# Patient Record
Sex: MALE | Race: WHITE | NOT HISPANIC OR LATINO | Employment: UNEMPLOYED | URBAN - METROPOLITAN AREA
[De-identification: names, ages, dates, MRNs, and addresses within clinical notes are randomized per-mention and may not be internally consistent; named-entity substitution may affect disease eponyms.]

---

## 2017-01-03 ENCOUNTER — OFFICE VISIT (OUTPATIENT)
Dept: ORTHOPEDICS | Facility: CLINIC | Age: 52
End: 2017-01-03

## 2017-01-03 VITALS — WEIGHT: 133 LBS | BODY MASS INDEX: 18.62 KG/M2 | HEIGHT: 71 IN

## 2017-01-03 DIAGNOSIS — S82.101D CLOSED FRACTURE OF PROXIMAL END OF RIGHT TIBIA WITH ROUTINE HEALING, UNSPECIFIED FRACTURE MORPHOLOGY, SUBSEQUENT ENCOUNTER: Primary | ICD-10-CM

## 2017-01-03 NOTE — PROGRESS NOTES
CHIEF COMPLAINT:  Status post right proximal tibia open reduction internal fixation performed on 09/15/2016.      HISTORY OF PRESENT ILLNESS:  Mr. Echevarria presents today for further followup.  Overall, he reports to be doing well.  Denies to have any pain.  Reports to have been nonweightbearing.      PHYSICAL EXAMINATION:  On today's visit, he presented with a knee flexion of 120 degrees with almost full extension, being probably short by 10 degrees.  Alignment presents with a slight valgus but overall is in good alignment.      A CT scan of the proximal tibia was obtained today which were significant for showing complete consolidation of the medial condyle of the proximal tibia with a still partial consolidation across the lateral condyle of the proximal tibia.  Hardware is intact and in place and there is no protrusion into the joint, although the joint is  traumatic and depressed, secondary to the early walking that we witnessed.      ASSESSMENT:  Status post right proximal tibia open reduction internal fixation.      PLAN:  I discussed with the patient that he can proceed with weightbearing as tolerated; however, he will not push through the pain.  A prescription for physical therapy was given to him for this purpose.      The patient will follow up in 2 months from today with plain x-rays of the proximal tibia versus on a p.r.n. basis if his discomfort is acceptable to him.      All questions were answered.  The patient was pleased with the discussion.  The patient was instructed on how to improve his knee extension by hanging a weight from his knee while watching TV or relaxing.  He is to continue with full extension, which is critical in order to avoid a gait without a limp.      TT 15 minutes, CT 10 minutes.

## 2017-01-03 NOTE — Clinical Note
1/3/2017       RE: Delvin Echevarria  [address] [place] [address]     Dear Colleague,    Thank you for referring your patient, Delvin Echevarria, to the [facility] at [facility]. Please see a copy of my visit note below.    CHIEF COMPLAINT:  Status post right proximal tibia open reduction internal fixation performed on 09/15/2016.      HISTORY OF PRESENT ILLNESS:  Mr. Echevarria presents today for further followup.  Overall, he reports to be doing well.  Denies to have any pain.  Reports to have been nonweightbearing.      PHYSICAL EXAMINATION:  On today's visit, he presented with a knee flexion of 120 degrees with almost full extension, being probably short by 10 degrees.  Alignment presents with a slight valgus but overall is in good alignment.      A CT scan of the proximal tibia was obtained today which were significant for showing complete consolidation of the medial condyle of the proximal tibia with a still partial consolidation across the lateral condyle of the proximal tibia.  Hardware is intact and in place and there is no protrusion into the joint, although the joint is  traumatic and depressed, secondary to the early walking that we witnessed.      ASSESSMENT:  Status post right proximal tibia open reduction internal fixation.      PLAN:  I discussed with the patient that he can proceed with weightbearing as tolerated; however, he will not push through the pain.  A prescription for physical therapy was given to him for this purpose.      The patient will follow up in 2 months from today with plain x-rays of the proximal tibia versus on a p.r.n. basis if his discomfort is acceptable to him.      All questions were answered.  The patient was pleased with the discussion.  The patient was instructed on how to improve his knee extension by hanging a weight from his knee while watching TV or relaxing.  He is to continue with full extension, which is  critical in order to avoid a gait without a limp.      TT 15 minutes, CT 10 minutes.         Again, thank you for allowing me to participate in the care of your patient.      Sincerely,    Miguel A Green MD

## 2017-01-03 NOTE — NURSING NOTE
"Reason For Visit:   Chief Complaint   Patient presents with     Surgical Followup     S/P RIGHT proximal tibia fracture with bicondylar fracture, DOS 9/15/16     RECHECK     Pt. states that he is here today for CT Result.       Pain Assessment  Patient Currently in Pain: No               HEIGHT: 5' 11\", WEIGHT: 133 lbs 0 oz, BMI: Body mass index is 18.56 kg/(m^2).      Current Outpatient Prescriptions   Medication Sig Dispense Refill     hydrocortisone (CORTAID) 1 % cream Apply topically 2 times daily       ASPIRIN PO Take 325 mg by mouth daily       Skin Protectants, Misc. (HYDROCERIN) LOTN Apply topically daily       multivitamin, therapeutic with minerals (THERA-VIT-M) TABS Take 1 tablet by mouth daily       NUTRITIONAL SUPPLEMENT LIQD Take 4 oz by mouth daily (with breakfast)        ferrous sulfate (IRON) 325 (65 FE) MG tablet Take 325 mg by mouth 2 times daily       HYDROmorphone (DILAUDID) 4 MG tablet Take 1-1.5 tablets (4-6 mg) by mouth every 3 hours as needed for moderate to severe pain 60 tablet 0     triamcinolone (KENALOG) 0.1 % cream Apply topically 2 times daily 15 g      mineral oil-hydrophilic petrolatum (AQUAPHOR) ointment Apply topically At Bedtime 99 g      magnesium hydroxide (MILK OF MAGNESIA) 400 MG/5ML suspension Take 30 mLs by mouth daily as needed for constipation 105 mL      polyethylene glycol (MIRALAX/GLYCOLAX) packet Take 17 g by mouth 2 times daily 14 packet      senna-docusate (SENOKOT-S;PERICOLACE) 8.6-50 MG per tablet Take 1 tablet by mouth 2 times daily 100 tablet      acetaminophen (TYLENOL) 325 MG tablet Take 3 tablets (975 mg) by mouth every 8 hours 100 tablet      gabapentin (NEURONTIN) 300 MG capsule Take 1 capsule (300 mg) by mouth 3 times daily 90 capsule      cyclobenzaprine (FLEXERIL) 10 MG tablet Take 1 tablet (10 mg) by mouth 3 times daily 90 tablet           Allergies   Allergen Reactions     Seasonal Allergies Other (See Comments)     Congestion sinuses     Codeine " Nausea     No Known Drug Allergies

## 2017-02-09 ENCOUNTER — NURSING HOME VISIT (OUTPATIENT)
Dept: GERIATRICS | Facility: CLINIC | Age: 52
End: 2017-02-09
Payer: COMMERCIAL

## 2017-02-09 DIAGNOSIS — S82.141D TIBIAL PLATEAU FRACTURE, RIGHT, CLOSED, WITH ROUTINE HEALING, SUBSEQUENT ENCOUNTER: Primary | ICD-10-CM

## 2017-02-09 DIAGNOSIS — I10 ESSENTIAL HYPERTENSION, BENIGN: ICD-10-CM

## 2017-02-09 DIAGNOSIS — R05.9 COUGH: ICD-10-CM

## 2017-02-09 PROCEDURE — 99308 SBSQ NF CARE LOW MDM 20: CPT | Performed by: NURSE PRACTITIONER

## 2017-02-10 ENCOUNTER — DOCUMENTATION ONLY (OUTPATIENT)
Dept: OTHER | Facility: CLINIC | Age: 52
End: 2017-02-10

## 2017-02-10 DIAGNOSIS — Z71.89 ACP (ADVANCE CARE PLANNING): Primary | Chronic | ICD-10-CM

## 2017-02-11 VITALS
BODY MASS INDEX: 18.45 KG/M2 | DIASTOLIC BLOOD PRESSURE: 73 MMHG | WEIGHT: 132.2 LBS | TEMPERATURE: 96.8 F | HEART RATE: 85 BPM | SYSTOLIC BLOOD PRESSURE: 111 MMHG | RESPIRATION RATE: 18 BRPM

## 2017-02-11 NOTE — PROGRESS NOTES
"  [facility]    Chief Complaint   Patient presents with     group home Regulatory       HPI:    Delvin Echevarria is a 51 year old  (1965), who is being seen today for a federally mandated E/M visit at [facility] . Today's concerns are:  1. Tibial plateau fracture, right, closed, with routine healing, subsequent encounter    2. Essential hypertension, benign    3. Cough      He tells me today that he is able to place greater on his fractured leg ( with normal healing).  He is also stating he is leaving the facility and will not be back until \"late\".    He feels he is doing very well overall.  He is using only a cane for assistive device.  His blood pressure is very stable, denies any issues with dizziness or lightheadedness.   He also shares he is taking Robitussin for his cough.    ALLERGIES: Seasonal allergies; Codeine; and No known drug allergies  PAST MEDICAL HISTORY:  has a past medical history of Hypertension (3/15/2013). He also has no past medical history of Unspecified cerebral artery occlusion with cerebral infarction, Thyroid disease, COPD (chronic obstructive pulmonary disease) (H), Arthritis, Congestive heart failure, unspecified, Asthma, Coronary artery disease, Diabetes mellitus (H), Malignant neoplasm (H), or Blood transfusion.  PAST SURGICAL HISTORY:  has past surgical history that includes Laryngectomy (Several Years ago); Herniorrhaphy inguinal (7/2/2012); Apply external fixator lower extremity (Right, 9/12/2016); and Open reduction internal fixation tibia (Right, 9/15/2016).  FAMILY HISTORY: family history includes Hypertension in his brother and mother.  SOCIAL HISTORY:  reports that he has been smoking.  He has never used smokeless tobacco. He reports that he does not drink alcohol or use illicit drugs.    MEDICATIONS:  Current Outpatient Prescriptions   Medication Sig Dispense Refill     hydrocortisone (CORTAID) 1 % cream Apply topically 2 times daily   "     ASPIRIN PO Take 325 mg by mouth daily       Skin Protectants, Misc. (HYDROCERIN) LOTN Apply topically daily       multivitamin, therapeutic with minerals (THERA-VIT-M) TABS Take 1 tablet by mouth daily       NUTRITIONAL SUPPLEMENT LIQD Take 4 oz by mouth daily (with breakfast)        ferrous sulfate (IRON) 325 (65 FE) MG tablet Take 325 mg by mouth 2 times daily       HYDROmorphone (DILAUDID) 4 MG tablet Take 1-1.5 tablets (4-6 mg) by mouth every 3 hours as needed for moderate to severe pain 60 tablet 0     triamcinolone (KENALOG) 0.1 % cream Apply topically 2 times daily 15 g      mineral oil-hydrophilic petrolatum (AQUAPHOR) ointment Apply topically At Bedtime 99 g      magnesium hydroxide (MILK OF MAGNESIA) 400 MG/5ML suspension Take 30 mLs by mouth daily as needed for constipation 105 mL      polyethylene glycol (MIRALAX/GLYCOLAX) packet Take 17 g by mouth 2 times daily 14 packet      senna-docusate (SENOKOT-S;PERICOLACE) 8.6-50 MG per tablet Take 1 tablet by mouth 2 times daily 100 tablet      acetaminophen (TYLENOL) 325 MG tablet Take 3 tablets (975 mg) by mouth every 8 hours 100 tablet      gabapentin (NEURONTIN) 300 MG capsule Take 1 capsule (300 mg) by mouth 3 times daily 90 capsule      cyclobenzaprine (FLEXERIL) 10 MG tablet Take 1 tablet (10 mg) by mouth 3 times daily 90 tablet      Medications reviewed:  Medications reconciled to facility chart and changes were made to reflect current medications as identified as above med list. Below are the changes that were made:   Medications stopped since last EPIC medication reconciliation:   There are no discontinued medications.    Medications started since last [facility] medication reconciliation:  No orders of the defined types were placed in this encounter.     Case Management:  I have reviewed the care plan and MDS and do agree with the plan. Patient's desire to return to the community is present, but is not able due to care needs .  Information reviewed:   Medications, vital signs, orders, and nursing notes.    ROS:  4 point ROS including Respiratory, CV, GI and , other than that noted in the HPI,  is negative    Exam:  /73 mmHg  Pulse 85  Temp(Src) 96.8  F (36  C)  Resp 18  Wt 132 lb 3.2 oz (59.966 kg)  GENERAL APPEARANCE:  Alert, in no distress, appears healthy  EYES:  EOM normal, Conjunctiva and lids normal  NECK:  no adenopathy, no thyromegaly  RESP:  lungs clear to auscultation , no respiratory distress  CV:  no edema, rate-normal  ABDOMEN:  bowel sounds normal, soft, non-tender  M/S:   Gait and station normal  Digits and nails normal  SKIN:  Inspection of skin and subcutaneous tissue baseline, Palpation of skin and subcutaneous tissue baseline  NEURO:   Cranial nerves 2-12 are normal tested and grossly at patient's baseline, Examination of sensation by touch normal  PSYCH:  oriented X 3    Lab/Diagnostic data:  Was reviewed from the nursing home chart.    ASSESSMENT/PLAN  1. Tibial plateau fracture, right, closed, with routine healing, subsequent encounter    2. Essential hypertension, benign    3. Cough      Orders:  1.  No changes needed at this time.    Electronically signed by:  XIN De Los Santos CNP

## 2017-02-16 ENCOUNTER — HOSPITAL LABORATORY (OUTPATIENT)
Facility: OTHER | Age: 52
End: 2017-02-16

## 2017-02-16 LAB
ANION GAP SERPL CALCULATED.3IONS-SCNC: 5 MMOL/L (ref 3–14)
BUN SERPL-MCNC: 13 MG/DL (ref 7–30)
CALCIUM SERPL-MCNC: 8.8 MG/DL (ref 8.5–10.1)
CHLORIDE SERPL-SCNC: 108 MMOL/L (ref 94–109)
CO2 SERPL-SCNC: 28 MMOL/L (ref 20–32)
CREAT SERPL-MCNC: 0.68 MG/DL (ref 0.66–1.25)
ERYTHROCYTE [DISTWIDTH] IN BLOOD BY AUTOMATED COUNT: 16.3 % (ref 10–15)
GFR SERPL CREATININE-BSD FRML MDRD: NORMAL ML/MIN/1.7M2
GLUCOSE SERPL-MCNC: 72 MG/DL (ref 70–99)
HCT VFR BLD AUTO: 41 % (ref 40–53)
HGB BLD-MCNC: 13.4 G/DL (ref 13.3–17.7)
IRON SERPL-MCNC: 46 UG/DL (ref 35–180)
MCH RBC QN AUTO: 28.9 PG (ref 26.5–33)
MCHC RBC AUTO-ENTMCNC: 32.7 G/DL (ref 31.5–36.5)
MCV RBC AUTO: 88 FL (ref 78–100)
PLATELET # BLD AUTO: 330 10E9/L (ref 150–450)
POTASSIUM SERPL-SCNC: 4.4 MMOL/L (ref 3.4–5.3)
RBC # BLD AUTO: 4.64 10E12/L (ref 4.4–5.9)
SODIUM SERPL-SCNC: 141 MMOL/L (ref 133–144)
WBC # BLD AUTO: 8.7 10E9/L (ref 4–11)

## 2017-03-01 DIAGNOSIS — S82.201D CLOSED FRACTURE OF RIGHT TIBIA AND FIBULA WITH ROUTINE HEALING: Primary | ICD-10-CM

## 2017-03-01 DIAGNOSIS — S82.401D CLOSED FRACTURE OF RIGHT TIBIA AND FIBULA WITH ROUTINE HEALING: Primary | ICD-10-CM

## 2017-03-07 ENCOUNTER — OFFICE VISIT (OUTPATIENT)
Dept: ORTHOPEDICS | Facility: CLINIC | Age: 52
End: 2017-03-07

## 2017-03-07 DIAGNOSIS — S82.201D CLOSED FRACTURE OF RIGHT TIBIA AND FIBULA WITH ROUTINE HEALING: Primary | ICD-10-CM

## 2017-03-07 DIAGNOSIS — S82.401D CLOSED FRACTURE OF RIGHT TIBIA AND FIBULA WITH ROUTINE HEALING: Primary | ICD-10-CM

## 2017-03-07 NOTE — MR AVS SNAPSHOT
After Visit Summary   3/7/2017    Delvin Echevarria    MRN: [id number]           Patient Information     Date Of Birth          1965        Visit Information        Provider Department      3/7/2017 2:10 PM Miguel A Green MD [facility]        Today's Diagnoses     Closed fracture of right tibia and fibula with routine healing    -  1       Follow-ups after your visit        Additional Services     PHYSICAL THERAPY REFERRAL (External-Prints)       Physical Therapy Referral                  Who to contact     Please call your clinic at [phone] to:    Ask questions about your health    Make or cancel appointments    Discuss your medicines    Learn about your test results    Speak to your doctor   If you have compliments or concerns about an experience at your clinic, or if you wish to file a complaint, please contact [facility] Patient Relations at [phone] or email us at [email or web address]         Additional Information About Your Visit        MyChart Information     [employer] is an electronic gateway that provides easy, online access to your medical records. With [employer] (formerly [employer]), you can request a clinic appointment, read your test results, renew a prescription or communicate with your care team.     To sign up for [employer] visit the website at [email or web address]   You will be asked to enter the access code listed below, as well as some personal information. Please follow the directions to create your username and password.     Your access code is: [email or web address]  Expires: 2017  7:31 AM     Your access code will  in 90 days. If you need help or a new code, please contact your [facility] or call [phone] for assistance.        Care EveryWhere ID     This is your Care EveryWhere ID. This could be used by other organizations to access your [place] medical records  [id number]          Blood Pressure from Last 3 Encounters:   02/09/17 111/73   12/20/16 156/82   12/14/16 (!) 147/109    Weight from Last 3 Encounters:   02/09/17 60 kg (132 lb 3.2 oz)   01/03/17 60.3 kg (133 lb)   12/20/16 59.2 kg (130 lb 9.6 oz)              We Performed the Following     PHYSICAL THERAPY REFERRAL (External-Prints)          Today's Medication Changes          These changes are accurate as of: 3/7/17 11:59 PM.  If you have any questions, ask your nurse or doctor.               Stop taking these medicines if you haven't already. Please contact your care team if you have questions.     HYDROmorphone 4 MG tablet   Commonly known as:  DILAUDID   Stopped by:  Miguel A Green MD           senna-docusate 8.6-50 MG per tablet   Commonly known as:  SENOKOT-S;PERICOLACE   Stopped by:  Miguel A Green MD                    Primary Care Provider Office Phone #    Jessica McXIN [phone]        [facility] [address] [place] [id number]        Thank you!     Thank you for choosing [facility]  for your care. Our goal is always to provide you with excellent care. Hearing back from our patients is one way we can continue to improve our services. Please take a few minutes to complete the written survey that you may receive in the mail after your visit with us. Thank you!             Your Updated Medication List - Protect others around you: Learn how to safely use, store and throw away your medicines at www.disposemymeds.org.          This list is accurate as of: 3/7/17 11:59 PM.  Always use your most recent med list.                   Brand Name Dispense Instructions for use    acetaminophen 325 MG tablet    TYLENOL    100 tablet    Take 3 tablets (975 mg) by mouth every 8 hours       ASPIRIN PO      Take 325 mg by mouth daily       cyclobenzaprine 10 MG tablet    FLEXERIL    90 tablet    Take 1 tablet (10 mg) by mouth 3 times daily       ferrous sulfate 325 (65 FE) MG  tablet    IRON     Take 325 mg by mouth 2 times daily       gabapentin 300 MG capsule    NEURONTIN    90 capsule    Take 1 capsule (300 mg) by mouth 3 times daily       HYDROCERIN Lotn lotion      Apply topically daily       hydrocortisone 1 % cream    CORTAID     Apply topically 2 times daily       magnesium hydroxide 400 MG/5ML suspension    MILK OF MAGNESIA    105 mL    Take 30 mLs by mouth daily as needed for constipation       mineral oil-hydrophilic petrolatum     99 g    Apply topically At Bedtime       multivitamin, therapeutic with minerals Tabs tablet      Take 1 tablet by mouth daily       NUTRITIONAL SUPPLEMENT Liqd      Take 4 oz by mouth daily (with breakfast)       polyethylene glycol Packet    MIRALAX/GLYCOLAX    14 packet    Take 17 g by mouth 2 times daily       triamcinolone 0.1 % cream    KENALOG    15 g    Apply topically 2 times daily

## 2017-03-07 NOTE — LETTER
3/7/2017       RE: Delvin Echevarria  [address] [place] [address]     Dear Colleague,    Thank you for referring your patient, Delvin Echevarria, to the [facility] at [facility]. Please see a copy of my visit note below.    CHIEF COMPLAINT:  Status post right proximal tibia open reduction internal fixation performed 09/15/2016.      HISTORY OF PRESENT ILLNESS:  Mr. Echevarria presents today for further followup.  Denies to have any problems or complaints.  Reports to be walking with a cane which according to the therapist, per patient's report, it is more in his head than a true necessity.        PHYSICAL EXAMINATION:  On today's visit, he presents with a very minimum antalgic gait.  He presents with almost full extension of the knee, maybe short by 5 degrees, and a knee flexion of approximately 100 degrees.  The patient is able to stand from a seated chair without the use of the upper extremity, although this requires a little bit of effort from his part.      RADIOGRAPHIC EVALUATION:  Plain x-rays were reviewed today which were significant for showing what seems to be complete consolidation of the fracture sites.  Hardware is intact and in place.  On the lateral projection, he presents with excellent alignment of the tibial slope.  However, on the AP projection, we know for a fact he presents with some collapse of the lateral plateau given the premature walking that he performed early on in the nursing home.      ASSESSMENT:  Status post right proximal tibia open reduction internal fixation.      PLAN:  I discussed with the patient at this point I emphasized the importance of strengthening along the full extensor mechanism of the knee but also for his core strengthening given the fact that I believe that his limp, which he clearly states is without any pain, is more related to weakness of his core than anything else.      The patient will follow up on a  p.r.n. basis.  He has no activity restrictions.  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      All questions were answered.      TT 15 minutes, CT 10 minutes.         Again, thank you for allowing me to participate in the care of your patient.      Sincerely,    Miguel A Green MD

## 2017-03-07 NOTE — NURSING NOTE
Reason For Visit:   Chief Complaint   Patient presents with     RECHECK     R proximal tibia fx ORIF. DOS 9/15/16.           Pain Assessment  Patient Currently in Pain: Denies

## 2017-03-07 NOTE — PROGRESS NOTES
CHIEF COMPLAINT:  Status post right proximal tibia open reduction internal fixation performed 09/15/2016.      HISTORY OF PRESENT ILLNESS:  Mr. Echevarria presents today for further followup.  Denies to have any problems or complaints.  Reports to be walking with a cane which according to the therapist, per patient's report, it is more in his head than a true necessity.        PHYSICAL EXAMINATION:  On today's visit, he presents with a very minimum antalgic gait.  He presents with almost full extension of the knee, maybe short by 5 degrees, and a knee flexion of approximately 100 degrees.  The patient is able to stand from a seated chair without the use of the upper extremity, although this requires a little bit of effort from his part.      RADIOGRAPHIC EVALUATION:  Plain x-rays were reviewed today which were significant for showing what seems to be complete consolidation of the fracture sites.  Hardware is intact and in place.  On the lateral projection, he presents with excellent alignment of the tibial slope.  However, on the AP projection, we know for a fact he presents with some collapse of the lateral plateau given the premature walking that he performed early on in the nursing home.      ASSESSMENT:  Status post right proximal tibia open reduction internal fixation.      PLAN:  I discussed with the patient at this point I emphasized the importance of strengthening along the full extensor mechanism of the knee but also for his core strengthening given the fact that I believe that his limp, which he clearly states is without any pain, is more related to weakness of his core than anything else.      The patient will follow up on a p.r.n. basis.  He has no activity restrictions.  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      All questions were answered.      TT 15 minutes, CT 10 minutes.

## 2017-03-21 ENCOUNTER — DISCHARGE SUMMARY NURSING HOME (OUTPATIENT)
Dept: GERIATRICS | Facility: CLINIC | Age: 52
End: 2017-03-21

## 2017-03-21 VITALS
WEIGHT: 152.8 LBS | SYSTOLIC BLOOD PRESSURE: 124 MMHG | DIASTOLIC BLOOD PRESSURE: 78 MMHG | RESPIRATION RATE: 20 BRPM | BODY MASS INDEX: 21.31 KG/M2 | TEMPERATURE: 98.4 F | OXYGEN SATURATION: 98 % | HEART RATE: 83 BPM

## 2017-03-21 DIAGNOSIS — S82.141D TIBIAL PLATEAU FRACTURE, RIGHT, CLOSED, WITH ROUTINE HEALING, SUBSEQUENT ENCOUNTER: Primary | ICD-10-CM

## 2017-03-21 DIAGNOSIS — I10 ESSENTIAL HYPERTENSION, BENIGN: ICD-10-CM

## 2017-03-21 NOTE — PROGRESS NOTES
[facility] DISCHARGE SUMMARY    PATIENT'S NAME: Delvin Echevarria  YOB: 1965    PRIMARY CARE PROVIDER AND CLINIC RESPONSIBLE AFTER TRANSFER: VADIM Kapoor [facility]    CODE STATUS/ADVANCE DIRECTIVES DISCUSSION: CPR/Full code     ALLERGIES: Seasonal allergies; Codeine; and No known drug allergies    TRANSFERRING PROVIDERS:  Jessica Mc CNP, Armani Palacios MD   DATE OF SNF ADMISSION:  September / 22 / 2016  DATE OF SNF (anticipated) DISCHARGE: March / 23 / 2017  DISCHARGE DISPOSITION: [facility] Provider  Nursing Facility: [facility] stay 9/10/16  to 9/22/16.     DISCHARGE DIAGNOSIS:   1. Tibial plateau fracture, right, closed, with routine healing, subsequent encounter    2. Essential hypertension, benign        Condition on Discharge:  Improving.    Function:  Ambulatory and completely able to care for self, does use a cane at times.   Cognitive Scores: BIMS 15/15    Equipment: cane      HPI Nursing Facility Course:    Tibial plateau fracture, right, closed, with routine healing, subsequent encounter  Gradual improvement in pain, function. He is now ambulatory with cane, though really only uses this for reassurance. He is ready to return to previous independent living.     Essential hypertension, benign  All medications discontinued,a nd BP stable. Monitor . Previously on lisinopril, metoprolol.   Blood Pressure Summary  03/19/2017 18:57 124/78 mmHg (Sitting l/arm)  03/12/2017 20:10 154/96 mmHg (Sitting r/arm)  02/26/2017 19:49 117/79 mmHg (Sitting l/arm)  02/19/2017 16:41 107/62 mmHg (Lying l/arm)  02/12/2017 20:29 118/90 mmHg (Sitting l/arm)        PAST MEDICAL HISTORY:  has a past medical history of Hypertension (3/15/2013). He also has no past medical history of Arthritis; Asthma; Blood transfusion; Congestive heart failure, unspecified; COPD (chronic obstructive pulmonary disease) (H); Coronary artery  disease; Diabetes mellitus (H); Malignant neoplasm (H); Thyroid disease; or Unspecified cerebral artery occlusion with cerebral infarction.    DISCHARGE MEDICATIONS:  Current Outpatient Prescriptions   Medication Sig Dispense Refill     GUAIFENESIN PO Take 2 teaspoonful by mouth every 6 hours as needed       hydrocortisone (CORTAID) 1 % cream Apply topically 2 times daily       ASPIRIN PO Take 325 mg by mouth daily       Skin Protectants, Misc. (HYDROCERIN) LOTN Apply topically daily       multivitamin, therapeutic with minerals (THERA-VIT-M) TABS Take 1 tablet by mouth daily       ferrous sulfate (IRON) 325 (65 FE) MG tablet Take 325 mg by mouth 2 times daily       triamcinolone (KENALOG) 0.1 % cream Apply topically 2 times daily 15 g      mineral oil-hydrophilic petrolatum (AQUAPHOR) ointment Apply topically At Bedtime 99 g      acetaminophen (TYLENOL) 325 MG tablet Take 3 tablets (975 mg) by mouth every 8 hours 100 tablet      gabapentin (NEURONTIN) 300 MG capsule Take 1 capsule (300 mg) by mouth 3 times daily 90 capsule      cyclobenzaprine (FLEXERIL) 10 MG tablet Take 1 tablet (10 mg) by mouth 3 times daily 90 tablet        DISCHARGE PLAN:  Home with no homecare services    Current [place] scheduled appointments:  No future appointments. - needs follow up with PCP.     Pending labs: none    [facility] labs   Hospital Laboratory on 02/16/2017   Component Date Value Ref Range Status     Sodium 02/16/2017 141  133 - 144 mmol/L Final     Potassium 02/16/2017 4.4  3.4 - 5.3 mmol/L Final     Chloride 02/16/2017 108  94 - 109 mmol/L Final     Carbon Dioxide 02/16/2017 28  20 - 32 mmol/L Final     Anion Gap 02/16/2017 5  3 - 14 mmol/L Final     Glucose 02/16/2017 72  70 - 99 mg/dL Final     Urea Nitrogen 02/16/2017 13  7 - 30 mg/dL Final     Creatinine 02/16/2017 0.68  0.66 - 1.25 mg/dL Final     GFR Estimate 02/16/2017   >60 mL/min/1.7m2 Final                    Value:>90  Non  GFR Calc       GFR Estimate If  Black 02/16/2017   >60 mL/min/1.7m2 Final                    Value:>90   GFR Calc       Calcium 02/16/2017 8.8  8.5 - 10.1 mg/dL Final     WBC 02/16/2017 8.7  4.0 - 11.0 10e9/L Final     RBC Count 02/16/2017 4.64  4.4 - 5.9 10e12/L Final     Hemoglobin 02/16/2017 13.4  13.3 - 17.7 g/dL Final     Hematocrit 02/16/2017 41.0  40.0 - 53.0 % Final     MCV 02/16/2017 88  78 - 100 fl Final     MCH 02/16/2017 28.9  26.5 - 33.0 pg Final     MCHC 02/16/2017 32.7  31.5 - 36.5 g/dL Final     RDW 02/16/2017 16.3* 10.0 - 15.0 % Final     Platelet Count 02/16/2017 330  150 - 450 10e9/L Final     Iron 02/16/2017 46  35 - 180 ug/dL Final         Discharge Treatments:none    No charge visit, as patient not available to be seen. Summary sent to Krystian Streeter .     TOTAL DISCHARGE TIME:   Less than or equal to 30 minutes  Electronically signed by:  Jessica Mc CNP   [facility]  [phone] cell

## 2017-07-10 ENCOUNTER — OFFICE VISIT (OUTPATIENT)
Dept: URGENT CARE | Facility: URGENT CARE | Age: 52
End: 2017-07-10
Payer: COMMERCIAL

## 2017-07-10 VITALS
HEART RATE: 73 BPM | DIASTOLIC BLOOD PRESSURE: 91 MMHG | BODY MASS INDEX: 20.31 KG/M2 | TEMPERATURE: 98.5 F | WEIGHT: 145.6 LBS | SYSTOLIC BLOOD PRESSURE: 148 MMHG | OXYGEN SATURATION: 97 %

## 2017-07-10 DIAGNOSIS — L30.9 ECZEMA, UNSPECIFIED TYPE: Primary | ICD-10-CM

## 2017-07-10 PROCEDURE — 99213 OFFICE O/P EST LOW 20 MIN: CPT | Performed by: NURSE PRACTITIONER

## 2017-07-10 RX ORDER — HYDROXYZINE PAMOATE 25 MG/1
25 CAPSULE ORAL 3 TIMES DAILY PRN
Qty: 30 CAPSULE | Refills: 0 | Status: ON HOLD | OUTPATIENT
Start: 2017-07-10 | End: 2017-08-15

## 2017-07-10 RX ORDER — TRIAMCINOLONE ACETONIDE 1 MG/G
CREAM TOPICAL
Qty: 30 G | Refills: 0 | Status: SHIPPED | OUTPATIENT
Start: 2017-07-10 | End: 2017-07-12

## 2017-07-10 NOTE — MR AVS SNAPSHOT
After Visit Summary   7/10/2017    Delvin Echevarria    MRN: [id number]           Patient Information     Date Of Birth          1965        Visit Information        Provider Department      7/10/2017 5:10 PM Sona Rice APRN CNP [facility]        Today's Diagnoses     Eczema, unspecified type    -  1      Care Instructions    Aveeno baths  Follow up in 1 week if there is no improvement.  Observe for signs of superimposed infection and systemic symptoms  Watch for signs of fever or worsening of the rash.    Use Medication as directed    Follow up with PCP in 2 weeks.    Go to Emergency Room if sx worsen or change.   May use Aveeno eczema cream  prn.    Patient voiced understanding of instructions given.          Atopic Dermatitis (Adult)  Atopic dermatitis is a dry, itchy, red rash. It s also called eczema. The rash is chronic, or ongoing. It can come and go over time. The disease is often passed down in families. It causes a problem with the skin barrier that makes the skin more sensitive to the environment and other factors. The increased skin sensitivity causes an itch, which causes scratching. Scratching can worsen the itching or also break the skin. This can put the skin at risk of infection.  The condition is most common in people with asthma, hay fever, hives, or dry or sensitive skin. The rash may be caused by extreme heat or heavy sweating. Skin irritants can cause the rash to flare up. These can include wool or silk clothing, grease, oils, some medicines, and harsh soaps and detergents. Emotional stress can also be a trigger.  Treatment is done to relieve the itching and inflammation of the skin.  Home care  Follow these tips to care for your condition:    Keep the areas of rash clean by bathing at least every other day. Use lukewarm water to bathe. Don t use hot water, which can dry out the skin.    Don t use soaps with strong detergents. Use mild  soaps made for sensitive skin.    Apply a cream or ointment to damp skin right after bathing.    Avoid things that irritate your skin. Wear absorbent, soft fabrics next to the skin rather than rough or scratchy materials.    Use mild laundry soap free of scents and perfumes. Make sure to rinse all the soap out of your clothes.    Treat any skin infection as directed.    Use oral diphenhydramine to help reduce itching. This is an antihistamine you can buy at drug and grocery stores. It can make you sleepy, so use lower doses during the daytime. Or you can use loratadine. This is an antihistamine that will not make you sleepy. Do not use diphenhydramine if you have glaucoma or have trouble urinating due to an enlarged prostate.  Follow-up care  See your healthcare provider, or as advised. If your symptoms don t get better or if they get worse in the next 7 days, make an appointment with your healthcare provider.  When to seek medical advice  Call your healthcare provider right away  if any of these occur:    Increasing area of redness or pain in the skin    Yellow crusts or wet drainage from the rash    Fever of 100.4 F (38 C) or higher, or as directed by your healthcare provider  Date Last Reviewed: 9/1/2016 2000-2017 [employer]. [address] [place] [address], [place] [address]. All rights reserved. This information is not intended as a substitute for professional medical care. Always follow your healthcare professional's instructions.                Follow-ups after your visit        Your next 10 appointments already scheduled     Jul 12, 2017  1:40 PM CDT   SHORT with Krystian Streeter MD   [facility]    [address] [place] [id number] [phone]              Who to contact     If you have questions or need follow up information about today's clinic visit or your schedule please contact [facility] directly at [phone].  Normal or non-critical lab and imaging results will be communicated to you by MyChart, letter or phone within 4 business days after the clinic has received the results. If you do not hear from us within 7 days, please contact the clinic through [employer] or phone. If you have a critical or abnormal lab result, we will notify you by phone as soon as possible.  Submit refill requests through [employer] or call your pharmacy and they will forward the refill request to us. Please allow 3 business days for your refill to be completed.          Additional Information About Your Visit        [employer] Information     [employer] lets you send messages to your doctor, view your test results, renew your prescriptions, schedule appointments and more. To sign up, go to [email or web address] . Click on \"Log in\" on the left side of the screen, which will take you to the Welcome page. Then click on \"Sign up Now\" on the right side of the page.     You will be asked to enter the access code listed below, as well as some personal information. Please follow the directions to create your username and password.     Your access code is: [email or web address]  Expires: 10/8/2017  5:49 PM     Your access code will  in 90 days. If you need help or a new code, please call your [facility] clinic or [phone].        Care EveryWhere ID     This is your Care EveryWhere ID. This could be used by other organizations to access your [facility] medical records  [id number]        Your Vitals Were     Pulse Temperature Pulse Oximetry BMI (Body Mass Index)          73 98.5  F (36.9  C) (Tympanic) 97% 20.31 kg/m2         Blood Pressure from Last 3 Encounters:   07/10/17 (!) 148/91   17 124/78   17 111/73    Weight from Last 3 Encounters:   07/10/17 145 lb 9.6 oz (66 kg)   17 152 lb 12.8 oz (69.3 kg)   17 132 lb 3.2 oz (60 kg)              Today, you had the following     No orders found for display       "   Today's Medication Changes          These changes are accurate as of: 7/10/17  5:49 PM.  If you have any questions, ask your nurse or doctor.               These medicines have changed or have updated prescriptions.        Dose/Directions    * triamcinolone 0.1 % cream   Commonly known as:  KENALOG   This may have changed:  Another medication with the same name was added. Make sure you understand how and when to take each.   Used for:  Atopic dermatitis, unspecified type        Apply topically 2 times daily   Quantity:  15 g   Refills:  0       * triamcinolone 0.1 % cream   Commonly known as:  KENALOG   This may have changed:  You were already taking a medication with the same name, and this prescription was added. Make sure you understand how and when to take each.   Used for:  Eczema, unspecified type   Changed by:  Sona Rice APRN CNP        Apply sparingly to affected area three times daily for 14 days.   Quantity:  30 g   Refills:  0       * Notice:  This list has 2 medication(s) that are the same as other medications prescribed for you. Read the directions carefully, and ask your doctor or other care provider to review them with you.         Where to get your medicines      These medications were sent to [facility] [place] [id number]     Phone:  [phone]     triamcinolone 0.1 % cream                Primary Care Provider Office Phone # Fax #    Krystian Streeter [phone]       [address] [address]        Equal Access to Services     GAYLA CRAWFORD AH: Hadii aad ku hadasho Solimaali, waaxda luqadaha, qaybta kaalmada adeyariyada, charleen coleman. So [facility] [phone].    ATENCIÓN: Si habla español, tiene a alcazar disposición servicios gratuitos de asistencia lingüística. Llame al [phone].    We comply with applicable  federal civil rights laws and [place] laws. We do not discriminate on the basis of race, color, national origin, age, disability sex, sexual orientation or gender identity.            Thank you!     Thank you for choosing [facility]  for your care. Our goal is always to provide you with excellent care. Hearing back from our patients is one way we can continue to improve our services. Please take a few minutes to complete the written survey that you may receive in the mail after your visit with us. Thank you!             Your Updated Medication List - Protect others around you: Learn how to safely use, store and throw away your medicines at www.disposemymeds.org.          This list is accurate as of: 7/10/17  5:49 PM.  Always use your most recent med list.                   Brand Name Dispense Instructions for use Diagnosis    acetaminophen 325 MG tablet    TYLENOL    100 tablet    Take 3 tablets (975 mg) by mouth every 8 hours    Tibial plateau fracture, right, closed, initial encounter       ASPIRIN PO      Take 325 mg by mouth daily        cyclobenzaprine 10 MG tablet    FLEXERIL    90 tablet    Take 1 tablet (10 mg) by mouth 3 times daily    Pain of right lower extremity       ferrous sulfate 325 (65 FE) MG tablet    IRON     Take 325 mg by mouth 2 times daily        gabapentin 300 MG capsule    NEURONTIN    90 capsule    Take 1 capsule (300 mg) by mouth 3 times daily    Pain of right lower extremity       GUAIFENESIN PO      Take 2 teaspoonful by mouth every 6 hours as needed        HYDROCERIN Lotn lotion      Apply topically daily        hydrocortisone 1 % cream    CORTAID     Apply topically 2 times daily        mineral oil-hydrophilic petrolatum     99 g    Apply topically At Bedtime    Atopic dermatitis, unspecified type       multivitamin, therapeutic with minerals Tabs tablet      Take 1 tablet by mouth daily        * triamcinolone 0.1 % cream    KENALOG    15 g    Apply  topically 2 times daily    Atopic dermatitis, unspecified type       * triamcinolone 0.1 % cream    KENALOG    30 g    Apply sparingly to affected area three times daily for 14 days.    Eczema, unspecified type       * Notice:  This list has 2 medication(s) that are the same as other medications prescribed for you. Read the directions carefully, and ask your doctor or other care provider to review them with you.

## 2017-07-10 NOTE — PROGRESS NOTES
SUBJECTIVE:  Delvin Echevarria is a 52 year old male who presents to the clinic today for a rash.  Onset of rash was 1 month(s) ago.   Rash is gradual onset.  Location of the rash: arm, lower and lower leg.  Quality/symptoms of rash: itching   Symptoms are moderate and rash seems to be worsening.  Previous history of a similar rash? No  Recent exposure history: none known    Associated symptoms include: nothing.    Past Medical History:   Diagnosis Date     Hypertension 3/15/2013     Current Outpatient Prescriptions   Medication Sig Dispense Refill     ASPIRIN PO Take 325 mg by mouth daily       acetaminophen (TYLENOL) 325 MG tablet Take 3 tablets (975 mg) by mouth every 8 hours 100 tablet      GUAIFENESIN PO Take 2 teaspoonful by mouth every 6 hours as needed       hydrocortisone (CORTAID) 1 % cream Apply topically 2 times daily       Skin Protectants, Misc. (HYDROCERIN) LOTN Apply topically daily       multivitamin, therapeutic with minerals (THERA-VIT-M) TABS Take 1 tablet by mouth daily       ferrous sulfate (IRON) 325 (65 FE) MG tablet Take 325 mg by mouth 2 times daily       triamcinolone (KENALOG) 0.1 % cream Apply topically 2 times daily (Patient not taking: Reported on 7/10/2017) 15 g      mineral oil-hydrophilic petrolatum (AQUAPHOR) ointment Apply topically At Bedtime (Patient not taking: Reported on 7/10/2017) 99 g      gabapentin (NEURONTIN) 300 MG capsule Take 1 capsule (300 mg) by mouth 3 times daily (Patient not taking: Reported on 7/10/2017) 90 capsule      cyclobenzaprine (FLEXERIL) 10 MG tablet Take 1 tablet (10 mg) by mouth 3 times daily (Patient not taking: Reported on 7/10/2017) 90 tablet      Social History   Substance Use Topics     Smoking status: Current Every Day Smoker     Packs/day: 1.00     Years: 10.00     Smokeless tobacco: Never Used      Comment: quit date of accident and has not restarted-9/10/16     Alcohol use No       ROS:  CONSTITUTIONAL:NEGATIVE for fever, chills, change in  weight  INTEGUMENTARY/SKIN: See above  EYES: NEGATIVE for vision changes or irritation  ENT/MOUTH: NEGATIVE for ear, mouth and throat problems  RESP:NEGATIVE for significant cough or SOB  CV: NEGATIVE for chest pain, palpitations or peripheral edema  MUSCULOSKELETAL: NEGATIVE for significant arthralgias or myalgia  NEURO: NEGATIVE for weakness, dizziness or paresthesias    EXAM:   BP (!) 148/91  Pulse 73  Temp 98.5  F (36.9  C) (Tympanic)  Wt 145 lb 9.6 oz (66 kg)  SpO2 97%  BMI 20.31 kg/m2  GENERAL: alert, no acute distress.  SKIN:    Distribution: localized  Location: arm, lower and lower leg  Examination of the rash reveals: Eczema: dry, slightly raised, red patches with mild flaking.  GENERAL APPEARANCE: healthy, alert and no distress  EYES: EOMI,  PERRL, conjunctiva clear  NECK: supple, non-tender to palpation, no adenopathy noted  RESP: lungs clear to auscultation - no rales, rhonchi or wheezes  CV: regular rates and rhythm, normal S1 S2, no murmur noted    ASSESSMENT:    ICD-10-CM    1. Eczema, unspecified type L30.9 triamcinolone (KENALOG) 0.1 % cream     hydrOXYzine (VISTARIL) 25 MG capsule         PLAN:  Patient Instructions   Aveeno baths  Follow up in 1 week if there is no improvement.  Observe for signs of superimposed infection and systemic symptoms  Watch for signs of fever or worsening of the rash.    Use Medication as directed    Follow up with PCP in 2 weeks.    Go to Emergency Room if sx worsen or change.   May use Aveeno eczema cream  prn.    Patient voiced understanding of instructions given.          Atopic Dermatitis (Adult)  Atopic dermatitis is a dry, itchy, red rash. It s also called eczema. The rash is chronic, or ongoing. It can come and go over time. The disease is often passed down in families. It causes a problem with the skin barrier that makes the skin more sensitive to the environment and other factors. The increased skin sensitivity causes an itch, which causes scratching.  Scratching can worsen the itching or also break the skin. This can put the skin at risk of infection.  The condition is most common in people with asthma, hay fever, hives, or dry or sensitive skin. The rash may be caused by extreme heat or heavy sweating. Skin irritants can cause the rash to flare up. These can include wool or silk clothing, grease, oils, some medicines, and harsh soaps and detergents. Emotional stress can also be a trigger.  Treatment is done to relieve the itching and inflammation of the skin.  Home care  Follow these tips to care for your condition:    Keep the areas of rash clean by bathing at least every other day. Use lukewarm water to bathe. Don t use hot water, which can dry out the skin.    Don t use soaps with strong detergents. Use mild soaps made for sensitive skin.    Apply a cream or ointment to damp skin right after bathing.    Avoid things that irritate your skin. Wear absorbent, soft fabrics next to the skin rather than rough or scratchy materials.    Use mild laundry soap free of scents and perfumes. Make sure to rinse all the soap out of your clothes.    Treat any skin infection as directed.    Use oral diphenhydramine to help reduce itching. This is an antihistamine you can buy at drug and grocery stores. It can make you sleepy, so use lower doses during the daytime. Or you can use loratadine. This is an antihistamine that will not make you sleepy. Do not use diphenhydramine if you have glaucoma or have trouble urinating due to an enlarged prostate.  Follow-up care  See your healthcare provider, or as advised. If your symptoms don t get better or if they get worse in the next 7 days, make an appointment with your healthcare provider.  When to seek medical advice  Call your healthcare provider right away  if any of these occur:    Increasing area of redness or pain in the skin    Yellow crusts or wet drainage from the rash    Fever of 100.4 F (38 C) or higher, or as directed by  your healthcare provider  Date Last Reviewed: 9/1/2016 2000-2017 [employer]. [address] [place] [address], [place] [address]. All rights reserved. This information is not intended as a substitute for professional medical care. Always follow your healthcare professional's instructions.              XIN Gomez CNP

## 2017-07-10 NOTE — PATIENT INSTRUCTIONS
Aveeno baths  Follow up in 1 week if there is no improvement.  Observe for signs of superimposed infection and systemic symptoms  Watch for signs of fever or worsening of the rash.    Use Medication as directed    Follow up with PCP in 2 weeks.    Go to Emergency Room if sx worsen or change.   May use Aveeno eczema cream  prn.    Patient voiced understanding of instructions given.          Atopic Dermatitis (Adult)  Atopic dermatitis is a dry, itchy, red rash. It s also called eczema. The rash is chronic, or ongoing. It can come and go over time. The disease is often passed down in families. It causes a problem with the skin barrier that makes the skin more sensitive to the environment and other factors. The increased skin sensitivity causes an itch, which causes scratching. Scratching can worsen the itching or also break the skin. This can put the skin at risk of infection.  The condition is most common in people with asthma, hay fever, hives, or dry or sensitive skin. The rash may be caused by extreme heat or heavy sweating. Skin irritants can cause the rash to flare up. These can include wool or silk clothing, grease, oils, some medicines, and harsh soaps and detergents. Emotional stress can also be a trigger.  Treatment is done to relieve the itching and inflammation of the skin.  Home care  Follow these tips to care for your condition:    Keep the areas of rash clean by bathing at least every other day. Use lukewarm water to bathe. Don t use hot water, which can dry out the skin.    Don t use soaps with strong detergents. Use mild soaps made for sensitive skin.    Apply a cream or ointment to damp skin right after bathing.    Avoid things that irritate your skin. Wear absorbent, soft fabrics next to the skin rather than rough or scratchy materials.    Use mild laundry soap free of scents and perfumes. Make sure to rinse all the soap out of your clothes.    Treat any skin infection as directed.    Use oral  diphenhydramine to help reduce itching. This is an antihistamine you can buy at drug and grocery stores. It can make you sleepy, so use lower doses during the daytime. Or you can use loratadine. This is an antihistamine that will not make you sleepy. Do not use diphenhydramine if you have glaucoma or have trouble urinating due to an enlarged prostate.  Follow-up care  See your healthcare provider, or as advised. If your symptoms don t get better or if they get worse in the next 7 days, make an appointment with your healthcare provider.  When to seek medical advice  Call your healthcare provider right away  if any of these occur:    Increasing area of redness or pain in the skin    Yellow crusts or wet drainage from the rash    Fever of 100.4 F (38 C) or higher, or as directed by your healthcare provider  Date Last Reviewed: 9/1/2016 2000-2017 [employer]. [address] [place] [address], [place] [address]. All rights reserved. This information is not intended as a substitute for professional medical care. Always follow your healthcare professional's instructions.

## 2017-07-12 ENCOUNTER — OFFICE VISIT (OUTPATIENT)
Dept: FAMILY MEDICINE | Facility: CLINIC | Age: 52
End: 2017-07-12
Payer: COMMERCIAL

## 2017-07-12 VITALS
WEIGHT: 146 LBS | SYSTOLIC BLOOD PRESSURE: 130 MMHG | BODY MASS INDEX: 20.44 KG/M2 | DIASTOLIC BLOOD PRESSURE: 78 MMHG | TEMPERATURE: 97.5 F | HEIGHT: 71 IN | HEART RATE: 76 BPM

## 2017-07-12 DIAGNOSIS — M79.604 PAIN OF RIGHT LOWER EXTREMITY: ICD-10-CM

## 2017-07-12 PROCEDURE — 99214 OFFICE O/P EST MOD 30 MIN: CPT | Performed by: FAMILY MEDICINE

## 2017-07-12 RX ORDER — GABAPENTIN 300 MG/1
300 CAPSULE ORAL 3 TIMES DAILY
Qty: 75 CAPSULE | Refills: 1 | Status: SHIPPED | OUTPATIENT
Start: 2017-07-12 | End: 2017-10-31

## 2017-07-12 RX ORDER — CYCLOBENZAPRINE HCL 10 MG
10 TABLET ORAL 3 TIMES DAILY
Qty: 75 TABLET | Refills: 1 | Status: SHIPPED | OUTPATIENT
Start: 2017-07-12 | End: 2017-09-23

## 2017-07-12 NOTE — MR AVS SNAPSHOT
"              After Visit Summary   7/12/2017    Delvin Echevarria    MRN: [id number]           Patient Information     Date Of Birth          1965        Visit Information        Provider Department      7/12/2017 1:40 PM Krystian Streeter MD [facility]        Today's Diagnoses     Pain of right lower extremity          Care Instructions    1. LETS START SOME MUSCLE BUILDING    2. RESUME THE MEDICATION FOR THREE WEEKS    3. COME BACK AND SEE ME IN ONE MONTH.          Follow-ups after your visit        Additional Services     PHYSICAL THERAPY REFERRAL       *This therapy referral will be filtered to a centralized scheduling office at [facility] and the patient will receive a call to schedule an appointment at a [facility] location most convenient for them. *     [facility] provides Physical Therapy evaluation and treatment and many specialty services across the [facility] system.  If requesting a specialty program, please choose from the list below.    If you have not heard from the scheduling office within 2 business days, please call [phone] for all locations, with the exception of [place], please call [phone].  Treatment: Evaluation & Treatment  Special Instructions/Modalities: HE NEEDS MUSCLE BUILDING   Special Programs: None    Please be aware that coverage of these services is subject to the terms and limitations of your health insurance plan.  Call member services at your health plan with any benefit or coverage questions.      **Note to Provider:  If you are referring outside of [facility] for the therapy appointment, please list the name of the location in the \"special instructions\" above, print the referral and give to the patient to schedule the appointment.                  Who to contact     If you have questions or need follow up information about today's clinic visit or your schedule please contact [facility] directly at [phone].  Normal or non-critical lab and imaging results will be communicated to you by MyChart, letter or phone within 4 business days after the clinic has received the results. If you do not hear from us within 7 days, please contact the clinic through [employer] or phone. If you have a critical or abnormal lab result, we will notify you by phone as soon as possible.  Submit refill requests through [employer] or call your pharmacy and they will forward the refill request to us. Please allow 3 business days for your refill to be completed.          Additional Information About Your Visit        [employer] Information     [employer] lets you send messages to your doctor, view your test results, renew your prescriptions, schedule appointments and more. To sign up, go to [email or web address] . Click on \"Log in\" on the left side of the screen, which will take you to the Welcome page. Then click on \"Sign up Now\" on the right side of the page.     You will be asked to enter the access code listed below, as well as some personal information. Please follow the directions to create your username and password.     Your access code is: [email or web address]  Expires: 10/8/2017  5:49 PM     Your access code will  in 90 days. If you need help or a new code, please call your [facility] clinic or [phone].        Care EveryWhere ID     This is your Care EveryWhere ID. This could be used by other organizations to access your [facility] medical records  [id number]        Your Vitals Were     Pulse Temperature Height BMI (Body Mass Index)          76 97.5  F (36.4  C) (Tympanic) 5' 11\" (1.803 m) 20.36 kg/m2         Blood Pressure from Last 3 Encounters:   17 130/78   07/10/17 (!) 148/91   17 124/78    Weight from Last 3 Encounters:   17 146 lb (66.2 kg)   07/10/17 145 lb 9.6 oz (66 kg)   17 152 lb 12.8 oz (69.3 kg)              We Performed the Following     PHYSICAL THERAPY REFERRAL        "   Where to get your medicines      These medications were sent to [facility] [place] - [address] [place] [address]  [address] [place] [address]     Phone:  [phone]     cyclobenzaprine 10 MG tablet    gabapentin 300 MG capsule          Primary Care Provider Office Phone # Fax #    Krystian Tadeo Streeter [phone]       [place] [address] [facility] [address]        Equal Access to Services     GAYLA CRAWFORD : Hadii aad ku hadasho Soomaali, waaxda luqadaha, qaybta kaalmada adeegyada, waxay idiin hayaan adeeg kharash laisabelle coleman. So [facility] [phone].    ATENCIÓN: Si habla español, tiene a alcazar disposición servicios gratuitos de asistencia lingüística. [facility] [phone].    We comply with applicable federal civil rights laws and [place] laws. We do not discriminate on the basis of race, color, national origin, age, disability sex, sexual orientation or gender identity.            Thank you!     Thank you for choosing [facility]  for your care. Our goal is always to provide you with excellent care. Hearing back from our patients is one way we can continue to improve our services. Please take a few minutes to complete the written survey that you may receive in the mail after your visit with us. Thank you!             Your Updated Medication List - Protect others around you: Learn how to safely use, store and throw away your medicines at www.disposemymeds.org.          This list is accurate as of: 7/12/17  2:08 PM.  Always use your most recent med list.                   Brand Name Dispense Instructions for use Diagnosis    acetaminophen 325 MG tablet    TYLENOL    100 tablet    Take 3 tablets (975 mg) by mouth every 8 hours    Tibial plateau fracture, right, closed, initial encounter       ASPIRIN PO      Take 325 mg by mouth daily        cyclobenzaprine 10 MG tablet    FLEXERIL    75 tablet    Take 1 tablet  (10 mg) by mouth 3 times daily    Pain of right lower extremity       gabapentin 300 MG capsule    NEURONTIN    75 capsule    Take 1 capsule (300 mg) by mouth 3 times daily    Pain of right lower extremity       HYDROCERIN Lotn lotion      Apply topically daily        hydrocortisone 1 % cream    CORTAID     Apply topically 2 times daily        hydrOXYzine 25 MG capsule    VISTARIL    30 capsule    Take 1 capsule (25 mg) by mouth 3 times daily as needed for itching    Eczema, unspecified type       mineral oil-hydrophilic petrolatum     99 g    Apply topically At Bedtime    Atopic dermatitis, unspecified type       triamcinolone 0.1 % cream    KENALOG    15 g    Apply topically 2 times daily    Atopic dermatitis, unspecified type
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
## 2017-07-12 NOTE — PATIENT INSTRUCTIONS
1. LETS START SOME MUSCLE BUILDING    2. RESUME THE MEDICATION FOR THREE WEEKS    3. COME BACK AND SEE ME IN ONE MONTH.

## 2017-07-12 NOTE — NURSING NOTE
"Chief Complaint   Patient presents with     Surgical Followup     Leg surgery       Initial /78 (BP Location: Right arm, Cuff Size: Adult Regular)  Pulse 76  Temp 97.5  F (36.4  C) (Tympanic)  Ht 5' 11\" (1.803 m)  Wt 146 lb (66.2 kg)  BMI 20.36 kg/m2 Estimated body mass index is 20.36 kg/(m^2) as calculated from the following:    Height as of this encounter: 5' 11\" (1.803 m).    Weight as of this encounter: 146 lb (66.2 kg).  Medication Reconciliation: complete    Health Maintenance that is potentially due pending provider review:  Colonoscopy/FIT and lipids, hep c screening, Tdap    Possibly completing today per provider review.    Lucia Lopez MA       "

## 2017-07-17 ENCOUNTER — HOSPITAL ENCOUNTER (OUTPATIENT)
Dept: PHYSICAL THERAPY | Facility: CLINIC | Age: 52
Setting detail: THERAPIES SERIES
End: 2017-07-17
Attending: FAMILY MEDICINE
Payer: COMMERCIAL

## 2017-07-17 PROCEDURE — 97110 THERAPEUTIC EXERCISES: CPT | Mod: GP

## 2017-07-17 PROCEDURE — 40000718 ZZHC STATISTIC PT DEPARTMENT ORTHO VISIT

## 2017-07-17 PROCEDURE — 97161 PT EVAL LOW COMPLEX 20 MIN: CPT | Mod: GP
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
## 2017-07-20 ENCOUNTER — HOSPITAL ENCOUNTER (OUTPATIENT)
Dept: PHYSICAL THERAPY | Facility: CLINIC | Age: 52
Setting detail: THERAPIES SERIES
End: 2017-07-20
Attending: FAMILY MEDICINE
Payer: COMMERCIAL

## 2017-07-20 PROCEDURE — 40000718 ZZHC STATISTIC PT DEPARTMENT ORTHO VISIT

## 2017-07-20 PROCEDURE — 97110 THERAPEUTIC EXERCISES: CPT | Mod: GP

## 2017-07-24 ENCOUNTER — HOSPITAL ENCOUNTER (OUTPATIENT)
Dept: PHYSICAL THERAPY | Facility: CLINIC | Age: 52
Setting detail: THERAPIES SERIES
End: 2017-07-24
Attending: FAMILY MEDICINE
Payer: COMMERCIAL

## 2017-07-24 PROCEDURE — 97110 THERAPEUTIC EXERCISES: CPT | Mod: GP

## 2017-07-24 PROCEDURE — 40000718 ZZHC STATISTIC PT DEPARTMENT ORTHO VISIT

## 2017-07-26 ENCOUNTER — HOSPITAL ENCOUNTER (OUTPATIENT)
Dept: PHYSICAL THERAPY | Facility: CLINIC | Age: 52
Setting detail: THERAPIES SERIES
End: 2017-07-26
Attending: FAMILY MEDICINE
Payer: COMMERCIAL

## 2017-07-26 PROCEDURE — 97140 MANUAL THERAPY 1/> REGIONS: CPT | Mod: GP

## 2017-07-26 PROCEDURE — 97110 THERAPEUTIC EXERCISES: CPT | Mod: GP

## 2017-07-26 PROCEDURE — 40000718 ZZHC STATISTIC PT DEPARTMENT ORTHO VISIT

## 2017-08-01 ENCOUNTER — HOSPITAL ENCOUNTER (OUTPATIENT)
Dept: PHYSICAL THERAPY | Facility: CLINIC | Age: 52
Setting detail: THERAPIES SERIES
End: 2017-08-01
Attending: FAMILY MEDICINE
Payer: COMMERCIAL

## 2017-08-01 PROCEDURE — 40000718 ZZHC STATISTIC PT DEPARTMENT ORTHO VISIT

## 2017-08-01 PROCEDURE — 97140 MANUAL THERAPY 1/> REGIONS: CPT | Mod: GP

## 2017-08-01 PROCEDURE — 97110 THERAPEUTIC EXERCISES: CPT | Mod: GP

## 2017-08-08 ENCOUNTER — HOSPITAL ENCOUNTER (OUTPATIENT)
Dept: PHYSICAL THERAPY | Facility: CLINIC | Age: 52
Setting detail: THERAPIES SERIES
End: 2017-08-08
Attending: FAMILY MEDICINE
Payer: COMMERCIAL

## 2017-08-10 ENCOUNTER — APPOINTMENT (OUTPATIENT)
Dept: GENERAL RADIOLOGY | Facility: CLINIC | Age: 52
DRG: 493 | End: 2017-08-10
Attending: ORTHOPAEDIC SURGERY
Payer: COMMERCIAL

## 2017-08-10 ENCOUNTER — OFFICE VISIT (OUTPATIENT)
Dept: URGENT CARE | Facility: URGENT CARE | Age: 52
End: 2017-08-10
Payer: COMMERCIAL

## 2017-08-10 ENCOUNTER — HOSPITAL ENCOUNTER (INPATIENT)
Facility: CLINIC | Age: 52
LOS: 5 days | Discharge: NURSING, REHAB OR LONG-TERM CARE | DRG: 493 | End: 2017-08-15
Attending: ORTHOPAEDIC SURGERY | Admitting: ORTHOPAEDIC SURGERY
Payer: COMMERCIAL

## 2017-08-10 VITALS
RESPIRATION RATE: 20 BRPM | TEMPERATURE: 97.5 F | WEIGHT: 147 LBS | DIASTOLIC BLOOD PRESSURE: 97 MMHG | HEART RATE: 96 BPM | BODY MASS INDEX: 20.5 KG/M2 | SYSTOLIC BLOOD PRESSURE: 152 MMHG

## 2017-08-10 DIAGNOSIS — L02.91 ABSCESS: Primary | ICD-10-CM

## 2017-08-10 DIAGNOSIS — I10 ESSENTIAL HYPERTENSION, BENIGN: ICD-10-CM

## 2017-08-10 DIAGNOSIS — S82.141A TIBIAL PLATEAU FRACTURE, RIGHT, CLOSED, INITIAL ENCOUNTER: ICD-10-CM

## 2017-08-10 DIAGNOSIS — L30.9 ECZEMA, UNSPECIFIED TYPE: ICD-10-CM

## 2017-08-10 DIAGNOSIS — L02.419 CELLULITIS AND ABSCESS OF LEG: Primary | ICD-10-CM

## 2017-08-10 DIAGNOSIS — M79.604 PAIN OF RIGHT LOWER EXTREMITY: ICD-10-CM

## 2017-08-10 DIAGNOSIS — L03.119 CELLULITIS AND ABSCESS OF LEG: Primary | ICD-10-CM

## 2017-08-10 LAB
BASOPHILS # BLD AUTO: 0.1 10E9/L (ref 0–0.2)
BASOPHILS NFR BLD AUTO: 0.8 %
DIFFERENTIAL METHOD BLD: ABNORMAL
EOSINOPHIL # BLD AUTO: 0.3 10E9/L (ref 0–0.7)
EOSINOPHIL NFR BLD AUTO: 5.5 %
ERYTHROCYTE [DISTWIDTH] IN BLOOD BY AUTOMATED COUNT: 13.1 % (ref 10–15)
ERYTHROCYTE [SEDIMENTATION RATE] IN BLOOD BY WESTERGREN METHOD: 56 MM/H (ref 0–20)
HCT VFR BLD AUTO: 36 % (ref 40–53)
HGB BLD-MCNC: 11.9 G/DL (ref 13.3–17.7)
LYMPHOCYTES # BLD AUTO: 2.1 10E9/L (ref 0.8–5.3)
LYMPHOCYTES NFR BLD AUTO: 33.8 %
MCH RBC QN AUTO: 30.6 PG (ref 26.5–33)
MCHC RBC AUTO-ENTMCNC: 33.1 G/DL (ref 31.5–36.5)
MCV RBC AUTO: 93 FL (ref 78–100)
MONOCYTES # BLD AUTO: 0.4 10E9/L (ref 0–1.3)
MONOCYTES NFR BLD AUTO: 6.1 %
NEUTROPHILS # BLD AUTO: 3.3 10E9/L (ref 1.6–8.3)
NEUTROPHILS NFR BLD AUTO: 53.8 %
PLATELET # BLD AUTO: 328 10E9/L (ref 150–450)
RBC # BLD AUTO: 3.89 10E12/L (ref 4.4–5.9)
WBC # BLD AUTO: 6.2 10E9/L (ref 4–11)

## 2017-08-10 PROCEDURE — 73590 X-RAY EXAM OF LOWER LEG: CPT | Mod: RT

## 2017-08-10 PROCEDURE — 99214 OFFICE O/P EST MOD 30 MIN: CPT | Performed by: NURSE PRACTITIONER

## 2017-08-10 PROCEDURE — 36415 COLL VENOUS BLD VENIPUNCTURE: CPT | Performed by: NURSE PRACTITIONER

## 2017-08-10 PROCEDURE — 87186 SC STD MICRODIL/AGAR DIL: CPT | Performed by: NURSE PRACTITIONER

## 2017-08-10 PROCEDURE — 93005 ELECTROCARDIOGRAM TRACING: CPT | Performed by: FAMILY MEDICINE

## 2017-08-10 PROCEDURE — 87070 CULTURE OTHR SPECIMN AEROBIC: CPT | Performed by: NURSE PRACTITIONER

## 2017-08-10 PROCEDURE — 12000001 ZZH R&B MED SURG/OB UMMC

## 2017-08-10 PROCEDURE — 87077 CULTURE AEROBIC IDENTIFY: CPT | Performed by: NURSE PRACTITIONER

## 2017-08-10 PROCEDURE — 85652 RBC SED RATE AUTOMATED: CPT | Performed by: NURSE PRACTITIONER

## 2017-08-10 PROCEDURE — 99207 ZZC MOONLIGHTING INDICATOR: CPT | Performed by: INTERNAL MEDICINE

## 2017-08-10 PROCEDURE — 85025 COMPLETE CBC W/AUTO DIFF WBC: CPT | Performed by: NURSE PRACTITIONER

## 2017-08-10 PROCEDURE — 93010 ELECTROCARDIOGRAM REPORT: CPT | Performed by: INTERNAL MEDICINE

## 2017-08-10 RX ORDER — CEFAZOLIN SODIUM 1 G/3ML
1 INJECTION, POWDER, FOR SOLUTION INTRAMUSCULAR; INTRAVENOUS SEE ADMIN INSTRUCTIONS
Status: DISCONTINUED | OUTPATIENT
Start: 2017-08-10 | End: 2017-08-11 | Stop reason: HOSPADM

## 2017-08-10 RX ORDER — ACETAMINOPHEN 500 MG
1000 TABLET ORAL ONCE
Status: DISCONTINUED | OUTPATIENT
Start: 2017-08-10 | End: 2017-08-11 | Stop reason: HOSPADM

## 2017-08-10 RX ORDER — CEFAZOLIN SODIUM 2 G/100ML
2 INJECTION, SOLUTION INTRAVENOUS
Status: DISCONTINUED | OUTPATIENT
Start: 2017-08-10 | End: 2017-08-11 | Stop reason: HOSPADM

## 2017-08-10 ASSESSMENT — ACTIVITIES OF DAILY LIVING (ADL)
FALL_HISTORY_WITHIN_LAST_SIX_MONTHS: NO
DRESS: 0-->INDEPENDENT
BATHING: 0-->INDEPENDENT
RETIRED_EATING: 0-->INDEPENDENT
TOILETING: 0-->INDEPENDENT
SWALLOWING: 0-->SWALLOWS FOODS/LIQUIDS WITHOUT DIFFICULTY
TRANSFERRING: 1-->ASSISTIVE EQUIPMENT
COGNITION: 0 - NO COGNITION ISSUES REPORTED
AMBULATION: 1-->ASSISTIVE EQUIPMENT
RETIRED_COMMUNICATION: 0-->UNDERSTANDS/COMMUNICATES WITHOUT DIFFICULTY

## 2017-08-10 NOTE — PATIENT INSTRUCTIONS
You will be directly admitted to the [facility] unit [facility] on the [place].    You are to go to the  and let them know you are a direct admit to 8A     Abscess (Incision & Drainage)  An abscess is sometimes called a boil. It happens when bacteria get trapped under the skin and start to grow. Pus forms inside the abscess as the body responds to the bacteria. An abscess can happen with an insect bite, ingrown hair, blocked oil gland, pimple, cyst, or puncture wound.  Your healthcare provider has drained the pus from your abscess. If the abscess pocket was large, your healthcare provider may have put in gauze packing. Your provider will need to remove it on your next visit. He or she may also replace it at that time. You may not need antibiotics to treat a simple abscess, unless the infection is spreading into the skin around the wound (cellulitis).  The wound will take about 1 to 2 weeks to heal, depending on the size of the abscess. Healthy tissue will grow from the bottom and sides of the opening until it seals over.  Home care  These tips can help your wound heal:    The wound may drain for the first 2 days. Cover the wound with a clean dry dressing. Change the dressing if it becomes soaked with blood or pus.    If a gauze packing was placed inside the abscess pocket, you may be told to remove it yourself. You may do this in the shower. Once the packing is removed, you should wash the area in the shower, or clean the area as directed by your provider. Continue to do this until the skin opening has closed. Make sure you wash your hands after changing the packing or cleaning the wound.    If you were prescribed antibiotics, take them as directed until they are all gone.    You may use acetaminophen or ibuprofen to control pain, unless another pain medicine was prescribed. If you have liver disease or ever had a stomach ulcer, talk with your doctor before using these  medicines.  Follow-up care  Follow up with your healthcare provider, or as advised. If a gauze packing was put in your wound, it should be removed in 1 to 2 days. Check your wound every day for any signs that the infection is getting worse. The signs are listed below.  When to seek medical advice  Call your healthcare provider right away if any of these occur:    Increasing redness or swelling    Red streaks in the skin leading away from the wound    Increasing local pain or swelling    Continued pus draining from the wound 2 days after treatment    Fever of 100.4 F (38 C) or higher, or as directed by your healthcare provider    Boil returns when you are at home  Date Last Reviewed: 9/1/2016 2000-2017 [employer]. [address] [place] [address], [place] [address]. All rights reserved. This information is not intended as a substitute for professional medical care. Always follow your healthcare professional's instructions.

## 2017-08-10 NOTE — IP AVS SNAPSHOT
"     Delvin Echevarria #[id number] (CSN: [id number])  (52 year old M)  (Adm: 08/10/17)     [id number]               UR 8A: [phone]            Patient Demographics     Patient Name Sex          Age SSN Address Phone    Delvin Echevarria Male 1965 (52 year old) [id number] [address] [place] [address] [phone] (Home) *Preferred*      Emergency Contact(s)     Name Relation Home Work Mobile    MAN SMITH [phone]        Admission Information     Attending Provider Admitting Provider Admission Type Admission Date/Time    Kenroy Dos Santos MD Horst, Patrick Kevin, MD Urgent 08/10/17  2131    Discharge Date Hospital Service Auth/Cert Status Service Area     Orthopedics [facility]    Unit Room/Bed Admission Status       UR 8A  Admission (Confirmed)       Admission     Complaint    Knee Abscess, Right tibial abcess, Abscess      Hospital Account     Name Acct ID Class Status Primary Coverage    Delvin Echevarria [id number] Inpatient Open BLUE PLUS - BLUE PLUS [place]            Guarantor Account (for Hospital Account #[id number])     Name Relation to Pt Service Area Active? Acct Type    Delvin Echevarria Self FCS Yes Personal/Family    Address Phone          [address] [place] [address] [phone](H)              Coverage Information (for Hospital Account #[id number])     F/O Payor/Plan Precert #    BLUE PLUS/BLUE PLUS [place]     Subscriber Subscriber #    Delvin Echevarria [id number]    Address Phone    [address] [address]  [place], [place] [id number] [phone]                                                INTERAGENCY TRANSFER FORM - PHYSICIAN ORDERS   8/10/2017                       UR 8A: [phone]            Attending Provider: Kenroy Dos Santos MD     Allergies:  Seasonal Allergies, Codeine, No Known Drug Allergies    Infection:  None   Service:  ORTHOPEDICS    Ht:  1.803 m (5' 11\")   Wt:  66.7 kg (147 lb)   Admission Wt:  --    " BMI:  20.5 kg/m 2   BSA:  1.83 m 2            ED Clinical Impression     Diagnosis Description Comment Added By Time Added    Cellulitis and abscess of leg [L03.119, L02.419] Cellulitis and abscess of leg [L03.119, L02.419]  Nicholas Agarwal MD 8/10/2017 10:33 PM    Tibial plateau fracture, right, closed, initial encounter [S82.141A] Tibial plateau fracture, right, closed, initial encounter [S82.141A]  Darleen Pabon APRN CNP 8/15/2017  8:40 AM    Pain of right lower extremity [M79.604] Pain of right lower extremity [M79.604]  Darleen Pabon APRN CNP 8/15/2017  8:40 AM    Eczema, unspecified type [L30.9] Eczema, unspecified type [L30.9]  Darleen Pabon APRN CNP 8/15/2017  8:45 AM    Essential hypertension, benign [I10] Essential hypertension, benign [I10]  Darleen Pabon APRN CNP 8/15/2017  8:46 AM      Hospital Problems as of 8/15/2017              Priority Class Noted POA    Abscess Medium  8/11/2017 Yes      Non-Hospital Problems as of 8/15/2017              Priority Class Noted    Inguinal hernia Medium  6/28/2012    Essential hypertension, benign Medium  3/15/2013    CARDIOVASCULAR SCREENING; LDL GOAL LESS THAN 130 Medium  8/12/2013    Tibial plateau fracture Medium  9/11/2016    Wound infection Medium  9/29/2016    Closed fracture of right tibia and fibula with routine healing Medium  9/29/2016    Pain Medium  9/30/2016    Dehydration Medium  9/30/2016    Anemia due to blood loss, acute Medium  9/30/2016    Rash Medium  10/14/2016    Aftercare for healing traumatic fracture of lower leg, unspecified laterality, closed Medium  10/16/2016    Weight loss Medium  10/21/2016    Hypotension due to drugs Medium  10/21/2016    Acute posthemorrhagic anemia Medium  12/14/2016    ACP (advance care planning) Medium  2/10/2017      Code Status History     Date Active Date Inactive Code Status Order ID Comments User Context    8/15/2017  7:37 AM  Full Code [id number]  Olivia Savage MD Outpatient     8/11/2017  7:58 AM 8/15/2017  7:37 AM Full Code [id number]  Nicholas Agarwal MD Inpatient    9/22/2016  8:18 AM 8/11/2017  7:58 AM Full Code [id number]  Jonah Salcedo NP Outpatient    9/11/2016  1:00 AM 9/22/2016  8:18 AM Full Code [id number]  Mike Farias MD Inpatient      Current Code Status     Date Active Code Status Order ID Comments User Context       Prior      Summary of Visit     Reason for your hospital stay       You were hospitalized following surgery for an infected tibia.                Medication Review      START taking        Dose / Directions Comments    amLODIPine 5 MG tablet   Commonly known as:  NORVASC   Used for:  Essential hypertension, benign        Dose:  5 mg   Take 1 tablet (5 mg) by mouth daily Hold for systolic pressure < 110. Facility MD to titrate as needed. This is a new med for the patient.   Quantity:  30 tablet   Refills:  0        ceFAZolin sodium-dextrose 2-4 GM/100ML-% Soln infusion   Commonly known as:  ANCEF   Indication:  Infection of Bone and Bone Marrow   Used for:  Cellulitis and abscess of leg        Dose:  2 g   Inject 100 mLs (2 g) into the vein every 8 hours Draw weekly cbc/diff, crp,esr, cmp, and send to Dr Ennis/Leuck [phone].   Refills:  0        melatonin 1 MG Tabs tablet   Used for:  Cellulitis and abscess of leg        Dose:  1 mg   Take 1 tablet (1 mg) by mouth nightly as needed for sleep   Quantity:  30 tablet   Refills:  0        oxyCODONE 5 MG IR tablet   Commonly known as:  ROXICODONE   Used for:  Cellulitis and abscess of leg        For pain complaints of 1-5 take 1 tablet, (5 mg) for pain complaints of 6-10 take 2 tablets ( 10 mg) every 4 hours as needed for pain.   Quantity:  65 tablet   Refills:  0        senna-docusate 8.6-50 MG per tablet   Commonly known as:  SENOKOT-S;PERICOLACE   Used for:  Cellulitis and abscess of leg        Dose:  1-2 tablet   Take 1-2 tablets by mouth 2 times daily   Quantity:  100 tablet   Refills:  0          CONTINUE  these medications which may have CHANGED, or have new prescriptions. If we are uncertain of the size of tablets/capsules you have at home, strength may be listed as something that might have changed.        Dose / Directions Comments    aspirin 81 MG chewable tablet   This may have changed:    - medication strength  - how much to take  - when to take this   Used for:  Cellulitis and abscess of leg        Dose:  81 mg   Take 1 tablet (81 mg) by mouth 2 times daily   Quantity:  60 tablet   Refills:  0          CONTINUE these medications which have NOT CHANGED        Dose / Directions Comments    acetaminophen 325 MG tablet   Commonly known as:  TYLENOL   Used for:  Tibial plateau fracture, right, closed, initial encounter        Dose:  975 mg   Take 3 tablets (975 mg) by mouth every 8 hours   Quantity:  100 tablet   Refills:  0        cyclobenzaprine 10 MG tablet   Commonly known as:  FLEXERIL   Used for:  Pain of right lower extremity        Dose:  10 mg   Take 1 tablet (10 mg) by mouth 3 times daily   Quantity:  75 tablet   Refills:  1        gabapentin 300 MG capsule   Commonly known as:  NEURONTIN   Used for:  Pain of right lower extremity        Dose:  300 mg   Take 1 capsule (300 mg) by mouth 3 times daily   Quantity:  75 capsule   Refills:  1        HYDROCERIN Lotn lotion        Apply topically daily   Refills:  0        hydrocortisone 1 % cream   Commonly known as:  CORTAID        Apply topically 2 times daily   Refills:  0        mineral oil-hydrophilic petrolatum   Used for:  Atopic dermatitis, unspecified type        Apply topically At Bedtime   Quantity:  99 g   Refills:  0        triamcinolone 0.1 % cream   Commonly known as:  KENALOG   Used for:  Atopic dermatitis, unspecified type        Apply topically 2 times daily   Quantity:  15 g   Refills:  0          STOP taking     hydrOXYzine 25 MG capsule   Commonly known as:  VISTARIL                   After Care     Advance Diet as Tolerated       Follow  "this diet upon discharge: Orders Placed This Encounter      Regular Diet Adult       General info for SNF       Length of Stay Estimate: Short Term Care: Estimated # of Days <30  Condition at Discharge: Improving  Level of care:skilled   Rehabilitation Potential: Excellent  Admission H&P remains valid and up-to-date: Yes  Recent Chemotherapy: N/A  Use Nursing Home Standing Orders: Yes       Mantoux instructions       Give two-step Mantoux (PPD) Per Facility Policy Yes       Weight bearing status       Toe touch weight bearing.       Wound care       Leave dressing in place for  10-14 days. May shower over dressing. Remove in 10-14 days. If wound is clean and dry, leave dressing off.  If any draining is noted, call [phone] and ask to speak to the triage nurse.             Referrals     Physical Therapy Adult Consult       Evaluate and treat as clinically indicated.    Reason:  S/p tibia I&D and hardware removal             Follow-Up Appointment Instructions     Follow Up and recommended labs and tests       Follow up with Dr. Dos Santos in 2 weeks             Your next 10 appointments already scheduled     Aug 15, 2017  4:00 PM CDT   [facility]   [facility]    [address] [place] [id number]   [phone]              Statement of Approval     Ordered          08/15/17 0738  I have reviewed and agree with all the recommendations and orders detailed in this document.  EFFECTIVE NOW     Approved and electronically signed by:  Kenroy Dos Santos MD                                                 INTERAGENCY TRANSFER FORM - NURSING   8/10/2017                       UR 8A: [phone]            Attending Provider: Kenroy Dos Santos MD     Allergies:  Seasonal Allergies, Codeine, No Known Drug Allergies    Infection:  None   Service:  ORTHOPEDICS    Ht:  1.803 m (5' 11\")   Wt:  66.7 kg (147 lb)   Admission Wt:  --    " BMI:  20.5 kg/m 2   BSA:  1.83 m 2            Advance Directives        Does patient have a scanned Advance Directive/ACP document in EPIC?           No        Immunizations     Name Date      Influenza (IIV3) defer-12/01/16     Deferral:  Patient Refused     Pneumococcal (PCV 13) defer-12/01/16     Deferral:  Patient Refused     Pneumococcal 23 valent defer-12/01/16     Deferral:  Patient Refused     Tdap (Adacel,Boostrix) 06/28/07       ASSESSMENT     Discharge Profile Flowsheet     EXPECTED DISCHARGE     Patient's communication style  spoken language (English or Bilingual) 12/14/16 1700    Expected Discharge Date  08/15/17 08/14/17 1438   SKIN      DISCHARGE NEEDS ASSESSMENT     Inspection of bony prominences  Full 08/15/17 1007    Patient/family verbalizes understanding of discharge plan recommendations?  Yes 08/14/17 1438   Full except areas not inspected   -- (under ACE wrap) 08/15/17 1007    Medical Team notified of plan?  yes 08/14/17 1438   Skin WDL  ex 08/15/17 1007    Equipment Currently Used at Home  cane, straight 08/12/17 1027   Skin Color/Characteristics  without discoloration 08/15/17 1007    Transportation Available  bicycle 08/12/17 0911   Skin Temperature  warm 08/15/17 1007    GASTROINTESTINAL (ADULT,PEDIATRIC,OB)     Skin Moisture  dry 08/15/17 1007    GI WDL  WDL 08/15/17 0922   Skin Integrity  incision(s) 08/15/17 1007    All Quadrants Bowel Sounds  audible and normoactive 08/15/17 0922   SAFETY      Last Bowel Movement  08/14/17 08/15/17 0922   Safety WDL  WDL;safety factors 08/15/17 0922    Passing flatus  yes 08/15/17 0922   Safety Factors  bed in low position;wheels locked;call light in reach;upper side rails raised x 2;ID band on 08/15/17 0922    COMMUNICATION ASSESSMENT                        Assessment WDL (Within Defined Limits) Definitions           Safety WDL     Effective: 09/28/15    Row Information: <b>WDL Definition:</b> Bed in low position, wheels locked; call light in reach;  "upper side rails up x 2; ID band on<br> <font color=\"gray\"><i>Item=AS safety wdl>>List=AS safety wdl>>Version=F14</i></font>      Skin WDL     Effective: 09/28/15    Row Information: <b>WDL Definition:</b> Warm; dry; intact; elastic; without discoloration; pressure points without redness<br> <font color=\"gray\"><i>Item=AS skin wdl>>List=AS skin wdl>>Version=F14</i></font>      Vitals     Vital Signs Flowsheet     VITAL SIGNS     Pain Management Interventions  analgesia administered 08/12/17 2350    Temp  96.8  F (36  C) 08/15/17 0817   Pain Intervention(s)  Declines 08/14/17 0606    Temp src  Oral 08/15/17 0112   CLINICALLY ALIGNED PAIN ASSESSMENT (CAPA) ([facility], [facility] AND [facility] ADULTS ONLY)      Resp  16 08/15/17 0817   Comfort  comfortably manageable 08/14/17 1100    Pulse  59 08/15/17 0817   Change in Pain  about the same 08/14/17 1100    Heart Rate  62 08/14/17 1548   Pain Control  partially effective 08/14/17 1100    Pulse/Heart Rate Source  Monitor 08/14/17 1548   Functioning  can do most things, but pain gets in the way of some 08/14/17 1100    BP  146/90 08/15/17 0817   Sleep  normal sleep 08/14/17 1100    BP Location  Left arm 08/14/17 1548   ANALGESIA SIDE EFFECTS MONITORING      Patient Position  Lying 08/11/17 1926   Side Effects Monitoring: Respiratory Quality  R 08/14/17 0606    OXYGEN THERAPY     Side Effects Monitoring: Respiratory Depth  N 08/14/17 0606    SpO2  95 % 08/15/17 0817   Side Effects Monitoring: Sedation Level  S 08/14/17 0606    O2 Device  None (Room air) 08/15/17 0817   POSITIONING      Oxygen Delivery  2 LPM 08/11/17 1945   Body Position  independently positioning 08/15/17 0922    RESPIRATORY MONITORING     Head of Bed (HOB)  HOB at 20-30 degrees 08/15/17 0922    Respiratory Monitoring (EtCO2)  41 mmHg 08/11/17 2128   Positioning/Transfer Devices  pillows;in use 08/15/17 0922    Integrated Pulmonary Index (IPI)  10 08/11/17 2128   Chair  Upright in chair 08/14/17 6834    PAIN/COMFORT "     DAILY CARE      Patient Currently in Pain  yes 08/14/17 0606   Activity Type  activity encouraged;ambulated in arechiga;ambulated in room;dorsiflexion, plantar flexion encouraged 08/15/17 0922    Preferred Pain Scale  CAPA (Clinically Aligned Pain Assessment) ([facility], [facility] Adults Only) 08/12/17 2350   Activity Level of Assistance  independent (SBA) 08/15/17 0922    Pain Location  Knee 08/12/17 2350   ECG      Pain Orientation  Right 08/12/17 2350   ECG Rhythm  Sinus bradycardia 08/11/17 2001    Pain Descriptors  Aching 08/12/17 2350   Ectopy  None 08/11/17 2001            Patient Lines/Drains/Airways Status    Active LINES/DRAINS/AIRWAYS     Name: Placement date: Placement time: Site: Days: Last dressing change:    Peripheral IV 12/14/16 Right Hand 12/14/16      Hand   244     Rash 09/11/16 0211 other (see comments) neck other (see comments);patch;papule 09/11/16   0211    338     Incision/Surgical Site 07/02/12 Right;Lower Abdomen 07/02/12   1444    1869     Incision/Surgical Site 09/12/16 Right Leg 09/12/16   1658    336     Incision/Surgical Site 08/11/17 Medial;Right Leg 08/11/17   1808    3     Incision/Surgical Site 08/11/17 Lateral;Right Leg 08/11/17   1812    3             Patient Lines/Drains/Airways Status    Active PICC/CVC     Name: Placement date: Placement time: Site: Days: Additional Info Last dressing change:    PICC Single Lumen 08/12/17 Right Basilic 08/12/17   1812   Basilic   2 External Cath Length (cm): 3 cm            Size (Fr): 4 Fr            Orientation: Right            Extremity Circumference (cm): 26.5 cm            Catheter Brand: Hotlist            Dressing Intervention: Chlorhexidine patch;Transparent;New dressing;Securing device            Description: Valved;Power PICC            Total Catheter Length (cm) Trimmed: 48 cm            Site Prep: Chlorhexidine            Local Anesthetic: Injectable            Inserted by: Olivia Auguste RN            Insertion attempts with  ultrasound: 1            Patient Tolerance: Tolerated well            Placement Verification: Other (Comment)            Difficulty with threading line: No            Tip location: SVC/RA Junction            Full barrier precautions done: Yes            Consent Signed: Yes            Time Out performed: Yes            Lot #: [id number]               Intake/Output Detail Report     Date Intake     Output     Net    Shift P.O. I.V. IV Piggyback Total Urine Drains Blood Total       Day 08/14/17 0000 - 08/14/17 0659 -- -- -- -- -- -- -- -- 0    Sasha 08/14/17 0700 - 08/14/17 1459 -- -- -- -- -- -- -- -- 0    Noc 08/14/17 1500 - 08/14/17 2359 -- -- -- -- -- -- -- -- 0    Day 08/15/17 0000 - 08/15/17 0659 -- -- -- -- -- -- -- -- 0    Sasha 08/15/17 0700 - 08/15/17 1459 240 -- -- 240 -- -- -- -- 240      Case Management/Discharge Planning     Case Management/Discharge Planning Flowsheet     LIVING ENVIRONMENT     Medical Team notified of plan?  yes 08/14/17 1438    Lives With  alone 08/12/17 1027   Transportation Available  bicycle 08/12/17 0911    Living Arrangements  other (see comments) 08/12/17 1027   FINAL RESOURCES      COPING/STRESS     Equipment Currently Used at Home  cane, straight 08/12/17 1027    Major Change/Loss/Stressor  hospitalization;illness;medical condition/diagnosis 08/14/17 1438   / CAREGIVER      Patient Personal Strengths  expressive of emotions;expressive of needs;positive attitude;resourceful;successful coping history 08/14/17 1438   Filed Complexity Screen Score  4 08/14/17 1438    Sources Of Support  friend(s);other family members 08/14/17 1438   ABUSE RISK SCREEN      Reaction To Health Status  accepting;adjusting;hopeful 08/14/17 1438   QUESTION TO PATIENT:  Has a member of your family or a partner(now or in the past) intimidated, hurt, manipulated, or controlled you in any way?  no 08/10/17 2210    Understanding Of Condition And Treatment  adequate understanding of medical condition;adequate  understanding of treatment 08/14/17 1438   QUESTION TO PATIENT: Do you feel safe going back to the place where you are living?  yes 08/10/17 2210    EXPECTED DISCHARGE     OBSERVATION: Is there reason to believe there has been maltreatment of a vulnerable adult (ie. Physical/Sexual/Emotional abuse, self neglect, lack of adequate food, shelter, medical care, or financial exploitation)?  no 08/10/17 2210    Expected Discharge Date  08/15/17 08/14/17 1438   (R) MENTAL HEALTH SUICIDE RISK      DISCHARGE PLANNING     Are you depressed or being treated for depression?  No 08/10/17 2211    Patient/family verbalizes understanding of discharge plan recommendations?  Yes 08/14/17 1438                       UR 8A: [phone]            Medication Administration Report for Delvin Echevarria as of 08/15/17 1228   Legend:    Given Hold Not Given Due Canceled Entry Other Actions    Time Time (Time) Time  Time-Action       Inactive    Active    Linked        Medications 08/09/17 08/10/17 08/11/17 08/12/17 08/13/17 08/14/17 08/15/17    acetaminophen (TYLENOL) tablet 650 mg  Dose: 650 mg Freq: EVERY 4 HOURS PRN Route: PO  PRN Reason: other  PRN Comment: surgical pain  Start: 08/14/17 0000   Admin Instructions: May give first dose 4 hours after last scheduled dose of acetaminophen  Maximum acetaminophen dose from all sources = 75 mg/kg/day not to exceed 4 grams/day.       1527-Auto Hold       2110-Unhold               amLODIPine (NORVASC) tablet 5 mg  Dose: 5 mg Freq: DAILY Route: PO  Start: 08/13/17 1000        1130 (5 mg)-Given        0824 (5 mg)-Given        0852 (5 mg)-Given           aspirin chewable tablet 81 mg  Dose: 81 mg Freq: 2 TIMES DAILY Route: PO  Start: 08/12/17 0800       0956 (81 mg)-Given       2020 (81 mg)-Given        0918 (81 mg)-Given       2113 (81 mg)-Given        0824 (81 mg)-Given       1949 (81 mg)-Given        0852 (81 mg)-Given       [ ] 2000           ceFAZolin sodium-dextrose (ANCEF) infusion 2  g  Dose: 2 g Freq: EVERY 8 HOURS Route: IV  Indications of Use: OSTEOMYELITIS  Start: 08/14/17 1600         1550 (2 g)-Given        0110 (2 g)-Given       0933 (2 g)-Given       [ ] 1600           diphenhydrAMINE (BENADRYL) capsule 25 mg  Dose: 25 mg Freq: EVERY 6 HOURS PRN Route: PO  PRN Reason: itching  Start: 08/11/17 0757   Admin Instructions: Caution to be used when administering multiple Central Nervous System (CNS) depressing meds within a short time frame.       1527-Auto Hold       2110-Unhold              Or  diphenhydrAMINE (BENADRYL) injection 25 mg  Dose: 25 mg Freq: EVERY 6 HOURS PRN Route: IV  PRN Reason: itching  PRN Comment: Only give if patient unable to take PO.  Start: 08/11/17 0757   Admin Instructions: Caution to be used when administering multiple Central Nervous System (CNS) depressing meds within a short time frame.       1527-Auto Hold       2110-Unhold               hydrocortisone (CORTAID) 1 % cream  Freq: 2 TIMES DAILY Route: Top  Start: 08/11/17 0800   Admin Instructions: Apply to hands, forearm rash       0927 ( )-Given       1527-Auto Hold       2000-Automatically Held       2110-Unhold        0958 ( )-Given       2145 ( )-Given        0923 ( )-Given       (2113)-Not Given        (0833)-Not Given       (1949)-Not Given        0933 ( )-Given       [ ] 2000           HYDROmorphone (PF) (DILAUDID) injection 0.3-0.5 mg  Dose: 0.3-0.5 mg Freq: EVERY 2 HOURS PRN Route: IV  PRN Reason: severe pain  PRN Comment: or patient unable to take PO  Start: 08/11/17 0757   Admin Instructions: Hold while on PCA..       1527-Auto Hold       2110-Unhold               lidocaine (LMX4) kit  Freq: ONCE PRN Route: Top  PRN Reason: moderate pain  PRN Comment: for local anesthetic during PICC insertion  Start: 08/12/17 1218   Admin Instructions: Apply to PICC Insertion Site. Apply 30 minutes prior to procedure. MAX Dose: 2.5 gm  (1/2 of 5 gm tube)                 lidocaine (LMX4) kit  Freq: EVERY 1 HOUR PRN  "Route: Top  PRN Reason: pain  PRN Comment: with VAD insertion or accessing implanted port.  Start: 08/11/17 0757   Admin Instructions: Do NOT give if patient has a history of allergy to any local anesthetic or any \"molly\" product.   Apply 30 minutes prior to VAD insertion or port access.  MAX Dose:  2.5 g (  of 5 g tube)       1527-Auto Hold       2110-Unhold               lidocaine 1 % 0.5-5 mL  Dose: 0.5-5 mL Freq: ONCE PRN Route: OTHER  PRN Comment: mild pain For local anesthetic during PICC insertion.  Start: 08/12/17 1218   Admin Instructions: Give Sub-Q/Intradermal.  Give in divided doses as needed.               lidocaine 1 % 1 mL  Dose: 1 mL Freq: EVERY 1 HOUR PRN Route: OTHER  PRN Comment: mild pain with VAD insertion or accessing implanted port  Start: 08/11/17 0757   Admin Instructions: Do NOT give if patient has a history of allergy to any local anesthetic or any \"molly\" product. MAX dose 1 mL subcutaneous OR intradermal in divided doses.       1527-Auto Hold       2110-Unhold               melatonin tablet 1 mg  Dose: 1 mg Freq: AT BEDTIME PRN Route: PO  PRN Reason: sleep  Start: 08/15/17 0139          0150 (1 mg)-Given           metoclopramide (REGLAN) tablet 10 mg  Dose: 10 mg Freq: EVERY 6 HOURS PRN Route: PO  PRN Reasons: nausea,vomiting  Start: 08/11/17 0757   Admin Instructions: This is Step 3 of nausea and vomiting management.  Give if nausea not resolved 15 minutes after giving prochlorperazine (COMPAZINE).  If nausea not resolved in 15-30 minutes, Notify provider.  Avoid use if patient has full bowel obstruction or perforation.       1527-Auto Hold       2110-Unhold              Or  metoclopramide (REGLAN) injection 10 mg  Dose: 10 mg Freq: EVERY 6 HOURS PRN Route: IV  PRN Reasons: nausea,vomiting  Start: 08/11/17 0757   Admin Instructions: This is Step 3 of nausea and vomiting management.  Give if nausea not resolved 15 minutes after giving prochlorperazine (COMPAZINE).  If nausea not resolved " in 15-30 minutes, Notify provider.  Avoid use if patient has full bowel obstruction or perforation. Irritant.       1527-Auto Hold       2110-Unhold                 Start: 08/14/17 1539   End: 08/15/17 0344   Admin Instructions: Noni Olmstead : steph evans                  0344-Med Discontinued       naloxone (NARCAN) injection 0.1-0.4 mg  Dose: 0.1-0.4 mg Freq: EVERY 2 MIN PRN Route: IV  PRN Reason: opioid reversal  Start: 08/11/17 0757   Admin Instructions: For respiratory rate LESS than or EQUAL to 8.  Partial reversal dose:  0.1 mg titrated q 2 minutes for Analgesia Side Effects Monitoring Sedation Level of 3 (frequently drowsy, arousable, drifts to sleep during conversation).Full reversal dose:  0.4 mg bolus for Analgesia Side Effects Monitoring Sedation Level of 4 (somnolent, minimal or no response to stimulation).       1527-Auto Hold       2110-Unhold               ondansetron (ZOFRAN-ODT) ODT tab 4 mg  Dose: 4 mg Freq: EVERY 6 HOURS PRN Route: PO  PRN Reasons: nausea,vomiting  Start: 08/11/17 0757   Admin Instructions: This is Step 1 of nausea and vomiting management.  If nausea not resolved in 15 minutes, go to Step 2 prochlorperazine (COMPAZINE). Do not push through foil backing. Peel back foil and gently remove. Place on tongue immediately. Administration with liquid unnecessary       1527-Auto Hold 2110-Unhold        0004 (4 mg)-Given             Or  ondansetron (ZOFRAN) injection 4 mg  Dose: 4 mg Freq: EVERY 6 HOURS PRN Route: IV  PRN Reasons: nausea,vomiting  Start: 08/11/17 0757   Admin Instructions: This is Step 1 of nausea and vomiting management.  If nausea not resolved in 15 minutes, go to Step 2 prochlorperazine (COMPAZINE).  Irritant.       1527-Auto Hold       2110-Unhold                      oxyCODONE (ROXICODONE) IR tablet 5-10 mg  Dose: 5-10 mg Freq: EVERY 3 HOURS PRN Route: PO  PRN Reason: moderate to severe pain  Start: 08/11/17 0757   Admin Instructions: IF CrCl is UNKNOWN  start at lowest end of dosing range. Hold while on PCA or with regular IV opioid dosing.       1527-Auto Hold       2110-Unhold        1443 (5 mg)-Given       1842 (10 mg)-Given       2324 (5 mg)-Given              prochlorperazine (COMPAZINE) injection 5-10 mg  Dose: 5-10 mg Freq: EVERY 6 HOURS PRN Route: IV  PRN Reasons: nausea,vomiting  Start: 08/11/17 0757   Admin Instructions: This is Step 2 of nausea and vomiting management.   If nausea not resolved in 15 minutes, give metoclopramide (REGLAN), if ordered (step 3 of nausea and vomiting management)       1527-Auto Hold       2110-Unhold        0513 (5 mg)-Given             Or  prochlorperazine (COMPAZINE) tablet 5-10 mg  Dose: 5-10 mg Freq: EVERY 6 HOURS PRN Route: PO  PRN Reasons: nausea,vomiting  Start: 08/11/17 0757   Admin Instructions: This is Step 2 of nausea and vomiting management.   If nausea not resolved in 15 minutes, give metoclopramide (REGLAN), if ordered (step 3 of nausea and vomiting management)       1527-Auto Hold       2110-Unhold                      senna-docusate (SENOKOT-S;PERICOLACE) 8.6-50 MG per tablet 1-2 tablet  Dose: 1-2 tablet Freq: 2 TIMES DAILY Route: PO  Start: 08/11/17 0757   Admin Instructions: Start with 1 tablet PO BID, If no bowel movement in 24 hours, increase to 2 tablets PO BID.  Hold for loose stools.       0927 (1 tablet)-Given       1527-Auto Hold       2000-Automatically Held       2110-Unhold        0956 (2 tablet)-Given       2020 (2 tablet)-Given        (0919)-Not Given       (2104)-Not Given        0824 (2 tablet)-Given       (1949)-Not Given        (0853)-Not Given       [ ] 2000           sodium chloride (PF) 0.9% PF flush 10 mL  Dose: 10 mL Freq: EVERY 7 DAYS Route: IK  Start: 08/12/17 1815   Admin Instructions: And Q1H PRN, to lock each CVC - Valved (Tunneled and Non-Tunneled) dormant lumen.        1844 (10 mL)-Given              sodium chloride (PF) 0.9% PF flush 10-20 mL  Dose: 10-20 mL Freq: EVERY 1 HOUR  PRN Route: IK  PRN Reasons: line flush,post meds or blood draw  Start: 08/12/17 1808   Admin Instructions: to flush CVC - Valved (Tunneled and Non-Tunneled).   10 mL post IV meds; 20 mL post blood draw.         1655 (10 mL)-Given        0709 (20 mL)-Given [C]            sodium chloride (PF) 0.9% PF flush 3 mL  Dose: 3 mL Freq: EVERY 8 HOURS Route: IK  Start: 08/11/17 0757   Admin Instructions: And Q1H PRN, to lock peripheral IV dormant line.       1432 (3 mL)-Given       1527-Auto Hold       1600-Automatically Held       2110-Unhold               (0800)-Not Given       1843 (3 mL)-Given        (0457)-Not Given       0920 (3 mL)-Given                      (0819)-Not Given       1552 (3 mL)-Given        (0000)-Not Given       0940 (3 mL)-Given       [ ] 1600           sodium chloride (PF) 0.9% PF flush 3 mL  Dose: 3 mL Freq: EVERY 1 HOUR PRN Route: IK  PRN Reason: line flush  PRN Comment: for peripheral IV flush post IV meds  Start: 08/11/17 0757      1527-Auto Hold       2110-Unhold               sodium chloride (PF) 0.9% PF flush 5-50 mL  Dose: 5-50 mL Freq: ONCE PRN Route: IK  PRN Reason: line flush  PRN Comment: to flush each lumen with line placement  Start: 08/12/17 1218   Admin Instructions: May repeat x 1              Completed Medications  Medications 08/09/17 08/10/17 08/11/17 08/12/17 08/13/17 08/14/17 08/15/17         Dose: 2-5 mL Freq: ONCE PRN Route: IK  PRN Reason: line flush  PRN Comment: for locking each dormant lumen with line placement  Start: 08/12/17 1218   End: 08/15/17 1122   Admin Instructions: May repeat x 1           1122 (5 mL)-Given          Discontinued Medications  Medications 08/09/17 08/10/17 08/11/17 08/12/17 08/13/17 08/14/17 08/15/17         Rate: 125 mL/hr Freq: CONTINUOUS Route: IV  Start: 08/11/17 2200   End: 08/15/17 1104      2228 ( )-New Bag          0824 ( )-New Bag        1104-Med Discontinued         Dose: 975 mg Freq: EVERY 8 HOURS Route: PO  Start: 08/11/17 0757   End:  08/14/17 0759   Admin Instructions: Do not use if patient has an active opioid/acetaminophen analgesic order for pain  Maximum acetaminophen dose from all sources = 75 mg/kg/day not to exceed 4 grams/day.       0927 (975 mg)-Given       1527-Auto Hold       1600-Automatically Held       2110-Unhold       2358 (975 mg)-Given        0956 (975 mg)-Given       1634 (975 mg)-Given       2324 (975 mg)-Given        0919 (975 mg)-Given       1701 (975 mg)-Given       2309 (975 mg)-Given        0759-Med Discontinued          Dose: 10 mg Freq: 4 TIMES DAILY PRN Route: PO  PRN Comment: sbp>170 or dbp>100  Start: 08/11/17 0033   End: 08/15/17 1104      1527-Auto Hold       2110-Unhold           1104-Med Discontinued         Dose: 3.375 g Freq: EVERY 6 HOURS Route: IV  Indications of Use: OSTEOMYELITIS  Last Dose: 3.375 g (08/12/17 1109)  Start: 08/11/17 2300   End: 08/14/17 1342      2228 (3.375 g)-New Bag        0515 (3.375 g)-New Bag       1109 (3.375 g)-New Bag       1728 (3.375 g)-New Bag [C]       2220 (3.375 g)-New Bag        0456 (3.375 g)-New Bag       1130 (3.375 g)-New Bag       1701 (3.375 g)-New Bag       2309 (3.375 g)-New Bag        0557 (3.375 g)-New Bag       1153 (3.375 g)-New Bag       1342-Med Discontinued          Dose: 1,250 mg Freq: EVERY 12 HOURS Route: IV  Indications of Use: BONE AND/OR JOINT INFECTION  Start: 08/13/17 0800   End: 08/14/17 1342   Admin Instructions: For an adult with peripheral catheter and dose of 2-2.5 g, infuse over 2 hours.  Vesicant. IF central catheter, doses below 2 g may be given over 1 hour.         0922 (1,250 mg)-New Bag [C]       2113 (1,250 mg)-New Bag        0824 (1,250 mg)-New Bag       1342-Med Discontinued          Dose: 1,000 mg Freq: EVERY 12 HOURS Route: IV  Indications of Use: BONE AND/OR JOINT INFECTION  Start: 08/12/17 0500   End: 08/13/17 1238   Admin Instructions: For an adult with peripheral catheter and dose of 2-2.5 g, infuse over 2 hours.  Vesicant. IF  central catheter, doses below 2 g may be given over 1 hour.        0635 (1,000 mg)-New Bag       1843 (1,000 mg)-New Bag        0752-Med Discontinued            Medications 08/09/17 08/10/17 08/11/17 08/12/17 08/13/17 08/14/17 08/15/17               INTERAGENCY TRANSFER FORM - NOTES (H&P, Discharge Summary, Consults, Procedures, Therapies)   8/10/2017                       UR 8A: [phone]               History & Physicals      H&P by Kenroy Dos Santos MD at 8/10/2017 10:55 PM     Author:  Kenroy Dos Santos MD Service:  Orthopedics Author Type:  Physician    Filed:  8/11/2017  6:41 PM Date of Service:  8/10/2017 10:55 PM Creation Time:  8/10/2017 10:55 PM    Status:  Signed :  Kenroy Dos Santos MD (Physician)         Orthopedic Surgery Consult / History and Physical    Delvin Echevarria MRN# [id number]   Age: 52 year old YOB: 1965     Date of Admission:  8/10/2017    Primary care provider: Krystian Streeter           Assessment and Plan:   Assessment:  R anterior tibial abscess around site of proximal tibial ORIF     Plan:  1. Admit to ortho  2. OR tomorrow for I&D R tibia, poss hardware removal  3. NPO after 2400             Chief Complaint:   R tibia abscess           History of Present Illness:   This patient is a 52 year old male who sustained R proximal tibia fx Sept 2016. Underwent ORIF by Dr Green. 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    On Tues reports he[RH1.1] had new onset swelling of the anterior tibia. He reports that this actually end up bursting open and draining some purulent fluid from the lateral aspect of his proximal tibia. Since that time has had mild purulent drainage. Went to an outside urgent care today and they're concerned for large abscess was transferred here.    Patient reports no systemic symptoms, no fevers or chills. No recent infections or illnesses.    Reports that he has continued to be  weightbearing as tolerated on the right side without pain. Range of motion of the right knee has not been affected and he still has motion from 0-110 limited by stiffness secondary to scarring. No pain with range of motion knee or weightbearing. 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.[RH1.2]              Past Medical History:     Past Medical History:   Diagnosis Date     Hypertension 3/15/2013            Past Surgical History:     Past Surgical History:   Procedure Laterality Date     APPLY EXTERNAL FIXATOR LOWER EXTREMITY Right 9/12/2016    Procedure: APPLY EXTERNAL FIXATOR LOWER EXTREMITY;  Surgeon: John Gonzales MD;  Location: [facility] OR     HERNIORRHAPHY INGUINAL  7/2/2012    Procedure: HERNIORRHAPHY INGUINAL;  Open Repair Right Inguinal Hernia with Mesh;  Surgeon: Avni Maxwell MD;  Location: [facility] OR     LARYNGECTOMY  Several Years ago    Partail removal due to fish bone     OPEN REDUCTION INTERNAL FIXATION TIBIA Right 9/15/2016    Procedure: OPEN REDUCTION INTERNAL FIXATION TIBIA;  Surgeon: Miguel A Green MD;  Location:  OR            Social History:   Smoking:[RH1.1] 1 PPD[RH1.2]  Alcohol:[RH1.1] rare[RH1.2]  Street drugs:[RH1.1] none[RH1.2]  Ambulatory status:[RH1.1]  indep[RH1.2]    Social History   Substance Use Topics     Smoking status: Current Every Day Smoker     Packs/day: 1.00     Years: 10.00     Smokeless tobacco: Never Used      Comment: quit date of accident and has not restarted-9/10/16     Alcohol use No            Family History:   Denies family history of bleeding or clotting disorders  Family History   Problem Relation Age of Onset     Hypertension Mother      Hypertension Brother             Allergies:     Allergies   Allergen Reactions     Seasonal Allergies Other (See Comments)     Congestion sinuses     Codeine Nausea     No Known Drug Allergies             Medications:   Anticoagulants:[RH1.1]  none[RH1.2]  Prescriptions Prior to Admission    Medication Sig Dispense Refill Last Dose     gabapentin (NEURONTIN) 300 MG capsule Take 1 capsule (300 mg) by mouth 3 times daily 75 capsule 1 Taking     cyclobenzaprine (FLEXERIL) 10 MG tablet Take 1 tablet (10 mg) by mouth 3 times daily 75 tablet 1 Taking     hydrOXYzine (VISTARIL) 25 MG capsule Take 1 capsule (25 mg) by mouth 3 times daily as needed for itching (Patient not taking: Reported on 8/10/2017) 30 capsule 0 Not Taking     hydrocortisone (CORTAID) 1 % cream Apply topically 2 times daily   Not Taking     ASPIRIN PO Take 325 mg by mouth daily   Taking     Skin Protectants, Misc. (HYDROCERIN) LOTN Apply topically daily   Not Taking     triamcinolone (KENALOG) 0.1 % cream Apply topically 2 times daily 15 g  Taking     mineral oil-hydrophilic petrolatum (AQUAPHOR) ointment Apply topically At Bedtime (Patient not taking: Reported on 8/10/2017) 99 g  Not Taking     acetaminophen (TYLENOL) 325 MG tablet Take 3 tablets (975 mg) by mouth every 8 hours (Patient not taking: Reported on 7/12/2017) 100 tablet  Not Taking             Review of Systems:   A 10 point review of systems was performed, and was negative except as noted in HPI.         Physical Exam:   Patient Vitals for the past 8 hrs:   BP Temp Temp src Pulse Resp SpO2   08/10/17 2137 (!) 141/94 97.4  F (36.3  C) Oral 79 16 98 %     General: awake, alert, cooperative, no apparent distress, appears stated age  HEENT: normal  Respiratory: breathing non-labored  Cardiovascular: peripheral pulses palpable and symmetric, capillary refill < 2sec  Skin: no rashes or lesions  Neurological: CN II-XII grossly intact  Musculoskeletal:[RH1.1]     RLE   Large anterior swelling of proximal tibia overlying tib crest approximately 10 cm ×7 cm   1 cm ×1 cm open wound on the lateral aspect the proximal tibia, P once draining out. Pressure on the inferior portion of the large anterior tibial crest abscess does increase pressure on the draining site. Unable to fully  decompress abscess, suggests loculation.   Motor: EHL, TA, FHL, GS 5/5   SILT on SP, DP, S, S, and T nerve territories   Circulation: palpable DP and TP, foot warm   Painless range of motion of knee, no knee effusion. The range of motion 3-110 without pain[RH1.2]            Nicholsa Agarwal  Orthopaedic Surgery PGY-4  Pager:  [phone][RH1.1]      ATTENDING ATTESTATION:[PH1.1]   I met with the patient and performed the key portions of the history and physical exam.  In brief, here is a pleasant 52-year-old man who underwent open reduction internal fixation of his right proximal tibia by Dr. Green in September 2016. The patient initially did well following the surgery and went on to heal his fracture. He had returned to normal activities and had no ongoing right proximal tibial or leg pain. He did have limited range of motion. However he denied any pain prior to this recent episode. About 3-4 days ago the patient noted increased swelling in the prior surgical area. The swelling eventually increased and burst open on the anterior lateral aspect of the tibia. The patient had continued drainage from that site and increased swelling and pain. He presented to urgent care and subsequently recommended she present to the ER. He was seen and evaluated by the orthopedic service and found to have a large tibial abscess. He has been stable since admission to the hospital. Antibiotics had been held. He denied systemic symptoms such as fever chills or night sweats. He also denied any recent infections or illness.    His past medical history is notable for hypertension no immunosuppressive conditions.    Past surgical history is as above.    He smokes 1 pack of cigarettes per day. He does not use any street drugs. He occasionally smokes marijuana.    On exam he is pleasant and healthy-appearing he is comfortable lying in bed he is cooperative and interactive. Examination of his right lower extremity reveals a large abscess overlying the  prior surgical site. His medial and laterally based incisions are well healed there is a forming sinus tract of the distal aspect of the medial incision, however there is no drainage from the site. There is a large open area of approximately 2-3 mm with a drainage. There is expressible drainage from an area of about 10 cm x 10 cm in between the 2 surgical incisions.      Imaging shows progressive bony union of his prior proximal tibia fracture. There is no notable lucency or fracture lines. The hardware appears to be intact.    Delvin is a very pleasant 52-year-old man with a large pretibial abscess status post open reduction internal fixation of his proximal tibia in September 2016.  Discussion with Delvin regarding operative versus nonoperative treatment options. At this point we discussed that there is a high concern for infection of the hardware. We discussed management options including irrigation and debridement and hardware removal. I further discuss this case with my partner Dr. Peter ortiz the original surgery and asked for my assistance in managing the ongoing infection. In reviewing imaging we both agree that the fracture appears to be well-healed. Furthermore if there was a nonunion E treatment would be revision open reduction internal fixation given his benign months. Therefore we will plan for hardware removal and irrigation debridement and wound closure. We discussed the possibility of X fix if the fracture is grossly unstable. Of ongoing infection, wound healing problems, persistent pain. We also discussed the postoperative plan including long-term IV antibiotics to help clear the infection. Patient is agreement with this treatment plan. The previously obtained informed consent was reviewed and signed by myself.[PH1.2]     Revision History        User Key Date/Time User Provider Type Action    > PH1.2 8/11/2017  6:41 PM Kenroy Dos Santos MD Physician Sign     PH1.1 8/11/2017  4:30 PM Kenroy Dos Santos  MD Jovan Physician Incomplete Revision     RH1.2 8/10/2017 11:02 PM Nicholas Agarwal MD Resident Sign     RH1.1 8/10/2017 10:55 PM Nicholas Agarwal MD Resident                      Discharge Summaries      Discharge Summaries by Darleen Pabon APRN CNP at 8/15/2017  7:40 AM     Author:  Darleen Pabon APRN CNP Service:  Orthopedics Author Type:  Nurse Practitioner    Filed:  8/15/2017 10:45 AM Date of Service:  8/15/2017  7:40 AM Creation Time:  8/15/2017  7:38 AM    Status:  Cosign Needed :  Darleen Pabon APRN CNP (Nurse Practitioner)    Cosign Required:  Yes             [facility], [place]  Orthopaedic Discharge Summary    Patient: Delvin Echevarria MRN# [id number]   Age/Sex: 52 year old male YOB: 1965      Date of Admission:  8/10/2017  Date of Discharge:  8/15/2017  Admitting Physician:  Kenroy Dos Santos MD  Discharge Physician:  Olivia Savage MD  Primary Care Provider:  Krystian Streeter  Discharge location         TCU    DISCHARGE DIAGNOSIS:  Right Tibial Abcess    PROCEDURE(S):   8/10/2017 - 8/11/2017 Procedure(s):  COMBINED INCISION AND DRAINAGE BONE LOWER EXTREMITY  REMOVE HARDWARE LOWER EXTREMITY     BRIEF HISTORY:  This patient is a 52 year old male who sustained R proximal tibia fx Sept 2016. Underwent ORIF by Dr Green. 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     Reports he had new onset swelling of the anterior tibia. He reports that this actually end up bursting open and draining some purulent fluid from the lateral aspect of his proximal tibia. Since that time has had mild purulent drainage. Went to an outside urgent care today and they're concerned for large abscess was transferred here.     Patient reports no systemic symptoms, no fevers or chills. No recent infections or illnesses.     Reports that he has continued to be weightbearing as tolerated on the right side without pain.  Range of motion of the right knee has not been affected and he still has motion from 0-110 limited by stiffness secondary to scarring. No pain with range of motion knee or weightbearing. 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     HOSPITAL COURSE:    Patient was admitted on 8/10/17 and subsequently underwent the above stated procedure(s). There were no complications and the patient was transferred to the PACU in stable condition.  Medicine was consulted post operatively to aid in management of medical comorbidities. Patient received routine nursing cares on the floor.  A clear liquid diet was started and subsequently advance to regular, which the patient is tolerating without issue such as GI distress, nausea or vomiting.  Stool softeners were administered while taking pain medications to prevent constipation.  The  patient is voiding independently.  PT/OT was initiated for safety training, ADLs, mobility and ROM and the patient is ambulating safetly.   After demonstrating the ability to tolerate a diet, pain control on PO pain meds and clearance by PT/OT, the patient was deemed medically safe for discharge to TCU on 8/15/17.    Antibiotics:  Vancomycin and Zosyn given and narrowed to[LF1.1] ancef post operatively to be continued for approximately 6 weeks.[MB1.1]   DVT Prophylaxis:  ASA 81 BID  for 4 weeks.  PT/OT Progress: Has met PT/OT goals for safe mobility.   Pain Medications: Weaned off all IV pain meds by time of discharge.  Inpatient Events: No significant events or complications.     DISCHARGE MEDICATIONS AND PROCEDURES:[LF1.1]      Current Discharge Medication List      START taking these medications    Details   senna-docusate (SENOKOT-S;PERICOLACE) 8.6-50 MG per tablet Take 1-2 tablets by mouth 2 times daily  Qty: 100 tablet, Refills: 0    Associated Diagnoses: Cellulitis and abscess of leg      melatonin 1 MG TABS tablet Take 1 tablet (1 mg) by mouth nightly as needed for sleep  Qty: 30 tablet,  Refills: 0    Associated Diagnoses: Cellulitis and abscess of leg      oxyCODONE (ROXICODONE) 5 MG IR tablet For pain complaints of 1-5 take 1 tablet, (5 mg) for pain complaints of 6-10 take 2 tablets ( 10 mg) every 4 hours as needed for pain.  Qty: 65 tablet, Refills: 0    Associated Diagnoses: Cellulitis and abscess of leg      amLODIPine (NORVASC) 5 MG tablet Take 1 tablet (5 mg) by mouth daily Hold for systolic pressure < 110. Facility MD to titrate as needed. This is a new med for the patient.  Qty: 30 tablet    Associated Diagnoses: Essential hypertension, benign      ceFAZolin sodium-dextrose (ANCEF) 2-4 GM/100ML-% SOLN infusion Inject 100 mLs (2 g) into the vein every 8 hours Draw weekly cbc/diff, crp,esr, cmp, and send to Dr Ennis/Sheeba [phone].    Associated Diagnoses: Cellulitis and abscess of leg         CONTINUE these medications which have CHANGED    Details   aspirin 81 MG chewable tablet Take 1 tablet (81 mg) by mouth 2 times daily  Qty: 60 tablet, Refills: 0    Associated Diagnoses: Cellulitis and abscess of leg         CONTINUE these medications which have NOT CHANGED    Details   gabapentin (NEURONTIN) 300 MG capsule Take 1 capsule (300 mg) by mouth 3 times daily  Qty: 75 capsule, Refills: 1    Associated Diagnoses: Pain of right lower extremity      cyclobenzaprine (FLEXERIL) 10 MG tablet Take 1 tablet (10 mg) by mouth 3 times daily  Qty: 75 tablet, Refills: 1    Associated Diagnoses: Pain of right lower extremity      hydrocortisone (CORTAID) 1 % cream Apply topically 2 times daily      Skin Protectants, Misc. (HYDROCERIN) LOTN Apply topically daily      triamcinolone (KENALOG) 0.1 % cream Apply topically 2 times daily  Qty: 15 g    Associated Diagnoses: Atopic dermatitis, unspecified type      mineral oil-hydrophilic petrolatum (AQUAPHOR) ointment Apply topically At Bedtime  Qty: 99 g    Associated Diagnoses: Atopic dermatitis, unspecified type      acetaminophen (TYLENOL) 325 MG tablet  Take 3 tablets (975 mg) by mouth every 8 hours  Qty: 100 tablet    Associated Diagnoses: Tibial plateau fracture, right, closed, initial encounter         STOP taking these medications       hydrOXYzine (VISTARIL) 25 MG capsule Comments:   Reason for Stopping:[MB1.2]                 Discharge Procedure Orders  General info for SNF   Order Comments: Length of Stay Estimate: Short Term Care: Estimated # of Days <30  Condition at Discharge: Improving  Level of care:skilled   Rehabilitation Potential: Excellent  Admission H&P remains valid and up-to-date: Yes  Recent Chemotherapy: N/A  Use Nursing Home Standing Orders: Yes     Mantoux instructions   Order Comments: Give two-step Mantoux (PPD) Per Facility Policy Yes     Reason for your hospital stay   Order Comments: You were hospitalized following surgery for an infected tibia.     Weight bearing status   Order Comments: Toe touch weight bearing.     Wound care   Order Comments: Remove dressing POD 7. Okay to shower POD 3. No soaking or bathing. After POD 7, change dressing PRN.     Follow Up and recommended labs and tests   Order Comments: Follow up with Dr. Dos Santos in 2 weeks     Full Code     Physical Therapy Adult Consult   Order Comments: Evaluate and treat as clinically indicated.    Reason:  S/p tibia I&D and hardware removal     ABO/Rh Type and Screen   Standing Status: Future  Standing Exp. Date: 08/10/18     Advance Diet as Tolerated   Order Comments: Follow this diet upon discharge: Orders Placed This Encounter     Regular Diet Adult   Order Specific Question Answer Comments   Is discharge order? Yes          DISCHARGE INSTRUCTIONS:    You are being discharged from hospital cares.  Please ice and elevate your affected area as much as possible to reduce swelling.  Swelling is one of the biggest producers of pain after surgery.  If the dressing is in place without a splint, please keep it in place for 7  days, then it is okay to remove.  Showering is okay after  surgery, but please do not soak or scrub the incision.  If a splint or cast is in place, please keep clean and dry, and keep covered at all times in shower/bath. If the cast or splint becomes soaked, you must return to clinic or emergency department to have it changed.     Please take pain medications as instructed, but it is okay to begin to wean off of them as your pain tolerates.    Please call or seek medical attention for pain that continues to worsen, new onset of numbness or tingling, or extreme swelling.    Please see a medical provider for new onset of chest pain or shortness of breath.  This may be a sign of a heart attack or blood clot in your lungs.     After discharge, most patients will return to clinic in 2[LF1.1] weeks[MB1.1] for a wound check.  Please call the orthopaedic clinic in the interim for any questions or concerns about your post-operative course.[LF1.1]  An appointment has been requested. Call [phone] if you do not hear regarding this appointment.[MB1.1]    FOLLOWUP:    Future Appointments  Date Time Provider Department Center   8/15/2017 9:00 AM Sejal Mon, OT [facility] [place]   8/15/2017 10:15 AM Lacy Bolden, PT [facility] [place]   8/15/2017 4:00 PM Krystian Streeter MD [facility]     Appointments at [facility]: [address] [place] [address]  Please call [phone]][phone][MB1.1] if you haven't heard regarding these appointments within 7 days of discharge.      Olivia Savage MD, MA  Orthopaedic Surgery Resident, PGY2  Pager: [phone][LF1.1]    Discharge summary updated by me to reflect changes in Plan/medicines.[MB1.1]      Revision History        User Key Date/Time User Provider Type Action    > MB1.2 8/15/2017 10:45 AM Darleen Pabon APRN CNP Nurse Practitioner Sign     MB1.1 8/15/2017 10:40 AM Darleen Pabon APRN CNP Nurse Practitioner      LF1.1 8/15/2017  7:40 AM Olivia Savage MD  Resident Share                     Consult Notes      Consults by Arlet Dorado LSW at 2017  2:39 PM     Author:  Arlet Dorado LSW Service:  Social Work Author Type:      Filed:  2017  3:04 PM Date of Service:  2017  2:39 PM Creation Time:  2017  2:38 PM    Status:  Signed :  Arlet Dorado LSW ()         Social Work: Assessment with Discharge Plan    Patient Name:  Delvin Echevarria  :  1965  Age:  52 year old  MRN:  [id number]  Risk/Complexity Score:  Filed Complexity Screen Score: 4  Completed assessment with:  Pt, 8A IDT    Presenting Information   Reason for Referral:  Discharge plan  Date of Intake:  2017  Referral Source:  Physician  Decision Maker:  pt  Alternate Decision Maker:  Friend Mike Byers  Health Care Directive:  Declined completing  Living Situation:  House  Previous Functional Status:  Independent  Patient and family understanding of hospitalization:  Seeking treatment for infection  Cultural/Language/Spiritual Considerations:    Adjustment to Illness:  accepting    Physical Health  Reason for Admission:[MK1.1]    1. Cellulitis and abscess of leg[MK1.2]      Services Needed/Recommended:  TCU    Mental Health/Chemical Dependency  Diagnosis:  NA  Support/Services in Place:  NA  Services Needed/Recommended:  NA    Support System  Significant relationship at present time:  None noted  Family of origin is available for support:  None noted  Other support available:  Not assessed  Gaps in support system:  Pt has support of friend  Patient is caregiver to:  None     Provider Information   Primary Care Physician:  Krystian Streeter   [phone]   Clinic:  [address] [place] [address]      :      Financial   In[place] Source:  [place]  Financial Concerns:  None noted  Insurance:    Payor/Plan Subscriber Name Rel Member # Group #   BLUE PLUS - BLUE PLUS*  VINH CATALAN  [id number]       [id number]       Discharge Plan   Patient and family discharge goal:  TCU  Provided education on discharge plan:  YES  Patient agreeable to discharge plan:  YES  A list of Medicare Certified Facilities was provided to the patient and/or family to encourage patient choice. Patient's choices for facility are:  Pt is requesting to return to [facility] as he has had previous stay there  Will NH provide Skilled rehabilitation or complex medical:  YES  General information regarding anticipated insurance coverage and possible out of pocket cost was discussed. Patient and patient's family are aware patient may incur the cost of transportation to the facility, pending insurance payment: YES  Barriers to discharge:  Medical stability    Discharge Recommendations   Anticipated Disposition:  Facility:  [facility]  Transportation Needs:  Other:  Health system [phone]  Name of Transportation Company and Phone:  [phone] (Health system)    Additional comments   Writer met w/pt and introduced role/reason for visit. Pt is requesting referral to [facility].  Writer spoke w/Admissions at [facility], they would be willing to accept him back, and have bed available for Tuesday, 8/15/17. Pt will need transportation. Writer faxed updated notes from weekend to ensure facility will be able to meet his needs. SW con't to follow[MK1.1]      Revision History        User Key Date/Time User Provider Type Action    > MK1.2 8/14/2017  3:04 PM Arlet Dorado LSW  Sign     MK1.1 8/14/2017  2:38 PM Arlet Dorado LSW              Consults signed by Arvin Ennis MD at 8/13/2017  3:42 PM      Author:  Arvin Enins MD Service:  Infectious Disease Author Type:  Physician    Filed:  8/13/2017  3:42 PM Date of Service:  8/12/2017 12:15 PM Creation Time:  8/12/2017 12:33 PM    Status:  Signed :  Strike,  Arvin DHALIWAL MD (Physician)         REQUESTING PHYSICIAN:  Dr. Dos Santos.      REASON FOR CONSULTATION:  Staphylococcus aureus infection, hardware, right proximal tibia, status post explantation of hardware.      HISTORY OF PRESENT ILLNESS:  Delvin Echevarria is a 52-year-old gentleman.  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      He had a right proximal tibia fracture in 09/2016.  He had open reduction and internal fixation.  He was in a nursing home afterwards.  He said there was some initial difficulty with his wound, but then it healed up nicely.  He has been doing well.  He has been working on the farm and did not have any problems.  He has had no fevers or chills.  No skin lesions.  No foot problems, ingrown toenails, cracks or calluses.      The patient said over the past 3 days he had swelling over the instrumented area.  This then opened and drained some purulent fluid laterally.  He went to an urgent care center on 08/10 and was transferred back to [facility] for care.  He has had no fevers or chills or sweats.      PAST MEDICAL HISTORY:  Remarkable for hypertension.      SURGICAL HISTORY:     1.  History of external fixator.     2.  History of inguinal hernia repair.    3.  Laryngeal surgery in the past.    4.  Open reduction internal fixation 09/15/2016.      SOCIAL HISTORY:  Single.  He is a smoker.  No use of alcohol.  Some use of marijuana.      FAMILY HISTORY:  Remarkable for hypertension.      DRUG ALLERGIES:  Nausea from codeine.      MEDICATIONS:  The patient has been on IV vancomycin.  Please see the medicine consultation on 08/10 by Dr. Joel listing other medications.      REVIEW OF SYSTEMS:  Complete review of systems negative other than above.      PHYSICAL EXAMINATION:   VITAL SIGNS:  Temperature 97.6, heart rate 63, blood pressure 133/75, respiratory rate 16, O2 saturation 95%.   HEENT:  Extraocular motions are full.  Pupils are  equal and round.  Oral mucosa is moist.  Dentition is in good shape.   CHEST:  Clear anteriorly.  There is a bit of pectus excavatum.   HEART:  Without murmurs or gallops.   ABDOMEN:  Soft and nontender.   GENITAL AND RECTAL:  Exam is deferred.   EXTREMITIES:  Without edema.   SKIN:  No generalized rashes.   NEUROLOGIC:  Cranial nerves intact.  No evident focal motor neurologic signs.      IMPRESSION:  This patient had a Staph aureus infection of his right tibial fixation plates.   The bone was healed and all hardware has been removed at this time.  Surgery was accomplished on .  At the present time, the patient is on IV vancomycin.  We are awaiting results of sensitivity tests on the Staph aureus and final evaluation of cultures.      I expect this patient will need a PICC line.  He will need a long course of IV antibiotic treatment adequate to treat for osteomyelitis when the IV course is completed.  He also will likely be on oral medication for a total treatment of up to 3 months. We can follow him in the ID clinic if needed. We should see him in one to two weeks post discharge.        Thank you for this consultation.  Infectious Disease Service will continue to look in on Mr. Catalan and add any additional recommendations in the progress notes.         GREGORY HERRERA MD             D: 2017 12:15   T: 2017 12:33   MT: BUTCH      Name:     VINH CATALAN   MRN:      [id number]        Account:       [id number]   :      1965           Consult Date:  2017      Document: [id number][DS1.1]         Revision History        User Key Date/Time User Provider Type Action    > DS1.1 2017  3:42 PM Gregory Herrera MD Physician Sign     [N/A] 2017  3:40 PM Gregory Herrera MD Physician Edit     [N/A] 2017 12:33 PM Gregory Herrera MD Physician Edit            Consults by Andrew Hicks MD at 2017  1:46 PM     Author:  Andrew Hicks MD Service:  Hospitalist Author Type:   Physician    Filed:  8/11/2017  1:46 PM Date of Service:  8/11/2017  1:46 PM Creation Time:  8/11/2017  1:46 PM    Status:  Signed :  Andrew Hicks MD (Physician)     Consult Orders:    1. Internal Medicine Adult IP Consult for [facility]: Patient to be seen: Routine within 24 hrs; Call back #: [phone]; Medical eval for surgery, assistance with comorbidities; Consultant may enter orders: Yes [[id number]] ordered by Nicholas Agarwal MD at 08/10/17 2233                Please see consult note on 08/10[PD1.1]     Revision History        User Key Date/Time User Provider Type Action    > PD1.1 8/11/2017  1:46 PM Andrew Hicks MD Physician Sign            Consults by Gui Joel MD at 8/10/2017 11:30 PM     Author:  Gui Joel MD Service:  Internal Medicine Author Type:  Physician    Filed:  8/11/2017  2:59 AM Date of Service:  8/10/2017 11:30 PM Creation Time:  8/10/2017 11:29 PM    Status:  Signed :  Gui Joel MD (Physician)     Consult Orders:    1. Internal Medicine Adult IP Consult for [facility]: Patient to be seen: Routine within 24 hrs; Call back #: [phone]; manage co-morbities and pre-op; Consultant may enter orders: Yes [[id number]] ordered by Kd Rocha MD at 08/10/17 2333                  Internal Medicine Consultation  [facility] Medicine Service    Delvin Echevarria MRN# [id number]   YOB: 1965 Age: 52 year old      Date of Admission: 8/10/2017    Primary care provider: Krystian Streeter    Requesting Provider : Dr Rocha    Reason for Consultation : Pre op eval and evaluation and recommendation for medical co morbidities.           Chief Complaint:      R knee, leg area pain, swelling.      History of Present Illness:     History is obtained from the patient and medical record. Orthopedic provider.     Delvin Echevarria is a 52 year old male with a history of HTN (not taking medications) who  sustained R proximal tibia fx Sept 2016. Underwent ORIF by Dr Green. 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     On Tues reports he had new onset swelling of the anterior tibia. He reports that this actually end up bursting open and draining some purulent fluid from the lateral aspect of his proximal tibia. Since that time has had mild purulent drainage. Went to an outside urgent care today and they're concerned for large abscess was transferred here.     Patient reports no systemic symptoms, no fevers or chills. No recent infections or illnesses.  He has been very active and continued to be weightbearing as tolerated on the right side without pain.  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     [SD1.1]  Denies any other significant medical concern.[SD1.2]                  Past Medical History:[SD1.1]     Past Medical History:   Diagnosis Date     Hypertension 3/15/2013[SD1.3]             Past Surgical History:[SD1.1]     Past Surgical History:   Procedure Laterality Date     APPLY EXTERNAL FIXATOR LOWER EXTREMITY Right 9/12/2016    Procedure: APPLY EXTERNAL FIXATOR LOWER EXTREMITY;  Surgeon: John Gonzales MD;  Location: [facility] OR     HERNIORRHAPHY INGUINAL  7/2/2012    Procedure: HERNIORRHAPHY INGUINAL;  Open Repair Right Inguinal Hernia with Mesh;  Surgeon: Avni Maxwell MD;  Location: [facility] OR     LARYNGECTOMY  Several Years ago    Partail removal due to fish bone     OPEN REDUCTION INTERNAL FIXATION TIBIA Right 9/15/2016    Procedure: OPEN REDUCTION INTERNAL FIXATION TIBIA;  Surgeon: Miguel A Green MD;  Location:  OR[SD1.3]             Social History:[SD1.1]     Social History     Social History     Marital status: Single     Spouse name: N/A     Number of children: N/A     Years of education: N/A     Occupational History     Not on file.     Social History Main Topics     Smoking status: Current Every Day Smoker     Packs/day: 1.00      Years: 10.00     Smokeless tobacco: Never Used      Comment: quit date of accident and has not restarted-9/10/16     Alcohol use No     Drug use: No      Comment: Smoked Marijuna last monday. Smoke Marijuna 1-2  times per week     Sexual activity: Not on file     Other Topics Concern     Not on file     Social History Narrative[SD1.3]             Family History:   Reviewed.[SD1.1]   Family History   Problem Relation Age of Onset     Hypertension Mother      Hypertension Brother[SD1.3]              Immunizations:[SD1.1]     Immunization History   Administered Date(s) Administered     Tdap (Adacel,Boostrix) 06/28/2007[SD1.3]            Allergies:[SD1.1]     Allergies   Allergen Reactions     Seasonal Allergies Other (See Comments)     Congestion sinuses     Codeine Nausea     No Known Drug Allergies[SD1.3]              Medications:     Prior to Admission medications    Medication Sig Start Date End Date Taking? Authorizing Provider   hydrOXYzine (VISTARIL) 25 MG capsule Take 1 capsule (25 mg) by mouth 3 times daily as needed for itching 7/10/17  Yes Sona Rice APRN CNP   gabapentin (NEURONTIN) 300 MG capsule Take 1 capsule (300 mg) by mouth 3 times daily 7/12/17   Krystian Streeter MD   cyclobenzaprine (FLEXERIL) 10 MG tablet Take 1 tablet (10 mg) by mouth 3 times daily 7/12/17   Krystian Streeter MD   hydrocortisone (CORTAID) 1 % cream Apply topically 2 times daily    Reported, Patient   ASPIRIN PO Take 325 mg by mouth daily    Reported, Patient   Skin Protectants, Misc. (HYDROCERIN) LOTN Apply topically daily    Reported, Patient   triamcinolone (KENALOG) 0.1 % cream Apply topically 2 times daily 9/21/16   Jonah Salcedo NP   mineral oil-hydrophilic petrolatum (AQUAPHOR) ointment Apply topically At Bedtime  Patient not taking: Reported on 8/10/2017 9/21/16   Jonah Salcedo NP   acetaminophen (TYLENOL) 325 MG tablet Take 3 tablets (975 mg) by mouth every 8 hours  Patient  not taking: Reported on 2017   Filomena Jung, NP[SD1.1]        Current Facility-Administered Medications   Medication     hydrOXYzine (ATARAX) tablet 25 mg     hydrocortisone (CORTAID) 1 % cream     hydrALAZINE (APRESOLINE) tablet 10 mg     ceFAZolin sodium-dextrose (ANCEF) infusion 2 g     ceFAZolin (ANCEF) 1 g vial to attach to  ml bag for ADULT or 50 ml bag for PEDS     acetaminophen (TYLENOL) tablet 1,000 mg[SD1.3]            Review of Systems:   The 10 point Review of Systems is negative other than noted in the HPI           Physical Exam:[SD1.1]       Blood pressure 138/90, pulse 70, temperature 97.3  F (36.3  C), temperature source Oral, resp. rate 16, SpO2 97 %.[SD1.3]    Temp (24hrs), Av.4  F (36.3  C), Min:97.3  F (36.3  C), Max:97.5  F (36.4  C)[SD1.1]      Wt Readings from Last 5 Encounters:   08/10/17 66.7 kg (147 lb)   17 66.2 kg (146 lb)   07/10/17 66 kg (145 lb 9.6 oz)   17 69.3 kg (152 lb 12.8 oz)   17 60 kg (132 lb 3.2 oz)[SD1.3]       No intake or output data in the 24 hours ending 17 0236    General: Alert, interactive, NAD  HEENT: AT/NC, anicteric, PERRL, Moist MM  Neck: Supple, no JVD or cervical LAD  Chest/Resp: Clear to auscultation bilaterally, no crackles or wheezes  Heart/CV: S1 S2 regular, no murmur. No pedal edema.   Abdomen/ GI: Soft, nontender, nondistended. +BS.  No rebound or guarding.  Extremities/MSK:[SD1.1] R knee, proximal tibia area anterior swelling, ttp. Rest per ortho.[SD1.2]  distal pulses 2+ and warm.   Skin:[SD1.1] both hands, forearm: dermatitis ? Contact. Chronic.[SD1.2]   Neuro: Alert & oriented x 3, Cns 2-12 grossly intact  Psych: Pleasant.           Data:     All laboratory data reviewed    LABS (Last four results)  CMP[SD1.1]No lab results found in last 7 days.[SD1.3]  CBC[SD1.1]    Recent Labs  Lab 08/10/17  1736   WBC 6.2   RBC 3.89*   HGB 11.9*   HCT 36.0*   MCV 93   MCH 30.6   MCHC 33.1   RDW 13.1   PLT  328[SD1.3]     INR[SD1.1]No lab results found in last 7 days.[SD1.3]  Arterial Blood Gas[SD1.1]No lab results found in last 7 days.[SD1.3]    No results found for this or any previous visit (from the past 48 hour(s)).         Assessment and Recommendations:[SD1.1]     53 yo male with htn (not on medications at current) with   R anterior tibial abscess around site of proximal tibial ORIF  Ortho planning for OR tomorrow for I&D R tibia, poss hardware removal.     # Pre Op evaluation:     HTN. Current smoker.   No other significant medical problem.     Patient claims his bp controlled despite not taking any medications.   Hands and both forearms dermatitis no topical steroids. No systemic steroid.   Denies any problem during prior surgeries.   Clinically no systemic s/s of sepsis.     RCRI:   No risk factors - 0.4 percentrisk of major cardiac complications yoko op.   METS >4.0    Routine pre op labs - pending.     Provided labs: wnl. Patient appears optimized for the procedure.   Monitor BP. Could consider hydralazine prn if needed for hbp.   Close yoko op pulmonary monitoring, toileting.   Patient was on aspirin 325, was taking till a day prior. Monitor for bleeding intra and post op.   Encourage smoking cessation.   Encourage IS    # Hand dermatitis: topical steroid. Hydroxyzine prn for itching.     Rest per Ortho.   Hospitalist team to follow.[SD1.2]     Thank you for this interesting consultation.    We are glad to follow along with you.[SD1.1]    D/w RN    Gui Joel MD  House Physician (Staff Hospitalist)  [facility]  Pager: [phone]    8/10/2017[SD1.2]                Revision History        User Key Date/Time User Provider Type Action    > SD1.3 8/11/2017  2:59 AM Gui Joel MD Physician Sign     SD1.2 8/11/2017  2:44 AM Gui Joel MD Physician      SD1.1 8/11/2017  2:35 AM Gui Joel MD Physician                      Progress Notes - Physician (Notes for yesterday and today)      Progress  Notes by Olivia Savage MD at 8/15/2017  6:43 AM     Author:  Olivia Savage MD Service:  Orthopedics Author Type:  Resident    Filed:  8/15/2017  6:48 AM Date of Service:  8/15/2017  6:43 AM Creation Time:  8/15/2017  6:43 AM    Status:  Signed :  Olivia Savage MD (Resident)         Ortho Progress Note     Subjective:   Pain controlled. No concerns at present. Tolerating PO, voiding spontaneously. Ambulating well with crutches.    Objective:  BP (!) 164/93  Pulse 91  Temp 96.4  F (35.8  C) (Oral)  Resp 14  SpO2 96%  Gen: A&Ox3, no acute distress  CV: 2+ dp/pt pulses, capillary refill < 2sec  Resp: breathing equal and non-labored, no wheezing  MSK:     RLE   Dressings c/d/i.    Motor: EHL, TA, FHL, GS 5/5   SILT on SP, DP, S, S, and T nerve territories   Circulation: palpable DP and TP, foot warm      Hemoglobin   Date Value Ref Range Status   08/13/2017 11.3 (L) 13.3 - 17.7 g/dL Final   08/12/2017 11.4 (L) 13.3 - 17.7 g/dL Final       Cx: Staph aureus      Assessment/Plan:  52M s/p 9/2016 ORIF R tibial plateau, now with pretibial abscess. S/p 8/11 I&D and removal of hardware by Dr Dos Santos.    -Weight Bearing Status: TTWB RLE  Diet: Regular  Abx:  Ceftriaxone per ID  DVT PPx: 81 ASA BID  Dispo:  TCU todau       Olivia Savage MD  PGY-2  Orthopaedic Surgery   [phone][LF1.1]       Revision History        User Key Date/Time User Provider Type Action    > LF1.1 8/15/2017  6:48 AM Olivia Savage MD Resident Sign            Progress Notes by Nikole Aly MD at 8/14/2017  1:38 PM     Author:  Nikole Aly MD Service:  Infectious Disease Author Type:  Physician    Filed:  8/14/2017  1:55 PM Date of Service:  8/14/2017  1:38 PM Creation Time:  8/14/2017  1:38 PM    Status:  Signed :  Nikole Aly MD (Physician)           [facility] ID SERVICE: PROGRESS NOTE      Patient:  Delvin Echevarria, Date of birth 1965, Medical record number [id number]  Date  of Visit:  August 14, 2017         Assessment and Recommendations:   Mr. Echevarria has a history of ORIF of his R tibia complicated by MSSA infection. He is now s/p hardware removal 8/11/17. At this point we can narrow his antibiotics to cefazolin with plans to continue for 6 weeks from the time of surgery. If ongoing concerns regarding elevated CRP or incomplete wound healing, may consider extension with oral antibiotics at that time.     Recommendations:  1. Stop vancomycin and pip/tazo (ordered)  2. Start cefazolin 2g IV Q8H through 9/22/17 (ordered)  3. Check CBC, CMP, and CRP weekly with labs faxed to the [facility] at [phone].   4. Follow-up with either Sheeba or Loli ~2 weeks after discharge.    The [facility] ID team will continue to peripherally follow this patient until discharge. Please feel free to call with any questions.     Nikole Aly MD  Infectious Diseases  [phone]      Interval History :   Overall feels he is doing well today except for ongoing pain with attempting to straighten leg. He reports that the current plan is for him to go to a TCU from the hospital and that he would be relieved if this were the plan as managing his care from his current living situation sounds daunting. No fevers. No significant diarrhea. No rashes. No difficulty breathing. 4 point ROS including Respiratory, GI, musculoskeletal, and skin otherwise negative         Allergies:[AL1.1]      Allergies   Allergen Reactions     Seasonal Allergies Other (See Comments)     Congestion sinuses     Codeine Nausea     No Known Drug Allergies[AL1.2]            Recent Antimicrobials::   Current: pip/tazo and vancomycin         Physical Exam:[AL1.1]   BP (!) 141/95 (BP Location: Left arm)  Pulse 57  Temp 97  F (36.1  C) (Oral)  Resp 16  SpO2 97%[AL1.2]   Exam:  GENERAL:  Well-developed, well-nourished, sitting in bed in no acute distress.   ENT:  Head is normocephalic, atraumatic.   EYES:  Eyes have anicteric  sclerae.    NECK:  Supple.  EXT: Extremities warm and without edema. Right leg wrapped. No erythema above wrapping.   SKIN:  No acute rashes. PICC is in place without any surrounding erythema.  NEUROLOGIC:  Grossly nonfocal.         Laboratory Data:[AL1.1]     Creatinine   Date Value Ref Range Status   08/14/2017 0.73 0.66 - 1.25 mg/dL Final   08/13/2017 0.87 0.66 - 1.25 mg/dL Final   08/11/2017 0.70 0.66 - 1.25 mg/dL Final   02/16/2017 0.68 0.66 - 1.25 mg/dL Final   12/14/2016 0.60 (L) 0.66 - 1.25 mg/dL Final     WBC   Date Value Ref Range Status   08/11/2017 6.1 4.0 - 11.0 10e9/L Final   08/10/2017 6.2 4.0 - 11.0 10e9/L Final   02/16/2017 8.7 4.0 - 11.0 10e9/L Final   12/14/2016 11.7 (H) 4.0 - 11.0 10e9/L Final   10/27/2016 6.4 4.0 - 11.0 10e9/L Final     Hemoglobin   Date Value Ref Range Status   08/13/2017 11.3 (L) 13.3 - 17.7 g/dL Final     Platelet Count   Date Value Ref Range Status   08/11/2017 302 150 - 450 10e9/L Final     Lab Results   Component Value Date     08/11/2017    BUN 16 08/11/2017    CO2 25 08/11/2017     CRP Inflammation   Date Value Ref Range Status   08/11/2017 29.5 (H) 0.0 - 8.0 mg/L Final[AL1.2]           Pertinent Recent Microbiology Data:[AL1.1]     Recent Labs  Lab 08/11/17  1719 08/11/17  1718 08/11/17  1707 08/11/17  1704 08/11/17  1701 08/10/17  1736   CULT On day 1, isolated in broth only: Staphylococcus aureus Susceptibility testing done on previous specimen*  Culture negative monitoring continues Light growth Staphylococcus aureus Susceptibility testing done on previous specimen*  Culture negative monitoring continues On day 2, isolated in broth only: Staphylococcus aureus Susceptibility testing done on previous specimen*  Culture negative monitoring continues Light growth Staphylococcus aureus*  Culture negative monitoring continues On day 2, isolated in broth only: Staphylococcus aureus Susceptibility testing done on previous specimen*  Culture negative monitoring  "continues Moderate growth Staphylococcus aureus*   SDES Tissue Right Leg  medial 2  Tissue Right Leg  medial 2 Tissue Right Leg  medial 1  Tissue Right Leg  medial 1 Tissue Right Leg  midline  Tissue Right Leg  midline Tissue Right Leg  lateral deep  Tissue Right Leg  lateral deep Tissue Right Leg  lateral superficial  Tissue Right Leg  lateral superficial Left Knee Wound[AL1.2]          Revision History        User Key Date/Time User Provider Type Action    > AL1.2 8/14/2017  1:55 PM Nikole Aly MD Physician Sign     AL1.1 8/14/2017  1:38 PM Nikole Aly MD Physician             Progress Notes by Andrew Hicks MD at 8/14/2017 12:09 PM     Author:  Andrew Hicks MD Service:  Hospitalist Author Type:  Physician    Filed:  8/14/2017 12:10 PM Date of Service:  8/14/2017 12:09 PM Creation Time:  8/14/2017 12:09 PM    Status:  Signed :  Andrew Hicks MD (Physician)         [facility], [place]   Internal Medicine Daily Note           Interval History/Events     Overnight events reviewed  Some overnight sweating.   No nausea, vomiting, chest pain, shortness of breath  Had BM  No lightheadedness or dizziness.        Review of Systems        4 point ROS including Respiratory, CV, GI and , other than that noted above is negative      Medications   I have reviewed current medications  in the \"current medication\" section of Epic.  Relevant changes include:     Physical Exam   General:       Vital signs:    Blood pressure (!) 141/95, pulse 57, temperature 97  F (36.1  C), temperature source Oral, resp. rate 16, SpO2 97 %.  Estimated body mass index is 20.5 kg/(m^2) as calculated from the following:    Height as of 7/12/17: 1.803 m (5' 11\").    Weight as of an earlier encounter on 8/10/17: 66.7 kg (147 lb).    No intake or output data in the 24 hours ending 08/11/17 1716   HEENT: No icterus, no pallor  Cardiovascular: S1, S2 nromal.   Respiratory:  B/LC TA  GI/Abdomen: " Soft, NT, BS+  Neurology: Alert, awake, and oriented. No tremors.   Extremities: R knee dressing in place.   Skin:      Laboratory and Imaging Studies     I have reviewed  laboratory and imaging studies in the Epic. Pertinent findings are as below:    BMP    Recent Labs  Lab 08/14/17  0719 08/13/17  0614 08/11/17  0649   NA  --   --  140   POTASSIUM  --   --  4.2   CHLORIDE  --   --  107   DINO  --   --  8.4*   CO2  --   --  25   BUN  --   --  16   CR 0.73 0.87 0.70   GLC  --   --  102*     CBC    Recent Labs  Lab 08/13/17  0614  08/11/17  0649 08/10/17  1736   WBC  --   --  6.1 6.2   RBC  --   --  3.87* 3.89*   HGB 11.3*  < > 11.7* 11.9*   HCT  --   --  35.4* 36.0*   MCV  --   --  92 93   MCH  --   --  30.2 30.6   MCHC  --   --  33.1 33.1   RDW  --   --  13.3 13.1   PLT  --   --  302 328   < > = values in this interval not displayed.  INR    Recent Labs  Lab 08/11/17  0649   INR 1.08     LFTsNo lab results found in last 7 days.   PANCNo lab results found in last 7 days.        Impression/Plan        51 yo male with hx of HTN, R anterior tibial abscess around site of proximal tibial ORIF is being admitted S/p I and D and removal of hardware by Dr. Dos Santos on 08/11    # S/p I and D and removal of hardware by Dr. Dos Santos on 08/11  Management primarily per Ortho  Culture from 08/11 growing Staph. ID consulted. Initiated on Vancomycin.   Antibiotics plan per ID.     # Anemia: Most likely due to acute blood loss, hemodilution. Hg 11.3 on 08/13  - Transfuse if Hg < 7 gm/dl.     # HTN: Not on any medications prior to admission.   Amlodipine 5 mg started on 08/13  - Continue to monitor and titrate    Pt's care was discussed with bedside RN, patient and  during Care Team Rounds.       Andrew Hicks MD  Hospitalist ( Internal medicine)  Pager: [phone][PD1.1]        Revision History        User Key Date/Time User Provider Type Action    > PD1.1 8/14/2017 12:10 PM Andrew Hicks MD Physician Sign            Progress Notes by  Kenroy Dos Santos MD at 8/14/2017  8:09 AM     Author:  Kenroy Dos Santos MD Service:  Orthopedics Author Type:  Physician    Filed:  8/14/2017  8:12 AM Date of Service:  8/14/2017  8:09 AM Creation Time:  8/14/2017  8:09 AM    Status:  Signed :  Kenroy Dos Santos MD (Physician)                  Interval History:     Pain well controlled. No acute events. He has been recovering well. He is tolerating oral diet and mobilizing with crutches. He has no pain with mobilization.         Physical Exam:     Vital Signs:  /76  Pulse 57  Temp 95.9  F (35.5  C) (Oral)  Resp 12  SpO2 94%    Intake/Output Summary (Last 24 hours) at 08/11/17 1906  Last data filed at 08/11/17 1834   Gross per 24 hour   Intake             1000 ml   Output               50 ml   Net              950 ml     Gen:    No acute distress. Alert.  Pulm:  Non-labored breathing  Incision:   Dressing c/d/i - incision clean dry and well approximated.  Drain: With 0 and 5 output over the past 12 and 12 hours. Drain removed this morning.    Operative extremity:   Motor: ehl/fhl/gs/at intact  Sensory: intact light touch dp/sp/t/s/s      Labs:  CBC    Recent Labs  Lab 08/13/17  0614 08/12/17  0640 08/11/17  0649 08/10/17  1736   WBC  --   --  6.1 6.2   HGB 11.3* 11.4* 11.7* 11.9*   CRP  --   --  29.5*  --    PLT  --   --  302 328     Micro:[PH1.1]    Recent Labs  Lab 08/11/17  1719 08/11/17  1718 08/11/17  1707 08/11/17  1704 08/11/17  1701 08/10/17  1736   CULT On day 1, isolated in broth only: Staphylococcus aureus Susceptibility testing done on previous specimen*  Culture negative monitoring continues Light growth Staphylococcus aureus Susceptibility testing done on previous specimen*  Culture negative monitoring continues On day 2, isolated in broth only: Gram positive cocci in clusters*  Culture negative monitoring continues Light growth Staphylococcus aureus*  Culture negative monitoring continues On day 2, isolated in broth  only: Gram positive cocci in clusters*  Culture negative monitoring continues Moderate growth Staphylococcus aureus*[PH1.2]            Assessment and Plan:    Delvin Echevarria is a 52 year old male who is s/p irrigation and debridement and removal of hardware from right proximal tibia.    Activity: Up with assist. Mobilizing well.  Weight bearing status: TTWB RLE  Antibiotics/Tetanus: IV vancomycin cultures as above. ID consult obtained and pending sensitivities.  Diet: Begin with clear fluids and progress diet as tolerated.  DVT prophylaxis: SCDs while in hospital, 81 mg aspirin BID x4 weeks total.  Bracing/Splinting: No brace or splint needed.  Ice: Ice operative site 20 minutes >4 times daily.  Wound Care: Will follow wounds closely.  Drains: Drain removed and we'll continue to follow incision closely.  Pain management: transition from IV to orals as tolerated.     Disposition pending IV antibiotic plan[PH1.1]           Revision History        User Key Date/Time User Provider Type Action    > PH1.2 8/14/2017  8:12 AM Kenroy Dos Santos MD Physician Sign     PH1.1 8/14/2017  8:09 AM Kenroy Dos Santos MD Physician             Progress Notes by Olivia Savage MD at 8/14/2017  7:03 AM     Author:  Olivia Savage MD Service:  Orthopedics Author Type:  Resident    Filed:  8/14/2017  7:04 AM Date of Service:  8/14/2017  7:03 AM Creation Time:  8/14/2017  7:03 AM    Status:  Signed :  Olivia Savage MD (Resident)         Ortho Progress Note     Subjective:  No fevers/chills. Pain controlled. No concerns at present. Tolerating PO, voiding spontaneously    Objective:  /76  Pulse 57  Temp 95.9  F (35.5  C) (Oral)  Resp 12  SpO2 94%  Gen: A&Ox3, no acute distress  CV: 2+ dp/pt pulses, capillary refill < 2sec  Resp: breathing equal and non-labored, no wheezing  MSK:     RLE   Dressings c/d/i. Dressings changed, incision intact.   Motor: EHL, TA, FHL, GS 5/5   SILT on SP, DP,  S, S, and T nerve territories   Circulation: palpable DP and TP, foot warm      Hemoglobin   Date Value Ref Range Status   08/13/2017 11.3 (L) 13.3 - 17.7 g/dL Final   08/12/2017 11.4 (L) 13.3 - 17.7 g/dL Final     HV: 5,0 last two shifts. Drain removed.  Cx: Staph aureus      Assessment/Plan:  52M s/p 9/2016 ORIF R tibial plateau, now with pretibial abscess. S/p 8/11 I&D and removal of hardware by Dr Dos Santos.    -Weight Bearing Status: TTWB RLE  Diet: Regular  Abx:  vanc, zosyn for now.  Appreciate ID consult  DVT PPx: 81 ASA BID  Dispo:  Pending cultures, establishment of antibiotic regimen, placement        Olivia Savage MD  PGY-2  Orthopaedic Surgery   [phone][LF1.1]       Revision History        User Key Date/Time User Provider Type Action    > LF1.1 8/14/2017  7:04 AM Olivia Savage MD Resident Sign                     Procedure Notes      Procedures by Olivia Auguste, RN at 8/12/2017  6:45 PM     Author:  Olivia Auguste RN Service:  Infusion Services Author Type:  Registered Nurse    Filed:  8/12/2017  6:48 PM Date of Service:  8/12/2017  6:45 PM Creation Time:  8/12/2017  6:44 PM    Status:  Signed :  Olivia Auguste, RN (Registered Nurse)     Procedure Orders:    1. Vascular Access Adult IP Consult [[id number]] ordered by Arvin Ennis MD at 08/12/17 1218                In pt room for PICC placement.  Procedure explained and consent signed.  Single lumen PICC placed in R basilic vein without difficulty.  Placement confirmed at SCV/RA via 3 CG.  Report given to Trudy SMITH that PICC is ready to use.            [LI1.1]     Revision History        User Key Date/Time User Provider Type Action    > LI1.1 8/12/2017  6:48 PM Olivia Auguste, RN Registered Nurse Sign                     Progress Notes - Therapies (Notes from 08/12/17 through 08/15/17)      Progress Notes by Kenzie Guzmán OT at 8/12/2017 10:50 AM     Author:  Pantages, Kenzie Leia, OT Service:  (none) Author Type:   Occupational Therapist    Filed:  8/12/2017 10:51 AM Date of Service:  8/12/2017 10:50 AM Creation Time:  8/12/2017 10:50 AM    Status:  Signed :  Kenzie Guzmán OT (Occupational Therapist)          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   Self-Care   Dominant Hand right   Usual Activity Tolerance good   Current Activity Tolerance fair   Regular Exercise no   Equipment Currently Used at Home cane, straight   Activity/Exercise/Self-Care Comment Had surgery last September and then was in a TCU for 6 months. 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 Reports R knee was stiff in the mornings   General Information   Onset of Illness/Injury or Date of Surgery - Date 08/11/17   Referring Physician Levon   Patient/Family Goals Statement Go to rehab, return to work and driving when able.    Additional Occupational Profile Info/Pertinent History of Current Problem s/p I and D and hardware removal due to ORIF R tibial plateau with abscess   Precautions/Limitations fall precautions   Weight-Bearing Status - RLE toe touch weight-bearing   General Observations Resting in bed, agreeable to OT.   Cognitive Status Examination   Orientation orientation to person, place and time   Level of Consciousness alert   Able to Follow Commands WNL/WFL   Personal Safety (Cognitive) WNL/WFL   Visual Perception   Visual Perception No deficits were identified   Sensory Examination   Sensory Quick Adds No deficits were identified   Pain Assessment   Patient Currently in Pain Yes, see Vital Sign flowsheet   Range of Motion (ROM)   ROM Comment BUE AROM intact. Limited R knee ROM. Reports R knee limited to approx 70 degrees flexion pre-op.   Strength   Strength Comments RLE limited post-op. Able to complete (I) R SLR.   Hand Strength   Hand Strength Comments strong  B hands   Muscle Tone Assessment   Muscle Tone Quick Adds No deficits were identified   Coordination   Upper Extremity Coordination No deficits were identified   Mobility   Bed Mobility Comments SBA-(I) with PT earlier today   Transfer Skills   Transfer Comments SBA-CGA with PT earlier today   Balance   Balance Comments CGA with cues for ambulating with crutches with PT. Declined to get up again with OT.   Upper Body Dressing   Level of [place]: Dress Upper Body independent   Physical Assist/Nonphysical Assist: Dress Upper Body set-up required   Lower Body Dressing   Level of [place]: Dress Lower Body minimum assist (75% patients effort)   Physical Assist/Nonphysical Assist: Dress Lower Body verbal cues   Toileting   Level of [place]: Toilet minimum assist (75% patients effort)   Grooming   Level of [place]: Grooming minimum assist (75% patients effort)   Eating/Self Feeding   Level of [place]: Eating independent   Instrumental  Activities of Daily Living (IADL)   Previous Responsibilities work;driving;meal prep   Activities of Daily Living Analysis   Impairments Contributing to Impaired Activities of Daily Living pain;post surgical precautions;ROM decreased   General Therapy Interventions   Planned Therapy Interventions ADL retraining;transfer training   Clinical Impression   Criteria for Skilled Therapeutic Interventions Met yes, treatment indicated   OT Diagnosis impaired ADL and mobility post RLE surgery   Influenced by the following impairments pain, fatigue, impaired ROM   Assessment of Occupational Performance 1-3 Performance Deficits   Identified Performance Deficits ADl, IADL, mobility   Clinical Decision Making (Complexity) Low complexity   Therapy Frequency daily   Predicted Duration of Therapy Intervention (days/wks) 3 days   Anticipated Equipment Needs at Discharge raised toilet seat;reacher;shower chair   Anticipated Discharge Disposition Transitional Care Facility   Risks and Benefits of Treatment have been explained. Yes   Patient, Family & other staff in agreement with plan of care Yes   Total Evaluation Time   Total Evaluation Time (Minutes) 10[KP1.1]        Revision History        User Key Date/Time User Provider Type Action    > KP1.1 8/12/2017 10:51 AM Kenzie Guzmán OT Occupational Therapist Sign            Progress Notes by Lacy Bolden PT at 8/12/2017  9:33 AM     Author:  Lacy Bolden PT Service:  (none) Author Type:  Physical Therapist    Filed:  8/12/2017  9:33 AM Date of Service:  8/12/2017  9:33 AM Creation Time:  8/12/2017  9:33 AM    Status:  Signed :  Lacy Bolden PT (Physical Therapist)          08/12/17 0837   Quick Adds   Type of Visit Initial PT Evaluation       Present no   Language english   Living Environment   Lives With alone   Living Arrangements other (see comments)   Home Accessibility stairs to enter home   Number of Stairs to Enter Home 4    Number of Stairs Within Home 0   Stair Railings at Home none   Transportation Available bicycle   Living Environment Comment 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 Pt reports farm house in .25 miles from school bus and usually drives to get there. 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 none   Prior Functional Level Comment Pt is IND with all ADLs and functional mobility   General Information   Onset of Illness/Injury or Date of Surgery - Date 08/11/17   Referring Physician dK Rocha MD   Pertinent History of Current Problem (include personal factors and/or comorbidities that impact the POC) Delvin Echevarria is a 52 year old male who is s/p irrigation and debridement and removal of hardware from right proximal tibia.   Precautions/Limitations fall precautions   Weight-Bearing Status - LUE full weight-bearing   Weight-Bearing Status - RUE full weight-bearing   Weight-Bearing Status - LLE full weight-bearing   Weight-Bearing Status - RLE weight-bearing as tolerated   General Observations hemovac drain on RLE   General Info Comments TTWB RLE   Cognitive Status Examination   Orientation  "orientation to person, place and time   Level of Consciousness alert   Follows Commands and Answers Questions 100% of the time   Personal Safety and Judgment intact   Memory intact   Pain Assessment   Patient Currently in Pain Yes, see Vital Sign flowsheet   Integumentary/Edema   Integumentary/Edema Comments normal surgical swelling on RLE   Posture    Posture Forward head position;Protracted shoulders   Range of Motion (ROM)   ROM Comment pt has lacks 11 degrees into extension on RLE   Strength   Strength Comments Not formally assessed but demonstrates WFL with mobility. Could not assess RLE d/t recent surgery   Bed Mobility   Bed Mobility Comments IND   Transfer Skills   Transfer Comments SBA for sit<>stand transfers with crutches   Gait   Gait Comments pt amb 80 ft with axillary crutches and CGA   Balance   Balance Comments Impaired static/dynamic standing balance d/t TTWB   General Therapy Interventions   Planned Therapy Interventions balance training;bed mobility training;gait training;strengthening;stretching;transfer training;progressive activity/exercise;home program guidelines;risk factor education   Clinical Impression   Criteria for Skilled Therapeutic Intervention yes, treatment indicated   PT Diagnosis Impaired functional mobility   Influenced by the following impairments pain, WB status, decreased RLE ROM and strength   Functional limitations due to impairments gait, stairs, transfers   Clinical Presentation Stable/Uncomplicated   Clinical Presentation Rationale Per pts PMHx and current medical and functional status is below baseline   Clinical Decision Making (Complexity) Low complexity   Therapy Frequency` 2 times/day   Predicted Duration of Therapy Intervention (days/wks) 4 days   Anticipated Discharge Disposition Transitional Care Facility   Risk & Benefits of therapy have been explained Yes   Patient, Family & other staff in agreement with plan of care Yes   [facility] AM-PAC TM \"6 Clicks\" " "  2016, Trustees of [facility], under license to [employer].  All rights reserved.   6 Clicks Short Forms Basic Mobility Inpatient Short Form   [facility] AM-PAC  \"6 Clicks\" V.2 Basic Mobility Inpatient Short Form   1. Turning from your back to your side while in a flat bed without using bedrails? 4 - None   2. Moving from lying on your back to sitting on the side of a flat bed without using bedrails? 4 - None   3. Moving to and from a bed to a chair (including a wheelchair)? 3 - A Little   4. Standing up from a chair using your arms (e.g., wheelchair, or bedside chair)? 3 - A Little   5. To walk in hospital room? 3 - A Little   6. Climbing 3-5 steps with a railing? 3 - A Little   Basic Mobility Raw Score (Score out of 24.Lower scores equate to lower levels of function) 20   Total Evaluation Time   Total Evaluation Time (Minutes) 10[SD1.1]        Revision History        User Key Date/Time User Provider Type Action    > SD1.1 8/12/2017  9:33 AM Lacy Bolden, PT Physical Therapist Sign                                                      INTERAGENCY TRANSFER FORM - LAB / IMAGING / EKG / EMG RESULTS   8/10/2017                       UR 8A: [phone]            Unresulted Labs     None         Lab Results - 3 Days      Tissue Culture Aerobic Bacterial [[id number]] (Abnormal)  Resulted: 08/14/17 0841, Result status: Final result    Ordering provider: Kenroy Dos Santos MD  08/11/17 1716 Resulting lab: [facility]    Specimen Information    Type Source Collected On   Tissue Leg Lower, Right 08/11/17 1707          Components       Value Reference Range Flag Lab   Specimen Description Tissue Right Leg  midline   75   Culture Micro --  A 225   Result:         On day 2, isolated in broth only: Staphylococcus aureus Susceptibility testing   done on previous specimen     Micro Report Status FINAL 08/14/2017   225   Result:              Tissue Culture Aerobic Bacterial " [[id number]] (Abnormal)  Resulted: 08/14/17 0840, Result status: Final result    Ordering provider: Kenroy Dos Santos MD  08/11/17 1716 Resulting lab: [facility]    Specimen Information    Type Source Collected On   Tissue Leg Lower, Right 08/11/17 1701          Components       Value Reference Range Flag Lab   Specimen Description Tissue Right Leg  lateral superficial   75   Culture Micro --  A 225   Result:         On day 2, isolated in broth only: Staphylococcus aureus Susceptibility testing   done on previous specimen     Micro Report Status FINAL 08/14/2017   225   Result:              Creatinine [[id number]]  Resulted: 08/14/17 0807, Result status: Final result    Ordering provider: Kenroy Dos Santos MD  08/14/17 0000 Resulting lab: [facility]    Specimen Information    Type Source Collected On   Blood  08/14/17 0719          Components       Value Reference Range Flag Lab   Creatinine 0.73 0.66 - 1.25 mg/dL  13   GFR Estimate -- >60 mL/min/1.7m2  13   Result:         >90  Non  GFR Calc     GFR Estimate If Black -- >60 mL/min/1.7m2  13   Result:         >90   GFR Calc              Tissue Culture Aerobic Bacterial [[id number]] (Abnormal)  Resulted: 08/14/17 0743, Result status: Final result    Ordering provider: Kenory Dos Santos MD  08/11/17 1719 Resulting lab: INFECTIOUS DISEASE DIAGNOSTIC LABORATORY    Specimen Information    Type Source Collected On   Tissue Leg Lower, Right 08/11/17 1719          Components       Value Reference Range Flag Lab   Specimen Description Tissue Right Leg  medial 2   75   Culture Micro --  A 225   Result:         On day 1, isolated in broth only: Staphylococcus aureus Susceptibility testing   done on previous specimen     Micro Report Status FINAL 08/14/2017   225   Result:              Tissue Culture Aerobic Bacterial [[id number]] (Abnormal)  Resulted: 08/13/17 2258, Result  status: Final result    Ordering provider: Kenroy Dos Santos MD  08/11/17 1716 Resulting lab: INFECTIOUS DISEASE DIAGNOSTIC LABORATORY    Specimen Information    Type Source Collected On   Tissue Leg Lower, Right 08/11/17 1704          Components       Value Reference Range Flag Lab   Specimen Description Tissue Right Leg  lateral deep   75   Culture Micro Light growth Staphylococcus aureus  A 225   Micro Report Status FINAL 08/13/2017   225   Organism: Light growth Staphylococcus aureus   225            Tissue Culture Aerobic Bacterial [[id number]] (Abnormal)  Resulted: 08/13/17 1240, Result status: Preliminary result    Ordering provider: Kenroy Dos Santos MD  08/11/17 1719 Resulting lab: INFECTIOUS DISEASE DIAGNOSTIC LABORATORY    Specimen Information    Type Source Collected On   Tissue Leg Lower, Right 08/11/17 1718          Components       Value Reference Range Flag Lab   Specimen Description Tissue Right Leg  medial 1   75   Culture Micro --  A 225   Result:         Light growth Staphylococcus aureus Susceptibility testing done on previous   specimen     Micro Report Status Pending   225   Result:              Vancomycin level [[id number]]  Resulted: 08/13/17 0650, Result status: Final result    Ordering provider: Kenroy Dos Santos MD  08/12/17 2330 Resulting lab: [facility]    Specimen Information    Type Source Collected On   Blood  08/13/17 0614          Components       Value Reference Range Flag Lab   Vancomycin Level 10.2 mg/L  13   Comment:         Traditional Dosing therapeutic Range:          Trough 8-20 mg/L          Peak 20-50 mg/L              Creatinine [[id number]]  Resulted: 08/13/17 0645, Result status: Final result    Ordering provider: Kenroy Dos Santos MD  08/12/17 2330 Resulting lab: [facility]    Specimen Information    Type Source Collected On   Blood  08/13/17 0614          Components       Value Reference Range Flag Lab    Creatinine 0.87 0.66 - 1.25 mg/dL  FrStHsLb   GFR Estimate -- >60 mL/min/1.7m2  FrStHsLb   Result:         >90  Non  GFR Calc     GFR Estimate If Black -- >60 mL/min/1.7m2  FrStHsLb   Result:         >90   GFR Calc              Hemoglobin [[id number]] (Abnormal)  Resulted: 08/13/17 0631, Result status: Final result    Ordering provider: Nicholas Agarwal MD  08/13/17 0000 Resulting lab: [facility]    Specimen Information    Type Source Collected On   Blood  08/13/17 0614          Components       Value Reference Range Flag Lab   Hemoglobin 11.3 13.3 - 17.7 g/dL L 13            Anaerobic bacterial culture [[id number]]  Resulted: 08/12/17 1222, Result status: Preliminary result    Ordering provider: Kenroy Dos Santos MD  08/11/17 1719 Resulting lab: INFECTIOUS DISEASE DIAGNOSTIC LABORATORY    Specimen Information    Type Source Collected On   Tissue Leg Lower, Right 08/11/17 1719          Components       Value Reference Range Flag Lab   Specimen Description Tissue Right Leg  medial 2   75   Special Requests Not received in an anaerobic transport container.   226   Culture Micro Culture negative monitoring continues   225   Micro Report Status Pending   225            Anaerobic bacterial culture [[id number]]  Resulted: 08/12/17 1221, Result status: Preliminary result    Ordering provider: Kenroy Dos Santos MD  08/11/17 1719 Resulting lab: INFECTIOUS DISEASE DIAGNOSTIC LABORATORY    Specimen Information    Type Source Collected On   Tissue Leg Lower, Right 08/11/17 1718          Components       Value Reference Range Flag Lab   Specimen Description Tissue Right Leg  medial 1   75   Special Requests Not received in an anaerobic transport container.   75   Culture Micro Culture negative monitoring continues   225   Micro Report Status Pending   225            Anaerobic bacterial culture [[id number]]  Resulted: 08/12/17 1221, Result status: Preliminary result     Ordering provider: Kenroy Dos Santos MD  08/11/17 1716 Resulting lab: INFECTIOUS DISEASE DIAGNOSTIC LABORATORY    Specimen Information    Type Source Collected On   Tissue Leg Lower, Right 08/11/17 1707          Components       Value Reference Range Flag Lab   Specimen Description Tissue Right Leg  midline   75   Special Requests Not received in an anaerobic transport container.   75   Culture Micro Culture negative monitoring continues   225   Micro Report Status Pending   225            Anaerobic bacterial culture [[id number]]  Resulted: 08/12/17 1221, Result status: Preliminary result    Ordering provider: Kenroy Dos Santos MD  08/11/17 1716 Resulting lab: INFECTIOUS DISEASE DIAGNOSTIC LABORATORY    Specimen Information    Type Source Collected On   Tissue Leg Lower, Right 08/11/17 1704          Components       Value Reference Range Flag Lab   Specimen Description Tissue Right Leg  lateral deep   75   Special Requests Not received in an anaerobic transport container.   75   Culture Micro Culture negative monitoring continues   225   Micro Report Status Pending   225            Anaerobic bacterial culture [[id number]]  Resulted: 08/12/17 1221, Result status: Preliminary result    Ordering provider: Kenroy Dos Santos MD  08/11/17 1716 Resulting lab: INFECTIOUS DISEASE DIAGNOSTIC LABORATORY    Specimen Information    Type Source Collected On   Tissue Leg Lower, Right 08/11/17 1701          Components       Value Reference Range Flag Lab   Specimen Description Tissue Right Leg  lateral superficial   75   Special Requests Not received in an anaerobic transport container.   226   Culture Micro Culture negative monitoring continues   225   Micro Report Status Pending   225            Hemoglobin [[id number]] (Abnormal)  Resulted: 08/12/17 0716, Result status: Final result    Ordering provider: Nicholas Agarwal MD  08/12/17 0000 Resulting lab: [facility]    Specimen Information     Type Source Collected On   Blood  08/12/17 0640          Components       Value Reference Range Flag Lab   Hemoglobin 11.4 13.3 - 17.7 g/dL L 13            Testing Performed By     Lab - Abbreviation Name Director Address Valid Date Range    13 - Unknown [facility] Unknown [address]  [place] [address] 01/15/15 0916 - Present    14 - [facility] Unknown [address] [place] [address] 05/08/15 1057 - Present    75 - Unknown [facility] Unknown [address] [facility] [address] 01/15/15 1019 - Present    225 - Unknown INFECT[facility]EASE DIAGNOSTIC LABORATORY Unknown [address] [facility] [address] 12/19/14 0954 - Present    226 - Unknown MICRO RAPID TESTING LAB Unknown [address] [facility] [address] 12/19/14 0955 - Present               Imaging Results - 3 Days      XR Tibia & Fibula Right 2 Views [[id number]]  Resulted: 08/12/17 0717, Result status: Final result    Ordering provider: Kenroy Dos Santos MD  08/11/17 1923 Resulted by: Chepe Varela MD    Performed: 08/11/17 1952 - 08/11/17 1952 Resulting lab: RADIOLOGY RESULTS    Narrative:       XR TIBIA & FIBULA RT 2 VW 8/11/2017 7:52 PM    HISTORY: Postop.    COMPARISON: August 10, 2017.      Impression:       IMPRESSION: Interval removal of previously seen cortical hardware of  the right proximal tibia. There is a surgical drain in place.    CHEPE VARELA MD      Testing Performed By     Lab - Abbreviation Name Director Address Valid Date Range    104 - Rad Rslts RADIOLOGY RESULTS Unknown Unknown 02/16/05 1553 - Present            Encounter-Level Documents:     There are no encounter-level documents.      Order-Level Documents:     There are no order-level documents.

## 2017-08-10 NOTE — NURSING NOTE
"Chief Complaint   Patient presents with     Knee Pain     patient had a water pocket that poped yesterday morning and now today it has puss coming out. Yellow, white color       Initial BP (!) 152/97 (BP Location: Right arm, Cuff Size: Adult Large)  Pulse 96  Temp 97.5  F (36.4  C) (Oral)  Resp 20  Wt 147 lb (66.7 kg)  BMI 20.5 kg/m2 Estimated body mass index is 20.5 kg/(m^2) as calculated from the following:    Height as of 7/12/17: 5' 11\" (1.803 m).    Weight as of this encounter: 147 lb (66.7 kg).  Medication Reconciliation: complete    Health Maintenance that is potentially due pending provider review:  NONE    n/a    Is there anyone who you would like to be able to receive your results? Not Applicable  If yes have patient fill out MEHUL Garza M.A.      "

## 2017-08-10 NOTE — PROGRESS NOTES
SUBJECTIVE:  Delvin Echevarria is a 52 year old male who presents to the clinic today for possible cellulitis on the abscess with drainage to right knee.  Patient began noticing edema and tenderness about 2-3 weeks ago. Denies fevers, chills, or night sweats.    Possible event related to current symptoms:  Had knee surgery 1 year ago for tibia plateau fracture.      Past Medical History:   Diagnosis Date     Hypertension 3/15/2013        Current Outpatient Prescriptions   Medication Sig Dispense Refill     gabapentin (NEURONTIN) 300 MG capsule Take 1 capsule (300 mg) by mouth 3 times daily 75 capsule 1     cyclobenzaprine (FLEXERIL) 10 MG tablet Take 1 tablet (10 mg) by mouth 3 times daily 75 tablet 1     ASPIRIN PO Take 325 mg by mouth daily       triamcinolone (KENALOG) 0.1 % cream Apply topically 2 times daily 15 g      hydrOXYzine (VISTARIL) 25 MG capsule Take 1 capsule (25 mg) by mouth 3 times daily as needed for itching (Patient not taking: Reported on 8/10/2017) 30 capsule 0     hydrocortisone (CORTAID) 1 % cream Apply topically 2 times daily       Skin Protectants, Misc. (HYDROCERIN) LOTN Apply topically daily       mineral oil-hydrophilic petrolatum (AQUAPHOR) ointment Apply topically At Bedtime (Patient not taking: Reported on 8/10/2017) 99 g      acetaminophen (TYLENOL) 325 MG tablet Take 3 tablets (975 mg) by mouth every 8 hours (Patient not taking: Reported on 7/12/2017) 100 tablet        REVIEW OF SYSTEMS  ROS:   CONSTITUTIONAL:NEGATIVE for fever, chills, change in weight  INTEGUMENTARY/SKIN: See above   EYES: NEGATIVE for vision changes or irritation  ENT/MOUTH: NEGATIVE for ear, mouth and throat problems  RESP:NEGATIVE for significant cough or SOB  CV: NEGATIVE for chest pain, palpitations or peripheral edema  MUSCULOSKELETAL: NEGATIVE for significant arthralgias or myalgia  NEURO: NEGATIVE for weakness, dizziness or paresthesias    OBJECTIVE:  BP (!) 152/97 (BP Location: Right arm, Cuff Size: Adult  Large)  Pulse 96  Temp 97.5  F (36.4  C) (Oral)  Resp 20  Wt 147 lb (66.7 kg)  BMI 20.5 kg/m2   General appearance: alert,no apparent distress  CARDIAC:NORMAL - regular rate and rhythm without murmur.  RESP: Normal - CTA without rales, rhonchi, or wheezing.  SKIN: right knee incsion noted to medial aspects of the knee. There is an open abscess draining the abscess extends 5 inch by 3 inches and is raised 2 inches with redness covering the knee.   Results for orders placed or performed in visit on 08/10/17   CBC with platelets differential   Result Value Ref Range    WBC 6.2 4.0 - 11.0 10e9/L    RBC Count 3.89 (L) 4.4 - 5.9 10e12/L    Hemoglobin 11.9 (L) 13.3 - 17.7 g/dL    Hematocrit 36.0 (L) 40.0 - 53.0 %    MCV 93 78 - 100 fl    MCH 30.6 26.5 - 33.0 pg    MCHC 33.1 31.5 - 36.5 g/dL    RDW 13.1 10.0 - 15.0 %    Platelet Count 328 150 - 450 10e9/L    Diff Method Automated Method     % Neutrophils 53.8 %    % Lymphocytes 33.8 %    % Monocytes 6.1 %    % Eosinophils 5.5 %    % Basophils 0.8 %    Absolute Neutrophil 3.3 1.6 - 8.3 10e9/L    Absolute Lymphocytes 2.1 0.8 - 5.3 10e9/L    Absolute Monocytes 0.4 0.0 - 1.3 10e9/L    Absolute Eosinophils 0.3 0.0 - 0.7 10e9/L    Absolute Basophils 0.1 0.0 - 0.2 10e9/L   ESR: Erythrocyte sedimentation rate   Result Value Ref Range    Sed Rate 56 (H) 0 - 20 mm/h       ASSESSMENT:    ICD-10-CM    1. Abscess L02.91 Wound Culture Aerobic Bacterial     CBC with platelets differential     ESR: Erythrocyte sedimentation rate       PLAN:  Reviewed with [facility].  Patient will be directly admitted to .     Patient Instructions   You will be directly admitted to the [facility] unit 8A on the [facility].    You are to go to the  and let them know you are a direct admit to 8A     Abscess (Incision & Drainage)  An abscess is sometimes called a boil. It happens when bacteria get trapped under the skin and start to grow. Pus  forms inside the abscess as the body responds to the bacteria. An abscess can happen with an insect bite, ingrown hair, blocked oil gland, pimple, cyst, or puncture wound.  Your healthcare provider has drained the pus from your abscess. If the abscess pocket was large, your healthcare provider may have put in gauze packing. Your provider will need to remove it on your next visit. He or she may also replace it at that time. You may not need antibiotics to treat a simple abscess, unless the infection is spreading into the skin around the wound (cellulitis).  The wound will take about 1 to 2 weeks to heal, depending on the size of the abscess. Healthy tissue will grow from the bottom and sides of the opening until it seals over.  Home care  These tips can help your wound heal:    The wound may drain for the first 2 days. Cover the wound with a clean dry dressing. Change the dressing if it becomes soaked with blood or pus.    If a gauze packing was placed inside the abscess pocket, you may be told to remove it yourself. You may do this in the shower. Once the packing is removed, you should wash the area in the shower, or clean the area as directed by your provider. Continue to do this until the skin opening has closed. Make sure you wash your hands after changing the packing or cleaning the wound.    If you were prescribed antibiotics, take them as directed until they are all gone.    You may use acetaminophen or ibuprofen to control pain, unless another pain medicine was prescribed. If you have liver disease or ever had a stomach ulcer, talk with your doctor before using these medicines.  Follow-up care  Follow up with your healthcare provider, or as advised. If a gauze packing was put in your wound, it should be removed in 1 to 2 days. Check your wound every day for any signs that the infection is getting worse. The signs are listed below.  When to seek medical advice  Call your healthcare provider right away if any  of these occur:    Increasing redness or swelling    Red streaks in the skin leading away from the wound    Increasing local pain or swelling    Continued pus draining from the wound 2 days after treatment    Fever of 100.4 F (38 C) or higher, or as directed by your healthcare provider    Boil returns when you are at home  Date Last Reviewed: 9/1/2016 2000-2017 [employer]. [address] [place] [address]. All rights reserved. This information is not intended as a substitute for professional medical care. Always follow your healthcare professional's instructions.               XIN Gomez CNP

## 2017-08-10 NOTE — MR AVS SNAPSHOT
After Visit Summary   8/10/2017    Delvin Echevarria    MRN: [id number]           Patient Information     Date Of Birth          1965        Visit Information        Provider Department      8/10/2017 5:15 PM Sona Rice APRN CNP [facility]        Today's Diagnoses     Abscess    -  1      Care Instructions    You will be directly admitted to the [facility] unit 8A on the [facility].    You are to go to the  and let them know you are a direct admit to 8A     Abscess (Incision & Drainage)  An abscess is sometimes called a boil. It happens when bacteria get trapped under the skin and start to grow. Pus forms inside the abscess as the body responds to the bacteria. An abscess can happen with an insect bite, ingrown hair, blocked oil gland, pimple, cyst, or puncture wound.  Your healthcare provider has drained the pus from your abscess. If the abscess pocket was large, your healthcare provider may have put in gauze packing. Your provider will need to remove it on your next visit. He or she may also replace it at that time. You may not need antibiotics to treat a simple abscess, unless the infection is spreading into the skin around the wound (cellulitis).  The wound will take about 1 to 2 weeks to heal, depending on the size of the abscess. Healthy tissue will grow from the bottom and sides of the opening until it seals over.  Home care  These tips can help your wound heal:    The wound may drain for the first 2 days. Cover the wound with a clean dry dressing. Change the dressing if it becomes soaked with blood or pus.    If a gauze packing was placed inside the abscess pocket, you may be told to remove it yourself. You may do this in the shower. Once the packing is removed, you should wash the area in the shower, or clean the area as directed by your provider. Continue to do this until the skin opening has closed. Make sure you  wash your hands after changing the packing or cleaning the wound.    If you were prescribed antibiotics, take them as directed until they are all gone.    You may use acetaminophen or ibuprofen to control pain, unless another pain medicine was prescribed. If you have liver disease or ever had a stomach ulcer, talk with your doctor before using these medicines.  Follow-up care  Follow up with your healthcare provider, or as advised. If a gauze packing was put in your wound, it should be removed in 1 to 2 days. Check your wound every day for any signs that the infection is getting worse. The signs are listed below.  When to seek medical advice  Call your healthcare provider right away if any of these occur:    Increasing redness or swelling    Red streaks in the skin leading away from the wound    Increasing local pain or swelling    Continued pus draining from the wound 2 days after treatment    Fever of 100.4 F (38 C) or higher, or as directed by your healthcare provider    Boil returns when you are at home  Date Last Reviewed: 9/1/2016 2000-2017 [employer]. [address] [place] [address]. All rights reserved. This information is not intended as a substitute for professional medical care. Always follow your healthcare professional's instructions.                Follow-ups after your visit        Your next 10 appointments already scheduled     Aug 15, 2017  4:00 PM CDT   SHORT with Krystian Streeter MD   [facility]    [address] [place] [id number] [phone]              Who to contact     If you have questions or need follow up information about today's clinic visit or your schedule please contact [facility] directly at [phone].  Normal or non-critical lab and imaging results will be communicated to you by MyChart, letter or phone within 4 business days after the clinic  "has received the results. If you do not hear from us within 7 days, please contact the clinic through [employer] or phone. If you have a critical or abnormal lab result, we will notify you by phone as soon as possible.  Submit refill requests through [employer] or call your pharmacy and they will forward the refill request to us. Please allow 3 business days for your refill to be completed.          Additional Information About Your Visit        [employer] Information     [employer] lets you send messages to your doctor, view your test results, renew your prescriptions, schedule appointments and more. To sign up, go to [email or web address] . Click on \"Log in\" on the left side of the screen, which will take you to the Welcome page. Then click on \"Sign up Now\" on the right side of the page.     You will be asked to enter the access code listed below, as well as some personal information. Please follow the directions to create your username and password.     Your access code is: [email or web address]  Expires: 10/8/2017  5:49 PM     Your access code will  in 90 days. If you need help or a new code, please call your [facility] clinic or [phone].        Care EveryWhere ID     This is your Care EveryWhere ID. This could be used by other organizations to access your [facility] medical records  [id number]        Your Vitals Were     Pulse Temperature Respirations BMI (Body Mass Index)          96 97.5  F (36.4  C) (Oral) 20 20.5 kg/m2         Blood Pressure from Last 3 Encounters:   08/10/17 (!) 152/97   17 130/78   07/10/17 (!) 148/91    Weight from Last 3 Encounters:   08/10/17 147 lb (66.7 kg)   17 146 lb (66.2 kg)   07/10/17 145 lb 9.6 oz (66 kg)              We Performed the Following     CBC with platelets differential     ESR: Erythrocyte sedimentation rate     Wound Culture Aerobic Bacterial        Primary Care Provider Office Phone # Fax #    Krystian Streeter [phone]       [address] [facility] [address]        Equal Access to Services     GAYLA CRAWFORD : Hadii aad ku hadzakidoron Bills, waaxda yadira, qaybta michamacharleen dixon. So [facility] [phone].    ATENCIÓN: Si habla español, tiene a alcazar disposición servicios gratuitos de asistencia lingüística. Llame al [phone].    We comply with applicable federal civil rights laws and [place] laws. We do not discriminate on the basis of race, color, national origin, age, disability sex, sexual orientation or gender identity.            Thank you!     Thank you for choosing [facility]  for your care. Our goal is always to provide you with excellent care. Hearing back from our patients is one way we can continue to improve our services. Please take a few minutes to complete the written survey that you may receive in the mail after your visit with us. Thank you!             Your Updated Medication List - Protect others around you: Learn how to safely use, store and throw away your medicines at www.disposemymeds.org.          This list is accurate as of: 8/10/17  6:12 PM.  Always use your most recent med list.                   Brand Name Dispense Instructions for use Diagnosis    acetaminophen 325 MG tablet    TYLENOL    100 tablet    Take 3 tablets (975 mg) by mouth every 8 hours    Tibial plateau fracture, right, closed, initial encounter       ASPIRIN PO      Take 325 mg by mouth daily        cyclobenzaprine 10 MG tablet    FLEXERIL    75 tablet    Take 1 tablet (10 mg) by mouth 3 times daily    Pain of right lower extremity       gabapentin 300 MG capsule    NEURONTIN    75 capsule    Take 1 capsule (300 mg) by mouth 3 times daily    Pain of right lower extremity       HYDROCERIN Lotn lotion      Apply topically daily        hydrocortisone 1 % cream    CORTAID     Apply topically 2 times daily        hydrOXYzine 25 MG capsule    VISTARIL    30 capsule     Take 1 capsule (25 mg) by mouth 3 times daily as needed for itching    Eczema, unspecified type       mineral oil-hydrophilic petrolatum     99 g    Apply topically At Bedtime    Atopic dermatitis, unspecified type       triamcinolone 0.1 % cream    KENALOG    15 g    Apply topically 2 times daily    Atopic dermatitis, unspecified type

## 2017-08-10 NOTE — IP AVS SNAPSHOT
` ` Patient Information     Patient Name Sex     Delvin Echevarria ([id number]) Male 1965       Room Bed    0835 0835-01      Patient Demographics     Address Phone    [address] [address] [phone] (Home) *Preferred*      Patient Ethnicity & Race     Ethnic Group Patient Race    Not  or  White      Emergency Contact(s)     Name Relation Home Work Mobile    MAN SMITH [phone]        Documents on File        Status Date Received Description       Documents for the Patient    Privacy Notice - [place] Received 12     Insurance Card Received 16 not available    External Medication Information Consent Accepted () 12     Patient ID Received 16 not available    Consent for Services - Hospital/Clinic Received () 12     Waiver - Payment Received 12     Waiver - Payment Received 12     Waiver - Payment Received 12     Waiver - Payment Received 12     Consent for EHR Access  13 Copied from existing Consent for services - C/HOD collected on 2012    [facility] Specified Other       Consent for Services - Hospital/Clinic Received () 13     External Medication Information Consent Accepted 13     External Medication Information Consent  () 13     External Medication Information Consent  () 13     Consent for Services - Hospital/Clinic  () 13     Consent for Services - Hospital/Clinic Received () 04/13/15     Consent for Services/Privacy Notice - Hospital/Clinic Received () 16     Consent for Services - Geriatrics Received 16     Consent for Services - [facility]       Consent for Services/Privacy Notice - Hospital/Clinic-Esign Received 07/10/17     Advance Directives and Living Will Received 16 POLST 2016    HIM MEHUL Authorization  17     Business/Insurance/Care Coordination/Health Form - Patient  17  BLUE CROSS/[facility] - PHYSICAL THERAPY APPROVAL - 7/26/17      Admission Information     Attending Provider Admitting Provider Admission Type Admission Date/Time    Kenroy Dos Santos MD Horst, Patrick Kevin, MD Urgent 08/10/17  2131    Discharge Date Hospital Service Auth/Cert Status Service Area     Orthopedics [facility]    Unit Room/Bed Admission Status       UR 8A 0835/0835-01 Admission (Confirmed)       Admission     Complaint    Knee Abscess, Right tibial abcess, Abscess      Hospital Account     Name Acct ID Class Status Primary Coverage    Delvin Echevarria [id number] Inpatient Open BLUE PLUS - BLUE PLUS [place]            Guarantor Account (for Hospital Account #[id number])     Name Relation to Pt Service Area Active? Acct Type    Delvin Echevarria Self FCS Yes Personal/Family    Address Phone          [address] [address] [phone](H)              Coverage Information (for Hospital Account #[id number])     F/O Payor/Plan Precert #    BLUE PLUS/BLUE PLUS [place]     Subscriber Subscriber #    Delvin Echevarria [id number]    Address Phone    [address] [address]  [place] [address] [phone]

## 2017-08-11 ENCOUNTER — ANESTHESIA (OUTPATIENT)
Dept: SURGERY | Facility: CLINIC | Age: 52
DRG: 493 | End: 2017-08-11
Payer: COMMERCIAL

## 2017-08-11 ENCOUNTER — ANESTHESIA EVENT (OUTPATIENT)
Dept: SURGERY | Facility: CLINIC | Age: 52
DRG: 493 | End: 2017-08-11
Payer: COMMERCIAL

## 2017-08-11 ENCOUNTER — APPOINTMENT (OUTPATIENT)
Dept: GENERAL RADIOLOGY | Facility: CLINIC | Age: 52
DRG: 493 | End: 2017-08-11
Attending: ORTHOPAEDIC SURGERY
Payer: COMMERCIAL

## 2017-08-11 PROBLEM — L02.91 ABSCESS: Status: ACTIVE | Noted: 2017-08-11

## 2017-08-11 LAB
ABO + RH BLD: NORMAL
ABO + RH BLD: NORMAL
ANION GAP SERPL CALCULATED.3IONS-SCNC: 8 MMOL/L (ref 3–14)
BLD GP AB SCN SERPL QL: NORMAL
BLOOD BANK CMNT PATIENT-IMP: NORMAL
BUN SERPL-MCNC: 16 MG/DL (ref 7–30)
CALCIUM SERPL-MCNC: 8.4 MG/DL (ref 8.5–10.1)
CHLORIDE SERPL-SCNC: 107 MMOL/L (ref 94–109)
CO2 SERPL-SCNC: 25 MMOL/L (ref 20–32)
CREAT SERPL-MCNC: 0.7 MG/DL (ref 0.66–1.25)
CRP SERPL-MCNC: 29.5 MG/L (ref 0–8)
ERYTHROCYTE [DISTWIDTH] IN BLOOD BY AUTOMATED COUNT: 13.3 % (ref 10–15)
ERYTHROCYTE [SEDIMENTATION RATE] IN BLOOD BY WESTERGREN METHOD: 53 MM/H (ref 0–20)
GFR SERPL CREATININE-BSD FRML MDRD: ABNORMAL ML/MIN/1.7M2
GLUCOSE SERPL-MCNC: 102 MG/DL (ref 70–99)
HCT VFR BLD AUTO: 35.4 % (ref 40–53)
HGB BLD-MCNC: 11.7 G/DL (ref 13.3–17.7)
INR PPP: 1.08 (ref 0.86–1.14)
INTERPRETATION ECG - MUSE: NORMAL
MCH RBC QN AUTO: 30.2 PG (ref 26.5–33)
MCHC RBC AUTO-ENTMCNC: 33.1 G/DL (ref 31.5–36.5)
MCV RBC AUTO: 92 FL (ref 78–100)
PLATELET # BLD AUTO: 302 10E9/L (ref 150–450)
POTASSIUM SERPL-SCNC: 4.2 MMOL/L (ref 3.4–5.3)
RBC # BLD AUTO: 3.87 10E12/L (ref 4.4–5.9)
SODIUM SERPL-SCNC: 140 MMOL/L (ref 133–144)
SPECIMEN EXP DATE BLD: NORMAL
WBC # BLD AUTO: 6.1 10E9/L (ref 4–11)

## 2017-08-11 PROCEDURE — 27210794 ZZH OR GENERAL SUPPLY STERILE: Performed by: ORTHOPAEDIC SURGERY

## 2017-08-11 PROCEDURE — 25000132 ZZH RX MED GY IP 250 OP 250 PS 637: Performed by: INTERNAL MEDICINE

## 2017-08-11 PROCEDURE — 85652 RBC SED RATE AUTOMATED: CPT | Performed by: ORTHOPAEDIC SURGERY

## 2017-08-11 PROCEDURE — 40000278 XR SURGERY CARM FLUORO LESS THAN 5 MIN: Mod: TC

## 2017-08-11 PROCEDURE — 86900 BLOOD TYPING SEROLOGIC ABO: CPT | Performed by: ORTHOPAEDIC SURGERY

## 2017-08-11 PROCEDURE — 25000128 H RX IP 250 OP 636: Performed by: ORTHOPAEDIC SURGERY

## 2017-08-11 PROCEDURE — 85027 COMPLETE CBC AUTOMATED: CPT | Performed by: ORTHOPAEDIC SURGERY

## 2017-08-11 PROCEDURE — 80048 BASIC METABOLIC PNL TOTAL CA: CPT | Performed by: ORTHOPAEDIC SURGERY

## 2017-08-11 PROCEDURE — 25000125 ZZHC RX 250: Performed by: ORTHOPAEDIC SURGERY

## 2017-08-11 PROCEDURE — 37000008 ZZH ANESTHESIA TECHNICAL FEE, 1ST 30 MIN: Performed by: ORTHOPAEDIC SURGERY

## 2017-08-11 PROCEDURE — E2402 NEG PRESS WOUND THERAPY PUMP: HCPCS | Performed by: OCCUPATIONAL THERAPIST

## 2017-08-11 PROCEDURE — 0QPG04Z REMOVAL OF INTERNAL FIXATION DEVICE FROM RIGHT TIBIA, OPEN APPROACH: ICD-10-PCS | Performed by: ORTHOPAEDIC SURGERY

## 2017-08-11 PROCEDURE — 37000009 ZZH ANESTHESIA TECHNICAL FEE, EACH ADDTL 15 MIN: Performed by: ORTHOPAEDIC SURGERY

## 2017-08-11 PROCEDURE — 25000132 ZZH RX MED GY IP 250 OP 250 PS 637: Performed by: ORTHOPAEDIC SURGERY

## 2017-08-11 PROCEDURE — 25000128 H RX IP 250 OP 636: Performed by: REGISTERED NURSE

## 2017-08-11 PROCEDURE — 25800025 ZZH RX 258: Performed by: ORTHOPAEDIC SURGERY

## 2017-08-11 PROCEDURE — 86901 BLOOD TYPING SEROLOGIC RH(D): CPT | Performed by: ORTHOPAEDIC SURGERY

## 2017-08-11 PROCEDURE — 87186 SC STD MICRODIL/AGAR DIL: CPT | Performed by: ORTHOPAEDIC SURGERY

## 2017-08-11 PROCEDURE — 0QBG0ZZ EXCISION OF RIGHT TIBIA, OPEN APPROACH: ICD-10-PCS | Performed by: ORTHOPAEDIC SURGERY

## 2017-08-11 PROCEDURE — 36000061 ZZH SURGERY LEVEL 3 W FLUORO 1ST 30 MIN - UMMC: Performed by: ORTHOPAEDIC SURGERY

## 2017-08-11 PROCEDURE — 40000170 ZZH STATISTIC PRE-PROCEDURE ASSESSMENT II: Performed by: ORTHOPAEDIC SURGERY

## 2017-08-11 PROCEDURE — 25000565 ZZH ISOFLURANE, EA 15 MIN: Performed by: ORTHOPAEDIC SURGERY

## 2017-08-11 PROCEDURE — 71000014 ZZH RECOVERY PHASE 1 LEVEL 2 FIRST HR: Performed by: ORTHOPAEDIC SURGERY

## 2017-08-11 PROCEDURE — 86850 RBC ANTIBODY SCREEN: CPT | Performed by: ORTHOPAEDIC SURGERY

## 2017-08-11 PROCEDURE — 85610 PROTHROMBIN TIME: CPT | Performed by: ORTHOPAEDIC SURGERY

## 2017-08-11 PROCEDURE — 86140 C-REACTIVE PROTEIN: CPT | Performed by: ORTHOPAEDIC SURGERY

## 2017-08-11 PROCEDURE — C9399 UNCLASSIFIED DRUGS OR BIOLOG: HCPCS | Performed by: REGISTERED NURSE

## 2017-08-11 PROCEDURE — 36415 COLL VENOUS BLD VENIPUNCTURE: CPT | Performed by: ORTHOPAEDIC SURGERY

## 2017-08-11 PROCEDURE — 27210995 ZZH RX 272: Performed by: ORTHOPAEDIC SURGERY

## 2017-08-11 PROCEDURE — 71000015 ZZH RECOVERY PHASE 1 LEVEL 2 EA ADDTL HR: Performed by: ORTHOPAEDIC SURGERY

## 2017-08-11 PROCEDURE — 25000125 ZZHC RX 250: Performed by: REGISTERED NURSE

## 2017-08-11 PROCEDURE — 87070 CULTURE OTHR SPECIMN AEROBIC: CPT | Performed by: ORTHOPAEDIC SURGERY

## 2017-08-11 PROCEDURE — 40000986 XR TIBIA & FIBULA RT 2 VW: Mod: RT

## 2017-08-11 PROCEDURE — 36000059 ZZH SURGERY LEVEL 3 EA 15 ADDTL MIN UMMC: Performed by: ORTHOPAEDIC SURGERY

## 2017-08-11 PROCEDURE — 87075 CULTR BACTERIA EXCEPT BLOOD: CPT | Performed by: ORTHOPAEDIC SURGERY

## 2017-08-11 PROCEDURE — 87077 CULTURE AEROBIC IDENTIFY: CPT | Performed by: ORTHOPAEDIC SURGERY

## 2017-08-11 PROCEDURE — 12000001 ZZH R&B MED SURG/OB UMMC

## 2017-08-11 RX ORDER — BENZOCAINE/MENTHOL 6 MG-10 MG
LOZENGE MUCOUS MEMBRANE 2 TIMES DAILY
Status: DISCONTINUED | OUTPATIENT
Start: 2017-08-11 | End: 2017-08-15 | Stop reason: HOSPADM

## 2017-08-11 RX ORDER — ONDANSETRON 4 MG/1
4 TABLET, ORALLY DISINTEGRATING ORAL EVERY 6 HOURS PRN
Status: DISCONTINUED | OUTPATIENT
Start: 2017-08-11 | End: 2017-08-15 | Stop reason: HOSPADM

## 2017-08-11 RX ORDER — ONDANSETRON 2 MG/ML
4 INJECTION INTRAMUSCULAR; INTRAVENOUS EVERY 6 HOURS PRN
Status: DISCONTINUED | OUTPATIENT
Start: 2017-08-11 | End: 2017-08-15 | Stop reason: HOSPADM

## 2017-08-11 RX ORDER — PROPOFOL 10 MG/ML
INJECTION, EMULSION INTRAVENOUS PRN
Status: DISCONTINUED | OUTPATIENT
Start: 2017-08-11 | End: 2017-08-11

## 2017-08-11 RX ORDER — SODIUM CHLORIDE, SODIUM LACTATE, POTASSIUM CHLORIDE, CALCIUM CHLORIDE 600; 310; 30; 20 MG/100ML; MG/100ML; MG/100ML; MG/100ML
INJECTION, SOLUTION INTRAVENOUS CONTINUOUS
Status: DISCONTINUED | OUTPATIENT
Start: 2017-08-11 | End: 2017-08-11

## 2017-08-11 RX ORDER — FENTANYL CITRATE 50 UG/ML
25-50 INJECTION, SOLUTION INTRAMUSCULAR; INTRAVENOUS
Status: DISCONTINUED | OUTPATIENT
Start: 2017-08-11 | End: 2017-08-11 | Stop reason: HOSPADM

## 2017-08-11 RX ORDER — LIDOCAINE 40 MG/G
CREAM TOPICAL
Status: DISCONTINUED | OUTPATIENT
Start: 2017-08-11 | End: 2017-08-15 | Stop reason: HOSPADM

## 2017-08-11 RX ORDER — LIDOCAINE HYDROCHLORIDE 20 MG/ML
INJECTION, SOLUTION INFILTRATION; PERINEURAL PRN
Status: DISCONTINUED | OUTPATIENT
Start: 2017-08-11 | End: 2017-08-11

## 2017-08-11 RX ORDER — VANCOMYCIN HYDROCHLORIDE 1 G/200ML
1000 INJECTION, SOLUTION INTRAVENOUS EVERY 12 HOURS
Status: DISCONTINUED | OUTPATIENT
Start: 2017-08-12 | End: 2017-08-13

## 2017-08-11 RX ORDER — HYDROMORPHONE HYDROCHLORIDE 1 MG/ML
.3-.5 INJECTION, SOLUTION INTRAMUSCULAR; INTRAVENOUS; SUBCUTANEOUS EVERY 5 MIN PRN
Status: DISCONTINUED | OUTPATIENT
Start: 2017-08-11 | End: 2017-08-11 | Stop reason: HOSPADM

## 2017-08-11 RX ORDER — DEXAMETHASONE SODIUM PHOSPHATE 4 MG/ML
INJECTION, SOLUTION INTRA-ARTICULAR; INTRALESIONAL; INTRAMUSCULAR; INTRAVENOUS; SOFT TISSUE PRN
Status: DISCONTINUED | OUTPATIENT
Start: 2017-08-11 | End: 2017-08-11

## 2017-08-11 RX ORDER — OXYCODONE HYDROCHLORIDE 5 MG/1
5-10 TABLET ORAL
Status: DISCONTINUED | OUTPATIENT
Start: 2017-08-11 | End: 2017-08-15 | Stop reason: HOSPADM

## 2017-08-11 RX ORDER — ACETAMINOPHEN 325 MG/1
975 TABLET ORAL EVERY 8 HOURS
Status: DISPENSED | OUTPATIENT
Start: 2017-08-11 | End: 2017-08-14

## 2017-08-11 RX ORDER — FENTANYL CITRATE 50 UG/ML
INJECTION, SOLUTION INTRAMUSCULAR; INTRAVENOUS PRN
Status: DISCONTINUED | OUTPATIENT
Start: 2017-08-11 | End: 2017-08-11

## 2017-08-11 RX ORDER — HYDROXYZINE HYDROCHLORIDE 25 MG/1
25 TABLET, FILM COATED ORAL 3 TIMES DAILY PRN
Status: DISCONTINUED | OUTPATIENT
Start: 2017-08-11 | End: 2017-08-11

## 2017-08-11 RX ORDER — HYDRALAZINE HYDROCHLORIDE 10 MG/1
10 TABLET, FILM COATED ORAL 4 TIMES DAILY PRN
Status: DISCONTINUED | OUTPATIENT
Start: 2017-08-11 | End: 2017-08-15

## 2017-08-11 RX ORDER — ASPIRIN 81 MG/1
81 TABLET, CHEWABLE ORAL 2 TIMES DAILY
Status: DISCONTINUED | OUTPATIENT
Start: 2017-08-12 | End: 2017-08-15 | Stop reason: HOSPADM

## 2017-08-11 RX ORDER — AMOXICILLIN 250 MG
1-2 CAPSULE ORAL 2 TIMES DAILY
Status: DISCONTINUED | OUTPATIENT
Start: 2017-08-11 | End: 2017-08-15 | Stop reason: HOSPADM

## 2017-08-11 RX ORDER — PROCHLORPERAZINE MALEATE 5 MG
5-10 TABLET ORAL EVERY 6 HOURS PRN
Status: DISCONTINUED | OUTPATIENT
Start: 2017-08-11 | End: 2017-08-15 | Stop reason: HOSPADM

## 2017-08-11 RX ORDER — ONDANSETRON 2 MG/ML
INJECTION INTRAMUSCULAR; INTRAVENOUS PRN
Status: DISCONTINUED | OUTPATIENT
Start: 2017-08-11 | End: 2017-08-11

## 2017-08-11 RX ORDER — HYDROMORPHONE HYDROCHLORIDE 1 MG/ML
.3-.5 INJECTION, SOLUTION INTRAMUSCULAR; INTRAVENOUS; SUBCUTANEOUS
Status: DISCONTINUED | OUTPATIENT
Start: 2017-08-11 | End: 2017-08-15 | Stop reason: HOSPADM

## 2017-08-11 RX ORDER — NALOXONE HYDROCHLORIDE 0.4 MG/ML
.1-.4 INJECTION, SOLUTION INTRAMUSCULAR; INTRAVENOUS; SUBCUTANEOUS
Status: DISCONTINUED | OUTPATIENT
Start: 2017-08-11 | End: 2017-08-15 | Stop reason: HOSPADM

## 2017-08-11 RX ORDER — ONDANSETRON 2 MG/ML
4 INJECTION INTRAMUSCULAR; INTRAVENOUS EVERY 30 MIN PRN
Status: DISCONTINUED | OUTPATIENT
Start: 2017-08-11 | End: 2017-08-11 | Stop reason: HOSPADM

## 2017-08-11 RX ORDER — DIPHENHYDRAMINE HYDROCHLORIDE 50 MG/ML
25 INJECTION INTRAMUSCULAR; INTRAVENOUS EVERY 6 HOURS PRN
Status: DISCONTINUED | OUTPATIENT
Start: 2017-08-11 | End: 2017-08-15 | Stop reason: HOSPADM

## 2017-08-11 RX ORDER — ONDANSETRON 4 MG/1
4 TABLET, ORALLY DISINTEGRATING ORAL EVERY 30 MIN PRN
Status: DISCONTINUED | OUTPATIENT
Start: 2017-08-11 | End: 2017-08-11 | Stop reason: HOSPADM

## 2017-08-11 RX ORDER — DIPHENHYDRAMINE HCL 25 MG
25 CAPSULE ORAL EVERY 6 HOURS PRN
Status: DISCONTINUED | OUTPATIENT
Start: 2017-08-11 | End: 2017-08-15 | Stop reason: HOSPADM

## 2017-08-11 RX ORDER — METOCLOPRAMIDE 10 MG/1
10 TABLET ORAL EVERY 6 HOURS PRN
Status: DISCONTINUED | OUTPATIENT
Start: 2017-08-11 | End: 2017-08-15 | Stop reason: HOSPADM

## 2017-08-11 RX ORDER — PIPERACILLIN SODIUM, TAZOBACTAM SODIUM 3; .375 G/15ML; G/15ML
INJECTION, POWDER, LYOPHILIZED, FOR SOLUTION INTRAVENOUS PRN
Status: DISCONTINUED | OUTPATIENT
Start: 2017-08-11 | End: 2017-08-11

## 2017-08-11 RX ORDER — HYDRALAZINE HYDROCHLORIDE 20 MG/ML
10 INJECTION INTRAMUSCULAR; INTRAVENOUS EVERY 6 HOURS PRN
Status: DISCONTINUED | OUTPATIENT
Start: 2017-08-11 | End: 2017-08-11

## 2017-08-11 RX ORDER — SODIUM CHLORIDE 9 MG/ML
INJECTION, SOLUTION INTRAVENOUS CONTINUOUS
Status: DISCONTINUED | OUTPATIENT
Start: 2017-08-11 | End: 2017-08-15

## 2017-08-11 RX ORDER — SODIUM CHLORIDE, SODIUM LACTATE, POTASSIUM CHLORIDE, CALCIUM CHLORIDE 600; 310; 30; 20 MG/100ML; MG/100ML; MG/100ML; MG/100ML
INJECTION, SOLUTION INTRAVENOUS CONTINUOUS
Status: DISCONTINUED | OUTPATIENT
Start: 2017-08-11 | End: 2017-08-11 | Stop reason: HOSPADM

## 2017-08-11 RX ORDER — METOCLOPRAMIDE HYDROCHLORIDE 5 MG/ML
10 INJECTION INTRAMUSCULAR; INTRAVENOUS EVERY 6 HOURS PRN
Status: DISCONTINUED | OUTPATIENT
Start: 2017-08-11 | End: 2017-08-15 | Stop reason: HOSPADM

## 2017-08-11 RX ORDER — PIPERACILLIN SODIUM, TAZOBACTAM SODIUM 3; .375 G/15ML; G/15ML
3.38 INJECTION, POWDER, LYOPHILIZED, FOR SOLUTION INTRAVENOUS EVERY 6 HOURS
Status: DISCONTINUED | OUTPATIENT
Start: 2017-08-11 | End: 2017-08-14

## 2017-08-11 RX ORDER — ACETAMINOPHEN 325 MG/1
650 TABLET ORAL EVERY 4 HOURS PRN
Status: DISCONTINUED | OUTPATIENT
Start: 2017-08-14 | End: 2017-08-15 | Stop reason: HOSPADM

## 2017-08-11 RX ADMIN — FENTANYL CITRATE 50 MCG: 50 INJECTION, SOLUTION INTRAMUSCULAR; INTRAVENOUS at 17:26

## 2017-08-11 RX ADMIN — FENTANYL CITRATE 100 MCG: 50 INJECTION, SOLUTION INTRAMUSCULAR; INTRAVENOUS at 16:30

## 2017-08-11 RX ADMIN — PROPOFOL 140 MG: 10 INJECTION, EMULSION INTRAVENOUS at 16:30

## 2017-08-11 RX ADMIN — SUGAMMADEX 200 MG: 100 INJECTION, SOLUTION INTRAVENOUS at 18:32

## 2017-08-11 RX ADMIN — LIDOCAINE HYDROCHLORIDE 80 MG: 20 INJECTION, SOLUTION INFILTRATION; PERINEURAL at 16:30

## 2017-08-11 RX ADMIN — HYDROMORPHONE HYDROCHLORIDE 0.5 MG: 1 INJECTION, SOLUTION INTRAMUSCULAR; INTRAVENOUS; SUBCUTANEOUS at 18:31

## 2017-08-11 RX ADMIN — ONDANSETRON 4 MG: 2 INJECTION INTRAMUSCULAR; INTRAVENOUS at 17:59

## 2017-08-11 RX ADMIN — PROPOFOL 60 MG: 10 INJECTION, EMULSION INTRAVENOUS at 16:33

## 2017-08-11 RX ADMIN — FENTANYL CITRATE 50 MCG: 50 INJECTION, SOLUTION INTRAMUSCULAR; INTRAVENOUS at 18:29

## 2017-08-11 RX ADMIN — DEXAMETHASONE SODIUM PHOSPHATE 6 MG: 4 INJECTION, SOLUTION INTRAMUSCULAR; INTRAVENOUS at 16:41

## 2017-08-11 RX ADMIN — ACETAMINOPHEN 975 MG: 325 TABLET, FILM COATED ORAL at 09:27

## 2017-08-11 RX ADMIN — Medication 50 MG: at 16:30

## 2017-08-11 RX ADMIN — SODIUM CHLORIDE: 9 INJECTION, SOLUTION INTRAVENOUS at 22:28

## 2017-08-11 RX ADMIN — HYDROMORPHONE HYDROCHLORIDE 0.5 MG: 1 INJECTION, SOLUTION INTRAMUSCULAR; INTRAVENOUS; SUBCUTANEOUS at 17:03

## 2017-08-11 RX ADMIN — SENNOSIDES AND DOCUSATE SODIUM 1 TABLET: 8.6; 5 TABLET ORAL at 09:27

## 2017-08-11 RX ADMIN — HYDROMORPHONE HYDROCHLORIDE 0.5 MG: 1 INJECTION, SOLUTION INTRAMUSCULAR; INTRAVENOUS; SUBCUTANEOUS at 17:34

## 2017-08-11 RX ADMIN — FENTANYL CITRATE 100 MCG: 50 INJECTION, SOLUTION INTRAMUSCULAR; INTRAVENOUS at 16:57

## 2017-08-11 RX ADMIN — SODIUM CHLORIDE, POTASSIUM CHLORIDE, SODIUM LACTATE AND CALCIUM CHLORIDE: 600; 310; 30; 20 INJECTION, SOLUTION INTRAVENOUS at 18:06

## 2017-08-11 RX ADMIN — SODIUM CHLORIDE, POTASSIUM CHLORIDE, SODIUM LACTATE AND CALCIUM CHLORIDE: 600; 310; 30; 20 INJECTION, SOLUTION INTRAVENOUS at 14:33

## 2017-08-11 RX ADMIN — MIDAZOLAM HYDROCHLORIDE 0.5 MG: 1 INJECTION, SOLUTION INTRAMUSCULAR; INTRAVENOUS at 16:53

## 2017-08-11 RX ADMIN — PIPERACILLIN SODIUM AND TAZOBACTAM SODIUM 3.38 G: .375; 3 INJECTION, POWDER, LYOPHILIZED, FOR SOLUTION INTRAVENOUS at 17:20

## 2017-08-11 RX ADMIN — ACETAMINOPHEN 975 MG: 325 TABLET, FILM COATED ORAL at 23:58

## 2017-08-11 RX ADMIN — HYDROCORTISONE: 10 CREAM TOPICAL at 09:27

## 2017-08-11 RX ADMIN — ONDANSETRON 4 MG: 2 INJECTION INTRAMUSCULAR; INTRAVENOUS at 18:28

## 2017-08-11 RX ADMIN — SODIUM CHLORIDE, POTASSIUM CHLORIDE, SODIUM LACTATE AND CALCIUM CHLORIDE: 600; 310; 30; 20 INJECTION, SOLUTION INTRAVENOUS at 16:21

## 2017-08-11 RX ADMIN — MIDAZOLAM HYDROCHLORIDE 2 MG: 1 INJECTION, SOLUTION INTRAMUSCULAR; INTRAVENOUS at 16:19

## 2017-08-11 RX ADMIN — PIPERACILLIN SODIUM,TAZOBACTAM SODIUM 3.38 G: 3; .375 INJECTION, POWDER, FOR SOLUTION INTRAVENOUS at 22:28

## 2017-08-11 RX ADMIN — MIDAZOLAM HYDROCHLORIDE 0.5 MG: 1 INJECTION, SOLUTION INTRAMUSCULAR; INTRAVENOUS at 17:34

## 2017-08-11 RX ADMIN — PHENYLEPHRINE HYDROCHLORIDE 100 MCG: 10 INJECTION, SOLUTION INTRAMUSCULAR; INTRAVENOUS; SUBCUTANEOUS at 16:53

## 2017-08-11 RX ADMIN — VANCOMYCIN HYDROCHLORIDE 1 G: 1 INJECTION, POWDER, LYOPHILIZED, FOR SOLUTION INTRAVENOUS at 17:25

## 2017-08-11 ASSESSMENT — LIFESTYLE VARIABLES: TOBACCO_USE: 1

## 2017-08-11 NOTE — BRIEF OP NOTE
[facility], [place]    Orthopaedic Surgery  Brief Operative Note    Pre-operative diagnosis: Right Tibial Abcess   Post-operative diagnosis Same   Procedure: Procedure(s):  Irrigation and Debridement Right Tibia,  Removal of Hardware - Wound Class: II-Clean Contaminated   - Wound Class: II-Clean Contaminated   Surgeon: Dr. Dos Santos   Assistants(s): Olivia Savage MD   Anesthesia: General    Estimated blood loss: 50 mL   Total IV fluids: (See anesthesia record)   Blood transfusion: (See anesthesia record)   Total urine output: (See anesthesia record)   Drains: Hemovac   Specimens:   ID Type Source Tests Collected by Time Destination   1 : Lateral Superficial Tissue Leg Lower, Right ANAEROBIC BACTERIAL CULTURE, TISSUE CULTURE AEROBIC BACTERIAL Kenroy Dos Santos MD 8/11/2017  5:01 PM    2 : Lateral Deep Tissue Leg Lower, Right ANAEROBIC BACTERIAL CULTURE, TISSUE CULTURE MONI BACTERIAL Kenroy Dos Santos MD 8/11/2017  5:04 PM    3 : Midline Tissue Leg Lower, Right ANAEROBIC BACTERIAL CULTURE, TISSUE CULTURE Kenroy Mason MD 8/11/2017  5:07 PM    4 : medial # 1 Tissue Leg Lower, Right ANAEROBIC BACTERIAL CULTURE, TISSUE CULTURE Kenroy Mason MD 8/11/2017  5:18 PM    5 : medial # 2 Tissue Leg Lower, Right ANAEROBIC BACTERIAL CULTURE, TISSUE CULTURE MONI BACTERIAL Kenroy Dos Santos MD 8/11/2017  5:19 PM       Findings: See dictated op note   Complications: None   Implants: None     Plan:  Diet: Regular  Weight Bearing: TTWB RLE  Abx:  vanc, zosyn postoperatively ID consult  DVT PPX: 81 ASA BID  Pain Management  PT/OT  Condition: To PACU in stable condition    Olivia Savage MD  PGY-2  Orthopaedic Surgery   [phone]

## 2017-08-11 NOTE — ANESTHESIA PREPROCEDURE EVALUATION
"  Anesthesia Evaluation     . Pt has had prior anesthetic. Type: General    History of anesthetic complications    Patient states that he \"woke up\" during his right tibial ORIF under general anesthesia in 2016.        ROS/MED HX    ENT/Pulmonary:     (+)tobacco use, Current use , . .    Neurologic:  - neg neurologic ROS     Cardiovascular:  - neg cardiovascular ROS   (+) ----. : . . . :. . Previous cardiac testing date:results:date: results:ECG reviewed date:08/10/2017 results:Sinus rhythm. Possible left atrial enlargement. date: results:          METS/Exercise Tolerance:     Hematologic:  - neg hematologic  ROS       Musculoskeletal:  - neg musculoskeletal ROS       GI/Hepatic:  - neg GI/hepatic ROS       Renal/Genitourinary:  - ROS Renal section negative       Endo:  - neg endo ROS       Psychiatric:  - neg psychiatric ROS       Infectious Disease:   (+) Other Infectious Disease Suspected infected right lower extremity hardware.        Malignancy:      - no malignancy   Other:    - neg other ROS                 Physical Exam  Normal systems: pulmonary and dental    Airway   Mallampati: II  TM distance: >3 FB  Neck ROM: full    Dental     Cardiovascular   Rhythm and rate: regular and normal      Pulmonary                        Lab / Radiology Results:   Reviewed current labs when avail, see EMR for details.      BMP:  Recent Labs   Lab Test  08/11/17   0649   NA  140   POTASSIUM  4.2   CHLORIDE  107   CO2  25   BUN  16   CR  0.70   GLC  102*   DINO  8.4*       LFTs:   Recent Labs   Lab Test  10/06/16   0642   PROTTOTAL  8.1   ALBUMIN  2.2*   BILITOTAL  0.3   ALKPHOS  184*   AST  23   ALT  19       CBC:   Recent Labs   Lab Test  08/11/17   0649   WBC  6.1   HGB  11.7*   PLT  302       Coags:  Recent Labs   Lab Test  08/11/17   0649  12/14/16   1604   INR  1.08  1.12   PTT   --   28       Blood Bank:  Lab Results   Component Value Date    ABO A 08/11/2017    RH  Pos 08/11/2017    AS Neg 08/11/2017       Studies:  See " EMR for current studies, reviewed when available.      Anesthesia Plan      History & Physical Review  History and physical reviewed and following examination; no interval change.    ASA Status:  2 .    NPO Status:  > 8 hours    Plan for General and ETT with Intravenous induction. Maintenance will be Balanced.    PONV prophylaxis:  Ondansetron (or other 5HT-3) and Dexamethasone or Solumedrol       Postoperative Care  Postoperative pain management:  Multi-modal analgesia.      Consents  Anesthetic plan, risks, benefits and alternatives discussed with:  Patient.  Use of blood products discussed: Yes.   Use of blood products discussed with Patient.  Consented to blood products.  .      Krystian Bourne MD  Anesthesiologist  4:18 PM  August 11, 2017                        .

## 2017-08-11 NOTE — CONSULTS
Internal Medicine Consultation  [facility] Medicine Service    Delvin Echevarria MRN# [id number]   YOB: 1965 Age: 52 year old      Date of Admission: 8/10/2017    Primary care provider: Krystian Streeter    Requesting Provider : Dr Rocha    Reason for Consultation : Pre op eval and evaluation and recommendation for medical co morbidities.           Chief Complaint:      R knee, leg area pain, swelling.      History of Present Illness:     History is obtained from the patient and medical record. Orthopedic provider.     Delvin Echevarria is a 52 year old male with a history of HTN (not taking medications) who sustained R proximal tibia fx Sept 2016. Underwent ORIF by Dr Green. 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     On Tues reports he had new onset swelling of the anterior tibia. He reports that this actually end up bursting open and draining some purulent fluid from the lateral aspect of his proximal tibia. Since that time has had mild purulent drainage. Went to an outside urgent care today and they're concerned for large abscess was transferred here.     Patient reports no systemic symptoms, no fevers or chills. No recent infections or illnesses.  He has been very active and continued to be weightbearing as tolerated on the right side without pain.  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       Denies any other significant medical concern.                  Past Medical History:     Past Medical History:   Diagnosis Date     Hypertension 3/15/2013             Past Surgical History:     Past Surgical History:   Procedure Laterality Date     APPLY EXTERNAL FIXATOR LOWER EXTREMITY Right 9/12/2016    Procedure: APPLY EXTERNAL FIXATOR LOWER EXTREMITY;  Surgeon: John Gonzales MD;  Location: [facility] OR     HERNIORRHAPHY INGUINAL  7/2/2012    Procedure: HERNIORRHAPHY INGUINAL;  Open Repair Right  Inguinal Hernia with Mesh;  Surgeon: Avni Maxwell MD;  Location: [facility] OR     LARYNGECTOMY  Several Years ago    Partail removal due to fish bone     OPEN REDUCTION INTERNAL FIXATION TIBIA Right 9/15/2016    Procedure: OPEN REDUCTION INTERNAL FIXATION TIBIA;  Surgeon: Miguel A Green MD;  Location: [facility] OR             Social History:     Social History     Social History     Marital status: Single     Spouse name: N/A     Number of children: N/A     Years of education: N/A     Occupational History     Not on file.     Social History Main Topics     Smoking status: Current Every Day Smoker     Packs/day: 1.00     Years: 10.00     Smokeless tobacco: Never Used      Comment: quit date of accident and has not restarted-9/10/16     Alcohol use No     Drug use: No      Comment: Smoked Marijuna last monday. Smoke Marijuna 1-2  times per week     Sexual activity: Not on file     Other Topics Concern     Not on file     Social History Narrative             Family History:   Reviewed.   Family History   Problem Relation Age of Onset     Hypertension Mother      Hypertension Brother              Immunizations:     Immunization History   Administered Date(s) Administered     Tdap (Adacel,Boostrix) 06/28/2007            Allergies:     Allergies   Allergen Reactions     Seasonal Allergies Other (See Comments)     Congestion sinuses     Codeine Nausea     No Known Drug Allergies              Medications:     Prior to Admission medications    Medication Sig Start Date End Date Taking? Authorizing Provider   hydrOXYzine (VISTARIL) 25 MG capsule Take 1 capsule (25 mg) by mouth 3 times daily as needed for itching 7/10/17  Yes Sona Rice APRN CNP   gabapentin (NEURONTIN) 300 MG capsule Take 1 capsule (300 mg) by mouth 3 times daily 7/12/17   Krystian Streeter MD   cyclobenzaprine (FLEXERIL) 10 MG tablet Take 1 tablet (10 mg) by mouth 3 times daily 7/12/17   Krystian Streeter MD    hydrocortisone (CORTAID) 1 % cream Apply topically 2 times daily    Reported, Patient   ASPIRIN PO Take 325 mg by mouth daily    Reported, Patient   Skin Protectants, Misc. (HYDROCERIN) LOTN Apply topically daily    Reported, Patient   triamcinolone (KENALOG) 0.1 % cream Apply topically 2 times daily 16   Jonah Salcedo, NP   mineral oil-hydrophilic petrolatum (AQUAPHOR) ointment Apply topically At Bedtime  Patient not taking: Reported on 8/10/2017 9/21/16   Jonah Salcedo NP   acetaminophen (TYLENOL) 325 MG tablet Take 3 tablets (975 mg) by mouth every 8 hours  Patient not taking: Reported on 2017   Filomena Jung NP        Current Facility-Administered Medications   Medication     hydrOXYzine (ATARAX) tablet 25 mg     hydrocortisone (CORTAID) 1 % cream     hydrALAZINE (APRESOLINE) tablet 10 mg     ceFAZolin sodium-dextrose (ANCEF) infusion 2 g     ceFAZolin (ANCEF) 1 g vial to attach to  ml bag for ADULT or 50 ml bag for PEDS     acetaminophen (TYLENOL) tablet 1,000 mg            Review of Systems:   The 10 point Review of Systems is negative other than noted in the HPI           Physical Exam:       Blood pressure 138/90, pulse 70, temperature 97.3  F (36.3  C), temperature source Oral, resp. rate 16, SpO2 97 %.    Temp (24hrs), Av.4  F (36.3  C), Min:97.3  F (36.3  C), Max:97.5  F (36.4  C)      Wt Readings from Last 5 Encounters:   08/10/17 66.7 kg (147 lb)   17 66.2 kg (146 lb)   07/10/17 66 kg (145 lb 9.6 oz)   17 69.3 kg (152 lb 12.8 oz)   17 60 kg (132 lb 3.2 oz)       No intake or output data in the 24 hours ending 17 0236    General: Alert, interactive, NAD  HEENT: AT/NC, anicteric, PERRL, Moist MM  Neck: Supple, no JVD or cervical LAD  Chest/Resp: Clear to auscultation bilaterally, no crackles or wheezes  Heart/CV: S1 S2 regular, no murmur. No pedal edema.   Abdomen/ GI: Soft, nontender, nondistended. +BS.  No rebound or  guarding.  Extremities/MSK: R knee, proximal tibia area anterior swelling, ttp. Rest per ortho.  distal pulses 2+ and warm.   Skin: both hands, forearm: dermatitis ? Contact. Chronic.   Neuro: Alert & oriented x 3, Cns 2-12 grossly intact  Psych: Pleasant.           Data:     All laboratory data reviewed    LABS (Last four results)  CMPNo lab results found in last 7 days.  CBC    Recent Labs  Lab 08/10/17  1736   WBC 6.2   RBC 3.89*   HGB 11.9*   HCT 36.0*   MCV 93   MCH 30.6   MCHC 33.1   RDW 13.1        INRNo lab results found in last 7 days.  Arterial Blood GasNo lab results found in last 7 days.    No results found for this or any previous visit (from the past 48 hour(s)).         Assessment and Recommendations:     53 yo male with htn (not on medications at current) with   R anterior tibial abscess around site of proximal tibial ORIF  Ortho planning for OR tomorrow for I&D R tibia, poss hardware removal.     # Pre Op evaluation:     HTN. Current smoker.   No other significant medical problem.     Patient claims his bp controlled despite not taking any medications.   Hands and both forearms dermatitis no topical steroids. No systemic steroid.   Denies any problem during prior surgeries.   Clinically no systemic s/s of sepsis.     RCRI:   No risk factors - 0.4 percentrisk of major cardiac complications yoko op.   METS >4.0    Routine pre op labs - pending.     Provided labs: wnl. Patient appears optimized for the procedure.   Monitor BP. Could consider hydralazine prn if needed for hbp.   Close yoko op pulmonary monitoring, toileting.   Patient was on aspirin 325, was taking till a day prior. Monitor for bleeding intra and post op.   Encourage smoking cessation.   Encourage IS    # Hand dermatitis: topical steroid. Hydroxyzine prn for itching.     Rest per Ortho.   Hospitalist team to follow.     Thank you for this interesting consultation.    We are glad to follow along with you.    D/w SARAH Messer  MD Marycruz  House Physician (Staff Hospitalist)  [facility]  Pager: [phone]    8/10/2017

## 2017-08-11 NOTE — OP NOTE
PREOPERATIVE DIAGNOSIS: Status post open reduction internal fixation proximal right tibia with postoperative deep space infection and draining abscess.  POSTOPERATIVE DIAGNOSIS: Same as pre-op.  PROCEDURE: Removal of lateral proximal tibial plate. Removal of medial proximal tibial plate. Irrigation and debridement of medial wound. Irrigation and debridement of lateral wound.  DATE OF SURGERY: 8/11/2017   ASSISTANTS: Olivia Savage    COMPONENTS USED:  Prior hardware was removed without complication  ANESTHESIA: General   COMPLICATIONS: None  EBL: 50cc    FINDINGS:  There was extensive purulence that indicated between the medial and the lateral plate. We incised through the prior lateral incision down to the plate. The plate was removed without complication. We then incised through the prior medial incision and the medial plate was removed without complication. Imaging confirmed complete removal of the hardware. 5 cultures were sent. The wound was closed without tension and deep drain was placed.  INDICATIONS: Delvin Echevarria is a 52 year old male who is status post open reduction internal fixation of his proximal tibia now with a surgical site infection deep abscess and draining sinus. A long discussion with him regarding the risks and benefits of irrigation and debridement and removal of hardware. Please see consult note for full details of that discussion. In brief following discussion of risks and benefits of surgery the patient elected to proceed with irrigation and debridement and removal of the hardware.    DESCRIPTION OF PROCEDURE: We met the patient in the preoperative area.  There we again reviewed the risks, benefits, and alternatives of irrigation and debridement and removal of hardware.  Informed written consent was obtained.  The operative right knee was marked with an indelible marker after patient confirmation of the operative site.  All the patient's questions were answered.  The patient was taken to  the operating room and underwent induction of  General  anesthesia.  The patient was placed on the operating table in the supine position.  Bony prominences were well padded and the patient was secured to the table in the standard fashion.   A tourniquet was placed on the operative thigh and the patient was prepped and draped in the normal sterile fashion.       A timeout was performed in which the patient's name, MRN, imaging, preoperative plan and laterality was reviewed. Antibiotics were held to obtain cultures.       We incised the prior laterally based incision. We carefully dissected down to the subcutaneous soft tissue. Extensive purulence was noted once we had incised through the deep subcutaneous tissue. The lateral plate was encountered. The incision and deep dissection was extended proximally and distally to expose the entire plate. 3 cultures were sent from the deep abscess. Once the plate was exposed, the screws were removed without complication. The plate was subsequently removed. We performed extensive sharp debridement of the skin, subcutaneous soft tissue, muscle, periosteum and bone using the Eyad, Morris elevator, scalpel.. The size of the lateral incision measured approximately 14 x 4 cm.  We then turned our attention to the medial side. We incised through the prior medial incision which was located directly over the plate. We exposed the entire plate. The screws removed without complication. The plate was subsequently removed. We then performed extensive sharp debridement of the medial wound, which communicated with the lateral wound. We used the Eyad, Morris elevator, scalpel, and curettes. The medial and lateral holes were curetted. The size of the medial incision was approximately 10 cm x 3 cm. Again there was a deep abscess likely indicated. We sent an additional 2 cultures from the medial side. Once cultures were sent patient was given IV vancomycin and Zosyn. Final sharp debridement was  performed using a nicola Morris Roger. All nonviable soft tissue was removed. We then irrigated the wound area with 4 L of antibiotic saline consisting of gentamicin polymyxin and bacitracin. We subsequently irrigated the wound with dilute Betadine followed by 3 L of sterile saline. A new sterile drape was placed the surgical team changed gloves and new inserts were used. A deep drain was placed. The wound was closed with 2-0 PDS followed by 3-0 nylon. Steri-Strips are placed and a sterile bandage was placed. The patient was subsequently x-rayed and transferred the PACU in stable condition    Postoperative plan the patient will be toe-touch weightbearing, range of motion as tolerated. He'll be started on IV vancomycin and Zosyn ending cultures and IV recommendations. Will follow the drain output. We will obtain postoperative imaging in the PACU.

## 2017-08-11 NOTE — PLAN OF CARE
Problem: Goal Outcome Summary  Goal: Goal Outcome Summary  Outcome: No Change  Pt arrived to unit 8A at 2220. Alert and oriented x4. Pt denied pain. Pt was seen by Ortho and the moonlighter. Ortho put dressing on LLE which is CDI. NPO at midnight. X rays and EKG done. Consent signed. Resting in bed with call light in reach.

## 2017-08-11 NOTE — PROGRESS NOTES
Ortho Progress Note     Subjective:  No acute overnight events. No fevers/chills. OK with OR today.    Objective:  /90 (BP Location: Left arm)  Pulse 70  Temp 97.3  F (36.3  C) (Oral)  Resp 16  SpO2 97%  Gen: A&Ox3, no acute distress  CV: 2+ dp/pt pulses, capillary refill < 2sec  Resp: breathing equal and non-labored, no wheezing  MSK:     RLE   Dressing with mild purulent drainage   Motor: EHL, TA, FHL, GS 5/5   SILT on SP, DP, S, S, and T nerve territories   Circulation: palpable DP and TP, foot warm      Hemoglobin   Date Value Ref Range Status   08/11/2017 11.7 (L) 13.3 - 17.7 g/dL Final   08/10/2017 11.9 (L) 13.3 - 17.7 g/dL Final       Assessment/Plan:  52M s/p 9/2016 ORIF R tibial plateau, now with pretibial abscess.    -Weight Bearing Status: WBAT RLE  NPO for OR  IV fluids  Hold Abx until OR      Nicholas Agarwal MD  Orthopaedic Surgery PGY-4  Pager:  [phone]

## 2017-08-11 NOTE — H&P
Orthopedic Surgery Consult / History and Physical    Delvin Echevarria MRN# [id number]   Age: 52 year old YOB: 1965     Date of Admission:  8/10/2017    Primary care provider: Krystian Streeter           Assessment and Plan:   Assessment:  R anterior tibial abscess around site of proximal tibial ORIF     Plan:  1. Admit to ortho  2. OR tomorrow for I&D R tibia, poss hardware removal  3. NPO after 2400             Chief Complaint:   R tibia abscess           History of Present Illness:   This patient is a 52 year old male who sustained R proximal tibia fx Sept 2016. Underwent ORIF by Dr Green. 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    On Tues reports he had new onset swelling of the anterior tibia. He reports that this actually end up bursting open and draining some purulent fluid from the lateral aspect of his proximal tibia. Since that time has had mild purulent drainage. Went to an outside urgent care today and they're concerned for large abscess was transferred here.    Patient reports no systemic symptoms, no fevers or chills. No recent infections or illnesses.    Reports that he has continued to be weightbearing as tolerated on the right side without pain. Range of motion of the right knee has not been affected and he still has motion from 0-110 limited by stiffness secondary to scarring. No pain with range of motion knee or weightbearing. 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              Past Medical History:     Past Medical History:   Diagnosis Date     Hypertension 3/15/2013            Past Surgical History:     Past Surgical History:   Procedure Laterality Date     APPLY EXTERNAL FIXATOR LOWER EXTREMITY Right 9/12/2016    Procedure: APPLY EXTERNAL FIXATOR LOWER EXTREMITY;  Surgeon: John Gonzales MD;  Location: [facility] OR     HERNIORRHAPHY INGUINAL  7/2/2012    Procedure: HERNIORRHAPHY INGUINAL;  Open Repair Right  Inguinal Hernia with Mesh;  Surgeon: Avni Maxwell MD;  Location: [facility] OR     LARYNGECTOMY  Several Years ago    Partail removal due to fish bone     OPEN REDUCTION INTERNAL FIXATION TIBIA Right 9/15/2016    Procedure: OPEN REDUCTION INTERNAL FIXATION TIBIA;  Surgeon: Miguel A Green MD;  Location: [facility] OR            Social History:   Smokin PPD  Alcohol: rare  Street drugs: none  Ambulatory status:  indep    Social History   Substance Use Topics     Smoking status: Current Every Day Smoker     Packs/day: 1.00     Years: 10.00     Smokeless tobacco: Never Used      Comment: quit date of accident and has not restarted-9/10/16     Alcohol use No            Family History:   Denies family history of bleeding or clotting disorders  Family History   Problem Relation Age of Onset     Hypertension Mother      Hypertension Brother             Allergies:     Allergies   Allergen Reactions     Seasonal Allergies Other (See Comments)     Congestion sinuses     Codeine Nausea     No Known Drug Allergies             Medications:   Anticoagulants:  none  Prescriptions Prior to Admission   Medication Sig Dispense Refill Last Dose     gabapentin (NEURONTIN) 300 MG capsule Take 1 capsule (300 mg) by mouth 3 times daily 75 capsule 1 Taking     cyclobenzaprine (FLEXERIL) 10 MG tablet Take 1 tablet (10 mg) by mouth 3 times daily 75 tablet 1 Taking     hydrOXYzine (VISTARIL) 25 MG capsule Take 1 capsule (25 mg) by mouth 3 times daily as needed for itching (Patient not taking: Reported on 8/10/2017) 30 capsule 0 Not Taking     hydrocortisone (CORTAID) 1 % cream Apply topically 2 times daily   Not Taking     ASPIRIN PO Take 325 mg by mouth daily   Taking     Skin Protectants, Misc. (HYDROCERIN) LOTN Apply topically daily   Not Taking     triamcinolone (KENALOG) 0.1 % cream Apply topically 2 times daily 15 g  Taking     mineral oil-hydrophilic petrolatum (AQUAPHOR) ointment Apply topically At Bedtime (Patient not taking:  Reported on 8/10/2017) 99 g  Not Taking     acetaminophen (TYLENOL) 325 MG tablet Take 3 tablets (975 mg) by mouth every 8 hours (Patient not taking: Reported on 7/12/2017) 100 tablet  Not Taking             Review of Systems:   A 10 point review of systems was performed, and was negative except as noted in HPI.         Physical Exam:   Patient Vitals for the past 8 hrs:   BP Temp Temp src Pulse Resp SpO2   08/10/17 2137 (!) 141/94 97.4  F (36.3  C) Oral 79 16 98 %     General: awake, alert, cooperative, no apparent distress, appears stated age  HEENT: normal  Respiratory: breathing non-labored  Cardiovascular: peripheral pulses palpable and symmetric, capillary refill < 2sec  Skin: no rashes or lesions  Neurological: CN II-XII grossly intact  Musculoskeletal:     RLE   Large anterior swelling of proximal tibia overlying tib crest approximately 10 cm ×7 cm   1 cm ×1 cm open wound on the lateral aspect the proximal tibia, P once draining out. Pressure on the inferior portion of the large anterior tibial crest abscess does increase pressure on the draining site. Unable to fully decompress abscess, suggests loculation.   Motor: EHL, TA, FHL, GS 5/5   SILT on SP, DP, S, S, and T nerve territories   Circulation: palpable DP and TP, foot warm   Painless range of motion of knee, no knee effusion. The range of motion 3-110 without pain            Nicholas Agarwal  Orthopaedic Surgery PGY-4  Pager:  [phone]      ATTENDING ATTESTATION:   I met with the patient and performed the key portions of the history and physical exam.  In brief, here is a pleasant 52-year-old man who underwent open reduction internal fixation of his right proximal tibia by Dr. Green in September 2016. The patient initially did well following the surgery and went on to heal his fracture. He had returned to normal activities and had no ongoing right proximal tibial or leg pain. He did have limited range of motion. However he denied any pain prior to this  recent episode. About 3-4 days ago the patient noted increased swelling in the prior surgical area. The swelling eventually increased and burst open on the anterior lateral aspect of the tibia. The patient had continued drainage from that site and increased swelling and pain. He presented to urgent care and subsequently recommended she present to the ER. He was seen and evaluated by the orthopedic service and found to have a large tibial abscess. He has been stable since admission to the hospital. Antibiotics had been held. He denied systemic symptoms such as fever chills or night sweats. He also denied any recent infections or illness.    His past medical history is notable for hypertension no immunosuppressive conditions.    Past surgical history is as above.    He smokes 1 pack of cigarettes per day. He does not use any street drugs. He occasionally smokes marijuana.    On exam he is pleasant and healthy-appearing he is comfortable lying in bed he is cooperative and interactive. Examination of his right lower extremity reveals a large abscess overlying the prior surgical site. His medial and laterally based incisions are well healed there is a forming sinus tract of the distal aspect of the medial incision, however there is no drainage from the site. There is a large open area of approximately 2-3 mm with a drainage. There is expressible drainage from an area of about 10 cm x 10 cm in between the 2 surgical incisions.      Imaging shows progressive bony union of his prior proximal tibia fracture. There is no notable lucency or fracture lines. The hardware appears to be intact.    Delvin is a very pleasant 52-year-old man with a large pretibial abscess status post open reduction internal fixation of his proximal tibia in September 2016.  Discussion with Delvin regarding operative versus nonoperative treatment options. At this point we discussed that there is a high concern for infection of the hardware. We discussed  management options including irrigation and debridement and hardware removal. I further discuss this case with my partner Dr. Peter ortiz the original surgery and asked for my assistance in managing the ongoing infection. In reviewing imaging we both agree that the fracture appears to be well-healed. Furthermore if there was a nonunion E treatment would be revision open reduction internal fixation given his benign months. Therefore we will plan for hardware removal and irrigation debridement and wound closure. We discussed the possibility of X fix if the fracture is grossly unstable. Of ongoing infection, wound healing problems, persistent pain. We also discussed the postoperative plan including long-term IV antibiotics to help clear the infection. Patient is agreement with this treatment plan. The previously obtained informed consent was reviewed and signed by myself.

## 2017-08-11 NOTE — PROGRESS NOTES
"[facility], [place]   Internal Medicine Daily Note           Interval History/Events     Overnight events reviewed  Reports of no new concerns  Awaiting to go to OR       Review of Systems        4 point ROS including Respiratory, CV, GI and , other than that noted above is negative      Medications   I have reviewed current medications  in the \"current medication\" section of Epic.  Relevant changes include:     Physical Exam   General:       Vital signs:    Blood pressure 130/78, pulse 56, temperature 97.4  F (36.3  C), temperature source Oral, resp. rate 16, SpO2 99 %.  Estimated body mass index is 20.5 kg/(m^2) as calculated from the following:    Height as of 7/12/17: 1.803 m (5' 11\").    Weight as of an earlier encounter on 8/10/17: 66.7 kg (147 lb).    No intake or output data in the 24 hours ending 08/11/17 1716   HEENT: No icterus, no pallor  Cardiovascular:   Respiratory:    GI/Abdomen:   Neurology: No tremors.   Extremities:   Skin:      Laboratory and Imaging Studies     I have reviewed  laboratory and imaging studies in the Epic. Pertinent findings are as below:    BMP  Recent Labs  Lab 08/11/17  0649      POTASSIUM 4.2   CHLORIDE 107   DINO 8.4*   CO2 25   BUN 16   CR 0.70   *     CBC  Recent Labs  Lab 08/11/17  0649 08/10/17  1736   WBC 6.1 6.2   RBC 3.87* 3.89*   HGB 11.7* 11.9*   HCT 35.4* 36.0*   MCV 92 93   MCH 30.2 30.6   MCHC 33.1 33.1   RDW 13.3 13.1    328     INR  Recent Labs  Lab 08/11/17  0649   INR 1.08     LFTsNo lab results found in last 7 days.   PANCNo lab results found in last 7 days.        Impression/Plan        53 yo male with htn (not on medications at current) with   R anterior tibial abscess around site of proximal tibial ORIF  Ortho planning for OR this AM for I&D R tibia, poss hardware removal  Pre op evaluation completed by Dr. Joel  Medicine will continue to follow post operative.     Pt's care was discussed with bedside RN, " patient and  during Care Team Rounds.               Andrew Hicks MD  Hospitalist ( Internal medicine)  Pager: [phone]

## 2017-08-11 NOTE — PLAN OF CARE
Problem: Individualization  Goal: Patient Preferences  8/11 07:30-15:30  VS:     Stable   Output:     Voiding spontaneously without difficulty  BM 8/10   Activity:     Up ad lesa   Skin: Redness/swelling below right knee.  2 cm cream colored raised area below right knee, draining serous fluid   LDA's  PIV placed this afternoon, LR running @ 125/hr   Pain:     denies   CMS:     intact   Diet:     NPO   Equipment:     PCD's on    Plan:     Surgery this afternoon  Report called to Mee @ 15:10.  Pt informed he will be going soon.   Additional Info:

## 2017-08-12 ENCOUNTER — APPOINTMENT (OUTPATIENT)
Dept: OCCUPATIONAL THERAPY | Facility: CLINIC | Age: 52
DRG: 493 | End: 2017-08-12
Attending: ORTHOPAEDIC SURGERY
Payer: COMMERCIAL

## 2017-08-12 ENCOUNTER — APPOINTMENT (OUTPATIENT)
Dept: PHYSICAL THERAPY | Facility: CLINIC | Age: 52
DRG: 493 | End: 2017-08-12
Attending: ORTHOPAEDIC SURGERY
Payer: COMMERCIAL

## 2017-08-12 LAB
BACTERIA SPEC CULT: ABNORMAL
HGB BLD-MCNC: 11.4 G/DL (ref 13.3–17.7)
Lab: ABNORMAL
MICRO REPORT STATUS: ABNORMAL
MICROORGANISM SPEC CULT: ABNORMAL
SPECIMEN SOURCE: ABNORMAL

## 2017-08-12 PROCEDURE — 12000001 ZZH R&B MED SURG/OB UMMC

## 2017-08-12 PROCEDURE — 25000128 H RX IP 250 OP 636: Performed by: ORTHOPAEDIC SURGERY

## 2017-08-12 PROCEDURE — 25000132 ZZH RX MED GY IP 250 OP 250 PS 637: Performed by: ORTHOPAEDIC SURGERY

## 2017-08-12 PROCEDURE — 97530 THERAPEUTIC ACTIVITIES: CPT | Mod: GP

## 2017-08-12 PROCEDURE — E2402 NEG PRESS WOUND THERAPY PUMP: HCPCS | Performed by: OCCUPATIONAL THERAPIST

## 2017-08-12 PROCEDURE — 85018 HEMOGLOBIN: CPT | Performed by: ORTHOPAEDIC SURGERY

## 2017-08-12 PROCEDURE — 97116 GAIT TRAINING THERAPY: CPT | Mod: GP

## 2017-08-12 PROCEDURE — 97535 SELF CARE MNGMENT TRAINING: CPT | Mod: GO | Performed by: OCCUPATIONAL THERAPIST

## 2017-08-12 PROCEDURE — 25000125 ZZHC RX 250: Performed by: ORTHOPAEDIC SURGERY

## 2017-08-12 PROCEDURE — 40000133 ZZH STATISTIC OT WARD VISIT: Performed by: OCCUPATIONAL THERAPIST

## 2017-08-12 PROCEDURE — 97161 PT EVAL LOW COMPLEX 20 MIN: CPT | Mod: GP

## 2017-08-12 PROCEDURE — 40000193 ZZH STATISTIC PT WARD VISIT

## 2017-08-12 PROCEDURE — 36569 INSJ PICC 5 YR+ W/O IMAGING: CPT

## 2017-08-12 PROCEDURE — 36415 COLL VENOUS BLD VENIPUNCTURE: CPT | Performed by: ORTHOPAEDIC SURGERY

## 2017-08-12 PROCEDURE — 99231 SBSQ HOSP IP/OBS SF/LOW 25: CPT | Performed by: INTERNAL MEDICINE

## 2017-08-12 PROCEDURE — 97165 OT EVAL LOW COMPLEX 30 MIN: CPT | Mod: GO | Performed by: OCCUPATIONAL THERAPIST

## 2017-08-12 RX ORDER — LIDOCAINE 40 MG/G
CREAM TOPICAL
Status: DISCONTINUED | OUTPATIENT
Start: 2017-08-12 | End: 2017-08-15 | Stop reason: HOSPADM

## 2017-08-12 RX ORDER — HEPARIN SODIUM,PORCINE 10 UNIT/ML
2-5 VIAL (ML) INTRAVENOUS
Status: COMPLETED | OUTPATIENT
Start: 2017-08-12 | End: 2017-08-15

## 2017-08-12 RX ADMIN — PIPERACILLIN SODIUM,TAZOBACTAM SODIUM 3.38 G: 3; .375 INJECTION, POWDER, FOR SOLUTION INTRAVENOUS at 22:20

## 2017-08-12 RX ADMIN — ACETAMINOPHEN 975 MG: 325 TABLET, FILM COATED ORAL at 23:24

## 2017-08-12 RX ADMIN — PROCHLORPERAZINE EDISYLATE 5 MG: 5 INJECTION INTRAMUSCULAR; INTRAVENOUS at 05:13

## 2017-08-12 RX ADMIN — ONDANSETRON 4 MG: 4 TABLET, ORALLY DISINTEGRATING ORAL at 00:04

## 2017-08-12 RX ADMIN — ACETAMINOPHEN 975 MG: 325 TABLET, FILM COATED ORAL at 16:34

## 2017-08-12 RX ADMIN — PIPERACILLIN SODIUM,TAZOBACTAM SODIUM 3.38 G: 3; .375 INJECTION, POWDER, FOR SOLUTION INTRAVENOUS at 11:09

## 2017-08-12 RX ADMIN — SENNOSIDES AND DOCUSATE SODIUM 2 TABLET: 8.6; 5 TABLET ORAL at 09:56

## 2017-08-12 RX ADMIN — VANCOMYCIN HYDROCHLORIDE 1000 MG: 1 INJECTION, SOLUTION INTRAVENOUS at 06:35

## 2017-08-12 RX ADMIN — OXYCODONE HYDROCHLORIDE 5 MG: 5 TABLET ORAL at 23:24

## 2017-08-12 RX ADMIN — ACETAMINOPHEN 975 MG: 325 TABLET, FILM COATED ORAL at 09:56

## 2017-08-12 RX ADMIN — SENNOSIDES AND DOCUSATE SODIUM 2 TABLET: 8.6; 5 TABLET ORAL at 20:20

## 2017-08-12 RX ADMIN — VANCOMYCIN HYDROCHLORIDE 1000 MG: 1 INJECTION, SOLUTION INTRAVENOUS at 18:43

## 2017-08-12 RX ADMIN — ASPIRIN 81 MG CHEWABLE TABLET 81 MG: 81 TABLET CHEWABLE at 09:56

## 2017-08-12 RX ADMIN — ASPIRIN 81 MG CHEWABLE TABLET 81 MG: 81 TABLET CHEWABLE at 20:20

## 2017-08-12 RX ADMIN — PIPERACILLIN SODIUM,TAZOBACTAM SODIUM 3.38 G: 3; .375 INJECTION, POWDER, FOR SOLUTION INTRAVENOUS at 05:15

## 2017-08-12 RX ADMIN — HYDROCORTISONE: 10 CREAM TOPICAL at 09:58

## 2017-08-12 RX ADMIN — HYDROCORTISONE: 10 CREAM TOPICAL at 21:45

## 2017-08-12 RX ADMIN — PIPERACILLIN SODIUM,TAZOBACTAM SODIUM 3.38 G: 3; .375 INJECTION, POWDER, FOR SOLUTION INTRAVENOUS at 17:28

## 2017-08-12 RX ADMIN — OXYCODONE HYDROCHLORIDE 5 MG: 5 TABLET ORAL at 14:43

## 2017-08-12 RX ADMIN — OXYCODONE HYDROCHLORIDE 10 MG: 5 TABLET ORAL at 18:42

## 2017-08-12 NOTE — PLAN OF CARE
Problem: Goal Outcome Summary  Goal: Goal Outcome Summary  Outcome: Therapy, progress toward functional goals as expected  OT: Eval completed and treatment initiated but patient declined to get out of bed again this morning after PT and eating a large breakfast. 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 Only has a cane, no other DME. He plans to return to TCU at [place].

## 2017-08-12 NOTE — PROGRESS NOTES
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 none   Prior Functional Level Comment Pt is IND with all ADLs and functional mobility   General Information   Onset of Illness/Injury or Date of Surgery - Date 08/11/17   Referring Physician Kd Rocha MD   Pertinent History of Current Problem (include personal factors and/or comorbidities that impact the POC) Delvin Echevarira is a 52 year old male who is s/p irrigation and debridement and removal of hardware from right proximal tibia.   Precautions/Limitations fall precautions   Weight-Bearing Status - LUE full weight-bearing    Weight-Bearing Status - RUE full weight-bearing   Weight-Bearing Status - LLE full weight-bearing   Weight-Bearing Status - RLE weight-bearing as tolerated   General Observations hemovac drain on RLE   General Info Comments TTWB RLE   Cognitive Status Examination   Orientation orientation to person, place and time   Level of Consciousness alert   Follows Commands and Answers Questions 100% of the time   Personal Safety and Judgment intact   Memory intact   Pain Assessment   Patient Currently in Pain Yes, see Vital Sign flowsheet   Integumentary/Edema   Integumentary/Edema Comments normal surgical swelling on RLE   Posture    Posture Forward head position;Protracted shoulders   Range of Motion (ROM)   ROM Comment pt has lacks 11 degrees into extension on RLE   Strength   Strength Comments Not formally assessed but demonstrates WFL with mobility. Could not assess RLE d/t recent surgery   Bed Mobility   Bed Mobility Comments IND   Transfer Skills   Transfer Comments SBA for sit<>stand transfers with crutches   Gait   Gait Comments pt amb 80 ft with axillary crutches and CGA   Balance   Balance Comments Impaired static/dynamic standing balance d/t TTWB   General Therapy Interventions   Planned Therapy Interventions balance training;bed mobility training;gait training;strengthening;stretching;transfer training;progressive activity/exercise;home program guidelines;risk factor education   Clinical Impression   Criteria for Skilled Therapeutic Intervention yes, treatment indicated   PT Diagnosis Impaired functional mobility   Influenced by the following impairments pain, WB status, decreased RLE ROM and strength   Functional limitations due to impairments gait, stairs, transfers   Clinical Presentation Stable/Uncomplicated   Clinical Presentation Rationale Per pts PMHx and current medical and functional status is below baseline   Clinical Decision Making (Complexity) Low complexity   Therapy Frequency` 2 times/day  "  Predicted Duration of Therapy Intervention (days/wks) 4 days   Anticipated Discharge Disposition Transitional Care Facility   Risk & Benefits of therapy have been explained Yes   Patient, Family & other staff in agreement with plan of care Yes   [facility] TM \"6 Clicks\"   2016, Trustees of [facility], under license to [employer].  All rights reserved.   6 Clicks Short Forms Basic Mobility Inpatient Short Form   [facility]  \"6 Clicks\" V.2 Basic Mobility Inpatient Short Form   1. Turning from your back to your side while in a flat bed without using bedrails? 4 - None   2. Moving from lying on your back to sitting on the side of a flat bed without using bedrails? 4 - None   3. Moving to and from a bed to a chair (including a wheelchair)? 3 - A Little   4. Standing up from a chair using your arms (e.g., wheelchair, or bedside chair)? 3 - A Little   5. To walk in hospital room? 3 - A Little   6. Climbing 3-5 steps with a railing? 3 - A Little   Basic Mobility Raw Score (Score out of 24.Lower scores equate to lower levels of function) 20   Total Evaluation Time   Total Evaluation Time (Minutes) 10     "

## 2017-08-12 NOTE — ANESTHESIA POSTPROCEDURE EVALUATION
Patient: Delvin Echevarria    Procedure(s):  Irrigation and Debridement Right Tibia,  Removal of Hardware - Wound Class: II-Clean Contaminated   - Wound Class: II-Clean Contaminated    Diagnosis:Right Tibial Abcess  Diagnosis Additional Information: No value filed.    Anesthesia Type:  General    Note:  Anesthesia Post Evaluation    Patient location during evaluation: PACU  Patient participation: Able to fully participate in evaluation  Level of consciousness: awake and alert  Pain management: adequate  Airway patency: patent  Cardiovascular status: acceptable and hemodynamically stable  Respiratory status: acceptable  Hydration status: acceptable  PONV: none     Anesthetic complications: None          Last vitals:  Vitals:    08/11/17 2015 08/11/17 2030 08/11/17 2045   BP: (!) 140/91 (!) 139/92    Pulse:      Resp: 9 12 24   Temp:   36.4  C (97.5  F)   SpO2: 97% 94% 92%         Electronically Signed By: Krystian Bourne MD  August 11, 2017  10:38 PM

## 2017-08-12 NOTE — CONSULTS
REQUESTING PHYSICIAN:  Dr. Dos Santos.      REASON FOR CONSULTATION:  Staphylococcus aureus infection, hardware, right proximal tibia, status post explantation of hardware.      HISTORY OF PRESENT ILLNESS:  Delvin Echevarria is a 52-year-old gentleman.  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      He had a right proximal tibia fracture in 09/2016.  He had open reduction and internal fixation.  He was in a nursing home afterwards.  He said there was some initial difficulty with his wound, but then it healed up nicely.  He has been doing well.  He has been working on the farm and did not have any problems.  He has had no fevers or chills.  No skin lesions.  No foot problems, ingrown toenails, cracks or calluses.      The patient said over the past 3 days he had swelling over the instrumented area.  This then opened and drained some purulent fluid laterally.  He went to an urgent care center on 08/10 and was transferred back to [facility] for care.  He has had no fevers or chills or sweats.      PAST MEDICAL HISTORY:  Remarkable for hypertension.      SURGICAL HISTORY:     1.  History of external fixator.     2.  History of inguinal hernia repair.    3.  Laryngeal surgery in the past.    4.  Open reduction internal fixation 09/15/2016.      SOCIAL HISTORY:  Single.  He is a smoker.  No use of alcohol.  Some use of marijuana.      FAMILY HISTORY:  Remarkable for hypertension.      DRUG ALLERGIES:  Nausea from codeine.      MEDICATIONS:  The patient has been on IV vancomycin.  Please see the medicine consultation on 08/10 by Dr. Joel listing other medications.      REVIEW OF SYSTEMS:  Complete review of systems negative other than above.      PHYSICAL EXAMINATION:   VITAL SIGNS:  Temperature 97.6, heart rate 63, blood pressure 133/75, respiratory rate 16, O2 saturation 95%.   HEENT:  Extraocular motions are full.  Pupils are equal and round.  Oral mucosa  is moist.  Dentition is in good shape.   CHEST:  Clear anteriorly.  There is a bit of pectus excavatum.   HEART:  Without murmurs or gallops.   ABDOMEN:  Soft and nontender.   GENITAL AND RECTAL:  Exam is deferred.   EXTREMITIES:  Without edema.   SKIN:  No generalized rashes.   NEUROLOGIC:  Cranial nerves intact.  No evident focal motor neurologic signs.      IMPRESSION:  This patient had a Staph aureus infection of his right tibial fixation plates.   The bone was healed and all hardware has been removed at this time.  Surgery was accomplished on .  At the present time, the patient is on IV vancomycin.  We are awaiting results of sensitivity tests on the Staph aureus and final evaluation of cultures.      I expect this patient will need a PICC line.  He will need a long course of IV antibiotic treatment adequate to treat for osteomyelitis when the IV course is completed.  He also will likely be on oral medication for a total treatment of up to 3 months. We can follow him in the ID clinic if needed. We should see him in one to two weeks post discharge.        Thank you for this consultation.  Infectious Disease Service will continue to look in on Mr. Catalan and add any additional recommendations in the progress notes.         GREGORY HERRERA MD             D: 2017 12:15   T: 2017 12:33   MT: BUTCH      Name:     VINH CATALAN   MRN:      [id number]        Account:       [id number]   :      1965           Consult Date:  2017      Document: [id number]

## 2017-08-12 NOTE — PROGRESS NOTES
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 However pt reports not to call it works since he does not collect a pay check.   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 none   Prior Functional Level Comment Pt is IND with all ADLs and functional mobility   General Information   Onset of Illness/Injury or Date of Surgery - Date 08/11/17   Referring Physician Kd Rocha MD   Pertinent History of Current Problem (include personal factors and/or comorbidities that impact the POC) Delvin Echevarria is a 52 year old male who is s/p irrigation and debridement and removal of hardware from right proximal tibia.   Precautions/Limitations fall precautions   Weight-Bearing Status - LUE full weight-bearing   Weight-Bearing Status - RUE full weight-bearing   Weight-Bearing Status - LLE full " weight-bearing   Weight-Bearing Status - RLE weight-bearing as tolerated   General Observations hemovac drain on RLE   General Info Comments TTWB RLE   Cognitive Status Examination   Orientation orientation to person, place and time   Level of Consciousness alert   Follows Commands and Answers Questions 100% of the time   Personal Safety and Judgment intact   Memory intact   Pain Assessment   Patient Currently in Pain Yes, see Vital Sign flowsheet   Integumentary/Edema   Integumentary/Edema Comments normal surgical swelling on RLE   Posture    Posture Forward head position;Protracted shoulders   Range of Motion (ROM)   ROM Comment pt has lacks 11 degrees into extension on RLE   Strength   Strength Comments Not formally assessed but demonstrates WFL with mobility. Could not assess RLE d/t recent surgery   Bed Mobility   Bed Mobility Comments IND   Transfer Skills   Transfer Comments SBA for sit<>stand transfers with crutches   Gait   Gait Comments pt amb 80 ft with axillary crutches and CGA   Balance   Balance Comments Impaired static/dynamic standing balance d/t TTWB   General Therapy Interventions   Planned Therapy Interventions balance training;bed mobility training;gait training;strengthening;stretching;transfer training;progressive activity/exercise;home program guidelines;risk factor education   Clinical Impression   Criteria for Skilled Therapeutic Intervention yes, treatment indicated   PT Diagnosis Impaired functional mobility   Influenced by the following impairments pain, WB status, decreased RLE ROM and strength   Functional limitations due to impairments gait, stairs, transfers   Clinical Presentation Stable/Uncomplicated   Clinical Presentation Rationale Per pts PMHx and current medical and functional status is below baseline   Clinical Decision Making (Complexity) Low complexity   Therapy Frequency` 2 times/day   Predicted Duration of Therapy Intervention (days/wks) 4 days   Anticipated Discharge  "Disposition Transitional Care Facility   Risk & Benefits of therapy have been explained Yes   Patient, Family & other staff in agreement with plan of care Yes   [facility] AM-PAC TM \"6 Clicks\"   2016, Trustees of [facility], under license to [employer].  All rights reserved.   6 Clicks Short Forms Basic Mobility Inpatient Short Form   [facility] AM-PAC  \"6 Clicks\" V.2 Basic Mobility Inpatient Short Form   1. Turning from your back to your side while in a flat bed without using bedrails? 4 - None   2. Moving from lying on your back to sitting on the side of a flat bed without using bedrails? 4 - None   3. Moving to and from a bed to a chair (including a wheelchair)? 3 - A Little   4. Standing up from a chair using your arms (e.g., wheelchair, or bedside chair)? 3 - A Little   5. To walk in hospital room? 3 - A Little   6. Climbing 3-5 steps with a railing? 3 - A Little   Basic Mobility Raw Score (Score out of 24.Lower scores equate to lower levels of function) 20   Total Evaluation Time   Total Evaluation Time (Minutes) 10     "

## 2017-08-12 NOTE — PROGRESS NOTES
Interval History:     Stable in pacu.  Pain controlled.           Physical Exam:     Vital Signs:  /78 (BP Location: Right arm)  Pulse 56  Temp 97.4  F (36.3  C) (Oral)  Resp 16  SpO2 99%    Intake/Output Summary (Last 24 hours) at 08/11/17 1906  Last data filed at 08/11/17 1834   Gross per 24 hour   Intake             1000 ml   Output               50 ml   Net              950 ml     Gen:    No acute distress. Alert.  Pulm:  Non-labored breathing  Incision:   Dressing c/d/i  Drain: w/ s/s output    Operative extremity:   Motor: ehl/fhl/gs/at intact  Sensory: intact light touch dp/sp/t/s/s  Vascular: 2+ DP pulse    Labs:  CBC  Recent Labs  Lab 08/11/17  0649 08/10/17  1736   WBC 6.1 6.2   HGB 11.7* 11.9*   CRP 29.5*  --     328     Micro:  Recent Labs  Lab 08/10/17  1736   CULT Moderate growth Staphylococcus aureusCulture in progress*            Assessment and Plan:    Delvin Echevarria is a 52 year old male who is s/p irrigation and debridement and removal of hardware from right proximal tibia.    Activity: Up with assist.  Bridge knee in extension while in bed.  Gait aids PRN.  OOB to chair for all meals.  Weight bearing status: TTWB RLE  Antibiotics/Tetanus: IV vancomycin and Zosyn pending ID recommendations..  Diet: Begin with clear fluids and progress diet as tolerated.  DVT prophylaxis: SCDs while in hospital, 81 mg aspirin BID x4 weeks total.  Bracing/Splinting: No brace or splint needed.  Ice: Ice operative site 20 minutes >4 times daily.  Wound Care: Will follow wounds closely.  Drains: Follow drain output.  Pain management: transition from IV to orals as tolerated.

## 2017-08-12 NOTE — OR NURSING
PACU to Inpatient Nursing Handoff    Patient Delvin Echevarria is a 52 year old male who speaks English.   Procedure Procedure(s):  Irrigation and Debridement Right Tibia,  Removal of Hardware - Wound Class: II-Clean Contaminated   - Wound Class: II-Clean Contaminated   Surgeon(s) Primary: Kenroy Dos Santos MD  Resident - Assisting: Olivia Savage MD     Allergies   Allergen Reactions     Seasonal Allergies Other (See Comments)     Congestion sinuses     Codeine Nausea     No Known Drug Allergies        Isolation  No active isolations    Past Medical History   has a past medical history of Hypertension (3/15/2013). He also has no past medical history of Arthritis; Asthma; Blood transfusion; Congestive heart failure, unspecified; COPD (chronic obstructive pulmonary disease) (H); Coronary artery disease; Diabetes mellitus (H); Malignant neoplasm (H); Thyroid disease; or Unspecified cerebral artery occlusion with cerebral infarction.    Anesthesia General   Dermatome Level     Preop Meds Versed 3mg 1734 - time given: 1734   Nerve block Not applicable   Intraop Meds fentanyl (Sublimaze):  300 mcg total   Local Meds No   Antibiotics piperacillin-tazobactam (Zosyn) - last given at 1720  vancomycin (Vancocin) - last given at 1725     Pain Patient Currently in Pain: sleeping: patient not able to self report   PACU meds  Not applicable   PCA / epidural No   Capnography     Telemetry ECG Rhythm: Sinus bradycardia      Labs Glucose Lab Results   Component Value Date     08/11/2017       Hgb Lab Results   Component Value Date    HGB 11.7 08/11/2017       INR Lab Results   Component Value Date    INR 1.08 08/11/2017      PACU Imaging Completed     Wound/Incision Rash 09/11/16 0211 other (see comments) neck other (see comments);patch;papule (Active)   Number of days:334       Incision/Surgical Site 07/02/12 Right;Lower Abdomen (Active)   Number of days:1866       Incision/Surgical Site 09/12/16 Right Leg  (Active)   Number of days:333       Incision/Surgical Site 08/11/17 Medial;Right Leg (Active)   Incision Assessment UTV 8/11/2017  7:54 PM   Closure TRAAS 8/11/2017  7:54 PM   Incision Drainage Amount Scant 8/11/2017  6:12 PM   Drainage Description Serosanguinous 8/11/2017  6:12 PM   Dressing Intervention Clean, dry, intact 8/11/2017  7:54 PM   Number of days:0       Incision/Surgical Site 08/11/17 Lateral;Right Leg (Active)   Number of days:0      CMS All Extremities Temperature: warm  RLE Temperature: warm  RLE Color: red  RLE Sensation: no numbness;no tingling   Equipment ice pack   Other LDA       IV Access Peripheral IV 12/14/16 Right Hand (Active)   Number of days:240       Peripheral IV 08/11/17 Left;Medial Lower forearm (Active)   Site Assessment WDL 8/11/2017  7:53 PM   Line Status Infusing 8/11/2017  7:53 PM   Phlebitis Scale 0-->no symptoms 8/11/2017  7:53 PM   Number of days:0      Blood Products Not applicable EBL surg log * No blood loss amount entered *   Intake/Output Date 08/11/17 0700 - 08/12/17 0659   Shift 3281-3547 0765-3798 7640-8146 24 Hour Total   I  N  T  A  K  E   I.V.  1000  1000    Shift Total  1000  1000   O  U  T  P  U  T   Blood  50  50    Shift Total  50  50   Weight (kg)            Drains / Rivera Closed/Suction Drain 1 Right Leg Accordion 10 [place] (Active)   Site Description UTV 8/11/2017  7:53 PM   Dressing Status Normal: Clean, Dry & Intact 8/11/2017  7:53 PM   Drainage Appearance Bloody/Bright Red 8/11/2017  7:53 PM   Status To bulb suction 8/11/2017  7:53 PM   Number of days:0      Time of void PreOp      PostOp     Bladder Scan     PO    tolerating sips     Vitals    B/P: (!) 128/94  T: 97  F (36.1  C)    Temp src: Temporal  P:  Pulse: 56 (08/11/17 0728)    Heart Rate: 59 (08/11/17 2000)     R: 10  O2:  SpO2: 95 %    O2 Device: None (Room air)    Oxygen Delivery: 2 LPM         Family/support present no   Patient belongings Patient Belongings: none   Patient transported on cart    DC meds/scripts (obs/outpt) Not applicable     Special needs/considerations None   Tasks needing completion None       Todd Younger RN  ASCOM [id number]

## 2017-08-12 NOTE — PHARMACY-VANCOMYCIN DOSING SERVICE
Pharmacy Vancomycin Initial Note  Date of Service 2017  Patient's  1965  52 year old, male    Indication: Bone and Joint Infection, s/p irrigation and debridement and removal of hardware from right proximal tibia on     Current estimated CrCl = Estimated Creatinine Clearance: 113.8 mL/min (based on Cr of 0.7).    Creatinine for last 3 days  2017:  6:49 AM Creatinine 0.70 mg/dL    Recent Vancomycin Level(s) for last 3 days  No results found for requested labs within last 72 hours.      Vancomycin IV Administrations (past 72 hours)                   vancomycin vial 1000 mg (g) 1 g Given 17 1725                Nephrotoxins and other renal medications (Future)    Start     Dose/Rate Route Frequency Ordered Stop    17 2300  piperacillin-tazobactam (ZOSYN) 3.375 g vial to attach to  mL bag      3.375 g  over 30 Minutes Intravenous EVERY 6 HOURS 17 213  vancomycin (VANCOCIN) 1000 mg in dextrose 5% 200 mL PREMIX      1,000 mg Intravenous EVERY 12 HOURS 17 212            Contrast Orders - past 72 hours     None                Plan:  1.  Start vancomycin  1000 mg IV q12h.   2.  Goal Trough Level: 15-20 mg/L   3.  Pharmacy will check trough levels as appropriate in 1-3 Days.    4. Serum creatinine levels will be ordered a minimum of twice weekly.    5. [place] method utilized to dose vancomycin therapy: Method 2    Agustin Cruz PharmD
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
## 2017-08-12 NOTE — ANESTHESIA CARE TRANSFER NOTE
Patient: Delvin Echevarria    Procedure(s):  Irrigation and Debridement Right Tibia,  Removal of Hardware - Wound Class: II-Clean Contaminated   - Wound Class: II-Clean Contaminated    Diagnosis: Right Tibial Abcess  Diagnosis Additional Information: No value filed.    Anesthesia Type:   General     Note:  Airway :Face Mask  Patient transferred to:PACU        Vitals: (Last set prior to Anesthesia Care Transfer)    CRNA VITALS  8/11/2017 1825 - 8/11/2017 1925      8/11/2017             Resp Rate (set): 10                Electronically Signed By: XIN Dickinson CRNA  August 11, 2017  8:39 PM

## 2017-08-12 NOTE — PROGRESS NOTES
Ortho Progress Note     Subjective:  No acute overnight events. No fevers/chills. Pain controlled.    Objective:  /66 (BP Location: Right arm)  Pulse (!) 47  Temp 95.7  F (35.4  C) (Oral)  Resp 14  SpO2 96%  Gen: A&Ox3, no acute distress  CV: 2+ dp/pt pulses, capillary refill < 2sec  Resp: breathing equal and non-labored, no wheezing  MSK:     RLE   Dressings c/d/i   Motor: EHL, TA, FHL, GS 5/5   SILT on SP, DP, S, S, and T nerve territories   Circulation: palpable DP and TP, foot warm      Hemoglobin   Date Value Ref Range Status   08/12/2017 11.4 (L) 13.3 - 17.7 g/dL Final   08/11/2017 11.7 (L) 13.3 - 17.7 g/dL Final     HV: 110  Cx: pending      Assessment/Plan:  52M s/p 9/2016 ORIF R tibial plateau, now with pretibial abscess. S/p 8/11 I&D and removal of hardware by Dr Dos Santos.    -Weight Bearing Status: TTWB RLE  Diet: Regular  Abx:  vanc, zosyn for now.  Appreciate ID consult  DVT PPx: 81 ASA BID  Dispo:  Pending cultures, clinical course.      Nicholas Agarwal MD  Orthopaedic Surgery PGY-4  Pager:  [phone]

## 2017-08-12 NOTE — PLAN OF CARE
"Problem: Goal Outcome Summary  Goal: Goal Outcome Summary  Outcome: No Change  Patient A/Ox4, arrived from PACU around 9pm. VSS, declined capnography, \"turn this alarm off, take this out of my nose\" - given education sheet about CAPNO. Denies CP, SOB, dizziness/LH. LSCTA, pt has some snoring noted. +BS. Voiding well in urinal at bedside. CMS intact. Dressing to right knee CDI ex. Some scant drainage. Tolerating clear liquid diet with some nausea, advanced to regular for AM, pt also took 1x zofran orally for comfort.  Activity level is not great for day 0, pt declined and requested to rest, pt rather lethargic and drowsy. IV infusing fluids throughout shift, given some doses of abx as well. Pain rated as comfortably managed throughout shift, pt has not requested anything PRN yet for pain, will offer oral pain control closer to morning. Patient has demonstrated ability to call appropriately. Patient is resting with call light within reach. Will continue to monitor.          "

## 2017-08-12 NOTE — PROGRESS NOTES
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   Self-Care   Dominant Hand right   Usual Activity Tolerance good   Current Activity Tolerance fair   Regular Exercise no   Equipment Currently Used at Home cane, straight   Activity/Exercise/Self-Care Comment Had surgery last September and then was in a TCU for 6 months. 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 Reports R knee was stiff in the mornings   General Information   Onset of Illness/Injury or Date of Surgery - Date 08/11/17   Referring Physician Levon   Patient/Family Goals Statement Go to rehab, return to work and driving when able.   Additional Occupational Profile Info/Pertinent History of Current Problem s/p I and D and hardware removal due to ORIF R tibial plateau with abscess   Precautions/Limitations fall precautions   Weight-Bearing Status - RLE  toe touch weight-bearing   General Observations Resting in bed, agreeable to OT.   Cognitive Status Examination   Orientation orientation to person, place and time   Level of Consciousness alert   Able to Follow Commands WNL/WFL   Personal Safety (Cognitive) WNL/WFL   Visual Perception   Visual Perception No deficits were identified   Sensory Examination   Sensory Quick Adds No deficits were identified   Pain Assessment   Patient Currently in Pain Yes, see Vital Sign flowsheet   Range of Motion (ROM)   ROM Comment BUE AROM intact. Limited R knee ROM. Reports R knee limited to approx 70 degrees flexion pre-op.   Strength   Strength Comments RLE limited post-op. Able to complete (I) R SLR.   Hand Strength   Hand Strength Comments strong  B hands   Muscle Tone Assessment   Muscle Tone Quick Adds No deficits were identified   Coordination   Upper Extremity Coordination No deficits were identified   Mobility   Bed Mobility Comments SBA-(I) with PT earlier today   Transfer Skills   Transfer Comments SBA-CGA with PT earlier today   Balance   Balance Comments CGA with cues for ambulating with crutches with PT. Declined to get up again with OT.   Upper Body Dressing   Level of [place]: Dress Upper Body independent   Physical Assist/Nonphysical Assist: Dress Upper Body set-up required   Lower Body Dressing   Level of [place]: Dress Lower Body minimum assist (75% patients effort)   Physical Assist/Nonphysical Assist: Dress Lower Body verbal cues   Toileting   Level of [place]: Toilet minimum assist (75% patients effort)   Grooming   Level of [place]: Grooming minimum assist (75% patients effort)   Eating/Self Feeding   Level of [place]: Eating independent   Instrumental Activities of Daily Living (IADL)   Previous Responsibilities work;driving;meal prep   Activities of Daily Living Analysis   Impairments Contributing to Impaired Activities of Daily Living pain;post surgical precautions;ROM  decreased   General Therapy Interventions   Planned Therapy Interventions ADL retraining;transfer training   Clinical Impression   Criteria for Skilled Therapeutic Interventions Met yes, treatment indicated   OT Diagnosis impaired ADL and mobility post RLE surgery   Influenced by the following impairments pain, fatigue, impaired ROM   Assessment of Occupational Performance 1-3 Performance Deficits   Identified Performance Deficits ADl, IADL, mobility   Clinical Decision Making (Complexity) Low complexity   Therapy Frequency daily   Predicted Duration of Therapy Intervention (days/wks) 3 days   Anticipated Equipment Needs at Discharge raised toilet seat;reacher;shower chair   Anticipated Discharge Disposition Transitional Care Facility   Risks and Benefits of Treatment have been explained. Yes   Patient, Family & other staff in agreement with plan of care Yes   Total Evaluation Time   Total Evaluation Time (Minutes) 10

## 2017-08-12 NOTE — PROGRESS NOTES
"[facility], [place]   Internal Medicine Daily Note           Interval History/Events     Overnight events reviewed  Reports doing well  No chest pain, shortness of breath, fever, chills, nausea, vomiting       Review of Systems        4 point ROS including Respiratory, CV, GI and , other than that noted above is negative      Medications   I have reviewed current medications  in the \"current medication\" section of Epic.  Relevant changes include:     Physical Exam   General:       Vital signs:    Blood pressure 133/75, pulse (!) 47, temperature 97.6  F (36.4  C), temperature source Oral, resp. rate 16, SpO2 95 %.  Estimated body mass index is 20.5 kg/(m^2) as calculated from the following:    Height as of 7/12/17: 1.803 m (5' 11\").    Weight as of an earlier encounter on 8/10/17: 66.7 kg (147 lb).    No intake or output data in the 24 hours ending 08/11/17 1716   HEENT: No icterus, no pallor  Cardiovascular: S1, S2 nromal.   Respiratory:  B/LC TA  GI/Abdomen: Soft, NT, BS+  Neurology: Alert, awake, and oriented. No tremors.   Extremities: R knee dressing in place.   Skin:      Laboratory and Imaging Studies     I have reviewed  laboratory and imaging studies in the Epic. Pertinent findings are as below:    BMP    Recent Labs  Lab 08/11/17  0649      POTASSIUM 4.2   CHLORIDE 107   DINO 8.4*   CO2 25   BUN 16   CR 0.70   *     CBC    Recent Labs  Lab 08/12/17  0640 08/11/17  0649 08/10/17  1736   WBC  --  6.1 6.2   RBC  --  3.87* 3.89*   HGB 11.4* 11.7* 11.9*   HCT  --  35.4* 36.0*   MCV  --  92 93   MCH  --  30.2 30.6   MCHC  --  33.1 33.1   RDW  --  13.3 13.1   PLT  --  302 328     INR    Recent Labs  Lab 08/11/17  0649   INR 1.08     LFTsNo lab results found in last 7 days.   PANCNo lab results found in last 7 days.        Impression/Plan        53 yo male with  Hx of HTN (not on medications at current) with   R anterior tibial abscess around site of proximal tibial " ORIF  S/p I and D and removal of hardware by Dr. Dos Santos on 08/11  Hemodynamics stable  Anemia- Most likely due to acute blood loss, hemodilution. Hg 11.4  IS, Bowel regimen    Pt's care was discussed with bedside RN, patient and  during Care Team Rounds.       Andrew Hicks MD  Hospitalist ( Internal medicine)  Pager: [phone]

## 2017-08-12 NOTE — PROCEDURES
In pt room for PICC placement.  Procedure explained and consent signed.  Single lumen PICC placed in R basilic vein without difficulty.  Placement confirmed at SCV/RA via 3 CG.  Report given to Trudy SMITH that PICC is ready to use.

## 2017-08-12 NOTE — PLAN OF CARE
Problem: Goal Outcome Summary  Goal: Goal Outcome Summary  Outcome: No Change  Patient A/Ox4. VSS. Denies CP, SOB, dizziness/LH. LSCTA. +fl/BS. Up voiding to the BR. Denies pain.  CMS intact. Dressing to affected LE CDI.Hemovac has scant output this shift. Zosyn 3.375 g infused without difficulty.Tolerating regular diet. IS encourage. Patient has demonstrated ability to call appropriately. Patient is resting with call light within reach. Will continue to monitor.      Roxicodone 5 mg po given for pain after PT.Pain is better .

## 2017-08-12 NOTE — PROGRESS NOTES
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 However pt reports not to call it works since he does not collect a pay check.   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 none   Prior Functional Level Comment Pt is IND with all ADLs and functional mobility   General Information   Onset of Illness/Injury or Date of Surgery - Date 08/11/17   Referring Physician Kd Rocha MD   Precautions/Limitations fall precautions   Weight-Bearing Status - LUE full weight-bearing   Weight-Bearing Status - RUE full weight-bearing   Weight-Bearing Status - LLE full weight-bearing   Weight-Bearing Status - RLE weight-bearing as tolerated   General Observations hemovac drain on RLE   Cognitive Status Examination   Orientation orientation to person, place and time   Level of Consciousness alert " "  Follows Commands and Answers Questions 100% of the time   Personal Safety and Judgment intact   Memory intact   Pain Assessment   Patient Currently in Pain Yes, see Vital Sign flowsheet   Integumentary/Edema   Integumentary/Edema Comments normal surgical swelling on RLE   Posture    Posture Forward head position;Protracted shoulders   Range of Motion (ROM)   ROM Comment pt has lacks 11 degrees into extension on RLE   Strength   Strength Comments Not formally assessed but demonstrates WFL with mobility. Could not assess RLE d/t recent surgery   Bed Mobility   Bed Mobility Comments IND   Transfer Skills   Transfer Comments SBA for sit<>stand transfers with crutches   Gait   Gait Comments pt amb 80 ft with axillary crutches and CGA   Balance   Balance Comments Impaired static/dynamic standing balance d/t TTWB   General Therapy Interventions   Planned Therapy Interventions balance training;bed mobility training;gait training;strengthening;stretching;transfer training;progressive activity/exercise;home program guidelines;risk factor education   Clinical Impression   Criteria for Skilled Therapeutic Intervention yes, treatment indicated   PT Diagnosis Impaired functional mobility   Influenced by the following impairments pain, WB status, decreased RLE ROM and strength   Functional limitations due to impairments gait, stairs, transfers   Clinical Presentation Stable/Uncomplicated   Clinical Presentation Rationale Per pts PMHx and current medical and functional status is below baseline   Clinical Decision Making (Complexity) Low complexity   Therapy Frequency` 2 times/day   Predicted Duration of Therapy Intervention (days/wks) 4 days   Anticipated Discharge Disposition Transitional Care Facility   Risk & Benefits of therapy have been explained Yes   Patient, Family & other staff in agreement with plan of care Yes   [facility] AM-PAC TM \"6 Clicks\"   2016, Trustees of [facility], under license to [employer]. " " All rights reserved.   6 Clicks Short Forms Basic Mobility Inpatient Short Form   [facility] AM-PAC  \"6 Clicks\" V.2 Basic Mobility Inpatient Short Form   1. Turning from your back to your side while in a flat bed without using bedrails? 4 - None   2. Moving from lying on your back to sitting on the side of a flat bed without using bedrails? 4 - None   3. Moving to and from a bed to a chair (including a wheelchair)? 3 - A Little   4. Standing up from a chair using your arms (e.g., wheelchair, or bedside chair)? 3 - A Little   5. To walk in hospital room? 3 - A Little   6. Climbing 3-5 steps with a railing? 3 - A Little   Basic Mobility Raw Score (Score out of 24.Lower scores equate to lower levels of function) 20   Total Evaluation Time   Total Evaluation Time (Minutes) 10     "

## 2017-08-13 ENCOUNTER — APPOINTMENT (OUTPATIENT)
Dept: PHYSICAL THERAPY | Facility: CLINIC | Age: 52
DRG: 493 | End: 2017-08-13
Attending: ORTHOPAEDIC SURGERY
Payer: COMMERCIAL

## 2017-08-13 ENCOUNTER — APPOINTMENT (OUTPATIENT)
Dept: OCCUPATIONAL THERAPY | Facility: CLINIC | Age: 52
DRG: 493 | End: 2017-08-13
Attending: ORTHOPAEDIC SURGERY
Payer: COMMERCIAL

## 2017-08-13 LAB
BACTERIA SPEC CULT: ABNORMAL
CREAT SERPL-MCNC: 0.87 MG/DL (ref 0.66–1.25)
GFR SERPL CREATININE-BSD FRML MDRD: NORMAL ML/MIN/1.7M2
HGB BLD-MCNC: 11.3 G/DL (ref 13.3–17.7)
MICRO REPORT STATUS: ABNORMAL
MICROORGANISM SPEC CULT: ABNORMAL
SPECIMEN SOURCE: ABNORMAL
VANCOMYCIN SERPL-MCNC: 10.2 MG/L

## 2017-08-13 PROCEDURE — 25000128 H RX IP 250 OP 636: Performed by: ORTHOPAEDIC SURGERY

## 2017-08-13 PROCEDURE — 80202 ASSAY OF VANCOMYCIN: CPT | Performed by: ORTHOPAEDIC SURGERY

## 2017-08-13 PROCEDURE — 97116 GAIT TRAINING THERAPY: CPT | Mod: GP

## 2017-08-13 PROCEDURE — 85018 HEMOGLOBIN: CPT | Performed by: ORTHOPAEDIC SURGERY

## 2017-08-13 PROCEDURE — 97535 SELF CARE MNGMENT TRAINING: CPT | Mod: GO | Performed by: OCCUPATIONAL THERAPIST

## 2017-08-13 PROCEDURE — 12000001 ZZH R&B MED SURG/OB UMMC

## 2017-08-13 PROCEDURE — 25000132 ZZH RX MED GY IP 250 OP 250 PS 637: Performed by: INTERNAL MEDICINE

## 2017-08-13 PROCEDURE — 97110 THERAPEUTIC EXERCISES: CPT | Mod: GP

## 2017-08-13 PROCEDURE — 40000193 ZZH STATISTIC PT WARD VISIT

## 2017-08-13 PROCEDURE — 99232 SBSQ HOSP IP/OBS MODERATE 35: CPT | Performed by: INTERNAL MEDICINE

## 2017-08-13 PROCEDURE — 40000133 ZZH STATISTIC OT WARD VISIT: Performed by: OCCUPATIONAL THERAPIST

## 2017-08-13 PROCEDURE — 36415 COLL VENOUS BLD VENIPUNCTURE: CPT | Performed by: ORTHOPAEDIC SURGERY

## 2017-08-13 PROCEDURE — 82565 ASSAY OF CREATININE: CPT | Performed by: ORTHOPAEDIC SURGERY

## 2017-08-13 PROCEDURE — E2402 NEG PRESS WOUND THERAPY PUMP: HCPCS | Performed by: OCCUPATIONAL THERAPIST

## 2017-08-13 PROCEDURE — 97530 THERAPEUTIC ACTIVITIES: CPT | Mod: GO | Performed by: OCCUPATIONAL THERAPIST

## 2017-08-13 PROCEDURE — 25000132 ZZH RX MED GY IP 250 OP 250 PS 637: Performed by: ORTHOPAEDIC SURGERY

## 2017-08-13 RX ORDER — AMLODIPINE BESYLATE 5 MG/1
5 TABLET ORAL DAILY
Status: DISCONTINUED | OUTPATIENT
Start: 2017-08-13 | End: 2017-08-15 | Stop reason: HOSPADM

## 2017-08-13 RX ADMIN — PIPERACILLIN SODIUM,TAZOBACTAM SODIUM 3.38 G: 3; .375 INJECTION, POWDER, FOR SOLUTION INTRAVENOUS at 11:30

## 2017-08-13 RX ADMIN — ASPIRIN 81 MG CHEWABLE TABLET 81 MG: 81 TABLET CHEWABLE at 21:13

## 2017-08-13 RX ADMIN — PIPERACILLIN SODIUM,TAZOBACTAM SODIUM 3.38 G: 3; .375 INJECTION, POWDER, FOR SOLUTION INTRAVENOUS at 17:01

## 2017-08-13 RX ADMIN — ACETAMINOPHEN 975 MG: 325 TABLET, FILM COATED ORAL at 09:19

## 2017-08-13 RX ADMIN — PIPERACILLIN SODIUM,TAZOBACTAM SODIUM 3.38 G: 3; .375 INJECTION, POWDER, FOR SOLUTION INTRAVENOUS at 04:56

## 2017-08-13 RX ADMIN — VANCOMYCIN HYDROCHLORIDE 1250 MG: 10 INJECTION, POWDER, LYOPHILIZED, FOR SOLUTION INTRAVENOUS at 09:22

## 2017-08-13 RX ADMIN — HYDROCORTISONE: 10 CREAM TOPICAL at 09:23

## 2017-08-13 RX ADMIN — VANCOMYCIN HYDROCHLORIDE 1250 MG: 10 INJECTION, POWDER, LYOPHILIZED, FOR SOLUTION INTRAVENOUS at 21:13

## 2017-08-13 RX ADMIN — AMLODIPINE BESYLATE 5 MG: 5 TABLET ORAL at 11:30

## 2017-08-13 RX ADMIN — PIPERACILLIN SODIUM,TAZOBACTAM SODIUM 3.38 G: 3; .375 INJECTION, POWDER, FOR SOLUTION INTRAVENOUS at 23:09

## 2017-08-13 RX ADMIN — ACETAMINOPHEN 975 MG: 325 TABLET, FILM COATED ORAL at 17:01

## 2017-08-13 RX ADMIN — ASPIRIN 81 MG CHEWABLE TABLET 81 MG: 81 TABLET CHEWABLE at 09:18

## 2017-08-13 RX ADMIN — ACETAMINOPHEN 975 MG: 325 TABLET, FILM COATED ORAL at 23:09

## 2017-08-13 NOTE — PLAN OF CARE
Problem: Goal Outcome Summary  Goal: Goal Outcome Summary  Outcome: No Change  VS:  VSS, A&O to 4 makes his needs known    Output:  Adequate output voiding spontaneously    Activity:  A with FWW; up in chair most of the shift.   Pain:  PRN oxycodone has been effective for pain control    CMS:  Intact   Dressing:  CDI   Diet:  Tolerating regular diet;    LDA:  PICC patent and infusing   Equipment:  None    Plan:  Continue with current plan

## 2017-08-13 NOTE — PHARMACY-VANCOMYCIN DOSING SERVICE
Pharmacy Vancomycin Note  Date of Service 2017  Patient's  1965   52 year old, male    Indication: Bone and Joint Infection  Goal Trough Level: 15-20 mg/L  Day of Therapy: 3  Current Vancomycin regimen:  1000 mg IV q12h    Current estimated CrCl = Estimated Creatinine Clearance: 91.6 mL/min (based on Cr of 0.87).    Creatinine for last 3 days  2017:  6:49 AM Creatinine 0.70 mg/dL  2017:  6:14 AM Creatinine 0.87 mg/dL    Recent Vancomycin Levels (past 3 days)  2017:  6:14 AM Vancomycin Level 10.2 mg/L    Vancomycin IV Administrations (past 72 hours)                   vancomycin (VANCOCIN) 1000 mg in dextrose 5% 200 mL PREMIX (mg) 1,000 mg New Bag 17 1843     1,000 mg New Bag  0635    vancomycin vial 1000 mg (g) 1 g Given 17 1725                Nephrotoxins and other renal medications (Future)    Start     Dose/Rate Route Frequency Ordered Stop    17 0500  vancomycin (VANCOCIN) 1000 mg in dextrose 5% 200 mL PREMIX      1,000 mg Intravenous EVERY 12 HOURS 17 2124      17 2300  piperacillin-tazobactam (ZOSYN) 3.375 g vial to attach to  mL bag      3.375 g  over 30 Minutes Intravenous EVERY 6 HOURS 17 2112               Contrast Orders - past 72 hours     None          Interpretation of levels and current regimen:  Trough level is  Subtherapeutic    Has serum creatinine changed > 50% in last 72 hours: No    Urine output:  good urine output     Renal Function: Possibly Worsening slightly based on increased Scr of 0.7 --> 0.87, but will continue to monitor.  Plan:  1.  Increase Dose to 1250 mg (19 mg/kg) po q12h.  2.  Pharmacy will check trough levels as appropriate in 1-3 Days.    3. Serum creatinine levels will be ordered daily for the first week of therapy and at least twice weekly for subsequent weeks.      Farhana Garcia, PharmD        .

## 2017-08-13 NOTE — PROVIDER NOTIFICATION
0920: /100 after ambulating in halls. Rechecked at 0926: 164/106. Rechecked at 0948:168/104. Dr. Hicks notified via text page.

## 2017-08-13 NOTE — PROGRESS NOTES
Ortho Progress Note     Subjective:  No fevers/chills. Pain controlled. No concerns at present.     Objective:  /84 (BP Location: Right arm)  Pulse 61  Temp 96.6  F (35.9  C) (Oral)  Resp 16  SpO2 98%  Gen: A&Ox3, no acute distress  CV: 2+ dp/pt pulses, capillary refill < 2sec  Resp: breathing equal and non-labored, no wheezing  MSK:     RLE   Dressings c/d/i   Motor: EHL, TA, FHL, GS 5/5   SILT on SP, DP, S, S, and T nerve territories   Circulation: palpable DP and TP, foot warm      Hemoglobin   Date Value Ref Range Status   08/13/2017 11.3 (L) 13.3 - 17.7 g/dL Final   08/12/2017 11.4 (L) 13.3 - 17.7 g/dL Final     HV: 30 cc past 24h  Cx: Staph aureus      Assessment/Plan:  52M s/p 9/2016 ORIF R tibial plateau, now with pretibial abscess. S/p 8/11 I&D and removal of hardware by Dr Dos Santos.    -Weight Bearing Status: TTWB RLE  Diet: Regular  Abx:  vanc, zosyn for now.  Appreciate ID consult  DVT PPx: 81 ASA BID  Dispo:  Pending cultures, establishment of antibiotic regimen       Nicholas Agarwal MD  Orthopaedic Surgery PGY-4  Pager:  [phone]

## 2017-08-13 NOTE — PROGRESS NOTES
"[facility], [place]   Internal Medicine Daily Note           Interval History/Events     Overnight events reviewed  Feels tired  No nausea, vomiting, chest pain, shortness of breath  Feels constipated, and abdominal fullness  No lightheadedness or dizziness.        Review of Systems        4 point ROS including Respiratory, CV, GI and , other than that noted above is negative      Medications   I have reviewed current medications  in the \"current medication\" section of Epic.  Relevant changes include:     Physical Exam   General:       Vital signs:    Blood pressure (!) 168/104, pulse 61, temperature 96.9  F (36.1  C), temperature source Oral, resp. rate 16, SpO2 97 %.  Estimated body mass index is 20.5 kg/(m^2) as calculated from the following:    Height as of 7/12/17: 1.803 m (5' 11\").    Weight as of an earlier encounter on 8/10/17: 66.7 kg (147 lb).    No intake or output data in the 24 hours ending 08/11/17 1716   HEENT: No icterus, no pallor  Cardiovascular: S1, S2 nromal.   Respiratory:  B/LC TA  GI/Abdomen: Soft, NT, BS+  Neurology: Alert, awake, and oriented. No tremors.   Extremities: R knee dressing in place.   Skin:      Laboratory and Imaging Studies     I have reviewed  laboratory and imaging studies in the Epic. Pertinent findings are as below:    BMP    Recent Labs  Lab 08/13/17  0614 08/11/17  0649   NA  --  140   POTASSIUM  --  4.2   CHLORIDE  --  107   DINO  --  8.4*   CO2  --  25   BUN  --  16   CR 0.87 0.70   GLC  --  102*     CBC    Recent Labs  Lab 08/13/17  0614  08/11/17  0649 08/10/17  1736   WBC  --   --  6.1 6.2   RBC  --   --  3.87* 3.89*   HGB 11.3*  < > 11.7* 11.9*   HCT  --   --  35.4* 36.0*   MCV  --   --  92 93   MCH  --   --  30.2 30.6   MCHC  --   --  33.1 33.1   RDW  --   --  13.3 13.1   PLT  --   --  302 328   < > = values in this interval not displayed.  INR    Recent Labs  Lab 08/11/17  0649   INR 1.08     LFTsNo lab results found in last 7 days. "   PANCNo lab results found in last 7 days.        Impression/Plan        51 yo male with hx of HTN, R anterior tibial abscess around site of proximal tibial ORIF is being admitted S/p I and D and removal of hardware by Dr. Dos Santos on 08/11    # S/p I and D and removal of hardware by Dr. Dos Santos on 08/11  Management primarily per Ortho  Culture from 08/11 growing Staph. ID consulted. Initiated on Vancomycin.   Antibiotics plan per ID.     # Anemia: Most likely due to acute blood loss, hemodilution. Hg 11.3  - Transfuse if Hg < 7 gm/dl.     # HTN: Not on any medications prior to admission.   - Start Amlodipine 5 mg  - Continue to monitor and titrate    Pt's care was discussed with bedside RN, patient and  during Care Team Rounds.       Andrew Hicks MD  Hospitalist ( Internal medicine)  Pager: [phone]

## 2017-08-13 NOTE — PLAN OF CARE
Problem: Goal Outcome Summary  Goal: Goal Outcome Summary  Outcome: Therapy, progress toward functional goals as expected  OT: Patient reports he was nauseated earlier but feeling better after eating and thinks nausea was from taking meds on an empty stomach. Completed bed mobility, toileting, hygienes at sink and ambulation x 225 feet with crutches, all with SBA-CGA. /89 after activity. 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

## 2017-08-13 NOTE — PLAN OF CARE
Problem: Goal Outcome Summary  Goal: Goal Outcome Summary  Outcome: No Change  Patient A/Ox4. VSS. Denies CP, SOB, dizziness/LH. LSCTA. +fl/BS. Voiding in large amounts in the urinal, clear yellow urine. CMS intact. Dressing to his right knee is  CDI, ice applied. Tolerating a regular diet without NV. IS encouraged. Activity up ambulating in the arechiga with crutches and the assist of one person. PICC in his right upper arm.  Patient has demonstrated ability to call appropriately. Patient is resting with call light within reach. Will continue to monitor.

## 2017-08-13 NOTE — PLAN OF CARE
Problem: Goal Outcome Summary  Goal: Goal Outcome Summary  Outcome: Improving  A/O x4. VSS, afebrile. LS clear. Denies SOB or CP. Right knee drsg CDI. Scant bloody drainage from hemovac. Pain managed with prn oxycodone. Up walking halls with one assist. PICC line placed in RUE by PICC nurse, pt tolerated well. NS running at 75 btw vanco and zosyn. Good appetite. Calls appropriately and makes need known.

## 2017-08-13 NOTE — PLAN OF CARE
Problem: Goal Outcome Summary  Goal: Goal Outcome Summary            VS:    BP elevated today, Dr. Kurt bray- new order for Norvasc. Continue to assess. Maintaining O2 SATS in upper 90s on room air, afebrile.    Output:    Voiding independently in bathroom- BM today.    Activity:    Up to ambulate with walker and SBA x 1    Skin: Intact- UTV under wrap on RLE.    Pain:    Pain well controlled today per patient report. Pt has PRN Oxycodone available q 3 hours, has not asked for it today.    CMS:    Intact    Dressing:    CDI    Diet:    Regular- fair appetite this afternoon.    LDA:    PICC patent and saline locked in right arm. PIV patent/saline locked in left hand. Hemovac drain with minimal output today.    Equipment:    Crutches, PCDS,

## 2017-08-13 NOTE — PLAN OF CARE
Problem: Goal Outcome Summary  Goal: Goal Outcome Summary  PT: Pt overall doing well. Independent with bed mobility and supervision level for transfers with use of crutches. Tolerates ambulating in arechiga with crutches and SBA, steady throughout with no LOB. Pt able to maintain TTWB throughout. Initiate exercises within precautions although needed some encouragement. Pt with limited flexion R LE overall. Continue to anticipate d/c to rehab as home currently not a safe environment following surgery and within precautions.

## 2017-08-14 ENCOUNTER — APPOINTMENT (OUTPATIENT)
Dept: OCCUPATIONAL THERAPY | Facility: CLINIC | Age: 52
DRG: 493 | End: 2017-08-14
Attending: ORTHOPAEDIC SURGERY
Payer: COMMERCIAL

## 2017-08-14 ENCOUNTER — APPOINTMENT (OUTPATIENT)
Dept: PHYSICAL THERAPY | Facility: CLINIC | Age: 52
DRG: 493 | End: 2017-08-14
Attending: ORTHOPAEDIC SURGERY
Payer: COMMERCIAL

## 2017-08-14 LAB
BACTERIA SPEC CULT: ABNORMAL
CREAT SERPL-MCNC: 0.73 MG/DL (ref 0.66–1.25)
GFR SERPL CREATININE-BSD FRML MDRD: NORMAL ML/MIN/1.7M2
MICRO REPORT STATUS: ABNORMAL
SPECIMEN SOURCE: ABNORMAL

## 2017-08-14 PROCEDURE — E2402 NEG PRESS WOUND THERAPY PUMP: HCPCS | Performed by: OCCUPATIONAL THERAPIST

## 2017-08-14 PROCEDURE — 25000128 H RX IP 250 OP 636: Performed by: ORTHOPAEDIC SURGERY

## 2017-08-14 PROCEDURE — 97535 SELF CARE MNGMENT TRAINING: CPT | Mod: GO | Performed by: OCCUPATIONAL THERAPIST

## 2017-08-14 PROCEDURE — 82565 ASSAY OF CREATININE: CPT | Performed by: ORTHOPAEDIC SURGERY

## 2017-08-14 PROCEDURE — 12000001 ZZH R&B MED SURG/OB UMMC

## 2017-08-14 PROCEDURE — 25000128 H RX IP 250 OP 636: Performed by: INTERNAL MEDICINE

## 2017-08-14 PROCEDURE — 40000193 ZZH STATISTIC PT WARD VISIT

## 2017-08-14 PROCEDURE — 97110 THERAPEUTIC EXERCISES: CPT | Mod: GP

## 2017-08-14 PROCEDURE — 97116 GAIT TRAINING THERAPY: CPT | Mod: GP

## 2017-08-14 PROCEDURE — 40000133 ZZH STATISTIC OT WARD VISIT: Performed by: OCCUPATIONAL THERAPIST

## 2017-08-14 PROCEDURE — 25000132 ZZH RX MED GY IP 250 OP 250 PS 637: Performed by: ORTHOPAEDIC SURGERY

## 2017-08-14 PROCEDURE — 25000132 ZZH RX MED GY IP 250 OP 250 PS 637: Performed by: INTERNAL MEDICINE

## 2017-08-14 PROCEDURE — 36592 COLLECT BLOOD FROM PICC: CPT | Performed by: ORTHOPAEDIC SURGERY

## 2017-08-14 PROCEDURE — 99231 SBSQ HOSP IP/OBS SF/LOW 25: CPT | Performed by: INTERNAL MEDICINE

## 2017-08-14 RX ORDER — CEFAZOLIN SODIUM 2 G/100ML
2 INJECTION, SOLUTION INTRAVENOUS EVERY 8 HOURS
Status: DISCONTINUED | OUTPATIENT
Start: 2017-08-14 | End: 2017-08-15 | Stop reason: HOSPADM

## 2017-08-14 RX ORDER — SODIUM CHLORIDE 9 MG/ML
INJECTION, SOLUTION INTRAVENOUS
Status: DISPENSED
Start: 2017-08-14 | End: 2017-08-15

## 2017-08-14 RX ADMIN — PIPERACILLIN SODIUM,TAZOBACTAM SODIUM 3.38 G: 3; .375 INJECTION, POWDER, FOR SOLUTION INTRAVENOUS at 05:57

## 2017-08-14 RX ADMIN — SENNOSIDES AND DOCUSATE SODIUM 2 TABLET: 8.6; 5 TABLET ORAL at 08:24

## 2017-08-14 RX ADMIN — VANCOMYCIN HYDROCHLORIDE 1250 MG: 10 INJECTION, POWDER, LYOPHILIZED, FOR SOLUTION INTRAVENOUS at 08:24

## 2017-08-14 RX ADMIN — CEFAZOLIN SODIUM 2 G: 2 INJECTION, SOLUTION INTRAVENOUS at 15:50

## 2017-08-14 RX ADMIN — ASPIRIN 81 MG CHEWABLE TABLET 81 MG: 81 TABLET CHEWABLE at 19:49

## 2017-08-14 RX ADMIN — PIPERACILLIN SODIUM,TAZOBACTAM SODIUM 3.38 G: 3; .375 INJECTION, POWDER, FOR SOLUTION INTRAVENOUS at 11:53

## 2017-08-14 RX ADMIN — ASPIRIN 81 MG CHEWABLE TABLET 81 MG: 81 TABLET CHEWABLE at 08:24

## 2017-08-14 RX ADMIN — SODIUM CHLORIDE: 9 INJECTION, SOLUTION INTRAVENOUS at 08:24

## 2017-08-14 RX ADMIN — AMLODIPINE BESYLATE 5 MG: 5 TABLET ORAL at 08:24

## 2017-08-14 NOTE — CONSULTS
Social Work: Assessment with Discharge Plan    Patient Name:  Delvin Catalan  :  1965  Age:  52 year old  MRN:  [id number]  Risk/Complexity Score:  Filed Complexity Screen Score: 4  Completed assessment with:  Pt, 8A IDT    Presenting Information   Reason for Referral:  Discharge plan  Date of Intake:  2017  Referral Source:  Physician  Decision Maker:  pt  Alternate Decision Maker:  Friend Mike Byers  Health Care Directive:  Declined completing  Living Situation:  House  Previous Functional Status:  Independent  Patient and family understanding of hospitalization:  Seeking treatment for infection  Cultural/Language/Spiritual Considerations:    Adjustment to Illness:  accepting    Physical Health  Reason for Admission:    1. Cellulitis and abscess of leg      Services Needed/Recommended:  TCU    Mental Health/Chemical Dependency  Diagnosis:  NA  Support/Services in Place:  NA  Services Needed/Recommended:  NA    Support System  Significant relationship at present time:  None noted  Family of origin is available for support:  None noted  Other support available:  Not assessed  Gaps in support system:  Pt has support of friend  Patient is caregiver to:  None     Provider Information   Primary Care Physician:  Krystian Streeter   [phone]   Clinic:  [address] [place] [id number]      :      Financial   Income Source:  GA  Financial Concerns:  None noted  Insurance:    Payor/Plan Subscriber Name Rel Member # Group #   BLUE PLUS - BLUE PLUS* DELVIN CATALAN  [id number]       [id number]       Discharge Plan   Patient and family discharge goal:  TCU  Provided education on discharge plan:  YES  Patient agreeable to discharge plan:  YES  A list of Medicare Certified Facilities was provided to the patient and/or family to encourage patient choice. Patient's choices for facility are:  Pt is requesting to return to [facility] as he  has had previous stay there  Will NH provide Skilled rehabilitation or complex medical:  YES  General information regarding anticipated insurance coverage and possible out of pocket cost was discussed. Patient and patient's family are aware patient may incur the cost of transportation to the facility, pending insurance payment: YES  Barriers to discharge:  Medical stability    Discharge Recommendations   Anticipated Disposition:  Facility:  [facility]  Transportation Needs:  Other:  [facility] [phone]  Name of Transportation Company and Phone:  [phone] ([facility])    Additional comments   Writer met w/pt and introduced role/reason for visit. Pt is requesting referral to [facility].  Writer spoke w/Admissions at [facility], they would be willing to accept him back, and have bed available for Tuesday, 8/15/17. Pt will need transportation. Writer faxed updated notes from weekend to ensure facility will be able to meet his needs. SW con't to follow

## 2017-08-14 NOTE — PROGRESS NOTES
Interval History:     Pain well controlled. No acute events. He has been recovering well. He is tolerating oral diet and mobilizing with crutches. He has no pain with mobilization.         Physical Exam:     Vital Signs:  /76  Pulse 57  Temp 95.9  F (35.5  C) (Oral)  Resp 12  SpO2 94%    Intake/Output Summary (Last 24 hours) at 08/11/17 1906  Last data filed at 08/11/17 1834   Gross per 24 hour   Intake             1000 ml   Output               50 ml   Net              950 ml     Gen:    No acute distress. Alert.  Pulm:  Non-labored breathing  Incision:   Dressing c/d/i - incision clean dry and well approximated.  Drain: With 0 and 5 output over the past 12 and 12 hours. Drain removed this morning.    Operative extremity:   Motor: ehl/fhl/gs/at intact  Sensory: intact light touch dp/sp/t/s/s      Labs:  CBC    Recent Labs  Lab 08/13/17  0614 08/12/17  0640 08/11/17  0649 08/10/17  1736   WBC  --   --  6.1 6.2   HGB 11.3* 11.4* 11.7* 11.9*   CRP  --   --  29.5*  --    PLT  --   --  302 328     Micro:    Recent Labs  Lab 08/11/17  1719 08/11/17  1718 08/11/17  1707 08/11/17  1704 08/11/17  1701 08/10/17  1736   CULT On day 1, isolated in broth only: Staphylococcus aureus Susceptibility testing done on previous specimen*  Culture negative monitoring continues Light growth Staphylococcus aureus Susceptibility testing done on previous specimen*  Culture negative monitoring continues On day 2, isolated in broth only: Gram positive cocci in clusters*  Culture negative monitoring continues Light growth Staphylococcus aureus*  Culture negative monitoring continues On day 2, isolated in broth only: Gram positive cocci in clusters*  Culture negative monitoring continues Moderate growth Staphylococcus aureus*            Assessment and Plan:    Delvin Echevarria is a 52 year old male who is s/p irrigation and debridement and removal of hardware from right proximal tibia.    Activity: Up with assist.  Mobilizing well.  Weight bearing status: TTWB RLE  Antibiotics/Tetanus: IV vancomycin cultures as above. ID consult obtained and pending sensitivities.  Diet: Begin with clear fluids and progress diet as tolerated.  DVT prophylaxis: SCDs while in hospital, 81 mg aspirin BID x4 weeks total.  Bracing/Splinting: No brace or splint needed.  Ice: Ice operative site 20 minutes >4 times daily.  Wound Care: Will follow wounds closely.  Drains: Drain removed and we'll continue to follow incision closely.  Pain management: transition from IV to orals as tolerated.     Disposition pending IV antibiotic plan

## 2017-08-14 NOTE — PLAN OF CARE
Problem: Goal Outcome Summary  Goal: Goal Outcome Summary  OT: OT educated pt on LB dressing and tub transfers w/in precautions.

## 2017-08-14 NOTE — PROGRESS NOTES
"[facility], [place]   Internal Medicine Daily Note           Interval History/Events     Overnight events reviewed  Some overnight sweating.   No nausea, vomiting, chest pain, shortness of breath  Had BM  No lightheadedness or dizziness.        Review of Systems        4 point ROS including Respiratory, CV, GI and , other than that noted above is negative      Medications   I have reviewed current medications  in the \"current medication\" section of Epic.  Relevant changes include:     Physical Exam   General:       Vital signs:    Blood pressure (!) 141/95, pulse 57, temperature 97  F (36.1  C), temperature source Oral, resp. rate 16, SpO2 97 %.  Estimated body mass index is 20.5 kg/(m^2) as calculated from the following:    Height as of 7/12/17: 1.803 m (5' 11\").    Weight as of an earlier encounter on 8/10/17: 66.7 kg (147 lb).    No intake or output data in the 24 hours ending 08/11/17 1716   HEENT: No icterus, no pallor  Cardiovascular: S1, S2 nromal.   Respiratory:  B/LC TA  GI/Abdomen: Soft, NT, BS+  Neurology: Alert, awake, and oriented. No tremors.   Extremities: R knee dressing in place.   Skin:      Laboratory and Imaging Studies     I have reviewed  laboratory and imaging studies in the Epic. Pertinent findings are as below:    BMP    Recent Labs  Lab 08/14/17  0719 08/13/17  0614 08/11/17  0649   NA  --   --  140   POTASSIUM  --   --  4.2   CHLORIDE  --   --  107   DINO  --   --  8.4*   CO2  --   --  25   BUN  --   --  16   CR 0.73 0.87 0.70   GLC  --   --  102*     CBC    Recent Labs  Lab 08/13/17  0614  08/11/17  0649 08/10/17  1736   WBC  --   --  6.1 6.2   RBC  --   --  3.87* 3.89*   HGB 11.3*  < > 11.7* 11.9*   HCT  --   --  35.4* 36.0*   MCV  --   --  92 93   MCH  --   --  30.2 30.6   MCHC  --   --  33.1 33.1   RDW  --   --  13.3 13.1   PLT  --   --  302 328   < > = values in this interval not displayed.  INR    Recent Labs  Lab 08/11/17  0649   INR 1.08     LFTsNo lab " results found in last 7 days.   PANCNo lab results found in last 7 days.        Impression/Plan        53 yo male with hx of HTN, R anterior tibial abscess around site of proximal tibial ORIF is being admitted S/p I and D and removal of hardware by Dr. Dos Santos on 08/11    # S/p I and D and removal of hardware by Dr. Dos Santos on 08/11  Management primarily per Ortho  Culture from 08/11 growing Staph. ID consulted. Initiated on Vancomycin.   Antibiotics plan per ID.     # Anemia: Most likely due to acute blood loss, hemodilution. Hg 11.3 on 08/13  - Transfuse if Hg < 7 gm/dl.     # HTN: Not on any medications prior to admission.   Amlodipine 5 mg started on 08/13  - Continue to monitor and titrate    Pt's care was discussed with bedside RN, patient and  during Care Team Rounds.       Andrew Hicks MD  Hospitalist ( Internal medicine)  Pager: [phone]

## 2017-08-14 NOTE — PROGRESS NOTES
Ortho Progress Note     Subjective:  No fevers/chills. Pain controlled. No concerns at present. Tolerating PO, voiding spontaneously    Objective:  /76  Pulse 57  Temp 95.9  F (35.5  C) (Oral)  Resp 12  SpO2 94%  Gen: A&Ox3, no acute distress  CV: 2+ dp/pt pulses, capillary refill < 2sec  Resp: breathing equal and non-labored, no wheezing  MSK:     RLE   Dressings c/d/i. Dressings changed, incision intact.   Motor: EHL, TA, FHL, GS 5/5   SILT on SP, DP, S, S, and T nerve territories   Circulation: palpable DP and TP, foot warm      Hemoglobin   Date Value Ref Range Status   08/13/2017 11.3 (L) 13.3 - 17.7 g/dL Final   08/12/2017 11.4 (L) 13.3 - 17.7 g/dL Final     HV: 5,0 last two shifts. Drain removed.  Cx: Staph aureus      Assessment/Plan:  52M s/p 9/2016 ORIF R tibial plateau, now with pretibial abscess. S/p 8/11 I&D and removal of hardware by Dr Dos Santos.    -Weight Bearing Status: TTWB RLE  Diet: Regular  Abx:  vanc, zosyn for now.  Appreciate ID consult  DVT PPx: 81 ASA BID  Dispo:  Pending cultures, establishment of antibiotic regimen, placement        Olivia Savage MD  PGY-2  Orthopaedic Surgery   [phone]

## 2017-08-14 NOTE — PROGRESS NOTES
[facility] [place] ID SERVICE: PROGRESS NOTE      Patient:  Delvin Echevarria, Date of birth 1965, Medical record number [id number]  Date of Visit:  August 14, 2017         Assessment and Recommendations:   Mr. Echevarria has a history of ORIF of his R tibia complicated by MSSA infection. He is now s/p hardware removal 8/11/17. At this point we can narrow his antibiotics to cefazolin with plans to continue for 6 weeks from the time of surgery. If ongoing concerns regarding elevated CRP or incomplete wound healing, may consider extension with oral antibiotics at that time.     Recommendations:  1. Stop vancomycin and pip/tazo (ordered)  2. Start cefazolin 2g IV Q8H through 9/22/17 (ordered)  3. Check CBC, CMP, and CRP weekly with labs faxed to the [facility] at [phone].   4. Follow-up with either Sheeba or Loli ~2 weeks after discharge.    The [facility] [place] ID team will continue to peripherally follow this patient until discharge. Please feel free to call with any questions.     Nikole Aly MD  Infectious Diseases  [phone]      Interval History :   Overall feels he is doing well today except for ongoing pain with attempting to straighten leg. He reports that the current plan is for him to go to a TCU from the hospital and that he would be relieved if this were the plan as managing his care from his current living situation sounds daunting. No fevers. No significant diarrhea. No rashes. No difficulty breathing. 4 point ROS including Respiratory, GI, musculoskeletal, and skin otherwise negative         Allergies:      Allergies   Allergen Reactions     Seasonal Allergies Other (See Comments)     Congestion sinuses     Codeine Nausea     No Known Drug Allergies            Recent Antimicrobials::   Current: pip/tazo and vancomycin         Physical Exam:   BP (!) 141/95 (BP Location: Left arm)  Pulse 57  Temp 97  F (36.1  C) (Oral)  Resp 16  SpO2 97%   Exam:  GENERAL:  Well-developed, well-nourished,  sitting in bed in no acute distress.   ENT:  Head is normocephalic, atraumatic.   EYES:  Eyes have anicteric sclerae.    NECK:  Supple.  EXT: Extremities warm and without edema. Right leg wrapped. No erythema above wrapping.   SKIN:  No acute rashes. PICC is in place without any surrounding erythema.  NEUROLOGIC:  Grossly nonfocal.         Laboratory Data:     Creatinine   Date Value Ref Range Status   08/14/2017 0.73 0.66 - 1.25 mg/dL Final   08/13/2017 0.87 0.66 - 1.25 mg/dL Final   08/11/2017 0.70 0.66 - 1.25 mg/dL Final   02/16/2017 0.68 0.66 - 1.25 mg/dL Final   12/14/2016 0.60 (L) 0.66 - 1.25 mg/dL Final     WBC   Date Value Ref Range Status   08/11/2017 6.1 4.0 - 11.0 10e9/L Final   08/10/2017 6.2 4.0 - 11.0 10e9/L Final   02/16/2017 8.7 4.0 - 11.0 10e9/L Final   12/14/2016 11.7 (H) 4.0 - 11.0 10e9/L Final   10/27/2016 6.4 4.0 - 11.0 10e9/L Final     Hemoglobin   Date Value Ref Range Status   08/13/2017 11.3 (L) 13.3 - 17.7 g/dL Final     Platelet Count   Date Value Ref Range Status   08/11/2017 302 150 - 450 10e9/L Final     Lab Results   Component Value Date     08/11/2017    BUN 16 08/11/2017    CO2 25 08/11/2017     CRP Inflammation   Date Value Ref Range Status   08/11/2017 29.5 (H) 0.0 - 8.0 mg/L Final           Pertinent Recent Microbiology Data:     Recent Labs  Lab 08/11/17  1719 08/11/17  1718 08/11/17  1707 08/11/17  1704 08/11/17  1701 08/10/17  1736   CULT On day 1, isolated in broth only: Staphylococcus aureus Susceptibility testing done on previous specimen*  Culture negative monitoring continues Light growth Staphylococcus aureus Susceptibility testing done on previous specimen*  Culture negative monitoring continues On day 2, isolated in broth only: Staphylococcus aureus Susceptibility testing done on previous specimen*  Culture negative monitoring continues Light growth Staphylococcus aureus*  Culture negative monitoring continues On day 2, isolated in broth only: Staphylococcus  aureus Susceptibility testing done on previous specimen*  Culture negative monitoring continues Moderate growth Staphylococcus aureus*   SDES Tissue Right Leg  medial 2  Tissue Right Leg  medial 2 Tissue Right Leg  medial 1  Tissue Right Leg  medial 1 Tissue Right Leg  midline  Tissue Right Leg  midline Tissue Right Leg  lateral deep  Tissue Right Leg  lateral deep Tissue Right Leg  lateral superficial  Tissue Right Leg  lateral superficial Left Knee Wound

## 2017-08-14 NOTE — PLAN OF CARE
Problem: Goal Outcome Summary  Goal: Goal Outcome Summary  Outcome: No Change  Patient A/Ox4. VSS on eves, pt declined night vitals and slept well for most of the night. Denies CP, SOB, dizziness/LH. LSCTA. +fl/BS. Voiding in urinal at bedside. CMS intact. Dressing to right leg CDI, HV in place. Tolerating regular diet without NV. IS encouraged. Activity level is great, pt ambulates with crutches a few times a day. IV PICC in place, infusing abx a few times this shift. Pain rated as comfortably managed throughout shift, pt takes scheduled tylenol for pain. Patient has demonstrated ability to call appropriately. Patient is resting with call light within reach. Will continue to monitor.

## 2017-08-14 NOTE — PLAN OF CARE
Problem: Goal Outcome Summary  Goal: Goal Outcome Summary  Outcome: Improving      VS:    High BP this AM; recheck was 149/87. Asymptomatic. On RA   Output:    Adequate output; LBM 8/14.   Activity:    TTWB RLE; independently ambulating in halls with crutches.   Skin: Intact except for incision   Pain:    Declined pain medications this shift.   CMS:    Intact   Dressing:    ACE CDI; pt states it was changed today   Diet:    Tolerating regular diet   LDA:    PICC SL btw abx; changed to ancef today. Drain removed today.   Equipment:    Crutches   Plan:    Possibly TCU tomorrow.

## 2017-08-14 NOTE — PLAN OF CARE
"Problem: Goal Outcome Summary  Goal: Goal Outcome Summary  PT: Continues to do well overall. Independent with bed mobility and Tobin for transfers with use of crutches. Ambulates in arechiga with crutches and supervision, stated he can do \"foot flat\" weight bearing but notes still indicated TTWB. Encouraged TTWB until orders/notes update. Completes 3 stairs with bilateral rails at SBA, did maintain TTWB with heavy push through arms. Has to do quite a large step to get into the bus he lives in with no rails. Completes therex with good tolerance. Actively flexing knee to about 90 degrees. Anticipate d/c to rehab once medically cleared.      "

## 2017-08-15 ENCOUNTER — APPOINTMENT (OUTPATIENT)
Dept: PHYSICAL THERAPY | Facility: CLINIC | Age: 52
DRG: 493 | End: 2017-08-15
Attending: ORTHOPAEDIC SURGERY
Payer: COMMERCIAL

## 2017-08-15 ENCOUNTER — APPOINTMENT (OUTPATIENT)
Dept: OCCUPATIONAL THERAPY | Facility: CLINIC | Age: 52
DRG: 493 | End: 2017-08-15
Attending: ORTHOPAEDIC SURGERY
Payer: COMMERCIAL

## 2017-08-15 VITALS
HEART RATE: 59 BPM | DIASTOLIC BLOOD PRESSURE: 90 MMHG | TEMPERATURE: 96.8 F | SYSTOLIC BLOOD PRESSURE: 146 MMHG | OXYGEN SATURATION: 95 % | RESPIRATION RATE: 16 BRPM

## 2017-08-15 PROCEDURE — 25000128 H RX IP 250 OP 636: Performed by: INTERNAL MEDICINE

## 2017-08-15 PROCEDURE — 99231 SBSQ HOSP IP/OBS SF/LOW 25: CPT | Performed by: INTERNAL MEDICINE

## 2017-08-15 PROCEDURE — 40000133 ZZH STATISTIC OT WARD VISIT: Performed by: OCCUPATIONAL THERAPIST

## 2017-08-15 PROCEDURE — 97110 THERAPEUTIC EXERCISES: CPT | Mod: GP

## 2017-08-15 PROCEDURE — 40000193 ZZH STATISTIC PT WARD VISIT

## 2017-08-15 PROCEDURE — 97116 GAIT TRAINING THERAPY: CPT | Mod: GP

## 2017-08-15 PROCEDURE — 25000132 ZZH RX MED GY IP 250 OP 250 PS 637: Performed by: ORTHOPAEDIC SURGERY

## 2017-08-15 PROCEDURE — 25000132 ZZH RX MED GY IP 250 OP 250 PS 637: Performed by: INTERNAL MEDICINE

## 2017-08-15 PROCEDURE — 97535 SELF CARE MNGMENT TRAINING: CPT | Mod: GO | Performed by: OCCUPATIONAL THERAPIST

## 2017-08-15 RX ORDER — OXYCODONE HYDROCHLORIDE 5 MG/1
TABLET ORAL
Qty: 65 TABLET | Refills: 0 | Status: SHIPPED | DISCHARGE
Start: 2017-08-15 | End: 2017-09-23

## 2017-08-15 RX ORDER — AMLODIPINE BESYLATE 5 MG/1
5 TABLET ORAL DAILY
Qty: 30 TABLET | DISCHARGE
Start: 2017-08-15 | End: 2017-11-09

## 2017-08-15 RX ORDER — CEFAZOLIN SODIUM 2 G/100ML
2 INJECTION, SOLUTION INTRAVENOUS EVERY 8 HOURS
DISCHARGE
Start: 2017-08-15 | End: 2017-09-23

## 2017-08-15 RX ORDER — ASPIRIN 81 MG/1
81 TABLET, CHEWABLE ORAL 2 TIMES DAILY
Qty: 60 TABLET | Refills: 0 | DISCHARGE
Start: 2017-08-15 | End: 2017-09-14

## 2017-08-15 RX ORDER — OXYCODONE HYDROCHLORIDE 5 MG/1
5-10 TABLET ORAL
Qty: 65 TABLET | Refills: 0 | Status: SHIPPED | OUTPATIENT
Start: 2017-08-15 | End: 2017-08-15

## 2017-08-15 RX ORDER — ASPIRIN 81 MG/1
81 TABLET, CHEWABLE ORAL 2 TIMES DAILY
Qty: 60 TABLET | Refills: 0 | Status: SHIPPED | OUTPATIENT
Start: 2017-08-15 | End: 2017-08-15

## 2017-08-15 RX ORDER — AMOXICILLIN 250 MG
1-2 CAPSULE ORAL 2 TIMES DAILY
Qty: 100 TABLET | Refills: 0 | Status: SHIPPED | OUTPATIENT
Start: 2017-08-15 | End: 2017-10-31

## 2017-08-15 RX ADMIN — HYDROCORTISONE: 10 CREAM TOPICAL at 09:33

## 2017-08-15 RX ADMIN — AMLODIPINE BESYLATE 5 MG: 5 TABLET ORAL at 08:52

## 2017-08-15 RX ADMIN — SODIUM CHLORIDE, PRESERVATIVE FREE 5 ML: 5 INJECTION INTRAVENOUS at 11:22

## 2017-08-15 RX ADMIN — Medication 1 MG: at 01:50

## 2017-08-15 RX ADMIN — ASPIRIN 81 MG CHEWABLE TABLET 81 MG: 81 TABLET CHEWABLE at 08:52

## 2017-08-15 RX ADMIN — CEFAZOLIN SODIUM 2 G: 2 INJECTION, SOLUTION INTRAVENOUS at 01:10

## 2017-08-15 RX ADMIN — CEFAZOLIN SODIUM 2 G: 2 INJECTION, SOLUTION INTRAVENOUS at 09:33

## 2017-08-15 NOTE — PLAN OF CARE
Problem: Perioperative Period (Adult)  Goal: Signs and Symptoms of Listed Potential Problems Will be Absent or Manageable (Perioperative Period)  Signs and symptoms of listed potential problems will be absent or manageable by discharge/transition of care (reference Perioperative Period (Adult) CPG).   Outcome: Adequate for Discharge Date Met:  08/15/17  VS:    VS WDL, BP slightly hypertensive but asymptomatic.    Output:    Pt voided spontaneously without difficulty, per pt report LBM 8/14.   Activity:    Pt ambulates and transfers with SBA using crutches.    Skin: Skin intact except for RLE, ACE wrapped to mid-thigh. No visible drainage on ACE wrap. Skin is warm without discoloration.   Pain:    Pt denies pain.   CMS:    CMS intact, DP +2 bilaterally. No swelling in BLE. Patient able to wiggle toes and fingers, no tingling or numbness present.    Dressing:    Dressing CDI to RLE. No visible drainage on dressing.    Diet:    Pt tolerating regular diet with good appetite. Ate 100% of all meals.   LDA:    Pt has PICC in RUE with good blood return, infused abx and heparin locked afterwards.    Equipment:    Pt ambulates with crutches.    Plan:    Discharge to TCU today.    Additional Info:    Pt is pleasant and cooperative.

## 2017-08-15 NOTE — DISCHARGE SUMMARY
[facility], [place]  Orthopaedic Discharge Summary    Patient: Delvin Echevarria MRN# [id number]   Age/Sex: 52 year old male YOB: 1965      Date of Admission:  8/10/2017  Date of Discharge:  8/15/2017  Admitting Physician:  Kenroy Dos Santos MD  Discharge Physician:  Olivia Savage MD  Primary Care Provider:  Krystian Streeter  Discharge location         TCU    DISCHARGE DIAGNOSIS:  Right Tibial Abcess    PROCEDURE(S):   8/10/2017 - 8/11/2017 Procedure(s):  COMBINED INCISION AND DRAINAGE BONE LOWER EXTREMITY  REMOVE HARDWARE LOWER EXTREMITY     BRIEF HISTORY:  This patient is a 52 year old male who sustained R proximal tibia fx Sept 2016. Underwent ORIF by Dr Green. 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     Reports he had new onset swelling of the anterior tibia. He reports that this actually end up bursting open and draining some purulent fluid from the lateral aspect of his proximal tibia. Since that time has had mild purulent drainage. Went to an outside urgent care today and they're concerned for large abscess was transferred here.     Patient reports no systemic symptoms, no fevers or chills. No recent infections or illnesses.     Reports that he has continued to be weightbearing as tolerated on the right side without pain. Range of motion of the right knee has not been affected and he still has motion from 0-110 limited by stiffness secondary to scarring. No pain with range of motion knee or weightbearing. 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     HOSPITAL COURSE:    Patient was admitted on 8/10/17 and subsequently underwent the above stated procedure(s). There were no complications and the patient was transferred to the PACU in stable condition.  Medicine was consulted post operatively to aid in management of medical comorbidities. Patient received routine nursing cares on the floor.  A  clear liquid diet was started and subsequently advance to regular, which the patient is tolerating without issue such as GI distress, nausea or vomiting.  Stool softeners were administered while taking pain medications to prevent constipation.  The  patient is voiding independently.  PT/OT was initiated for safety training, ADLs, mobility and ROM and the patient is ambulating safetly.   After demonstrating the ability to tolerate a diet, pain control on PO pain meds and clearance by PT/OT, the patient was deemed medically safe for discharge to TCU on 8/15/17.    Antibiotics:  Vancomycin and Zosyn given and narrowed to ancef post operatively to be continued for approximately 6 weeks.   DVT Prophylaxis:  ASA 81 BID  for 4 weeks.  PT/OT Progress: Has met PT/OT goals for safe mobility.   Pain Medications: Weaned off all IV pain meds by time of discharge.  Inpatient Events: No significant events or complications.     DISCHARGE MEDICATIONS AND PROCEDURES:      Current Discharge Medication List      START taking these medications    Details   senna-docusate (SENOKOT-S;PERICOLACE) 8.6-50 MG per tablet Take 1-2 tablets by mouth 2 times daily  Qty: 100 tablet, Refills: 0    Associated Diagnoses: Cellulitis and abscess of leg      melatonin 1 MG TABS tablet Take 1 tablet (1 mg) by mouth nightly as needed for sleep  Qty: 30 tablet, Refills: 0    Associated Diagnoses: Cellulitis and abscess of leg      oxyCODONE (ROXICODONE) 5 MG IR tablet For pain complaints of 1-5 take 1 tablet, (5 mg) for pain complaints of 6-10 take 2 tablets ( 10 mg) every 4 hours as needed for pain.  Qty: 65 tablet, Refills: 0    Associated Diagnoses: Cellulitis and abscess of leg      amLODIPine (NORVASC) 5 MG tablet Take 1 tablet (5 mg) by mouth daily Hold for systolic pressure < 110. Facility MD to titrate as needed. This is a new med for the patient.  Qty: 30 tablet    Associated Diagnoses: Essential hypertension, benign      ceFAZolin sodium-dextrose  (ANCEF) 2-4 GM/100ML-% SOLN infusion Inject 100 mLs (2 g) into the vein every 8 hours Draw weekly cbc/diff, crp,esr, cmp, and send to Dr Ennis/Jonok [phone].    Associated Diagnoses: Cellulitis and abscess of leg         CONTINUE these medications which have CHANGED    Details   aspirin 81 MG chewable tablet Take 1 tablet (81 mg) by mouth 2 times daily  Qty: 60 tablet, Refills: 0    Associated Diagnoses: Cellulitis and abscess of leg         CONTINUE these medications which have NOT CHANGED    Details   gabapentin (NEURONTIN) 300 MG capsule Take 1 capsule (300 mg) by mouth 3 times daily  Qty: 75 capsule, Refills: 1    Associated Diagnoses: Pain of right lower extremity      cyclobenzaprine (FLEXERIL) 10 MG tablet Take 1 tablet (10 mg) by mouth 3 times daily  Qty: 75 tablet, Refills: 1    Associated Diagnoses: Pain of right lower extremity      hydrocortisone (CORTAID) 1 % cream Apply topically 2 times daily      Skin Protectants, Misc. (HYDROCERIN) LOTN Apply topically daily      triamcinolone (KENALOG) 0.1 % cream Apply topically 2 times daily  Qty: 15 g    Associated Diagnoses: Atopic dermatitis, unspecified type      mineral oil-hydrophilic petrolatum (AQUAPHOR) ointment Apply topically At Bedtime  Qty: 99 g    Associated Diagnoses: Atopic dermatitis, unspecified type      acetaminophen (TYLENOL) 325 MG tablet Take 3 tablets (975 mg) by mouth every 8 hours  Qty: 100 tablet    Associated Diagnoses: Tibial plateau fracture, right, closed, initial encounter         STOP taking these medications       hydrOXYzine (VISTARIL) 25 MG capsule Comments:   Reason for Stopping:                 Discharge Procedure Orders  General info for SNF   Order Comments: Length of Stay Estimate: Short Term Care: Estimated # of Days <30  Condition at Discharge: Improving  Level of care:skilled   Rehabilitation Potential: Excellent  Admission H&P remains valid and up-to-date: Yes  Recent Chemotherapy: N/A  Use Nursing Home Standing  Orders: Yes     Mantoux instructions   Order Comments: Give two-step Mantoux (PPD) Per Facility Policy Yes     Reason for your hospital stay   Order Comments: You were hospitalized following surgery for an infected tibia.     Weight bearing status   Order Comments: Toe touch weight bearing.     Wound care   Order Comments: Remove dressing POD 7. Okay to shower POD 3. No soaking or bathing. After POD 7, change dressing PRN.     Follow Up and recommended labs and tests   Order Comments: Follow up with Dr. Dos Santos in 2 weeks     Full Code     Physical Therapy Adult Consult   Order Comments: Evaluate and treat as clinically indicated.    Reason:  S/p tibia I&D and hardware removal     ABO/Rh Type and Screen   Standing Status: Future  Standing Exp. Date: 08/10/18     Advance Diet as Tolerated   Order Comments: Follow this diet upon discharge: Orders Placed This Encounter     Regular Diet Adult   Order Specific Question Answer Comments   Is discharge order? Yes          DISCHARGE INSTRUCTIONS:    You are being discharged from hospital cares.  Please ice and elevate your affected area as much as possible to reduce swelling.  Swelling is one of the biggest producers of pain after surgery.  If the dressing is in place without a splint, please keep it in place for 7  days, then it is okay to remove.  Showering is okay after surgery, but please do not soak or scrub the incision.  If a splint or cast is in place, please keep clean and dry, and keep covered at all times in shower/bath. If the cast or splint becomes soaked, you must return to clinic or emergency department to have it changed.     Please take pain medications as instructed, but it is okay to begin to wean off of them as your pain tolerates.    Please call or seek medical attention for pain that continues to worsen, new onset of numbness or tingling, or extreme swelling.    Please see a medical provider for new onset of chest pain or shortness of breath.  This may be  a sign of a heart attack or blood clot in your lungs.     After discharge, most patients will return to clinic in 2 weeks for a wound check.  Please call the orthopaedic clinic in the interim for any questions or concerns about your post-operative course.  An appointment has been requested. Call [phone] if you do not hear regarding this appointment.    FOLLOWUP:    Future Appointments  Date Time Provider Department Center   8/15/2017 9:00 AM Sejal Mon OT UROT [place]   8/15/2017 10:15 AM Lacy Bolden PT URPT [place]   8/15/2017 4:00 PM [facility]     Appointments at [facility]: [address] [place] [id number]  Please call [phone] if you haven't heard regarding these appointments within 7 days of discharge.      Olivia Savage MD, MA  Orthopaedic Surgery Resident, PGY2  Pager: [phone]    Discharge summary updated by me to reflect changes in Plan/medicines.

## 2017-08-15 NOTE — PLAN OF CARE
Problem: Goal Outcome Summary  Goal: Goal Outcome Summary  PT: Pt progressing well per POC. Pt able to amb 500 ft with SBA and axiliary crutches, demonstrating good stability and able to maintain TTWB. Pt ascends/descends 3 stairs x2 with Bi handrail support and SBA. Pt tolerated sitting and supine exercises well at end of session to promote RLE ROM and strength for IND and safe functional mobility.     Physical Therapy Discharge Summary     Reason for therapy discharge:    Discharged to transitional care facility.     Progress towards therapy goal(s). See goals on Care Plan in [facility] electronic health record for goal details.  Goals met     Therapy recommendation(s):    Continued therapy is recommended.  Rationale/Recommendations:  to continue to improve pts IND and safety with functional mobility as well as promote further R knee ROM and strength for daily activities..

## 2017-08-15 NOTE — PLAN OF CARE
Problem: Individualization  Goal: Patient Preferences        8/14 15:30-23:30  VS:    BP's continue to run high, 162/94 this shift.  Denies headache, light headedness.    Other VS stable.  Lungs CTA, denies chest pain/SOB   Output:    Voiding spontaneously without difficulty.  BS + all quadrants, + flatus.  Pt reports having BM today, declined HS stool softeners.   Activity:    Up ad lesa, ambulating the arechiga using crutches   Skin: Intact except for surgical incisions RLE   Pain:    Denies   CMS:    intact   Dressing:    Shadowing on both aquacel dressings.  Dressings are intact. Ace wrap re-wrapped at HS.   Diet:    Regular.  Tolerating well.   LDA:    PICC, good blood return, saline locked after IV antibiotics.   Equipment:    Crutches   Plan:    Continue with plan of care.  Expect discharge to TCU.   Additional Info:

## 2017-08-15 NOTE — PROGRESS NOTES
"Social Work Services Discharge Note      Patient Name:  Delvin Echevarria     Anticipated Discharge Date:  8/15/27  Discharge Disposition:   TCU:  [facility] [phone] F: [phone]    Following MD:  [facility] : [phone]     Pre-Admission Screening (PAS) online form has been completed.  The Level of Care (LOC) is:  Determined  Confirmation Code is:  [id number]  Patient/caregiver informed of referral to [facility] Line for Pre-Admission Screening for skilled nursing facility (SNF) placement and to expect a phone call post discharge from SNF.     Additional Services/Equipment Arranged:  [employer] () [phone]     Patient / Family response to discharge plan:  agreeable. \"Right on, thank you\"     Persons notified of above discharge plan:  Pt, Darleen Pabon, NP, Tanna  Charge RN, [place],  at [facility]    Staff Discharge Instructions:  Please fax discharge orders and signed hard scripts for any controlled substances.  Please print a packet and send with patient.     CTS Handoff completed:  YES    Medicare Notice of Rights provided to the patient/family:  NO-pt does not have Medicare    "

## 2017-08-15 NOTE — PLAN OF CARE
Problem: Goal Outcome Summary  Goal: Goal Outcome Summary  OT: Recommend d/c to rehab for activity tolerance and ADL/IADL tasks. Pt complete tub shower transfers x 2 with CGA and crutches. Pt demonstrated ability to complete full body dressing with SBA, and crutches to complete the dressing task.      Occupational Therapy Discharge Summary     Reason for therapy discharge:    Discharged to transitional care facility.     Progress towards therapy goal(s). See goals on Care Plan in [facility] electronic health record for goal details.  Goals met     Therapy recommendation(s):    Continue with activity tolerance and BADLs/IADLs.

## 2017-08-15 NOTE — PROGRESS NOTES
Ortho Progress Note     Subjective:   Pain controlled. No concerns at present. Tolerating PO, voiding spontaneously. Ambulating well with crutches.    Objective:  BP (!) 164/93  Pulse 91  Temp 96.4  F (35.8  C) (Oral)  Resp 14  SpO2 96%  Gen: A&Ox3, no acute distress  CV: 2+ dp/pt pulses, capillary refill < 2sec  Resp: breathing equal and non-labored, no wheezing  MSK:     RLE   Dressings c/d/i.    Motor: EHL, TA, FHL, GS 5/5   SILT on SP, DP, S, S, and T nerve territories   Circulation: palpable DP and TP, foot warm      Hemoglobin   Date Value Ref Range Status   08/13/2017 11.3 (L) 13.3 - 17.7 g/dL Final   08/12/2017 11.4 (L) 13.3 - 17.7 g/dL Final       Cx: Staph aureus      Assessment/Plan:  52M s/p 9/2016 ORIF R tibial plateau, now with pretibial abscess. S/p 8/11 I&D and removal of hardware by Dr Dos Santos.    -Weight Bearing Status: TTWB RLE  Diet: Regular  Abx:  Ceftriaxone per ID  DVT PPx: 81 ASA BID  Dispo:  TCU bacilio Savage MD  PGY-2  Orthopaedic Surgery   [phone]

## 2017-08-15 NOTE — PROGRESS NOTES
"[facility], [place]   Internal Medicine Daily Note           Interval History/Events     Overnight events reviewed  Doing well.   Pain controlled.   No concern.            Review of Systems        4 point ROS including Respiratory, CV, GI and , other than that noted above is negative      Medications   I have reviewed current medications  in the \"current medication\" section of Epic.  Relevant changes include:     Physical Exam   General:       Vital signs:    Blood pressure 146/90, pulse 59, temperature 96.8  F (36  C), resp. rate 16, SpO2 95 %.  Estimated body mass index is 20.5 kg/(m^2) as calculated from the following:    Height as of 7/12/17: 1.803 m (5' 11\").    Weight as of an earlier encounter on 8/10/17: 66.7 kg (147 lb).    No intake or output data in the 24 hours ending 08/11/17 1716   HEENT: No icterus, no pallor  Cardiovascular: S1, S2 nromal.   Respiratory:  B/LC TA  GI/Abdomen: Soft, NT, BS+  Neurology: Alert, awake, and oriented. No tremors.   Extremities: R knee dressing in place.   Skin: no new rash     Laboratory and Imaging Studies     I have reviewed  laboratory and imaging studies in the Epic. Pertinent findings are as below:    BMP    Recent Labs  Lab 08/14/17  0719 08/13/17  0614 08/11/17  0649   NA  --   --  140   POTASSIUM  --   --  4.2   CHLORIDE  --   --  107   DINO  --   --  8.4*   CO2  --   --  25   BUN  --   --  16   CR 0.73 0.87 0.70   GLC  --   --  102*     CBC    Recent Labs  Lab 08/13/17  0614  08/11/17  0649 08/10/17  1736   WBC  --   --  6.1 6.2   RBC  --   --  3.87* 3.89*   HGB 11.3*  < > 11.7* 11.9*   HCT  --   --  35.4* 36.0*   MCV  --   --  92 93   MCH  --   --  30.2 30.6   MCHC  --   --  33.1 33.1   RDW  --   --  13.3 13.1   PLT  --   --  302 328   < > = values in this interval not displayed.  INR    Recent Labs  Lab 08/11/17  0649   INR 1.08     LFTsNo lab results found in last 7 days.   PANCNo lab results found in last 7 " days.        Impression/Plan        53 yo male with hx of HTN, R anterior tibial abscess around site of proximal tibial ORIF is being admitted S/p I and D and removal of hardware by Dr. Dos Santos on 08/11    # S/p I and D and removal of hardware by Dr. Dos Santos on 08/11  Management primarily per Ortho  Culture from 08/11 growing Staph. ID consulted. Initiated on Vancomycin.   Antibiotics plan per ID.     # Anemia: Most likely due to acute blood loss, hemodilution. Hg 11.3 on 08/13  - Transfuse if Hg < 7 gm/dl.     # HTN: Not on any medications prior to admission.   Amlodipine 5 mg started on 08/13  - Continue to monitor and titrate    Pt's care was discussed with bedside RN, patient and  during Care Team Rounds.     Dispo: per primary, medically stable.       Gui Joel MD   Hospitalist (Internal Medicine)  Pager: [phone].

## 2017-08-15 NOTE — PLAN OF CARE
Problem: Goal Outcome Summary  Goal: Goal Outcome Summary  Outcome: Improving            VS:    WDL except, hypertensive   Output:    Voiding adequately in urinal   Activity:    Up with crutches   Skin: WDL   Pain:    Denies   CMS:    intact   Dressing:    Aquacel, dried drainage   Diet:    Regular   LDA:    RUE PICC   Equipment:    Crutches   Plan:    D/C to TCU for antibiotics   Additional Info:

## 2017-08-16 LAB
BACTERIA SPEC CULT: ABNORMAL
SPECIMEN SOURCE: ABNORMAL

## 2017-08-17 ENCOUNTER — HOSPITAL LABORATORY (OUTPATIENT)
Facility: OTHER | Age: 52
End: 2017-08-17

## 2017-08-17 LAB
ALBUMIN SERPL-MCNC: 3.2 G/DL (ref 3.4–5)
ALP SERPL-CCNC: 86 U/L (ref 40–150)
ALT SERPL W P-5'-P-CCNC: 26 U/L (ref 0–70)
ANION GAP SERPL CALCULATED.3IONS-SCNC: 7 MMOL/L (ref 3–14)
AST SERPL W P-5'-P-CCNC: 27 U/L (ref 0–45)
BASOPHILS # BLD AUTO: 0.1 10E9/L (ref 0–0.2)
BASOPHILS NFR BLD AUTO: 1.1 %
BILIRUB SERPL-MCNC: 0.2 MG/DL (ref 0.2–1.3)
BUN SERPL-MCNC: 19 MG/DL (ref 7–30)
CALCIUM SERPL-MCNC: 9 MG/DL (ref 8.5–10.1)
CHLORIDE SERPL-SCNC: 105 MMOL/L (ref 94–109)
CO2 SERPL-SCNC: 25 MMOL/L (ref 20–32)
CREAT SERPL-MCNC: 0.7 MG/DL (ref 0.66–1.25)
CRP SERPL-MCNC: <2.9 MG/L (ref 0–8)
DIFFERENTIAL METHOD BLD: ABNORMAL
EOSINOPHIL # BLD AUTO: 0.8 10E9/L (ref 0–0.7)
EOSINOPHIL NFR BLD AUTO: 9.4 %
ERYTHROCYTE [DISTWIDTH] IN BLOOD BY AUTOMATED COUNT: 13.6 % (ref 10–15)
ERYTHROCYTE [SEDIMENTATION RATE] IN BLOOD BY WESTERGREN METHOD: 29 MM/H (ref 0–20)
GFR SERPL CREATININE-BSD FRML MDRD: >90 ML/MIN/1.7M2
GLUCOSE SERPL-MCNC: 78 MG/DL (ref 70–99)
HCT VFR BLD AUTO: 37.2 % (ref 40–53)
HGB BLD-MCNC: 12.3 G/DL (ref 13.3–17.7)
IMM GRANULOCYTES # BLD: 0.1 10E9/L (ref 0–0.4)
IMM GRANULOCYTES NFR BLD: 0.6 %
LYMPHOCYTES # BLD AUTO: 2.2 10E9/L (ref 0.8–5.3)
LYMPHOCYTES NFR BLD AUTO: 26.7 %
MCH RBC QN AUTO: 30.2 PG (ref 26.5–33)
MCHC RBC AUTO-ENTMCNC: 33.1 G/DL (ref 31.5–36.5)
MCV RBC AUTO: 91 FL (ref 78–100)
MONOCYTES # BLD AUTO: 0.7 10E9/L (ref 0–1.3)
MONOCYTES NFR BLD AUTO: 8.5 %
NEUTROPHILS # BLD AUTO: 4.4 10E9/L (ref 1.6–8.3)
NEUTROPHILS NFR BLD AUTO: 53.7 %
PLATELET # BLD AUTO: 358 10E9/L (ref 150–450)
POTASSIUM SERPL-SCNC: 4.1 MMOL/L (ref 3.4–5.3)
PROT SERPL-MCNC: 7.6 G/DL (ref 6.8–8.8)
RBC # BLD AUTO: 4.07 10E12/L (ref 4.4–5.9)
SODIUM SERPL-SCNC: 137 MMOL/L (ref 133–144)
WBC # BLD AUTO: 8.2 10E9/L (ref 4–11)

## 2017-08-18 ENCOUNTER — NURSING HOME VISIT (OUTPATIENT)
Dept: GERIATRICS | Facility: CLINIC | Age: 52
End: 2017-08-18
Payer: COMMERCIAL

## 2017-08-18 VITALS
TEMPERATURE: 97.4 F | WEIGHT: 139 LBS | OXYGEN SATURATION: 96 % | HEART RATE: 65 BPM | DIASTOLIC BLOOD PRESSURE: 77 MMHG | RESPIRATION RATE: 18 BRPM | SYSTOLIC BLOOD PRESSURE: 141 MMHG | BODY MASS INDEX: 19.39 KG/M2

## 2017-08-18 DIAGNOSIS — L30.9 ECZEMA, UNSPECIFIED TYPE: ICD-10-CM

## 2017-08-18 DIAGNOSIS — I10 ESSENTIAL HYPERTENSION, BENIGN: ICD-10-CM

## 2017-08-18 DIAGNOSIS — Z98.890 STATUS POST INCISION AND DRAINAGE: ICD-10-CM

## 2017-08-18 DIAGNOSIS — L02.91 ABSCESS: Primary | ICD-10-CM

## 2017-08-18 DIAGNOSIS — S82.141D TIBIAL PLATEAU FRACTURE, RIGHT, CLOSED, WITH ROUTINE HEALING, SUBSEQUENT ENCOUNTER: ICD-10-CM

## 2017-08-18 LAB
BACTERIA SPEC CULT: NORMAL
Lab: NORMAL
SPECIMEN SOURCE: NORMAL

## 2017-08-18 PROCEDURE — 99309 SBSQ NF CARE MODERATE MDM 30: CPT | Performed by: NURSE PRACTITIONER

## 2017-08-18 NOTE — PROGRESS NOTES
[facility]  PRIMARY CARE PROVIDER AND CLINIC:  Krystian Streeter [address] [place] [address]  Chief Complaint   Patient presents with     Hospital F/U       HPI:    Delvin Echevarria is a 52 year old  (1965),admitted to the [facility] from [facility].  Hospital stay 8/10/17 through 8/15/17.  Admitted to this facility for  rehab, medical management and nursing care.  HPI information obtained from: facility chart records, facility staff and patient report.  Current issues are:      Patient had right proximal tib/fib fracture in September 2016.  ORIF was done, all went well, no concerns.  Recently, experienced swelling and purulent drainage in anterior tibial area.  I & D of bone done, hardware removed.  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  Now on IV Ancef through 9/22/17.  Is here for management of IV antibiotics and rehab post op.  Patient offers no concerns other than stool softeners making BMs too soft.  He is not taking any narcotics.  History HTN.  BPs:  08/17/2017 20:26 141/77 mmHg (Sitting r/arm)  08/17/2017 16:08 139/84 mmHg (Sitting r/arm)  08/17/2017 10:02 130/91 mmHg (Sitting r/arm)  08/17/2017 00:35 108/68 mmHg (Sitting l/arm)  08/16/2017 22:24 125/78 mmHg (Lying l/arm)  08/16/2017 12:46 130/88 mmHg (Sitting l/arm)    CODE STATUS/ADVANCE DIRECTIVES DISCUSSION:   CPR/Full code   Patient's living condition: lives alone but with others on [place]    ALLERGIES:Seasonal allergies; Codeine; and No known drug allergies  PAST MEDICAL HISTORY:  has a past medical history of Hypertension (3/15/2013). He also has no past medical history of Arthritis; Asthma; Blood transfusion; Congestive heart failure, unspecified; COPD (chronic obstructive pulmonary disease) (H); Coronary artery disease; Diabetes mellitus (H); Malignant neoplasm (H); Thyroid disease; or Unspecified cerebral artery occlusion  with cerebral infarction.  PAST SURGICAL HISTORY:  has a past surgical history that includes Laryngectomy (Several Years ago); Herniorrhaphy inguinal (7/2/2012); Apply external fixator lower extremity (Right, 9/12/2016); Open reduction internal fixation tibia (Right, 9/15/2016); Incision and drainage bone lower extremity, combined (Right, 8/11/2017); and Remove hardware lower extremity (Right, 8/11/2017).  FAMILY HISTORY: family history includes Hypertension in his brother and mother.  SOCIAL HISTORY:  reports that he has been smoking.  He has a 10.00 pack-year smoking history. He has never used smokeless tobacco. He reports that he does not drink alcohol or use illicit drugs.    Post Discharge Medication Reconciliation Status: discharge medications reconciled and changed, per note/orders (see AVS).  Current Outpatient Prescriptions   Medication Sig Dispense Refill     senna-docusate (SENOKOT-S;PERICOLACE) 8.6-50 MG per tablet Take 1-2 tablets by mouth 2 times daily 100 tablet 0     melatonin 1 MG TABS tablet Take 1 tablet (1 mg) by mouth nightly as needed for sleep 30 tablet 0     oxyCODONE (ROXICODONE) 5 MG IR tablet For pain complaints of 1-5 take 1 tablet, (5 mg) for pain complaints of 6-10 take 2 tablets ( 10 mg) every 4 hours as needed for pain. 65 tablet 0     aspirin 81 MG chewable tablet Take 1 tablet (81 mg) by mouth 2 times daily 60 tablet 0     amLODIPine (NORVASC) 5 MG tablet Take 1 tablet (5 mg) by mouth daily Hold for systolic pressure < 110. Facility MD to titrate as needed. This is a new med for the patient. 30 tablet      ceFAZolin sodium-dextrose (ANCEF) 2-4 GM/100ML-% SOLN infusion Inject 100 mLs (2 g) into the vein every 8 hours Draw weekly cbc/diff, crp,esr, cmp, and send to Dr Ennis/Sheeba [phone].       gabapentin (NEURONTIN) 300 MG capsule Take 1 capsule (300 mg) by mouth 3 times daily 75 capsule 1     cyclobenzaprine (FLEXERIL) 10 MG tablet Take 1 tablet (10 mg) by mouth 3 times daily  75 tablet 1     hydrocortisone (CORTAID) 1 % cream Apply topically 2 times daily       Skin Protectants, Misc. (HYDROCERIN) LOTN Apply topically daily       triamcinolone (KENALOG) 0.1 % cream Apply topically 2 times daily 15 g      mineral oil-hydrophilic petrolatum (AQUAPHOR) ointment Apply topically At Bedtime 99 g      acetaminophen (TYLENOL) 325 MG tablet Take 3 tablets (975 mg) by mouth every 8 hours 100 tablet        ROS:  10 point ROS of systems including Constitutional, Eyes, Respiratory, Cardiovascular, Gastroenterology, Genitourinary, Integumentary, Muscularskeletal, Psychiatric were all negative except for pertinent positives noted in my HPI.    Exam:  /77  Pulse 65  Temp 97.4  F (36.3  C)  Resp 18  Wt 139 lb (63 kg)  SpO2 96%  BMI 19.39 kg/m2  GENERAL APPEARANCE:  Alert, in no distress, oriented  ENT:  Mouth and posterior oropharynx normal, moist mucous membranes  NECK:  No adenopathy,masses or thyromegaly  RESP:  lungs clear to auscultation , no respiratory distress  CV:  regular rate and rhythm, no murmur, rub, or gallop, no edema  ABDOMEN:  normal bowel sounds, soft, nontender, no hepatosplenomegaly or other masses  M/S:   Gait and station abnormal using cane for right leg limitations  SKIN:  wound healing well, no signs of infection covered with dressing, but no redness, swelling noted.  NO drainage outside of dressing.  Dressing to remain intact until 8/25-29  PSYCH:  normal insight, judgement and memory   ACE bandage removed, lotion applied to dry skin on foot/ankle.  ACE had not been removed since facility admission.    Lab/Diagnostic data:     CBC RESULTS:   Recent Labs   Lab Test  08/17/17   0644  08/13/17   0614   08/11/17   0649   WBC  8.2   --    --   6.1   RBC  4.07*   --    --   3.87*   HGB  12.3*  11.3*   < >  11.7*   HCT  37.2*   --    --   35.4*   MCV  91   --    --   92   MCH  30.2   --    --   30.2   MCHC  33.1   --    --   33.1   RDW  13.6   --    --   13.3   PLT  358    --    --   302    < > = values in this interval not displayed.       Last Basic Metabolic Panel:  Recent Labs   Lab Test  08/17/17   0644  08/14/17   0719   08/11/17   0649   NA  137   --    --   140   POTASSIUM  4.1   --    --   4.2   CHLORIDE  105   --    --   107   DINO  9.0   --    --   8.4*   CO2  25   --    --   25   BUN  19   --    --   16   CR  0.70  0.73   < >  0.70   GLC  78   --    --   102*    < > = values in this interval not displayed.       Liver Function Studies -   Recent Labs   Lab Test  08/17/17   0644  10/06/16   0642   PROTTOTAL  7.6  8.1   ALBUMIN  3.2*  2.2*   BILITOTAL  0.2  0.3   ALKPHOS  86  184*   AST  27  23   ALT  26  19       TSH   Date Value Ref Range Status   06/28/2012 2.12 0.4 - 5.0 mU/L Final   ]      ASSESSMENT/PLAN:  Abscess  Status post incision and drainage  Will continue IV antibiotics and therapy    Essential hypertension, benign  Blood pressure managed well with current regime     Eczema  Will add moisturizer    Orders:  1. Remove ACE wraps daily and apply hydrocerin or eucerin to foot and ankle, then reapply ACE.  2. DC senna ducosate scheduled  3. Senna S 1-2 tabs 1-2 x daily prn constipation  4. Weekly CBC w/ diff, CRP, ECR, CMP and send results to Dr Ennis each week.    Total time spent with patient visit at the skilled nursing facility was 25 min including patient visit and review of past records. Greater than 50% of total time spent with counseling and coordinating care due to new admit    Electronically signed by:  XIN Lennon CNP

## 2017-08-23 ENCOUNTER — OFFICE VISIT (OUTPATIENT)
Dept: ORTHOPEDICS | Facility: CLINIC | Age: 52
End: 2017-08-23

## 2017-08-23 VITALS — WEIGHT: 149 LBS | BODY MASS INDEX: 20.86 KG/M2 | HEIGHT: 71 IN

## 2017-08-23 DIAGNOSIS — S82.401D CLOSED FRACTURE OF RIGHT TIBIA AND FIBULA WITH ROUTINE HEALING: Primary | ICD-10-CM

## 2017-08-23 DIAGNOSIS — S82.201D CLOSED FRACTURE OF RIGHT TIBIA AND FIBULA WITH ROUTINE HEALING: Primary | ICD-10-CM

## 2017-08-23 NOTE — MR AVS SNAPSHOT
After Visit Summary   8/23/2017    Delvin Echevarria    MRN: [id number]           Patient Information     Date Of Birth          1965        Visit Information        Provider Department      8/23/2017 5:15 PM Kenroy Dos Santos MD [facility]        Today's Diagnoses     Closed fracture of right tibia and fibula with routine healing    -  1      Care Instructions         Interval History:   Delvin Echevarria is a 52 year old male who is here follow up for:   Right tibial hardware removal and I/D - 8/11/2017  Cultures positive for MSSA - on 2g cefazolin q8 hours through 9/22/17    He has been at a transitional care unit since discharge from the hospital and is on his IV antibiotics with the PICC line. He has not had any ongoing left tibial pain. He has been doing weightbearing as tolerated with no tibial pain. He has maintained the same dressings from the time of surgery and has not had any noted wound drainage. He continues to take aspirin for DVT prophylaxis.         Physical Exam:     NAD  AOx3  Interactive and cooperative with the exam.      The lateral incision is clean dry and intact and well-healed. There was some bloody saturation on the medial bandage. However after the bandage was removed there was no active drainage. There was a 1 cm area in the midportion of the incision that had read fibrinous healing. There is no surrounding redness or drainage.  Motor: GS/AT are intact  Sensation: intact diffusely throughout the foot.  No pain with passive range of motion of the operative extremity.         Imaging:      No new imaging.       Assessment and Plan:      Delvin is now approximately 2 weeks out following his hardware removal and irrigation and debridement of the right tibia. His lateral incision is healing well. There is a small area of fibrinous healing on the medial incision, which we'll keep a close eye on. Delvin will return to clinic next week. If the incision  continues to heal well we'll consider removal sutures. Otherwise we'll continue to follow him closely. He'll remain on the IV cefazolin until September 22 and possibly transition to oral antibiotics after that. In the meantime he can begin doing weightbearing as tolerated. We discussed that if he is having pain he should back off and rely more on his cane. As noted I will see him back in 1 week for a wound check.  He will continue taking 81 mg of aspirin twice a day for DVT prophylaxis at least until his next visit.    Kenroy Dos Santos M.D.     Arthritis and Joint Replacement  Department of Orthopaedic Surgery, [facility]  [email or web address]  [phone] (pager)                Follow-ups after your visit        Your next 10 appointments already scheduled     Aug 30, 2017  6:00 PM CDT   (Arrive by 5:45 PM)   Post-Op with Kenroy Dos Santos MD   [facility] ([facility])    [address]  [address] [facility] [address] [phone]            Aug 31, 2017 10:00 AM CDT   New Visit with Arvin Ennis MD   [facility] [place], Infectious Disease ([facility])    [address] [place] [address]  [address]  [facility] [address] [phone]              Who to contact     Please call your clinic at [phone] to:    Ask questions about your health    Make or cancel appointments    Discuss your medicines    Learn about your test results    Speak to your doctor   If you have compliments or concerns about an experience at your clinic, or if you wish to file a complaint, please contact [facility] Patient Relations at [phone] or email us at [email or web address]         Additional Information About Your Visit        [employer] Information     [employer] is an electronic gateway that provides easy, online access to your medical records. With [employer], you can request a clinic  "appointment, read your test results, renew a prescription or communicate with your care team.     To sign up for [employer] visit the website at [email or web address]   You will be asked to enter the access code listed below, as well as some personal information. Please follow the directions to create your username and password.     Your access code is: [email or web address]  Expires: 10/8/2017  5:49 PM     Your access code will  in 90 days. If you need help or a new code, please contact your [facility] or call [phone] for assistance.        Care EveryWhere ID     This is your Care EveryWhere ID. This could be used by other organizations to access your [place] medical records  [id number]        Your Vitals Were     Height BMI (Body Mass Index)                1.803 m (5' 11\") 20.78 kg/m2           Blood Pressure from Last 3 Encounters:   17 141/77   08/15/17 146/90   08/10/17 (!) 152/97    Weight from Last 3 Encounters:   17 67.6 kg (149 lb)   17 63 kg (139 lb)   08/10/17 66.7 kg (147 lb)              Today, you had the following     No orders found for display       Primary Care Provider Office Phone # Fax #    Krystian Streeter [phone]       [address] [address]        Equal Access to Services     [facility]IL[facility] AH: Hadii aad ku hadasho Soomaali, waaxda luqadaha, qaybta kaalmada adeegyada, charleen zambrano . So [facility] [phone].    ATENCIÓN: Si habla español, tiene a alcazar disposición servicios gratuitos de asistencia lingüística. Llame al [phone].    We comply with applicable federal civil rights laws and [place] laws. We do not discriminate on the basis of race, color, national origin, age, disability sex, sexual orientation or gender identity.            Thank you!     Thank you for choosing [facility]  for your care. Our goal is always to provide you with " excellent care. Hearing back from our patients is one way we can continue to improve our services. Please take a few minutes to complete the written survey that you may receive in the mail after your visit with us. Thank you!             Your Updated Medication List - Protect others around you: Learn how to safely use, store and throw away your medicines at www.disposemymeds.org.          This list is accurate as of: 8/23/17  5:41 PM.  Always use your most recent med list.                   Brand Name Dispense Instructions for use Diagnosis    acetaminophen 325 MG tablet    TYLENOL    100 tablet    Take 3 tablets (975 mg) by mouth every 8 hours    Tibial plateau fracture, right, closed, initial encounter       amLODIPine 5 MG tablet    NORVASC    30 tablet    Take 1 tablet (5 mg) by mouth daily Hold for systolic pressure < 110. Facility MD to titrate as needed. This is a new med for the patient.    Essential hypertension, benign       aspirin 81 MG chewable tablet     60 tablet    Take 1 tablet (81 mg) by mouth 2 times daily    Cellulitis and abscess of leg       ceFAZolin sodium-dextrose 2-4 GM/100ML-% Soln infusion    ANCEF     Inject 100 mLs (2 g) into the vein every 8 hours Draw weekly cbc/diff, crp,esr, cmp, and send to Dr Ennis/Le[place] [phone].    Cellulitis and abscess of leg       cyclobenzaprine 10 MG tablet    FLEXERIL    75 tablet    Take 1 tablet (10 mg) by mouth 3 times daily    Pain of right lower extremity       gabapentin 300 MG capsule    NEURONTIN    75 capsule    Take 1 capsule (300 mg) by mouth 3 times daily    Pain of right lower extremity       HYDROCERIN Lotn lotion      Apply topically daily        hydrocortisone 1 % cream    CORTAID     Apply topically 2 times daily        melatonin 1 MG Tabs tablet     30 tablet    Take 1 tablet (1 mg) by mouth nightly as needed for sleep    Cellulitis and abscess of leg       mineral oil-hydrophilic petrolatum     99 g    Apply topically At Bedtime     Atopic dermatitis, unspecified type       oxyCODONE 5 MG IR tablet    ROXICODONE    65 tablet    For pain complaints of 1-5 take 1 tablet, (5 mg) for pain complaints of 6-10 take 2 tablets ( 10 mg) every 4 hours as needed for pain.    Cellulitis and abscess of leg       senna-docusate 8.6-50 MG per tablet    SENOKOT-S;PERICOLACE    100 tablet    Take 1-2 tablets by mouth 2 times daily    Cellulitis and abscess of leg       triamcinolone 0.1 % cream    KENALOG    15 g    Apply topically 2 times daily    Atopic dermatitis, unspecified type

## 2017-08-23 NOTE — LETTER
8/23/2017       RE: Delvin Echevarria  [address] [place] [address]     Dear Colleague,    Thank you for referring your patient, Delvin Echevarria, to the [facility] at [facility]. Please see a copy of my visit note below.         Interval History:   Delvin Echevarria is a 52 year old male who is here follow up for:   Right tibial hardware removal and I/D - 8/11/2017  Cultures positive for MSSA - on 2g cefazolin q8 hours through 9/22/17    He has been at a transitional care unit since discharge from the hospital and is on his IV antibiotics with the PICC line. He has not had any ongoing left tibial pain. He has been doing weightbearing as tolerated with no tibial pain. He has maintained the same dressings from the time of surgery and has not had any noted wound drainage. He continues to take aspirin for DVT prophylaxis.         Physical Exam:   NAD  AOx3  Interactive and cooperative with the exam.    The lateral incision is clean dry and intact and well-healed. There was some bloody saturation on the medial bandage. However after the bandage was removed there was no active drainage. There was a 1 cm area in the midportion of the incision that had read fibrinous healing. There is no surrounding redness or drainage.  Motor: GS/AT are intact  Sensation: intact diffusely throughout the foot.  No pain with passive range of motion of the operative extremity.       Imaging:      No new imaging.       Assessment and Plan:    Delvin is now approximately 2 weeks out following his hardware removal and irrigation and debridement of the right tibia. His lateral incision is healing well. There is a small area of fibrinous healing on the medial incision, which we'll keep a close eye on. Delvin will return to clinic next week. If the incision continues to heal well we'll consider removal sutures. Otherwise we'll continue to follow him closely. He'll remain on the IV  cefazolin until September 22 and possibly transition to oral antibiotics after that. In the meantime he can begin doing weightbearing as tolerated. We discussed that if he is having pain he should back off and rely more on his cane. As noted I will see him back in 1 week for a wound check.  He will continue taking 81 mg of aspirin twice a day for DVT prophylaxis at least until his next visit.    Kenroy Dos Santos M.D.     Arthritis and Joint Replacement  Department of Orthopaedic Surgery, [facility]  [email or web address]  [phone] (pager)

## 2017-08-23 NOTE — PROGRESS NOTES
Interval History:   Delvin Echevarria is a 52 year old male who is here follow up for:   Right tibial hardware removal and I/D - 8/11/2017  Cultures positive for MSSA - on 2g cefazolin q8 hours through 9/22/17    He has been at a transitional care unit since discharge from the hospital and is on his IV antibiotics with the PICC line. He has not had any ongoing left tibial pain. He has been doing weightbearing as tolerated with no tibial pain. He has maintained the same dressings from the time of surgery and has not had any noted wound drainage. He continues to take aspirin for DVT prophylaxis.         Physical Exam:     NAD  AOx3  Interactive and cooperative with the exam.      The lateral incision is clean dry and intact and well-healed. There was some bloody saturation on the medial bandage. However after the bandage was removed there was no active drainage. There was a 1 cm area in the midportion of the incision that had read fibrinous healing. There is no surrounding redness or drainage.  Motor: GS/AT are intact  Sensation: intact diffusely throughout the foot.  No pain with passive range of motion of the operative extremity.         Imaging:      No new imaging.       Assessment and Plan:      Delvin is now approximately 2 weeks out following his hardware removal and irrigation and debridement of the right tibia. His lateral incision is healing well. There is a small area of fibrinous healing on the medial incision, which we'll keep a close eye on. Delvin will return to clinic next week. If the incision continues to heal well we'll consider removal sutures. Otherwise we'll continue to follow him closely. He'll remain on the IV cefazolin until September 22 and possibly transition to oral antibiotics after that. In the meantime he can begin doing weightbearing as tolerated. We discussed that if he is having pain he should back off and rely more on his cane. As noted I will see him back in 1 week for a wound  check.  He will continue taking 81 mg of aspirin twice a day for DVT prophylaxis at least until his next visit.    Kenroy Dos Santos M.D.     Arthritis and Joint Replacement  Department of Orthopaedic Surgery, [facility]  [email or web address]  [phone] (pager)

## 2017-08-23 NOTE — PATIENT INSTRUCTIONS
Interval History:   Delvin Echevarria is a 52 year old male who is here follow up for:   Right tibial hardware removal and I/D - 8/11/2017  Cultures positive for MSSA - on 2g cefazolin q8 hours through 9/22/17    He has been at a transitional care unit since discharge from the hospital and is on his IV antibiotics with the PICC line. He has not had any ongoing left tibial pain. He has been doing weightbearing as tolerated with no tibial pain. He has maintained the same dressings from the time of surgery and has not had any noted wound drainage. He continues to take aspirin for DVT prophylaxis.         Physical Exam:     NAD  AOx3  Interactive and cooperative with the exam.      The lateral incision is clean dry and intact and well-healed. There was some bloody saturation on the medial bandage. However after the bandage was removed there was no active drainage. There was a 1 cm area in the midportion of the incision that had read fibrinous healing. There is no surrounding redness or drainage.  Motor: GS/AT are intact  Sensation: intact diffusely throughout the foot.  No pain with passive range of motion of the operative extremity.         Imaging:      No new imaging.       Assessment and Plan:      Delvin is now approximately 2 weeks out following his hardware removal and irrigation and debridement of the right tibia. His lateral incision is healing well. There is a small area of fibrinous healing on the medial incision, which we'll keep a close eye on. Delvin will return to clinic next week. If the incision continues to heal well we'll consider removal sutures. Otherwise we'll continue to follow him closely. He'll remain on the IV cefazolin until September 22 and possibly transition to oral antibiotics after that. In the meantime he can begin doing weightbearing as tolerated. We discussed that if he is having pain he should back off and rely more on his cane. As noted I will see him back in 1 week for a wound  check.  He will continue taking 81 mg of aspirin twice a day for DVT prophylaxis at least until his next visit.    Kenroy Dos Santos M.D.     Arthritis and Joint Replacement  Department of Orthopaedic Surgery, [facility]  [email or web address]  [phone] (pager)

## 2017-08-24 ENCOUNTER — HOSPITAL LABORATORY (OUTPATIENT)
Facility: OTHER | Age: 52
End: 2017-08-24

## 2017-08-24 ENCOUNTER — NURSING HOME VISIT (OUTPATIENT)
Dept: GERIATRICS | Facility: CLINIC | Age: 52
End: 2017-08-24
Payer: COMMERCIAL

## 2017-08-24 ENCOUNTER — TRANSFERRED RECORDS (OUTPATIENT)
Dept: HEALTH INFORMATION MANAGEMENT | Facility: CLINIC | Age: 52
End: 2017-08-24

## 2017-08-24 VITALS
BODY MASS INDEX: 20.6 KG/M2 | SYSTOLIC BLOOD PRESSURE: 130 MMHG | WEIGHT: 147.7 LBS | RESPIRATION RATE: 18 BRPM | HEART RATE: 66 BPM | TEMPERATURE: 96.2 F | OXYGEN SATURATION: 96 % | DIASTOLIC BLOOD PRESSURE: 74 MMHG

## 2017-08-24 DIAGNOSIS — T84.7XXS INFECTED HARDWARE IN RIGHT LEG, SEQUELA: ICD-10-CM

## 2017-08-24 DIAGNOSIS — Z98.890 STATUS POST INCISION AND DRAINAGE: Primary | ICD-10-CM

## 2017-08-24 LAB
ALBUMIN SERPL-MCNC: 3.4 G/DL (ref 3.4–5)
ALP SERPL-CCNC: 87 U/L (ref 40–150)
ALT SERPL W P-5'-P-CCNC: 29 U/L (ref 0–70)
ANION GAP SERPL CALCULATED.3IONS-SCNC: 5 MMOL/L (ref 3–14)
AST SERPL W P-5'-P-CCNC: 40 U/L (ref 0–45)
BASOPHILS # BLD AUTO: 0.1 10E9/L (ref 0–0.2)
BASOPHILS NFR BLD AUTO: 1.1 %
BILIRUB SERPL-MCNC: 0.2 MG/DL (ref 0.2–1.3)
BUN SERPL-MCNC: 15 MG/DL (ref 7–30)
CALCIUM SERPL-MCNC: 8.9 MG/DL (ref 8.5–10.1)
CHLORIDE SERPL-SCNC: 107 MMOL/L (ref 94–109)
CO2 SERPL-SCNC: 27 MMOL/L (ref 20–32)
CREAT SERPL-MCNC: 0.7 MG/DL (ref 0.66–1.25)
CRP SERPL-MCNC: <2.9 MG/L (ref 0–8)
DIFFERENTIAL METHOD BLD: ABNORMAL
EOSINOPHIL # BLD AUTO: 0.7 10E9/L (ref 0–0.7)
EOSINOPHIL NFR BLD AUTO: 8.2 %
ERYTHROCYTE [DISTWIDTH] IN BLOOD BY AUTOMATED COUNT: 14.1 % (ref 10–15)
ERYTHROCYTE [SEDIMENTATION RATE] IN BLOOD BY WESTERGREN METHOD: 31 MM/H (ref 0–20)
GFR SERPL CREATININE-BSD FRML MDRD: >90 ML/MIN/1.7M2
GLUCOSE SERPL-MCNC: 75 MG/DL (ref 70–99)
HCT VFR BLD AUTO: 36.8 % (ref 40–53)
HGB BLD-MCNC: 12.2 G/DL (ref 13.3–17.7)
IMM GRANULOCYTES # BLD: 0 10E9/L (ref 0–0.4)
IMM GRANULOCYTES NFR BLD: 0.2 %
LYMPHOCYTES # BLD AUTO: 2.5 10E9/L (ref 0.8–5.3)
LYMPHOCYTES NFR BLD AUTO: 27.7 %
MCH RBC QN AUTO: 30.3 PG (ref 26.5–33)
MCHC RBC AUTO-ENTMCNC: 33.2 G/DL (ref 31.5–36.5)
MCV RBC AUTO: 92 FL (ref 78–100)
MONOCYTES # BLD AUTO: 0.6 10E9/L (ref 0–1.3)
MONOCYTES NFR BLD AUTO: 6.7 %
NEUTROPHILS # BLD AUTO: 5 10E9/L (ref 1.6–8.3)
NEUTROPHILS NFR BLD AUTO: 56.1 %
PLATELET # BLD AUTO: 395 10E9/L (ref 150–450)
POTASSIUM SERPL-SCNC: 4.4 MMOL/L (ref 3.4–5.3)
PROT SERPL-MCNC: 7.6 G/DL (ref 6.8–8.8)
RBC # BLD AUTO: 4.02 10E12/L (ref 4.4–5.9)
SODIUM SERPL-SCNC: 139 MMOL/L (ref 133–144)
WBC # BLD AUTO: 8.9 10E9/L (ref 4–11)

## 2017-08-24 PROCEDURE — 99308 SBSQ NF CARE LOW MDM 20: CPT | Performed by: NURSE PRACTITIONER

## 2017-08-24 NOTE — PROGRESS NOTES
[facility]    Chief Complaint   Patient presents with     Nursing Home Acute       HPI:    Delvin Echevarria is a 52 year old  (1965), who is being seen today for an episodic care visit at [facility].    HPI information obtained from: facility chart records, facility staff and patient report. Today's concern is:  Status post incision and drainage  Infected hardware in right leg, sequela  Continues on IV antibiotics through PICC line.  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  Concerned re:  Exposure, cleanliness, effects on healing.  Patient offers no c/o.  Is pleased with healing.  Continues with rehab      REVIEW OF SYSTEMS:  4 point ROS including Respiratory, CV, GI and , other than that noted in the HPI,  is negative    /74  Pulse 66  Temp 96.2  F (35.7  C)  Resp 18  Wt 147 lb 11.2 oz (67 kg)  SpO2 96%  BMI 20.6 kg/m2  GENERAL APPEARANCE:  Alert, in no distress  RESP:  respiratory effort and palpation of chest normal, auscultation of lungs clear , no respiratory distress  CV:  Palpation and auscultation of heart done , rate and rhythm regular, no murmur, minimal peripheral edema right foot/ankle  ABDOMEN:  normal bowel sounds, soft, nontender, no hepatosplenomegaly or other masses  M/S:   Gait and station abnormal--using cane  SKIN:  Lateral incision CDI, healing well.  Medial incision--healing well, 1cm area in middle of incision with red fibrinous healing.  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 The current medical regimen is effective;  continue present plan and medications. Pharmacy to manage dosing and labs  Ortho follow up appt next week        Tessie LY  XIN Thomson CNP

## 2017-08-30 ENCOUNTER — OFFICE VISIT (OUTPATIENT)
Dept: ORTHOPEDICS | Facility: CLINIC | Age: 52
End: 2017-08-30

## 2017-08-30 VITALS — WEIGHT: 149 LBS | BODY MASS INDEX: 20.86 KG/M2 | HEIGHT: 71 IN

## 2017-08-30 DIAGNOSIS — S82.201D CLOSED FRACTURE OF RIGHT TIBIA AND FIBULA WITH ROUTINE HEALING: Primary | ICD-10-CM

## 2017-08-30 DIAGNOSIS — S82.401D CLOSED FRACTURE OF RIGHT TIBIA AND FIBULA WITH ROUTINE HEALING: Primary | ICD-10-CM

## 2017-08-30 NOTE — MR AVS SNAPSHOT
After Visit Summary   8/30/2017    Delvin Echevarria    MRN: [id number]           Patient Information     Date Of Birth          1965        Visit Information        Provider Department      8/30/2017 6:00 PM Kenroy Dos Santos MD [facility]        Today's Diagnoses     Closed fracture of right tibia and fibula with routine healing    -  1      Care Instructions         Interval History:   Delvin Echevarria is a 52 year old male who is here follow up for:   Right tibial hardware removal and I/D - 8/11/2017  Cultures positive for MSSA - on 2g cefazolin q8 hours through 9/22/17    Delvin returns today for wound check. Since he was seen last week, he notes no ongoing issues. He states he has been doing aggressive physical therapy. He has been doing activities like going to the [employer]. He notes that the pain in his leg feels much better in the morning. He feels like once he gets up and walks around the pain gets E worse and is worse towards the end of the day. He has kept the bandage that we put on at the last visit in place. There has not been significant drainage or any drainage seeping through the bandage. Otherwise he feels well and continues to receive the cefazolin as noted above.         Physical Exam:     NAD  AOx3  Interactive and cooperative with the exam.      The lateral incision is clean dry and intact and well-healed.  The sutures were removed and Steri-Strips are placed.    The bandage on the medial aspect was removed. There is some dried blood that had formed since his last wound check 1 week ago. Otherwise the same bandages in place. There is no ongoing drainage. There is a 1 cm area in the midportion incision with progressively healing read fibrinous tissue. There is also a area of about 5 mm the distal portion that is fibrinous tissue. Otherwise the incision remains well approximated and appears to be healing. There is no fluctuance or any active drainage or  surrounding redness.           Imaging:      No new imaging.       Assessment and Plan:      In general, appears making good progress. I think that he has been overdoing it some with physical therapy. I would have him hold off on physical therapy for the next week. He can continue to progress with weightbearing as tolerated with a cane. However we discussed the importance of elevating his leg. He should minimize his activities of walking on the leg. He should not do activities like mowing the lawn or going to the state fair. For the most part he should continue with icing and leg elevation to minimize swelling and assist with healing. Otherwise he will remain on the aspirin for DVT prophylaxis. He will also continue the cefazolin as ordered. He'll return to clinic next week for repeat wound check. He contact us in the meantime if she has any issues or concerns.    Kenroy Dos Santos M.D.     Arthritis and Joint Replacement  Department of Orthopaedic Surgery, [facility]  [email or web address]  [phone] (pager)          Follow-ups after your visit        Your next 10 appointments already scheduled     Aug 31, 2017 10:00 AM CDT   New Visit with Arvin Ennis MD   [facility] [place], Infectious Disease ([facility])    [address] [place] [id number] [phone]            Sep 06, 2017 10:45 AM CDT   (Arrive by 10:30 AM)   Post-Op with Kenroy Dos Santos MD   [facility] ([facility])    [address]  4th [address]  [facility] [address] [phone]              Who to contact     Please call your clinic at [phone] to:    Ask questions about your health    Make or cancel appointments    Discuss your medicines    Learn about your test results    Speak to your doctor   If you have compliments or concerns about an experience at your clinic, or if you wish to file a  "complaint, please contact [facility] Patient Relations at [phone] or email us at [email or web address]         Additional Information About Your Visit        [employer] Information     [employer] is an electronic gateway that provides easy, online access to your medical records. With [employer], you can request a clinic appointment, read your test results, renew a prescription or communicate with your care team.     To sign up for [employer] visit the website at [email or web address]   You will be asked to enter the access code listed below, as well as some personal information. Please follow the directions to create your username and password.     Your access code is: [email or web address]  Expires: 10/8/2017  5:49 PM     Your access code will  in 90 days. If you need help or a new code, please contact your [facility] or call [phone] for assistance.        Care EveryWhere ID     This is your Care EveryWhere ID. This could be used by other organizations to access your [facility] medical records  [id number]        Your Vitals Were     Height BMI (Body Mass Index)                1.803 m (5' 11\") 20.78 kg/m2           Blood Pressure from Last 3 Encounters:   17 130/74   17 141/77   08/15/17 146/90    Weight from Last 3 Encounters:   17 67.6 kg (149 lb)   17 67 kg (147 lb 11.2 oz)   17 67.6 kg (149 lb)              Today, you had the following     No orders found for display       Primary Care Provider Office Phone # Fax #    Krystian Streeter [phone]       [address] [id number]        Equal Access to Services     GAYLA CRAWFORD : Zainab Bills, fuad may, varsha kaalmada abram, charleen coleman. So [facility] [phone].    ATENCIÓN: Si habla español, tiene a alcazar disposición servicios gratuitos de asistencia lingüística. Llame " al [phone].    We comply with applicable federal civil rights laws and [place] laws. We do not discriminate on the basis of race, color, national origin, age, disability sex, sexual orientation or gender identity.            Thank you!     Thank you for choosing [facility]  for your care. Our goal is always to provide you with excellent care. Hearing back from our patients is one way we can continue to improve our services. Please take a few minutes to complete the written survey that you may receive in the mail after your visit with us. Thank you!             Your Updated Medication List - Protect others around you: Learn how to safely use, store and throw away your medicines at www.disposemymeds.org.          This list is accurate as of: 8/30/17  6:08 PM.  Always use your most recent med list.                   Brand Name Dispense Instructions for use Diagnosis    acetaminophen 325 MG tablet    TYLENOL    100 tablet    Take 3 tablets (975 mg) by mouth every 8 hours    Tibial plateau fracture, right, closed, initial encounter       amLODIPine 5 MG tablet    NORVASC    30 tablet    Take 1 tablet (5 mg) by mouth daily Hold for systolic pressure < 110. Facility MD to titrate as needed. This is a new med for the patient.    Essential hypertension, benign       aspirin 81 MG chewable tablet     60 tablet    Take 1 tablet (81 mg) by mouth 2 times daily    Cellulitis and abscess of leg       ceFAZolin sodium-dextrose 2-4 GM/100ML-% Soln infusion    ANCEF     Inject 100 mLs (2 g) into the vein every 8 hours Draw weekly cbc/diff, crp,esr, cmp, and send to Dr Ennis/Leuck [phone].    Cellulitis and abscess of leg       cyclobenzaprine 10 MG tablet    FLEXERIL    75 tablet    Take 1 tablet (10 mg) by mouth 3 times daily    Pain of right lower extremity       gabapentin 300 MG capsule    NEURONTIN    75 capsule    Take 1 capsule (300 mg) by mouth 3 times daily    Pain of right lower extremity        HYDROCERIN Lotn lotion      Apply topically daily        hydrocortisone 1 % cream    CORTAID     Apply topically 2 times daily        melatonin 1 MG Tabs tablet     30 tablet    Take 1 tablet (1 mg) by mouth nightly as needed for sleep    Cellulitis and abscess of leg       mineral oil-hydrophilic petrolatum     99 g    Apply topically At Bedtime    Atopic dermatitis, unspecified type       oxyCODONE 5 MG IR tablet    ROXICODONE    65 tablet    For pain complaints of 1-5 take 1 tablet, (5 mg) for pain complaints of 6-10 take 2 tablets ( 10 mg) every 4 hours as needed for pain.    Cellulitis and abscess of leg       senna-docusate 8.6-50 MG per tablet    SENOKOT-S;PERICOLACE    100 tablet    Take 1-2 tablets by mouth 2 times daily    Cellulitis and abscess of leg       triamcinolone 0.1 % cream    KENALOG    15 g    Apply topically 2 times daily    Atopic dermatitis, unspecified type

## 2017-08-30 NOTE — NURSING NOTE
"Reason For Visit:   Chief Complaint   Patient presents with     Surgical Followup     DOS 8/11/17 S/P Irrigation and Debridement Right Tibia,  Removal of Hardware       Primary MD: Krystian Streeter      Ht 1.803 m (5' 11\")  Wt 67.6 kg (149 lb)  BMI 20.78 kg/m2    Pain Assessment  Patient Currently in Pain: Yes  0-10 Pain Scale: 2  Primary Pain Location: Leg  Pain Orientation: Right, Lower  Pain Descriptors: Discomfort, Sore      Current Outpatient Prescriptions   Medication     senna-docusate (SENOKOT-S;PERICOLACE) 8.6-50 MG per tablet     melatonin 1 MG TABS tablet     oxyCODONE (ROXICODONE) 5 MG IR tablet     aspirin 81 MG chewable tablet     amLODIPine (NORVASC) 5 MG tablet     ceFAZolin sodium-dextrose (ANCEF) 2-4 GM/100ML-% SOLN infusion     gabapentin (NEURONTIN) 300 MG capsule     cyclobenzaprine (FLEXERIL) 10 MG tablet     hydrocortisone (CORTAID) 1 % cream     Skin Protectants, Misc. (HYDROCERIN) LOTN     triamcinolone (KENALOG) 0.1 % cream     mineral oil-hydrophilic petrolatum (AQUAPHOR) ointment     acetaminophen (TYLENOL) 325 MG tablet     No current facility-administered medications for this visit.           Allergies   Allergen Reactions     Seasonal Allergies Other (See Comments)     Congestion sinuses     Codeine Nausea     No Known Drug Allergies      "

## 2017-08-30 NOTE — PATIENT INSTRUCTIONS
Interval History:   Delvin Echevarria is a 52 year old male who is here follow up for:   Right tibial hardware removal and I/D - 8/11/2017  Cultures positive for MSSA - on 2g cefazolin q8 hours through 9/22/17    Delvin returns today for wound check. Since he was seen last week, he notes no ongoing issues. He states he has been doing aggressive physical therapy. He has been doing activities like going to the state [employer]. He notes that the pain in his leg feels much better in the morning. He feels like once he gets up and walks around the pain gets E worse and is worse towards the end of the day. He has kept the bandage that we put on at the last visit in place. There has not been significant drainage or any drainage seeping through the bandage. Otherwise he feels well and continues to receive the cefazolin as noted above.         Physical Exam:     NAD  AOx3  Interactive and cooperative with the exam.      The lateral incision is clean dry and intact and well-healed.  The sutures were removed and Steri-Strips are placed.    The bandage on the medial aspect was removed. There is some dried blood that had formed since his last wound check 1 week ago. Otherwise the same bandages in place. There is no ongoing drainage. There is a 1 cm area in the midportion incision with progressively healing read fibrinous tissue. There is also a area of about 5 mm the distal portion that is fibrinous tissue. Otherwise the incision remains well approximated and appears to be healing. There is no fluctuance or any active drainage or surrounding redness.           Imaging:      No new imaging.       Assessment and Plan:      In general, appears making good progress. I think that he has been overdoing it some with physical therapy. I would have him hold off on physical therapy for the next week. He can continue to progress with weightbearing as tolerated with a cane. However we discussed the importance of elevating his leg. He should  minimize his activities of walking on the leg. He should not do activities like mowing the lawn or going to the state fair. For the most part he should continue with icing and leg elevation to minimize swelling and assist with healing. Otherwise he will remain on the aspirin for DVT prophylaxis. He will also continue the cefazolin as ordered. He'll return to clinic next week for repeat wound check. He contact us in the meantime if she has any issues or concerns.    Kenroy Dos Santos M.D.     Arthritis and Joint Replacement  Department of [facility], [facility]  [email or web address]  [phone] (pager)

## 2017-08-30 NOTE — PROGRESS NOTES
Interval History:   Delvin Echevarria is a 52 year old male who is here follow up for:   Right tibial hardware removal and I/D - 8/11/2017  Cultures positive for MSSA - on 2g cefazolin q8 hours through 9/22/17    Delvin returns today for wound check. Since he was seen last week, he notes no ongoing issues. He states he has been doing aggressive physical therapy. He has been doing activities like going to the state [employer]. He notes that the pain in his leg feels much better in the morning. He feels like once he gets up and walks around the pain gets E worse and is worse towards the end of the day. He has kept the bandage that we put on at the last visit in place. There has not been significant drainage or any drainage seeping through the bandage. Otherwise he feels well and continues to receive the cefazolin as noted above.         Physical Exam:     NAD  AOx3  Interactive and cooperative with the exam.      The lateral incision is clean dry and intact and well-healed.  The sutures were removed and Steri-Strips are placed.    The bandage on the medial aspect was removed. There is some dried blood that had formed since his last wound check 1 week ago. Otherwise the same bandages in place. There is no ongoing drainage. There is a 1 cm area in the midportion incision with progressively healing read fibrinous tissue. There is also a area of about 5 mm the distal portion that is fibrinous tissue. Otherwise the incision remains well approximated and appears to be healing. There is no fluctuance or any active drainage or surrounding redness.           Imaging:      No new imaging.       Assessment and Plan:      In general, appears making good progress. I think that he has been overdoing it some with physical therapy. I would have him hold off on physical therapy for the next week. He can continue to progress with weightbearing as tolerated with a cane. However we discussed the importance of elevating his leg. He should  minimize his activities of walking on the leg. He should not do activities like mowing the lawn or going to the state fair. For the most part he should continue with icing and leg elevation to minimize swelling and assist with healing. Otherwise he will remain on the aspirin for DVT prophylaxis. He will also continue the cefazolin as ordered. He'll return to clinic next week for repeat wound check. He contact us in the meantime if she has any issues or concerns.    Kenroy Dos Santos M.D.     Arthritis and Joint Replacement  Department of Orthopaedic Surgery, [facility]  [email or web address]  [phone] (pager)

## 2017-08-30 NOTE — LETTER
8/30/2017       RE: Delvin Echevarria  [address] [place] [address]     Dear Colleague,    Thank you for referring your patient, Delvin Echevarria, to the [facility] at [facility]. Please see a copy of my visit note below.           Interval History:   Delvin Echevarria is a 52 year old male who is here follow up for:   Right tibial hardware removal and I/D - 8/11/2017  Cultures positive for MSSA - on 2g cefazolin q8 hours through 9/22/17    Delvin returns today for wound check. Since he was seen last week, he notes no ongoing issues. He states he has been doing aggressive physical therapy. He has been doing activities like going to the [employer]. He notes that the pain in his leg feels much better in the morning. He feels like once he gets up and walks around the pain gets E worse and is worse towards the end of the day. He has kept the bandage that we put on at the last visit in place. There has not been significant drainage or any drainage seeping through the bandage. Otherwise he feels well and continues to receive the cefazolin as noted above.         Physical Exam:     NAD  AOx3  Interactive and cooperative with the exam.      The lateral incision is clean dry and intact and well-healed.  The sutures were removed and Steri-Strips are placed.    The bandage on the medial aspect was removed. There is some dried blood that had formed since his last wound check 1 week ago. Otherwise the same bandages in place. There is no ongoing drainage. There is a 1 cm area in the midportion incision with progressively healing read fibrinous tissue. There is also a area of about 5 mm the distal portion that is fibrinous tissue. Otherwise the incision remains well approximated and appears to be healing. There is no fluctuance or any active drainage or surrounding redness.           Imaging:      No new imaging.       Assessment and Plan:      In general, appears making  good progress. I think that he has been overdoing it some with physical therapy. I would have him hold off on physical therapy for the next week. He can continue to progress with weightbearing as tolerated with a cane. However we discussed the importance of elevating his leg. He should minimize his activities of walking on the leg. He should not do activities like mowing the lawn or going to the state fair. For the most part he should continue with icing and leg elevation to minimize swelling and assist with healing. Otherwise he will remain on the aspirin for DVT prophylaxis. He will also continue the cefazolin as ordered. He'll return to clinic next week for repeat wound check. He contact us in the meantime if she has any issues or concerns.    Again, thank you for allowing me to participate in the care of your patient.      Sincerely,    Kenroy Dos Santos MD

## 2017-08-31 ENCOUNTER — HOSPITAL LABORATORY (OUTPATIENT)
Facility: OTHER | Age: 52
End: 2017-08-31

## 2017-08-31 ENCOUNTER — OFFICE VISIT (OUTPATIENT)
Dept: CT IMAGING | Facility: CLINIC | Age: 52
End: 2017-08-31
Attending: INTERNAL MEDICINE
Payer: COMMERCIAL

## 2017-08-31 VITALS
OXYGEN SATURATION: 99 % | DIASTOLIC BLOOD PRESSURE: 82 MMHG | HEART RATE: 65 BPM | BODY MASS INDEX: 20.89 KG/M2 | SYSTOLIC BLOOD PRESSURE: 121 MMHG | WEIGHT: 149.8 LBS | TEMPERATURE: 97.9 F

## 2017-08-31 DIAGNOSIS — T84.7XXD: Primary | ICD-10-CM

## 2017-08-31 LAB
ANION GAP SERPL CALCULATED.3IONS-SCNC: 4 MMOL/L (ref 3–14)
ANION GAP SERPL CALCULATED.3IONS-SCNC: 8 MMOL/L (ref 3–14)
BASOPHILS # BLD AUTO: 0.1 10E9/L (ref 0–0.2)
BASOPHILS # BLD AUTO: 0.1 10E9/L (ref 0–0.2)
BASOPHILS NFR BLD AUTO: 0.9 %
BASOPHILS NFR BLD AUTO: 1 %
BUN SERPL-MCNC: 15 MG/DL (ref 7–30)
BUN SERPL-MCNC: 17 MG/DL (ref 7–30)
CALCIUM SERPL-MCNC: 8.5 MG/DL (ref 8.5–10.1)
CALCIUM SERPL-MCNC: 8.8 MG/DL (ref 8.5–10.1)
CHLORIDE SERPL-SCNC: 107 MMOL/L (ref 94–109)
CHLORIDE SERPL-SCNC: 107 MMOL/L (ref 94–109)
CO2 SERPL-SCNC: 26 MMOL/L (ref 20–32)
CO2 SERPL-SCNC: 27 MMOL/L (ref 20–32)
CREAT SERPL-MCNC: 0.63 MG/DL (ref 0.66–1.25)
CREAT SERPL-MCNC: 0.73 MG/DL (ref 0.66–1.25)
CRP SERPL-MCNC: <2.9 MG/L (ref 0–8)
CRP SERPL-MCNC: <2.9 MG/L (ref 0–8)
DIFFERENTIAL METHOD BLD: ABNORMAL
DIFFERENTIAL METHOD BLD: ABNORMAL
EOSINOPHIL # BLD AUTO: 0.6 10E9/L (ref 0–0.7)
EOSINOPHIL # BLD AUTO: 0.7 10E9/L (ref 0–0.7)
EOSINOPHIL NFR BLD AUTO: 10.1 %
EOSINOPHIL NFR BLD AUTO: 8.1 %
ERYTHROCYTE [DISTWIDTH] IN BLOOD BY AUTOMATED COUNT: 14.7 % (ref 10–15)
ERYTHROCYTE [DISTWIDTH] IN BLOOD BY AUTOMATED COUNT: 14.9 % (ref 10–15)
ERYTHROCYTE [SEDIMENTATION RATE] IN BLOOD BY WESTERGREN METHOD: 21 MM/H (ref 0–20)
ERYTHROCYTE [SEDIMENTATION RATE] IN BLOOD BY WESTERGREN METHOD: 23 MM/H (ref 0–20)
GFR SERPL CREATININE-BSD FRML MDRD: >90 ML/MIN/1.7M2
GFR SERPL CREATININE-BSD FRML MDRD: >90 ML/MIN/1.7M2
GLUCOSE SERPL-MCNC: 101 MG/DL (ref 70–99)
GLUCOSE SERPL-MCNC: 75 MG/DL (ref 70–99)
HCT VFR BLD AUTO: 36.2 % (ref 40–53)
HCT VFR BLD AUTO: 36.7 % (ref 40–53)
HGB BLD-MCNC: 11.9 G/DL (ref 13.3–17.7)
HGB BLD-MCNC: 12 G/DL (ref 13.3–17.7)
IMM GRANULOCYTES # BLD: 0 10E9/L (ref 0–0.4)
IMM GRANULOCYTES # BLD: 0 10E9/L (ref 0–0.4)
IMM GRANULOCYTES NFR BLD: 0.1 %
IMM GRANULOCYTES NFR BLD: 0.3 %
LYMPHOCYTES # BLD AUTO: 2 10E9/L (ref 0.8–5.3)
LYMPHOCYTES # BLD AUTO: 2.1 10E9/L (ref 0.8–5.3)
LYMPHOCYTES NFR BLD AUTO: 28.6 %
LYMPHOCYTES NFR BLD AUTO: 30.3 %
MCH RBC QN AUTO: 30.1 PG (ref 26.5–33)
MCH RBC QN AUTO: 30.4 PG (ref 26.5–33)
MCHC RBC AUTO-ENTMCNC: 32.7 G/DL (ref 31.5–36.5)
MCHC RBC AUTO-ENTMCNC: 32.9 G/DL (ref 31.5–36.5)
MCV RBC AUTO: 92 FL (ref 78–100)
MCV RBC AUTO: 93 FL (ref 78–100)
MONOCYTES # BLD AUTO: 0.5 10E9/L (ref 0–1.3)
MONOCYTES # BLD AUTO: 0.6 10E9/L (ref 0–1.3)
MONOCYTES NFR BLD AUTO: 7.5 %
MONOCYTES NFR BLD AUTO: 7.7 %
NEUTROPHILS # BLD AUTO: 3.3 10E9/L (ref 1.6–8.3)
NEUTROPHILS # BLD AUTO: 4 10E9/L (ref 1.6–8.3)
NEUTROPHILS NFR BLD AUTO: 50.9 %
NEUTROPHILS NFR BLD AUTO: 54.5 %
NRBC # BLD AUTO: 0 10*3/UL
NRBC BLD AUTO-RTO: 0 /100
PLATELET # BLD AUTO: 282 10E9/L (ref 150–450)
PLATELET # BLD AUTO: 302 10E9/L (ref 150–450)
POTASSIUM SERPL-SCNC: 4.2 MMOL/L (ref 3.4–5.3)
POTASSIUM SERPL-SCNC: 4.3 MMOL/L (ref 3.4–5.3)
RBC # BLD AUTO: 3.91 10E12/L (ref 4.4–5.9)
RBC # BLD AUTO: 3.99 10E12/L (ref 4.4–5.9)
SODIUM SERPL-SCNC: 138 MMOL/L (ref 133–144)
SODIUM SERPL-SCNC: 141 MMOL/L (ref 133–144)
WBC # BLD AUTO: 6.5 10E9/L (ref 4–11)
WBC # BLD AUTO: 7.3 10E9/L (ref 4–11)

## 2017-08-31 PROCEDURE — 80048 BASIC METABOLIC PNL TOTAL CA: CPT | Performed by: INTERNAL MEDICINE

## 2017-08-31 PROCEDURE — 87075 CULTR BACTERIA EXCEPT BLOOD: CPT | Performed by: INTERNAL MEDICINE

## 2017-08-31 PROCEDURE — 85025 COMPLETE CBC W/AUTO DIFF WBC: CPT | Performed by: INTERNAL MEDICINE

## 2017-08-31 PROCEDURE — 36592 COLLECT BLOOD FROM PICC: CPT | Mod: ZF

## 2017-08-31 PROCEDURE — 85652 RBC SED RATE AUTOMATED: CPT | Performed by: INTERNAL MEDICINE

## 2017-08-31 PROCEDURE — 87070 CULTURE OTHR SPECIMN AEROBIC: CPT | Performed by: INTERNAL MEDICINE

## 2017-08-31 PROCEDURE — 86140 C-REACTIVE PROTEIN: CPT | Performed by: INTERNAL MEDICINE

## 2017-08-31 PROCEDURE — 99211 OFF/OP EST MAY X REQ PHY/QHP: CPT | Mod: ZF

## 2017-08-31 PROCEDURE — 87077 CULTURE AEROBIC IDENTIFY: CPT | Performed by: INTERNAL MEDICINE

## 2017-08-31 PROCEDURE — 87106 FUNGI IDENTIFICATION YEAST: CPT | Performed by: INTERNAL MEDICINE

## 2017-08-31 ASSESSMENT — PAIN SCALES - GENERAL: PAINLEVEL: NO PAIN (0)

## 2017-08-31 NOTE — MR AVS SNAPSHOT
After Visit Summary   8/31/2017    Delvin Echevarria    MRN: [id number]           Patient Information     Date Of Birth          1965        Visit Information        Provider Department      8/31/2017 10:00 AM Arvin Ennis MD [facility] [place] [place], Infectious Disease        Today's Diagnoses     Internal orthopedic device with infection or inflammatory reaction, subsequent encounter    -  1       Follow-ups after your visit        Your next 10 appointments already scheduled     Nov 22, 2017  1:00 PM CST   (Arrive by 12:45 PM)   Post-Op with [facility] Nurse   [facility] ([facility])    [address] [place] [id number] [phone]            Dec 19, 2017  3:10 PM CST   (Arrive by 2:55 PM)   POST-OP FOOT/ANKLE with Miguel A Green MD   [facility] ([facility])    [address] [place] [id number] [phone]              Who to contact     Please call your clinic at [phone] to:    Ask questions about your health    Make or cancel appointments    Discuss your medicines    Learn about your test results    Speak to your doctor   If you have compliments or concerns about an experience at your clinic, or if you wish to file a complaint, please contact [facility] Patient Relations at [phone] or email us at [email or web address]         Additional Information About Your Visit        MyChart Information     [employer] is an electronic gateway that provides easy, online access to your medical records. With [employer], you can request a clinic appointment, read your test results, renew a prescription or communicate with your care team.     To sign up for [employer] visit the website at [email or web address]   You will be asked to enter the access code listed below, as well as some personal information. Please follow the  directions to create your username and password.     Your access code is: [email or web address]  Expires: 2018 10:52 AM     Your access code will  in 90 days. If you need help or a new code, please contact your [facility] or call [phone] for assistance.        Care EveryWhere ID     This is your Care EveryWhere ID. This could be used by other organizations to access your [place] medical records  [id number]        Your Vitals Were     Pulse Temperature Pulse Oximetry BMI (Body Mass Index)          65 97.9  F (36.6  C) (Oral) 99% 20.89 kg/m2         Blood Pressure from Last 3 Encounters:   17 140/77   17 (!) 182/116   17 (!) 182/116    Weight from Last 3 Encounters:   17 67.6 kg (149 lb)   17 68 kg (150 lb)   17 68 kg (150 lb)              We Performed the Following     Anaerobic bacterial culture     Basic metabolic panel     CBC with platelets differential     CRP inflammation     ESR: Erythrocyte sedimentation rate     Wound Culture Aerobic Bacterial        Primary Care Provider Office Phone # Fax #    Krystian Streeter [phone]       [address] [address]        Equal Access to Services     GAYLA CRAWFORD AH: Hadii aad ku hadasho Soomaali, waaxda luqadaha, qaybta kaalmada adeegyada, waxay idiin haymichaeln jelani coleman. So [facility] [phone].    ATENCIÓN: Si habla español, tiene a alcazar disposición servicios gratuitos de asistencia lingüística. Llame al [phone].    We comply with applicable federal civil rights laws and [place] laws. We do not discriminate on the basis of race, color, national origin, age, disability, sex, sexual orientation, or gender identity.            Thank you!     Thank you for choosing [facility], INFECTIOUS DISEASE  for your care. Our goal is always to provide you with excellent care. Hearing back from our patients is one way we can continue to improve our  services. Please take a few minutes to complete the written survey that you may receive in the mail after your visit with us. Thank you!             Your Updated Medication List - Protect others around you: Learn how to safely use, store and throw away your medicines at www.disposemymeds.org.          This list is accurate as of: 8/31/17 11:59 PM.  Always use your most recent med list.                   Brand Name Dispense Instructions for use Diagnosis    amLODIPine 5 MG tablet    NORVASC    30 tablet    Take 1 tablet (5 mg) by mouth daily Hold for systolic pressure < 110. Facility MD to titrate as needed. This is a new med for the patient.    Essential hypertension, benign       aspirin 81 MG chewable tablet     60 tablet    Take 1 tablet (81 mg) by mouth 2 times daily    Cellulitis and abscess of leg       ceFAZolin-dextrose 2-4 GM/100ML-% Soln infusion    ANCEF     Inject 100 mLs (2 g) into the vein every 8 hours Draw weekly cbc/diff, crp,esr, cmp, and send to Dr Ennis/Leuck [phone].    Cellulitis and abscess of leg       cyclobenzaprine 10 MG tablet    FLEXERIL    75 tablet    Take 1 tablet (10 mg) by mouth 3 times daily    Pain of right lower extremity       hydrocortisone 1 % cream    CORTAID     Apply topically 2 times daily        oxyCODONE IR 5 MG tablet    ROXICODONE    65 tablet    For pain complaints of 1-5 take 1 tablet, (5 mg) for pain complaints of 6-10 take 2 tablets ( 10 mg) every 4 hours as needed for pain.    Cellulitis and abscess of leg

## 2017-08-31 NOTE — PROGRESS NOTES
[facility]  Infectious diseases clinic  8/31/2017  Posthospital follow-up      Mr. Echevarria had a proximal tibial fracture on the right in the past. He had open reduction and internal fixation. This achieved bone healing. He did well for a prolonged period of time but then presented with swelling and erythema at the hardware site.     Hardware was removed on August 11. Multiple cultures showed Staphylococcus aureus that was sensitive to methicillin. Patient was treated with combination antibiotics initially and then switched to IV Ancef and he has been had in the [facility] receiving antibiotics.    Mr. Colvin saw Dr. Dos Santos yesterday. Stitches were removed from the lateral wound and that looked good. Medial wound had partial removal of stitches and there are couple of moist areas. He is scheduled to see Dr. Dos Santos again early next week.     EXAM: /82  Pulse 65  Temp 97.9  F (36.6  C) (Oral)  Wt 67.9 kg (149 lb 12.8 oz)  SpO2 99%  BMI 20.89 kg/m2  On examination today there is a moist area distally and one proximally involving the medial wound. Culture was taken from both sites with 2 different swabs and these were combined.    Mr Echevarria has also had a problem with his PICC line. It has been pulled a couple of centimeters and was bleeding. He was seen by the PICC nurse here, had his dressing changed and anchor was placed.  The amount of displacement was not considered to be significant.    Plan: We will send labs today. Cultures are pending. I will be out of town when these results return. I will send a note to Dr. Dos Santos to update him on the plan. Hopefully  the cultures can be reviewed when the patient goes back to the orthopedics clinic. Pt will call if the wound changes or becomes inflamed or more painful in the interim.    Total time for the visit was 25 minutes more than half spent in counseling and coordination of care.  Arvin Ennis

## 2017-08-31 NOTE — NURSING NOTE
Labs drawn via picc line and picc dressing changed, including cap, biopatch and tegaderm.  Patient states line was pulled out slightly.  Measures 3cm more than it had been, more than likely tip is still in svc.  Also patient is on ancef which does not require a central line.

## 2017-08-31 NOTE — NURSING NOTE
Visit Reason: Hospital follow up      Evaluation / Assessment: Patient is well, states no pain when sitting but when standing or walking he has some soreness and pain. Vitals taken, allergies and medications reviewed.      Staff Time: 5 minutes      Labs: Yes, drawn by Infusion RN due to PICC line and would culture done by Dr. Ennis and both brought to lab      Procedure ordered? NA      Dressing Change: NA      Vaccine ordered / administered: NA      Other: NA

## 2017-09-02 LAB
BACTERIA SPEC CULT: ABNORMAL
BACTERIA SPEC CULT: ABNORMAL
Lab: ABNORMAL
SPECIMEN SOURCE: ABNORMAL

## 2017-09-05 ENCOUNTER — NURSING HOME VISIT (OUTPATIENT)
Dept: GERIATRICS | Facility: CLINIC | Age: 52
End: 2017-09-05
Payer: COMMERCIAL

## 2017-09-05 VITALS
OXYGEN SATURATION: 93 % | RESPIRATION RATE: 16 BRPM | SYSTOLIC BLOOD PRESSURE: 123 MMHG | TEMPERATURE: 97 F | DIASTOLIC BLOOD PRESSURE: 74 MMHG

## 2017-09-05 DIAGNOSIS — T84.7XXS INFECTED HARDWARE IN RIGHT LEG, SEQUELA: ICD-10-CM

## 2017-09-05 DIAGNOSIS — L30.9 ECZEMA, UNSPECIFIED TYPE: ICD-10-CM

## 2017-09-05 DIAGNOSIS — Z98.890 STATUS POST INCISION AND DRAINAGE: Primary | ICD-10-CM

## 2017-09-05 PROCEDURE — 99308 SBSQ NF CARE LOW MDM 20: CPT | Performed by: NURSE PRACTITIONER

## 2017-09-05 RX ORDER — HYDROCORTISONE 2.5 %
CREAM (GRAM) TOPICAL 2 TIMES DAILY
COMMUNITY
End: 2017-10-27

## 2017-09-05 NOTE — PROGRESS NOTES
"[facility]    Chief Complaint   Patient presents with     Nursing Home Acute       HPI:    Delvin Echevarria is a 52 year old  (1965), who is being seen today for an episodic care visit at [facility].    HPI information obtained from: facility chart records, facility staff and patient report. Today's concern is:  Status post incision and drainage  Infected hardware in right leg, sequela  Staff indicate patient has been walking much--including going to the [facility].  Recent Ortho visit results indicated patient is to hold physical therapy for a week, progress with weightbearing as tolerated with a cane, however, important to elevate leg, minimize activities of walking.  Should continue icing and leg elevation to minimize swelling and assist with healing as one of hte incisions was not healing as well as the other. Patient states he \"does not walk too much\"  Reviewed with patient importance of adherence now for rapid healing and less complications later.  Patient verbalized understanding.  States his pain is 0-1 with rest, elevates to 5-6 after ambulation.    Eczema:  Patient states the 1% cortisone cream is not sufficient for his eczema.  States he used \"2%\" in the past with better results      REVIEW OF SYSTEMS:  4 point ROS including Respiratory, CV, GI and , other than that noted in the HPI,  is negative    /74  Temp 97  F (36.1  C)  Resp 16  SpO2 93%  GENERAL APPEARANCE:  Alert, in no distress  RESP:  respiratory effort and palpation of chest normal, auscultation of lungs clear , no respiratory distress  CV:  Palpation and auscultation of heart done , rate and rhythm regular, no murmur, no peripheral edema  ABDOMEN:  normal bowel sounds, soft, nontender, no hepatosplenomegaly or other masses  M/S:   Gait and station normal  SKIN: lateral incision-dressing had been removed by ortho, healing, CDI, steri strips in place.  Medial incision remains dressed as there were 2 " areas that were not healing as well.  No active drainage noted through dressing.    PSYCH:  insight and judgement, memory WNL , affect and mood normal      ASSESSMENT/PLAN:  Status post incision and drainage  Infected hardware in right leg, sequela  Stressed need for rest, ice, elevation.  Ortho appt tomorrow    Eczema, unspecified type  Will increase dose of  Cortisone cream       Orders:  1.  Ice pack to LE up to every 2hrs PRN for pain.  2.  Patient to rest.  Encourage legs elevated when sitting, rest from walking hourly to facilitate healing.  3.  Hydrocortisone cream 2.5% to hands and feet BID for exzema.  4.  DC Hydrocortisone 1% cream.            XIN Lennon CNP

## 2017-09-06 ENCOUNTER — OFFICE VISIT (OUTPATIENT)
Dept: ORTHOPEDICS | Facility: CLINIC | Age: 52
End: 2017-09-06

## 2017-09-06 VITALS — WEIGHT: 149 LBS | HEIGHT: 71 IN | BODY MASS INDEX: 20.86 KG/M2

## 2017-09-06 DIAGNOSIS — S82.201D CLOSED FRACTURE OF RIGHT TIBIA AND FIBULA WITH ROUTINE HEALING: Primary | ICD-10-CM

## 2017-09-06 DIAGNOSIS — S82.401D CLOSED FRACTURE OF RIGHT TIBIA AND FIBULA WITH ROUTINE HEALING: Primary | ICD-10-CM

## 2017-09-06 NOTE — PROGRESS NOTES
Interval History:   Delvin Echevarria is a 52 year old male who is here follow up for:   Right tibial hardware removal and I/D - 8/11/2017  Cultures positive for MSSA - on 2g cefazolin q8 hours through 9/22/17    He returns today for one week interval wound check. The lateral side remains well-healed. He has not had any issues since last visit. He notes that since he has decreased the amount he has been doing physical therapy and the amount that he is up on his leg his symptoms feel somewhat better. He denies any redness or pain around the medial sided incision. He has not changed the bandage since last week. He continues to tolerate his antibiotics well.         Physical Exam:     NAD  AOx3  Interactive and cooperative with the exam.      The lateral incision remains well-healed.    The medial bandage, which was placed last week and Steri-Strips removed. There is mild dried serous fluid on the bandage. There is no active drainage. The wound appears stable. There is no surrounding redness. The incision is well approximated aside from the portion in the midportion of the incision that measures about 7 cm. There is healthy for venous tissue underlying. He also has a area distally measuring about 5 mm which has healthy fibrinous tissue and appears to be progressively healing.           Imaging:      No new imaging.       Assessment and Plan:      Delvin is making good progress and I think his incision on the medial side continues to heal despite some underlying fibrinous tissue. There is no active drainage and there appears to be noted interval drainage on the bandage since his last visit. I think holding on physical therapy has helped with some of swelling, which is helping the incision improved.  He will return to clinic next week. He will remain on aspirin for DVT prophylaxis. He can continue with weightbearing as tolerated, but he should minimize his activities and walking on the leg. He should hold off on  physical therapy for at least one more week. Contact us in the meantime if there are any questions or concerns or increase in drainage or redness on the medial side.    Kenroy Dos Santos M.D.     Arthritis and Joint Replacement  Department of Orthopaedic Surgery, [facility]  [email or web address]  [phone] (pager)

## 2017-09-06 NOTE — MR AVS SNAPSHOT
After Visit Summary   9/6/2017    Delvin Echevarria    MRN: [id number]           Patient Information     Date Of Birth          1965        Visit Information        Provider Department      9/6/2017 10:45 AM Kenroy Dos Santos MD [facility]        Today's Diagnoses     Closed fracture of right tibia and fibula with routine healing    -  1      Care Instructions           Interval History:   Delvin Echevarria is a 52 year old male who is here follow up for:   Right tibial hardware removal and I/D - 8/11/2017  Cultures positive for MSSA - on 2g cefazolin q8 hours through 9/22/17    He returns today for one week interval wound check. The lateral side remains well-healed. He has not had any issues since last visit. He notes that since he has decreased the amount he has been doing physical therapy and the amount that he is up on his leg his symptoms feel somewhat better. He denies any redness or pain around the medial sided incision. He has not changed the bandage since last week. He continues to tolerate his antibiotics well.         Physical Exam:     NAD  AOx3  Interactive and cooperative with the exam.      The lateral incision remains well-healed.    The medial bandage, which was placed last week and Steri-Strips removed. There is mild dried serous fluid on the bandage. There is no active drainage. The wound appears stable. There is no surrounding redness. The incision is well approximated aside from the portion in the midportion of the incision that measures about 7 cm. There is healthy for venous tissue underlying. He also has a area distally measuring about 5 mm which has healthy fibrinous tissue and appears to be progressively healing.           Imaging:      No new imaging.       Assessment and Plan:      Delvin is making good progress and I think his incision on the medial side continues to heal despite some underlying fibrinous tissue. There is no active drainage and there  appears to be noted interval drainage on the bandage since his last visit. I think holding on physical therapy has helped with some of swelling, which is helping the incision improved.  He will return to clinic next week. He will remain on aspirin for DVT prophylaxis. He can continue with weightbearing as tolerated, but he should minimize his activities and walking on the leg. He should hold off on physical therapy for at least one more week. Contact us in the meantime if there are any questions or concerns or increase in drainage or redness on the medial side.    Kenroy Dos Santos M.D.     Arthritis and Joint Replacement  Department of Orthopaedic Surgery, [facility]  [email or web address]  [phone] (pager)            Follow-ups after your visit        Your next 10 appointments already scheduled     Sep 13, 2017 10:45 AM CDT   (Arrive by 10:30 AM)   Post-Op with Kenroy Dos Santos MD   [facility] ([facility])    [address] [place] [id number] [phone]              Who to contact     Please call your clinic at [phone] to:    Ask questions about your health    Make or cancel appointments    Discuss your medicines    Learn about your test results    Speak to your doctor   If you have compliments or concerns about an experience at your clinic, or if you wish to file a complaint, please contact [facility] Patient Relations at [phone] or email us at [email or web address]         Additional Information About Your Visit        [employer] Information     "[employer]t is an electronic gateway that provides easy, online access to your medical records. With [employer], you can request a clinic appointment, read your test results, renew a prescription or communicate with your care team.     To sign up for "[employer]t visit the website at [email or web address]   You will be asked to  "enter the access code listed below, as well as some personal information. Please follow the directions to create your username and password.     Your access code is: [email or web address]  Expires: 10/8/2017  5:49 PM     Your access code will  in 90 days. If you need help or a new code, please contact your [facility] or call [phone] for assistance.        Care EveryWhere ID     This is your Care EveryWhere ID. This could be used by other organizations to access your [facility] medical records  [id number]        Your Vitals Were     Height BMI (Body Mass Index)                1.803 m (5' 11\") 20.78 kg/m2           Blood Pressure from Last 3 Encounters:   17 123/74   17 121/82   17 130/74    Weight from Last 3 Encounters:   17 67.6 kg (149 lb)   17 67.9 kg (149 lb 12.8 oz)   17 67.6 kg (149 lb)              Today, you had the following     No orders found for display       Primary Care Provider Office Phone # Fax #    Krystian Streeter [phone]       [address] [place] [address]        Equal Access to Services     GAYLA CRAWFORD AH: Hadii aad ku hadasho Soomaali, waaxda luqadaha, qaybta kaalmada adeegyada, waxay louisein hayjaymie zambrano . So [facility] [phone].    ATENCIÓN: Si habla español, tiene a alcazar disposición servicios gratuitos de asistencia lingüística. Llame al [phone].    We comply with applicable federal civil rights laws and [place] laws. We do not discriminate on the basis of race, color, national origin, age, disability sex, sexual orientation or gender identity.            Thank you!     Thank you for choosing [facility]  for your care. Our goal is always to provide you with excellent care. Hearing back from our patients is one way we can continue to improve our services. Please take a few minutes to complete the written survey that you may receive in the mail after " your visit with us. Thank you!             Your Updated Medication List - Protect others around you: Learn how to safely use, store and throw away your medicines at www.disposemymeds.org.          This list is accurate as of: 9/6/17 11:06 AM.  Always use your most recent med list.                   Brand Name Dispense Instructions for use Diagnosis    acetaminophen 325 MG tablet    TYLENOL    100 tablet    Take 3 tablets (975 mg) by mouth every 8 hours    Tibial plateau fracture, right, closed, initial encounter       amLODIPine 5 MG tablet    NORVASC    30 tablet    Take 1 tablet (5 mg) by mouth daily Hold for systolic pressure < 110. Facility MD to titrate as needed. This is a new med for the patient.    Essential hypertension, benign       aspirin 81 MG chewable tablet     60 tablet    Take 1 tablet (81 mg) by mouth 2 times daily    Cellulitis and abscess of leg       ceFAZolin sodium-dextrose 2-4 GM/100ML-% Soln infusion    ANCEF     Inject 100 mLs (2 g) into the vein every 8 hours Draw weekly cbc/diff, crp,esr, cmp, and send to Dr Ennis/Leuck [phone].    Cellulitis and abscess of leg       cyclobenzaprine 10 MG tablet    FLEXERIL    75 tablet    Take 1 tablet (10 mg) by mouth 3 times daily    Pain of right lower extremity       gabapentin 300 MG capsule    NEURONTIN    75 capsule    Take 1 capsule (300 mg) by mouth 3 times daily    Pain of right lower extremity       HYDROCERIN Lotn lotion      Apply topically daily        hydrocortisone 2.5 % cream      Apply topically 2 times daily        melatonin 1 MG Tabs tablet     30 tablet    Take 1 tablet (1 mg) by mouth nightly as needed for sleep    Cellulitis and abscess of leg       mineral oil-hydrophilic petrolatum     99 g    Apply topically At Bedtime    Atopic dermatitis, unspecified type       oxyCODONE 5 MG IR tablet    ROXICODONE    65 tablet    For pain complaints of 1-5 take 1 tablet, (5 mg) for pain complaints of 6-10 take 2 tablets ( 10 mg) every  4 hours as needed for pain.    Cellulitis and abscess of leg       senna-docusate 8.6-50 MG per tablet    SENOKOT-S;PERICOLACE    100 tablet    Take 1-2 tablets by mouth 2 times daily    Cellulitis and abscess of leg       triamcinolone 0.1 % cream    KENALOG    15 g    Apply topically 2 times daily    Atopic dermatitis, unspecified type

## 2017-09-06 NOTE — PATIENT INSTRUCTIONS
Interval History:   Delvin Echevarria is a 52 year old male who is here follow up for:   Right tibial hardware removal and I/D - 8/11/2017  Cultures positive for MSSA - on 2g cefazolin q8 hours through 9/22/17    He returns today for one week interval wound check. The lateral side remains well-healed. He has not had any issues since last visit. He notes that since he has decreased the amount he has been doing physical therapy and the amount that he is up on his leg his symptoms feel somewhat better. He denies any redness or pain around the medial sided incision. He has not changed the bandage since last week. He continues to tolerate his antibiotics well.         Physical Exam:     NAD  AOx3  Interactive and cooperative with the exam.      The lateral incision remains well-healed.    The medial bandage, which was placed last week and Steri-Strips removed. There is mild dried serous fluid on the bandage. There is no active drainage. The wound appears stable. There is no surrounding redness. The incision is well approximated aside from the portion in the midportion of the incision that measures about 7 cm. There is healthy for venous tissue underlying. He also has a area distally measuring about 5 mm which has healthy fibrinous tissue and appears to be progressively healing.           Imaging:      No new imaging.       Assessment and Plan:      Delvin is making good progress and I think his incision on the medial side continues to heal despite some underlying fibrinous tissue. There is no active drainage and there appears to be noted interval drainage on the bandage since his last visit. I think holding on physical therapy has helped with some of swelling, which is helping the incision improved.  He will return to clinic next week. He will remain on aspirin for DVT prophylaxis. He can continue with weightbearing as tolerated, but he should minimize his activities and walking on the leg. He should hold off on  physical therapy for at least one more week. Contact us in the meantime if there are any questions or concerns or increase in drainage or redness on the medial side.    Kenroy Dos Santos M.D.     Arthritis and Joint Replacement  Department of Orthopaedic Surgery, [facility]  [email or web address]  [phone] (pager)

## 2017-09-06 NOTE — NURSING NOTE
"Reason For Visit:   Chief Complaint   Patient presents with     RECHECK     Wound check     Surgical Followup     DOS 8/11/17 S/P Irrigation and Debridement Right Tibia,  Removal of Hardware       Primary MD: Krystian Streeter    Ht 1.803 m (5' 11\")  Wt 67.6 kg (149 lb)  BMI 20.78 kg/m2    Pain Assessment  Patient Currently in Pain: Denies    Current Outpatient Prescriptions   Medication     hydrocortisone 2.5 % cream     senna-docusate (SENOKOT-S;PERICOLACE) 8.6-50 MG per tablet     melatonin 1 MG TABS tablet     oxyCODONE (ROXICODONE) 5 MG IR tablet     aspirin 81 MG chewable tablet     amLODIPine (NORVASC) 5 MG tablet     ceFAZolin sodium-dextrose (ANCEF) 2-4 GM/100ML-% SOLN infusion     gabapentin (NEURONTIN) 300 MG capsule     cyclobenzaprine (FLEXERIL) 10 MG tablet     Skin Protectants, Misc. (HYDROCERIN) LOTN     triamcinolone (KENALOG) 0.1 % cream     mineral oil-hydrophilic petrolatum (AQUAPHOR) ointment     acetaminophen (TYLENOL) 325 MG tablet     No current facility-administered medications for this visit.           Allergies   Allergen Reactions     Seasonal Allergies Other (See Comments)     Congestion sinuses     Codeine Nausea     No Known Drug Allergies      "

## 2017-09-06 NOTE — LETTER
9/6/2017       RE: Delvin Echevarria  [address] [place] [address]     Dear Colleague,    Thank you for referring your patient, Delvin Echevarria, to the [facility] at [facility]. Please see a copy of my visit note below.           Interval History:   Delvin Echevarria is a 52 year old male who is here follow up for:   Right tibial hardware removal and I/D - 8/11/2017  Cultures positive for MSSA - on 2g cefazolin q8 hours through 9/22/17    He returns today for one week interval wound check. The lateral side remains well-healed. He has not had any issues since last visit. He notes that since he has decreased the amount he has been doing physical therapy and the amount that he is up on his leg his symptoms feel somewhat better. He denies any redness or pain around the medial sided incision. He has not changed the bandage since last week. He continues to tolerate his antibiotics well.         Physical Exam:     NAD  AOx3  Interactive and cooperative with the exam.      The lateral incision remains well-healed.    The medial bandage, which was placed last week and Steri-Strips removed. There is mild dried serous fluid on the bandage. There is no active drainage. The wound appears stable. There is no surrounding redness. The incision is well approximated aside from the portion in the midportion of the incision that measures about 7 cm. There is healthy for venous tissue underlying. He also has a area distally measuring about 5 mm which has healthy fibrinous tissue and appears to be progressively healing.           Imaging:      No new imaging.       Assessment and Plan:      Delvin is making good progress and I think his incision on the medial side continues to heal despite some underlying fibrinous tissue. There is no active drainage and there appears to be noted interval drainage on the bandage since his last visit. I think holding on physical therapy has  helped with some of swelling, which is helping the incision improved.  He will return to clinic next week. He will remain on aspirin for DVT prophylaxis. He can continue with weightbearing as tolerated, but he should minimize his activities and walking on the leg. He should hold off on physical therapy for at least one more week. Contact us in the meantime if there are any questions or concerns or increase in drainage or redness on the medial side.    Kenroy Dos Santos M.D.     Arthritis and Joint Replacement  Department of Orthopaedic Surgery, [facility]  [email or web address]  [phone] (pager)

## 2017-09-07 ENCOUNTER — HOSPITAL LABORATORY (OUTPATIENT)
Facility: OTHER | Age: 52
End: 2017-09-07

## 2017-09-07 LAB
ALBUMIN SERPL-MCNC: 3.3 G/DL (ref 3.4–5)
ALP SERPL-CCNC: 86 U/L (ref 40–150)
ALT SERPL W P-5'-P-CCNC: 26 U/L (ref 0–70)
ANION GAP SERPL CALCULATED.3IONS-SCNC: 7 MMOL/L (ref 3–14)
AST SERPL W P-5'-P-CCNC: 36 U/L (ref 0–45)
BACTERIA SPEC CULT: NORMAL
BASOPHILS # BLD AUTO: 0 10E9/L (ref 0–0.2)
BASOPHILS NFR BLD AUTO: 0.6 %
BILIRUB SERPL-MCNC: 0.2 MG/DL (ref 0.2–1.3)
BUN SERPL-MCNC: 15 MG/DL (ref 7–30)
CALCIUM SERPL-MCNC: 8.8 MG/DL (ref 8.5–10.1)
CHLORIDE SERPL-SCNC: 107 MMOL/L (ref 94–109)
CO2 SERPL-SCNC: 27 MMOL/L (ref 20–32)
CREAT SERPL-MCNC: 0.7 MG/DL (ref 0.66–1.25)
CRP SERPL-MCNC: <2.9 MG/L (ref 0–8)
DIFFERENTIAL METHOD BLD: ABNORMAL
EOSINOPHIL # BLD AUTO: 1 10E9/L (ref 0–0.7)
EOSINOPHIL NFR BLD AUTO: 15.3 %
ERYTHROCYTE [DISTWIDTH] IN BLOOD BY AUTOMATED COUNT: 15 % (ref 10–15)
ERYTHROCYTE [SEDIMENTATION RATE] IN BLOOD BY WESTERGREN METHOD: 16 MM/H (ref 0–20)
GFR SERPL CREATININE-BSD FRML MDRD: >90 ML/MIN/1.7M2
GLUCOSE SERPL-MCNC: 68 MG/DL (ref 70–99)
HCT VFR BLD AUTO: 36.5 % (ref 40–53)
HGB BLD-MCNC: 12 G/DL (ref 13.3–17.7)
IMM GRANULOCYTES # BLD: 0 10E9/L (ref 0–0.4)
IMM GRANULOCYTES NFR BLD: 0.2 %
LYMPHOCYTES # BLD AUTO: 1.6 10E9/L (ref 0.8–5.3)
LYMPHOCYTES NFR BLD AUTO: 25.9 %
Lab: NORMAL
MCH RBC QN AUTO: 30.3 PG (ref 26.5–33)
MCHC RBC AUTO-ENTMCNC: 32.9 G/DL (ref 31.5–36.5)
MCV RBC AUTO: 92 FL (ref 78–100)
MONOCYTES # BLD AUTO: 0.6 10E9/L (ref 0–1.3)
MONOCYTES NFR BLD AUTO: 9 %
NEUTROPHILS # BLD AUTO: 3 10E9/L (ref 1.6–8.3)
NEUTROPHILS NFR BLD AUTO: 49 %
PLATELET # BLD AUTO: 237 10E9/L (ref 150–450)
POTASSIUM SERPL-SCNC: 4.3 MMOL/L (ref 3.4–5.3)
PROT SERPL-MCNC: 7 G/DL (ref 6.8–8.8)
RBC # BLD AUTO: 3.96 10E12/L (ref 4.4–5.9)
SODIUM SERPL-SCNC: 141 MMOL/L (ref 133–144)
SPECIMEN SOURCE: NORMAL
WBC # BLD AUTO: 6.2 10E9/L (ref 4–11)

## 2017-09-12 VITALS
BODY MASS INDEX: 21.87 KG/M2 | DIASTOLIC BLOOD PRESSURE: 85 MMHG | SYSTOLIC BLOOD PRESSURE: 142 MMHG | OXYGEN SATURATION: 96 % | WEIGHT: 156.8 LBS | TEMPERATURE: 95.9 F | HEART RATE: 73 BPM | RESPIRATION RATE: 20 BRPM

## 2017-09-12 NOTE — PROGRESS NOTES
[facility]  PRIMARY CARE PROVIDER AND CLINIC:  Krystian Streeter [address] [place] [id number]  Chief Complaint   Patient presents with     Hospital F/U       HPI:    Delvin Echevarria is a 52 year old  (1965),admitted to the [facility] from [facility].  Hospital stay 8/10/17  through 8/15/17.  Admitted to this facility for  rehab, medical management and nursing care.  HPI information obtained from: patient report and [facility] chart review.        Interval History:  - Pt with significant PMH of right proximal tib/fib fracture in September 2016. S/p ORIF,  admitted to the above hospital for abscess from tibial area s/p ID, hardware removal, found to have staph a infection, started on Ancef IV until 9.22    Current issues are:      Abscess  Status post incision and drainage  - reports feeling fine, can walk, no pain, fever or chills. Today will see the surgeon.   - appetite fine, no issue with BM.       Eczema:  - reports started using a stronger lotion and it is getting better now.       CODE STATUS/ADVANCE DIRECTIVES DISCUSSION:   CPR/Full code   Patient's living condition: lives alone    ALLERGIES:Seasonal allergies; Codeine; and No known drug allergies  PAST MEDICAL HISTORY:  has a past medical history of Hypertension (3/15/2013). He also has no past medical history of Arthritis; Asthma; Blood transfusion; Congestive heart failure, unspecified; COPD (chronic obstructive pulmonary disease) (H); Coronary artery disease; Diabetes mellitus (H); Malignant neoplasm (H); Thyroid disease; or Unspecified cerebral artery occlusion with cerebral infarction.  PAST SURGICAL HISTORY:  has a past surgical history that includes Laryngectomy (Several Years ago); Herniorrhaphy inguinal (7/2/2012); Apply external fixator lower extremity (Right, 9/12/2016); Open reduction internal fixation tibia (Right, 9/15/2016); Incision and drainage  bone lower extremity, combined (Right, 8/11/2017); and Remove hardware lower extremity (Right, 8/11/2017).  FAMILY HISTORY: family history includes Hypertension in his brother and mother.  SOCIAL HISTORY:  reports that he has been smoking.  He has a 10.00 pack-year smoking history. He has never used smokeless tobacco. He reports that he does not drink alcohol or use illicit drugs.    Post Discharge Medication Reconciliation Status: discharge medications reconciled and changed, per note/orders (see AVS).  Current Outpatient Prescriptions   Medication Sig Dispense Refill     hydrocortisone 2.5 % cream Apply topically 2 times daily       senna-docusate (SENOKOT-S;PERICOLACE) 8.6-50 MG per tablet Take 1-2 tablets by mouth 2 times daily 100 tablet 0     melatonin 1 MG TABS tablet Take 1 tablet (1 mg) by mouth nightly as needed for sleep 30 tablet 0     oxyCODONE (ROXICODONE) 5 MG IR tablet For pain complaints of 1-5 take 1 tablet, (5 mg) for pain complaints of 6-10 take 2 tablets ( 10 mg) every 4 hours as needed for pain. 65 tablet 0     aspirin 81 MG chewable tablet Take 1 tablet (81 mg) by mouth 2 times daily 60 tablet 0     amLODIPine (NORVASC) 5 MG tablet Take 1 tablet (5 mg) by mouth daily Hold for systolic pressure < 110. Facility MD to titrate as needed. This is a new med for the patient. 30 tablet      ceFAZolin sodium-dextrose (ANCEF) 2-4 GM/100ML-% SOLN infusion Inject 100 mLs (2 g) into the vein every 8 hours Draw weekly cbc/diff, crp,esr, cmp, and send to Dr Ennis/Leuck [phone].       gabapentin (NEURONTIN) 300 MG capsule Take 1 capsule (300 mg) by mouth 3 times daily 75 capsule 1     cyclobenzaprine (FLEXERIL) 10 MG tablet Take 1 tablet (10 mg) by mouth 3 times daily 75 tablet 1     Skin Protectants, Misc. (HYDROCERIN) LOTN Apply topically daily       triamcinolone (KENALOG) 0.1 % cream Apply topically 2 times daily 15 g      mineral oil-hydrophilic petrolatum (AQUAPHOR) ointment Apply topically At  Bedtime 99 g      acetaminophen (TYLENOL) 325 MG tablet Take 3 tablets (975 mg) by mouth every 8 hours 100 tablet        ROS:  10 point ROS of systems including Constitutional, Eyes, Respiratory, Cardiovascular, Gastroenterology, Genitourinary, Integumentary, Muscularskeletal, Psychiatric were all negative except for pertinent positives noted in my HPI.    Exam:  /85  Pulse 73  Temp 95.9  F (35.5  C)  Resp 20  Wt 156 lb 12.8 oz (71.1 kg)  SpO2 96%  BMI 21.87 kg/m2  GENERAL APPEARANCE:  Alert, in no distress  ENT:  Mouth and posterior oropharynx normal, moist mucous membranes  EYES:  EOM, conjunctivae, lids, pupils and irises normal  NECK:  No adenopathy,masses or thyromegaly  RESP:  respiratory effort and palpation of chest normal, lungs clear to auscultation , no respiratory distress  CV:  Palpation and auscultation of heart done , regular rate and rhythm, no murmur, rub, or gallop, no edema  ABDOMEN:  BS audible, soft, NTND, reducible mass in the epigastric area.   M/S:   Gait and station abnormal uses cane, limb on the right side. no caft swelling.   SKIN:  wound healing well, no signs of infection over latearl surface of right leg, medial side is covering with dressing (CDI). Exfoliating skin over hands plams with rough surface.   NEURO:   Cranial nerves 2-12 are normal tested and grossly at patient's baseline, moves all limbs.   PSYCH:  oriented X 3, affect and mood normal    Lab/Diagnostic data:   CBC RESULTS:   Recent Labs   Lab Test  09/07/17 0623 08/31/17   1100   WBC  6.2  6.5   RBC  3.96*  3.91*   HGB  12.0*  11.9*   HCT  36.5*  36.2*   MCV  92  93   MCH  30.3  30.4   MCHC  32.9  32.9   RDW  15.0  14.9   PLT  237  302       Last Basic Metabolic Panel:  Recent Labs   Lab Test  09/07/17 0623 08/31/17   1100   NA  141  141   POTASSIUM  4.3  4.3   CHLORIDE  107  107   DINO  8.8  8.8   CO2  27  26   BUN  15  15   CR  0.70  0.63*   GLC  68*  101*       Liver Function Studies -   Recent Labs    Lab Test  09/07/17   0623  08/24/17   0643   PROTTOTAL  7.0  7.6   ALBUMIN  3.3*  3.4   BILITOTAL  0.2  0.2   ALKPHOS  86  87   AST  36  40   ALT  26  29       ASSESSMENT/PLAN:  Abscess  Status post incision and drainage  - improving, continue cefazolin, follow on the surgeon's and ID's recommendations.   - analgesia optimal  - will change cyclobenzaprine from tid schedule to prn. Continue Neurontin schedule for now, with a plan to taper down.       Essential hypertension, benign  BP Readings from Last 3 Encounters:   09/12/17 142/85   09/05/17 123/74   08/31/17 121/82   - .controlled.     Anemia due to blood loss, acute  - HH trending up, asymptomatic.     Eczema, unspecified type  - started on hydrocortisone 2.5% with good response, continue  - counseled about watching for the source of allergy, advised using hypo allergin and non scented soap, Vaseline at night.        Orders:  - will change cyclobenzaprine from tid schedule to prn.       Electronically signed by:  Armani Palacios MD

## 2017-09-13 ENCOUNTER — OFFICE VISIT (OUTPATIENT)
Dept: ORTHOPEDICS | Facility: CLINIC | Age: 52
End: 2017-09-13

## 2017-09-13 ENCOUNTER — NURSING HOME VISIT (OUTPATIENT)
Dept: GERIATRICS | Facility: CLINIC | Age: 52
End: 2017-09-13
Payer: COMMERCIAL

## 2017-09-13 VITALS — WEIGHT: 149 LBS | BODY MASS INDEX: 20.86 KG/M2 | HEIGHT: 71 IN

## 2017-09-13 DIAGNOSIS — I10 ESSENTIAL HYPERTENSION, BENIGN: ICD-10-CM

## 2017-09-13 DIAGNOSIS — S82.201D CLOSED FRACTURE OF RIGHT TIBIA AND FIBULA WITH ROUTINE HEALING: Primary | ICD-10-CM

## 2017-09-13 DIAGNOSIS — L02.91 ABSCESS: Primary | ICD-10-CM

## 2017-09-13 DIAGNOSIS — L30.9 ECZEMA, UNSPECIFIED TYPE: ICD-10-CM

## 2017-09-13 DIAGNOSIS — D62 ANEMIA DUE TO BLOOD LOSS, ACUTE: ICD-10-CM

## 2017-09-13 DIAGNOSIS — Z98.890 STATUS POST INCISION AND DRAINAGE: ICD-10-CM

## 2017-09-13 DIAGNOSIS — S82.401D CLOSED FRACTURE OF RIGHT TIBIA AND FIBULA WITH ROUTINE HEALING: Primary | ICD-10-CM

## 2017-09-13 PROBLEM — A49.01 STAPH AUREUS INFECTION: Status: ACTIVE | Noted: 2017-09-13

## 2017-09-13 PROCEDURE — 99306 1ST NF CARE HIGH MDM 50: CPT | Performed by: FAMILY MEDICINE

## 2017-09-13 NOTE — PATIENT INSTRUCTIONS
Interval History:   Delvin Echevarria is a 52 year old male who is here follow up for:   Right tibial hardware removal and I/D - 8/11/2017  Cultures positive for MSSA - on 2g cefazolin q8 hours through 9/22/17    He returns today for wound check. He was doing well since his visit last week. He has not had any drainage on the bandage. Otherwise his pain is stable and he has limited his activity.         Physical Exam:     NAD  AOx3  Interactive and cooperative with the exam.      The lateral incision remains well-healed.    The medial bandage which was placed last week has no drainage on it. There is healthy-appearing scabs over the 2 prior concerning sites. There is no surrounding redness or drainage. Sutures removed and Steri-Strips were placed.         Imaging:      No new imaging.       Assessment and Plan:      Delvin is now approximately 1 month out from right hardware removal and irrigation debridement. He remains on IV cefazolin and will continue on that until September 22. He'll return to clinic in 2 weeks for a repeat wound check to ensure that it continues to heal well after suture removal. I will be around the time of his antibiotic discontinuation. He'll contact us in the meantime if there are any interval questions or concerns. Otherwise she can continue with weightbearing as tolerated, but he should continue to limit his activity and elevate the leg as much as possible.

## 2017-09-13 NOTE — LETTER
9/13/2017       RE: Delvin Echevarria  [address] [place] [address]     Dear Colleague,    Thank you for referring your patient, Delvin Echevarria, to the [facility]. Please see a copy of my visit note below.         Interval History:   Delvin Echevarria is a 52 year old male who is here follow up for:   Right tibial hardware removal and I/D - 8/11/2017  Cultures positive for MSSA - on 2g cefazolin q8 hours through 9/22/17    He returns today for wound check. He was doing well since his visit last week. He has not had any drainage on the bandage. Otherwise his pain is stable and he has limited his activity.         Physical Exam:   NAD  AOx3  Interactive and cooperative with the exam.    The lateral incision remains well-healed.    The medial bandage which was placed last week has no drainage on it. There is healthy-appearing scabs over the 2 prior concerning sites. There is no surrounding redness or drainage. Sutures removed and Steri-Strips were placed       Imaging:    No new imaging.       Assessment and Plan:    Delvin is now approximately 1 month out from right hardware removal and irrigation debridement. He remains on IV cefazolin and will continue on that until September 22. He'll return to clinic in 2 weeks for a repeat wound check to ensure that it continues to heal well after suture removal. I will be around the time of his antibiotic discontinuation. He'll contact us in the meantime if there are any interval questions or concerns. Otherwise she can continue with weightbearing as tolerated, but he should continue to limit his activity and elevate the leg as much as possible.    Again, thank you for allowing me to participate in the care of your patient.      Sincerely,    Kenroy Dos Santos MD

## 2017-09-13 NOTE — PROGRESS NOTES
Interval History:   Delvin Echevarria is a 52 year old male who is here follow up for:   Right tibial hardware removal and I/D - 8/11/2017  Cultures positive for MSSA - on 2g cefazolin q8 hours through 9/22/17    He returns today for wound check. He was doing well since his visit last week. He has not had any drainage on the bandage. Otherwise his pain is stable and he has limited his activity.         Physical Exam:     NAD  AOx3  Interactive and cooperative with the exam.      The lateral incision remains well-healed.    The medial bandage which was placed last week has no drainage on it. There is healthy-appearing scabs over the 2 prior concerning sites. There is no surrounding redness or drainage. Sutures removed and Steri-Strips were placed.         Imaging:      No new imaging.       Assessment and Plan:      Delvin is now approximately 1 month out from right hardware removal and irrigation debridement. He remains on IV cefazolin and will continue on that until September 22. He'll return to clinic in 2 weeks for a repeat wound check to ensure that it continues to heal well after suture removal. I will be around the time of his antibiotic discontinuation. He'll contact us in the meantime if there are any interval questions or concerns. Otherwise she can continue with weightbearing as tolerated, but he should continue to limit his activity and elevate the leg as much as possible.    Kenroy Dos Santos M.D.     Arthritis and Joint Replacement  Department of Orthopaedic Surgery, [facility]  [email or web address]  [phone] (pager)        Answers for HPI/ROS submitted by the patient on 9/13/2017   General Symptoms: No  Skin Symptoms: No  HENT Symptoms: No  EYE SYMPTOMS: No  HEART SYMPTOMS: No  LUNG SYMPTOMS: No  INTESTINAL SYMPTOMS: No  URINARY SYMPTOMS: No  REPRODUCTIVE SYMPTOMS: No  SKELETAL SYMPTOMS: No  BLOOD SYMPTOMS: No  NERVOUS SYSTEM SYMPTOMS: No  MENTAL HEALTH SYMPTOMS:  No

## 2017-09-13 NOTE — NURSING NOTE
"Reason For Visit:   Chief Complaint   Patient presents with     Surgical Followup      DOS 8/11/17 - Removal of lateral proximal tibial plate. Irrigation and debridement of medial wound     RECHECK     Wound check       Primary MD: Krystian Streeter    Ht 1.803 m (5' 11\")  Wt 67.6 kg (149 lb)  BMI 20.78 kg/m2    Pain Assessment  Patient Currently in Pain: Yes  0-10 Pain Scale: 1  Primary Pain Location: Leg  Pain Orientation: Right  Pain Descriptors: Aching  Alleviating Factors: Rest  Aggravating Factors: Movement      Current Outpatient Prescriptions   Medication     hydrocortisone 2.5 % cream     senna-docusate (SENOKOT-S;PERICOLACE) 8.6-50 MG per tablet     melatonin 1 MG TABS tablet     oxyCODONE (ROXICODONE) 5 MG IR tablet     aspirin 81 MG chewable tablet     amLODIPine (NORVASC) 5 MG tablet     ceFAZolin sodium-dextrose (ANCEF) 2-4 GM/100ML-% SOLN infusion     gabapentin (NEURONTIN) 300 MG capsule     cyclobenzaprine (FLEXERIL) 10 MG tablet     Skin Protectants, Misc. (HYDROCERIN) LOTN     triamcinolone (KENALOG) 0.1 % cream     mineral oil-hydrophilic petrolatum (AQUAPHOR) ointment     acetaminophen (TYLENOL) 325 MG tablet     No current facility-administered medications for this visit.           Allergies   Allergen Reactions     Seasonal Allergies Other (See Comments)     Congestion sinuses     Codeine Nausea     No Known Drug Allergies      "

## 2017-09-13 NOTE — MR AVS SNAPSHOT
After Visit Summary   9/13/2017    Delvin Echevarria    MRN: [id number]           Patient Information     Date Of Birth          1965        Visit Information        Provider Department      9/13/2017 10:45 AM Kenroy Dos Santos MD [facility]        Today's Diagnoses     Closed fracture of right tibia and fibula with routine healing    -  1      Care Instructions           Interval History:   Delvin Echevarria is a 52 year old male who is here follow up for:   Right tibial hardware removal and I/D - 8/11/2017  Cultures positive for MSSA - on 2g cefazolin q8 hours through 9/22/17    He returns today for wound check. He was doing well since his visit last week. He has not had any drainage on the bandage. Otherwise his pain is stable and he has limited his activity.         Physical Exam:     NAD  AOx3  Interactive and cooperative with the exam.      The lateral incision remains well-healed.    The medial bandage which was placed last week has no drainage on it. There is healthy-appearing scabs over the 2 prior concerning sites. There is no surrounding redness or drainage. Sutures removed and Steri-Strips were placed.         Imaging:      No new imaging.       Assessment and Plan:      Delvin is now approximately 1 month out from right hardware removal and irrigation debridement. He remains on IV cefazolin and will continue on that until September 22. He'll return to clinic in 2 weeks for a repeat wound check to ensure that it continues to heal well after suture removal. I will be around the time of his antibiotic discontinuation. He'll contact us in the meantime if there are any interval questions or concerns. Otherwise she can continue with weightbearing as tolerated, but he should continue to limit his activity and elevate the leg as much as possible.          Follow-ups after your visit        Your next 10 appointments already scheduled     Sep 27, 2017  5:00 PM CDT   (Arrive by  "4:45 PM)   Post-Op with Kenroy Dos Santos MD   [facility] ([facility])    [address]  [address] [facility] [address] [phone]              Who to contact     Please call your clinic at [phone] to:    Ask questions about your health    Make or cancel appointments    Discuss your medicines    Learn about your test results    Speak to your doctor   If you have compliments or concerns about an experience at your clinic, or if you wish to file a complaint, please contact [facility] Patient Relations at [phone] or email us at [email or web address]         Additional Information About Your Visit        [employer] Information     [employer] is an electronic gateway that provides easy, online access to your medical records. With [employer], you can request a clinic appointment, read your test results, renew a prescription or communicate with your care team.     To sign up for [employer] visit the website at [email or web address]   You will be asked to enter the access code listed below, as well as some personal information. Please follow the directions to create your username and password.     Your access code is: [email or web address]  Expires: 10/8/2017  5:49 PM     Your access code will  in 90 days. If you need help or a new code, please contact your [facility] Clinic or call [phone] for assistance.        Care EveryWhere ID     This is your Care EveryWhere ID. This could be used by other organizations to access your [facility] medical records  [id number]        Your Vitals Were     Height BMI (Body Mass Index)                1.803 m (5' 11\") 20.78 kg/m2           Blood Pressure from Last 3 Encounters:   17 142/85   17 123/74   17 121/82    Weight from Last 3 Encounters:   17 67.6 kg (149 lb)   17 71.1 kg (156 lb 12.8 oz)   17 67.6 kg (149 lb)            "   Today, you had the following     No orders found for display       Primary Care Provider Office Phone # Fax #    Krystian Tadeo Streeter [phone]       [address] [place] [address]        Equal Access to Services     WUDION YVETTE : Zainab hernan costa stephanieo Solimaali, waaxda luqadaha, qaybta kaalmada adeyariyada, charleen barajas tonyayari hernandez larejialiya coleman. So [facility] [phone].    ATENCIÓN: Si habla español, tiene a alcazar disposición servicios gratuitos de asistencia lingüística. Llame al [phone].    We comply with applicable federal civil rights laws and [place] laws. We do not discriminate on the basis of race, color, national origin, age, disability sex, sexual orientation or gender identity.            Thank you!     Thank you for choosing [facility]  for your care. Our goal is always to provide you with excellent care. Hearing back from our patients is one way we can continue to improve our services. Please take a few minutes to complete the written survey that you may receive in the mail after your visit with us. Thank you!             Your Updated Medication List - Protect others around you: Learn how to safely use, store and throw away your medicines at www.disposemymeds.org.          This list is accurate as of: 9/13/17 11:08 AM.  Always use your most recent med list.                   Brand Name Dispense Instructions for use Diagnosis    acetaminophen 325 MG tablet    TYLENOL    100 tablet    Take 3 tablets (975 mg) by mouth every 8 hours    Tibial plateau fracture, right, closed, initial encounter       amLODIPine 5 MG tablet    NORVASC    30 tablet    Take 1 tablet (5 mg) by mouth daily Hold for systolic pressure < 110. Facility MD to titrate as needed. This is a new med for the patient.    Essential hypertension, benign       aspirin 81 MG chewable tablet     60 tablet    Take 1 tablet (81 mg) by mouth 2 times daily    Cellulitis and abscess of leg        ceFAZolin sodium-dextrose 2-4 GM/100ML-% Soln infusion    ANCEF     Inject 100 mLs (2 g) into the vein every 8 hours Draw weekly cbc/diff, crp,esr, cmp, and send to Dr Ennis/Leuck [phone].    Cellulitis and abscess of leg       cyclobenzaprine 10 MG tablet    FLEXERIL    75 tablet    Take 1 tablet (10 mg) by mouth 3 times daily    Pain of right lower extremity       gabapentin 300 MG capsule    NEURONTIN    75 capsule    Take 1 capsule (300 mg) by mouth 3 times daily    Pain of right lower extremity       HYDROCERIN Lotn lotion      Apply topically daily        hydrocortisone 2.5 % cream      Apply topically 2 times daily        melatonin 1 MG Tabs tablet     30 tablet    Take 1 tablet (1 mg) by mouth nightly as needed for sleep    Cellulitis and abscess of leg       mineral oil-hydrophilic petrolatum     99 g    Apply topically At Bedtime    Atopic dermatitis, unspecified type       oxyCODONE 5 MG IR tablet    ROXICODONE    65 tablet    For pain complaints of 1-5 take 1 tablet, (5 mg) for pain complaints of 6-10 take 2 tablets ( 10 mg) every 4 hours as needed for pain.    Cellulitis and abscess of leg       senna-docusate 8.6-50 MG per tablet    SENOKOT-S;PERICOLACE    100 tablet    Take 1-2 tablets by mouth 2 times daily    Cellulitis and abscess of leg       triamcinolone 0.1 % cream    KENALOG    15 g    Apply topically 2 times daily    Atopic dermatitis, unspecified type

## 2017-09-14 ENCOUNTER — HOSPITAL LABORATORY (OUTPATIENT)
Facility: OTHER | Age: 52
End: 2017-09-14

## 2017-09-14 LAB
ALBUMIN SERPL-MCNC: 3.5 G/DL (ref 3.4–5)
ALP SERPL-CCNC: 94 U/L (ref 40–150)
ALT SERPL W P-5'-P-CCNC: 28 U/L (ref 0–70)
ANION GAP SERPL CALCULATED.3IONS-SCNC: 5 MMOL/L (ref 3–14)
AST SERPL W P-5'-P-CCNC: 39 U/L (ref 0–45)
BASOPHILS # BLD AUTO: 0.1 10E9/L (ref 0–0.2)
BASOPHILS NFR BLD AUTO: 1.1 %
BILIRUB SERPL-MCNC: 0.3 MG/DL (ref 0.2–1.3)
BUN SERPL-MCNC: 19 MG/DL (ref 7–30)
CALCIUM SERPL-MCNC: 8.8 MG/DL (ref 8.5–10.1)
CHLORIDE SERPL-SCNC: 107 MMOL/L (ref 94–109)
CO2 SERPL-SCNC: 27 MMOL/L (ref 20–32)
CREAT SERPL-MCNC: 0.69 MG/DL (ref 0.66–1.25)
CRP SERPL-MCNC: <2.9 MG/L (ref 0–8)
DIFFERENTIAL METHOD BLD: ABNORMAL
EOSINOPHIL # BLD AUTO: 0.9 10E9/L (ref 0–0.7)
EOSINOPHIL NFR BLD AUTO: 12.8 %
ERYTHROCYTE [DISTWIDTH] IN BLOOD BY AUTOMATED COUNT: 15.2 % (ref 10–15)
ERYTHROCYTE [SEDIMENTATION RATE] IN BLOOD BY WESTERGREN METHOD: 12 MM/H (ref 0–20)
GFR SERPL CREATININE-BSD FRML MDRD: >90 ML/MIN/1.7M2
GLUCOSE SERPL-MCNC: 71 MG/DL (ref 70–99)
HCT VFR BLD AUTO: 38 % (ref 40–53)
HGB BLD-MCNC: 12.6 G/DL (ref 13.3–17.7)
IMM GRANULOCYTES # BLD: 0 10E9/L (ref 0–0.4)
IMM GRANULOCYTES NFR BLD: 0.3 %
LYMPHOCYTES # BLD AUTO: 1.5 10E9/L (ref 0.8–5.3)
LYMPHOCYTES NFR BLD AUTO: 23.2 %
MCH RBC QN AUTO: 30.5 PG (ref 26.5–33)
MCHC RBC AUTO-ENTMCNC: 33.2 G/DL (ref 31.5–36.5)
MCV RBC AUTO: 92 FL (ref 78–100)
MONOCYTES # BLD AUTO: 0.6 10E9/L (ref 0–1.3)
MONOCYTES NFR BLD AUTO: 9.7 %
NEUTROPHILS # BLD AUTO: 3.5 10E9/L (ref 1.6–8.3)
NEUTROPHILS NFR BLD AUTO: 52.9 %
PLATELET # BLD AUTO: 232 10E9/L (ref 150–450)
POTASSIUM SERPL-SCNC: 4.3 MMOL/L (ref 3.4–5.3)
PROT SERPL-MCNC: 7.1 G/DL (ref 6.8–8.8)
RBC # BLD AUTO: 4.13 10E12/L (ref 4.4–5.9)
SODIUM SERPL-SCNC: 139 MMOL/L (ref 133–144)
WBC # BLD AUTO: 6.6 10E9/L (ref 4–11)

## 2017-09-21 ENCOUNTER — TRANSFERRED RECORDS (OUTPATIENT)
Dept: HEALTH INFORMATION MANAGEMENT | Facility: CLINIC | Age: 52
End: 2017-09-21

## 2017-09-21 ENCOUNTER — HOSPITAL LABORATORY (OUTPATIENT)
Facility: OTHER | Age: 52
End: 2017-09-21

## 2017-09-21 LAB
ALBUMIN SERPL-MCNC: 3.5 G/DL (ref 3.4–5)
ALP SERPL-CCNC: 107 U/L (ref 40–150)
ALT SERPL W P-5'-P-CCNC: 25 U/L (ref 0–70)
ANION GAP SERPL CALCULATED.3IONS-SCNC: 5 MMOL/L (ref 3–14)
AST SERPL W P-5'-P-CCNC: 37 U/L (ref 0–45)
BASOPHILS # BLD AUTO: 0.1 10E9/L (ref 0–0.2)
BASOPHILS NFR BLD AUTO: 0.6 %
BILIRUB SERPL-MCNC: 0.4 MG/DL (ref 0.2–1.3)
BUN SERPL-MCNC: 12 MG/DL (ref 7–30)
CALCIUM SERPL-MCNC: 9 MG/DL (ref 8.5–10.1)
CHLORIDE SERPL-SCNC: 107 MMOL/L (ref 94–109)
CO2 SERPL-SCNC: 28 MMOL/L (ref 20–32)
CREAT SERPL-MCNC: 0.65 MG/DL (ref 0.66–1.25)
CRP SERPL-MCNC: 32.8 MG/L (ref 0–8)
DIFFERENTIAL METHOD BLD: ABNORMAL
EOSINOPHIL # BLD AUTO: 0.6 10E9/L (ref 0–0.7)
EOSINOPHIL NFR BLD AUTO: 7 %
ERYTHROCYTE [DISTWIDTH] IN BLOOD BY AUTOMATED COUNT: 14.9 % (ref 10–15)
ERYTHROCYTE [SEDIMENTATION RATE] IN BLOOD BY WESTERGREN METHOD: 16 MM/H (ref 0–20)
GFR SERPL CREATININE-BSD FRML MDRD: >90 ML/MIN/1.7M2
GLUCOSE SERPL-MCNC: 84 MG/DL (ref 70–99)
HCT VFR BLD AUTO: 39.2 % (ref 40–53)
HGB BLD-MCNC: 13.1 G/DL (ref 13.3–17.7)
IMM GRANULOCYTES # BLD: 0 10E9/L (ref 0–0.4)
IMM GRANULOCYTES NFR BLD: 0.1 %
LYMPHOCYTES # BLD AUTO: 1.3 10E9/L (ref 0.8–5.3)
LYMPHOCYTES NFR BLD AUTO: 15 %
MCH RBC QN AUTO: 30.3 PG (ref 26.5–33)
MCHC RBC AUTO-ENTMCNC: 33.4 G/DL (ref 31.5–36.5)
MCV RBC AUTO: 91 FL (ref 78–100)
MONOCYTES # BLD AUTO: 0.8 10E9/L (ref 0–1.3)
MONOCYTES NFR BLD AUTO: 9.3 %
NEUTROPHILS # BLD AUTO: 5.9 10E9/L (ref 1.6–8.3)
NEUTROPHILS NFR BLD AUTO: 68 %
PLATELET # BLD AUTO: 241 10E9/L (ref 150–450)
POTASSIUM SERPL-SCNC: 4.2 MMOL/L (ref 3.4–5.3)
PROT SERPL-MCNC: 7.3 G/DL (ref 6.8–8.8)
RBC # BLD AUTO: 4.32 10E12/L (ref 4.4–5.9)
SODIUM SERPL-SCNC: 140 MMOL/L (ref 133–144)
WBC # BLD AUTO: 8.6 10E9/L (ref 4–11)

## 2017-09-21 NOTE — PROGRESS NOTES
[place] [facility] DISCHARGE SUMMARY    PATIENT'S NAME: Delvin Echevarria  YOB: 1965  MEDICAL RECORD NUMBER:  [id number]    PRIMARY CARE PROVIDER AND CLINIC RESPONSIBLE AFTER TRANSFER: Krystian Streeter [address] [place] [address]     CODE STATUS/ADVANCE DIRECTIVES DISCUSSION:   CPR/Full code        Allergies   Allergen Reactions     Seasonal Allergies Other (See Comments)     Congestion sinuses     Codeine Nausea     No Known Drug Allergies        TRANSFERRING PROVIDERS: XIN Ward CNP,   DATE OF SNF ADMISSION:  August / 15 / 2017  DATE OF SNF (anticipated) DISCHARGE: September / 23 / 2017  DISCHARGE DISPOSITION: Non-[facility] Provider   Nursing Facility: [facility] stay 8/10/17 to 8/15/17.     Condition on Discharge:  Improving.  Function:  indep  Cognitive Scores: na    Equipment: walker    DISCHARGE DIAGNOSIS:   1. Abscess    2. Status post incision and drainage    3. Infected hardware in right leg, sequela    4. Tibial plateau fracture, right, closed, with routine healing, subsequent encounter    5. Essential hypertension, benign        HPI Nursing Facility Course:  HPI information obtained from: facility chart records, facility staff and patient report. Pt admitted to the [facility] [place] from   [facility].  Hospital stay 8/10/17 through 8/15/17.  Patient had right proximal tib/fib fracture in September 2016 which resulted in SNF stay until April 2017. He went home then mid July started having swelling/pain . I & D of bone done, hardware removed on 8/11.  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 Treated with  IV Ancef through today (9/22/17) via PICC. Pt now ready to dC to home.  He does not have any concerns or pain - states he doesn't need pain meds anymore. Has f/u with ortho on 9/27.  Remains on Tylenol for mild pain - can  change that to PRN at home.   Remains on Norvasc for HTn - stable at TCU - noted last reading elevated incidental.   BP Readings from Last 3 Encounters:   09/21/17 155/79   09/12/17 142/85   09/05/17 123/74     BP Range: 101-155/66-85 mmHg    PAST MEDICAL HISTORY:  has a past medical history of Hypertension (3/15/2013). He also has no past medical history of Arthritis; Asthma; Blood transfusion; Congestive heart failure, unspecified; COPD (chronic obstructive pulmonary disease) (H); Coronary artery disease; Diabetes mellitus (H); Malignant neoplasm (H); Thyroid disease; or Unspecified cerebral artery occlusion with cerebral infarction.    DISCHARGE MEDICATIONS:  Current Outpatient Prescriptions   Medication Sig Dispense Refill     ASPIRIN PO Take 81 mg by mouth 2 times daily       hydrocortisone 2.5 % cream Apply topically 2 times daily       senna-docusate (SENOKOT-S;PERICOLACE) 8.6-50 MG per tablet Take 1-2 tablets by mouth 2 times daily 100 tablet 0     melatonin 1 MG TABS tablet Take 1 tablet (1 mg) by mouth nightly as needed for sleep 30 tablet 0     amLODIPine (NORVASC) 5 MG tablet Take 1 tablet (5 mg) by mouth daily Hold for systolic pressure < 110. Facility MD to titrate as needed. This is a new med for the patient. 30 tablet      gabapentin (NEURONTIN) 300 MG capsule Take 1 capsule (300 mg) by mouth 3 times daily 75 capsule 1     Skin Protectants, Misc. (HYDROCERIN) LOTN Apply topically daily       triamcinolone (KENALOG) 0.1 % cream Apply topically 2 times daily 15 g      mineral oil-hydrophilic petrolatum (AQUAPHOR) ointment Apply topically At Bedtime 99 g      acetaminophen (TYLENOL) 325 MG tablet Take 3 tablets (975 mg) by mouth every 8 hours 100 tablet        MEDICATION CHANGES/RATIONALE:   1. Wean to off flexeril - stable  2. DC oxycodone at  as not needing  Controlled medications sent with patient: not applicable/none     ROS:    4 point ROS including Respiratory, CV, GI and , other than that  "noted in the HPI,  is negative    Physical Exam:   Vitals: /79  Pulse 69  Temp 95.8  F (35.4  C)  Resp 20  Ht 5' 11\" (1.803 m)  Wt 155 lb (70.3 kg)  SpO2 99%  BMI 21.62 kg/m2  BMI= Body mass index is 21.62 kg/(m^2).    A & O x 3, NAD. Lungs CTA, non labored. HR reg, no edema.  Abdomen soft, nontender. No focal neurological deficits. HAMMOND  R lower leg Incision is healed - area is enlarged - but suspect that will be chronic.      DISCHARGE PLAN:  Out pt PT  Patient instructed to follow-up with:  PCP in 7-10 days       [facility] scheduled appointments:  Future Appointments  Date Time Provider Department Center   9/27/2017 5:00 PM Kenroy Dos Santos MD [facility] referral needed and placed by this provider: No    Pending labs: none  SNF labs   Results for orders placed or performed in visit on 09/21/17   CBC with platelets differential   Result Value Ref Range    WBC 8.6 4.0 - 11.0 10e9/L    RBC Count 4.32 (L) 4.4 - 5.9 10e12/L    Hemoglobin 13.1 (L) 13.3 - 17.7 g/dL    Hematocrit 39.2 (L) 40.0 - 53.0 %    MCV 91 78 - 100 fl    MCH 30.3 26.5 - 33.0 pg    MCHC 33.4 31.5 - 36.5 g/dL    RDW 14.9 10.0 - 15.0 %    Platelet Count 241 150 - 450 10e9/L    Diff Method Automated Method     % Neutrophils 68.0 %    % Lymphocytes 15.0 %    % Monocytes 9.3 %    % Eosinophils 7.0 %    % Basophils 0.6 %    % Immature Granulocytes 0.1 %    Absolute Neutrophil 5.9 1.6 - 8.3 10e9/L    Absolute Lymphocytes 1.3 0.8 - 5.3 10e9/L    Absolute Monocytes 0.8 0.0 - 1.3 10e9/L    Absolute Eosinophils 0.6 0.0 - 0.7 10e9/L    Absolute Basophils 0.1 0.0 - 0.2 10e9/L    Abs Immature Granulocytes 0.0 0 - 0.4 10e9/L   Comprehensive metabolic panel   Result Value Ref Range    Sodium 140 133 - 144 mmol/L    Potassium 4.2 3.4 - 5.3 mmol/L    Chloride 107 94 - 109 mmol/L    Carbon Dioxide 28 20 - 32 mmol/L    Anion Gap 5 3 - 14 mmol/L    Glucose 84 70 - 99 mg/dL    Urea Nitrogen 12 7 - 30 mg/dL    Creatinine 0.65 (L) 0.66 - " 1.25 mg/dL    GFR Estimate >90 >60 mL/min/1.7m2    GFR Estimate If Black >90 >60 mL/min/1.7m2    Calcium 9.0 8.5 - 10.1 mg/dL    Bilirubin Total 0.4 0.2 - 1.3 mg/dL    Albumin 3.5 3.4 - 5.0 g/dL    Protein Total 7.3 6.8 - 8.8 g/dL    Alkaline Phosphatase 107 40 - 150 U/L    ALT 25 0 - 70 U/L    AST 37 0 - 45 U/L   CRP inflammation   Result Value Ref Range    CRP Inflammation 32.8 (H) 0.0 - 8.0 mg/L   Erythrocyte sedimentation rate auto   Result Value Ref Range    Sed Rate 16 0 - 20 mm/h       Discharge Treatments:none     TOTAL DISCHARGE TIME:   Greater than 30 minutes  Electronically signed by:  XIN Ward CNP

## 2017-09-22 ENCOUNTER — DISCHARGE SUMMARY NURSING HOME (OUTPATIENT)
Dept: GERIATRICS | Facility: CLINIC | Age: 52
End: 2017-09-22
Payer: COMMERCIAL

## 2017-09-22 VITALS
HEIGHT: 71 IN | HEART RATE: 69 BPM | WEIGHT: 155 LBS | RESPIRATION RATE: 20 BRPM | DIASTOLIC BLOOD PRESSURE: 79 MMHG | SYSTOLIC BLOOD PRESSURE: 155 MMHG | TEMPERATURE: 95.8 F | BODY MASS INDEX: 21.7 KG/M2 | OXYGEN SATURATION: 99 %

## 2017-09-22 DIAGNOSIS — T84.7XXS INFECTED HARDWARE IN RIGHT LEG, SEQUELA: ICD-10-CM

## 2017-09-22 DIAGNOSIS — Z98.890 STATUS POST INCISION AND DRAINAGE: ICD-10-CM

## 2017-09-22 DIAGNOSIS — S82.141D TIBIAL PLATEAU FRACTURE, RIGHT, CLOSED, WITH ROUTINE HEALING, SUBSEQUENT ENCOUNTER: ICD-10-CM

## 2017-09-22 DIAGNOSIS — L02.91 ABSCESS: Primary | ICD-10-CM

## 2017-09-22 DIAGNOSIS — I10 ESSENTIAL HYPERTENSION, BENIGN: ICD-10-CM

## 2017-09-22 PROCEDURE — 99316 NF DSCHRG MGMT 30 MIN+: CPT | Performed by: NURSE PRACTITIONER

## 2017-09-27 ENCOUNTER — OFFICE VISIT (OUTPATIENT)
Dept: ORTHOPEDICS | Facility: CLINIC | Age: 52
End: 2017-09-27

## 2017-09-27 VITALS — WEIGHT: 149 LBS | BODY MASS INDEX: 20.86 KG/M2 | HEIGHT: 71 IN

## 2017-09-27 DIAGNOSIS — S82.201D CLOSED FRACTURE OF RIGHT TIBIA AND FIBULA WITH ROUTINE HEALING: Primary | ICD-10-CM

## 2017-09-27 DIAGNOSIS — S82.401D CLOSED FRACTURE OF RIGHT TIBIA AND FIBULA WITH ROUTINE HEALING: Primary | ICD-10-CM

## 2017-09-27 ASSESSMENT — ENCOUNTER SYMPTOMS
NAIL CHANGES: 0
POOR WOUND HEALING: 0

## 2017-09-27 NOTE — LETTER
9/27/2017       RE: Delvin Echevarria  [address] [address]     Dear Colleague,    Thank you for referring your patient, Delvin Echevarria, to the [facility] at [facility]. Please see a copy of my visit note below.           Interval History:   Delvin Echevarria is a 52 year old male who is here follow up for:   Right tibial hardware removal and I/D - 8/11/2017  Cultures positive for MSSA - on 2g cefazolin q8 hours through 9/22/17    He returns today for routine follow-up for his hardware removal and irrigation debridement. He has completed his antibiotic course. He has not had any issues with the incision since his last visit. He has not had any drainage and it has continued to heal well. 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         Physical Exam:     NAD  AOx3  Interactive and cooperative with the exam.      The lateral incision remains well-healed.    The medial incision is well-healed. There are to 1 x 1 cm scab areas that are progressively healing. No surrounding redness or interval drainage.       Imaging:      No new imaging.       Assessment and Plan:      MYRNA has done well following his irrigation and debridement and hardware removal. We discussed that his incision should continue to heal well. He asked about physical therapy. I don't think he needs assess his day-to-day work as quite active and I would expect his motion to return to his preoperative baseline which was about 5-95  following his prior tibial fracture. He will return to clinic in 3 months for a repeat check. If he is doing well he can cancel that visit. Otherwise, as if he has any incisional issues or drainage or redness, he will contact us and return to clinic earlier.    Kenroy Dos Santos M.D.     Arthritis and Joint Replacement  Department of Orthopaedic Surgery, [facility]  [email or web address]  [phone] (pager)        Answers for HPI/ROS submitted by the patient on 9/13/2017   General Symptoms: No  Skin Symptoms: No  HENT Symptoms: No  EYE SYMPTOMS: No  HEART SYMPTOMS: No  LUNG SYMPTOMS: No  INTESTINAL SYMPTOMS: No  URINARY SYMPTOMS: No  REPRODUCTIVE SYMPTOMS: No  SKELETAL SYMPTOMS: No  BLOOD SYMPTOMS: No  NERVOUS SYSTEM SYMPTOMS: No  MENTAL HEALTH SYMPTOMS: No      Again, thank you for allowing me to participate in the care of your patient.      Sincerely,    Kenroy Dos Santos MD

## 2017-09-27 NOTE — PROGRESS NOTES
Interval History:   Delvin Echevarria is a 52 year old male who is here follow up for:   Right tibial hardware removal and I/D - 8/11/2017  Cultures positive for MSSA - on 2g cefazolin q8 hours through 9/22/17    He returns today for routine follow-up for his hardware removal and irrigation debridement. He has completed his antibiotic course. He has not had any issues with the incision since his last visit. He has not had any drainage and it has continued to heal well. 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         Physical Exam:     NAD  AOx3  Interactive and cooperative with the exam.      The lateral incision remains well-healed.    The medial incision is well-healed. There are to 1 x 1 cm scab areas that are progressively healing. No surrounding redness or interval drainage.       Imaging:      No new imaging.       Assessment and Plan:      P has done well following his irrigation and debridement and hardware removal. We discussed that his incision should continue to heal well. He asked about physical therapy. I don't think he needs assess his day-to-day work as quite active and I would expect his motion to return to his preoperative baseline which was about 5-95  following his prior tibial fracture. He will return to clinic in 3 months for a repeat check. If he is doing well he can cancel that visit. Otherwise, as if he has any incisional issues or drainage or redness, he will contact us and return to clinic earlier.    Kenroy Dos Santos M.D.     Arthritis and Joint Replacement  Department of Orthopaedic Surgery, [facility]  [email or web address]  [phone] (pager)        Answers for HPI/ROS submitted by the patient on 9/13/2017   General Symptoms: No  Skin Symptoms: No  HENT Symptoms: No  EYE SYMPTOMS: No  HEART SYMPTOMS: No  LUNG SYMPTOMS: No  INTESTINAL SYMPTOMS: No  URINARY SYMPTOMS: No  REPRODUCTIVE SYMPTOMS: No  SKELETAL SYMPTOMS:  No  BLOOD SYMPTOMS: No  NERVOUS SYSTEM SYMPTOMS: No  MENTAL HEALTH SYMPTOMS: No

## 2017-09-27 NOTE — MR AVS SNAPSHOT
"              After Visit Summary   2017    Delvin Echevarria    MRN: [id number]           Patient Information     Date Of Birth          1965        Visit Information        Provider Department      2017 5:00 PM Kenroy Dos Santos MD [facility]        Today's Diagnoses     Closed fracture of right tibia and fibula with routine healing    -  1       Follow-ups after your visit        Who to contact     Please call your clinic at [phone] to:    Ask questions about your health    Make or cancel appointments    Discuss your medicines    Learn about your test results    Speak to your doctor   If you have compliments or concerns about an experience at your clinic, or if you wish to file a complaint, please contact [facility] Patient Relations at [phone] or email us at [email or web address]         Additional Information About Your Visit        MyChart Information     [employer] Tix is an electronic gateway that provides easy, online access to your medical records. With Today T[employer], you can request a clinic appointment, read your test results, renew a prescription or communicate with your care team.     To sign up for April[employer]et visit the website at [email or web address]   You will be asked to enter the access code listed below, as well as some personal information. Please follow the directions to create your username and password.     Your access code is: [email or web address]  Expires: 10/8/2017  5:49 PM     Your access code will  in 90 days. If you need help or a new code, please contact your [facility] or call [phone] for assistance.        Care EveryWhere ID     This is your Care EveryWhere ID. This could be used by other organizations to access your [place] medical records  [id number]        Your Vitals Were     Height BMI (Body Mass Index)                1.803 m (5' 11\") 20.78 kg/m2           Blood Pressure " from Last 3 Encounters:   09/21/17 155/79   09/12/17 142/85   09/05/17 123/74    Weight from Last 3 Encounters:   09/27/17 67.6 kg (149 lb)   09/21/17 70.3 kg (155 lb)   09/13/17 67.6 kg (149 lb)              Today, you had the following     No orders found for display       Primary Care Provider Office Phone # Fax #    Krystian Streeter [phone]       [address] [place] [address]        Equal Access to Services     [facility]: Hadii hernan costa hadasho Soteena, waaxda luqadaha, qaybta kaalmada abram, charleen zambrano . So [facility] [phone].    ATENCIÓN: Si habla español, tiene a alcazar disposición servicios gratuitos de asistencia lingüística. LlGreen [facility] [phone].    We comply with applicable federal civil rights laws and [place] laws. We do not discriminate on the basis of race, color, national origin, age, disability sex, sexual orientation or gender identity.            Thank you!     Thank you for choosing [facility]  for your care. Our goal is always to provide you with excellent care. Hearing back from our patients is one way we can continue to improve our services. Please take a few minutes to complete the written survey that you may receive in the mail after your visit with us. Thank you!             Your Updated Medication List - Protect others around you: Learn how to safely use, store and throw away your medicines at www.disposemymeds.org.          This list is accurate as of: 9/27/17  5:56 PM.  Always use your most recent med list.                   Brand Name Dispense Instructions for use Diagnosis    acetaminophen 325 MG tablet    TYLENOL    100 tablet    Take 3 tablets (975 mg) by mouth every 8 hours    Tibial plateau fracture, right, closed, initial encounter       amLODIPine 5 MG tablet    NORVASC    30 tablet    Take 1 tablet (5 mg) by mouth daily Hold for systolic pressure < 110. Facility MD to titrate as  needed. This is a new med for the patient.    Essential hypertension, benign       ASPIRIN PO      Take 81 mg by mouth 2 times daily        gabapentin 300 MG capsule    NEURONTIN    75 capsule    Take 1 capsule (300 mg) by mouth 3 times daily    Pain of right lower extremity       HYDROCERIN Lotn lotion      Apply topically daily        hydrocortisone 2.5 % cream      Apply topically 2 times daily        melatonin 1 MG Tabs tablet     30 tablet    Take 1 tablet (1 mg) by mouth nightly as needed for sleep    Cellulitis and abscess of leg       mineral oil-hydrophilic petrolatum     99 g    Apply topically At Bedtime    Atopic dermatitis, unspecified type       senna-docusate 8.6-50 MG per tablet    SENOKOT-S;PERICOLACE    100 tablet    Take 1-2 tablets by mouth 2 times daily    Cellulitis and abscess of leg       triamcinolone 0.1 % cream    KENALOG    15 g    Apply topically 2 times daily    Atopic dermatitis, unspecified type

## 2017-09-27 NOTE — NURSING NOTE
"Reason For Visit:   Chief Complaint   Patient presents with     Surgical Followup     DOS 8/11/17 - Removal of lateral proximal tibial plate. Irrigation and debridement of medial wound       Primary MD: Krystian Streeter    Ht 1.803 m (5' 11\")  Wt 67.6 kg (149 lb)  BMI 20.78 kg/m2    Pain Assessment  Patient Currently in Pain: Denies       Current Outpatient Prescriptions   Medication     ASPIRIN PO     hydrocortisone 2.5 % cream     senna-docusate (SENOKOT-S;PERICOLACE) 8.6-50 MG per tablet     melatonin 1 MG TABS tablet     amLODIPine (NORVASC) 5 MG tablet     gabapentin (NEURONTIN) 300 MG capsule     Skin Protectants, Misc. (HYDROCERIN) LOTN     triamcinolone (KENALOG) 0.1 % cream     mineral oil-hydrophilic petrolatum (AQUAPHOR) ointment     acetaminophen (TYLENOL) 325 MG tablet     No current facility-administered medications for this visit.           Allergies   Allergen Reactions     Seasonal Allergies Other (See Comments)     Congestion sinuses     Codeine Nausea     No Known Drug Allergies      "

## 2017-10-18 NOTE — DISCHARGE SUMMARY
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    Objective Measurements:        Objective Measure: TUG  Details: Classic: 11.5sec, 9.0sec          Outcome Measures (most recent score):  Patient did not attend follow up visit, status unknown at this time.        Goals:  Goal Identifier stand for >1 hr   Goal Description Following PT interventions, pt will increase R knee ext AROM to 5* to be able to stand for >1 hr   Target Date 17   Date Met   (increased PROM)   Progress:     Goal Identifier walking without cane   Goal Description Following PT interventions, pt will perform TUG in 8 sec to be able to walk without cane on level ground.   Target Date 17   Date Met   (increased speed on classic TUG)   Progress:     Goal Identifier sit/stand   Goal Description Following PT interventions, pt will increase R hip IR/ER strength to 4/5 grade MMT to have less difficulty with sit/stand   Target Date 17   Date Met      Progress:     Goal Identifier move quick for getting out of the way of pigs   Goal Description Following PT interventions, pt will score 55/80 on LEFS to be able to move quick for getting out of the way of pigs.   Target Date 17   Date Met      Progress:     Goal Identifier     Goal Description     Target Date     Date Met      Progress:     Goal Identifier     Goal Description     Target Date     Date Met      Progress:     Goal Identifier     Goal Description     Target Date     Date Met      Progress:     Goal Identifier     Goal Description     Target Date     Date Met      Progress:     Progress Toward Goals:   Progress this reporting period: Pt had been making  progress towards 50% of goals prior to pt's R would started to abscess on 8/8/17. Pt was advised to not participate in skilled PT that day. Pt was instructed to follow up with Urgent Care for would care at that time. Two days later (8/10/17) pt ended up undergoing a COMBINED INCISION AND DRAINAGE BONE LOWER EXTREMITY, REMOVE HARDWARE LOWER EXTREMITY. Pt did not return to skilled PT after that procedure after 30 days.             Plan:  Discharge from therapy.    Discharge:    Reason for Discharge: Patient has failed to schedule further appointments.    Equipment Issued: na    Discharge Plan: Other services: Urgent care for open wound.

## 2017-10-27 ENCOUNTER — RADIANT APPOINTMENT (OUTPATIENT)
Dept: GENERAL RADIOLOGY | Facility: CLINIC | Age: 52
End: 2017-10-27
Attending: NURSE PRACTITIONER
Payer: COMMERCIAL

## 2017-10-27 ENCOUNTER — OFFICE VISIT (OUTPATIENT)
Dept: FAMILY MEDICINE | Facility: CLINIC | Age: 52
End: 2017-10-27
Payer: COMMERCIAL

## 2017-10-27 ENCOUNTER — TELEPHONE (OUTPATIENT)
Dept: ORTHOPEDICS | Facility: CLINIC | Age: 52
End: 2017-10-27

## 2017-10-27 VITALS — DIASTOLIC BLOOD PRESSURE: 111 MMHG | SYSTOLIC BLOOD PRESSURE: 189 MMHG | HEART RATE: 113 BPM | RESPIRATION RATE: 16 BRPM

## 2017-10-27 DIAGNOSIS — S97.01XA CRUSHING INJURY OF RIGHT ANKLE, INITIAL ENCOUNTER: ICD-10-CM

## 2017-10-27 DIAGNOSIS — M25.571 PAIN IN JOINT INVOLVING ANKLE AND FOOT, RIGHT: ICD-10-CM

## 2017-10-27 DIAGNOSIS — S82.401A CLOSED FRACTURE OF RIGHT TIBIA AND FIBULA, INITIAL ENCOUNTER: Primary | ICD-10-CM

## 2017-10-27 DIAGNOSIS — S82.201A CLOSED FRACTURE OF RIGHT TIBIA AND FIBULA, INITIAL ENCOUNTER: Primary | ICD-10-CM

## 2017-10-27 DIAGNOSIS — L20.9 ATOPIC DERMATITIS, UNSPECIFIED TYPE: ICD-10-CM

## 2017-10-27 PROCEDURE — 29515 APPLICATION SHORT LEG SPLINT: CPT | Performed by: NURSE PRACTITIONER

## 2017-10-27 PROCEDURE — 73610 X-RAY EXAM OF ANKLE: CPT | Mod: RT

## 2017-10-27 PROCEDURE — 99214 OFFICE O/P EST MOD 30 MIN: CPT | Mod: 25 | Performed by: NURSE PRACTITIONER

## 2017-10-27 RX ORDER — OXYCODONE AND ACETAMINOPHEN 5; 325 MG/1; MG/1
1 TABLET ORAL EVERY 6 HOURS PRN
Qty: 18 TABLET | Refills: 0 | Status: SHIPPED | OUTPATIENT
Start: 2017-10-27 | End: 2017-10-31

## 2017-10-27 RX ORDER — HYDROCORTISONE 2.5 %
CREAM (GRAM) TOPICAL 2 TIMES DAILY
Qty: 30 G | Refills: 3 | Status: SHIPPED | OUTPATIENT
Start: 2017-10-27 | End: 2018-01-17

## 2017-10-27 NOTE — NURSING NOTE
"Chief Complaint   Patient presents with     Foot Injury     Right foot was pinned between garage door and skidster.     Derm Problem     Would like some hydrocortisone cream for his rash       Initial BP (!) 189/111  Pulse 113  Resp 16 Estimated body mass index is 20.78 kg/(m^2) as calculated from the following:    Height as of 9/27/17: 5' 11\" (1.803 m).    Weight as of 9/27/17: 149 lb (67.6 kg).  Medication Reconciliation: complete    "

## 2017-10-27 NOTE — MR AVS SNAPSHOT
After Visit Summary   10/27/2017    Delvin Echevarria    MRN: [id number]           Patient Information     Date Of Birth          1965        Visit Information        Provider Department      10/27/2017 1:40 PM Vane Kauffman APRN CNP [facility]        Today's Diagnoses     Closed fracture of right tibia and fibula, initial encounter    -  1    Atopic dermatitis, unspecified type        Pain in joint involving ankle and foot, right        Crushing injury of right ankle, initial encounter           Follow-ups after your visit        Additional Services     DERMATOLOGY REFERRAL       Your provider has referred you to: [facility]: [facility] [phone]   [email or web address]    Please be aware that coverage of these services is subject to the terms and limitations of your health insurance plan.  Call member services at your health plan with any benefit or coverage questions.      Please bring the following with you to your appointment:    (1) Any X-Rays, CTs or MRIs which have been performed.  Contact the facility where they were done to arrange for  prior to your scheduled appointment.    (2) List of current medications  (3) This referral request   (4) Any documents/labs given to you for this referral                  Your next 10 appointments already scheduled     Oct 31, 2017 10:40 AM CDT   (Arrive by 10:25 AM)   NEW FOOT/ANKLE with Miguel A Green MD   [facility] ([facility])    [address] [place] [id number] [phone]              Who to contact     If you have questions or need follow up information about today's clinic visit or your schedule please contact [facility] directly at [phone].  Normal or non-critical lab and imaging results will be communicated to you by MyChart, letter or phone within 4 business days  "after the clinic has received the results. If you do not hear from us within 7 days, please contact the clinic through [employer] or phone. If you have a critical or abnormal lab result, we will notify you by phone as soon as possible.  Submit refill requests through [employer] or call your pharmacy and they will forward the refill request to us. Please allow 3 business days for your refill to be completed.          Additional Information About Your Visit        [employer] Information     [employer] lets you send messages to your doctor, view your test results, renew your prescriptions, schedule appointments and more. To sign up, go to [email or web address] . Click on \"Log in\" on the left side of the screen, which will take you to the Welcome page. Then click on \"Sign up Now\" on the right side of the page.     You will be asked to enter the access code listed below, as well as some personal information. Please follow the directions to create your username and password.     Your access code is: [email or web address]  Expires: 2018 11:52 AM     Your access code will  in 90 days. If you need help or a new code, please call your [place] clinic or [phone].        Care EveryWhere ID     This is your Care EveryWhere ID. This could be used by other organizations to access your [place] medical records  [id number]        Your Vitals Were     Pulse Respirations                113 16           Blood Pressure from Last 3 Encounters:   10/27/17 (!) 189/111   17 155/79   17 142/85    Weight from Last 3 Encounters:   17 149 lb (67.6 kg)   17 155 lb (70.3 kg)   17 149 lb (67.6 kg)              We Performed the Following     APPLY SHORT LEG SPLINT     DERMATOLOGY REFERRAL          Today's Medication Changes          These changes are accurate as of: 10/27/17 11:59 PM.  If you have any questions, ask your nurse or doctor.               Start taking these medicines.        Dose/Directions    " oxyCODONE-acetaminophen 5-325 MG per tablet   Commonly known as:  PERCOCET   Used for:  Crushing injury of right ankle, initial encounter, Pain in joint involving ankle and foot, right   Started by:  Vane Kauffman APRN CNP        Dose:  1 tablet   Take 1 tablet by mouth every 6 hours as needed for pain maximum 4 tablet(s) per day   Quantity:  18 tablet   Refills:  0            Where to get your medicines      These medications were sent to [facility] [address] [address]     Phone:  [phone]     hydrocortisone 2.5 % cream         Some of these will need a paper prescription and others can be bought over the counter.  Ask your nurse if you have questions.     Bring a paper prescription for each of these medications     oxyCODONE-acetaminophen 5-325 MG per tablet                Primary Care Provider Office Phone # Fax #    Krystian Streeter [phone]       [address] [place], [place] [id number]        Equal Access to Services     [facility]: Hadii hernan ku hadasho Soomaali, waaxda luqadaha, qaybta kaalmada adeegyada, waxay idiin hayjaymie coleman. So [facility] [phone].    ATENCIÓN: Si habla español, tiene a alcazar disposición servicios gratuitos de asistencia lingüística. LlKettering Health [phone].    We comply with applicable federal civil rights laws and [place] laws. We do not discriminate on the basis of race, color, national origin, age, disability, sex, sexual orientation, or gender identity.            Thank you!     Thank you for choosing [facility]  for your care. Our goal is always to provide you with excellent care. Hearing back from our patients is one way we can continue to improve our services. Please take a few minutes to complete the written survey that you may receive in the mail after your visit with us. Thank you!             Your Updated Medication  List - Protect others around you: Learn how to safely use, store and throw away your medicines at www.disposemymeds.org.          This list is accurate as of: 10/27/17 11:59 PM.  Always use your most recent med list.                   Brand Name Dispense Instructions for use Diagnosis    acetaminophen 325 MG tablet    TYLENOL    100 tablet    Take 3 tablets (975 mg) by mouth every 8 hours    Tibial plateau fracture, right, closed, initial encounter       amLODIPine 5 MG tablet    NORVASC    30 tablet    Take 1 tablet (5 mg) by mouth daily Hold for systolic pressure < 110. Facility MD to titrate as needed. This is a new med for the patient.    Essential hypertension, benign       ASPIRIN PO      Take 81 mg by mouth 2 times daily        gabapentin 300 MG capsule    NEURONTIN    75 capsule    Take 1 capsule (300 mg) by mouth 3 times daily    Pain of right lower extremity       HYDROCERIN Lotn lotion      Apply topically daily        hydrocortisone 2.5 % cream     30 g    Apply topically 2 times daily    Atopic dermatitis, unspecified type       melatonin 1 MG Tabs tablet     30 tablet    Take 1 tablet (1 mg) by mouth nightly as needed for sleep    Cellulitis and abscess of leg       mineral oil-hydrophilic petrolatum     99 g    Apply topically At Bedtime    Atopic dermatitis, unspecified type       oxyCODONE-acetaminophen 5-325 MG per tablet    PERCOCET    18 tablet    Take 1 tablet by mouth every 6 hours as needed for pain maximum 4 tablet(s) per day    Crushing injury of right ankle, initial encounter, Pain in joint involving ankle and foot, right       senna-docusate 8.6-50 MG per tablet    SENOKOT-S;PERICOLACE    100 tablet    Take 1-2 tablets by mouth 2 times daily    Cellulitis and abscess of leg       triamcinolone 0.1 % cream    KENALOG    15 g    Apply topically 2 times daily    Atopic dermatitis, unspecified type

## 2017-10-27 NOTE — PROGRESS NOTES
SUBJECTIVE:   Delvin Echevarria is a 52 year old male who presents to clinic today for the following health issues:      Rash      Duration: Has been ongoing for about 2 weeks    Description  Location: arms and feet  Itching: moderate    Intensity:  moderate    Accompanying signs and symptoms: Itching, spreading    History (similar episodes/previous evaluation): has had previously    Precipitating or alleviating factors:  New exposures:  None  Recent travel: no      Therapies tried and outcome: hydrocortisone cream -  effective    Foot injury      Duration: Started today- foot was pinned between garage door and skidster.    Description (location/character/radiation):     Intensity:  severe    Accompanying signs and symptoms: Swelling, redness, unable to put weight on it, weakness.    History (similar episodes/previous evaluation): Previous history of broken tibula and fibula 2016    Precipitating or alleviating factors: None    Therapies tried and outcome: None     Had initial surgery in 2016 and hardware removed in August 2017 from previous tib fib fracture at the [facility]  Skid steer rolled off ramp pinning leg between bucket an a garage door-reverse did not work so it took a while to manually remove machine  Pain 3/10-unable to bear weight on right leg     Problem list and histories reviewed & adjusted, as indicated.  Additional history: as documented    Patient Active Problem List   Diagnosis     Inguinal hernia     Essential hypertension, benign     CARDIOVASCULAR SCREENING; LDL GOAL LESS THAN 130     Tibial plateau fracture     Wound infection     Closed fracture of right tibia and fibula with routine healing     Pain     Dehydration     Anemia due to blood loss, acute     Rash     Aftercare for healing traumatic fracture of lower leg, unspecified laterality, closed     Weight loss     Hypotension due to drugs     Acute posthemorrhagic anemia     ACP (advance care planning)     Abscess      Staph aureus infection     Past Surgical History:   Procedure Laterality Date     APPLY EXTERNAL FIXATOR LOWER EXTREMITY Right 9/12/2016    Procedure: APPLY EXTERNAL FIXATOR LOWER EXTREMITY;  Surgeon: John Gonzales MD;  Location: [facility]     HERNIORRHAPHY INGUINAL  7/2/2012    Procedure: HERNIORRHAPHY INGUINAL;  Open Repair Right Inguinal Hernia with Mesh;  Surgeon: Avni Maxwell MD;  Location: [facility]     INCISION AND DRAINAGE BONE LOWER EXTREMITY, COMBINED Right 8/11/2017    Procedure: COMBINED INCISION AND DRAINAGE BONE LOWER EXTREMITY;  Irrigation and Debridement Right Tibia,  Removal of Hardware;  Surgeon: Kenroy Dos Santos MD;  Location: [facility] OR     LARYNGECTOMY  Several Years ago    Partail removal due to fish bone     OPEN REDUCTION INTERNAL FIXATION TIBIA Right 9/15/2016    Procedure: OPEN REDUCTION INTERNAL FIXATION TIBIA;  Surgeon: Miguel A Green MD;  Location: [facility]     REMOVE HARDWARE LOWER EXTREMITY Right 8/11/2017    Procedure: REMOVE HARDWARE LOWER EXTREMITY;;  Surgeon: Kenroy Dos Santos MD;  Location: [facility] OR       Social History   Substance Use Topics     Smoking status: Current Every Day Smoker     Packs/day: 1.00     Years: 10.00     Smokeless tobacco: Never Used      Comment: quit date of accident and has not restarted-9/10/16     Alcohol use No     Family History   Problem Relation Age of Onset     Hypertension Mother      Hypertension Brother          Current Outpatient Prescriptions   Medication Sig Dispense Refill     hydrocortisone 2.5 % cream Apply topically 2 times daily 30 g 3     oxyCODONE-acetaminophen (PERCOCET) 5-325 MG per tablet Take 1 tablet by mouth every 6 hours as needed for pain maximum 4 tablet(s) per day 18 tablet 0     ASPIRIN PO Take 81 mg by mouth 2 times daily       melatonin 1 MG TABS tablet Take 1 tablet (1 mg) by mouth nightly as needed for sleep 30 tablet 0     amLODIPine (NORVASC) 5 MG tablet Take 1 tablet (5 mg) by mouth daily Hold  for systolic pressure < 110. Facility MD to titrate as needed. This is a new med for the patient. 30 tablet      gabapentin (NEURONTIN) 300 MG capsule Take 1 capsule (300 mg) by mouth 3 times daily 75 capsule 1     Skin Protectants, Misc. (HYDROCERIN) LOTN Apply topically daily       mineral oil-hydrophilic petrolatum (AQUAPHOR) ointment Apply topically At Bedtime 99 g      acetaminophen (TYLENOL) 325 MG tablet Take 3 tablets (975 mg) by mouth every 8 hours 100 tablet      senna-docusate (SENOKOT-S;PERICOLACE) 8.6-50 MG per tablet Take 1-2 tablets by mouth 2 times daily (Patient not taking: Reported on 10/27/2017) 100 tablet 0     triamcinolone (KENALOG) 0.1 % cream Apply topically 2 times daily (Patient not taking: Reported on 10/27/2017) 15 g      Allergies   Allergen Reactions     Seasonal Allergies Other (See Comments)     Congestion sinuses     Codeine Nausea     No Known Drug Allergies      Labs reviewed in EPIC      Reviewed and updated as needed this visit by clinical staffTobacco  Allergies  Meds  Med Hx  Surg Hx  Fam Hx  Soc Hx      Reviewed and updated as needed this visit by Provider         ROS:  Constitutional, HEENT, cardiovascular, pulmonary, gi and gu systems are negative, except as otherwise noted.      OBJECTIVE:   BP (!) 189/111  Pulse 113  Resp 16  There is no height or weight on file to calculate BMI.  GENERAL: healthy, alert and no distress  MS: tenderness to palpation at right distal lateral and medial malleolus, swelling and ecchymosis present, significant decrease in range of motion  SKIN: excoriated papules and patches present on bilateral ankle and anterior wrists  PSYCH: mentation appears normal, affect normal/bright    Diagnostic Test Results:  Xray -   RIGHT ANKLE THREE OR MORE VIEWS  10/27/2017 2:22 PM     HISTORY: Pain in right ankle and joints of right foot. Crushing injury  of right ankle, initial encounter.    COMPARISON: None.             Impression              IMPRESSION: Angulated fractures noted in the distal tibial metaphysis  and distal fibular diaphysis. Distal fracture fragments are angled  medially in both the tibia and fibula. Ankle mortise appears intact.  Diffuse soft tissue swelling.    DOUGLAS VOGT MD          ASSESSMENT/PLAN:     1. Closed fracture of right tibia and fibula, initial encounter  No acute distress.  Spoke with Dr. Gonzales in orthopedic surgery at the [facility], he recommended splinting and will will work on coordinating care for follow up with ortho and notify the patient.  Splint applied long discussion on monitoring for signs or symptoms of decrease circulation, increased swelling or increased pain-needs to follow up immediately as increased risk for compartment syndrome.  Delvin verbalized understanding and will follow up with worsening symptoms.  Non weight bearing-Delvin brought crutches in to clinic and comfortable with use.  - APPLY SHORT LEG SPLINT    2. Atopic dermatitis, unspecified type  Hydrocortisone refilled.  Dermatology referral made  - hydrocortisone 2.5 % cream; Apply topically 2 times daily  Dispense: 30 g; Refill: 3  - DERMATOLOGY REFERRAL    3. Pain in joint involving ankle and foot, right  Per above  - XR Ankle Right G/E 3 Views; Future  - oxyCODONE-acetaminophen (PERCOCET) 5-325 MG per tablet; Take 1 tablet by mouth every 6 hours as needed for pain maximum 4 tablet(s) per day  Dispense: 18 tablet; Refill: 0    4. Crushing injury of right ankle, initial encounter  Per above.  - XR Ankle Right G/E 3 Views; Future  - oxyCODONE-acetaminophen (PERCOCET) 5-325 MG per tablet; Take 1 tablet by mouth every 6 hours as needed for pain maximum 4 tablet(s) per day  Dispense: 18 tablet; Refill: 0    Home care instructions were reviewed with the patient. The risks, benefits and treatment options of prescribed medications or other treatments have been discussed with the patient. The patient verbalized their understanding  and should call or follow up if no improvement or if they develop further problems.    XIN Stoddard [facility]

## 2017-10-27 NOTE — TELEPHONE ENCOUNTER
Left voicemail for patient, informing him that an appointment has been scheduled for him with Dr. Green on 10/31/17 at 10:40am, to evaluate his distal tib fib fracture. Address to the clinic was left. Callback number was left for questions.

## 2017-10-30 NOTE — TELEPHONE ENCOUNTER
APPT INFO    Date /Time: 10/31/17 10:40AM   Reason for Appt: Distal Tib Fib Fx   Ref Provider/Clinic: Vane Kauffman CNP - [facility]   Are there internal records? If yes, list: Yes (no outside records) -     [facility] (imaging in pacs)  [facility] (records with Dr. Green from 2016 and recent recs/imaging with Dr. Dos Santos)     Patient Contact (Y/N) & Call Details: No - internal records   Action: --

## 2017-10-31 ENCOUNTER — OFFICE VISIT (OUTPATIENT)
Dept: ORTHOPEDICS | Facility: CLINIC | Age: 52
End: 2017-10-31

## 2017-10-31 ENCOUNTER — ANESTHESIA EVENT (OUTPATIENT)
Dept: SURGERY | Facility: AMBULATORY SURGERY CENTER | Age: 52
End: 2017-10-31

## 2017-10-31 ENCOUNTER — OFFICE VISIT (OUTPATIENT)
Dept: SURGERY | Facility: CLINIC | Age: 52
End: 2017-10-31

## 2017-10-31 ENCOUNTER — PRE VISIT (OUTPATIENT)
Dept: ORTHOPEDICS | Facility: CLINIC | Age: 52
End: 2017-10-31

## 2017-10-31 ENCOUNTER — ALLIED HEALTH/NURSE VISIT (OUTPATIENT)
Dept: ORTHOPEDICS | Facility: CLINIC | Age: 52
End: 2017-10-31

## 2017-10-31 VITALS
RESPIRATION RATE: 16 BRPM | HEART RATE: 67 BPM | SYSTOLIC BLOOD PRESSURE: 189 MMHG | TEMPERATURE: 97.8 F | HEIGHT: 72 IN | OXYGEN SATURATION: 97 % | BODY MASS INDEX: 20.32 KG/M2 | DIASTOLIC BLOOD PRESSURE: 98 MMHG | WEIGHT: 150 LBS

## 2017-10-31 DIAGNOSIS — S82.401A CLOSED FRACTURE OF RIGHT TIBIA AND FIBULA, INITIAL ENCOUNTER: Primary | ICD-10-CM

## 2017-10-31 DIAGNOSIS — S82.201D CLOSED FRACTURE OF RIGHT TIBIA AND FIBULA WITH ROUTINE HEALING: ICD-10-CM

## 2017-10-31 DIAGNOSIS — Z01.818 PREOP EXAMINATION: Primary | ICD-10-CM

## 2017-10-31 DIAGNOSIS — S82.201A CLOSED FRACTURE OF RIGHT TIBIA AND FIBULA, INITIAL ENCOUNTER: Primary | ICD-10-CM

## 2017-10-31 DIAGNOSIS — S82.401D CLOSED FRACTURE OF RIGHT TIBIA AND FIBULA WITH ROUTINE HEALING: ICD-10-CM

## 2017-10-31 DIAGNOSIS — Z71.9 VISIT FOR COUNSELING: Primary | ICD-10-CM

## 2017-10-31 LAB
CREAT SERPL-MCNC: 0.57 MG/DL (ref 0.66–1.25)
GFR SERPL CREATININE-BSD FRML MDRD: >90 ML/MIN/1.7M2
HGB BLD-MCNC: 13 G/DL (ref 13.3–17.7)
POTASSIUM SERPL-SCNC: 4 MMOL/L (ref 3.4–5.3)

## 2017-10-31 RX ORDER — OXYCODONE AND ACETAMINOPHEN 5; 325 MG/1; MG/1
1 TABLET ORAL EVERY 6 HOURS PRN
Refills: 0 | COMMUNITY
Start: 2017-10-27 | End: 2017-10-31

## 2017-10-31 RX ORDER — OXYCODONE AND ACETAMINOPHEN 5; 325 MG/1; MG/1
1 TABLET ORAL EVERY 6 HOURS PRN
Qty: 15 TABLET | Refills: 0 | Status: SHIPPED | OUTPATIENT
Start: 2017-10-31 | End: 2017-10-31

## 2017-10-31 NOTE — MR AVS SNAPSHOT
After Visit Summary   10/31/2017    Delvin Echevarria    MRN: [id number]           Patient Information     Date Of Birth          1965        Visit Information        Provider Department      10/31/2017 10:40 AM Miguel A Green MD [facility]        Today's Diagnoses     Closed fracture of right tibia and fibula, initial encounter    -  1       Follow-ups after your visit        Your next 10 appointments already scheduled     Nov 07, 2017   Procedure with Miguel A Green MD   [facility] ([facility] [place])    [address] [place] [address]  5th [facility] [id number] [phone]           Located in the Clinics and Surgery Center at [address] [place] [address].   parking is very convenient and highly recommended.  is a $6 flat rate fee.  Both  and self parkers should enter the main arrival plaza from [facility]; parking attendants will direct you based on your parking preference.            Nov 22, 2017  1:00 PM CST   (Arrive by 12:45 PM)   Post-Op with  [facility] Orth[facility] Nurse   [facility] ([facility] [place])    [address] [place] [address]  4th [facility] [id number] [phone]            Dec 19, 2017  3:10 PM CST   (Arrive by 2:55 PM)   POST-OP FOOT/ANKLE with Miguel A Green MD   [facility] ([facility] [place])    [address] [place] [id number] [phone]              Who to contact     Please call your clinic at [phone] to:    Ask questions about your health    Make or cancel appointments    Discuss your medicines    Learn about your test results    Speak to your doctor   If you have compliments or concerns about an experience at your clinic, or if you wish to file a complaint, please contact [facility] Patient Relations at [phone] or email us at  Continue same medications.     [email or web address]         Additional Information About Your Visit        iKnowlharVettery Information     ChatID is an electronic gateway that provides easy, online access to your medical records. With ChatID, you can request a clinic appointment, read your test results, renew a prescription or communicate with your care team.     To sign up for ChatID visit the website at [email or web address]   You will be asked to enter the access code listed below, as well as some personal information. Please follow the directions to create your username and password.     Your access code is: [email or web address]  Expires: 2018 10:52 AM     Your access code will  in 90 days. If you need help or a new code, please contact your [facility] or call [phone] for assistance.        Care EveryWhere ID     This is your Care EveryWhere ID. This could be used by other organizations to access your [place] medical records  [id number]         Blood Pressure from Last 3 Encounters:   10/31/17 (!) 189/98   10/27/17 (!) 189/111   17 155/79    Weight from Last 3 Encounters:   10/31/17 68 kg (150 lb)   17 67.6 kg (149 lb)   17 70.3 kg (155 lb)              We Performed the Following     Tila-Operative Worksheet (Green)          Today's Medication Changes          These changes are accurate as of: 10/31/17 11:59 PM.  If you have any questions, ask your nurse or doctor.               These medicines have changed or have updated prescriptions.        Dose/Directions    amLODIPine 5 MG tablet   Commonly known as:  NORVASC   This may have changed:    - when to take this  - additional instructions   Used for:  Essential hypertension, benign        Dose:  5 mg   Take 1 tablet (5 mg) by mouth daily Hold for systolic pressure < 110. Facility MD to titrate as needed. This is a new med for the patient.   Quantity:  30 tablet   Refills:  0         Stop taking these medicines if you  haven't already. Please contact your care team if you have questions.     oxyCODONE-acetaminophen 5-325 MG per tablet   Commonly known as:  PERCOCET   Stopped by:  9,  Pac Caryl                    Primary Care Provider Office Phone # Fax #    Krystian Streeter [phone]       [address] [facility] [address]        Equal Access to Services     [facility]: Hadii aad ku hadasho Soomaali, waaxda luqadaha, qaybta kaalmada adeegyada, waxay idiin hayaan adeeg kharash la'aan . So [facility] [phone].    ATENCIÓN: Si habla español, tiene a alcazar disposición servicios gratuitos de asistencia lingüística. Larisa al [phone].    We comply with applicable federal civil rights laws and [place] laws. We do not discriminate on the basis of race, color, national origin, age, disability, sex, sexual orientation, or gender identity.            Thank you!     Thank you for choosing [facility]  for your care. Our goal is always to provide you with excellent care. Hearing back from our patients is one way we can continue to improve our services. Please take a few minutes to complete the written survey that you may receive in the mail after your visit with us. Thank you!             Your Updated Medication List - Protect others around you: Learn how to safely use, store and throw away your medicines at www.disposemymeds.org.          This list is accurate as of: 10/31/17 11:59 PM.  Always use your most recent med list.                   Brand Name Dispense Instructions for use Diagnosis    amLODIPine 5 MG tablet    NORVASC    30 tablet    Take 1 tablet (5 mg) by mouth daily Hold for systolic pressure < 110. Facility MD to titrate as needed. This is a new med for the patient.    Essential hypertension, benign       hydrocortisone 2.5 % cream     30 g    Apply topically 2 times daily    Atopic dermatitis, unspecified type

## 2017-10-31 NOTE — MR AVS SNAPSHOT
After Visit Summary   10/31/2017    Delvin Echevarria    MRN: [id number]           Patient Information     Date Of Birth          1965        Visit Information        Provider Department      10/31/2017 3:47 PM Samantha Harris LICSW [facility]        Today's Diagnoses     Visit for counseling    -  1       Follow-ups after your visit        Your next 10 appointments already scheduled     Nov 22, 2017  1:00 PM CST   (Arrive by 12:45 PM)   Post-Op with [facility] Orth[facility] Nurse   [facility] ([facility])    [address] [place] [id number] [phone]            Dec 19, 2017  3:10 PM CST   (Arrive by 2:55 PM)   POST-OP FOOT/ANKLE with Miguel A Green MD   [facility] ([facility])    [address] [place] [id number] [phone]              Who to contact     Please call your clinic at [phone] to:    Ask questions about your health    Make or cancel appointments    Discuss your medicines    Learn about your test results    Speak to your doctor   If you have compliments or concerns about an experience at your clinic, or if you wish to file a complaint, please contact [facility] Patient Relations at [phone] or email us at [email or web address]         Additional Information About Your Visit        MyChart Information     [employer] is an electronic gateway that provides easy, online access to your medical records. With [employer], you can request a clinic appointment, read your test results, renew a prescription or communicate with your care team.     To sign up for [employer] visit the website at [email or web address]   You will be asked to enter the access code listed below, as well as some personal information. Please follow the directions to create your username and password.     Your access code is:  [email or web address]  Expires: 2018 11:52 AM     Your access code will  in 90 days. If you need help or a new code, please contact your [facility] or call [phone] for assistance.        Care EveryWhere ID     This is your Care EveryWhere ID. This could be used by other organizations to access your [place] medical records  [id number]         Blood Pressure from Last 3 Encounters:   10/31/17 (!) 189/98   10/27/17 (!) 189/111   17 155/79    Weight from Last 3 Encounters:   10/31/17 68 kg (150 lb)   17 67.6 kg (149 lb)   17 70.3 kg (155 lb)              Today, you had the following     No orders found for display         Today's Medication Changes          These changes are accurate as of: 10/31/17  4:12 PM.  If you have any questions, ask your nurse or doctor.               These medicines have changed or have updated prescriptions.        Dose/Directions    amLODIPine 5 MG tablet   Commonly known as:  NORVASC   This may have changed:    - when to take this  - additional instructions   Used for:  Essential hypertension, benign        Dose:  5 mg   Take 1 tablet (5 mg) by mouth daily Hold for systolic pressure < 110. Facility MD to titrate as needed. This is a new med for the patient.   Quantity:  30 tablet   Refills:  0         Stop taking these medicines if you haven't already. Please contact your care team if you have questions.     oxyCODONE-acetaminophen 5-325 MG per tablet   Commonly known as:  PERCOCET   Stopped by:  ,  Pac Caryl                    Primary Care Provider Office Phone # Fax #    Krystian Streeter [phone]       [address] [facility] [address]        Equal Access to Services     DION CRAWFORD : Zainab Bills, fuad may, charleen silva. So [facility] [phone].    ATENCIÓN: Si habla español, tiene a alcazar disposición servicios  sergio de asistencia lingüística. Larisa skinner [phone].    We comply with applicable federal civil rights laws and [place] laws. We do not discriminate on the basis of race, color, national origin, age, disability, sex, sexual orientation, or gender identity.            Thank you!     Thank you for choosing [facility]  for your care. Our goal is always to provide you with excellent care. Hearing back from our patients is one way we can continue to improve our services. Please take a few minutes to complete the written survey that you may receive in the mail after your visit with us. Thank you!             Your Updated Medication List - Protect others around you: Learn how to safely use, store and throw away your medicines at www.disposemymeds.org.          This list is accurate as of: 10/31/17  4:12 PM.  Always use your most recent med list.                   Brand Name Dispense Instructions for use Diagnosis    amLODIPine 5 MG tablet    NORVASC    30 tablet    Take 1 tablet (5 mg) by mouth daily Hold for systolic pressure < 110. Facility MD to titrate as needed. This is a new med for the patient.    Essential hypertension, benign       hydrocortisone 2.5 % cream     30 g    Apply topically 2 times daily    Atopic dermatitis, unspecified type

## 2017-10-31 NOTE — PATIENT INSTRUCTIONS
Please hold the following medications the morning of your surgery:  -Any skin lotions such as , hydrocortisone cream  -Do not take any ibuprofen starting the day before surgery(11/2/17)  -Do not take aspirin starting today (10/31/17)      You can take:  Percocet, amlodipine
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
## 2017-10-31 NOTE — H&P
Pre-Operative H & P     CC:  Preoperative exam to assess for increased cardiopulmonary risk while undergoing surgery and anesthesia.    Date of Encounter: October 31, 2017   Primary Care Physician:  Krystian Streeter       HPI  Delvin Echevarria is a 52 year old male who presents for pre-operative H & P in preparation for a Right distal Tibia Fibula ORIF on 11/3/17 with Dr. Green at [facility].      Mr. Echevarria had ORIF surgery initially in 2016.  In August 2017 he had incision and debridement of the area with hardware removal due to infection.  He sustained another tibia fibular fracture to the same area a few days ago, so the above surgery has been recommended.      He has hypertension, but no known cardiac disease.  He is a current 1 ppd smoker for the past 25 years with no known pulmonary disease.     He reports no problems with previous anesthesia.    History is obtained from the patient and medical record including Care Everywhere.    Past Medical History  Past Medical History:   Diagnosis Date     Hypertension 3/15/2013     Tobacco use          Past Surgical History  Past Surgical History:   Procedure Laterality Date     APPLY EXTERNAL FIXATOR LOWER EXTREMITY Right 9/12/2016    Procedure: APPLY EXTERNAL FIXATOR LOWER EXTREMITY;  Surgeon: John Gonzales MD;  Location: [facility]     HERNIORRHAPHY INGUINAL  7/2/2012    Procedure: HERNIORRHAPHY INGUINAL;  Open Repair Right Inguinal Hernia with Mesh;  Surgeon: Avni Maxwell MD;  Location: [facility]     INCISION AND DRAINAGE BONE LOWER EXTREMITY, COMBINED Right 8/11/2017    Procedure: COMBINED INCISION AND DRAINAGE BONE LOWER EXTREMITY;  Irrigation and Debridement Right Tibia,  Removal of Hardware;  Surgeon: Kenroy Dos Santos MD;  Location: [facility] OR     LARYNGECTOMY  Several Years ago    Partail removal due to fish bone     OPEN REDUCTION INTERNAL FIXATION TIBIA Right 9/15/2016    Procedure: OPEN REDUCTION INTERNAL FIXATION TIBIA;  Surgeon:  Miguel A Green MD;  Location: [facility] OR     REMOVE HARDWARE LOWER EXTREMITY Right 8/11/2017    Procedure: REMOVE HARDWARE LOWER EXTREMITY;;  Surgeon: Kenroy Dos Santos MD;  Location: [facility] OR           Prior to Admission Medications  Current Outpatient Prescriptions   Medication Sig Dispense Refill     hydrocortisone 2.5 % cream Apply topically 2 times daily 30 g 3     amLODIPine (NORVASC) 5 MG tablet Take 1 tablet (5 mg) by mouth daily Hold for systolic pressure < 110. Facility MD to titrate as needed. This is a new med for the patient. (Patient taking differently: Take 5 mg by mouth every morning Hold for systolic pressure < 110. Facility MD to titrate as needed. This is a new med for the patient.) 30 tablet      oxyCODONE-acetaminophen (PERCOCET) 5-325 MG per tablet Take 1 tablet by mouth every 6 hours as needed  0         Allergies  Seasonal allergies and Codeine     Social History  Social History     Social History     Marital status: Single     Spouse name: N/A     Number of children: N/A     Years of education: N/A     Occupational History     Not on file.     Social History Main Topics     Smoking status: Current Every Day Smoker     Packs/day: 1.00     Years: 25.00     Smokeless tobacco: Never Used     Alcohol use No     Drug use: Yes     Special: Marijuana      Comment: Smokes 4x daily     Sexual activity: Not on file     Other Topics Concern     Not on file     Social History Narrative          Family History  Family History   Problem Relation Age of Onset     Hypertension Mother      Hypertension Brother         ROS  10 point review of systems performed.  All pertinent positives noted, otherwise Negative.      Cardiac Testing  -EKG 8/11/17:  Sinus Rhythm, possible left atrial enlargement     Labs: (personally reviewed):  Lab Results   Component Value Date    WBC 8.6 09/21/2017    HGB 13.0 (L) 10/31/2017    IRON 46 02/16/2017    IRONSAT 9 (L) 10/06/2016    HCT 39.2 (L) 09/21/2017      "09/21/2017    INR 1.08 08/11/2017    PTT 28 12/14/2016     09/21/2017    POTASSIUM 4.0 10/31/2017    DINO 9.0 09/21/2017    GLC 84 09/21/2017    CR 0.57 (L) 10/31/2017    BUN 12 09/21/2017    CO2 28 09/21/2017    ALT 25 09/21/2017    AST 37 09/21/2017    BILITOTAL 0.4 09/21/2017    ALKPHOS 107 09/21/2017           Physical Exam:  No LMP for male patient.   Vital signs:  BP (!) 189/98  Pulse 67  Temp 97.8  F (36.6  C) (Oral)  Resp 16  Ht 1.822 m (5' 11.75\")  Wt 68 kg (150 lb)  SpO2 97%  BMI 20.49 kg/m2    Constitutional: Awake, alert, cooperative, no apparent distress, and appears stated age.  Eyes: Pupils equal, round and reactive to light, sclera clear, conjunctiva normal.  HENT: Normocephalic, oral pharynx with moist mucus membranes. No goiter appreciated.   Respiratory: Clear to auscultation bilaterally, no crackles or wheezing.  Cardiovascular: Regular rate and rhythm, normal S1 and S2, and no overt murmur noted.  Carotids +2, no bruits. No edema. Palpable pulses to radial  DP and PT arteries.   GI: Umbilical hernia, Normal bowel sounds, soft  Skin: Warm and dry.  No rashes at exposed site.   Musculoskeletal: Full ROM of neck. There is no redness, warmth, or swelling of the exposed joints. Gross motor strength is normal.    Neurologic: Awake, alert, oriented to name, place and time.  Gait is normal.   Neuropsychiatric: Calm, cooperative. Normal affect.     Assessment/Plan  Delvin Echevarria is a 52 year old male who presents for pre-operative H & P in preparation for a Right distal Tibia Fibula ORIF on 11/3/17 with Dr. Green at [facility].     PAC referral for risk assessment and optimization for anesthesia with comorbid conditions of:    Pre-operative considerations:    MP II, >3 FB, Neck with full ROM, ASA 2    1.  Cardiac:  Functional status METS >4(Prior to fracture)     Risk of Major Adverse Cardiac event: 0.4%  -HTN controlled with amlodipine(Take DOS)  -EKG 8/11/17:  Sinus Rhythm, possible left atrial " enlargement   2.  Pulm:   ALVAREZ risk:  Intermediate  -Smoker, 1ppd for 25 years  3.  GI:  Risk of PONV score =1 .  If > 2, anti-emetic intervention recommended.  4.  Meds:  -Anticoags: asa:  no longer taking  5.  Marijuana Abuse, smokes 4 x or more daily    Patient is optimized and is acceptable candidate for the proposed procedure.  No further diagnostic evaluation is needed.   Patient given AVS with details about medications to take or hold prior to DOS.  Chayo Liu MS PA-C   Preoperative Assessment Center  [facility]  Clinic and Surgery Center  Phone: [phone]  Fax: [phone]

## 2017-10-31 NOTE — PROGRESS NOTES
Social Work Brief Intervention  [facility]    Data/Intervention:    Patient Name:  Delvin Echevarria  /Age:  1965 (52 year old)    Visit Type: in person  Referral Source: Ortho Clinic RN, Sejal Hatfield and Dr. Green  Reason for Referral:  Discharge planning for same-day surgery    Collaborated With:    -Patient  -[facility] orthopedic clinic  -[facility] ([phone], [phone]) Admissions SW is Adia Masonw    Patient Concerns/Issues:    Patient is being scheduled [place] [facility] [place] Patient will require TCU admission at discharge from surgery. Patient has been to [facility] in the past and wishes to go back there after upcoming surgery. [place] and will not be able to care for himself after surgery.    Intervention/Education/Resources Provided:   provided discharge education and offered to provide Patient with a Medicare list of SNFs. Patient declined list and wished to discharge to [facility].    Assessment/Plan:   spoke with [facility] ([facility]) who confirmed they will have a bed for Patient.  faxed face sheet and H&P to [facility]. Patient will get approval for transport through "[employer] Ride ([phone]).  will follow up with SW at [facility] on Monday to begin the finalization process for Patient's admission to TCU. TCU will need Rx orders by 3:00pm on day of admission and the rest of the necessary records by 4:00pm on day of surgery.    Provided patient/family with contact information and availability to follow up as needed.    [facility]  Outpatient Specialty Clinics  Direct Phone: [phone]  Pager:  [phone]

## 2017-10-31 NOTE — NURSING NOTE
Reason For Visit:   Chief Complaint   Patient presents with     Musculoskeletal Problem     Right distal tib-fib fx. DOI 10/2/7/17           Pain Assessment  Patient Currently in Pain: Yes  0-10 Pain Scale: 8  Primary Pain Location: Ankle  Pain Orientation: Right  Pain Descriptors: Sharp, Aching  Alleviating Factors: Pain medication, Rest  Aggravating Factors: Standing

## 2017-10-31 NOTE — MR AVS SNAPSHOT
After Visit Summary   10/31/2017    Delvin Echevarria    MRN: [id number]           Patient Information     Date Of Birth          1965        Visit Information        Provider Department      10/31/2017 1:30 PM 9, Satinder Pac Caryl [facility] Preoperative Assessment [place]        Today's Diagnoses     Closed fracture of right tibia and fibula with routine healing    -  1      Care Instructions    Please hold the following medications the morning of your surgery:  -Any skin lotions such as , hydrocortisone cream  -Do not take any ibuprofen starting the day before surgery(11/2/17)  -Do not take aspirin starting today (10/31/17)      You can take:  Percocet, amlodipine              Follow-ups after your visit        Your next 10 appointments already scheduled     Oct 31, 2017  2:30 PM CDT   (Arrive by 2:15 PM)   PAC RN ASSESSMENT with  Pac Rn   [facility] Preoperative Assessment [place] ([facility])    [address] [place] [address]   [phone]            Oct 31, 2017  3:20 PM CDT   (Arrive by 3:05 PM)   PAC Anesthesia Consult with  Pac Anesthesiologist   [facility] Assessment [place] ([facility])    [address] [place] [address]   [phone]            Oct 31, 2017  3:30 PM CDT   LAB with  LAB   [facility] ([facility])    [address] [place] [address]   [phone]           Please do not eat 10-12 hours before your appointment if you are coming in fasting for labs on lipids, cholesterol, or glucose (sugar). This does not apply to pregnant women. Water, hot tea and black coffee (with nothing added) are okay. Do not drink other fluids, diet soda or chew gum.            Nov 22, 2017  1:00 PM CST   (Arrive by 12:45 PM)   Post-Op with  U Ortho Nurse   [facility] Orthopaedic [facility] ([facility])    [address] [place] [address]  [address] [facility] [id number] [phone]            Dec 19, 2017  3:10 PM CST   (Arrive by 2:55 PM)   POST-OP FOOT/ANKLE with Miguel A Green MD   [facility] ([facility])    [address] [facility] [address] [phone]              Future tests that were ordered for you today     Open Future Orders        Priority Expected Expires Ordered    Potassium Routine 10/31/2017 2017 10/31/2017    Creatinine Routine 10/31/2017 2017 10/31/2017    Hemoglobin Routine 10/31/2017 2017 10/31/2017            Who to contact     Please call your clinic at [phone] to:    Ask questions about your health    Make or cancel appointments    Discuss your medicines    Learn about your test results    Speak to your doctor   If you have compliments or concerns about an experience at your clinic, or if you wish to file a complaint, please contact [facility] Patient Relations at [phone] or email us at [email or web address]         Additional Information About Your Visit        Frequent BrowserharEntegrion Information     [employer] is an electronic gateway that provides easy, online access to your medical records. With O4IT, you can request a clinic appointment, read your test results, renew a prescription or communicate with your care team.     To sign up for [employer]inner[employer] visit the website at [email or web address]   You will be asked to enter the access code listed below, as well as some personal information. Please follow the directions to create your username and password.     Your access code is: [email or web address]  Expires: 2018 11:52 AM     Your access code will  in 90 days. If you need help or a new code, please contact your [facility] Clinic or call [phone] for assistance.        Care EveryWhere ID     This is your Care EveryWhere ID. This could be used by other  "organizations to access your [place] medical records  [id number]        Your Vitals Were     Pulse Temperature Respirations Height Pulse Oximetry BMI (Body Mass Index)    67 97.8  F (36.6  C) (Oral) 16 1.822 m (5' 11.75\") 97% 20.49 kg/m2       Blood Pressure from Last 3 Encounters:   10/31/17 (!) 189/98   10/27/17 (!) 189/111   09/21/17 155/79    Weight from Last 3 Encounters:   10/31/17 68 kg (150 lb)   09/27/17 67.6 kg (149 lb)   09/21/17 70.3 kg (155 lb)                 Today's Medication Changes          These changes are accurate as of: 10/31/17  2:01 PM.  If you have any questions, ask your nurse or doctor.               These medicines have changed or have updated prescriptions.        Dose/Directions    amLODIPine 5 MG tablet   Commonly known as:  NORVASC   This may have changed:    - when to take this  - additional instructions   Used for:  Essential hypertension, benign        Dose:  5 mg   Take 1 tablet (5 mg) by mouth daily Hold for systolic pressure < 110. Facility MD to titrate as needed. This is a new med for the patient.   Quantity:  30 tablet   Refills:  0         Stop taking these medicines if you haven't already. Please contact your care team if you have questions.     oxyCODONE-acetaminophen 5-325 MG per tablet   Commonly known as:  PERCOCET   Stopped by:  9,  Pac Caryl                    Primary Care Provider Office Phone # Fax #    Krystian Streeter [phone]       [address] [place], [place] [address]        Equal Access to Services     GAYLA CRAWFORD : Hadjesus tolbert Soteena, waaxda luqadaha, qaybta kaalmada adeegyada, waxay idikeith coleman. So [facility] [phone].    ATENCIÓN: Si habla español, tiene a alcazar disposición servicios gratuitos de asistencia lingüística. Llame al [phone].    We comply with applicable federal civil rights laws and [place] laws. We do not discriminate on the basis of race, color, national origin, age, " disability, sex, sexual orientation, or gender identity.            Thank you!     Thank you for choosing [facility]  for your care. Our goal is always to provide you with excellent care. Hearing back from our patients is one way we can continue to improve our services. Please take a few minutes to complete the written survey that you may receive in the mail after your visit with us. Thank you!             Your Updated Medication List - Protect others around you: Learn how to safely use, store and throw away your medicines at www.disposemymeds.org.          This list is accurate as of: 10/31/17  2:01 PM.  Always use your most recent med list.                   Brand Name Dispense Instructions for use Diagnosis    amLODIPine 5 MG tablet    NORVASC    30 tablet    Take 1 tablet (5 mg) by mouth daily Hold for systolic pressure < 110. Facility MD to titrate as needed. This is a new med for the patient.    Essential hypertension, benign       hydrocortisone 2.5 % cream     30 g    Apply topically 2 times daily    Atopic dermatitis, unspecified type
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
## 2017-11-01 NOTE — NURSING NOTE
Teaching Flowsheet   Relevant Diagnosis: Right tib-fib fracture   Teaching Topic: Pre-op right tib-fib open reduction internal fixation      Person(s) involved in teaching:   Patient     Motivation Level:  Asks Questions: Yes  Eager to Learn: Yes  Cooperative: Yes  Receptive (willing/able to accept information): Yes  Any cultural factors/Nondenominational beliefs that may influence understanding or compliance? No       Patient demonstrates understanding of the following:  Reason for the appointment, diagnosis and treatment plan: Yes  Knowledge of proper use of medications and conditions for which they are ordered (with special attention to potential side effects or drug interactions): Yes  Which situations necessitate calling provider and whom to contact: Yes       Teaching Concerns Addressed:   Comments: Patient having surgery 11/7/17, pre-op physical completed in PAC, He will discharge to nursing home following surgery next week- [facility] is working with patient for placement.       Proper use and care of  (medical equip, care aids, etc.): Yes  Nutritional needs and diet plan: Yes  Pain management techniques: Yes  Wound Care: Yes  How and/when to access community resources: Yes     Instructional Materials Used/Given: pre-op packet, antiseptic soap, post-operative foot and ankle surgery instructions.      Time spent with patient: 15 minutes.

## 2017-11-07 ENCOUNTER — ANESTHESIA (OUTPATIENT)
Dept: SURGERY | Facility: AMBULATORY SURGERY CENTER | Age: 52
End: 2017-11-07

## 2017-11-07 ENCOUNTER — HOSPITAL ENCOUNTER (OUTPATIENT)
Facility: AMBULATORY SURGERY CENTER | Age: 52
End: 2017-11-07
Attending: ORTHOPAEDIC SURGERY

## 2017-11-07 ENCOUNTER — TELEPHONE (OUTPATIENT)
Dept: ORTHOPEDICS | Facility: CLINIC | Age: 52
End: 2017-11-07

## 2017-11-07 VITALS
BODY MASS INDEX: 21 KG/M2 | TEMPERATURE: 98 F | RESPIRATION RATE: 18 BRPM | HEIGHT: 71 IN | WEIGHT: 150 LBS | SYSTOLIC BLOOD PRESSURE: 182 MMHG | DIASTOLIC BLOOD PRESSURE: 116 MMHG | OXYGEN SATURATION: 95 %

## 2017-11-07 DIAGNOSIS — S82.831A CLOSED FRACTURE OF DISTAL END OF RIGHT FIBULA AND TIBIA, INITIAL ENCOUNTER: Primary | ICD-10-CM

## 2017-11-07 DIAGNOSIS — Z09 SURGICAL FOLLOW-UP CARE: Primary | ICD-10-CM

## 2017-11-07 DIAGNOSIS — S82.301A CLOSED FRACTURE OF DISTAL END OF RIGHT FIBULA AND TIBIA, INITIAL ENCOUNTER: Primary | ICD-10-CM

## 2017-11-07 DEVICE — IMPLANTABLE DEVICE: Type: IMPLANTABLE DEVICE | Site: ANKLE | Status: FUNCTIONAL

## 2017-11-07 RX ORDER — MEPERIDINE HYDROCHLORIDE 25 MG/ML
12.5 INJECTION INTRAMUSCULAR; INTRAVENOUS; SUBCUTANEOUS
Status: DISCONTINUED | OUTPATIENT
Start: 2017-11-07 | End: 2017-11-08 | Stop reason: HOSPADM

## 2017-11-07 RX ORDER — HYDROXYZINE PAMOATE 25 MG/1
25 CAPSULE ORAL EVERY 6 HOURS PRN
Qty: 30 CAPSULE | Refills: 0 | Status: SHIPPED | OUTPATIENT
Start: 2017-11-07 | End: 2018-01-17

## 2017-11-07 RX ORDER — PROPOFOL 10 MG/ML
INJECTION, EMULSION INTRAVENOUS PRN
Status: DISCONTINUED | OUTPATIENT
Start: 2017-11-07 | End: 2017-11-07

## 2017-11-07 RX ORDER — BUPIVACAINE HYDROCHLORIDE 5 MG/ML
INJECTION, SOLUTION EPIDURAL; INTRACAUDAL PRN
Status: DISCONTINUED | OUTPATIENT
Start: 2017-11-07 | End: 2017-11-07

## 2017-11-07 RX ORDER — ONDANSETRON 4 MG/1
4 TABLET, ORALLY DISINTEGRATING ORAL EVERY 30 MIN PRN
Status: DISCONTINUED | OUTPATIENT
Start: 2017-11-07 | End: 2017-11-08 | Stop reason: HOSPADM

## 2017-11-07 RX ORDER — FENTANYL CITRATE 50 UG/ML
25-50 INJECTION, SOLUTION INTRAMUSCULAR; INTRAVENOUS
Status: DISCONTINUED | OUTPATIENT
Start: 2017-11-07 | End: 2017-11-07 | Stop reason: HOSPADM

## 2017-11-07 RX ORDER — OXYCODONE HYDROCHLORIDE 5 MG/1
5-10 TABLET ORAL
Status: COMPLETED | OUTPATIENT
Start: 2017-11-07 | End: 2017-11-07

## 2017-11-07 RX ORDER — VANCOMYCIN HYDROCHLORIDE 1 G/200ML
1000 INJECTION, SOLUTION INTRAVENOUS
Status: COMPLETED | OUTPATIENT
Start: 2017-11-07 | End: 2017-11-07

## 2017-11-07 RX ORDER — LIDOCAINE 40 MG/G
CREAM TOPICAL
Status: DISCONTINUED | OUTPATIENT
Start: 2017-11-07 | End: 2017-11-07 | Stop reason: HOSPADM

## 2017-11-07 RX ORDER — DEXAMETHASONE SODIUM PHOSPHATE 4 MG/ML
4 INJECTION, SOLUTION INTRA-ARTICULAR; INTRALESIONAL; INTRAMUSCULAR; INTRAVENOUS; SOFT TISSUE EVERY 10 MIN PRN
Status: DISCONTINUED | OUTPATIENT
Start: 2017-11-07 | End: 2017-11-08 | Stop reason: HOSPADM

## 2017-11-07 RX ORDER — KETOROLAC TROMETHAMINE 30 MG/ML
30 INJECTION, SOLUTION INTRAMUSCULAR; INTRAVENOUS EVERY 6 HOURS PRN
Status: DISCONTINUED | OUTPATIENT
Start: 2017-11-07 | End: 2017-11-08 | Stop reason: HOSPADM

## 2017-11-07 RX ORDER — OXYCODONE HYDROCHLORIDE 5 MG/1
5-10 TABLET ORAL EVERY 4 HOURS PRN
Qty: 40 TABLET | Refills: 0 | Status: SHIPPED | OUTPATIENT
Start: 2017-11-07 | End: 2018-01-17

## 2017-11-07 RX ORDER — SODIUM CHLORIDE, SODIUM LACTATE, POTASSIUM CHLORIDE, CALCIUM CHLORIDE 600; 310; 30; 20 MG/100ML; MG/100ML; MG/100ML; MG/100ML
INJECTION, SOLUTION INTRAVENOUS CONTINUOUS
Status: DISCONTINUED | OUTPATIENT
Start: 2017-11-07 | End: 2017-11-08 | Stop reason: HOSPADM

## 2017-11-07 RX ORDER — GABAPENTIN 300 MG/1
300 CAPSULE ORAL ONCE
Status: COMPLETED | OUTPATIENT
Start: 2017-11-07 | End: 2017-11-07

## 2017-11-07 RX ORDER — ACETAMINOPHEN 325 MG/1
975 TABLET ORAL ONCE
Status: COMPLETED | OUTPATIENT
Start: 2017-11-07 | End: 2017-11-07

## 2017-11-07 RX ORDER — HYDROMORPHONE HYDROCHLORIDE 1 MG/ML
.3-.5 INJECTION, SOLUTION INTRAMUSCULAR; INTRAVENOUS; SUBCUTANEOUS EVERY 10 MIN PRN
Status: DISCONTINUED | OUTPATIENT
Start: 2017-11-07 | End: 2017-11-08 | Stop reason: HOSPADM

## 2017-11-07 RX ORDER — NALOXONE HYDROCHLORIDE 0.4 MG/ML
.1-.4 INJECTION, SOLUTION INTRAMUSCULAR; INTRAVENOUS; SUBCUTANEOUS
Status: DISCONTINUED | OUTPATIENT
Start: 2017-11-07 | End: 2017-11-08 | Stop reason: HOSPADM

## 2017-11-07 RX ORDER — VANCOMYCIN HYDROCHLORIDE 1 G/200ML
1000 INJECTION, SOLUTION INTRAVENOUS SEE ADMIN INSTRUCTIONS
Status: DISCONTINUED | OUTPATIENT
Start: 2017-11-07 | End: 2017-11-07 | Stop reason: HOSPADM

## 2017-11-07 RX ORDER — ONDANSETRON 2 MG/ML
INJECTION INTRAMUSCULAR; INTRAVENOUS PRN
Status: DISCONTINUED | OUTPATIENT
Start: 2017-11-07 | End: 2017-11-07

## 2017-11-07 RX ORDER — ACETAMINOPHEN 325 MG/1
650 TABLET ORAL
Status: DISCONTINUED | OUTPATIENT
Start: 2017-11-07 | End: 2017-11-08 | Stop reason: HOSPADM

## 2017-11-07 RX ORDER — FLUMAZENIL 0.1 MG/ML
0.2 INJECTION, SOLUTION INTRAVENOUS
Status: DISCONTINUED | OUTPATIENT
Start: 2017-11-07 | End: 2017-11-07 | Stop reason: HOSPADM

## 2017-11-07 RX ORDER — ALBUTEROL SULFATE 0.83 MG/ML
2.5 SOLUTION RESPIRATORY (INHALATION) EVERY 4 HOURS PRN
Status: DISCONTINUED | OUTPATIENT
Start: 2017-11-07 | End: 2017-11-07 | Stop reason: HOSPADM

## 2017-11-07 RX ORDER — ONDANSETRON 4 MG/1
4 TABLET, ORALLY DISINTEGRATING ORAL
Status: DISCONTINUED | OUTPATIENT
Start: 2017-11-07 | End: 2017-11-08 | Stop reason: HOSPADM

## 2017-11-07 RX ORDER — FENTANYL CITRATE 50 UG/ML
25-50 INJECTION, SOLUTION INTRAMUSCULAR; INTRAVENOUS
Status: DISCONTINUED | OUTPATIENT
Start: 2017-11-07 | End: 2017-11-08 | Stop reason: HOSPADM

## 2017-11-07 RX ORDER — HYDRALAZINE HYDROCHLORIDE 20 MG/ML
10 INJECTION INTRAMUSCULAR; INTRAVENOUS ONCE
Status: COMPLETED | OUTPATIENT
Start: 2017-11-07 | End: 2017-11-07

## 2017-11-07 RX ORDER — ONDANSETRON 2 MG/ML
4 INJECTION INTRAMUSCULAR; INTRAVENOUS EVERY 30 MIN PRN
Status: DISCONTINUED | OUTPATIENT
Start: 2017-11-07 | End: 2017-11-08 | Stop reason: HOSPADM

## 2017-11-07 RX ORDER — DEXAMETHASONE SODIUM PHOSPHATE 4 MG/ML
INJECTION, SOLUTION INTRA-ARTICULAR; INTRALESIONAL; INTRAMUSCULAR; INTRAVENOUS; SOFT TISSUE PRN
Status: DISCONTINUED | OUTPATIENT
Start: 2017-11-07 | End: 2017-11-07

## 2017-11-07 RX ORDER — SODIUM CHLORIDE, SODIUM LACTATE, POTASSIUM CHLORIDE, CALCIUM CHLORIDE 600; 310; 30; 20 MG/100ML; MG/100ML; MG/100ML; MG/100ML
INJECTION, SOLUTION INTRAVENOUS CONTINUOUS
Status: DISCONTINUED | OUTPATIENT
Start: 2017-11-07 | End: 2017-11-07 | Stop reason: HOSPADM

## 2017-11-07 RX ORDER — NALOXONE HYDROCHLORIDE 0.4 MG/ML
.1-.4 INJECTION, SOLUTION INTRAMUSCULAR; INTRAVENOUS; SUBCUTANEOUS
Status: DISCONTINUED | OUTPATIENT
Start: 2017-11-07 | End: 2017-11-07 | Stop reason: HOSPADM

## 2017-11-07 RX ORDER — KETOROLAC TROMETHAMINE 30 MG/ML
INJECTION, SOLUTION INTRAMUSCULAR; INTRAVENOUS PRN
Status: DISCONTINUED | OUTPATIENT
Start: 2017-11-07 | End: 2017-11-07

## 2017-11-07 RX ORDER — LIDOCAINE HYDROCHLORIDE 20 MG/ML
INJECTION, SOLUTION INFILTRATION; PERINEURAL PRN
Status: DISCONTINUED | OUTPATIENT
Start: 2017-11-07 | End: 2017-11-07

## 2017-11-07 RX ADMIN — FENTANYL CITRATE 50 MCG: 50 INJECTION, SOLUTION INTRAMUSCULAR; INTRAVENOUS at 16:40

## 2017-11-07 RX ADMIN — ONDANSETRON 4 MG: 2 INJECTION INTRAMUSCULAR; INTRAVENOUS at 15:23

## 2017-11-07 RX ADMIN — VANCOMYCIN HYDROCHLORIDE 1000 MG: 1 INJECTION, SOLUTION INTRAVENOUS at 14:59

## 2017-11-07 RX ADMIN — BUPIVACAINE HYDROCHLORIDE 20 ML: 5 INJECTION, SOLUTION EPIDURAL; INTRACAUDAL at 16:17

## 2017-11-07 RX ADMIN — PROPOFOL 200 MG: 10 INJECTION, EMULSION INTRAVENOUS at 15:23

## 2017-11-07 RX ADMIN — KETOROLAC TROMETHAMINE 30 MG: 30 INJECTION, SOLUTION INTRAMUSCULAR; INTRAVENOUS at 16:02

## 2017-11-07 RX ADMIN — HYDRALAZINE HYDROCHLORIDE 10 MG: 20 INJECTION INTRAMUSCULAR; INTRAVENOUS at 16:59

## 2017-11-07 RX ADMIN — FENTANYL CITRATE 50 MCG: 50 INJECTION, SOLUTION INTRAMUSCULAR; INTRAVENOUS at 16:42

## 2017-11-07 RX ADMIN — FENTANYL CITRATE 100 MCG: 50 INJECTION, SOLUTION INTRAMUSCULAR; INTRAVENOUS at 15:37

## 2017-11-07 RX ADMIN — SODIUM CHLORIDE, SODIUM LACTATE, POTASSIUM CHLORIDE, CALCIUM CHLORIDE: 600; 310; 30; 20 INJECTION, SOLUTION INTRAVENOUS at 15:16

## 2017-11-07 RX ADMIN — ACETAMINOPHEN 975 MG: 325 TABLET ORAL at 14:54

## 2017-11-07 RX ADMIN — DEXAMETHASONE SODIUM PHOSPHATE 4 MG: 4 INJECTION, SOLUTION INTRA-ARTICULAR; INTRALESIONAL; INTRAMUSCULAR; INTRAVENOUS; SOFT TISSUE at 15:23

## 2017-11-07 RX ADMIN — OXYCODONE HYDROCHLORIDE 5 MG: 5 TABLET ORAL at 17:10

## 2017-11-07 RX ADMIN — LIDOCAINE HYDROCHLORIDE 100 MG: 20 INJECTION, SOLUTION INFILTRATION; PERINEURAL at 15:21

## 2017-11-07 RX ADMIN — OXYCODONE HYDROCHLORIDE 5 MG: 5 TABLET ORAL at 16:45

## 2017-11-07 RX ADMIN — GABAPENTIN 300 MG: 300 CAPSULE ORAL at 14:54

## 2017-11-07 RX ADMIN — PROPOFOL 200 MG: 10 INJECTION, EMULSION INTRAVENOUS at 15:21

## 2017-11-07 ASSESSMENT — LIFESTYLE VARIABLES: TOBACCO_USE: 1

## 2017-11-07 NOTE — ANESTHESIA POSTPROCEDURE EVALUATION
Patient: Delvin Echevarria    Procedure(s):  Right Tibia Fibula Open Reduction Internal Fixation - Wound Class: I-Clean    Diagnosis:Right Tibia-Fibula Fracture  Diagnosis Additional Information: No value filed.    Anesthesia Type:  No value filed.    Note:  Anesthesia Post Evaluation    Patient location during evaluation: bedside  Patient participation: Able to participate in evaluation but full recovery from regional anesthesia has not yet occurred and is not anticipated to occur within 48 hours  Level of consciousness: awake  Pain management: adequate  Airway patency: patent  Cardiovascular status: acceptable  Respiratory status: acceptable  Hydration status: acceptable  PONV: controlled     Anesthetic complications: None          Last vitals:  Vitals:    11/07/17 1700 11/07/17 1704 11/07/17 1705   BP: (!) 196/125 (!) 182/116 (!) 182/116   Resp: 16 18    Temp: 36.7  C (98  F) 36.7  C (98  F)    SpO2: 96% 95% 95%         Electronically Signed By: Jose Martin MD, MD  November 7, 2017  5:43 PM

## 2017-11-07 NOTE — IP AVS SNAPSHOT
[facility] Surgery [facility] Procedure [facility]    [address] [place] [address]    Phone:  [phone]    Fax:  [phone]                                       After Visit Summary   11/7/2017    Delvin Echevarria    MRN: [id number]           After Visit Summary Signature Page     I have received my discharge instructions, and my questions have been answered. I have discussed any challenges I see with this plan with the nurse or doctor.    ..........................................................................................................................................  Patient/Patient Representative Signature      ..........................................................................................................................................  Patient Representative Print Name and Relationship to Patient    ..................................................               ................................................  Date                                            Time    ..........................................................................................................................................  Reviewed by Signature/Title    ...................................................              ..............................................  Date                                                            Time

## 2017-11-07 NOTE — ANESTHESIA PROCEDURE NOTES
Peripheral Nerve Block Procedure Note    Staff:     Anesthesiologist:  PRADIP VALENTE    Referred By:  GRIS STANLEY  Location: Pre-op  Procedure Start/Stop TImes:      11/7/2017 4:14 PM     11/7/2017 4:21 PM    patient identified, IV checked, site marked, risks and benefits discussed, informed consent, monitors and equipment checked, pre-op evaluation, at physician/surgeon's request and post-op pain management      Correct Patient: Yes      Correct Position: Yes      Correct Site: Yes      Correct Procedure: Yes      Correct Laterality:  Yes    Site Marked:  Yes  Procedure details:     Procedure:  Adductor canal and Popliteal    ASA:  2    Laterality:  Right    Position:  Supine    Sterile Prep: chloraprep, mask and sterile gloves      Needle:  Insulated and short bevel    Needle gauge:  21    Needle length (inches):  4    Abnormal pain on injection: No      Blood Aspirated: No      Paresthesias:  No    Bleeding at site: No      Bolus via:  Needle    Infusion Method:  Single Shot    Complications:  None  Assessment/Narrative:      Discussed with Patient Off-Label use of Liposomal Bupivacaine (Exparel) for Nerve Block.    Relevant risks & benefits were discussed with patient.    All questions were answered and there was agreement to proceed.    Patient signed Off-Label Use of Exparel Consent Form.

## 2017-11-07 NOTE — DISCHARGE INSTRUCTIONS
"Information about liposomal bupivacaine (Exparel)    What is Liposomal Bupivacaine?    Liposomal Bupivacaine is a numbing medication that can help you manage your pain after surgery.  This medication is similar to \"novacaine,\" which is often used by the dentist.  Liposomal bupivacaine is released slowly and can help control pain for up to 72 hours.    What is the purpose of Liposomal Bupivacaine?    To manage your pain after surgery    To help you sleep better, take deep breaths, walk more comfortable, and feel up to visiting with others    How is the procedure done?    Liposomal bupivacaine is a medication given by an injection.    It is usually given right before your surgery.  If this is the case, you will be awake or sedated, but you should experience minimal pain during the procedure.    For some people, the injection may be given at the very end of your surgery.  It all depends on the type of surgery and your situation.    The procedure usually takes about 5-15 minutes.  An ultrasound machine will help the anesthesiologist insert it in the right place or the surgeon will inject it under direct vision.     A needle is used to place the numbing medication under your skin.  It provides pain relief by numbing the tissue in the area where your surgeon will make the incision.    What can I expect?    You may experience numbness, tingling, or a feeling of heaviness around the area that was injected.    If you experience any of the follow symptoms IMMEDIATELY CALL THE REGIONAL ANESTHESIA PAIN SERVICE:    Numbness or tingling occurs in areas other than around the injection site    Blurry vision    Ringing in your ears    A metallic taste in your mouth    PAGE: Dial [phone].  When prompted, enter the following 4-digit ID number:  [id number].  You will be prompted to enter your phone number; and then enter the # sign.  The clinician on call will call you back.    OR    CALL: Dial [phone].  Let the hospital  " know that you are having a problem with a nerve block and that you would like to speak to the regional anesthesia pain service right away.    You should not receive any other type of numbing medication within 4 days after receiving liposomal bupivacaine unless your anesthesiologist approves.    Post Operative Instructions: Regional Anesthetic for Lower Extremity with Liposomal Bupivacaine  General Information:   Regional anesthesia is when local anesthetic or  numbing  medication is injected around the nerves to anesthetize or  numb  the area supplied by that set of nerves. It is a type of analgesia used to control pain and decreases the need for narcotics following surgery.    Types of Regional Blocks:  Femoral: A block injected into the groin area of the operative leg of a patient having thigh or knee surgery.  Adductor Canal: A block injected into the mid thigh of the operative leg of a patient having knee or ankle surgery.  Popliteal or Distal Sciatic: A block injected into the back of the knee of the operative leg of patients having foot or ankle surgery.   Ankle:  An anesthetic medication is injected into the ankle of the operative leg of a patient having foot or toe surgery.     Procedure:   The type of anesthesia your doctor used to numb your leg will usually not wear off for 24-48 hours, but may last as long as 72 hours. You should be careful during that period, since it is possible to injure your leg without being aware of the injury. While your leg is numb you should:    Use crutches (minimal weight bearing until your motor and strength is completely back to normal)    Avoid striking or bumping your leg    Avoid extreme hot or cold    Discomfort:  You will have a tingling and prickly sensation in your leg as the feeling begins to return; you can also expect some discomfort. The amount of discomfort is unpredictable, but if you have more pain than can be controlled with pain medication, you should notify  your physician.     Pain Medicine:   Begin taking your oral pain pills before bedtime and during the night to avoid a sudden onset of pain as part of the block wears off. Do not engage in drinking, driving, or hazardous occupations while taking pain medication. [facility]  Home Care Following Anesthesia  For 24 hours after surgery:  1. Get plenty of rest.  A responsible adult must stay with you for at least 24 hours after you leave the surgery center.  2. Do not drive or use heavy equipment.  If you have weakness or tingling, don't drive or use heavy equipment until this feeling goes away.   3. Do not drink alcohol.   4. Avoid strenuous or risky activities.  Ask for help when climbing stairs.  5. You may feel lightheaded.  IF so, sit for a few minutes before standing.  Have someone help you get up.   6. If you have nausea (feel sick to your stomach): Drink only clear liquids such as apple juice, ginger ale, broth or 7-Up.  Rest may also help.  Be sure to drink enough fluids.  Move to a regular diet as you feel able.   7. You may have a slight fever.  Call the doctor if your fever is over 100 F (37.7 C) (taken under the tongue) or lasts longer than 24 hours.  8. You may have a dry mouth, a sore throat, muscle aches or trouble sleeping. These should go away after 24 hours.  9. Do not make important or legal decisions.               Tips for taking pain medications  To get the best pain relief possible, remember these points:    Take pain medications as directed, before pain becomes severe.    Pain medication can upset your stomach: taking it with food may help.    Constipation is a common side effect of pain medication. Drink plenty of  fluids.    Eat foods high in fiber. Take a stool softener if recommended by your doctor or pharmacist.    Do not drink alcohol, drive or operate machinery while taking pain medications.    Ask about other ways to control pain, such as with heat,  ice or relaxation.    Tylenol/Acetaminophen Consumption  To help encourage the safe use of acetaminophen, the makers of TYLENOL  have lowered the maximum daily dose for single-ingredient Extra Strength TYLENOL  (acetaminophen) products sold in the [place] from 8 pills per day (4,000 mg) to 6 pills per day (3,000 mg). The dosing interval has also changed from 2 pills every 4-6 hours to 2 pills every 6 hours.    If you feel your pain relief is insufficient, you may take Tylenol/Acetaminophen in addition to your narcotic pain medication.     Be careful not to exceed 3,000 mg of Tylenol/Acetaminophen in a 24 hour period from all sources.    If you are taking extra strength Tylenol/acetaminophen (500 mg), the maximum dose is 6 tablets in 24 hours.    If you are taking regular strength acetaminophen (325 mg), the maximum dose is 9 tablets in 24 hours.    Call a doctor for any of the followin. Signs of infection (fever, growing tenderness at the surgery site, a large amount of drainage or bleeding, severe pain, foul-smelling drainage, redness, swelling).  2. It has been over 8 to 10 hours since surgery and you are still not able to urinate (pass water).  3. Headache for over 24 hours.  4. Numbness, tingling or weakness the day after surgery (if you had spinal anesthesia).  Your doctor is:       Dr. Miguel A Green, Orthopaedics: [phone]               Or dial [phone] and ask for the resident on call for:  Orthopaedics  For emergency care, call the:  [facility] Emergency Department: [phone] (TTY for hearing impaired: [phone])

## 2017-11-07 NOTE — IP AVS SNAPSHOT
MRN:[id number]                      After Visit Summary   11/7/2017    Delvin Echevarria    MRN: [id number]           Thank you!     Thank you for choosing [facility] for your care. Our goal is always to provide you with excellent care. Hearing back from our patients is one way we can continue to improve our services. Please take a few minutes to complete the written survey that you may receive in the mail after you visit with us. Thank you!        Patient Information     Date Of Birth          1965        About your hospital stay     You were admitted on:  November 7, 2017 [facility]    You were discharged on:  November 7, 2017       Who to Call     For medical emergencies, please call 911.  For non-urgent questions about your medical care, please call your primary care provider or clinic, [phone]  For questions related to your surgery, please call your surgery clinic        Attending Provider     Provider Miguel A Jorgensen MD Orthopedics       Primary Care Provider Office Phone # Fax #    Krystian Streeter [phone]      After Care Instructions      Diet as Tolerated       Return to diet before surgery, unless instructed otherwise.            Discharge Instructions       Review outpatient procedure discharge instructions with patient as directed by Provider            Dressing Change       Change dressing on third day after surgery, replace with clean, dry gauze.  Do not put any ointments on incisions.            Dressing Change        IF leaking, remove and redress. Wash hands before and after and use gloves.            Ice to affected area       Ice pack to surgical site every 15 minutes per hour for 24 hours            No driving or operating machinery        until the day after procedure            No weight bearing       Wear CAM boot at all times, except for hygiene             "Notify Provider       CALL YOUR PHYSICIAN IF:  1.  Your pain begins to worsen and is unable to be controlled with your medications.  2.  Excessive redness or drainage of cloudy or bloody material from the wounds (Clear red tinted fluid and some mild drainage should be expected). Drainage of any kind 5 days after surgery should be reported to the doctor.  3.  You have a temperature elevation greater than 101.5    4.  You have pain, swelling or redness in your calf. You have numbness or weakness in your leg or foot.    You many call your physician at [phone] during business hours.    For after hours or on weekends, you may call the hospital at [phone] and ask for the orthopedic resident on call.            Return to clinic       Return to clinic in 2 weeks            Shower        Cover dressings when bathing/showering, do not get dressings wet            Wound care       Do not immerse wound in water until sutures removed                  Your next 10 appointments already scheduled     Nov 22, 2017  1:00 PM CST   (Arrive by 12:45 PM)   Post-Op with [facility] Ortho Nurse   [facility] ([facility])    [address] [place] [id number] [phone]            Dec 19, 2017  3:10 PM CST   (Arrive by 2:55 PM)   POST-OP FOOT/ANKLE with Miguel A Green MD   [facility] ([facility])    [address] [place] [id number] [phone]              Further instructions from your care team       Information about liposomal bupivacaine (Exparel)    What is Liposomal Bupivacaine?    Liposomal Bupivacaine is a numbing medication that can help you manage your pain after surgery.  This medication is similar to \"novacaine,\" which is often used by the dentist.  Liposomal bupivacaine is released slowly and can help control pain for up to 72 hours.    What is the purpose of Liposomal " Bupivacaine?    To manage your pain after surgery    To help you sleep better, take deep breaths, walk more comfortable, and feel up to visiting with others    How is the procedure done?    Liposomal bupivacaine is a medication given by an injection.    It is usually given right before your surgery.  If this is the case, you will be awake or sedated, but you should experience minimal pain during the procedure.    For some people, the injection may be given at the very end of your surgery.  It all depends on the type of surgery and your situation.    The procedure usually takes about 5-15 minutes.  An ultrasound machine will help the anesthesiologist insert it in the right place or the surgeon will inject it under direct vision.     A needle is used to place the numbing medication under your skin.  It provides pain relief by numbing the tissue in the area where your surgeon will make the incision.    What can I expect?    You may experience numbness, tingling, or a feeling of heaviness around the area that was injected.    If you experience any of the follow symptoms IMMEDIATELY CALL THE REGIONAL ANESTHESIA PAIN SERVICE:    Numbness or tingling occurs in areas other than around the injection site    Blurry vision    Ringing in your ears    A metallic taste in your mouth    PAGE: Dial [phone].  When prompted, enter the following 4-digit ID number:  [id number].  You will be prompted to enter your phone number; and then enter the # sign.  The clinician on call will call you back.    OR    CALL: Dial [phone].  Let the hospital  know that you are having a problem with a nerve block and that you would like to speak to the regional anesthesia pain service right away.    You should not receive any other type of numbing medication within 4 days after receiving liposomal bupivacaine unless your anesthesiologist approves.    Post Operative Instructions: Regional Anesthetic for Lower Extremity with Liposomal  Bupivacaine  General Information:   Regional anesthesia is when local anesthetic or  numbing  medication is injected around the nerves to anesthetize or  numb  the area supplied by that set of nerves. It is a type of analgesia used to control pain and decreases the need for narcotics following surgery.    Types of Regional Blocks:  Femoral: A block injected into the groin area of the operative leg of a patient having thigh or knee surgery.  Adductor Canal: A block injected into the mid thigh of the operative leg of a patient having knee or ankle surgery.  Popliteal or Distal Sciatic: A block injected into the back of the knee of the operative leg of patients having foot or ankle surgery.   Ankle:  An anesthetic medication is injected into the ankle of the operative leg of a patient having foot or toe surgery.     Procedure:   The type of anesthesia your doctor used to numb your leg will usually not wear off for 24-48 hours, but may last as long as 72 hours. You should be careful during that period, since it is possible to injure your leg without being aware of the injury. While your leg is numb you should:    Use crutches (minimal weight bearing until your motor and strength is completely back to normal)    Avoid striking or bumping your leg    Avoid extreme hot or cold    Discomfort:  You will have a tingling and prickly sensation in your leg as the feeling begins to return; you can also expect some discomfort. The amount of discomfort is unpredictable, but if you have more pain than can be controlled with pain medication, you should notify your physician.     Pain Medicine:   Begin taking your oral pain pills before bedtime and during the night to avoid a sudden onset of pain as part of the block wears off. Do not engage in drinking, driving, or hazardous occupations while taking pain medication. [facility]  Home Care Following Anesthesia  For 24 hours after surgery:  1. Get  plenty of rest.  A responsible adult must stay with you for at least 24 hours after you leave the surgery center.  2. Do not drive or use heavy equipment.  If you have weakness or tingling, don't drive or use heavy equipment until this feeling goes away.   3. Do not drink alcohol.   4. Avoid strenuous or risky activities.  Ask for help when climbing stairs.  5. You may feel lightheaded.  IF so, sit for a few minutes before standing.  Have someone help you get up.   6. If you have nausea (feel sick to your stomach): Drink only clear liquids such as apple juice, ginger ale, broth or 7-Up.  Rest may also help.  Be sure to drink enough fluids.  Move to a regular diet as you feel able.   7. You may have a slight fever.  Call the doctor if your fever is over 100 F (37.7 C) (taken under the tongue) or lasts longer than 24 hours.  8. You may have a dry mouth, a sore throat, muscle aches or trouble sleeping. These should go away after 24 hours.  9. Do not make important or legal decisions.               Tips for taking pain medications  To get the best pain relief possible, remember these points:    Take pain medications as directed, before pain becomes severe.    Pain medication can upset your stomach: taking it with food may help.    Constipation is a common side effect of pain medication. Drink plenty of  fluids.    Eat foods high in fiber. Take a stool softener if recommended by your doctor or pharmacist.    Do not drink alcohol, drive or operate machinery while taking pain medications.    Ask about other ways to control pain, such as with heat, ice or relaxation.    Tylenol/Acetaminophen Consumption  To help encourage the safe use of acetaminophen, the makers of TYLENOL  have lowered the maximum daily dose for single-ingredient Extra Strength TYLENOL  (acetaminophen) products sold in the [place] from 8 pills per day (4,000 mg) to 6 pills per day (3,000 mg). The dosing interval has also changed from 2 pills every 4-6 hours  "to 2 pills every 6 hours.    If you feel your pain relief is insufficient, you may take Tylenol/Acetaminophen in addition to your narcotic pain medication.     Be careful not to exceed 3,000 mg of Tylenol/Acetaminophen in a 24 hour period from all sources.    If you are taking extra strength Tylenol/acetaminophen (500 mg), the maximum dose is 6 tablets in 24 hours.    If you are taking regular strength acetaminophen (325 mg), the maximum dose is 9 tablets in 24 hours.    Call a doctor for any of the followin. Signs of infection (fever, growing tenderness at the surgery site, a large amount of drainage or bleeding, severe pain, foul-smelling drainage, redness, swelling).  2. It has been over 8 to 10 hours since surgery and you are still not able to urinate (pass water).  3. Headache for over 24 hours.  4. Numbness, tingling or weakness the day after surgery (if you had spinal anesthesia).  Your doctor is:       Dr. Miguel A Green, Orthopaedics: [phone]               Or dial [phone] and ask for the resident on call for:  Orthopaedics  For emergency care, call the:  [facility] Emergency Department: [phone] (TTY for hearing impaired: [phone])                Pending Results     Date and Time Order Name Status Description    2017 1443 XR SURGERY MAIKEL FLUORO LESS THAN 5 MIN W STILLS In process             Admission Information     Date & Time Provider Department Dept. Phone    2017 Miguel A Green MD [facility] [phone]      Your Vitals Were     Blood Pressure Temperature Respirations Height Weight Pulse Oximetry    160/72 97.6  F (36.4  C) (Oral) 16 1.803 m (5' 11\") 68 kg (150 lb) 97%    BMI (Body Mass Index)                   20.92 kg/m2           Stupe[employer] Information     Stupeflix is an electronic gateway that provides easy, online access to your medical records. With Stupe[employer], you can request a clinic appointment, read your test results, renew a " prescription or communicate with your care team.     To sign up for [employer] visit the website at [email or web address]   You will be asked to enter the access code listed below, as well as some personal information. Please follow the directions to create your username and password.     Your access code is: [email or web address]  Expires: 2018 10:52 AM     Your access code will  in 90 days. If you need help or a new code, please contact your [facility] or call [phone] for assistance.        Care EveryWhere ID     This is your Care EveryWhere ID. This could be used by other organizations to access your [facility] medical records  [id number]        Equal Access to Services     GAYLA CRAWFORD : Zainab Bills, waanita navarroadaha, qaybta kaalmada adeyariyatere, charleen coleman. So [facility] [phone].    ATENCIÓN: Si habla español, tiene a alcazar disposición servicios gratuitos de asistencia lingüística. Llame al [phone].    We comply with applicable federal civil rights laws and [place] laws. We do not discriminate on the basis of race, color, national origin, age, disability, sex, sexual orientation, or gender identity.               Review of your medicines      CONTINUE these medicines which may have CHANGED, or have new prescriptions. If we are uncertain of the size of tablets/capsules you have at home, strength may be listed as something that might have changed.        Dose / Directions    amLODIPine 5 MG tablet   Commonly known as:  NORVASC   This may have changed:    - when to take this  - additional instructions   Used for:  Essential hypertension, benign        Dose:  5 mg   Take 1 tablet (5 mg) by mouth daily Hold for systolic pressure < 110. Facility MD to titrate as needed. This is a new med for the patient.   Quantity:  30 tablet   Refills:  0         CONTINUE these medicines which have NOT CHANGED        Dose / Directions     hydrocortisone 2.5 % cream   Used for:  Atopic dermatitis, unspecified type        Apply topically 2 times daily   Quantity:  30 g   Refills:  3       hydrOXYzine 25 MG capsule   Commonly known as:  VISTARIL   Used for:  Surgical follow-up care        Dose:  25 mg   Take 1 capsule (25 mg) by mouth every 6 hours as needed for itching   Quantity:  30 capsule   Refills:  0       oxyCODONE IR 5 MG tablet   Commonly known as:  ROXICODONE   Used for:  Surgical follow-up care        Dose:  5-10 mg   Take 1-2 tablets (5-10 mg) by mouth every 4 hours as needed for moderate to severe pain   Quantity:  40 tablet   Refills:  0                Protect others around you: Learn how to safely use, store and throw away your medicines at www.disposemymeds.org.             Medication List: This is a list of all your medications and when to take them. Check marks below indicate your daily home schedule. Keep this list as a reference.      Medications           Morning Afternoon Evening Bedtime As Needed    amLODIPine 5 MG tablet   Commonly known as:  NORVASC   Take 1 tablet (5 mg) by mouth daily Hold for systolic pressure < 110. Facility MD to titrate as needed. This is a new med for the patient.                                hydrocortisone 2.5 % cream   Apply topically 2 times daily                                hydrOXYzine 25 MG capsule   Commonly known as:  VISTARIL   Take 1 capsule (25 mg) by mouth every 6 hours as needed for itching                                oxyCODONE IR 5 MG tablet   Commonly known as:  ROXICODONE   Take 1-2 tablets (5-10 mg) by mouth every 4 hours as needed for moderate to severe pain

## 2017-11-07 NOTE — TELEPHONE ENCOUNTER
Social Work Brief Intervention  [facility]    Data/Intervention:    Patient Name:  Delvin Echevarria  /Age:  1965 (52 year old)    Visit Type: telephone  Referral Source: Ortho clinic  Reason for Referral:  Skilled Nursing Facility placement after surgery today.    Collaborated With:    -[facility] [phone]  -[facility] [phone]  -[employer] [facility] [phone]    Patient Concerns/Issues:  Follow up with Nursing Home referral post surgery.     Assessment/Plan:  [facility] and will be waiting for his arrival this evening. They have his Rx and admit orders.  arranged [facility] (drivers have extra training in assisting patients and handling emergencies). [facility] ([phone]) will transport. PAS completed #[id number]. [facility] RN, Pita, updated and given information. For handoff, call [facility], [phone], and ask for East Nurse.    Provided patient with contact information and availability to follow up as needed.    SHAHZAD Kang  Outpatient Specialty Clinics  Direct Phone: [phone]  Pager:  [phone]

## 2017-11-07 NOTE — ANESTHESIA PREPROCEDURE EVALUATION
Anesthesia Evaluation     . Pt has had prior anesthetic. Type: General    No history of anesthetic complications          ROS/MED HX    ENT/Pulmonary:     (+)tobacco use, , . .    Neurologic:  - neg neurologic ROS     Cardiovascular:     (+) hypertension----. : . . . :. .       METS/Exercise Tolerance:  >4 METS   Hematologic:  - neg hematologic  ROS       Musculoskeletal:   (+) , fracture lower extremity, -       GI/Hepatic:         Renal/Genitourinary:  - ROS Renal section negative       Endo:  - neg endo ROS       Psychiatric:  - neg psychiatric ROS       Infectious Disease:  - neg infectious disease ROS       Malignancy:      - no malignancy   Other: Comment: H/o marijuana use                    Physical Exam  Normal systems: dental    Airway   Mallampati: I  TM distance: >3 FB  Neck ROM: full    Dental     Cardiovascular   Rhythm and rate: regular      Pulmonary    breath sounds clear to auscultation                    Anesthesia Plan      History & Physical Review  History and physical reviewed and following examination; no interval change.    ASA Status:  2 .    NPO Status:  > 6 hours    Plan for General, LMA and Periph. Nerve Block for postop pain with Intravenous induction. Maintenance will be Balanced.    PONV prophylaxis:  Ondansetron (or other 5HT-3) and Dexamethasone or Solumedrol  Discussed with Patient Off-Label use of Liposomal Bupivacaine (Exparel) for Nerve Block.    Relevant risks & benefits were discussed with patient.    All questions were answered and there was agreement to proceed.    Patient signed Off-Label Use of Exparel Consent Form.          Postoperative Care  Postoperative pain management:  Oral pain medications and Peripheral nerve block (Single Shot).      Consents  Anesthetic plan, risks, benefits and alternatives discussed with:  Patient..                          .

## 2017-11-07 NOTE — ANESTHESIA CARE TRANSFER NOTE
Patient: Delvin Echevarria    Procedure(s):  Right Tibia Fibula Open Reduction Internal Fixation - Wound Class: I-Clean    Diagnosis: Right Tibia-Fibula Fracture  Diagnosis Additional Information: No value filed.    Anesthesia Type:   No value filed.     Note:  Airway :Nasal Cannula  Patient transferred to:PACU  Comments: VSS and WNL, no PONV, slightly disoriented, report to Pita SMITHHandoff Report: Identifed the Patient, Identified the Reponsible Provider, Reviewed the pertinent medical history, Discussed the surgical course, Reviewed Intra-OP anesthesia mangement and issues during anesthesia, Set expectations for post-procedure period and Allowed opportunity for questions and acknowledgement of understanding      Vitals: (Last set prior to Anesthesia Care Transfer)    CRNA VITALS  11/7/2017 1557 - 11/7/2017 1634      11/7/2017             Pulse: 92    SpO2: 100 %    Resp Rate (observed): 18                Electronically Signed By: XIN Narayanan CRNA  November 7, 2017  4:34 PM

## 2017-11-08 NOTE — OP NOTE
PREOPERATIVE DIAGNOSIS:  Right distal tibia and fibular fracture.      POSTOPERATIVE DIAGNOSIS:  Right distal tibia and fibular fracture.      PROCEDURE:  Right tibia open reduction and internal fixation.      SURGEON:  Miguel A Green MD      COMPLICATIONS:  None.      DRAINS:  None.      ESTIMATED BLOOD LOSS:  Less than 20 mL.      ANESTHESIA:  General endotracheal.      INDICATIONS:  Please refer to clinic notes for further details regarding indications of Mr. Echevarria's case.      DESCRIPTION OF PROCEDURE:  On 11/07/2017, patient was taken to surgery.  Preoperative antibiotics were administered to the patient prior to arrival to the OR.      After successful induction of general endotracheal anesthesia, he was placed supine on the operating table.  The right lower extremity was prepped and draped in a sterile fashion and after exsanguination by gravity, tourniquet cuff was inflated to 300 mmHg on the proximal third of the right thigh.      A pause for the cause was performed according to our institution's policy which confirmed laterality of the procedure.      Following this, we proceeded with an incision along the most distal and medial portion of the tibia.  Subcutaneous tissues were dissected down toe fracture site.  Multiple manipulations were performed and we proceeded with provisional fixation with a K-wire.  Subsequently, we proceeded with application of a buttress plate with purchase of 6 cortices proximal to the fracture site and 3 cortices with cancellous bones distal to the fracture site.  Acceptable purchase was accomplished in all screws with understanding the patient presented with a fairly osteopenic bone.      We confirmed under fluoroscopic examination, AP, oblique and lateral projections of the distal tibia to have excellent location of the hardware as well as very much acceptable alignment of the distal tibia.      The patient presented with some slight offset of the fibula, which was felt  to be adequate given the fact that he presents with excellent congruency of the ankle joint itself.      Excellent congruency of the ankle joint.  These images were sent to PACS for definitive documentation.      Tourniquet cuff was deflated.  Satisfactory hemostasis was accomplished.  Wound was closed in layers.  Sterile dressings were applied.  The patient was placed in a CAM walker and transferred in stable condition to PACU.      PLAN:  The patient will remain nonweightbearing x3 months.  He will return to clinic in 2 weeks for a wound check.  At that time, sutures will be removed if indicated.  The patient will proceed with gentle range of motion exercises of the ankle joint with no strengthening and nonweightbearing.  The CAM walker will be utilized at all times except for hygiene and the exercises for therapy.      The patient will be reevaluated at 6 weeks from surgery.  At that time, 3 views of the right ankle will be obtained and based on those findings, further recommendations will be given to the patient.         GRIS STANLEY MD             D: 2017 17:06   T: 2017 07:26   MT: al      Name:     VINH CATALAN   MRN:      [id number]        Account:        [id number]   :      1965           Procedure Date: 2017      Document: [id number]

## 2017-11-09 ENCOUNTER — NURSING HOME VISIT (OUTPATIENT)
Dept: GERIATRICS | Facility: CLINIC | Age: 52
End: 2017-11-09
Payer: COMMERCIAL

## 2017-11-09 VITALS
RESPIRATION RATE: 18 BRPM | OXYGEN SATURATION: 95 % | SYSTOLIC BLOOD PRESSURE: 182 MMHG | WEIGHT: 150 LBS | BODY MASS INDEX: 20.92 KG/M2 | TEMPERATURE: 98 F | DIASTOLIC BLOOD PRESSURE: 116 MMHG

## 2017-11-09 DIAGNOSIS — Z87.81 S/P ORIF (OPEN REDUCTION INTERNAL FIXATION) FRACTURE: ICD-10-CM

## 2017-11-09 DIAGNOSIS — I10 ESSENTIAL HYPERTENSION, BENIGN: ICD-10-CM

## 2017-11-09 DIAGNOSIS — Z98.890 S/P ORIF (OPEN REDUCTION INTERNAL FIXATION) FRACTURE: ICD-10-CM

## 2017-11-09 DIAGNOSIS — S82.301D TRAUMATIC CLOSED DISPLACED FRACTURE OF DISTAL END OF RIGHT TIBIA WITH FIBULA WITH ROUTINE HEALING, SUBSEQUENT ENCOUNTER: Primary | ICD-10-CM

## 2017-11-09 DIAGNOSIS — S82.401D TRAUMATIC CLOSED DISPLACED FRACTURE OF DISTAL END OF RIGHT TIBIA WITH FIBULA WITH ROUTINE HEALING, SUBSEQUENT ENCOUNTER: Primary | ICD-10-CM

## 2017-11-09 PROCEDURE — 99310 SBSQ NF CARE HIGH MDM 45: CPT | Performed by: NURSE PRACTITIONER

## 2017-11-09 RX ORDER — AMLODIPINE BESYLATE 5 MG/1
5 TABLET ORAL DAILY
COMMUNITY
End: 2018-01-17
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
## 2017-11-17 ENCOUNTER — NURSING HOME VISIT (OUTPATIENT)
Dept: GERIATRICS | Facility: CLINIC | Age: 52
End: 2017-11-17
Payer: COMMERCIAL

## 2017-11-17 VITALS
HEART RATE: 62 BPM | OXYGEN SATURATION: 96 % | WEIGHT: 149 LBS | SYSTOLIC BLOOD PRESSURE: 140 MMHG | TEMPERATURE: 96.2 F | BODY MASS INDEX: 20.78 KG/M2 | DIASTOLIC BLOOD PRESSURE: 77 MMHG | RESPIRATION RATE: 18 BRPM

## 2017-11-17 DIAGNOSIS — S82.301D TRAUMATIC CLOSED DISPLACED FRACTURE OF DISTAL END OF RIGHT TIBIA WITH FIBULA WITH ROUTINE HEALING, SUBSEQUENT ENCOUNTER: Primary | ICD-10-CM

## 2017-11-17 DIAGNOSIS — Z71.6 ENCOUNTER FOR SMOKING CESSATION COUNSELING: ICD-10-CM

## 2017-11-17 DIAGNOSIS — S82.401D TRAUMATIC CLOSED DISPLACED FRACTURE OF DISTAL END OF RIGHT TIBIA WITH FIBULA WITH ROUTINE HEALING, SUBSEQUENT ENCOUNTER: Primary | ICD-10-CM

## 2017-11-17 DIAGNOSIS — Z87.81 S/P ORIF (OPEN REDUCTION INTERNAL FIXATION) FRACTURE: ICD-10-CM

## 2017-11-17 DIAGNOSIS — L30.9 ECZEMA, UNSPECIFIED TYPE: ICD-10-CM

## 2017-11-17 DIAGNOSIS — I10 ESSENTIAL HYPERTENSION, BENIGN: ICD-10-CM

## 2017-11-17 DIAGNOSIS — Z98.890 S/P ORIF (OPEN REDUCTION INTERNAL FIXATION) FRACTURE: ICD-10-CM

## 2017-11-17 PROCEDURE — 99309 SBSQ NF CARE MODERATE MDM 30: CPT | Performed by: NURSE PRACTITIONER

## 2017-11-17 RX ORDER — TRIAMCINOLONE ACETONIDE 1 MG/G
CREAM TOPICAL SEE ADMIN INSTRUCTIONS
COMMUNITY
End: 2018-09-12

## 2017-11-17 NOTE — PROGRESS NOTES
[facility]    Chief Complaint   Patient presents with     Nursing Home Acute       HPI:    Delvin Echevarria is a 52 year old  (1965), who is being seen today for an episodic care visit at [facility].    HPI information obtained from: facility chart records, facility staff, patient report and [facility] chart review. Today's concern is:  Traumatic closed displaced fracture of distal end of right tibia with fibula with routine healing, subsequent encounter, S/P ORIF (open reduction internal fixation) fracture  Participating in therapies. Pain managed well when wearing CAM boot/immobilizer.  While sleeping, his foot often fall off edge of foam elevator causing increased pain.        Essential hypertension, benign  Managed with amlodipine  BPs:  11/11/2017 16:21 140/77 mmHg (Lying r/arm)  11/11/2017 15:24 139/77 mmHg (Lying r/arm)  11/10/2017 14:42 139/81 mmHg (Lying r/arm)  11/10/2017 14:42 147/97 mmHg (Sitting r/arm)    Eczema, unspecified type  Using hydrocortisone 2.5% cream on hands, elbows and feet, skin peeling. Feels is not working well enough, especially with weather changes. Itching and peeling skin    Smoking Cessation  Patient states he has only 2 cigarettes left, no way to get more. Interested in help with smoking cessation. Has been smoking average of 1PPD      REVIEW OF SYSTEMS:  4 point ROS including Respiratory, CV, GI and , other than that noted in the HPI,  is negative    /77  Pulse 62  Temp 96.2  F (35.7  C)  Resp 18  Wt 149 lb (67.6 kg)  SpO2 96%  BMI 20.78 kg/m2  GENERAL APPEARANCE:  Alert, in no distress  RESP:  respiratory effort and palpation of chest normal, auscultation of lungs clear , no respiratory distress  CV:  Palpation and auscultation of heart done , rate and rhythm regular, no murmur, no peripheral edema  ABDOMEN:  normal bowel sounds, soft, nontender, no hepatosplenomegaly or other masses  M/S:   Gait and station self propels in w/c    SKIN:  Unable to visualize skin under surgical site wrap. Skin on palms of hands, soles of feet, bilateral elbows red, thick, scaling  NEURO: CMS intact  PSYCH:  insight and judgement, memory WNL , affect and mood normal      ASSESSMENT/PLAN:  Traumatic closed displaced fracture of distal end of right tibia with fibula with routine healing, subsequent encounter  S/P ORIF (open reduction internal fixation) fracture  Continue with therapies. Pain managed well, continue POC    Essential hypertension, benign  Generally normotensive on current regimen, goal BP <150/90 to reduce risk of falls, hypotension. The current medical regimen is effective;  continue present plan and medications.      Eczema, unspecified type  Will add combination steroid cream    Encounter for smoking cessation counseling  Counseling re:  Smoking cessation, withdrawal, assistance.  Patient chooses nicotine gum.  Offered patches as well as adjunct if needed.       1. Nicotine gum 4 mg - up to q 1-2 hr x 6 wk  2. If patient wants concurrent use - nicotine patch 21mg/day x 6 wks then 14 mg/day x 2 weeks then 7 mg /day x 2 weeks  2. Triamcinolone cream 1% To palms and soles of feet, elbows affected areas 2-4 x day. Can be at bedside Dx Eczema     Total time spent with patient visit at the skilled nursing facility was 27 minutes including patient visit and review of past records. Greater than 50% of total time spent with counseling and coordinating care due to smoking cessation counseling    XIN Lennon CNP

## 2017-11-21 VITALS
OXYGEN SATURATION: 96 % | RESPIRATION RATE: 18 BRPM | DIASTOLIC BLOOD PRESSURE: 77 MMHG | WEIGHT: 149.8 LBS | BODY MASS INDEX: 20.89 KG/M2 | SYSTOLIC BLOOD PRESSURE: 140 MMHG | HEART RATE: 62 BPM | TEMPERATURE: 96.2 F

## 2017-11-21 RX ORDER — NICOTINE 21 MG/24HR
1 PATCH, TRANSDERMAL 24 HOURS TRANSDERMAL EVERY 24 HOURS
COMMUNITY
Start: 2017-11-18 | End: 2018-01-02
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
## 2017-11-22 ENCOUNTER — OFFICE VISIT (OUTPATIENT)
Dept: ORTHOPEDICS | Facility: CLINIC | Age: 52
End: 2017-11-22

## 2017-11-22 ENCOUNTER — NURSING HOME VISIT (OUTPATIENT)
Dept: GERIATRICS | Facility: CLINIC | Age: 52
End: 2017-11-22
Payer: COMMERCIAL

## 2017-11-22 DIAGNOSIS — S82.401D TRAUMATIC CLOSED DISPLACED FRACTURE OF DISTAL END OF RIGHT TIBIA WITH FIBULA WITH ROUTINE HEALING, SUBSEQUENT ENCOUNTER: Primary | ICD-10-CM

## 2017-11-22 DIAGNOSIS — I10 ESSENTIAL HYPERTENSION, BENIGN: ICD-10-CM

## 2017-11-22 DIAGNOSIS — S82.301D TRAUMATIC CLOSED DISPLACED FRACTURE OF DISTAL END OF RIGHT TIBIA WITH FIBULA WITH ROUTINE HEALING, SUBSEQUENT ENCOUNTER: Primary | ICD-10-CM

## 2017-11-22 DIAGNOSIS — L30.9 ECZEMA, UNSPECIFIED TYPE: ICD-10-CM

## 2017-11-22 DIAGNOSIS — Z98.890 S/P ORIF (OPEN REDUCTION INTERNAL FIXATION) FRACTURE: ICD-10-CM

## 2017-11-22 DIAGNOSIS — Z09 SURGICAL FOLLOW-UP CARE: Primary | ICD-10-CM

## 2017-11-22 DIAGNOSIS — R53.81 PHYSICAL DECONDITIONING: ICD-10-CM

## 2017-11-22 DIAGNOSIS — Z87.81 S/P ORIF (OPEN REDUCTION INTERNAL FIXATION) FRACTURE: ICD-10-CM

## 2017-11-22 PROCEDURE — 99305 1ST NF CARE MODERATE MDM 35: CPT | Performed by: FAMILY MEDICINE

## 2017-11-22 ASSESSMENT — ENCOUNTER SYMPTOMS
SKIN CHANGES: 0
POOR WOUND HEALING: 0
NAIL CHANGES: 0

## 2017-11-22 NOTE — MR AVS SNAPSHOT
After Visit Summary   2017    Delvin Echevarria    MRN: [id number]           Patient Information     Date Of Birth          1965        Visit Information        Provider Department      2017 1:00 PM Nurse, Satinder LYNN [facility]        Today's Diagnoses     Surgical follow-up care    -  1       Follow-ups after your visit        Additional Services     PHYSICAL THERAPY REFERRAL (External-Prints)       Physical Therapy Referral                  Your next 10 appointments already scheduled     Dec 19, 2017  3:10 PM CST   (Arrive by 2:55 PM)   POST-OP FOOT/ANKLE with Miguel A Green MD   [facility] ([facility])    [address]  [address] [facility] [id number] [phone]              Who to contact     Please call your clinic at [phone] to:    Ask questions about your health    Make or cancel appointments    Discuss your medicines    Learn about your test results    Speak to your doctor   If you have compliments or concerns about an experience at your clinic, or if you wish to file a complaint, please contact [facility] Patient Relations at [phone] or email us at [email or web address]         Additional Information About Your Visit        MyChart Information     GreenPalt is an electronic gateway that provides easy, online access to your medical records. With [employer], you can request a clinic appointment, read your test results, renew a prescription or communicate with your care team.     To sign up for GreenPalt visit the website at [email or web address]   You will be asked to enter the access code listed below, as well as some personal information. Please follow the directions to create your username and password.     Your access code is: [email or web address]  Expires: 2018 10:52 AM     Your access code will  in 90 days. If you need help or a new code, please  contact your [facility] or call [phone] for assistance.        Care EveryWhere ID     This is your Care EveryWhere ID. This could be used by other organizations to access your [place] medical records  [id number]         Blood Pressure from Last 3 Encounters:   11/21/17 140/77   11/17/17 140/77   11/09/17 (!) 182/116    Weight from Last 3 Encounters:   11/21/17 67.9 kg (149 lb 12.8 oz)   11/17/17 67.6 kg (149 lb)   11/09/17 68 kg (150 lb)              We Performed the Following     PHYSICAL THERAPY REFERRAL (External-Prints)        Primary Care Provider Office Phone # Fax #    Krystian Streeter [phone]       [address] [place] [address]        Equal Access to Services     GAYLA CRAWFORD : Hadii hernan costa hadasho Soteena, waaxda luqadaha, qaybta kaalmada adeyariyada, charleen zambrano . So [facility] [phone].    ATENCIÓN: Si habla español, tiene a alcazar disposición servicios gratuitos de asistencia lingüística. Larisa al [phone].    We comply with applicable federal civil rights laws and [place] laws. We do not discriminate on the basis of race, color, national origin, age, disability, sex, sexual orientation, or gender identity.            Thank you!     Thank you for choosing [facility]  for your care. Our goal is always to provide you with excellent care. Hearing back from our patients is one way we can continue to improve our services. Please take a few minutes to complete the written survey that you may receive in the mail after your visit with us. Thank you!             Your Updated Medication List - Protect others around you: Learn how to safely use, store and throw away your medicines at www.disposemymeds.org.          This list is accurate as of: 11/22/17  4:18 PM.  Always use your most recent med list.                   Brand Name Dispense Instructions for use Diagnosis    ACETAMINOPHEN PO       Take 650 mg by mouth every 4 hours as needed for pain        amLODIPine 5 MG tablet    NORVASC     Take 5 mg by mouth daily        hydrocortisone 2.5 % cream     30 g    Apply topically 2 times daily    Atopic dermatitis, unspecified type       hydrOXYzine 25 MG capsule    VISTARIL    30 capsule    Take 1 capsule (25 mg) by mouth every 6 hours as needed for itching    Surgical follow-up care       nicotine 21 MG/24HR 24 hr patch    NICODERM CQ     Place 1 patch onto the skin every 24 hours        oxyCODONE IR 5 MG tablet    ROXICODONE    40 tablet    Take 1-2 tablets (5-10 mg) by mouth every 4 hours as needed for moderate to severe pain    Surgical follow-up care       SM NICOTINE 4 MG gum   Generic drug:  nicotine polacrilex      Place 4 mg inside cheek See Admin Instructions 1-2 hrs x 6 wk        triamcinolone 0.1 % cream    KENALOG     Apply topically See Admin Instructions Apply palms and soles of feet, elbows affected areas 2-4 x day.

## 2017-11-22 NOTE — PROGRESS NOTES
Reason for visit:    Delvin Echevarria came in to the clinic for a two week post op check.    His surgery was done 11/7/17 by Dr Green.  He had Right tibia open reduction internal fixation      Assessment:    Delvin came into the clinic in post op cam boot Non-WB    The Surgical wounds were exposed and found to be well-healed and without evidence of infection; so the sutures were removed.    Patient denies any numbness/tingling. He states his pain is well controlled.     Plan:    He was placed in steri-strips and tubi-.  He was told to remain Non-WB.   He was given physical therapy orders to begin gentle ROM exercises of the ankle. He was instructed to not begin strengthening and again told he is to be nonweightbearing.      He has an appointment to see Dr. Green at that time Dr. Green will determine further restrictions.    He has our phone number and will call with questions or problems.       Answers for HPI/ROS submitted by the patient on 11/22/2017   General Symptoms: No  Skin Symptoms: Yes  HENT Symptoms: No  EYE SYMPTOMS: No  HEART SYMPTOMS: No  LUNG SYMPTOMS: No  INTESTINAL SYMPTOMS: No  URINARY SYMPTOMS: No  REPRODUCTIVE SYMPTOMS: No  SKELETAL SYMPTOMS: No  BLOOD SYMPTOMS: No  NERVOUS SYSTEM SYMPTOMS: No  MENTAL HEALTH SYMPTOMS: No  Changes in hair: No  Changes in moles/birth marks: No  Itching: Yes  Rashes: Yes  Changes in nails: No  Acne: No  Change in facial hair: No  Warts: No  Non-healing sores: No  Scarring: No  Flaking of skin: Yes  Color changes of hands/feet in cold : No  Sun sensitivity: No  Skin thickening: No

## 2017-11-30 NOTE — PROGRESS NOTES
[facility]    Chief Complaint   Patient presents with     Nursing Home Acute       HPI:    Delvin Echevarria is a 52 year old  (1965), who is being seen today for an episodic care visit at [facility] at [place].    HPI information obtained from: facility chart records, facility staff, patient report and [facility] chart review. Today's concern is:  Traumatic closed displaced fracture of distal end of right tibia with fibula with routine healing, subsequent encounter/S/P ORIF (open reduction internal fixation) fracture  F/u on pain meds - pt reports doing well - taking prn oxy 1-2 tabs about 1-2 x/day - one day last week he didn't need any.  - wanting to go to a  today and asking about wt bearing status  He acknowledges he is NWB and asking about using crutches to go to and stand through a full .   I explained it would be impossible for him to do this with out putting wt on his foot     Essential hypertension, benign  F/u BP  Take by me today and stable - on norvasc 5 mg daily      REVIEW OF SYSTEMS:  4 point ROS including Respiratory, CV, GI and , other than that noted in the HPI,  is negative    /67  Pulse 62  Temp 96.2  F (35.7  C)  Resp 18  Wt 149 lb 12.8 oz (67.9 kg)  SpO2 96%  BMI 20.89 kg/m2     GENERAL APPEARANCE:  Alert, in no distress  In w/c cast - remvoal in palce    ASSESSMENT/PLAN:  Traumatic closed displaced fracture of distal end of right tibia with fibula with routine healing, subsequent encounter/S/P ORIF (open reduction internal fixation) fracture  New script written for oxy refill - 60 tabls  Encouraged him to go to event in [place].   Re- educated that even TTBW would be detrimental     Essential hypertension, benign  Stable - no change in poc.       Nasima Hall, XIN CNP  
Normal Screen- (No follow-up needed)

## 2017-12-01 ENCOUNTER — NURSING HOME VISIT (OUTPATIENT)
Dept: GERIATRICS | Facility: CLINIC | Age: 52
End: 2017-12-01
Payer: COMMERCIAL

## 2017-12-01 VITALS
SYSTOLIC BLOOD PRESSURE: 127 MMHG | DIASTOLIC BLOOD PRESSURE: 67 MMHG | BODY MASS INDEX: 20.89 KG/M2 | RESPIRATION RATE: 18 BRPM | WEIGHT: 149.8 LBS | OXYGEN SATURATION: 96 % | HEART RATE: 62 BPM | TEMPERATURE: 96.2 F

## 2017-12-01 DIAGNOSIS — I10 ESSENTIAL HYPERTENSION, BENIGN: ICD-10-CM

## 2017-12-01 DIAGNOSIS — S82.301D TRAUMATIC CLOSED DISPLACED FRACTURE OF DISTAL END OF RIGHT TIBIA WITH FIBULA WITH ROUTINE HEALING, SUBSEQUENT ENCOUNTER: Primary | ICD-10-CM

## 2017-12-01 DIAGNOSIS — Z98.890 S/P ORIF (OPEN REDUCTION INTERNAL FIXATION) FRACTURE: ICD-10-CM

## 2017-12-01 DIAGNOSIS — S82.401D TRAUMATIC CLOSED DISPLACED FRACTURE OF DISTAL END OF RIGHT TIBIA WITH FIBULA WITH ROUTINE HEALING, SUBSEQUENT ENCOUNTER: Primary | ICD-10-CM

## 2017-12-01 DIAGNOSIS — Z87.81 S/P ORIF (OPEN REDUCTION INTERNAL FIXATION) FRACTURE: ICD-10-CM

## 2017-12-01 PROCEDURE — 99308 SBSQ NF CARE LOW MDM 20: CPT | Performed by: NURSE PRACTITIONER

## 2017-12-01 NOTE — LETTER
2017        RE: Delvin Echevarria  [address] [place] [place] [address]        [facility]    Chief Complaint   Patient presents with     Nursing Home Acute       HPI:    Delvin Echevarria is a 52 year old  (1965), who is being seen today for an episodic care visit at [facility].    HPI information obtained from: facility chart records, facility staff, patient report and [facility] chart review. Today's concern is:  Traumatic closed displaced fracture of distal end of right tibia with fibula with routine healing, subsequent encounter/S/P ORIF (open reduction internal fixation) fracture  F/u on pain meds - pt reports doing well - taking prn oxy 1-2 tabs about 1-2 x/day - one day last week he didn't need any.  - wanting to go to a  today and asking about wt bearing status  He acknowledges he is NWB and asking about using crutches to go to and stand through a full .   I explained it would be impossible for him to do this with out putting wt on his foot     Essential hypertension, benign  F/u BP  Take by me today and stable - on norvasc 5 mg daily      REVIEW OF SYSTEMS:  4 point ROS including Respiratory, CV, GI and , other than that noted in the HPI,  is negative    /67  Pulse 62  Temp 96.2  F (35.7  C)  Resp 18  Wt 149 lb 12.8 oz (67.9 kg)  SpO2 96%  BMI 20.89 kg/m2     GENERAL APPEARANCE:  Alert, in no distress  In w/c cast - remvoal in palce    ASSESSMENT/PLAN:  Traumatic closed displaced fracture of distal end of right tibia with fibula with routine healing, subsequent encounter/S/P ORIF (open reduction internal fixation) fracture  New script written for oxy refill - 60 tabls  Encouraged him to go to event in [place].   Re- educated that even TTBW would be detrimental     Essential hypertension, benign  Stable - no change in poc.       XIN Ward CNP      Sincerely,        XIN Ward CNP

## 2017-12-14 VITALS
DIASTOLIC BLOOD PRESSURE: 88 MMHG | RESPIRATION RATE: 18 BRPM | SYSTOLIC BLOOD PRESSURE: 152 MMHG | HEART RATE: 62 BPM | TEMPERATURE: 96.2 F | BODY MASS INDEX: 20.78 KG/M2 | WEIGHT: 149 LBS | OXYGEN SATURATION: 96 %

## 2017-12-14 DIAGNOSIS — S82.201D CLOSED FRACTURE OF RIGHT TIBIA AND FIBULA WITH ROUTINE HEALING: Primary | ICD-10-CM

## 2017-12-14 DIAGNOSIS — S82.401D CLOSED FRACTURE OF RIGHT TIBIA AND FIBULA WITH ROUTINE HEALING: Primary | ICD-10-CM

## 2017-12-15 ENCOUNTER — NURSING HOME VISIT (OUTPATIENT)
Dept: GERIATRICS | Facility: CLINIC | Age: 52
End: 2017-12-15
Payer: COMMERCIAL

## 2017-12-15 DIAGNOSIS — I10 ESSENTIAL HYPERTENSION, BENIGN: ICD-10-CM

## 2017-12-15 DIAGNOSIS — Z87.81 S/P ORIF (OPEN REDUCTION INTERNAL FIXATION) FRACTURE: ICD-10-CM

## 2017-12-15 DIAGNOSIS — S82.401D TRAUMATIC CLOSED DISPLACED FRACTURE OF DISTAL END OF RIGHT TIBIA WITH FIBULA WITH ROUTINE HEALING, SUBSEQUENT ENCOUNTER: Primary | ICD-10-CM

## 2017-12-15 DIAGNOSIS — R53.81 PHYSICAL DECONDITIONING: ICD-10-CM

## 2017-12-15 DIAGNOSIS — S82.301D TRAUMATIC CLOSED DISPLACED FRACTURE OF DISTAL END OF RIGHT TIBIA WITH FIBULA WITH ROUTINE HEALING, SUBSEQUENT ENCOUNTER: Primary | ICD-10-CM

## 2017-12-15 DIAGNOSIS — Z98.890 S/P ORIF (OPEN REDUCTION INTERNAL FIXATION) FRACTURE: ICD-10-CM

## 2017-12-15 PROCEDURE — 99308 SBSQ NF CARE LOW MDM 20: CPT | Performed by: NURSE PRACTITIONER

## 2017-12-15 NOTE — PROGRESS NOTES
[facility]    Chief Complaint   Patient presents with     FPC Regulatory       HPI:    Delvin Echevarria is a 52 year old  (1965), who is being seen today for a federally mandated E/M visit at [facility] [place].  HPI information obtained from: facility chart records, facility staff, patient report and [facility] chart review. Today's concerns are:  Traumatic closed displaced fracture of distal end of right tibia with fibula with routine healing, subsequent encounter  S/P ORIF (open reduction internal fixation) fracture  Physical deconditioning  Remains NWB, progressing with therapies, pain well managed. Reduced need for narcotics. Only 2 doses in past 7 days.  Reports good appetite, no problems with urination or bowel management    Essential hypertension, benign  Generally normotensive on current regimen, goal PB <150/90  BPs:  12/02/2017 12:23 134/79 mmHg (Sitting r/arm)  11/25/2017 13:49 125/72 mmHg (Sitting r/arm)  11/11/2017 14:44 139/77 mmHg (Sitting r/arm)  11/04/2017 15:26 135/75 mmHg (Sitting l/arm)  10/28/2017 14:15 142/83 mmHg (Sitting r/arm)  10/21/2017 09:20 115/65 mmHg (Sitting r/arm)  10/14/2017 08:44 146/71 mmHg (Sitting r/arm)    Patient continues to smoke 1ppd. No longer using patches or gum.  States is not ready to quit yet      ALLERGIES: Seasonal allergies and Codeine  PAST MEDICAL HISTORY:  has a past medical history of Hypertension (3/15/2013); Marijuana use; and Tobacco use. He also has no past medical history of Arthritis; Asthma; Blood transfusion; Congestive heart failure, unspecified; COPD (chronic obstructive pulmonary disease) (H); Coronary artery disease; Diabetes mellitus (H); Malignant neoplasm (H); Thyroid disease; or Unspecified cerebral artery occlusion with cerebral infarction.  PAST SURGICAL HISTORY:  has a past surgical history that includes Laryngectomy (Several Years ago); Herniorrhaphy inguinal (7/2/2012); Apply external fixator lower  extremity (Right, 9/12/2016); Open reduction internal fixation tibia (Right, 9/15/2016); Incision and drainage bone lower extremity, combined (Right, 8/11/2017); Remove hardware lower extremity (Right, 8/11/2017); and Open reduction internal fixation tibia (Right, 11/7/2017).  FAMILY HISTORY: family history includes Hypertension in his brother and mother.  SOCIAL HISTORY:  reports that he has been smoking.  He has a 25.00 pack-year smoking history. He has never used smokeless tobacco. He reports that he uses illicit drugs, including Marijuana. He reports that he does not drink alcohol.    MEDICATIONS:  Current Outpatient Prescriptions   Medication Sig Dispense Refill     nicotine (NICODERM CQ) 21 MG/24HR 24 hr patch Place 1 patch onto the skin every 24 hours       ACETAMINOPHEN PO Take 650 mg by mouth every 4 hours as needed for pain       nicotine polacrilex (SM NICOTINE) 4 MG gum Place 4 mg inside cheek See Admin Instructions 1-2 hrs x 6 wk       triamcinolone (KENALOG) 0.1 % cream Apply topically See Admin Instructions Apply palms and soles of feet, elbows affected areas 2-4 x day.       amLODIPine (NORVASC) 5 MG tablet Take 5 mg by mouth daily for  blood pressure Hold for systolic pressure  <110. Facility MD to titrate as needed.       oxyCODONE IR (ROXICODONE) 5 MG tablet Take 1-2 tablets (5-10 mg) by mouth every 4 hours as needed for moderate to severe pain 40 tablet 0     hydrOXYzine (VISTARIL) 25 MG capsule Take 1 capsule (25 mg) by mouth every 6 hours as needed for itching 30 capsule 0     hydrocortisone 2.5 % cream Apply topically 2 times daily 30 g 3     Medications reviewed:  Medications reconciled to facility chart and changes were made to reflect current medications as identified as above med list. Below are the changes that were made:   Medications stopped since last EPIC medication reconciliation:   There are no discontinued medications.    Medications started since last EPIC medication  reconciliation:  No orders of the defined types were placed in this encounter.        Case Management:  I have reviewed the care plan and MDS and do agree with the plan. Patient's desire to return to the community is present, but is not able due to care needs .  Information reviewed:  Medications, vital signs, orders, and nursing notes.    ROS:  4 point ROS including Respiratory, CV, GI and , other than that noted in the HPI,  is negative    Exam:  Vitals: /88  Pulse 62  Temp 96.2  F (35.7  C)  Resp 18  Wt 149 lb (67.6 kg)  SpO2 96%  BMI 20.78 kg/m2  BMI= Body mass index is 20.78 kg/(m^2).  GENERAL APPEARANCE:  Alert, in no distress, oriented, thin  ENT:  Mouth and posterior oropharynx normal, moist mucous membranes, normal hearing acuity  RESP:  lungs clear to auscultation , no respiratory distress  CV:  Palpation and auscultation of heart done , regular rate and rhythm, no murmur, rub, or gallop, no edema  M/S:   Gait and station abnormal NWB right leg, in w/c  SKIN:  wound healing well, no signs of infection , no concerning lesions  NEURO:   Cranial nerves 2-12 are normal tested and grossly at patient's baseline, CMS right foot intact  PSYCH:  oriented X 3, normal insight, judgement and memory, affect and mood normal    Lab/Diagnostic data: All labs reviewed, none recent.      ASSESSMENT/PLAN  Traumatic closed displaced fracture of distal end of right tibia with fibula with routine healing, subsequent encounter  S/P ORIF (open reduction internal fixation) fracture  Physical deconditioning  Minimal pain, controlled with current regimen. The current medical regimen is effective;  continue present plan and medications. Continue with therapies.    Essential hypertension, benign  Generally normotensive on current regimen, goal BP <150/90 to reduce risk of falls, hypotension. The current medical regimen is effective;  continue present plan and medications.      Orders:  No new orders      Electronically  signed by:  XIN Lennon CNP

## 2017-12-19 ENCOUNTER — TELEPHONE (OUTPATIENT)
Dept: ORTHOPEDICS | Facility: CLINIC | Age: 52
End: 2017-12-19

## 2017-12-19 ENCOUNTER — RADIANT APPOINTMENT (OUTPATIENT)
Dept: GENERAL RADIOLOGY | Facility: CLINIC | Age: 52
End: 2017-12-19
Attending: ORTHOPAEDIC SURGERY
Payer: COMMERCIAL

## 2017-12-19 ENCOUNTER — RADIANT APPOINTMENT (OUTPATIENT)
Dept: CT IMAGING | Facility: CLINIC | Age: 52
End: 2017-12-19
Attending: ORTHOPAEDIC SURGERY
Payer: COMMERCIAL

## 2017-12-19 ENCOUNTER — OFFICE VISIT (OUTPATIENT)
Dept: ORTHOPEDICS | Facility: CLINIC | Age: 52
End: 2017-12-19
Payer: COMMERCIAL

## 2017-12-19 VITALS — HEIGHT: 71 IN | WEIGHT: 150 LBS | BODY MASS INDEX: 21 KG/M2

## 2017-12-19 DIAGNOSIS — Z09 SURGICAL FOLLOW-UP CARE: Primary | ICD-10-CM

## 2017-12-19 DIAGNOSIS — Z09 SURGICAL FOLLOW-UP CARE: ICD-10-CM

## 2017-12-19 DIAGNOSIS — S82.201D CLOSED FRACTURE OF RIGHT TIBIA AND FIBULA WITH ROUTINE HEALING: ICD-10-CM

## 2017-12-19 DIAGNOSIS — S82.401D CLOSED FRACTURE OF RIGHT TIBIA AND FIBULA WITH ROUTINE HEALING: ICD-10-CM

## 2017-12-19 NOTE — MR AVS SNAPSHOT
After Visit Summary   12/19/2017    Delvin Echevarria    MRN: [id number]           Patient Information     Date Of Birth          1965        Visit Information        Provider Department      12/19/2017 3:10 PM Miguel A Green MD [facility]        Today's Diagnoses     Surgical follow-up care    -  1       Follow-ups after your visit        Your next 10 appointments already scheduled     Dec 19, 2017  8:00 PM CST   CT LOWER EXTREMITY RIGHT W/O CONTRAST with UCCT2   [facility] ([facility])    [address] [place] [id number] [phone]           Please bring any scans or X-rays taken at other hospitals, if similar tests were done. Also bring a list of your medicines, including vitamins, minerals and over-the-counter drugs. It is safest to leave personal items at home.  Be sure to tell your doctor:   If you have any allergies.   If there s any chance you are pregnant.   If you are breastfeeding.   If you have any special needs.  You do not need to do anything special to prepare.  Please wear loose clothing, such as a sweat suit or jogging clothes. Avoid snaps, zippers and other metal. We may ask you to undress and put on a hospital gown.            Jan 23, 2018  3:30 PM CST   (Arrive by 3:15 PM)   POST-OP FOOT/ANKLE with Miguel A Green MD   [facility] ([facility])    [address] [place] [id number] [phone]              Future tests that were ordered for you today     Open Future Orders        Priority Expected Expires Ordered    CT Lower extremity RT w/o contrast Routine  12/19/2018 12/19/2017            Who to contact     Please call your clinic at [phone] to:    Ask questions about your health    Make or cancel appointments    Discuss your medicines    Learn about your test results    Speak to your doctor   If you  "have compliments or concerns about an experience at your clinic, or if you wish to file a complaint, please contact [facility] Patient Relations at [phone] or email us at [email or web address]         Additional Information About Your Visit        Ramenhart Information     [email or web address] is an electronic gateway that provides easy, online access to your medical records. With [employer], you can request a clinic appointment, read your test results, renew a prescription or communicate with your care team.     To sign up for [employer]ukona visit the website at [email or web address]   You will be asked to enter the access code listed below, as well as some personal information. Please follow the directions to create your username and password.     Your access code is: [email or web address]  Expires: 2018 10:52 AM     Your access code will  in 90 days. If you need help or a new code, please contact your [facility] or call [phone] for assistance.        Care EveryWhere ID     This is your Care EveryWhere ID. This could be used by other organizations to access your [place] medical records  [id number]        Your Vitals Were     Height BMI (Body Mass Index)                1.803 m (5' 11\") 20.92 kg/m2           Blood Pressure from Last 3 Encounters:   17 152/88   17 127/67   17 140/77    Weight from Last 3 Encounters:   17 68 kg (150 lb)   17 67.6 kg (149 lb)   17 67.9 kg (149 lb 12.8 oz)               Primary Care Provider Office Phone # Fax #    Krystian Streeter [phone]       [address] [place] [address]        Equal Access to Services     GAYLA CRAWFORD : Zainab tolbert Soteena, waaxda luqadaha, qaybta kaalmada abram, charleen coleman. So [facility] [phone].    ATENCIÓN: Si habla español, tiene a alcazar disposición servicios gratuitos de " asistencia lingüística. Larisa al [phone].    We comply with applicable federal civil rights laws and [place] laws. We do not discriminate on the basis of race, color, national origin, age, disability, sex, sexual orientation, or gender identity.            Thank you!     Thank you for choosing [facility]  for your care. Our goal is always to provide you with excellent care. Hearing back from our patients is one way we can continue to improve our services. Please take a few minutes to complete the written survey that you may receive in the mail after your visit with us. Thank you!             Your Updated Medication List - Protect others around you: Learn how to safely use, store and throw away your medicines at www.disposemymeds.org.          This list is accurate as of: 12/19/17  3:49 PM.  Always use your most recent med list.                   Brand Name Dispense Instructions for use Diagnosis    ACETAMINOPHEN PO      Take 650 mg by mouth every 4 hours as needed for pain        amLODIPine 5 MG tablet    NORVASC     Take 5 mg by mouth daily for blood pressure Hold for systolic pressure <110. Facility MD to titrate as needed.        hydrocortisone 2.5 % cream     30 g    Apply topically 2 times daily    Atopic dermatitis, unspecified type       hydrOXYzine 25 MG capsule    VISTARIL    30 capsule    Take 1 capsule (25 mg) by mouth every 6 hours as needed for itching    Surgical follow-up care       nicotine 21 MG/24HR 24 hr patch    NICODERM CQ     Place 1 patch onto the skin every 24 hours        oxyCODONE IR 5 MG tablet    ROXICODONE    40 tablet    Take 1-2 tablets (5-10 mg) by mouth every 4 hours as needed for moderate to severe pain    Surgical follow-up care       SM NICOTINE 4 MG gum   Generic drug:  nicotine polacrilex      Place 4 mg inside cheek See Admin Instructions 1-2 hrs x 6 wk        triamcinolone 0.1 % cream    KENALOG     Apply topically See Admin Instructions Apply palms and  soles of feet, elbows affected areas 2-4 x day.

## 2017-12-19 NOTE — LETTER
12/19/2017       RE: Delvin Echevarria  [address] [place] [address]     Dear Colleague,    Thank you for referring your patient, Delvin Echevarria, to the [facility]TH[facility]INIC at [facility]. Please see a copy of my visit note below.    CHIEF COMPLAINT:  Status post right distal tibia open reduction and internal fixation performed on 11/07/2017.        HISTORY OF PRESENT ILLNESS: Mr. Echevarria presents today for further followup.  Reports to be compliant.  Denies to have any problems or complaints.      PHYSICAL EXAMINATION:  On today's visit, he presents with phenomenal ankle motion with 5 degrees of dorsiflexion and 30 degrees of plantar flexion.  Alignment is excellent.  There is a well-healed surgical incision.  There is no effusion.      RADIOGRAPHIC EVALUATION:  Three views of the ankle were reviewed today which were significant for showing some callus formation across the fracture site.  Hardware is intact and in place.  Alignment is excellent.      ASSESSMENT:  Status post right distal tibia open reduction and internal fixation.      PLAN:  I discussed with the patient that he is making excellent progress.  We are going to proceed with a CT scan as a way to better understand the amount of healing just in case we can proceed with some weightbearing.      He will continue working with exercises and physical therapy.  Regardless of the CT scan, he will return to clinic in a month from today.  At that time, 3 views of the ankle will be obtained.             Again, thank you for allowing me to participate in the care of your patient.      Sincerely,    Miguel A Green MD

## 2017-12-19 NOTE — NURSING NOTE
Reason For Visit:   Chief Complaint   Patient presents with     Surgical Followup     The patient is following up today after right tib-fib ORIF DOS 11/7/17.       ?  No  Type of surgery: right tib-fib ORIF  Smoker: Yes  Request smoking cessation information: No    Pain Assessment  Patient Currently in Pain: Denies

## 2017-12-19 NOTE — PROGRESS NOTES
CHIEF COMPLAINT:  Status post right distal tibia open reduction and internal fixation performed on 11/07/2017.        HISTORY OF PRESENT ILLNESS: Mr. Echevarria presents today for further followup.  Reports to be compliant.  Denies to have any problems or complaints.      PHYSICAL EXAMINATION:  On today's visit, he presents with phenomenal ankle motion with 5 degrees of dorsiflexion and 30 degrees of plantar flexion.  Alignment is excellent.  There is a well-healed surgical incision.  There is no effusion.      RADIOGRAPHIC EVALUATION:  Three views of the ankle were reviewed today which were significant for showing some callus formation across the fracture site.  Hardware is intact and in place.  Alignment is excellent.      ASSESSMENT:  Status post right distal tibia open reduction and internal fixation.      PLAN:  I discussed with the patient that he is making excellent progress.  We are going to proceed with a CT scan as a way to better understand the amount of healing just in case we can proceed with some weightbearing.      He will continue working with exercises and physical therapy.  Regardless of the CT scan, he will return to clinic in a month from today.  At that time, 3 views of the ankle will be obtained.

## 2017-12-19 NOTE — TELEPHONE ENCOUNTER
Left voicemail with number listed. Stated that Dr. Green reviewed his CT scan from today and said Delvin could start walking on his surgical leg. However, if he has pain, he is to back off and return to NWB status. Left the number for triage should he have any questions/concerns. He has a follow up with Dr. Green scheduled.

## 2018-01-08 VITALS
RESPIRATION RATE: 18 BRPM | BODY MASS INDEX: 22.04 KG/M2 | TEMPERATURE: 97.5 F | SYSTOLIC BLOOD PRESSURE: 138 MMHG | DIASTOLIC BLOOD PRESSURE: 86 MMHG | HEART RATE: 75 BPM | WEIGHT: 158 LBS | OXYGEN SATURATION: 97 %
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
## 2018-01-10 ENCOUNTER — NURSING HOME VISIT (OUTPATIENT)
Dept: GERIATRICS | Facility: CLINIC | Age: 53
End: 2018-01-10
Payer: COMMERCIAL

## 2018-01-10 DIAGNOSIS — Z87.81 S/P ORIF (OPEN REDUCTION INTERNAL FIXATION) FRACTURE: ICD-10-CM

## 2018-01-10 DIAGNOSIS — R53.81 PHYSICAL DECONDITIONING: ICD-10-CM

## 2018-01-10 DIAGNOSIS — S82.301D TRAUMATIC CLOSED DISPLACED FRACTURE OF DISTAL END OF RIGHT TIBIA WITH FIBULA WITH ROUTINE HEALING, SUBSEQUENT ENCOUNTER: Primary | ICD-10-CM

## 2018-01-10 DIAGNOSIS — I10 ESSENTIAL HYPERTENSION, BENIGN: ICD-10-CM

## 2018-01-10 DIAGNOSIS — L30.9 ECZEMA, UNSPECIFIED TYPE: ICD-10-CM

## 2018-01-10 DIAGNOSIS — S82.401D TRAUMATIC CLOSED DISPLACED FRACTURE OF DISTAL END OF RIGHT TIBIA WITH FIBULA WITH ROUTINE HEALING, SUBSEQUENT ENCOUNTER: Primary | ICD-10-CM

## 2018-01-10 DIAGNOSIS — Z98.890 S/P ORIF (OPEN REDUCTION INTERNAL FIXATION) FRACTURE: ICD-10-CM

## 2018-01-10 PROCEDURE — 99310 SBSQ NF CARE HIGH MDM 45: CPT | Performed by: FAMILY MEDICINE

## 2018-01-11 DIAGNOSIS — M25.571 PAIN IN JOINT INVOLVING ANKLE AND FOOT, RIGHT: Primary | ICD-10-CM

## 2018-01-17 ENCOUNTER — DISCHARGE SUMMARY NURSING HOME (OUTPATIENT)
Dept: GERIATRICS | Facility: CLINIC | Age: 53
End: 2018-01-17
Payer: COMMERCIAL

## 2018-01-17 VITALS
SYSTOLIC BLOOD PRESSURE: 132 MMHG | RESPIRATION RATE: 18 BRPM | WEIGHT: 159.8 LBS | HEART RATE: 78 BPM | OXYGEN SATURATION: 97 % | TEMPERATURE: 95.8 F | DIASTOLIC BLOOD PRESSURE: 80 MMHG | BODY MASS INDEX: 22.29 KG/M2

## 2018-01-17 DIAGNOSIS — I10 BENIGN ESSENTIAL HYPERTENSION: Primary | ICD-10-CM

## 2018-01-17 DIAGNOSIS — Z71.6 ENCOUNTER FOR SMOKING CESSATION COUNSELING: ICD-10-CM

## 2018-01-17 DIAGNOSIS — S82.141D TIBIAL PLATEAU FRACTURE, RIGHT, CLOSED, WITH ROUTINE HEALING, SUBSEQUENT ENCOUNTER: ICD-10-CM

## 2018-01-17 DIAGNOSIS — Z98.890 S/P ORIF (OPEN REDUCTION INTERNAL FIXATION) FRACTURE: ICD-10-CM

## 2018-01-17 DIAGNOSIS — Z87.81 S/P ORIF (OPEN REDUCTION INTERNAL FIXATION) FRACTURE: ICD-10-CM

## 2018-01-17 PROCEDURE — 99316 NF DSCHRG MGMT 30 MIN+: CPT | Performed by: NURSE PRACTITIONER

## 2018-01-17 RX ORDER — AMLODIPINE BESYLATE 5 MG/1
5 TABLET ORAL DAILY
Qty: 30 TABLET | Refills: 3 | Status: SHIPPED | OUTPATIENT
Start: 2018-01-17 | End: 2018-05-01
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
## 2018-01-23 ENCOUNTER — RADIANT APPOINTMENT (OUTPATIENT)
Dept: GENERAL RADIOLOGY | Facility: CLINIC | Age: 53
End: 2018-01-23
Attending: ORTHOPAEDIC SURGERY
Payer: COMMERCIAL

## 2018-01-23 ENCOUNTER — OFFICE VISIT (OUTPATIENT)
Dept: ORTHOPEDICS | Facility: CLINIC | Age: 53
End: 2018-01-23
Payer: COMMERCIAL

## 2018-01-23 VITALS — BODY MASS INDEX: 21 KG/M2 | HEIGHT: 71 IN | WEIGHT: 150 LBS

## 2018-01-23 DIAGNOSIS — M25.571 PAIN IN JOINT INVOLVING ANKLE AND FOOT, RIGHT: ICD-10-CM

## 2018-01-23 DIAGNOSIS — Z47.89 ORTHOPEDIC AFTERCARE: Primary | ICD-10-CM

## 2018-01-23 NOTE — PROGRESS NOTES
CHIEF COMPLAINT:  Status post right distal tibia open reduction internal fixation performed on 11/03/2017.      HISTORY OF PRESENT ILLNESS:  Mr. Echevarria presents today for further followup.  Denies to have any problems or complaints.  Reports to be compliant.  Reports to have some lateral distal ankle pain, but denies to have any pain along the lower leg.  Reports to be ambulating with a CAM Walker and a cane.  He has not been started on physical therapy.      PHYSICAL EXAMINATION:  On today's visit, he presents with range of motion of the ankle from a few degrees of dorsiflexion and to 30 degrees of plantar flexion.  CMS is intact.  Skin is intact.  Presents with diffuse swelling across the ankle joint.      RADIOGRAPHIC EVALUATION:  Three views of the right ankle were obtained today which were significant for showing consolidation across the fracture for the tibia and fibula.  Hardware is intact and in place.  Alignment is excellent.      ASSESSMENT:  Status post right tibia open reduction internal fixation.      PLAN:  I discussed with the patient he is making excellent progress.  We will proceed with activity as tolerated.  He has no activity restrictions.  He will wean himself off the CAM Walker and cane.  A new prescription for physical therapy was given to the patient.      The patient will follow up on a p.r.n. basis or in 2 months from today if he is not happy with the function of his right ankle.

## 2018-01-23 NOTE — LETTER
1/23/2018       RE: Delvin Echevarria  [address] [place] [address]     Dear Colleague,    Thank you for referring your patient, Delvin Echevarria, to the [facility] at [facility]. Please see a copy of my visit note below.    CHIEF COMPLAINT:  Status post right distal tibia open reduction internal fixation performed on 11/03/2017.      HISTORY OF PRESENT ILLNESS:  Mr. Echevarria presents today for further followup.  Denies to have any problems or complaints.  Reports to be compliant.  Reports to have some lateral distal ankle pain, but denies to have any pain along the lower leg.  Reports to be ambulating with a CAM Walker and a cane.  He has not been started on physical therapy.      PHYSICAL EXAMINATION:  On today's visit, he presents with range of motion of the ankle from a few degrees of dorsiflexion and to 30 degrees of plantar flexion.  CMS is intact.  Skin is intact.  Presents with diffuse swelling across the ankle joint.      RADIOGRAPHIC EVALUATION:  Three views of the right ankle were obtained today which were significant for showing consolidation across the fracture for the tibia and fibula.  Hardware is intact and in place.  Alignment is excellent.      ASSESSMENT:  Status post right tibia open reduction internal fixation.      PLAN:  I discussed with the patient he is making excellent progress.  We will proceed with activity as tolerated.  He has no activity restrictions.  He will wean himself off the CAM Walker and cane.  A new prescription for physical therapy was given to the patient.      The patient will follow up on a p.r.n. basis or in 2 months from today if he is not happy with the function of his right ankle.         Again, thank you for allowing me to participate in the care of your patient.      Sincerely,    Miguel A Green MD

## 2018-01-23 NOTE — MR AVS SNAPSHOT
After Visit Summary   2018    Delvin Echevarria    MRN: [id number]           Patient Information     Date Of Birth          1965        Visit Information        Provider Department      2018 3:30 PM Miguel A Green MD [facility]        Today's Diagnoses     Orthopedic aftercare    -  1       Follow-ups after your visit        Additional Services     PHYSICAL THERAPY REFERRAL (External-Prints)       Physical Therapy Referral for no restriction. Work on gait, strengthening, rom                  Who to contact     Please call your clinic at [phone] to:    Ask questions about your health    Make or cancel appointments    Discuss your medicines    Learn about your test results    Speak to your doctor   If you have compliments or concerns about an experience at your clinic, or if you wish to file a complaint, please contact [facility] Patient Relations at [phone] or email us at [email or web address]         Additional Information About Your Visit        MyChart Information     my4oneonet is an electronic gateway that provides easy, online access to your medical records. With Tonchidot, you can request a clinic appointment, read your test results, renew a prescription or communicate with your care team.     To sign up for [email or web address] visit the website at [email or web address]   You will be asked to enter the access code listed below, as well as some personal information. Please follow the directions to create your username and password.     Your access code is: [email or web address]  Expires: 2018  6:20 PM     Your access code will  in 90 days. If you need help or a new code, please contact your [facility] or call [phone] for assistance.        Care EveryWhere ID     This is your Care EveryWhere ID. This could be used by other organizations to access your [facility]  "records  [id number]        Your Vitals Were     Height BMI (Body Mass Index)                1.803 m (5' 11\") 20.92 kg/m2           Blood Pressure from Last 3 Encounters:   01/17/18 132/80   01/08/18 138/86   12/14/17 152/88    Weight from Last 3 Encounters:   01/23/18 68 kg (150 lb)   01/17/18 72.5 kg (159 lb 12.8 oz)   01/08/18 71.7 kg (158 lb)              We Performed the Following     PHYSICAL THERAPY REFERRAL (External-Prints)        Primary Care Provider Office Phone # Fax #    Krystian Streeter [phone]       [address] [place] [address]        Equal Access to Services     GAYLA CRAWFORD : Zainab browno Soteena, waaxda luqadaha, qaybta kaalmada adeegyada, charleen zambrano . So [facility] [phone].    ATENCIÓN: Si habla español, tiene a alcazar disposición servicios gratuitos de asistencia lingüística. [facility] [phone].    We comply with applicable federal civil rights laws and [place] laws. We do not discriminate on the basis of race, color, national origin, age, disability, sex, sexual orientation, or gender identity.            Thank you!     Thank you for choosing [facility]  for your care. Our goal is always to provide you with excellent care. Hearing back from our patients is one way we can continue to improve our services. Please take a few minutes to complete the written survey that you may receive in the mail after your visit with us. Thank you!             Your Updated Medication List - Protect others around you: Learn how to safely use, store and throw away your medicines at www.disposemymeds.org.          This list is accurate as of 1/23/18 11:59 PM.  Always use your most recent med list.                   Brand Name Dispense Instructions for use Diagnosis    ACETAMINOPHEN PO      Take 650 mg by mouth every 4 hours as needed for pain        amLODIPine 5 MG tablet    NORVASC    30 tablet    Take 1 tablet (5 " mg) by mouth daily    Benign essential hypertension       triamcinolone 0.1 % cream    KENALOG     Apply topically See Admin Instructions Apply palms and soles of feet, elbows affected areas 2-4 x day.

## 2018-01-23 NOTE — NURSING NOTE
"Reason For Visit:   Chief Complaint   Patient presents with     Surgical Followup     Right tib/Fib ORIF. DOS; 11/3/17.       Pain Assessment  Patient Currently in Pain: Yes  0-10 Pain Scale: 4  Primary Pain Location: Foot  Pain Orientation: Right  Pain Descriptors: Aching  Alleviating Factors: NSAIDS, Rest  Aggravating Factors: Walking, Standing (walking or standing too long)               HEIGHT: 5' 11\", WEIGHT: 150 lbs 0 oz, BMI: Body mass index is 20.92 kg/(m^2).      Current Outpatient Prescriptions   Medication Sig Dispense Refill     amLODIPine (NORVASC) 5 MG tablet Take 1 tablet (5 mg) by mouth daily 30 tablet 3     ACETAMINOPHEN PO Take 650 mg by mouth every 4 hours as needed for pain       triamcinolone (KENALOG) 0.1 % cream Apply topically See Admin Instructions Apply palms and soles of feet, elbows affected areas 2-4 x day.            Allergies   Allergen Reactions     Seasonal Allergies Other (See Comments)     Congestion sinuses     Codeine Nausea             MICHAEL, ESTUS, CMA    "

## 2018-04-17 ENCOUNTER — OFFICE VISIT (OUTPATIENT)
Dept: FAMILY MEDICINE | Facility: CLINIC | Age: 53
End: 2018-04-17
Payer: COMMERCIAL

## 2018-04-17 VITALS
BODY MASS INDEX: 22.45 KG/M2 | TEMPERATURE: 98.5 F | RESPIRATION RATE: 16 BRPM | SYSTOLIC BLOOD PRESSURE: 149 MMHG | HEART RATE: 88 BPM | DIASTOLIC BLOOD PRESSURE: 97 MMHG | OXYGEN SATURATION: 97 % | WEIGHT: 161 LBS

## 2018-04-17 DIAGNOSIS — L30.9 ECZEMA, UNSPECIFIED TYPE: Primary | ICD-10-CM

## 2018-04-17 PROCEDURE — 99213 OFFICE O/P EST LOW 20 MIN: CPT | Performed by: FAMILY MEDICINE

## 2018-04-17 RX ORDER — BETAMETHASONE DIPROPIONATE 0.5 MG/G
CREAM TOPICAL
Qty: 45 G | Refills: 0 | Status: SHIPPED | OUTPATIENT
Start: 2018-04-17 | End: 2023-07-21

## 2018-04-17 RX ORDER — PREDNISONE 20 MG/1
40 TABLET ORAL DAILY
Qty: 10 TABLET | Refills: 0 | Status: SHIPPED | OUTPATIENT
Start: 2018-04-17 | End: 2018-04-22

## 2018-04-17 ASSESSMENT — PAIN SCALES - GENERAL: PAINLEVEL: NO PAIN (0)

## 2018-04-17 NOTE — NURSING NOTE
"Chief Complaint   Patient presents with     Derm Problem     face last few days has gotten worse - ongoing issue       Initial BP (!) 149/97  Pulse 88  Temp 98.5  F (36.9  C) (Tympanic)  Resp 16  Wt 161 lb (73 kg)  SpO2 97%  BMI 22.45 kg/m2 Estimated body mass index is 22.45 kg/(m^2) as calculated from the following:    Height as of 1/23/18: 5' 11\" (1.803 m).    Weight as of this encounter: 161 lb (73 kg).      Health Maintenance that is potentially due pending provider review:  Colonoscopy/FIT and Hep C screening    Possibly completing today per provider review.    Is there anyone who you would like to be able to receive your results? No  If yes have patient fill out MEHUL      "

## 2018-04-17 NOTE — PROGRESS NOTES
SUBJECTIVE:   Delvin Echevarria is a 52 year old male who presents to clinic today for the following health issues:      Rash  He has a long history of recurrent rash on hands and face.         Duration: ongoing issue - worsen the last few day    Description  Location: face, neck, and bilateral arms  Itching: severe    Intensity:  severe    Accompanying signs and symptoms: red spots, scabs    History (similar episodes/previous evaluation): has been on triamcinolone cream - doesn't work    Precipitating or alleviating factors:  New exposures:  None  Recent travel: no      Therapies tried and outcome: triamcinolone     Pt stopped taking his norvasc - states he ran out          Problem list and histories reviewed & adjusted, as indicated.  Additional history: as documented    Patient Active Problem List   Diagnosis     Inguinal hernia     Essential hypertension, benign     CARDIOVASCULAR SCREENING; LDL GOAL LESS THAN 130     Tibial plateau fracture     Wound infection     Closed fracture of right tibia and fibula with routine healing     Pain     Anemia due to blood loss, acute     Rash     Aftercare for healing traumatic fracture of lower leg, unspecified laterality, closed     Weight loss     Hypotension due to drugs     Acute posthemorrhagic anemia     ACP (advance care planning)     Abscess     Staph aureus infection     Past Surgical History:   Procedure Laterality Date     APPLY EXTERNAL FIXATOR LOWER EXTREMITY Right 9/12/2016    Procedure: APPLY EXTERNAL FIXATOR LOWER EXTREMITY;  Surgeon: John Gonzales MD;  Location: [facility] OR     HERNIORRHAPHY INGUINAL  7/2/2012    Procedure: HERNIORRHAPHY INGUINAL;  Open Repair Right Inguinal Hernia with Mesh;  Surgeon: Avni Maxwell MD;  Location: [facility] OR     INCISION AND DRAINAGE BONE LOWER EXTREMITY, COMBINED Right 8/11/2017    Procedure: COMBINED INCISION AND DRAINAGE BONE LOWER EXTREMITY;  Irrigation and Debridement Right Tibia,  Removal of Hardware;   Surgeon: Kenroy Dos Santos MD;  Location: [facility] OR     LARYNGECTOMY  Several Years ago    Partail removal due to fish bone     OPEN REDUCTION INTERNAL FIXATION TIBIA Right 9/15/2016    Procedure: OPEN REDUCTION INTERNAL FIXATION TIBIA;  Surgeon: Miguel A Green MD;  Location: [facility] OR     OPEN REDUCTION INTERNAL FIXATION TIBIA Right 11/7/2017    Procedure: OPEN REDUCTION INTERNAL FIXATION TIBIA;  Right Tibia Fibula Open Reduction Internal Fixation;  Surgeon: Miguel A Green MD;  Location: [facility] OR     REMOVE HARDWARE LOWER EXTREMITY Right 8/11/2017    Procedure: REMOVE HARDWARE LOWER EXTREMITY;;  Surgeon: Kenroy Dos Santos MD;  Location: [facility] OR       Social History   Substance Use Topics     Smoking status: Current Every Day Smoker     Packs/day: 1.00     Years: 25.00     Smokeless tobacco: Never Used     Alcohol use No     Family History   Problem Relation Age of Onset     Hypertension Mother      Hypertension Brother          Current Outpatient Prescriptions   Medication Sig Dispense Refill     predniSONE (DELTASONE) 20 MG tablet Take 2 tablets (40 mg) by mouth daily for 5 days 10 tablet 0     amoxicillin-clavulanate (AUGMENTIN) 875-125 MG per tablet Take 1 tablet by mouth 2 times daily 20 tablet 0     betamethasone dipropionate (DIPROSONE) 0.05 % cream Apply sparingly to affected area twice daily as needed.  Do not apply to face. 45 g 0     predniSONE (DELTASONE) 20 MG tablet Take 2 tablets (40 mg) by mouth daily for 5 days 10 tablet 0     ACETAMINOPHEN PO Take 650 mg by mouth every 4 hours as needed for pain       triamcinolone (KENALOG) 0.1 % cream Apply topically See Admin Instructions Apply palms and soles of feet, elbows affected areas 2-4 x day.       amLODIPine (NORVASC) 5 MG tablet Take 1 tablet (5 mg) by mouth daily (Patient not taking: Reported on 4/17/2018) 30 tablet 3     Allergies   Allergen Reactions     Seasonal Allergies Other (See Comments)     Congestion sinuses     Codeine Nausea        Reviewed and updated as needed this visit by clinical staff  Tobacco  Allergies  Meds  Med Hx  Surg Hx  Fam Hx  Soc Hx      Reviewed and updated as needed this visit by Provider         ROS:  CONSTITUTIONAL: NEGATIVE for fever, chills, change in weight  ENT/MOUTH: NEGATIVE for ear, mouth and throat problems  RESP: NEGATIVE for significant cough or SOB  CV: NEGATIVE for chest pain, palpitations or peripheral edema    OBJECTIVE:     BP (!) 149/97  Pulse 88  Temp 98.5  F (36.9  C) (Tympanic)  Resp 16  Wt 161 lb (73 kg)  SpO2 97%  BMI 22.45 kg/m2  Body mass index is 22.45 kg/(m^2).  GENERAL: healthy, alert and no distress  NECK: no adenopathy, no asymmetry, masses, or scars and thyroid normal to palpation  SKIN: severe excema like rash on face, hand and neck.  Some diffuse redness.         ASSESSMENT/PLAN:     ASSESSMENT/PLAN: will add systemic prednisone and antibioitcs.       ICD-10-CM    1. Eczema, unspecified type L30.9 predniSONE (DELTASONE) 20 MG tablet     amoxicillin-clavulanate (AUGMENTIN) 875-125 MG per tablet     betamethasone dipropionate (DIPROSONE) 0.05 % cream     predniSONE (DELTASONE) 20 MG tablet       Patient Instructions   1. This looks like excema out of control.     2. The redness may be early infection so lets do antibiotics.     3. Use the prednisone which is oral cortisone.     4. Better let me look at this in two weeks.                       Krystian Streeter MD  [facility]

## 2018-04-17 NOTE — MR AVS SNAPSHOT
"              After Visit Summary   4/17/2018    Delvin Echevarria    MRN: [id number]           Patient Information     Date Of Birth          1965        Visit Information        Provider Department      4/17/2018 1:40 PM Krystian Streeter MD [facility]        Today's Diagnoses     Eczema, unspecified type    -  1      Care Instructions    1. This looks like excema out of control.     2. The redness may be early infection so lets do antibiotics.     3. Use the prednisone which is oral cortisone.     4. Better let me look at this in two weeks.           Follow-ups after your visit        Who to contact     If you have questions or need follow up information about today's clinic visit or your schedule please contact [facility] directly at [phone].  Normal or non-critical lab and imaging results will be communicated to you by MyChart, letter or phone within 4 business days after the clinic has received the results. If you do not hear from us within 7 days, please contact the clinic through MyChart or phone. If you have a critical or abnormal lab result, we will notify you by phone as soon as possible.  Submit refill requests through amSTATZ or call your pharmacy and they will forward the refill request to us. Please allow 3 business days for your refill to be completed.          Additional Information About Your Visit        MyChart Information     amSTATZ lets you send messages to your doctor, view your test results, renew your prescriptions, schedule appointments and more. To sign up, go to [email or web address] . Click on \"Log in\" on the left side of the screen, which will take you to the Welcome page. Then click on \"Sign up Now\" on the right side of the page.     You will be asked to enter the access code listed below, as well as some personal information. Please follow the directions to create your username and password.     Your access code is: " [email or web address]  Expires: 2018  7:20 PM     Your access code will  in 90 days. If you need help or a new code, please call your [place] clinic or [phone].        Care EveryWhere ID     This is your Care EveryWhere ID. This could be used by other organizations to access your [place] medical records  [id number]        Your Vitals Were     Pulse Temperature Respirations Pulse Oximetry BMI (Body Mass Index)       88 98.5  F (36.9  C) (Tympanic) 16 97% 22.45 kg/m2        Blood Pressure from Last 3 Encounters:   18 (!) 149/97   18 132/80   18 138/86    Weight from Last 3 Encounters:   18 161 lb (73 kg)   18 150 lb (68 kg)   18 159 lb 12.8 oz (72.5 kg)              Today, you had the following     No orders found for display         Today's Medication Changes          These changes are accurate as of 18  1:59 PM.  If you have any questions, ask your nurse or doctor.               Start taking these medicines.        Dose/Directions    amoxicillin-clavulanate 875-125 MG per tablet   Commonly known as:  AUGMENTIN   Used for:  Eczema, unspecified type   Started by:  Krystian Streeter MD        Dose:  1 tablet   Take 1 tablet by mouth 2 times daily   Quantity:  20 tablet   Refills:  0       betamethasone dipropionate 0.05 % cream   Commonly known as:  DIPROSONE   Used for:  Eczema, unspecified type   Started by:  Krystian Streeter MD        Apply sparingly to affected area twice daily as needed.  Do not apply to face.   Quantity:  45 g   Refills:  0       * predniSONE 20 MG tablet   Commonly known as:  DELTASONE   Used for:  Eczema, unspecified type   Started by:  Krystian Streeter MD        Dose:  40 mg   Take 2 tablets (40 mg) by mouth daily for 5 days   Quantity:  10 tablet   Refills:  0       * predniSONE 20 MG tablet   Commonly known as:  DELTASONE   Used for:  Eczema, unspecified type   Started by:  Krystian Streeter MD         Dose:  40 mg   Take 2 tablets (40 mg) by mouth daily for 5 days   Quantity:  10 tablet   Refills:  0       * Notice:  This list has 2 medication(s) that are the same as other medications prescribed for you. Read the directions carefully, and ask your doctor or other care provider to review them with you.         Where to get your medicines      These medications were sent to [facility] [address] [place] [address]  [address] [place] [address]     Phone:  [phone]     amoxicillin-clavulanate 875-125 MG per tablet    betamethasone dipropionate 0.05 % cream    predniSONE 20 MG tablet    predniSONE 20 MG tablet                Primary Care Provider Office Phone # Fax #    Krytsian Streeter [phone]       [address] [place] [id number]        Equal Access to Services     GAYLA CRAWFORD : Zainab tolbert Soteena, waaxda luqadaha, qaybta kaalmada abram, charleen coleman. So [facility] [phone].    ATENCIÓN: Si habla español, tiene a alcazar disposición servicios gratuitos de asistencia lingüística. Larisa al [phone].    We comply with applicable federal civil rights laws and [place] laws. We do not discriminate on the basis of race, color, national origin, age, disability, sex, sexual orientation, or gender identity.            Thank you!     Thank you for choosing [facility]  for your care. Our goal is always to provide you with excellent care. Hearing back from our patients is one way we can continue to improve our services. Please take a few minutes to complete the written survey that you may receive in the mail after your visit with us. Thank you!             Your Updated Medication List - Protect others around you: Learn how to safely use, store and throw away your medicines at www.disposemymeds.org.          This list is accurate as of 4/17/18  1:59 PM.  Always use your  most recent med list.                   Brand Name Dispense Instructions for use Diagnosis    ACETAMINOPHEN PO      Take 650 mg by mouth every 4 hours as needed for pain        amLODIPine 5 MG tablet    NORVASC    30 tablet    Take 1 tablet (5 mg) by mouth daily    Benign essential hypertension       amoxicillin-clavulanate 875-125 MG per tablet    AUGMENTIN    20 tablet    Take 1 tablet by mouth 2 times daily    Eczema, unspecified type       betamethasone dipropionate 0.05 % cream    DIPROSONE    45 g    Apply sparingly to affected area twice daily as needed.  Do not apply to face.    Eczema, unspecified type       * predniSONE 20 MG tablet    DELTASONE    10 tablet    Take 2 tablets (40 mg) by mouth daily for 5 days    Eczema, unspecified type       * predniSONE 20 MG tablet    DELTASONE    10 tablet    Take 2 tablets (40 mg) by mouth daily for 5 days    Eczema, unspecified type       triamcinolone 0.1 % cream    KENALOG     Apply topically See Admin Instructions Apply palms and soles of feet, elbows affected areas 2-4 x day.        * Notice:  This list has 2 medication(s) that are the same as other medications prescribed for you. Read the directions carefully, and ask your doctor or other care provider to review them with you.

## 2018-04-17 NOTE — PATIENT INSTRUCTIONS
1. This looks like excema out of control.     2. The redness may be early infection so lets do antibiotics.     3. Use the prednisone which is oral cortisone.     4. Better let me look at this in two weeks.

## 2018-05-01 ENCOUNTER — OFFICE VISIT (OUTPATIENT)
Dept: FAMILY MEDICINE | Facility: CLINIC | Age: 53
End: 2018-05-01
Payer: COMMERCIAL

## 2018-05-01 VITALS
RESPIRATION RATE: 16 BRPM | HEART RATE: 75 BPM | SYSTOLIC BLOOD PRESSURE: 144 MMHG | WEIGHT: 153 LBS | TEMPERATURE: 98.1 F | DIASTOLIC BLOOD PRESSURE: 92 MMHG | BODY MASS INDEX: 21.34 KG/M2

## 2018-05-01 DIAGNOSIS — I10 BENIGN ESSENTIAL HYPERTENSION: ICD-10-CM

## 2018-05-01 DIAGNOSIS — L30.8 OTHER ECZEMA: Primary | ICD-10-CM

## 2018-05-01 PROCEDURE — 99213 OFFICE O/P EST LOW 20 MIN: CPT | Performed by: FAMILY MEDICINE

## 2018-05-01 RX ORDER — HYDROXYZINE HYDROCHLORIDE 25 MG/1
25-50 TABLET, FILM COATED ORAL EVERY 6 HOURS PRN
Qty: 60 TABLET | Refills: 2 | Status: SHIPPED | OUTPATIENT
Start: 2018-05-01 | End: 2018-09-12

## 2018-05-01 RX ORDER — AMLODIPINE BESYLATE 5 MG/1
5 TABLET ORAL DAILY
Qty: 30 TABLET | Refills: 3 | Status: SHIPPED | OUTPATIENT
Start: 2018-05-01 | End: 2019-08-12

## 2018-05-01 RX ORDER — TRIAMCINOLONE ACETONIDE 1 MG/G
CREAM TOPICAL
Qty: 30 G | Refills: 0 | Status: SHIPPED | OUTPATIENT
Start: 2018-05-01 | End: 2018-09-12

## 2018-05-01 ASSESSMENT — PAIN SCALES - GENERAL: PAINLEVEL: NO PAIN (0)

## 2018-05-01 NOTE — PATIENT INSTRUCTIONS
1. Try to switch to dove soap  And use as little as possible.     2. Use the two creams and the hydrox.

## 2018-05-01 NOTE — MR AVS SNAPSHOT
"              After Visit Summary   2018    Delvin Echevarria    MRN: [id number]           Patient Information     Date Of Birth          1965        Visit Information        Provider Department      2018 1:40 PM Krystian Streeter MD [facility]        Today's Diagnoses     Other eczema    -  1    Benign essential hypertension          Care Instructions    1. Try to switch to dove soap  And use as little as possible.     2. Use the two creams and the hydrox.          Follow-ups after your visit        Who to contact     If you have questions or need follow up information about today's clinic visit or your schedule please contact [facility] directly at [phone].  Normal or non-critical lab and imaging results will be communicated to you by MyChart, letter or phone within 4 business days after the clinic has received the results. If you do not hear from us within 7 days, please contact the clinic through MyChart or phone. If you have a critical or abnormal lab result, we will notify you by phone as soon as possible.  Submit refill requests through [employer] or call your pharmacy and they will forward the refill request to us. Please allow 3 business days for your refill to be completed.          Additional Information About Your Visit        MyChart Information     [employer] lets you send messages to your doctor, view your test results, renew your prescriptions, schedule appointments and more. To sign up, go to [email or web address] . Click on \"Log in\" on the left side of the screen, which will take you to the Welcome page. Then click on \"Sign up Now\" on the right side of the page.     You will be asked to enter the access code listed below, as well as some personal information. Please follow the directions to create your username and password.     Your access code is: [email or web address]  Expires: 2018  2:02 PM     Your access code will  in 90 days. If " you need help or a new code, please call your [place] clinic or [phone].        Care EveryWhere ID     This is your Care EveryWhere ID. This could be used by other organizations to access your [place] medical records  [id number]        Your Vitals Were     Pulse Temperature Respirations BMI (Body Mass Index)          75 98.1  F (36.7  C) (Tympanic) 16 21.34 kg/m2         Blood Pressure from Last 3 Encounters:   05/01/18 (!) 144/92   04/17/18 (!) 149/97   01/17/18 132/80    Weight from Last 3 Encounters:   05/01/18 153 lb (69.4 kg)   04/17/18 161 lb (73 kg)   01/23/18 150 lb (68 kg)              Today, you had the following     No orders found for display         Today's Medication Changes          These changes are accurate as of 5/1/18  2:02 PM.  If you have any questions, ask your nurse or doctor.               Start taking these medicines.        Dose/Directions    hydrOXYzine 25 MG tablet   Commonly known as:  ATARAX   Used for:  Other eczema   Started by:  Krystian Streeter MD        Dose:  25-50 mg   Take 1-2 tablets (25-50 mg) by mouth every 6 hours as needed for itching   Quantity:  60 tablet   Refills:  2         These medicines have changed or have updated prescriptions.        Dose/Directions    * triamcinolone 0.1 % cream   Commonly known as:  KENALOG   This may have changed:  Another medication with the same name was added. Make sure you understand how and when to take each.   Changed by:  Krystian Streeter MD        Apply topically See Admin Instructions Apply palms and soles of feet, elbows affected areas 2-4 x day.   Refills:  0       * triamcinolone 0.1 % cream   Commonly known as:  KENALOG   This may have changed:  You were already taking a medication with the same name, and this prescription was added. Make sure you understand how and when to take each.   Used for:  Other eczema   Changed by:  Krystian Streeter MD        Use sparingly on face for the e  xcema   Quantity:  30 g   Refills:  0       * Notice:  This list has 2 medication(s) that are the same as other medications prescribed for you. Read the directions carefully, and ask your doctor or other care provider to review them with you.         Where to get your medicines      These medications were sent to [facility] ([place], [place]) [address] [place] [id number]     Phone:  [phone]     amLODIPine 5 MG tablet    hydrOXYzine 25 MG tablet    triamcinolone 0.1 % cream                Primary Care Provider Office Phone # Fax #    Krystian Streeter [phone]       [address] [facility] [address]        Equal Access to Services     GAYLA CRAWFORD : Hadii hernan browno Soteena, waaxda luqadaha, qaybta kaalmada adeyariyada, charleen coleman. So [facility] [phone].    ATENCIÓN: Si habla español, tiene a alcazar disposición servicios gratuitos de asistencia lingüística. [facility] [phone].    We comply with applicable federal civil rights laws and [place] laws. We do not discriminate on the basis of race, color, national origin, age, disability, sex, sexual orientation, or gender identity.            Thank you!     Thank you for choosing [facility]  for your care. Our goal is always to provide you with excellent care. Hearing back from our patients is one way we can continue to improve our services. Please take a few minutes to complete the written survey that you may receive in the mail after your visit with us. Thank you!             Your Updated Medication List - Protect others around you: Learn how to safely use, store and throw away your medicines at www.disposemymeds.org.          This list is accurate as of 5/1/18  2:02 PM.  Always use your most recent med list.                   Brand Name Dispense Instructions for use Diagnosis    ACETAMINOPHEN PO      Take 650 mg by mouth  every 4 hours as needed for pain        amLODIPine 5 MG tablet    NORVASC    30 tablet    Take 1 tablet (5 mg) by mouth daily    Benign essential hypertension       betamethasone dipropionate 0.05 % cream    DIPROSONE    45 g    Apply sparingly to affected area twice daily as needed.  Do not apply to face.    Eczema, unspecified type       hydrOXYzine 25 MG tablet    ATARAX    60 tablet    Take 1-2 tablets (25-50 mg) by mouth every 6 hours as needed for itching    Other eczema       * triamcinolone 0.1 % cream    KENALOG     Apply topically See Admin Instructions Apply palms and soles of feet, elbows affected areas 2-4 x day.        * triamcinolone 0.1 % cream    KENALOG    30 g    Use sparingly on face for the e xcema    Other eczema       * Notice:  This list has 2 medication(s) that are the same as other medications prescribed for you. Read the directions carefully, and ask your doctor or other care provider to review them with you.

## 2018-05-01 NOTE — PROGRESS NOTES
SUBJECTIVE:   Delvin Echevarria is a 53 year old male who presents to clinic today for the following health issues:      Rash;  Comes with the excema and is getting better.       Duration: 2 weeks recheck    Description  Location: face and arms  Itching: mild    Intensity:  mild    Accompanying signs and symptoms: some spots left but feels it has improved    History (similar episodes/previous evaluation): None    Precipitating or alleviating factors:     Therapies tried and outcome: Augmentin          Problem list and histories reviewed & adjusted, as indicated.  Additional history: as documented    Patient Active Problem List   Diagnosis     Inguinal hernia     Essential hypertension, benign     CARDIOVASCULAR SCREENING; LDL GOAL LESS THAN 130     Tibial plateau fracture     Wound infection     Closed fracture of right tibia and fibula with routine healing     Pain     Anemia due to blood loss, acute     Rash     Aftercare for healing traumatic fracture of lower leg, unspecified laterality, closed     Weight loss     Hypotension due to drugs     Acute posthemorrhagic anemia     ACP (advance care planning)     Abscess     Staph aureus infection     Past Surgical History:   Procedure Laterality Date     APPLY EXTERNAL FIXATOR LOWER EXTREMITY Right 9/12/2016    Procedure: APPLY EXTERNAL FIXATOR LOWER EXTREMITY;  Surgeon: John Gonzales MD;  [facility]     HERNIORRHAPHY INGUINAL  7/2/2012    Procedure: HERNIORRHAPHY INGUINAL;  Open Repair Right Inguinal Hernia with Mesh;  Surgeon: Avni Maxwell MD;  Location: [facility]     INCISION AND DRAINAGE BONE LOWER EXTREMITY, COMBINED Right 8/11/2017    Procedure: COMBINED INCISION AND DRAINAGE BONE LOWER EXTREMITY;  Irrigation and Debridement Right Tibia,  Removal of Hardware;  Surgeon: Kenroy Dos Santos MD;  Location: [facility] OR     LARYNGECTOMY  Several Years ago    Partail removal due to fish bone     OPEN REDUCTION INTERNAL FIXATION TIBIA Right  9/15/2016    Procedure: OPEN REDUCTION INTERNAL FIXATION TIBIA;  Surgeon: Miguel A Green MD;  [facility]     OPEN REDUCTION INTERNAL FIXATION TIBIA Right 11/7/2017    Procedure: OPEN REDUCTION INTERNAL FIXATION TIBIA;  Right Tibia Fibula Open Reduction Internal Fixation;  Surgeon: Miguel A Green MD;  [facility]     REMOVE HARDWARE LOWER EXTREMITY Right 8/11/2017    Procedure: REMOVE HARDWARE LOWER EXTREMITY;;  Surgeon: Kenroy Dos Santos MD;  [facility]       Social History   Substance Use Topics     Smoking status: Current Every Day Smoker     Packs/day: 1.00     Years: 25.00     Smokeless tobacco: Never Used     Alcohol use No     Family History   Problem Relation Age of Onset     Hypertension Mother      Hypertension Brother          Current Outpatient Prescriptions   Medication Sig Dispense Refill     ACETAMINOPHEN PO Take 650 mg by mouth every 4 hours as needed for pain       amLODIPine (NORVASC) 5 MG tablet Take 1 tablet (5 mg) by mouth daily 30 tablet 3     betamethasone dipropionate (DIPROSONE) 0.05 % cream Apply sparingly to affected area twice daily as needed.  Do not apply to face. 45 g 0     hydrOXYzine (ATARAX) 25 MG tablet Take 1-2 tablets (25-50 mg) by mouth every 6 hours as needed for itching 60 tablet 2     triamcinolone (KENALOG) 0.1 % cream Apply topically See Admin Instructions Apply palms and soles of feet, elbows affected areas 2-4 x day.       triamcinolone (KENALOG) 0.1 % cream Use sparingly on face for the e xcema 30 g 0     [DISCONTINUED] amLODIPine (NORVASC) 5 MG tablet Take 1 tablet (5 mg) by mouth daily (Patient not taking: Reported on 4/17/2018) 30 tablet 3     Allergies   Allergen Reactions     Seasonal Allergies Other (See Comments)     Congestion sinuses     Codeine Nausea       Reviewed and updated as needed this visit by clinical staff  Allergies       Reviewed and updated as needed this visit by Provider         ROS:  CONSTITUTIONAL: NEGATIVE  for fever, chills, change in weight  ENT/MOUTH: NEGATIVE for ear, mouth and throat problems  RESP: NEGATIVE for significant cough or SOB  CV: NEGATIVE for chest pain, palpitations or peripheral edema    OBJECTIVE:     BP (!) 144/92  Pulse 75  Temp 98.1  F (36.7  C) (Tympanic)  Resp 16  Wt 153 lb (69.4 kg)  BMI 21.34 kg/m2  Body mass index is 21.34 kg/(m^2).  GENERAL: healthy, alert and no distress  NECK: no adenopathy, no asymmetry, masses, or scars and thyroid normal to palpation  RESP: lungs clear to auscultation - no rales, rhonchi or wheezes  CV: regular rate and rhythm, normal S1 S2, no S3 or S4, no murmur, click or rub, no peripheral edema and peripheral pulses strong  ABDOMEN: soft, nontender, no hepatosplenomegaly, no masses and bowel sounds normal  MS: no gross musculoskeletal defects noted, no edema        ASSESSMENT/PLAN:             1. Other eczema    - hydrOXYzine (ATARAX) 25 MG tablet; Take 1-2 tablets (25-50 mg) by mouth every 6 hours as needed for itching  Dispense: 60 tablet; Refill: 2  - triamcinolone (KENALOG) 0.1 % cream; Use sparingly on face for the e xcema  Dispense: 30 g; Refill: 0    2. Benign essential hypertension    - amLODIPine (NORVASC) 5 MG tablet; Take 1 tablet (5 mg) by mouth daily  Dispense: 30 tablet; Refill: 3    ASSESSMENT/PLAN:      ICD-10-CM    1. Other eczema L30.8 hydrOXYzine (ATARAX) 25 MG tablet     triamcinolone (KENALOG) 0.1 % cream   2. Benign essential hypertension I10 amLODIPine (NORVASC) 5 MG tablet       Patient Instructions   1. Try to switch to dove soap  And use as little as possible.     2. Use the two creams and the hydrox.          Krystian Streeter MD  [facility]

## 2018-05-01 NOTE — NURSING NOTE
"Chief Complaint   Patient presents with     Derm Problem     recheck 2 weeks       Initial BP (!) 144/92  Pulse 75  Temp 98.1  F (36.7  C) (Tympanic)  Resp 16  Wt 153 lb (69.4 kg)  BMI 21.34 kg/m2 Estimated body mass index is 21.34 kg/(m^2) as calculated from the following:    Height as of 1/23/18: 5' 11\" (1.803 m).    Weight as of this encounter: 153 lb (69.4 kg).      Health Maintenance that is potentially due pending provider review:  Colonoscopy/FIT    n/a    Is there anyone who you would like to be able to receive your results? No  If yes have patient fill out MEHUL      "

## 2018-07-13 ENCOUNTER — TELEPHONE (OUTPATIENT)
Dept: FAMILY MEDICINE | Facility: CLINIC | Age: 53
End: 2018-07-13

## 2018-07-13 NOTE — TELEPHONE ENCOUNTER
Panel Management Review      Patient has the following on his problem list:     Hypertension   Last three blood pressure readings:  BP Readings from Last 3 Encounters:   05/01/18 (!) 144/92   04/17/18 (!) 149/97   01/17/18 132/80     Blood pressure: FAILED    HTN Guidelines:  Age 18-59 BP range:  Less than 140/90  Age 60-85 with Diabetes:  Less than 140/90  Age 60-85 without Diabetes:  less than 150/90      Composite cancer screening  Chart review shows that this patient is due/due soon for the following Colonoscopy  Summary:    Patient is due/failing the following:   BP CHECK and COLONOSCOPY    Action needed:   Patient needs nurse only appointment.    Type of outreach:    Phone, left message for patient to call back.     Questions for provider review:    None                                                                                                                                    Rosaura Batres MA

## 2018-07-30 ENCOUNTER — TELEPHONE (OUTPATIENT)
Dept: FAMILY MEDICINE | Facility: CLINIC | Age: 53
End: 2018-07-30

## 2018-09-12 ENCOUNTER — OFFICE VISIT (OUTPATIENT)
Dept: FAMILY MEDICINE | Facility: CLINIC | Age: 53
End: 2018-09-12
Payer: COMMERCIAL

## 2018-09-12 VITALS
DIASTOLIC BLOOD PRESSURE: 84 MMHG | HEART RATE: 64 BPM | TEMPERATURE: 99 F | WEIGHT: 152 LBS | SYSTOLIC BLOOD PRESSURE: 128 MMHG | HEIGHT: 71 IN | BODY MASS INDEX: 21.28 KG/M2

## 2018-09-12 DIAGNOSIS — L30.8 OTHER ECZEMA: ICD-10-CM

## 2018-09-12 DIAGNOSIS — Z23 NEED FOR VACCINATION: Primary | ICD-10-CM

## 2018-09-12 PROCEDURE — 99213 OFFICE O/P EST LOW 20 MIN: CPT | Mod: 25 | Performed by: FAMILY MEDICINE

## 2018-09-12 PROCEDURE — 90714 TD VACC NO PRESV 7 YRS+ IM: CPT | Performed by: FAMILY MEDICINE

## 2018-09-12 PROCEDURE — 90471 IMMUNIZATION ADMIN: CPT | Performed by: FAMILY MEDICINE

## 2018-09-12 RX ORDER — TRIAMCINOLONE ACETONIDE 1 MG/G
CREAM TOPICAL
Qty: 60 G | Refills: 0 | Status: SHIPPED | OUTPATIENT
Start: 2018-09-12 | End: 2023-07-21

## 2018-09-12 RX ORDER — HYDROXYZINE HYDROCHLORIDE 25 MG/1
25-50 TABLET, FILM COATED ORAL EVERY 6 HOURS PRN
Qty: 60 TABLET | Refills: 2 | Status: SHIPPED | OUTPATIENT
Start: 2018-09-12 | End: 2021-02-17

## 2018-09-12 NOTE — PROGRESS NOTES
SUBJECTIVE:   Delvin Echevarria is a 53 year old male who presents to clinic today for the following health issues:  Chief Complaint   Patient presents with     Derm Problem     wants the meds renewed for the rash on his hands.     He is here with chronic dermatitis and is now have a flare.  Did clear with steroids.       Problem list and histories reviewed & adjusted, as indicated.  Additional history: as documented    Patient Active Problem List   Diagnosis     Inguinal hernia     Essential hypertension, benign     CARDIOVASCULAR SCREENING; LDL GOAL LESS THAN 130     Tibial plateau fracture     Wound infection     Closed fracture of right tibia and fibula with routine healing     Pain     Anemia due to blood loss, acute     Rash     Aftercare for healing traumatic fracture of lower leg, unspecified laterality, closed     Weight loss     Hypotension due to drugs     Acute posthemorrhagic anemia     ACP (advance care planning)     Abscess     Staph aureus infection     Past Surgical History:   Procedure Laterality Date     APPLY EXTERNAL FIXATOR LOWER EXTREMITY Right 9/12/2016    Procedure: APPLY EXTERNAL FIXATOR LOWER EXTREMITY;  Surgeon: John Gonzales MD;  [facility]     HERNIORRHAPHY INGUINAL  7/2/2012    Procedure: HERNIORRHAPHY INGUINAL;  Open Repair Right Inguinal Hernia with Mesh;  Surgeon: Avni Maxwell MD;  Location: [facility]     INCISION AND DRAINAGE BONE LOWER EXTREMITY, COMBINED Right 8/11/2017    Procedure: COMBINED INCISION AND DRAINAGE BONE LOWER EXTREMITY;  Irrigation and Debridement Right Tibia,  Removal of Hardware;  Surgeon: Kenroy Dos Santos MD;  [facility]     LARYNGECTOMY  Several Years ago    Partail removal due to fish bone     OPEN REDUCTION INTERNAL FIXATION TIBIA Right 9/15/2016    Procedure: OPEN REDUCTION INTERNAL FIXATION TIBIA;  Surgeon: Miguel A Green MD;  [facility]     OPEN REDUCTION INTERNAL FIXATION TIBIA Right 11/7/2017     "Procedure: OPEN REDUCTION INTERNAL FIXATION TIBIA;  Right Tibia Fibula Open Reduction Internal Fixation;  Surgeon: Miguel A Green MD;  [facility]     REMOVE HARDWARE LOWER EXTREMITY Right 8/11/2017    Procedure: REMOVE HARDWARE LOWER EXTREMITY;;  Surgeon: Kenroy Dos Santos MD;  Location:  OR       Social History   Substance Use Topics     Smoking status: Current Every Day Smoker     Packs/day: 1.00     Years: 25.00     Smokeless tobacco: Never Used     Alcohol use No     Family History   Problem Relation Age of Onset     Hypertension Mother      Hypertension Brother          Current Outpatient Prescriptions   Medication Sig Dispense Refill     ACETAMINOPHEN PO Take 650 mg by mouth every 4 hours as needed for pain       amLODIPine (NORVASC) 5 MG tablet Take 1 tablet (5 mg) by mouth daily 30 tablet 3     betamethasone dipropionate (DIPROSONE) 0.05 % cream Apply sparingly to affected area twice daily as needed.  Do not apply to face. 45 g 0     hydrOXYzine (ATARAX) 25 MG tablet Take 1-2 tablets (25-50 mg) by mouth every 6 hours as needed for itching 60 tablet 2     triamcinolone (KENALOG) 0.1 % cream Use sparingly on face for the e xcema 60 g 0     Allergies   Allergen Reactions     Seasonal Allergies Other (See Comments)     Congestion sinuses     Codeine Nausea       Reviewed and updated as needed this visit by clinical staff       Reviewed and updated as needed this visit by Provider         ROS:  CONSTITUTIONAL: NEGATIVE for fever, chills, change in weight  ENT/MOUTH: NEGATIVE for ear, mouth and throat problems  RESP: NEGATIVE for significant cough or SOB  CV: NEGATIVE for chest pain, palpitations or peripheral edema    OBJECTIVE:     /84 (Cuff Size: Adult Regular)  Pulse 64  Temp 99  F (37.2  C) (Tympanic)  Ht 5' 11\" (1.803 m)  Wt 152 lb (68.9 kg)  BMI 21.2 kg/m2  Body mass index is 21.2 kg/(m^2).  GENERAL: healthy, alert and no distress  NECK: no adenopathy, no asymmetry, masses, or " scars and thyroid normal to palpation  RESP: lungs clear to auscultation - no rales, rhonchi or wheezes  CV: regular rate and rhythm, normal S1 S2, no S3 or S4, no murmur, click or rub, no peripheral edema and peripheral pulses strong  MS: no gross musculoskeletal defects noted, no edema  Skin with chronic dermatitis        ASSESSMENT/PLAN:             1. Other eczema    - triamcinolone (KENALOG) 0.1 % cream; Use sparingly on face for the e xcema  Dispense: 60 g; Refill: 0  - hydrOXYzine (ATARAX) 25 MG tablet; Take 1-2 tablets (25-50 mg) by mouth every 6 hours as needed for itching  Dispense: 60 tablet; Refill: 2        Krystian Streeter MD  [facility]

## 2018-09-12 NOTE — PATIENT INSTRUCTIONS
1. This is excema again and should clear    2. Lets stay with cream for now but call me if not improving.

## 2019-01-07 ENCOUNTER — APPOINTMENT (OUTPATIENT)
Dept: MRI IMAGING | Facility: CLINIC | Age: 54
End: 2019-01-07
Payer: COMMERCIAL

## 2019-01-07 ENCOUNTER — HOSPITAL ENCOUNTER (OUTPATIENT)
Facility: CLINIC | Age: 54
Setting detail: OBSERVATION
Discharge: HOME | End: 2019-01-09
Attending: EMERGENCY MEDICINE | Admitting: INTERNAL MEDICINE
Payer: COMMERCIAL

## 2019-01-07 DIAGNOSIS — M51.369 DDD (DEGENERATIVE DISC DISEASE), LUMBAR: ICD-10-CM

## 2019-01-07 DIAGNOSIS — M54.9 BACK PAIN: ICD-10-CM

## 2019-01-07 LAB
ANION GAP SERPL CALCULATED.3IONS-SCNC: 8 MMOL/L (ref 3–14)
BASOPHILS # BLD AUTO: 0 10E9/L (ref 0–0.2)
BASOPHILS NFR BLD AUTO: 0.2 %
BUN SERPL-MCNC: 15 MG/DL (ref 7–30)
CALCIUM SERPL-MCNC: 8.4 MG/DL (ref 8.5–10.1)
CHLORIDE SERPL-SCNC: 102 MMOL/L (ref 94–109)
CO2 SERPL-SCNC: 25 MMOL/L (ref 20–32)
CREAT SERPL-MCNC: 0.72 MG/DL (ref 0.66–1.25)
DIFFERENTIAL METHOD BLD: ABNORMAL
EOSINOPHIL # BLD AUTO: 0 10E9/L (ref 0–0.7)
EOSINOPHIL NFR BLD AUTO: 0 %
ERYTHROCYTE [DISTWIDTH] IN BLOOD BY AUTOMATED COUNT: 13.7 % (ref 10–15)
GFR SERPL CREATININE-BSD FRML MDRD: >90 ML/MIN/{1.73_M2}
GLUCOSE SERPL-MCNC: 121 MG/DL (ref 70–99)
HCT VFR BLD AUTO: 42.5 % (ref 40–53)
HGB BLD-MCNC: 14.4 G/DL (ref 13.3–17.7)
IMM GRANULOCYTES # BLD: 0 10E9/L (ref 0–0.4)
IMM GRANULOCYTES NFR BLD: 0.3 %
LYMPHOCYTES # BLD AUTO: 0.6 10E9/L (ref 0.8–5.3)
LYMPHOCYTES NFR BLD AUTO: 5 %
MCH RBC QN AUTO: 31.6 PG (ref 26.5–33)
MCHC RBC AUTO-ENTMCNC: 33.9 G/DL (ref 31.5–36.5)
MCV RBC AUTO: 93 FL (ref 78–100)
MONOCYTES # BLD AUTO: 0.2 10E9/L (ref 0–1.3)
MONOCYTES NFR BLD AUTO: 2.1 %
NEUTROPHILS # BLD AUTO: 10.6 10E9/L (ref 1.6–8.3)
NEUTROPHILS NFR BLD AUTO: 92.4 %
NRBC # BLD AUTO: 0 10*3/UL
NRBC BLD AUTO-RTO: 0 /100
PLATELET # BLD AUTO: 188 10E9/L (ref 150–450)
POTASSIUM SERPL-SCNC: 4.2 MMOL/L (ref 3.4–5.3)
RBC # BLD AUTO: 4.55 10E12/L (ref 4.4–5.9)
SODIUM SERPL-SCNC: 135 MMOL/L (ref 133–144)
WBC # BLD AUTO: 11.5 10E9/L (ref 4–11)

## 2019-01-07 PROCEDURE — 99285 EMERGENCY DEPT VISIT HI MDM: CPT | Mod: 25 | Performed by: EMERGENCY MEDICINE

## 2019-01-07 PROCEDURE — 96374 THER/PROPH/DIAG INJ IV PUSH: CPT | Performed by: EMERGENCY MEDICINE

## 2019-01-07 PROCEDURE — G0378 HOSPITAL OBSERVATION PER HR: HCPCS

## 2019-01-07 PROCEDURE — 80048 BASIC METABOLIC PNL TOTAL CA: CPT | Performed by: EMERGENCY MEDICINE

## 2019-01-07 PROCEDURE — 72148 MRI LUMBAR SPINE W/O DYE: CPT

## 2019-01-07 PROCEDURE — 96375 TX/PRO/DX INJ NEW DRUG ADDON: CPT | Performed by: EMERGENCY MEDICINE

## 2019-01-07 PROCEDURE — 25000128 H RX IP 250 OP 636: Performed by: EMERGENCY MEDICINE

## 2019-01-07 PROCEDURE — 85025 COMPLETE CBC W/AUTO DIFF WBC: CPT | Performed by: EMERGENCY MEDICINE

## 2019-01-07 RX ORDER — LORAZEPAM 2 MG/ML
1 INJECTION INTRAMUSCULAR
Status: DISCONTINUED | OUTPATIENT
Start: 2019-01-07 | End: 2019-01-08

## 2019-01-07 RX ORDER — NALOXONE HYDROCHLORIDE 0.4 MG/ML
.1-.4 INJECTION, SOLUTION INTRAMUSCULAR; INTRAVENOUS; SUBCUTANEOUS
Status: DISCONTINUED | OUTPATIENT
Start: 2019-01-07 | End: 2019-01-09 | Stop reason: HOSPADM

## 2019-01-07 RX ORDER — DEXAMETHASONE SODIUM PHOSPHATE 10 MG/ML
10 INJECTION, SOLUTION INTRAMUSCULAR; INTRAVENOUS ONCE
Status: COMPLETED | OUTPATIENT
Start: 2019-01-07 | End: 2019-01-07

## 2019-01-07 RX ORDER — LORAZEPAM 2 MG/ML
0.5 INJECTION INTRAMUSCULAR EVERY 4 HOURS PRN
Status: DISCONTINUED | OUTPATIENT
Start: 2019-01-07 | End: 2019-01-09 | Stop reason: HOSPADM

## 2019-01-07 RX ORDER — DIAZEPAM 10 MG/2ML
2.5 INJECTION, SOLUTION INTRAMUSCULAR; INTRAVENOUS
Status: DISCONTINUED | OUTPATIENT
Start: 2019-01-07 | End: 2019-01-07 | Stop reason: RX

## 2019-01-07 RX ORDER — ONDANSETRON 2 MG/ML
4 INJECTION INTRAMUSCULAR; INTRAVENOUS EVERY 6 HOURS PRN
Status: DISCONTINUED | OUTPATIENT
Start: 2019-01-07 | End: 2019-01-09 | Stop reason: HOSPADM

## 2019-01-07 RX ORDER — HYDROMORPHONE HYDROCHLORIDE 1 MG/ML
0.5 INJECTION, SOLUTION INTRAMUSCULAR; INTRAVENOUS; SUBCUTANEOUS
Status: DISCONTINUED | OUTPATIENT
Start: 2019-01-07 | End: 2019-01-08

## 2019-01-07 RX ORDER — ONDANSETRON 4 MG/1
4 TABLET, ORALLY DISINTEGRATING ORAL EVERY 6 HOURS PRN
Status: DISCONTINUED | OUTPATIENT
Start: 2019-01-07 | End: 2019-01-09 | Stop reason: HOSPADM

## 2019-01-07 RX ORDER — LORAZEPAM 0.5 MG/1
0.5 TABLET ORAL EVERY 4 HOURS PRN
Status: DISCONTINUED | OUTPATIENT
Start: 2019-01-07 | End: 2019-01-09 | Stop reason: HOSPADM

## 2019-01-07 RX ORDER — ACETAMINOPHEN 325 MG/1
650 TABLET ORAL EVERY 4 HOURS PRN
Status: DISCONTINUED | OUTPATIENT
Start: 2019-01-07 | End: 2019-01-08

## 2019-01-07 RX ADMIN — LORAZEPAM 1 MG: 2 INJECTION, SOLUTION INTRAMUSCULAR; INTRAVENOUS at 18:19

## 2019-01-07 RX ADMIN — DEXAMETHASONE SODIUM PHOSPHATE 10 MG: 10 INJECTION, SOLUTION INTRAMUSCULAR; INTRAVENOUS at 18:17

## 2019-01-07 ASSESSMENT — ENCOUNTER SYMPTOMS
GASTROINTESTINAL NEGATIVE: 1
CONSTITUTIONAL NEGATIVE: 1
CARDIOVASCULAR NEGATIVE: 1
PSYCHIATRIC NEGATIVE: 1
BACK PAIN: 1
EYES NEGATIVE: 1
RESPIRATORY NEGATIVE: 1
ENDOCRINE NEGATIVE: 1
HEMATOLOGIC/LYMPHATIC NEGATIVE: 1
NEUROLOGICAL NEGATIVE: 1
ALLERGIC/IMMUNOLOGIC NEGATIVE: 1

## 2019-01-07 ASSESSMENT — MIFFLIN-ST. JEOR
SCORE: 1537.53
SCORE: 1458.13

## 2019-01-07 NOTE — ED PROVIDER NOTES
"  History   No chief complaint on file.    HPI  Delvin Echevarria is a 53 year old male who has a history of hypertension, inguinal hernia, anemia, and marijuana use who presents to the ED for evaluation of back pain and neck pain. Upon EMS arrival, patient was unable to walk. EMS states that the patient was given 75 mg of fentanyl. His blood pressure was stable per EMS as well. Patient reports that three days ago on Saturday, he was chopping wood when he felt pain in his lower back. This pain caused him to drop to his knees. His pain radiates across the lower lumbar back and radiates into the lower extremities posteriorly. He was unable to move or walk since this injury. He has been laying in bed since the time of injury. Patient's last bowel movement was yesterday and this was normal. He has been urinating and passing gas normal. He is allergic to codeine. He is not on prescription medications. He has no history of imagining of his back. He has no history of surgery on the back. He denies abdominal pain or other injuries.     He notes that the chiropractor told him that he had a \"bulging disc pressing on a nerve that causes his pain\" when he was seen in the 1980s for this pain. He states that he has had back pain since the 1970s.      Social History: Patient lives in [place], [place]. He is here via EMS by himself. Patient works as a .     Problem List:    Patient Active Problem List    Diagnosis Date Noted     Staph aureus infection 09/13/2017     Priority: Medium     Abscess 08/11/2017     Priority: Medium     ACP (advance care planning) 02/10/2017     Priority: Medium     Advance Care Planning 2/10/2017: Receipt of ACP document:  Received: POL[facility] which was signed and dated by provider on 9-27-16.  Document previously scanned on 12-8-16.  Order reviewed and found to be valid.  Code Status reflects choices in most recent ACP document.  Confirmed/documented designated decision maker(s).  Added by Ashlee Batres " RN Advance Care Planning Liaison with [employer]           Acute posthemorrhagic anemia 12/14/2016     Priority: Medium     Weight loss 10/21/2016     Priority: Medium     Hypotension due to drugs 10/21/2016     Priority: Medium     Aftercare for healing traumatic fracture of lower leg, unspecified laterality, closed 10/16/2016     Priority: Medium     Rash 10/14/2016     Priority: Medium     Pain 09/30/2016     Priority: Medium     Anemia due to blood loss, acute 09/30/2016     Priority: Medium     Wound infection 09/29/2016     Priority: Medium     Closed fracture of right tibia and fibula with routine healing 09/29/2016     Priority: Medium     Tibial plateau fracture 09/11/2016     Priority: Medium     CARDIOVASCULAR SCREENING; LDL GOAL LESS THAN 130 08/12/2013     Priority: Medium     Essential hypertension, benign 03/15/2013     Priority: Medium     Inguinal hernia 06/28/2012     Priority: Medium     Problem list name updated by automated process. Provider to review          Past Medical History:    Past Medical History:   Diagnosis Date     Hypertension 3/15/2013     Marijuana use      Tobacco use        Past Surgical History:    Past Surgical History:   Procedure Laterality Date     APPLY EXTERNAL FIXATOR LOWER EXTREMITY Right 9/12/2016    Procedure: APPLY EXTERNAL FIXATOR LOWER EXTREMITY;  Surgeon: John Gonzales MD;  Location: [facility] OR     HERNIORRHAPHY INGUINAL  7/2/2012    Procedure: HERNIORRHAPHY INGUINAL;  Open Repair Right Inguinal Hernia with Mesh;  Surgeon: Avni Maxwell MD;  Location: [facility]     INCISION AND DRAINAGE BONE LOWER EXTREMITY, COMBINED Right 8/11/2017    Procedure: COMBINED INCISION AND DRAINAGE BONE LOWER EXTREMITY;  Irrigation and Debridement Right Tibia,  Removal of Hardware;  Surgeon: Kenroy Dos Santos MD;  Location: [facility] OR     LARYNGECTOMY  Several Years ago    Partail removal due to fish bone     OPEN REDUCTION INTERNAL FIXATION TIBIA Right 9/15/2016  "   Procedure: OPEN REDUCTION INTERNAL FIXATION TIBIA;  Surgeon: Miguel A Green MD;  Location: [facility] OR     OPEN REDUCTION INTERNAL FIXATION TIBIA Right 11/7/2017    Procedure: OPEN REDUCTION INTERNAL FIXATION TIBIA;  Right Tibia Fibula Open Reduction Internal Fixation;  Surgeon: Miguel A Green MD;  Location: [facility]     REMOVE HARDWARE LOWER EXTREMITY Right 8/11/2017    Procedure: REMOVE HARDWARE LOWER EXTREMITY;;  Surgeon: Kenroy Dos Santos MD;  Location: [facility] OR       Family History:    Family History   Problem Relation Age of Onset     Hypertension Mother      Hypertension Brother        Social History:  Marital Status:  Single [1]  Social History     Tobacco Use     Smoking status: Current Every Day Smoker     Packs/day: 1.00     Years: 25.00     Pack years: 25.00     Smokeless tobacco: Never Used   Substance Use Topics     Alcohol use: No     Drug use: Yes     Types: Marijuana     Comment: Smokes 4x daily        Medications:      ACETAMINOPHEN PO   amLODIPine (NORVASC) 5 MG tablet   betamethasone dipropionate (DIPROSONE) 0.05 % cream   hydrOXYzine (ATARAX) 25 MG tablet   triamcinolone (KENALOG) 0.1 % cream         Review of Systems    Physical Exam          Physical Exam    ED Course        Procedures          {EKG done?:252209::\" \"}    Critical Care time:  {none or minutes:928395::\"none\"}  {Trauma Activation or Fall?:135337::\" \"}  {Sepsis/Septic Shock/Stemi/Stroke:401268::\" \"}         No results found for this or any previous visit (from the past 24 hour(s)).    Medications - No data to display    ED Medications:  Medications - No data to display    ED Vitals:  There were no vitals filed for this visit.    ED Labs and Imaging:  No results found for this or any previous visit (from the past 24 hour(s)).    5:54 PM Patient assessed.    Assessments & Plan (with Medical Decision Making)     Clinical Impression:    ED Course and Plan:       Disclaimer: This note consists of symbols derived from " "keyboarding, dictation and/or voice recognition software. As a result, there may be errors in the script that have gone undetected. Please consider this when interpreting information found in this chart.      I have reviewed the nursing notes.    I have reviewed the findings, diagnosis, plan and need for follow up with the patient.  {ED Addendum:352278::\" \"}       Medication List      There are no discharge medications for this visit.         Final diagnoses:   None     This document serves as a record of the services and decisions personally performed and made by Reji Smith, *. The HPI was created on his behalf by   Kimberly Estrada, a trained medical scribe. The creation of this document is based the provider's statements to the medical scribe.  Kimberly Estrada 5:53 PM 1/7/2019    Provider:   The information in this document, created by the medical scribe for me, accurately reflects the services I personally performed and the decisions made by me. I have reviewed and approved this document for accuracy prior to leaving the patient care area.  Reji Smith, * 5:53 PM 1/7/2019 1/7/2019   [facility] EMERGENCY DEPARTMENT  "

## 2019-01-07 NOTE — ED TRIAGE NOTES
53-year-old male presents to the ER with complaints of back pain by EMS. Thought he may have done too much over the weekend while he was cutting wood. During transport patient started to have increase neck pain. Low grade fever on arrival.

## 2019-01-08 ENCOUNTER — APPOINTMENT (OUTPATIENT)
Dept: PHYSICAL THERAPY | Facility: CLINIC | Age: 54
End: 2019-01-08
Payer: COMMERCIAL

## 2019-01-08 ENCOUNTER — APPOINTMENT (OUTPATIENT)
Dept: GENERAL RADIOLOGY | Facility: CLINIC | Age: 54
End: 2019-01-08
Payer: COMMERCIAL

## 2019-01-08 PROBLEM — F12.10 MARIJUANA ABUSE: Status: ACTIVE | Noted: 2019-01-08

## 2019-01-08 PROBLEM — A49.01 STAPH AUREUS INFECTION: Status: RESOLVED | Noted: 2017-09-13 | Resolved: 2019-01-08

## 2019-01-08 PROBLEM — M54.16 LUMBAR NERVE ROOT IMPINGEMENT: Status: ACTIVE | Noted: 2019-01-08

## 2019-01-08 PROBLEM — F17.200 TOBACCO DEPENDENCE SYNDROME: Status: ACTIVE | Noted: 2019-01-08

## 2019-01-08 PROBLEM — M51.369 DDD (DEGENERATIVE DISC DISEASE), LUMBAR: Status: ACTIVE | Noted: 2019-01-08

## 2019-01-08 PROBLEM — L02.91 ABSCESS: Status: RESOLVED | Noted: 2017-08-11 | Resolved: 2019-01-08

## 2019-01-08 LAB
ALBUMIN UR-MCNC: NEGATIVE MG/DL
APPEARANCE UR: CLEAR
BILIRUB UR QL STRIP: NEGATIVE
COLOR UR AUTO: YELLOW
GLUCOSE UR STRIP-MCNC: NEGATIVE MG/DL
HGB UR QL STRIP: NEGATIVE
KETONES UR STRIP-MCNC: NEGATIVE MG/DL
LEUKOCYTE ESTERASE UR QL STRIP: NEGATIVE
MUCOUS THREADS #/AREA URNS LPF: PRESENT /LPF
NITRATE UR QL: NEGATIVE
PH UR STRIP: 5 PH (ref 5–7)
RBC #/AREA URNS AUTO: 3 /HPF (ref 0–2)
SOURCE: ABNORMAL
SP GR UR STRIP: 1.03 (ref 1–1.03)
UROBILINOGEN UR STRIP-MCNC: 2 MG/DL (ref 0–2)
WBC #/AREA URNS AUTO: 1 /HPF (ref 0–5)

## 2019-01-08 PROCEDURE — 71045 X-RAY EXAM CHEST 1 VIEW: CPT

## 2019-01-08 PROCEDURE — G0378 HOSPITAL OBSERVATION PER HR: HCPCS

## 2019-01-08 PROCEDURE — 25000132 ZZH RX MED GY IP 250 OP 250 PS 637: Performed by: PHYSICIAN ASSISTANT

## 2019-01-08 PROCEDURE — 81001 URINALYSIS AUTO W/SCOPE: CPT | Performed by: PHYSICIAN ASSISTANT

## 2019-01-08 PROCEDURE — 25000132 ZZH RX MED GY IP 250 OP 250 PS 637: Performed by: EMERGENCY MEDICINE

## 2019-01-08 PROCEDURE — 99220 ZZC INITIAL OBSERVATION CARE,LEVL III: CPT | Performed by: PHYSICIAN ASSISTANT

## 2019-01-08 PROCEDURE — 40000274 ZZH STATISTIC RCP CONSULT EA 30 MIN

## 2019-01-08 PROCEDURE — 99207 ZZC CDG-CODE CATEGORY CHANGED: CPT | Performed by: PHYSICIAN ASSISTANT

## 2019-01-08 PROCEDURE — 25000128 H RX IP 250 OP 636: Performed by: PHYSICIAN ASSISTANT

## 2019-01-08 PROCEDURE — 25000128 H RX IP 250 OP 636: Performed by: EMERGENCY MEDICINE

## 2019-01-08 PROCEDURE — 40000193 ZZH STATISTIC PT WARD VISIT: Performed by: PHYSICAL THERAPIST

## 2019-01-08 PROCEDURE — 97161 PT EVAL LOW COMPLEX 20 MIN: CPT | Mod: GP | Performed by: PHYSICAL THERAPIST

## 2019-01-08 PROCEDURE — 94640 AIRWAY INHALATION TREATMENT: CPT

## 2019-01-08 PROCEDURE — 25000125 ZZHC RX 250: Performed by: PHYSICIAN ASSISTANT

## 2019-01-08 RX ORDER — MAGNESIUM CARB/ALUMINUM HYDROX 105-160MG
148 TABLET,CHEWABLE ORAL
Status: DISCONTINUED | OUTPATIENT
Start: 2019-01-08 | End: 2019-01-09 | Stop reason: HOSPADM

## 2019-01-08 RX ORDER — OXYCODONE HYDROCHLORIDE 5 MG/1
5-10 TABLET ORAL
Status: DISCONTINUED | OUTPATIENT
Start: 2019-01-08 | End: 2019-01-09 | Stop reason: HOSPADM

## 2019-01-08 RX ORDER — BISACODYL 5 MG
15 TABLET, DELAYED RELEASE (ENTERIC COATED) ORAL DAILY PRN
Status: DISCONTINUED | OUTPATIENT
Start: 2019-01-08 | End: 2019-01-09 | Stop reason: HOSPADM

## 2019-01-08 RX ORDER — ACETAMINOPHEN 325 MG/1
650 TABLET ORAL EVERY 4 HOURS
Status: DISCONTINUED | OUTPATIENT
Start: 2019-01-08 | End: 2019-01-09 | Stop reason: HOSPADM

## 2019-01-08 RX ORDER — AMLODIPINE BESYLATE 5 MG/1
5 TABLET ORAL DAILY
Status: DISCONTINUED | OUTPATIENT
Start: 2019-01-08 | End: 2019-01-09 | Stop reason: HOSPADM

## 2019-01-08 RX ORDER — PROCHLORPERAZINE MALEATE 10 MG
10 TABLET ORAL EVERY 6 HOURS PRN
Status: DISCONTINUED | OUTPATIENT
Start: 2019-01-08 | End: 2019-01-09 | Stop reason: HOSPADM

## 2019-01-08 RX ORDER — ONDANSETRON 4 MG/1
4 TABLET, ORALLY DISINTEGRATING ORAL EVERY 6 HOURS PRN
Status: DISCONTINUED | OUTPATIENT
Start: 2019-01-08 | End: 2019-01-08

## 2019-01-08 RX ORDER — BISACODYL 5 MG
5 TABLET, DELAYED RELEASE (ENTERIC COATED) ORAL DAILY PRN
Status: DISCONTINUED | OUTPATIENT
Start: 2019-01-08 | End: 2019-01-09 | Stop reason: HOSPADM

## 2019-01-08 RX ORDER — ACETAMINOPHEN 650 MG/1
650 SUPPOSITORY RECTAL EVERY 4 HOURS PRN
Status: DISCONTINUED | OUTPATIENT
Start: 2019-01-08 | End: 2019-01-09 | Stop reason: HOSPADM

## 2019-01-08 RX ORDER — NALOXONE HYDROCHLORIDE 0.4 MG/ML
.1-.4 INJECTION, SOLUTION INTRAMUSCULAR; INTRAVENOUS; SUBCUTANEOUS
Status: DISCONTINUED | OUTPATIENT
Start: 2019-01-08 | End: 2019-01-08

## 2019-01-08 RX ORDER — BISACODYL 5 MG
10 TABLET, DELAYED RELEASE (ENTERIC COATED) ORAL DAILY PRN
Status: DISCONTINUED | OUTPATIENT
Start: 2019-01-08 | End: 2019-01-09 | Stop reason: HOSPADM

## 2019-01-08 RX ORDER — IPRATROPIUM BROMIDE AND ALBUTEROL SULFATE 2.5; .5 MG/3ML; MG/3ML
3 SOLUTION RESPIRATORY (INHALATION) EVERY 4 HOURS PRN
Status: DISCONTINUED | OUTPATIENT
Start: 2019-01-08 | End: 2019-01-09 | Stop reason: HOSPADM

## 2019-01-08 RX ORDER — PROCHLORPERAZINE 25 MG
25 SUPPOSITORY, RECTAL RECTAL EVERY 12 HOURS PRN
Status: DISCONTINUED | OUTPATIENT
Start: 2019-01-08 | End: 2019-01-09 | Stop reason: HOSPADM

## 2019-01-08 RX ORDER — POLYETHYLENE GLYCOL 3350 17 G/17G
17 POWDER, FOR SOLUTION ORAL DAILY PRN
Status: DISCONTINUED | OUTPATIENT
Start: 2019-01-08 | End: 2019-01-09 | Stop reason: HOSPADM

## 2019-01-08 RX ORDER — ONDANSETRON 2 MG/ML
4 INJECTION INTRAMUSCULAR; INTRAVENOUS EVERY 6 HOURS PRN
Status: DISCONTINUED | OUTPATIENT
Start: 2019-01-08 | End: 2019-01-08

## 2019-01-08 RX ORDER — IPRATROPIUM BROMIDE AND ALBUTEROL SULFATE 2.5; .5 MG/3ML; MG/3ML
3 SOLUTION RESPIRATORY (INHALATION) ONCE
Status: COMPLETED | OUTPATIENT
Start: 2019-01-08 | End: 2019-01-08

## 2019-01-08 RX ORDER — IBUPROFEN 600 MG/1
600 TABLET, FILM COATED ORAL EVERY 6 HOURS PRN
Status: DISCONTINUED | OUTPATIENT
Start: 2019-01-08 | End: 2019-01-09 | Stop reason: HOSPADM

## 2019-01-08 RX ADMIN — AMLODIPINE BESYLATE 5 MG: 5 TABLET ORAL at 08:33

## 2019-01-08 RX ADMIN — DEXAMETHASONE SODIUM PHOSPHATE 12 MG: 10 INJECTION, SOLUTION INTRAMUSCULAR; INTRAVENOUS at 11:31

## 2019-01-08 RX ADMIN — ACETAMINOPHEN 650 MG: 325 TABLET, FILM COATED ORAL at 13:24

## 2019-01-08 RX ADMIN — HYDROMORPHONE HYDROCHLORIDE 0.5 MG: 1 INJECTION, SOLUTION INTRAMUSCULAR; INTRAVENOUS; SUBCUTANEOUS at 00:26

## 2019-01-08 RX ADMIN — HYDROMORPHONE HYDROCHLORIDE 0.5 MG: 1 INJECTION, SOLUTION INTRAMUSCULAR; INTRAVENOUS; SUBCUTANEOUS at 05:02

## 2019-01-08 RX ADMIN — ACETAMINOPHEN 650 MG: 325 TABLET, FILM COATED ORAL at 21:32

## 2019-01-08 RX ADMIN — IBUPROFEN 600 MG: 600 TABLET ORAL at 17:26

## 2019-01-08 RX ADMIN — LORAZEPAM 0.5 MG: 0.5 TABLET ORAL at 08:39

## 2019-01-08 RX ADMIN — IPRATROPIUM BROMIDE AND ALBUTEROL SULFATE 3 ML: .5; 3 SOLUTION RESPIRATORY (INHALATION) at 09:32

## 2019-01-08 RX ADMIN — OXYCODONE HYDROCHLORIDE 10 MG: 5 TABLET ORAL at 19:46

## 2019-01-08 RX ADMIN — IBUPROFEN 600 MG: 600 TABLET ORAL at 11:31

## 2019-01-08 RX ADMIN — OXYCODONE HYDROCHLORIDE 5 MG: 5 TABLET ORAL at 16:27

## 2019-01-08 RX ADMIN — ACETAMINOPHEN 650 MG: 325 TABLET, FILM COATED ORAL at 00:26

## 2019-01-08 RX ADMIN — ACETAMINOPHEN 650 MG: 325 TABLET, FILM COATED ORAL at 17:26

## 2019-01-08 RX ADMIN — ACETAMINOPHEN 650 MG: 325 TABLET, FILM COATED ORAL at 08:34

## 2019-01-08 RX ADMIN — DEXAMETHASONE SODIUM PHOSPHATE 12 MG: 10 INJECTION, SOLUTION INTRAMUSCULAR; INTRAVENOUS at 19:56

## 2019-01-08 NOTE — PROGRESS NOTES
01/08/19 1100   Quick Adds   Type of Visit Initial PT Evaluation   Living Environment   Lives With significant other   Living Arrangements mobile home   Home Accessibility stairs to enter home   Living Environment Comment 1 step to enter w/o railing   Functional Level Prior   Ambulation 0-->independent   Transferring 0-->independent   Toileting 0-->independent   Bathing 0-->independent   Communication 0-->understands/communicates without difficulty   Swallowing 0-->swallows foods/liquids without difficulty   Cognition 0 - no cognition issues reported   Fall history within last six months no   Prior Functional Level Comment PLOF- Pt indep. with ambulation with no deive, since onset of SX- using [facility]   General Information   Onset of Illness/Injury or Date of Surgery - Date 01/07/19   Referring Physician Elizabeth HENSLEY   Patient/Family Goals Statement Pt w/ goal of decreasing pain   Pertinent History of Current Problem (include personal factors and/or comorbidities that impact the POC) 53 year old male who has a history of hypertension, inguinal hernia, anemia, and marijuana use who presents to the ED for evaluation of back pain and neck pain.    Precautions/Limitations spinal precautions   General Observations Pt lethargic initially, but arouses.  Pt reports longstanding hx of lower lumbar problems, but recently became intolerable.  Difficulty WB . no alleviating factors. Pt reports central l pain, bilateral posterior sx to his knees.   No saddle numbness, change. in  bowel and bladder function. Pt reports pain at 9/10 , but no pain behaviors at rest. Pain higher with activity   Pain Assessment   Patient Currently in Pain Yes, see Vital Sign flowsheet   Posture    Posture Comments frail, kyphotic   Range of Motion (ROM)   ROM Comment WFL, does have end range knee extension limatation. Longstanding on Right due to previous surgery.  .  Limited assessment- lumbar/ SI per pt request /   Strength   Strength Comments  Weakness Left> R.   tremulous bursts of strength  with left knee ext   MMT: Hip, Rehab Eval   Hip Flexion - Left Side (AG strength)   Hip Flexion - Right Side (AG strength)   MMT: Knee, Rehab Eval   Knee Flexion - Left Side (4/5) good, left   Knee Extension - Left Side (3+/5) fair plus, left   Knee Flexion - Right Side (4/5) good, right   Knee Extension - Right Side (4-/5) good minus, right   MMT: Ankle, Rehab Eval   Ankle Dorsiflexion - Left Side (4/5) good, left   Ankle Plantarflexion - Left Side (NT fully performs in sitting w/o difficulty)   Ankle Dorsiflexion - Right Side (4/5) good, left   Ankle Plantarflexion - Right Side (NT fully performs in sitting w/o difficulty)   Bed Mobility   Bed Mobility Comments Pt in sidelying, discussed log roll technique.. Slow and guarded to move into sitting, initially requesting no assistance ,but did require minimal assistance    Transfer Skills   Transfer Comments Min. assistance sit > stand w/RW. slow to obtain standing.    Pain increases w/ transitional movements    Gait   Gait Comments Pt ambulated 30 feet x1 with RW and SBA. Slow rate- flexed/ crouched posture w/ bilateral knee flexion at stance. Discussed to continue amb. W/ nursing staff 3x/ daily in preparation for discharge  to home        Balance   Balance Comments good with walker use. is reliant  on walker to unload spine   General Therapy Interventions   Planned Therapy Interventions gait training;home program guidelines   Clinical Impression   Criteria for Skilled Therapeutic Intervention yes, treatment indicated   PT Diagnosis Lumbar / SI dysfunction   Influenced by the following impairments pain, decreased strength   Functional limitations due to impairments altered mobility- decreased indep. w/ bed mobility, transfers, decreased ambulatory status   Clinical Presentation Stable/Uncomplicated   Clinical Presentation Rationale clinical  judgement   Clinical Decision Making (Complexity) Low complexity   Therapy  "Frequency` daily   Predicted Duration of Therapy Intervention (days/wks) 1 day   Anticipated Equipment Needs at Discharge (May need walker )   Anticipated Discharge Disposition Home with Outpatient Therapy  (outpatient PT, if agrreable)   Risk & Benefits of therapy have been explained Yes   Patient, Family & other staff in agreement with plan of care Yes   [facility] [place] AM-PAC  \"6 Clicks\" V.2 Basic Mobility Inpatient Short Form   1. Turning from your back to your side while in a flat bed without using bedrails? 3 - A Little   2. Moving from lying on your back to sitting on the side of a flat bed without using bedrails? 3 - A Little   3. Moving to and from a bed to a chair (including a wheelchair)? 3 - A Little   4. Standing up from a chair using your arms (e.g., wheelchair, or bedside chair)? 3 - A Little   5. To walk in hospital room? 3 - A Little   6. Climbing 3-5 steps with a railing? 3 - A Little   Basic Mobility Raw Score (Score out of 24.Lower scores equate to lower levels of function) 18   Total Evaluation Time   Total Evaluation Time (Minutes) 20     "

## 2019-01-08 NOTE — ED PROVIDER NOTES
History     Chief Complaint   Patient presents with     Back Pain     Neck Pain     HPI  Delvin Echevarria is a 53 year old male who presents for evaluation for evaluation of her back pain.  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  He reports he was chopping wood 3 days earlier.  There is no direct trauma or fall.  He has a history of chronic low back pain.  Been unable to get out of bed due to pain and discomfort.  He reports he is passing gas, he is also had a bowel movement has had no difficulty urinating and has no sense of urinary or numbness.  He has no leg weakness.  Prior history of leg trauma with multiple surgeries involving his right lower leg he reports he takes no active prescriptions and he is allergic to codeine.  EMS report he had to be transferred out of bed with a Sudarshan lift.  He received IV fentanyl in transport prior to ED arrival.    Problem List:    Patient Active Problem List    Diagnosis Date Noted     Staph aureus infection 09/13/2017     Priority: Medium     Abscess 08/11/2017     Priority: Medium     ACP (advance care planning) 02/10/2017     Priority: Medium     Advance Care Planning 2/10/2017: Receipt of ACP document:  Received: POLST which was signed and dated by provider on 9-27-16.  Document previously scanned on 12-8-16.  Order reviewed and found to be valid.  Code Status reflects choices in most recent ACP document.  Confirmed/documented designated decision maker(s).  Added by Ashlee Batres RN Advance Care Planning Liaison with [employer]           Acute posthemorrhagic anemia 12/14/2016     Priority: Medium     Weight loss 10/21/2016     Priority: Medium     Hypotension due to drugs 10/21/2016     Priority: Medium     Aftercare for healing traumatic fracture of lower leg, unspecified laterality, closed 10/16/2016     Priority: Medium     Rash 10/14/2016     Priority: Medium     Pain 09/30/2016     Priority: Medium     Anemia due to blood  loss, acute 09/30/2016     Priority: Medium     Wound infection 09/29/2016     Priority: Medium     Closed fracture of right tibia and fibula with routine healing 09/29/2016     Priority: Medium     Tibial plateau fracture 09/11/2016     Priority: Medium     CARDIOVASCULAR SCREENING; LDL GOAL LESS THAN 130 08/12/2013     Priority: Medium     Essential hypertension, benign 03/15/2013     Priority: Medium     Inguinal hernia 06/28/2012     Priority: Medium     Problem list name updated by automated process. Provider to review          Past Medical History:    Past Medical History:   Diagnosis Date     Hypertension 3/15/2013     Marijuana use      Tobacco use        Past Surgical History:    Past Surgical History:   Procedure Laterality Date     APPLY EXTERNAL FIXATOR LOWER EXTREMITY Right 9/12/2016    Procedure: APPLY EXTERNAL FIXATOR LOWER EXTREMITY;  Surgeon: John Gonzales MD;  Location: [facility] OR     HERNIORRHAPHY INGUINAL  7/2/2012    Procedure: HERNIORRHAPHY INGUINAL;  Open Repair Right Inguinal Hernia with Mesh;  Surgeon: Avni Maxwell MD;  Location: [facility]     INCISION AND DRAINAGE BONE LOWER EXTREMITY, COMBINED Right 8/11/2017    Procedure: COMBINED INCISION AND DRAINAGE BONE LOWER EXTREMITY;  Irrigation and Debridement Right Tibia,  Removal of Hardware;  Surgeon: Kenroy Dos Santos MD;  Location: [facility] OR     LARYNGECTOMY  Several Years ago    Partail removal due to fish bone     OPEN REDUCTION INTERNAL FIXATION TIBIA Right 9/15/2016    Procedure: OPEN REDUCTION INTERNAL FIXATION TIBIA;  Surgeon: Miguel A Green MD;  Location: [facility] OR     OPEN REDUCTION INTERNAL FIXATION TIBIA Right 11/7/2017    Procedure: OPEN REDUCTION INTERNAL FIXATION TIBIA;  Right Tibia Fibula Open Reduction Internal Fixation;  Surgeon: Miguel A Green MD;  Location: [facility]     REMOVE HARDWARE LOWER EXTREMITY Right 8/11/2017    Procedure: REMOVE HARDWARE LOWER EXTREMITY;;  Surgeon: Kenroy Dos Santos,  "MD;  Location: [facility] OR       Family History:    Family History   Problem Relation Age of Onset     Hypertension Mother      Hypertension Brother        Social History:  Marital Status:  Single [1]  Social History     Tobacco Use     Smoking status: Current Every Day Smoker     Packs/day: 1.00     Years: 25.00     Pack years: 25.00     Smokeless tobacco: Never Used   Substance Use Topics     Alcohol use: No     Drug use: Yes     Types: Marijuana     Comment: Smokes 4x daily        Medications:      ACETAMINOPHEN PO   amLODIPine (NORVASC) 5 MG tablet   betamethasone dipropionate (DIPROSONE) 0.05 % cream   hydrOXYzine (ATARAX) 25 MG tablet   triamcinolone (KENALOG) 0.1 % cream         Review of Systems   Constitutional: Negative.    HENT: Negative.    Eyes: Negative.    Respiratory: Negative.    Cardiovascular: Negative.    Gastrointestinal: Negative.    Endocrine: Negative.    Genitourinary: Negative.    Musculoskeletal: Positive for back pain.   Skin: Negative.    Allergic/Immunologic: Negative.    Neurological: Negative.    Hematological: Negative.    Psychiatric/Behavioral: Negative.    All other systems reviewed and are negative.      Physical Exam   BP: (!) 173/102  Pulse: 78  Temp: 100.2  F (37.9  C)  Resp: 18  Height: 180.3 cm (5' 11\")  Weight: 68 kg (150 lb)  SpO2: 95 %      Physical Exam   Constitutional: He is oriented to person, place, and time. He appears well-developed and well-nourished. No distress.   HENT:   Head: Normocephalic and atraumatic.   Eyes: EOM are normal. Pupils are equal, round, and reactive to light. Right eye exhibits no discharge. Left eye exhibits no discharge.   Neck: Normal range of motion. Neck supple. No JVD present. No tracheal deviation present. No thyromegaly present.   Cardiovascular: Normal rate.   Pulmonary/Chest: Effort normal.   Abdominal: Soft.   Musculoskeletal: He exhibits tenderness.        Lumbar back: He exhibits pain.        Back:    Neurological: He is alert and " oriented to person, place, and time. He displays normal reflexes. No cranial nerve deficit or sensory deficit. He exhibits normal muscle tone. Coordination normal.   Skin: Skin is warm. He is not diaphoretic.   Psychiatric: He has a normal mood and affect. His behavior is normal. Judgment and thought content normal.       ED Course        Procedures               Critical Care time:  none                 ED medications:  Medications   LORazepam (ATIVAN) injection 1 mg (1 mg Intravenous Given 1/7/19 1819)   HYDROmorphone (PF) (DILAUDID) injection 0.5 mg (not administered)   dexamethasone PF (DECADRON) injection 10 mg (10 mg Intravenous Given 1/7/19 1817)     ED labs and imaging:  Results for orders placed or performed during the hospital encounter of 01/07/19 (from the past 24 hour(s))   Lumbar spine MRI w/o contrast    Narrative    MRI LUMBAR SPINE WITHOUT CONTRAST   1/7/2019 8:56 PM     HISTORY: Lower lumbar back pain, immobility, extremity weakness acute  on chronic.     TECHNIQUE: Multiplanar multisequence MRI of the lumbar spine without  contrast.     COMPARISON: None.     FINDINGS: There are 5 lumbar-type vertebral bodies assumed for the  purposes of this dictation. The distal spinal cord and cauda equina  appear normal.     Alignment of the lumbar spine is normal. No loss of vertebral body  height. There are no destructive osseous lesions. T2 and mildly T1  hyperintense lesion in the L1 vertebral body likely represents a  venous malformation. Marked loss of intervertebral disc height with  disc desiccation and degenerative endplate changes at L5-S1 asymmetric  on the left. There is endplate marrow edema at L5-S1 particularly on  the left. The other disc levels appear normal.     Level by level as follows:     T12-L1: No significant spinal canal or neural foraminal narrowing.     L1-L2: No significant spinal canal or neural foraminal narrowing.     L2-L3: No significant spinal canal or neural foraminal  "narrowing.     L3-L4: No significant spinal canal or neural foraminal narrowing.     L4-L5: No significant spinal canal or neural foraminal narrowing.     L5-S1: Circumferential disc bulge asymmetric in the left foraminal and  far lateral region with associated endplate osteophytic spurring.  Findings result in moderate left neural foraminal narrowing. The disc  osteophyte complex may also impinge upon the traversing left S1 nerve  root in the lateral recess. No significant right neural foraminal  narrowing or spinal canal stenosis.     Paraspinous soft tissues: Normal.       Impression    IMPRESSION:  Degenerative disc changes at L5-S1 asymmetric towards the  left resulting in moderate left neural foraminal narrowing and  possible impingement on the traversing left S1 nerve root in the  lateral recess. The other disc levels appear relatively normal.              ED Vitals:  Vitals:    01/07/19 1756   BP: (!) 173/102   Pulse: 78   Resp: 18   Temp: 100.2  F (37.9  C)   TempSrc: Oral   SpO2: 95%   Weight: 68 kg (150 lb)   Height: 1.803 m (5' 11\")     Assessments & Plan (with Medical Decision Making)   Clinical impression: 53-year-old male who arrived by EMS from a local farm for further evaluation for acute lumbar back pain.  Symptoms due to degenerative disc disease at L5 through S1 with nerve impingement at the S1 nerve root on MRI imaging.    He reports a distant history of back problems but no prior history of back surgery.  He reports a  history of traumatic leg injury in the right lower leg requiring multiple surgeries in 2016.  He takes no active prescription.  He reports he was chopping wood about 3 days earlier when his pain and discomfort began.  He works as a .  No fever or chills.  No difficulty defecating urinating and passing gas and reporting no weakness or numbness in his lower legs.  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  He received IV fentanyl upon transport and reported some improvement in his pain and discomfort.  He reported no abdominal pain.  He was noted to be febrile on ED arrival with a temperature 100.2 hypertensive with a pressure 173/102 was 95% on room air.  He appears older than his stated age.  Reports taking no active medications and allergy to codeine.  GCS was 15.  He was able to move his lower extremities equally and reported chronic leg weakness because of prior surgeries.  No leg swelling.  There is no rashes or lesions.  Normal gross sensation.      ED course and Plan:  I discussed and reviewed options for care with symptom management with history of chronic low back pain and prior history of known lumbar disc problem.  His symptoms are suggestive of radiculopathy/sciatica.  We discussed approach to management and care.  At this time I elected to image the patient with  report of no recent imaging, patient statin he has been unable to get out of bed due to pain and discomfort and to exclude disc herniation versus nerve impingement or spinal stenosis with atraumatic symptoms that  began while he was chopping wood.  He was given IV Dilaudid and lorazepam.    MRI today  showed degenerative disease at L5 through S1.  There is asymmetric changes with moderate left neuroforaminal narrowing and possible impingement of the transversing left S1 nerve root.  Patient was reevaluated after MRI imaging, IV lorazepam,  and IV Decadron.  Patient could not get out of bed to ambulate in the emergency department.  At this time patient cannot be managed as an outpatient as he lives on a farm and has poor support.  He will be admitted for further care and evaluation including pain control, PT and OT evaluation and further care.    I spoke with Mo Johnson PA-C-admitting hospitalist at  10.20PM.  He agreed to assume care on admission after I  reviewed rationale for admission, clinical history and presentation, ED course and workup, and interventions in the emergency department.          I have reviewed the nursing notes.    I have reviewed the findings, diagnosis, plan and need for follow up with the patient.          Medication List      There are no discharge medications for this visit.         Final diagnoses:   Back pain - Over the last 2-3 days after chopping wood.  Prior history of chronic low back pain.  MRI showing nerve impingement at S1 nerve root       1/7/2019   [facility] EMERGENCY DEPARTMENT     Reji Smith MD  01/07/19 [id number]

## 2019-01-08 NOTE — PLAN OF CARE
Patient continues to c/o lower lumbar discomfort and refused to get out of bed.  Using Tylenol, Ibuprofen and Decadron with minimal relief.  Will continue to monitor.

## 2019-01-08 NOTE — PROGRESS NOTES
"[facility] ADMISSION NOTE    Patient admitted to room 2405 at approximately 2330 via cart from emergency room. Patient was accompanied by transport tech.     Verbal SBAR report received from [place] prior to patient arrival.     Patient trasferred to bed via slip sheet. Patient alert and oriented X 3. Pain is not well controlled.  Medication(s) being used: narcotic analgesics including Dilaudid. 0-10 Pain Scale: 9. Admission vital signs: Blood pressure 141/80, pulse 67, temperature 97.8  F (36.6  C), temperature source Oral, resp. rate 18, height 1.803 m (5' 11\"), weight 60.1 kg (132 lb 7.9 oz), SpO2 97 %. Patient was oriented to plan of care, call light, bed controls, tv, telephone, bathroom and visiting hours.     Risk Assessment    The following safety risks were identified during admission: fall. Yellow risk band applied: YES.     Skin Initial Assessment    This writer admitted this patient and completed a full skin assessment and Yosvany score in the Adult PCS flowsheet. Appropriate interventions initiated as needed.   Right forearm 1 cm scabbed burn \"from woodstove\",   Right knee inner aspect small scab,  Right knee Healed Scar from Previous ORIF surgery,  Left shin small scab,   Left ankle inner aspect 3 tiny scabs,  Right and left hand littered with black tiny scabs \"Eczema\".    Secondary skin check completed by Velma ALY RN.    Yosvany Risk Assessment  Sensory Perception: 4-->no impairment  Moisture: 3-->occasionally moist  Activity: 1-->bedfast  Mobility: 2-->very limited  Nutrition: 4-->excellent  Friction and Shear: 2-->potential problem  Yosvany Score: 19    Violet Bibeau    "

## 2019-01-08 NOTE — H&P
[facility]    History and Physical  Hospital Medicine       Date of Admission:  1/7/2019  Date of Service: 1/8/2019     CC: back pain    Assessment & Plan   Delvin Echevarria is a 53 year old male with a past medical history significant for hypertension, tobacco dependence, and marijuana abuse who presents on 1/7/2019 with back pain.      Back pain  DDD (degenerative disc disease), lumbar  Lumbar nerve root impingement  Acute pain after physical strain but otherwise atraumatic. Lumbar MRI showing degenerative disc disease with left neural foraminal narrowing and impingement on traversing left S1 nerve root in lateral recess. Unable to ambulate, admitted for pain control. Given IV lorazepam, decadron, and dilaudid in the emergency department.  - Scheduled acetaminophen  - Oxycodone 5-10 mg q 3 hrs prn   - Prn ibuprofen  - PT/OT assessments  - IV decadron  - Ortho spine consult    ADDENDUM:  Phone consult provided by ortho spine. Confirmed patient does not likely need surgical intervention. Continue with steroids, pain medications, and therapies.    Fever  Presented with temperature 100.3, now afebrile. Chest x-ray unremarkable.  - UA pending      Chronic cough, suspected COPD (undiagnosed)  Productive, chronic but stable. Initially febrile, minimal leukocytosis upon admission. Suspect patient has undiagnosed COPD given his long term tobacco dependence. Chest x-ray without evidence for pneumonia  - Trial of DuoNebs  - Would recommend outpatient follow-up to manage suspected COPD (patient has not had routine medical care)      Essential hypertension, benign  Pressures mildly elevated. Managed prior to admission with amlodipine 5 mg daily, although patient does not take regularly.  - Continue prior to admission amlodipine       Tobacco dependence syndrome  Encourage cessation. Patient declines nicotine replacement at this time.      Marijuana abuse  Encourage cessation.        Fluids:  Oral  Electrolytes: Monitor  Nutrition: Regular diet    DVT Prophylaxis: Pneumatic Compression Devices  Code Status: Full Code - discussed directly with patient    Lines: Peripheral  Rivera catheter: not indicated    Disposition: Anticipate discharge in 2+ day(s). Appropriate for inpatient care due to inability to ambulate and need for IV steroids.    Discussion: Assessment & plan discussed with Dr. Cory Jimenez.    Nancy Calle PA-C  [facility]  Pager: [phone]          Primary Care Physician   Krystian Streeter [phone]    History is obtained from the patient and review of old records via the EMR.    Past Medical History      Past Medical History:   Diagnosis Date     Abscess 8/11/2017     Acute posthemorrhagic anemia 12/14/2016     Anemia due to blood loss, acute 9/30/2016     Closed fracture of right tibia and fibula with routine healing 9/29/2016     DDD (degenerative disc disease), lumbar      Hypertension 3/15/2013     Marijuana use     4x daily     Staph aureus infection 9/13/2017     Tibial plateau fracture 9/11/2016     Tobacco use      Wound infection 9/29/2016         Diagnosis Date Noted     Tobacco dependence syndrome 01/08/2019     Priority: Medium     Marijuana abuse 01/08/2019     Priority: Medium     Weight loss 10/21/2016     Priority: Medium     Essential hypertension, benign 03/15/2013     Priority: Medium     Inguinal hernia 06/28/2012     Priority: Medium     Problem list name updated by automated process. Provider to review          Past Surgical History     Past Surgical History:   Procedure Laterality Date     APPLY EXTERNAL FIXATOR LOWER EXTREMITY Right 9/12/2016    Procedure: APPLY EXTERNAL FIXATOR LOWER EXTREMITY;  Surgeon: John Gonzales MD;  Location: [facility] OR     HERNIORRHAPHY INGUINAL  7/2/2012    Procedure: HERNIORRHAPHY INGUINAL;  Open Repair Right Inguinal Hernia with Mesh;  Surgeon: Avni Maxwell MD;  Location: [facility]  OR     INCISION AND DRAINAGE BONE LOWER EXTREMITY, COMBINED Right 8/11/2017    Procedure: COMBINED INCISION AND DRAINAGE BONE LOWER EXTREMITY;  Irrigation and Debridement Right Tibia,  Removal of Hardware;  Surgeon: Kenroy Dos Santos MD;  Location: [facility] OR     LARYNGECTOMY  Several Years ago    Partail removal due to fish bone     OPEN REDUCTION INTERNAL FIXATION TIBIA Right 9/15/2016    Procedure: OPEN REDUCTION INTERNAL FIXATION TIBIA;  Surgeon: Miguel A Green MD;  Location: [facility] OR     OPEN REDUCTION INTERNAL FIXATION TIBIA Right 11/7/2017    Procedure: OPEN REDUCTION INTERNAL FIXATION TIBIA;  Right Tibia Fibula Open Reduction Internal Fixation;  Surgeon: Miguel A Green MD;  [facility] OR     REMOVE HARDWARE LOWER EXTREMITY Right 8/11/2017    Procedure: REMOVE HARDWARE LOWER EXTREMITY;;  Surgeon: Kenroy Dos Santos MD;  Location: [facility] OR        History of Present Illness   Delvin Echevarria is a 53 year old male with the above past medical history now presents on 1/7/2019 with a 3 day history of low back pain.    Patient has a history of chronic back problems, but has not had regular formal medical management of the back pain or required ongoing pain medications. He also has not had routine preventative medical care.    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 Three days ago he was chopping wood when he developed acute low back pain. He denies any recent trauma to his back. Pain has persisted and worsened since onset, and he is unable to ambulate or care for himself at home due to the pain.    Pain is constant, described as sharp, shooting, aching, cramping, and dull - it is located along the spine in the low lumbar region with radiation down posterior legs bilaterally. Pain was 9/10 yesterday, down to 6/10 today with pain management. Pain is worse with any movement, weight bearing, or attempted ambulation, improves slightly with pain medication.    On review of systems patient  "mentions having a chronic productive cough, unchanged from baseline. He states \"I can't ever clear my lungs.\" He has occasional shortness of breath, wheezing, and chest pain when he coughs, but denies all this currently. He denies any fever, chills, or recent illness. He has a significant smoking history. He was slightly dizzy yesterday but denies any falls or syncopal episodes. The remainder review of systems is negative.      Prior to Admission Medications   Prior to Admission Medications   Prescriptions Last Dose Informant Patient Reported? Taking?   ACETAMINOPHEN PO   Yes No   Sig: Take 650 mg by mouth every 4 hours as needed for pain   amLODIPine (NORVASC) 5 MG tablet   No No   Sig: Take 1 tablet (5 mg) by mouth daily   betamethasone dipropionate (DIPROSONE) 0.05 % cream   No No   Sig: Apply sparingly to affected area twice daily as needed.  Do not apply to face.   hydrOXYzine (ATARAX) 25 MG tablet   No No   Sig: Take 1-2 tablets (25-50 mg) by mouth every 6 hours as needed for itching   triamcinolone (KENALOG) 0.1 % cream   No No   Sig: Use sparingly on face for the e xcema      Facility-Administered Medications: None     Allergies   Allergies   Allergen Reactions     Seasonal Allergies Other (See Comments)     Congestion sinuses     Codeine Nausea       Family History    Family History   Problem Relation Age of Onset     Hypertension Mother      Hypertension Brother        Social History   Social History     Socioeconomic History     Marital status: Single     Spouse name: Not on file     Number of children: Not on file     Years of education: Not on file     Highest education level: Not on file   Social Needs     Financial resource strain: Not on file     Food insecurity - worry: Not on file     Food insecurity - inability: Not on file     Transportation needs - medical: Not on file     Transportation needs - non-medical: Not on file   Occupational History     Not on file   Tobacco Use     Smoking status: " "Current Every Day Smoker     Packs/day: 1.00     Years: 25.00     Pack years: 25.00     Smokeless tobacco: Never Used   Substance and Sexual Activity     Alcohol use: No     Drug use: Yes     Types: Marijuana     Comment: Smokes 4x daily     Sexual activity: Yes   Other Topics Concern     Parent/sibling w/ CABG, MI or angioplasty before 65F 55M? Not Asked   Social History Narrative     Not on file       Review of Systems     A complete 10 point review of systems was negative except for items noted in the HPI/subjective.    Physical Exam   /75   Pulse 65   Temp 96.8  F (36  C) (Oral)   Resp 18   Ht 1.803 m (5' 11\")   Wt 60.1 kg (132 lb 7.9 oz)   SpO2 98%   BMI 18.48 kg/m       Weight: 132 lbs 7.94 oz Body mass index is 18.48 kg/m .     Constitutional: Alert, oriented, cooperative, speaking in full sentences. Appears uncomfortable but no apparent distress, appears nontoxic,     Eyes: Eyes are clear, pupils are reactive. No scleral icterus. Extroccular movements intact.     HEENT: Oropharynx is clear and moist, no lesions. Normocephalic, no evidence of cranial trauma.      Cardiovascular: Regular rhythm and rate, normal S1 and S2. No murmur, rubs, or gallops. Peripheral pulses in tact bilaterally. No lower extremity edema.    Respiratory: Rhonchi bilaterally with minimal wheezing. No crackles.     GI: Soft, non-distended. Non-tender, no rebound or guarding. No hepatosplenomegaly or masses appreciated. Normal bowel sounds.     Musculoskeletal: Without obvious deformity. Normal muscle bulk and tone. Low lumbar spine is tender to palpation. Pain worsened with straight leg raise bilaterally.    Skin: Warm and dry, no rashes or ecchymoses. No mottling of skin.      Neurologic: Patient moves all extremities. Cranial nerves are grossly intact.  is symmetric. Gross strength and sensation are equal bilaterally.    Genitourinary: Deferred    Data   Data reviewed today:   Recent Labs   Lab 01/07/19  2238   WBC " 11.5*   HGB 14.4   MCV 93         POTASSIUM 4.2   CHLORIDE 102   CO2 25   BUN 15   CR 0.72   ANIONGAP 8   DINO 8.4*   *       Recent Results (from the past 24 hour(s))   Lumbar spine MRI w/o contrast    Narrative    MRI LUMBAR SPINE WITHOUT CONTRAST   1/7/2019 8:56 PM     HISTORY: Lower lumbar back pain, immobility, extremity weakness acute  on chronic.     TECHNIQUE: Multiplanar multisequence MRI of the lumbar spine without  contrast.     COMPARISON: None.     FINDINGS: There are 5 lumbar-type vertebral bodies assumed for the  purposes of this dictation. The distal spinal cord and cauda equina  appear normal.     Alignment of the lumbar spine is normal. No loss of vertebral body  height. There are no destructive osseous lesions. T2 and mildly T1  hyperintense lesion in the L1 vertebral body likely represents a  venous malformation. Marked loss of intervertebral disc height with  disc desiccation and degenerative endplate changes at L5-S1 asymmetric  on the left. There is endplate marrow edema at L5-S1 particularly on  the left. The other disc levels appear normal.     Level by level as follows:     T12-L1: No significant spinal canal or neural foraminal narrowing.     L1-L2: No significant spinal canal or neural foraminal narrowing.     L2-L3: No significant spinal canal or neural foraminal narrowing.     L3-L4: No significant spinal canal or neural foraminal narrowing.     L4-L5: No significant spinal canal or neural foraminal narrowing.     L5-S1: Circumferential disc bulge asymmetric in the left foraminal and  far lateral region with associated endplate osteophytic spurring.  Findings result in moderate left neural foraminal narrowing. The disc  osteophyte complex may also impinge upon the traversing left S1 nerve  root in the lateral recess. No significant right neural foraminal  narrowing or spinal canal stenosis.     Paraspinous soft tissues: Normal.       Impression    IMPRESSION:   Degenerative disc changes at L5-S1 asymmetric towards the  left resulting in moderate left neural foraminal narrowing and  possible impingement on the traversing left S1 nerve root in the  lateral recess. The other disc levels appear relatively normal.     LILLIAN RG MD       I personally reviewed no images or EKG's today.    Nancy Calle PA-C  [facility]  Pager: [phone]

## 2019-01-08 NOTE — PLAN OF CARE
OT: Per discussion with physical therapy no IP OT needs identified at this time. See PT evaluation for functional status

## 2019-01-08 NOTE — PROGRESS NOTES
Skin affirmation note    Admitting nurse completed full skin assessment, Yosvany score and Yosvany interventions. This writer agrees with the initial skin assessment findings.

## 2019-01-08 NOTE — PROGRESS NOTES
Patient has  [place] to Observation  order. Patient has been given Patient Bill of Rights, Observation brochure and  What does Observation mean to me  forms.  Patient has been given the opportunity to ask questions about observation status and their plan of care.

## 2019-01-09 ENCOUNTER — APPOINTMENT (OUTPATIENT)
Dept: PHYSICAL THERAPY | Facility: CLINIC | Age: 54
End: 2019-01-09
Payer: COMMERCIAL

## 2019-01-09 VITALS
DIASTOLIC BLOOD PRESSURE: 80 MMHG | HEART RATE: 50 BPM | SYSTOLIC BLOOD PRESSURE: 134 MMHG | RESPIRATION RATE: 18 BRPM | OXYGEN SATURATION: 95 % | HEIGHT: 71 IN | BODY MASS INDEX: 18.55 KG/M2 | WEIGHT: 132.5 LBS | TEMPERATURE: 97.6 F

## 2019-01-09 LAB
ANION GAP SERPL CALCULATED.3IONS-SCNC: 5 MMOL/L (ref 3–14)
BUN SERPL-MCNC: 31 MG/DL (ref 7–30)
CALCIUM SERPL-MCNC: 7.9 MG/DL (ref 8.5–10.1)
CHLORIDE SERPL-SCNC: 105 MMOL/L (ref 94–109)
CO2 SERPL-SCNC: 26 MMOL/L (ref 20–32)
CREAT SERPL-MCNC: 0.68 MG/DL (ref 0.66–1.25)
ERYTHROCYTE [DISTWIDTH] IN BLOOD BY AUTOMATED COUNT: 13.7 % (ref 10–15)
GFR SERPL CREATININE-BSD FRML MDRD: >90 ML/MIN/{1.73_M2}
GLUCOSE SERPL-MCNC: 131 MG/DL (ref 70–99)
HCT VFR BLD AUTO: 38.5 % (ref 40–53)
HGB BLD-MCNC: 13.2 G/DL (ref 13.3–17.7)
MCH RBC QN AUTO: 32 PG (ref 26.5–33)
MCHC RBC AUTO-ENTMCNC: 34.3 G/DL (ref 31.5–36.5)
MCV RBC AUTO: 93 FL (ref 78–100)
PLATELET # BLD AUTO: 187 10E9/L (ref 150–450)
POTASSIUM SERPL-SCNC: 4.8 MMOL/L (ref 3.4–5.3)
RBC # BLD AUTO: 4.12 10E12/L (ref 4.4–5.9)
SODIUM SERPL-SCNC: 136 MMOL/L (ref 133–144)
WBC # BLD AUTO: 11 10E9/L (ref 4–11)

## 2019-01-09 PROCEDURE — 40000193 ZZH STATISTIC PT WARD VISIT: Performed by: PHYSICAL THERAPIST

## 2019-01-09 PROCEDURE — 80048 BASIC METABOLIC PNL TOTAL CA: CPT | Performed by: PHYSICIAN ASSISTANT

## 2019-01-09 PROCEDURE — 97116 GAIT TRAINING THERAPY: CPT | Mod: GP | Performed by: PHYSICAL THERAPIST

## 2019-01-09 PROCEDURE — 36415 COLL VENOUS BLD VENIPUNCTURE: CPT | Performed by: PHYSICIAN ASSISTANT

## 2019-01-09 PROCEDURE — G0378 HOSPITAL OBSERVATION PER HR: HCPCS

## 2019-01-09 PROCEDURE — 25000132 ZZH RX MED GY IP 250 OP 250 PS 637: Performed by: PHYSICIAN ASSISTANT

## 2019-01-09 PROCEDURE — 85027 COMPLETE CBC AUTOMATED: CPT | Performed by: PHYSICIAN ASSISTANT

## 2019-01-09 PROCEDURE — 99217 ZZC OBSERVATION CARE DISCHARGE: CPT | Performed by: INTERNAL MEDICINE

## 2019-01-09 PROCEDURE — 25000128 H RX IP 250 OP 636: Performed by: PHYSICIAN ASSISTANT

## 2019-01-09 PROCEDURE — 99207 ZZC APP CREDIT; MD BILLING SHARED VISIT: CPT | Performed by: INTERNAL MEDICINE

## 2019-01-09 RX ORDER — OXYCODONE HYDROCHLORIDE 5 MG/1
5-10 TABLET ORAL
Qty: 30 TABLET | Refills: 0 | Status: SHIPPED | OUTPATIENT
Start: 2019-01-09 | End: 2019-01-12

## 2019-01-09 RX ORDER — LORAZEPAM 0.5 MG/1
0.5 TABLET ORAL EVERY 4 HOURS PRN
Qty: 20 TABLET | Refills: 0 | Status: SHIPPED | OUTPATIENT
Start: 2019-01-09 | End: 2019-02-08

## 2019-01-09 RX ORDER — IBUPROFEN 600 MG/1
600 TABLET, FILM COATED ORAL EVERY 6 HOURS PRN
Qty: 60 TABLET | Refills: 1 | Status: SHIPPED | OUTPATIENT
Start: 2019-01-09 | End: 2019-02-08

## 2019-01-09 RX ORDER — DEXAMETHASONE 4 MG/1
4 TABLET ORAL 2 TIMES DAILY WITH MEALS
Qty: 10 TABLET | Refills: 0 | Status: SHIPPED | OUTPATIENT
Start: 2019-01-09 | End: 2019-08-12

## 2019-01-09 RX ORDER — POLYETHYLENE GLYCOL 3350 17 G/17G
17 POWDER, FOR SOLUTION ORAL DAILY PRN
Qty: 100 PACKET | Refills: 3 | Status: SHIPPED | OUTPATIENT
Start: 2019-01-09 | End: 2019-02-08

## 2019-01-09 RX ORDER — ONDANSETRON 4 MG/1
4 TABLET, ORALLY DISINTEGRATING ORAL EVERY 6 HOURS PRN
Qty: 20 TABLET | Refills: 0 | Status: SHIPPED | OUTPATIENT
Start: 2019-01-09 | End: 2019-01-16

## 2019-01-09 RX ORDER — ACETAMINOPHEN 325 MG/1
650 TABLET ORAL EVERY 4 HOURS
Qty: 360 TABLET | Refills: 0 | Status: SHIPPED | OUTPATIENT
Start: 2019-01-09 | End: 2019-02-08

## 2019-01-09 RX ADMIN — DEXAMETHASONE SODIUM PHOSPHATE 12 MG: 10 INJECTION, SOLUTION INTRAMUSCULAR; INTRAVENOUS at 03:15

## 2019-01-09 RX ADMIN — OXYCODONE HYDROCHLORIDE 10 MG: 5 TABLET ORAL at 07:08

## 2019-01-09 RX ADMIN — OXYCODONE HYDROCHLORIDE 10 MG: 5 TABLET ORAL at 10:56

## 2019-01-09 RX ADMIN — AMLODIPINE BESYLATE 5 MG: 5 TABLET ORAL at 08:24

## 2019-01-09 RX ADMIN — ACETAMINOPHEN 650 MG: 325 TABLET, FILM COATED ORAL at 03:14

## 2019-01-09 RX ADMIN — IBUPROFEN 600 MG: 600 TABLET ORAL at 00:40

## 2019-01-09 RX ADMIN — DEXAMETHASONE SODIUM PHOSPHATE 12 MG: 10 INJECTION, SOLUTION INTRAMUSCULAR; INTRAVENOUS at 10:56

## 2019-01-09 RX ADMIN — ACETAMINOPHEN 650 MG: 325 TABLET, FILM COATED ORAL at 10:56

## 2019-01-09 RX ADMIN — OXYCODONE HYDROCHLORIDE 5 MG: 5 TABLET ORAL at 03:15

## 2019-01-09 RX ADMIN — ACETAMINOPHEN 650 MG: 325 TABLET, FILM COATED ORAL at 07:08

## 2019-01-09 NOTE — DISCHARGE SUMMARY
[facility] Hospitalist Discharge Summary    Delvin Echevarria MRN# [id number]   Age: 53 year old YOB: 1965     Date of Admission:  1/7/2019  Date of Discharge::  1/9/2019  Admitting Physician:  Cory Jimenez MD  Discharge Physician:  Cory Jimenez MD  Primary Physician: Krystian Streeter  Transferring Facility: N/A     Home clinic: [facility]          Admission Diagnoses:   Back pain [M54.9]          Discharge Diagnosis:   Principle diagnosis: Back pain  DDD (degenerative disc disease), lumbar  Lumbar nerve root impingement        Secondary diagnoses:  Principal Problem:    Lumbar nerve root impingement  Active Problems:    Essential hypertension, benign    Back pain    DDD (degenerative disc disease), lumbar    Tobacco dependence syndrome    Marijuana abuse         Brief History of Presenting Illness:   As per admit hx  Delvin Echevarria is a 53 year old male with the above past medical history now presents on 1/7/2019 with a 3 day history of low back pain.     Patient has a history of chronic back problems, but has not had regular formal medical management of the back pain or required ongoing pain medications. He also has not had routine preventative medical care.     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 Three days ago he was chopping wood when he developed acute low back pain. He denies any recent trauma to his back. Pain has persisted and worsened since onset, and he is unable to ambulate or care for himself at home due to the pain.     Pain is constant, described as sharp, shooting, aching, cramping, and dull - it is located along the spine in the low lumbar region with radiation down posterior legs bilaterally. Pain was 9/10 yesterday, down to 6/10 today with pain management. Pain is worse with any movement, weight bearing, or attempted ambulation, improves slightly with pain medication.     On review of systems patient mentions having a chronic productive  "cough, unchanged from baseline. He states \"I can't ever clear my lungs.\" He has occasional shortness of breath, wheezing, and chest pain when he coughs, but denies all this currently. He denies any fever, chills, or recent illness. He has a significant smoking history. He was slightly dizzy yesterday but denies any falls or syncopal episodes. The remainder review of systems is negative.    No results found for this or any previous visit (from the past 24 hour(s)).         Hospital Course:   Back pain  DDD (degenerative disc disease), lumbar  Lumbar nerve root impingement  Acute pain after physical strain but otherwise atraumatic. Lumbar MRI showing degenerative disc disease with left neural foraminal narrowing and impingement on traversing left S1 nerve root in lateral recess. Unable to ambulate, admitted for pain control. Given IV lorazepam, decadron, and dilaudid in the emergency department.  Was rx with tylenol, ibuprofen, oxycodone and decadron. Doing better. Will discharge on po narcs/ decadron and f/u ortho spine in clinic     Phone consult provided by ortho spine. Confirmed patient does not likely need surgical intervention.      Fever  Presented with temperature 100.3, now afebrile. Chest x-ray unremarkable.  UA negative. Afebrile since admit        Chronic cough, suspected COPD (undiagnosed)  Productive, chronic but stable. Initially febrile, minimal leukocytosis upon admission. Suspect patient has undiagnosed COPD given his long term tobacco dependence. Chest x-ray without evidence for pneumonia  - Trial of DuoNebs  - Would recommend outpatient follow-up to manage suspected COPD (patient has not had routine medical care)        Essential hypertension, benign  Pressures mildly elevated. Managed prior to admission with amlodipine 5 mg daily, although patient does not take regularly.  - Continue prior to admission amlodipine         Tobacco dependence syndrome  Encourage cessation. Patient declines nicotine " replacement at this time.        Marijuana abuse  Encourage cessation.             Procedures:   No procedures performed during this admission         Allergies:      Allergies   Allergen Reactions     Seasonal Allergies Other (See Comments)     Congestion sinuses     Codeine Nausea             Medications Prior to Admission:     Medications Prior to Admission   Medication Sig Dispense Refill Last Dose     ACETAMINOPHEN PO Take 650 mg by mouth every 4 hours as needed for pain   More than a month at Unknown time     amLODIPine (NORVASC) 5 MG tablet Take 1 tablet (5 mg) by mouth daily 30 tablet 3 More than a month at Unknown time     betamethasone dipropionate (DIPROSONE) 0.05 % cream Apply sparingly to affected area twice daily as needed.  Do not apply to face. 45 g 0 More than a month at Unknown time     hydrOXYzine (ATARAX) 25 MG tablet Take 1-2 tablets (25-50 mg) by mouth every 6 hours as needed for itching 60 tablet 2 More than a month at Unknown time     triamcinolone (KENALOG) 0.1 % cream Use sparingly on face for the e xcema 60 g 0 More than a month at Unknown time             Discharge Medications:     Current Discharge Medication List      START taking these medications    Details   !! acetaminophen (TYLENOL) 325 MG tablet Take 2 tablets (650 mg) by mouth every 4 hours  Qty: 360 tablet, Refills: 0    Associated Diagnoses: DDD (degenerative disc disease), lumbar      dexamethasone (DECADRON) 4 MG tablet Take 4 mg by mouth 2 times daily (with meals) for 5 days.  Qty: 10 tablet, Refills: 0    Associated Diagnoses: DDD (degenerative disc disease), lumbar      ibuprofen (ADVIL/MOTRIN) 600 MG tablet Take 1 tablet (600 mg) by mouth every 6 hours as needed for moderate pain  Qty: 60 tablet, Refills: 1    Associated Diagnoses: DDD (degenerative disc disease), lumbar      LORazepam (ATIVAN) 0.5 MG tablet Take 1 tablet (0.5 mg) by mouth every 4 hours as needed for anxiety  Qty: 20 tablet, Refills: 0    Associated  Diagnoses: DDD (degenerative disc disease), lumbar      ondansetron (ZOFRAN-ODT) 4 MG ODT tab Take 1 tablet (4 mg) by mouth every 6 hours as needed for nausea or vomiting  Qty: 20 tablet, Refills: 0    Associated Diagnoses: DDD (degenerative disc disease), lumbar      order for DME Equipment being ordered: rolling walker.  Qty: 1 Units, Refills: 0      oxyCODONE (ROXICODONE) 5 MG tablet Take 1-2 tablets (5-10 mg) by mouth every 3 hours as needed for breakthrough pain  Qty: 30 tablet, Refills: 0    Associated Diagnoses: DDD (degenerative disc disease), lumbar      polyethylene glycol (MIRALAX/GLYCOLAX) packet Take 17 g by mouth daily as needed for constipation  Qty: 100 packet, Refills: 3    Associated Diagnoses: DDD (degenerative disc disease), lumbar       !! - Potential duplicate medications found. Please discuss with provider.      CONTINUE these medications which have NOT CHANGED    Details   !! ACETAMINOPHEN PO Take 650 mg by mouth every 4 hours as needed for pain      amLODIPine (NORVASC) 5 MG tablet Take 1 tablet (5 mg) by mouth daily  Qty: 30 tablet, Refills: 3    Associated Diagnoses: Benign essential hypertension      betamethasone dipropionate (DIPROSONE) 0.05 % cream Apply sparingly to affected area twice daily as needed.  Do not apply to face.  Qty: 45 g, Refills: 0    Associated Diagnoses: Eczema, unspecified type      hydrOXYzine (ATARAX) 25 MG tablet Take 1-2 tablets (25-50 mg) by mouth every 6 hours as needed for itching  Qty: 60 tablet, Refills: 2    Associated Diagnoses: Other eczema      triamcinolone (KENALOG) 0.1 % cream Use sparingly on face for the e xcema  Qty: 60 g, Refills: 0    Associated Diagnoses: Other eczema       !! - Potential duplicate medications found. Please discuss with provider.                Consultations:   No consultations were requested during this admission            Discharge Exam:   Blood pressure 134/80, pulse 50, temperature 97.6  F (36.4  C), temperature source  "Oral, resp. rate 18, height 1.803 m (5' 11\"), weight 60.1 kg (132 lb 7.9 oz), SpO2 95 %.  GENERAL APPEARANCE: healthy, alert and no distress  EYES: conjunctiva clear, eyes grossly normal  HENT: external ears and nose normal   NECK: supple, no masses or adenopathy  RESP: lungs clear to auscultation - no rales, rhonchi or wheezes  CV: regular rate and rhythm, normal S1 S2, no S3 or S4 and no murmur, click or rub   ABDOMEN: soft, nontender, no HSM or masses and bowel sounds normal  MS: no clubbing, cyanosis; no edema  SKIN: clear without significant rashes or lesions  NEURO: Normal strength and tone, sensory exam grossly normal, mentation intact and speech normal    Unresulted Labs Ordered in the Past 30 Days of this Admission     No orders found from 11/8/2018 to 1/8/2019.          No results found for this or any previous visit (from the past 24 hour(s)).         Pending Tests at Discharge:   None         Discharge Instructions and Follow-Up:   Discharge diet: Regular   Discharge activity: Activity as tolerated   Discharge follow-up: f/u ortho spine as scheduled           Discharge Disposition:   Discharged to home      Attestation:  I have reviewed today's vital signs, notes, medications, labs and imaging.    Time Spent on this Encounter   I, Cory Jimenez, personally saw the patient today and spent greater than 30 minutes discharging this patient.    Cory Jimenez MD       "

## 2019-01-09 NOTE — PLAN OF CARE
"Sleeping when entered room. Able to roll over in the bed without signs of pain. Reports, pain is getting better.  Spoke to pt re: pain meds and is in agreement to take IBU at this time and Tylenol at 0300 with narcotic as needed when steroid is due.   Pt states, \"I just want to sleep. I need to get some sleep.\"   Denies tingling or numbness.  "

## 2019-01-09 NOTE — DISCHARGE INSTRUCTIONS
[facility] [phone]    When you get your outpatient PT appointments call [facility] and they set up rides for you when you need them    See Spine Physician Dr Shiva Mallory [facility] in [place] at [facility] on the south end of the [facility] on Monday January 21st 9:30

## 2019-01-09 NOTE — PLAN OF CARE
SHELLY [facility]KE[facility] DISCHARGE NOTE    Patient discharged to home at 12:35 PM via wheel chair. Accompanied by other: transport  and staff. Discharge instructions reviewed with patient, opportunity offered to ask questions. Prescriptions sent to patients preferred pharmacy. All belongings sent with patient.    Megan Lewis

## 2019-01-09 NOTE — CONSULTS
Writer saw patient to help set up a ride home from the hospital at discharge and gave patient information on how to call for a ride for follow up appt. when needed.Patient verbalizes understanding. No further discharge needs at this time. ANA Dee CTS RN

## 2019-01-09 NOTE — PLAN OF CARE
Physical Therapy Discharge Summary    Reason for therapy discharge:    Discharged to home.    Progress towards therapy goal(s). See goals on Care Plan in [facility] electronic health record for goal details.  Goals met    Pt reports improvement- transfers in/out  of bed w/ log roll/ SBA to indep. ; tranfsers w/ RW and SBA- is  reliant on walker support to obtain standing.  Pt amb 140  Feet x1 with RW  And SBA. Slow, but steady with walker use; able to amb steps with use of both railings.     Improved gait quality- slow/ gaurded and needing walker support, but more upright    Therapy recommendation(s):    Walker use  for safety / ease of mobility. Pt can obtain independently with prescription.   ; offer referral for outpatient PT

## 2019-01-09 NOTE — PLAN OF CARE
Patient c/o lower back pain/discomfort.  Initiated PRN oxycodone 5mg for pain.  Patient receiving scheduled tylenol and PRN ibuprofen.  Patient states that he is still in pain but that it is slightly improved.  Patient up to chair for dinner with assist of one using walker and transfer belt.  Large void this evening, UA sent to lab. Appetite was good this evening.  Patient currently resting in bed, will continue to monitor.

## 2019-01-10 ENCOUNTER — TELEPHONE (OUTPATIENT)
Dept: FAMILY MEDICINE | Facility: CLINIC | Age: 54
End: 2019-01-10

## 2019-01-10 NOTE — TELEPHONE ENCOUNTER
ED/UC/IP follow up phone call:    RN please call to follow up.    Number of ED visits in past 12 months = 0  [place] [facility]

## 2019-01-10 NOTE — TELEPHONE ENCOUNTER
ED / Discharge Outreach Protocol    Patient Contact    Attempt # 1    Was call answered?  No.  Left message on voicemail with information to call me back.  Per demo page: [phone] Mike DEGROOT will take message to get to Pt since Pt has no phone    Adri CHAMPAGNE RN

## 2019-01-11 NOTE — TELEPHONE ENCOUNTER
"ED/Discharge Protocol    \"Hi, my name is Jaye Walker, a registered nurse, and I am calling on behalf of Dr. Kapoor's office at [facility].  I am calling to follow up and see how things are going for you after your recent visit.\"    \"I see that you were in the (ER/UC/IP) on 07/10/19.    How are you doing now that you are home?\" Sore    Is patient experiencing symptoms that may require a hospital visit?  no    Discharge Instructions    \"Let's review your discharge instructions.  What is/are the follow-up recommendations?  Pt. Response: Pt refused    \"Were you instructed to make a follow-up appointment?\"  Pt. Response: Pt refused      \"When you see the provider, I would recommend that you bring your discharge instructions with you.    Medications    \"How many new medications are you on since your hospitalization/ED visit?\"    0-1  \"How many of your current medicines changed (dose, timing, name, etc.) while you were in the hospital/ED visit?\"   0-1  \"Do you have questions about your medications?\"   No  \"Were you newly diagnosed with heart failure, COPD, diabetes or did you have a heart attack?\"   No  For patients on insulin: \"Did you start on insulin in the hospital or did you have your insulin dose changed?\"   No    Medication reconciliation completed? No, due to Pt refused    Was MTM referral placed (*Make sure to put transitions as reason for referral)?   No    Call Summary    \"Do you have any questions or concerns about your condition or care plan at the moment?\"    No  Triage nurse advice given: Denies concerns    Patient was in ER 1 in the past year (assess appropriateness of ER visits.)      \"If you have questions or things don't continue to improve, we encourage you contact us through the main clinic number,  [phone].  Even if the clinic is not open, triage nurses are available 24/7 to help you.     We would like you to know that our clinic has extended hours (provide information).  We also have " "urgent care (provide details on closest location and hours/contact info)\"      \"Thank you for your time and take care!\"        "

## 2019-01-16 ENCOUNTER — HOSPITAL ENCOUNTER (EMERGENCY)
Facility: CLINIC | Age: 54
Discharge: HOME | End: 2019-01-16
Attending: EMERGENCY MEDICINE | Admitting: EMERGENCY MEDICINE
Payer: COMMERCIAL

## 2019-01-16 VITALS
HEART RATE: 54 BPM | OXYGEN SATURATION: 98 % | SYSTOLIC BLOOD PRESSURE: 119 MMHG | TEMPERATURE: 97.8 F | HEIGHT: 71 IN | DIASTOLIC BLOOD PRESSURE: 77 MMHG | BODY MASS INDEX: 20.3 KG/M2 | RESPIRATION RATE: 18 BRPM | WEIGHT: 145 LBS

## 2019-01-16 DIAGNOSIS — M62.830 BACK MUSCLE SPASM: ICD-10-CM

## 2019-01-16 DIAGNOSIS — M54.59 INTRACTABLE LOW BACK PAIN: ICD-10-CM

## 2019-01-16 DIAGNOSIS — M51.27 HERNIATED NUCLEUS PULPOSUS, L5-S1, LEFT: ICD-10-CM

## 2019-01-16 PROCEDURE — 25000128 H RX IP 250 OP 636: Performed by: EMERGENCY MEDICINE

## 2019-01-16 PROCEDURE — 99285 EMERGENCY DEPT VISIT HI MDM: CPT | Mod: Z6 | Performed by: EMERGENCY MEDICINE

## 2019-01-16 PROCEDURE — 99285 EMERGENCY DEPT VISIT HI MDM: CPT | Mod: 25 | Performed by: EMERGENCY MEDICINE

## 2019-01-16 PROCEDURE — 99285 EMERGENCY DEPT VISIT HI MDM: CPT | Performed by: EMERGENCY MEDICINE

## 2019-01-16 PROCEDURE — 96374 THER/PROPH/DIAG INJ IV PUSH: CPT | Performed by: EMERGENCY MEDICINE

## 2019-01-16 PROCEDURE — 25000132 ZZH RX MED GY IP 250 OP 250 PS 637: Performed by: EMERGENCY MEDICINE

## 2019-01-16 PROCEDURE — 96375 TX/PRO/DX INJ NEW DRUG ADDON: CPT | Performed by: EMERGENCY MEDICINE

## 2019-01-16 RX ORDER — CYCLOBENZAPRINE HCL 10 MG
10 TABLET ORAL 3 TIMES DAILY PRN
Qty: 30 TABLET | Refills: 1 | Status: SHIPPED | OUTPATIENT
Start: 2019-01-16 | End: 2019-08-12

## 2019-01-16 RX ORDER — HYDROMORPHONE HYDROCHLORIDE 2 MG/1
2 TABLET ORAL ONCE
Status: COMPLETED | OUTPATIENT
Start: 2019-01-16 | End: 2019-01-16

## 2019-01-16 RX ORDER — KETOROLAC TROMETHAMINE 30 MG/ML
30 INJECTION, SOLUTION INTRAMUSCULAR; INTRAVENOUS ONCE
Status: COMPLETED | OUTPATIENT
Start: 2019-01-16 | End: 2019-01-16

## 2019-01-16 RX ORDER — HYDROMORPHONE HYDROCHLORIDE 2 MG/1
2-4 TABLET ORAL EVERY 6 HOURS PRN
Qty: 15 TABLET | Refills: 0 | Status: SHIPPED | OUTPATIENT
Start: 2019-01-16 | End: 2019-08-12

## 2019-01-16 RX ORDER — DIAZEPAM 10 MG/2ML
5 INJECTION, SOLUTION INTRAMUSCULAR; INTRAVENOUS ONCE
Status: COMPLETED | OUTPATIENT
Start: 2019-01-16 | End: 2019-01-16

## 2019-01-16 RX ADMIN — KETOROLAC TROMETHAMINE 30 MG: 30 INJECTION, SOLUTION INTRAMUSCULAR at 07:17

## 2019-01-16 RX ADMIN — HYDROMORPHONE HYDROCHLORIDE 2 MG: 2 TABLET ORAL at 07:32

## 2019-01-16 RX ADMIN — DIAZEPAM 5 MG: 5 INJECTION, SOLUTION INTRAMUSCULAR; INTRAVENOUS at 07:19

## 2019-01-16 ASSESSMENT — MIFFLIN-ST. JEOR: SCORE: 1514.85

## 2019-01-16 NOTE — ED TRIAGE NOTES
Pt called EMS due to uncontrolled pain. Pt recently hospitalized due to pain in back, did have MRI but has not been able to get into MD to go over results. EMS did given 0.5mgIV dilaudid and 0.5mg ativan enroute.

## 2019-01-16 NOTE — ED PROVIDER NOTES
History     Chief Complaint   Patient presents with     Back Pain     chronic, worsened since hospitalization.      HPI  Delvin Echevarria is a 53 year old male with history of lumbar degenerative disc disease and MRI confirmed L5-S1  circumferential disc bulge resulting in moderate left neural neural foraminal narrowing and impingement of the left S1 nerve root who arrives by EMS for intractable low back pain. He was hospitalized for intractable back pain here for 2 days from 1/7-1/919 and discharged 1 week ago with Percocet 30 tablets and Ativan 0.5 mg x 20 tablets.  He states that his pain is uncontrolled and is sharp, severe, constant mid low back pain radiating into both legs which is refractory to the above-mentioned medications and Tylenol.  He was also discharged on Decadron, and has completed this.  No true leg weakness. No bowel or bladder incontinence.  No abdominal pain.  He has been constipated, refractory to MiraLAX.  No fever or chills.  He was given Dilaudid 0.5 mg IV and Ativan 0.5 mg IV by EMS in route to the ED and reports he does not feel any better.    Allergies:  Allergies   Allergen Reactions     Seasonal Allergies Other (See Comments)     Congestion sinuses     Codeine Nausea       Problem List:    Patient Active Problem List    Diagnosis Date Noted     DDD (degenerative disc disease), lumbar 01/08/2019     Priority: Medium     Lumbar nerve root impingement 01/08/2019     Priority: Medium     Tobacco dependence syndrome 01/08/2019     Priority: Medium     Marijuana abuse 01/08/2019     Priority: Medium     Back pain 01/07/2019     Priority: Medium     ACP (advance care planning) 02/10/2017     Priority: Medium     Advance Care Planning 2/10/2017: Receipt of ACP document:  Received: POLST which was signed and dated by provider on 9-27-16.  Document previously scanned on 12-8-16.  Order reviewed and found to be valid.  Code Status reflects choices in most recent ACP document.   Confirmed/documented designated decision maker(s).  Added by Ashlee Batres RN Advance Care Planning Liaison with [facility]           Weight loss 10/21/2016     Priority: Medium     Aftercare for healing traumatic fracture of lower leg, unspecified laterality, closed 10/16/2016     Priority: Medium     CARDIOVASCULAR SCREENING; LDL GOAL LESS THAN 130 08/12/2013     Priority: Medium     Essential hypertension, benign 03/15/2013     Priority: Medium     Inguinal hernia 06/28/2012     Priority: Medium     Problem list name updated by automated process. Provider to review          Past Medical History:    Past Medical History:   Diagnosis Date     Abscess 8/11/2017     Acute posthemorrhagic anemia 12/14/2016     Anemia due to blood loss, acute 9/30/2016     Closed fracture of right tibia and fibula with routine healing 9/29/2016     DDD (degenerative disc disease), lumbar      Hypertension 3/15/2013     Marijuana use      Staph aureus infection 9/13/2017     Tibial plateau fracture 9/11/2016     Tobacco use      Wound infection 9/29/2016       Past Surgical History:    Past Surgical History:   Procedure Laterality Date     APPLY EXTERNAL FIXATOR LOWER EXTREMITY Right 9/12/2016    Procedure: APPLY EXTERNAL FIXATOR LOWER EXTREMITY;  Surgeon: John Gonzales MD;  Location: [facility]     HERNIORRHAPHY INGUINAL  7/2/2012    Procedure: HERNIORRHAPHY INGUINAL;  Open Repair Right Inguinal Hernia with Mesh;  Surgeon: Avni Maxwell MD;  Location: [facility] OR     INCISION AND DRAINAGE BONE LOWER EXTREMITY, COMBINED Right 8/11/2017    Procedure: COMBINED INCISION AND DRAINAGE BONE LOWER EXTREMITY;  Irrigation and Debridement Right Tibia,  Removal of Hardware;  Surgeon: Kenroy Dos Santos MD;  Location: [facility] OR     LARYNGECTOMY  Several Years ago    Partail removal due to fish bone     OPEN REDUCTION INTERNAL FIXATION TIBIA Right 9/15/2016    Procedure: OPEN REDUCTION INTERNAL FIXATION TIBIA;  Surgeon: Peter  Miguel A Shen MD;  Location: [facility] OR     OPEN REDUCTION INTERNAL FIXATION TIBIA Right 11/7/2017    Procedure: OPEN REDUCTION INTERNAL FIXATION TIBIA;  Right Tibia Fibula Open Reduction Internal Fixation;  Surgeon: Miguel A Green MD;  [facility]     REMOVE HARDWARE LOWER EXTREMITY Right 8/11/2017    Procedure: REMOVE HARDWARE LOWER EXTREMITY;;  Surgeon: Kenroy Dos Santos MD;  Location: [facility] OR       Family History:    Family History   Problem Relation Age of Onset     Hypertension Mother      Hypertension Brother        Social History:  Marital Status:  Single [1]  Social History     Tobacco Use     Smoking status: Current Every Day Smoker     Packs/day: 1.00     Years: 25.00     Pack years: 25.00     Smokeless tobacco: Never Used   Substance Use Topics     Alcohol use: No     Drug use: Yes     Types: Marijuana     Comment: Smokes 4x daily        Medications:      acetaminophen (TYLENOL) 325 MG tablet   cyclobenzaprine (FLEXERIL) 10 MG tablet   HYDROmorphone (DILAUDID) 2 MG tablet   oxyCODONE HCl (OXAYDO) 5 MG TABA   ACETAMINOPHEN PO   amLODIPine (NORVASC) 5 MG tablet   betamethasone dipropionate (DIPROSONE) 0.05 % cream   dexamethasone (DECADRON) 4 MG tablet   hydrOXYzine (ATARAX) 25 MG tablet   ibuprofen (ADVIL/MOTRIN) 600 MG tablet   LORazepam (ATIVAN) 0.5 MG tablet   ondansetron (ZOFRAN-ODT) 4 MG ODT tab   order for DME   polyethylene glycol (MIRALAX/GLYCOLAX) packet   triamcinolone (KENALOG) 0.1 % cream        Allergies   Allergen Reactions     Seasonal Allergies Other (See Comments)     Congestion sinuses     Codeine Nausea     Review of Systems  As mentioned above in the HPI, otherwise focused 10 point ROS was reviewed and was negative.  Constitutional: no fever or chills.  Ears, Nose,Throat: no sore throat or difficulty swallowing.  Respiratory: no cough or shortness of breath.  Cardiovascular: no chest pain or leg swelling.  Gastrointestinal: no nausea, vomiting or abdominal pain or blood  "in the stools.  Genitourinary: no acute difficulty urinating or UTI symptoms.  Musculoskeletal: no joint pain or swelling.  Skin: no rash or acute skin changes.  Neurologic: no focal weakness or numbness.  Hematologic: no unusual bleeding or bruising.    Physical Exam   BP: (!) 147/98  Pulse: 64  Temp: 97.8  F (36.6  C)  Resp: 18  Height: 180.3 cm (5' 11\")  Weight: 65.8 kg (145 lb)  SpO2: 95 %      Physical Exam   Constitutional: He is oriented to person, place, and time. He appears well-developed and well-nourished. He appears distressed.   HENT:   Head: Normocephalic and atraumatic.   Mouth/Throat: Oropharynx is clear and moist.   Eyes: Conjunctivae and EOM are normal. No scleral icterus.   Neck: Normal range of motion. Neck supple. No tracheal deviation present.   Cardiovascular: Normal rate, regular rhythm and normal heart sounds.   Pulmonary/Chest: Effort normal and breath sounds normal. No respiratory distress.   Abdominal: Soft. He exhibits no distension. There is no tenderness.   Musculoskeletal: He exhibits no edema.        Lumbar back: He exhibits tenderness and spasm.        Back:    Neurological: He is alert and oriented to person, place, and time. He displays normal reflexes. No sensory deficit. He exhibits normal muscle tone. Coordination normal.   Skin: Skin is warm and dry. No rash noted. He is not diaphoretic. No erythema. No pallor.   Psychiatric:   Anxious, angry and oppositional.   Nursing note and vitals reviewed.      ED Course        Procedures                 No results found for this or any previous visit (from the past 24 hour(s)).    Medications   diazepam (VALIUM) injection 5 mg (5 mg Intravenous Given 1/16/19 5478)   ketorolac (TORADOL) injection 30 mg (30 mg Intravenous Given 1/16/19 0771)   HYDROmorphone (DILAUDID) tablet 2 mg (2 mg Oral Given 1/16/19 0729)     9:00 AM - Feeling much improved and comfortable discharge home.    Assessments & Plan (with Medical Decision Making)   53 year " "old male with intractable low back pain due to MRI confirmed lumbar degenerative disc disease and L5-S1 circumferential disc bulge resulting in moderate left neural neural foraminal narrowing and impingement of the left S1 nerve root .  He presents by EMS for intractable low back pain. No acute injury or trauma.  No evidence of cauda equina syndrome.  He was admitted here for 2 days and discharged 1 week ago for the same symptoms. He was discharged with Percocet 30 tablets and Ativan 0.5 mg x 20 tablets.  He states that his low back pain is uncontrolled and that \"I can't live like this anymore!\".  He does not feel like he is going to be able to make it until his follow-up with a spine surgeon in 5 days, 1/21/19.  Pain was not adequately controlled with Dilaudid 0.5 mg and Ativan 0.5 mg IV by EMS.  He was given Valium 5 mg IV, Toradol 30 mg IV and Dilaudid 2 mg orally and felt significantly improved and comfortable discharge home.  Will Rx Dilaudid 2 mg x 15 tablets and Flexeril to try for spasm.  I asked that he follow-up in primary care clinic for continued pain management and follow-up as directed with the spine surgeon in 5 days.  He can call in clinic and see if he can be seen by a spine surgeon sooner.  He was provided instructions for supportive care and will return as needed for worsened condition or worsening symptoms, or new problems or concerns.      I have reviewed the nursing notes.    I have reviewed the findings, diagnosis, plan and need for follow up with the patient.       Medication List      Started    cyclobenzaprine 10 MG tablet  Commonly known as:  FLEXERIL  10 mg, Oral, 3 TIMES DAILY PRN     HYDROmorphone 2 MG tablet  Commonly known as:  DILAUDID  2-4 mg, Oral, EVERY 6 HOURS PRN            Final diagnoses:   Herniated nucleus pulposus, L5-S1, left   Intractable low back pain   Back muscle spasm       1/16/2019   [facility] EMERGENCY DEPARTMENT     Ruiz Brown MD  01/16/19 0919    "

## 2019-01-16 NOTE — ED AVS SNAPSHOT
[facility] Emergency Department  [address] [address]  Phone:  [phone]  Fax:  [phone]                                    Delvin Echevarria   MRN: [id number]    Department:  [facility] Emergency Department   Date of Visit:  1/16/2019           After Visit Summary Signature Page    I have received my discharge instructions, and my questions have been answered. I have discussed any challenges I see with this plan with the nurse or doctor.    ..........................................................................................................................................  Patient/Patient Representative Signature      ..........................................................................................................................................  Patient Representative Print Name and Relationship to Patient    ..................................................               ................................................  Date                                   Time    ..........................................................................................................................................  Reviewed by Signature/Title    ...................................................              ..............................................  Date                                               Time          22EPIC Rev 08/18

## 2019-01-17 ENCOUNTER — PATIENT OUTREACH (OUTPATIENT)
Dept: CARE COORDINATION | Facility: CLINIC | Age: 54
End: 2019-01-17

## 2019-01-17 NOTE — LETTER
Health Care Home - Access Care Plan    About Me  Patient Name:  Delvin Echevarria    YOB: 1965  Age:                             53 year old   [facility] MRN:            [id number] Telephone Information:   Home Phone [phone]   Mobile Not on file.       Address:    [address] [place] [address] Email address:  No e-mail address on record      Emergency Contact(s)  Name Relationship Lgl Grd Work Phone Home Phone Mobile Phone   1. LUISMAN Friend   [phone]              Health Maintenance: Routine Health maintenance Reviewed: Due/Overdue   Health Maintenance Due   Topic Date Due     PHQ-2 Q1 YR  05/01/1975     HIV SCREEN (SYSTEM ASSIGNED)  05/01/1981     HEPATITIS C SCREENING  05/01/1981     LIPID SCREEN Q5 YR MALE (SYSTEM ASSIGNED)  05/01/1998     COLON CANCER SCREEN (SYSTEM ASSIGNED)  05/01/2013     ZOSTER IMMUNIZATION (1 of 2) 05/01/2013     INFLUENZA VACCINE (1) 09/01/2018     Pt to talk with provider about what is needed or can continue wait.        My Access Plan  Medical Emergency 749   Questions or concerns during clinic hours Primary Clinic Line, I will call the clinic directly: [facility] [phone]   24 Hour Appointment Line [phone] or  [phone]) (toll free)   24 Hour Nurse Line [phone] (toll free)   Questions or concerns outside clinic hours 24 Hour Appointment Line, I will call the after-hours on-call line:   [facility] [phone] or [phone]) (toll-free)   Preferred Urgent Care [facility], [phone]   Preferred Hospital [place], [place]  [phone]   Preferred Pharmacy [facility], [place] - [address] [place] [address]     Behavioral Health Crisis Line The National Suicide Prevention Lifeline at 1-854.434.9081 or 076     My Care Team Members  Patient Care Team       Relationship Specialty  Notifications Start End    Krystian Streeter MD PCP - General Family Practice  3/22/17     Phone: [phone] Fax: [phone]         [address] [facility] [address]    Krystian Streeter MD PCP - Assigned PCP   9/23/18     Phone: [phone] Fax: [phone]         [address] [facility] [address]    Miguel A Green MD MD Orthopedics  10/4/16     Phone: [phone] Fax: [phone]         [address] [facility] [address]    Armani Palacios MD MD Family Practice  10/12/16     Phone: [phone] Fax: [phone]         [address] [address]    Linda Trinidad Other (see comments) Gerontology Admissions 10/12/16     Geriatric Services     Allison Scott, RN Clinic Care Coordinator Primary Care - CC Admissions 1/17/19     Phone: [phone] Fax: [phone]               My Medical and Care Information  Problem List   Patient Active Problem List   Diagnosis     Inguinal hernia     Essential hypertension, benign     CARDIOVASCULAR SCREENING; LDL GOAL LESS THAN 130     Aftercare for healing traumatic fracture of lower leg, unspecified laterality, closed     Weight loss     ACP (advance care planning)     Back pain     DDD (degenerative disc disease), lumbar     Lumbar nerve root impingement     Tobacco dependence syndrome     Marijuana abuse      Current Medications and Allergies:  See printed Medication Report

## 2019-01-17 NOTE — LETTER
[facility] CARE COORDINATION  [place] [id number] [place] [address]  [phone]    January 17, 2019    Delvin Echevarria  [address] [address]      Dear Delvin,    I am a clinic care coordinator who works with Krystian Streeter MD at [facility]. I recently tried to call and was unable to reach you. I wanted to introduce myself and provide you with my contact information so that you can call me with questions or concerns about your health care. Below is a description of clinic care coordination and how I can further assist you.     The clinic care coordinator is a registered nurse and/or  who understand the health care system. The goal of clinic care coordination is to help you manage your health and improve access to the [facility] system in the most efficient manner. The registered nurse can assist you in meeting your health care goals by providing education, coordinating services, and strengthening the communication among your providers. The  can assist you with financial, behavioral, psychosocial, chemical dependency, counseling, and/or psychiatric resources.    Please feel free to contact me at [phone], with any questions or concerns. We at [facility] are focused on providing you with the highest-quality healthcare experience possible and that all starts with you.     Sincerely,     Allison Scott          Enclosed: I have enclosed a copy of a 24 Hour Access Plan. This has helpful phone numbers for you to call when needed. Please keep this in an easy to access place to use as needed.   I have sent a Consent to Communicate form for you to complete (as you wish) to allow us to discuss/share your personal health information with whomever you choose on your behalf.   I have highlighted the areas for you to review/address.  Please complete all that apply.  Please check the box for medical information if you  "allow the clinic to share personal health information with whomever you choose.  This form must be signed and dated to be valid.  If you check all boxes, please be aware that checking \"nothing\" despite checking other boxes will allow no information to be shared.      If you wish not to have information regarding your mental health, chemical health, AIDS/HIV or sickle cell anemia, then please initial the second bullet point at the end of the form.  If you allow this information to be shared with those noted above on the form, then leave this area blank and do not initial.      This form does not , you can make a change to this document at any time.    "

## 2019-01-17 NOTE — PROGRESS NOTES
Clinic Care Coordination Contact  [facility]/Voicemail    Referral Source: ED Follow-Up    Clinical Data: Care Coordinator Outreach, ED f/u 1/16/19 for back pain. Has appointment with back surgeon 1/21/19 per chart.    Outreach attempted x 1.  Left message on voicemail with call back information and requested return call.    Plan: Care Coordinator will mail out care coordination introduction letter with care coordinator contact information and explanation of care coordination services. Care Coordinator will try to reach patient again in 1-2 business days.    Allison Scott RN, [facility]  RN Care Coordinator  [facility], [facility]  Phone # [phone]  1/17/2019 1:19 PM

## 2019-01-18 NOTE — PROGRESS NOTES
Clinic Care Coordination Contact  [facility]/Voicemail    Referral Source: ED Follow-Up    Clinical Data: Care Coordinator Outreach, ED f/u 1/16/19 for back pain. Has appointment with back surgeon 1/21/19 per chart.      Outreach attempted x 2.  Left message on voicemail with call back information and requested return call.    Plan: Care Coordinator mailed out care coordination introduction letter on 1/17/19. Care Coordinator will do no further outreaches at this time.    Allison Scott RN, [facility]  RN Care Coordinator  [facility], [facility]  Phone # [phone]  1/18/2019 8:24 AM

## 2019-01-21 ASSESSMENT — ACTIVITIES OF DAILY LIVING (ADL): DEPENDENT_IADLS:: TRANSPORTATION;INDEPENDENT

## 2019-01-21 NOTE — PROGRESS NOTES
Clinic Care Coordination Contact    Clinic Care Coordination Contact  OUTREACH    Referral Information:  Referral Source: ED Follow-Up    Primary Diagnosis: Injury/Fall    Chief Complaint   Patient presents with     ER F/U     nurse: ER f/u 1/16/19        Universal Utilization: No concerns at this time.   Clinic Utilization  Difficulty keeping appointments:: No  No-Show Concerns: No  No PCP office visit in Past Year: No  Utilization    Last refreshed: 1/20/2019  4:10 AM:  Hospital Admissions 1           Last refreshed: 1/20/2019  4:10 AM:  ED Visits 1           Last refreshed: 1/20/2019  4:10 AM:  No Show Count (past year) 0              Current as of: 1/20/2019  4:10 AM              Clinical Concerns:  Care Coordinator RN spoke with patient, he states he had an appointment with Dr. Mallory this morning but unfortunately he had to reschedule it because the transportation from [place] was unable to find his place. He rescheduled it for 1/30/19 and is working on other transportation but if no luck he will need to call [place] again, Care Coordinator RN told him to explain what happened with today's appointment, he verbalized understanding. He states he is waiting to hear the results of his MRI, Care Coordinator RN read him the exact results and explained that if he has further questions he will have to talk with PCP or Dr. Mallory, he verbalized understanding. He states he is taking the Ibuprofen 600mg every 6 hours with food in his stomach. He denies having any other questions or concerns. Care Coordinator RN did tell him that sometimes it helps to sleep with a pillow either under his knees or between his legs, he verbalized understanding of this.     Current Medical Concerns:    Patient Active Problem List   Diagnosis     Inguinal hernia     Essential hypertension, benign     CARDIOVASCULAR SCREENING; LDL GOAL LESS THAN 130     Aftercare for healing traumatic fracture of lower leg, unspecified laterality, closed      Weight loss     ACP (advance care planning)     Back pain     DDD (degenerative disc disease), lumbar     Lumbar nerve root impingement     Tobacco dependence syndrome     Marijuana abuse         Current Behavioral Concerns: No concerns at this time.       Education Provided to patient: RN CC educated about Care Coordination Services, discharge instructions, medications reviewed and follow up.      Pain  Pain (GOAL):: Yes  Type: Acute (<3mo)  Location of chronic pain:: Low back, down left leg  Radiating: Yes  Location pain radiates to: Left leg  Progression: Unchanged  Limitation of routine activities due to chronic pain:: Yes  Description: Unable to perform most daily activities (chores, hobbies, social activities, driving)  Alleviating Factors: Rest, Pain Medication, Ice  Aggravating Factors: Activity, Coughing  Health Maintenance Reviewed: Due/Overdue   Health Maintenance Due   Topic Date Due     PHQ-2 Q1 YR  05/01/1975     HIV SCREEN (SYSTEM ASSIGNED)  05/01/1981     HEPATITIS C SCREENING  05/01/1981     LIPID SCREEN Q5 YR MALE (SYSTEM ASSIGNED)  05/01/1998     COLON CANCER SCREEN (SYSTEM ASSIGNED)  05/01/2013     ZOSTER IMMUNIZATION (1 of 2) 05/01/2013     INFLUENZA VACCINE (1) 09/01/2018       Clinical Pathway: None    Medication Management:  Patient independent in medication management and verbalizes adherence and understanding of medication regimen.        Functional Status:  Dependent ADLs:: Ambulation-walker  Dependent IADLs:: Transportation, Independent  Bed or wheelchair confined:: No  Mobility Status: Independent w/Device  Fallen 2 or more times in the past year?: No  Any fall with injury in the past year?: No    Living Situation:  Current living arrangement:: I live in a private home    Diet/Exercise/Sleep:  Diet:: Regular  Inadequate nutrition (GOAL):: No  Food Insecurity: No  Tube Feeding: No  Exercise:: Currently not exercising  Inadequate activity/exercise (GOAL):: No  Significant changes in  sleep pattern (GOAL): No    Transportation:  Transportation concerns (GOAL):: No  Transportation means:: Friend, Family, Regular car     Psychosocial:  Mandaeism or spiritual beliefs that impact treatment:: No  Mental health DX:: No  Mental health management concern (GOAL):: No  Informal Support system:: Friends     Financial/Insurance:   Not assessed at this time.      Resources and Interventions:  Current Resources: RN CC mailed out to patient intro letter, access care plan and care coordination services brochure on 1/17/19.  Community Resources: [facility], Transportation Services     Equipment Currently Used at Home: cane, straight, walker, standard    Advance Care Plan/Directive  Advanced Care Plans/Directives on file:: Yes  Type Advanced Care Plans/Directives: POLST    Plan:   No further Care Coordination needs identified at this time. Patient may be referred to Care Coordination in the future if additional needs arise.  Pt encouraged to contact Care Coordinator through the clinic if situation changes and assistance is needed. No follow-up planned.    Allison Scott RN, [facility]  RN Care Coordinator  [facility]  Phone # [phone]  1/21/2019 2:48 PM

## 2019-01-21 NOTE — PROGRESS NOTES
Clinic Care Coordination Contact  Care Team Conversations    Patient left Care Coordinator RN lucila  stating he was returning her call.    Allison Scott RN, [facility]  RN Care Coordinator  [facility]  Phone # [phone]  1/21/2019 2:42 PM

## 2019-01-21 NOTE — PROGRESS NOTES
Clinic Care Coordination Contact  [facility]    Referral Source: ED Follow-Up    Clinical Data: Care Coordinator Outreach, ED f/u 1/16/19 for back pain. Has appointment with back surgeon 1/21/19 per chart.     Outreach attempted x 1.  Left message with female person who answered the phone, asking for return call from patient. She took down Care Coordinator RN contact information and will have patient call Care Coordinator RN back.    Plan: Care Coordinator mailed out care coordination introduction letter on 1/17/19. Care Coordinator will do no further outreaches at this time.    Allison Scott RN, [facility]  RN Care Coordinator  [facility] [place], [facility]  Phone # [phone]  1/21/2019 1:31 PM

## 2019-01-21 NOTE — PROGRESS NOTES
Clinic Care Coordination Contact  Care Team Conversations    Patient left a message stating he was returning her call.    Plan:  Care Coordinator RN to call patient back.    Allison Scott RN, [facility]  RN Care Coordinator  [facility]  Phone # [phone]  1/21/2019 1:27 PM

## 2019-01-30 ENCOUNTER — TELEPHONE (OUTPATIENT)
Dept: PALLIATIVE MEDICINE | Facility: CLINIC | Age: 54
End: 2019-01-30

## 2019-01-30 NOTE — TELEPHONE ENCOUNTER
Received 1-    Incoming fax from Dr. Shiva Mallory with [facility] for a left SI transforaminal ENMANUEL for left lumbar radiculopathy. Order lists [employer] MA as insurance, however patient's chart & OSWALD history indicate insurance changed from Blue Plus MA to Blue Cross Advantage MA on 1-1-2019.  [facility]'s contact #s:  Phone: [phone]  Fax: [phone]    Routing to scheduling coordinators to schedule injection.    Lindsay Boise  Patient Representative  [facility] [place]

## 2019-01-31 NOTE — TELEPHONE ENCOUNTER
Left msg on vm for pt to schedule Left S1 ENMANUEL.      Jorge WAYNE    [facility] [place]

## 2019-02-02 NOTE — TELEPHONE ENCOUNTER
Left VM for patient to schedule Left S1 ENMANUEL        [facility]    [facility]

## 2019-02-02 NOTE — TELEPHONE ENCOUNTER
Patient left  2/2/19 at 11:42 am, he is returning a call.        Please call        [facility]    [facility]

## 2019-02-04 ENCOUNTER — HOSPITAL ENCOUNTER (OUTPATIENT)
Dept: PHYSICAL THERAPY | Facility: CLINIC | Age: 54
Setting detail: THERAPIES SERIES
End: 2019-02-04
Attending: ORTHOPAEDIC SURGERY
Payer: COMMERCIAL

## 2019-02-04 PROCEDURE — 97161 PT EVAL LOW COMPLEX 20 MIN: CPT | Mod: GP | Performed by: PHYSICAL THERAPIST

## 2019-02-04 PROCEDURE — 97110 THERAPEUTIC EXERCISES: CPT | Mod: GP | Performed by: PHYSICAL THERAPIST
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
## 2019-02-06 ENCOUNTER — RADIOLOGY INJECTION OFFICE VISIT (OUTPATIENT)
Dept: PALLIATIVE MEDICINE | Facility: CLINIC | Age: 54
End: 2019-02-06
Payer: COMMERCIAL

## 2019-02-06 ENCOUNTER — HOSPITAL ENCOUNTER (OUTPATIENT)
Dept: RADIOLOGY | Facility: CLINIC | Age: 54
Discharge: HOME | End: 2019-02-06
Attending: ANESTHESIOLOGY | Admitting: ANESTHESIOLOGY
Payer: COMMERCIAL

## 2019-02-06 VITALS — DIASTOLIC BLOOD PRESSURE: 74 MMHG | SYSTOLIC BLOOD PRESSURE: 141 MMHG | HEART RATE: 74 BPM | RESPIRATION RATE: 16 BRPM

## 2019-02-06 DIAGNOSIS — M54.16 LUMBAR RADICULOPATHY: Primary | ICD-10-CM

## 2019-02-06 DIAGNOSIS — M51.26 LUMBAR DISC HERNIATION: ICD-10-CM

## 2019-02-06 DIAGNOSIS — M51.369 DDD (DEGENERATIVE DISC DISEASE), LUMBAR: ICD-10-CM

## 2019-02-06 DIAGNOSIS — M54.16 LUMBAR RADICULOPATHY: ICD-10-CM

## 2019-02-06 PROCEDURE — 25000128 H RX IP 250 OP 636: Performed by: ANESTHESIOLOGY

## 2019-02-06 PROCEDURE — 64483 NJX AA&/STRD TFRM EPI L/S 1: CPT | Mod: LT | Performed by: ANESTHESIOLOGY

## 2019-02-06 PROCEDURE — 27210202 XR LUMBAR SACRAL TRANSFORMINAL INJ LEFT: Mod: TC

## 2019-02-06 PROCEDURE — 25000125 ZZHC RX 250: Performed by: ANESTHESIOLOGY

## 2019-02-06 RX ORDER — METHYLPREDNISOLONE ACETATE 40 MG/ML
40 INJECTION, SUSPENSION INTRA-ARTICULAR; INTRALESIONAL; INTRAMUSCULAR; SOFT TISSUE ONCE
Status: COMPLETED | OUTPATIENT
Start: 2019-02-06 | End: 2019-02-06

## 2019-02-06 RX ORDER — IOPAMIDOL 612 MG/ML
15 INJECTION, SOLUTION INTRATHECAL ONCE
Status: COMPLETED | OUTPATIENT
Start: 2019-02-06 | End: 2019-02-06

## 2019-02-06 RX ORDER — DEXAMETHASONE SODIUM PHOSPHATE 10 MG/ML
10 INJECTION, SOLUTION INTRAMUSCULAR; INTRAVENOUS ONCE
Status: COMPLETED | OUTPATIENT
Start: 2019-02-06 | End: 2019-02-06

## 2019-02-06 RX ORDER — BUPIVACAINE HYDROCHLORIDE 5 MG/ML
10 INJECTION, SOLUTION PERINEURAL ONCE
Status: COMPLETED | OUTPATIENT
Start: 2019-02-06 | End: 2019-02-06

## 2019-02-06 RX ORDER — LIDOCAINE HYDROCHLORIDE 10 MG/ML
5 INJECTION, SOLUTION EPIDURAL; INFILTRATION; INTRACAUDAL; PERINEURAL ONCE
Status: COMPLETED | OUTPATIENT
Start: 2019-02-06 | End: 2019-02-06

## 2019-02-06 RX ADMIN — BUPIVACAINE HYDROCHLORIDE 1 ML: 5 INJECTION, SOLUTION EPIDURAL; INTRACAUDAL at 11:05

## 2019-02-06 RX ADMIN — DEXAMETHASONE SODIUM PHOSPHATE 5 MG: 10 INJECTION, SOLUTION INTRAMUSCULAR; INTRAVENOUS at 11:06

## 2019-02-06 RX ADMIN — LIDOCAINE HYDROCHLORIDE 1 ML: 10 INJECTION, SOLUTION EPIDURAL; INFILTRATION; INTRACAUDAL; PERINEURAL at 11:05

## 2019-02-06 RX ADMIN — METHYLPREDNISOLONE ACETATE 40 MG: 40 INJECTION, SUSPENSION INTRA-ARTICULAR; INTRALESIONAL; INTRAMUSCULAR; SOFT TISSUE at 11:06

## 2019-02-06 RX ADMIN — IOPAMIDOL 2 ML: 612 INJECTION, SOLUTION INTRATHECAL at 11:05

## 2019-02-06 ASSESSMENT — PAIN SCALES - GENERAL
PAINLEVEL: MODERATE PAIN (4)
PAINLEVEL: EXTREME PAIN (9)

## 2019-02-06 NOTE — PROGRESS NOTES
Pre procedure Diagnosis: lumbar radiculopathy, lumbar degenerative disc disease   Post procedure Diagnosis: Same  Procedure performed: lumbar transforaminal epidural steroid injection at S1 on the left, fluoroscopically guided, contrast controlled  Anesthesia: none  Complications: none  Operators: Sandro Hoyt MD     Indications:   Delvin Echevarria is a 53 year old male was sent by Dr. Shiva Mallory for lumbar epidural steroid injection.  They have a history of chronic lower back pain with pain radiating into the left buttock and upper thigh posteriorly.  Exam shows antalgic gait with a single based cane, limited lumbar ROM, lumbar tenderness, and equivocal to positive SLR and they have tried conservative treatment including physical therapy, medications.    MRI was done on 1/7/19 which showed   FINDINGS: There are 5 lumbar-type vertebral bodies assumed for the  purposes of this dictation. The distal spinal cord and cauda equina  appear normal.      Alignment of the lumbar spine is normal. No loss of vertebral body  height. There are no destructive osseous lesions. T2 and mildly T1  hyperintense lesion in the L1 vertebral body likely represents a  venous malformation. Marked loss of intervertebral disc height with  disc desiccation and degenerative endplate changes at L5-S1 asymmetric  on the left. There is endplate marrow edema at L5-S1 particularly on  the left. The other disc levels appear normal.      Level by level as follows:      T12-L1: No significant spinal canal or neural foraminal narrowing.      L1-L2: No significant spinal canal or neural foraminal narrowing.      L2-L3: No significant spinal canal or neural foraminal narrowing.      L3-L4: No significant spinal canal or neural foraminal narrowing.      L4-L5: No significant spinal canal or neural foraminal narrowing.      L5-S1: Circumferential disc bulge asymmetric in the left foraminal and  far lateral region with associated endplate osteophytic  spurring.  Findings result in moderate left neural foraminal narrowing. The disc  osteophyte complex may also impinge upon the traversing left S1 nerve  root in the lateral recess. No significant right neural foraminal  narrowing or spinal canal stenosis.      Paraspinous soft tissues: Normal.                                                                     IMPRESSION:  Degenerative disc changes at L5-S1 asymmetric towards the  left resulting in moderate left neural foraminal narrowing and  possible impingement on the traversing left S1 nerve root in the  lateral recess. The other disc levels appear relatively normal.      LILLIAN RG MD    Options/alternatives, benefits and risks were discussed with the patient including bleeding, infection, tissue trauma, numbness, weakness, paralysis, spinal cord injury, radiation exposure, headache and reaction to medications. Questions were answered to his satisfaction and he agrees to proceed. Voluntary informed consent was obtained and signed.     Vitals were reviewed: Yes  /74   Pulse 74   Resp 16   Allergies were reviewed:  Yes   Medications were reviewed:  Yes   Pre-procedure pain score: 9/10    Procedure:  After getting informed consent, patient was brought into the procedure suite and was placed in a prone position on the procedure table.   A Pause for the Cause was performed.  Patient was prepped and draped in sterile fashion.     After identifying the left S1 neuroforamen, the C-arm was rotated to a left lateral oblique angle.  A total of 1 ml of Lidocaine 1% was used to anesthetize the skin and the needle track at a skin entry site coaxial with the fluoroscopy beam, and overriding the superior aspect of the neuroforamen.  A 27 gauge 3.5 inch spinal needle was advanced under intermittent fluoroscopy until it entered the foramen superiorly.    The needle position was then inspected from anteroposterior and lateral views, and the needle adjusted  appropriately.  A total of 2 ml of Isovue-300 was injected, confirming appropriate position, with spread into the nerve root sheath and the epidural space, with no intravascular uptake. 13 ml was wasted    Then, after repeated negative aspiration, 2.5 ml of a combination of Depomedrol 40 mg, Decadron 5 mg, 0.5% bupivacaine 1 ml was injected and the needle was flushed with lidocaine and removed.    During the procedure, there was a paresthesia described as a pressure sensation with instillation of medication.  Hemostasis was achieved, the area was cleaned, and bandaids were placed when appropriate.  The patient tolerated the procedure well, and was taken to the recovery room.    Images were saved to PACS.    Post-procedure pain score: 4/10  Follow-up includes:   -f/u phone call in one week  -f/u with referring provider    Sandro Hoty MD  [facility]

## 2019-02-06 NOTE — DISCHARGE INSTRUCTIONS
[facility]   Procedure Discharge Instructions    Today you saw:    Dr. Sandro Hoyt      You had an:  Lumbar Epidural steroid injection       Medications used:  Lidocaine   Bupivacaine   Dexamethasone Depo-medrol  IsoVue        Be cautious when walking. Numbness and/or weakness in the lower extremities may occur for up to 6-8 hours after the procedure due to effect of the local anesthetic    Do not drive for 6 hours. The effect of the local anesthetic could slow your reflexes.     You may resume your regular activities after 24 hours    Avoid strenuous activity for the first 24 hours    You may shower, however avoid swimming, tub baths or hot tubs for 24 hours following your procedure    You may have a mild to moderate increase in pain for several days following the injection.    It may take up to 14 days for the steroid medication to start working although you may feel the effect as early as a few days after the procedure.       You may use ice packs for 10-15 minutes, 3 to 4 times a day at the injection site for comfort    Do not use heat to painful areas for 6 to 8 hours. This will give the local anesthetic time to wear off and prevent you from accidentally burning your skin.     You may use anti-inflammatory medications (such as Ibuprofen or Aleve or Advil) or Tylenol for pain control if necessary    If you were fasting, you may resume your normal diet and medications after the procedure    Possible side effects of steroids that you may experience include flushing, elevated blood pressure, increased appetite, mild headaches and restlessness.  All of these symptoms will get better with time.    If you experience any of the following, call the Pain Clinic during work hours at [phone] or the Provider Line after hours at [phone]:  -Fever over 100 degree F  -Swelling, bleeding, redness, drainage, warmth at the injection site  -Progressive weakness or numbness in your legs or  arms  -Loss of bowel or bladder function  -Unusual headache that is not relieved by Tylenol or other pain reliever  -Unusual new onset of pain that is not improving

## 2019-02-06 NOTE — IP AVS SNAPSHOT
[facility]  [address]  [facility] [place] [address]  Phone:  [phone]  Fax:  [phone]                                    After Visit Summary   2/6/2019    Delvin Echevarria    MRN: [id number]           After Visit Summary Signature Page    I have received my discharge instructions, and my questions have been answered. I have discussed any challenges I see with this plan with the nurse or doctor.    ..........................................................................................................................................  Patient/Patient Representative Signature      ..........................................................................................................................................  Patient Representative Print Name and Relationship to Patient    ..................................................               ................................................  Date                                   Time    ..........................................................................................................................................  Reviewed by Signature/Title    ...................................................              ..............................................  Date                                               Time          22EPIC Rev 08/18

## 2019-02-11 ENCOUNTER — HOSPITAL ENCOUNTER (OUTPATIENT)
Dept: PHYSICAL THERAPY | Facility: CLINIC | Age: 54
Setting detail: THERAPIES SERIES
End: 2019-02-11
Attending: ORTHOPAEDIC SURGERY
Payer: COMMERCIAL

## 2019-02-11 PROCEDURE — 97110 THERAPEUTIC EXERCISES: CPT | Mod: GP | Performed by: PHYSICAL THERAPIST

## 2019-02-18 ENCOUNTER — HOSPITAL ENCOUNTER (OUTPATIENT)
Dept: PHYSICAL THERAPY | Facility: CLINIC | Age: 54
Setting detail: THERAPIES SERIES
End: 2019-02-18
Attending: ORTHOPAEDIC SURGERY
Payer: COMMERCIAL

## 2019-02-18 PROCEDURE — 97110 THERAPEUTIC EXERCISES: CPT | Mod: GP | Performed by: PHYSICAL THERAPIST

## 2019-02-26 ENCOUNTER — HOSPITAL ENCOUNTER (OUTPATIENT)
Dept: PHYSICAL THERAPY | Facility: CLINIC | Age: 54
Setting detail: THERAPIES SERIES
End: 2019-02-26
Attending: ORTHOPAEDIC SURGERY
Payer: COMMERCIAL

## 2019-02-26 PROCEDURE — 97110 THERAPEUTIC EXERCISES: CPT | Mod: GP | Performed by: PHYSICAL THERAPIST

## 2019-03-06 ENCOUNTER — HOSPITAL ENCOUNTER (OUTPATIENT)
Dept: PHYSICAL THERAPY | Facility: CLINIC | Age: 54
Setting detail: THERAPIES SERIES
End: 2019-03-06
Attending: ORTHOPAEDIC SURGERY
Payer: COMMERCIAL

## 2019-03-06 PROCEDURE — 97110 THERAPEUTIC EXERCISES: CPT | Mod: GP | Performed by: PHYSICAL THERAPIST

## 2019-03-07 ENCOUNTER — RADIOLOGY INJECTION OFFICE VISIT (OUTPATIENT)
Dept: PALLIATIVE MEDICINE | Facility: CLINIC | Age: 54
End: 2019-03-07
Payer: COMMERCIAL

## 2019-03-07 ENCOUNTER — HOSPITAL ENCOUNTER (OUTPATIENT)
Dept: RADIOLOGY | Facility: CLINIC | Age: 54
Discharge: HOME | End: 2019-03-07
Attending: ANESTHESIOLOGY | Admitting: ANESTHESIOLOGY
Payer: COMMERCIAL

## 2019-03-07 VITALS — RESPIRATION RATE: 16 BRPM | DIASTOLIC BLOOD PRESSURE: 94 MMHG | HEART RATE: 63 BPM | SYSTOLIC BLOOD PRESSURE: 150 MMHG

## 2019-03-07 DIAGNOSIS — M51.369 DDD (DEGENERATIVE DISC DISEASE), LUMBAR: ICD-10-CM

## 2019-03-07 DIAGNOSIS — M54.16 LUMBAR RADICULOPATHY: ICD-10-CM

## 2019-03-07 DIAGNOSIS — M54.16 LUMBAR RADICULOPATHY: Primary | ICD-10-CM

## 2019-03-07 PROCEDURE — 25000125 ZZHC RX 250: Performed by: ANESTHESIOLOGY

## 2019-03-07 PROCEDURE — 25000128 H RX IP 250 OP 636: Performed by: ANESTHESIOLOGY

## 2019-03-07 PROCEDURE — 64483 NJX AA&/STRD TFRM EPI L/S 1: CPT | Mod: LT | Performed by: ANESTHESIOLOGY

## 2019-03-07 PROCEDURE — 27210202 XR LUMBAR SACRAL TRANSFORMINAL INJ LEFT: Mod: TC

## 2019-03-07 RX ORDER — LIDOCAINE HYDROCHLORIDE 10 MG/ML
5 INJECTION, SOLUTION EPIDURAL; INFILTRATION; INTRACAUDAL; PERINEURAL ONCE
Status: COMPLETED | OUTPATIENT
Start: 2019-03-07 | End: 2019-03-07

## 2019-03-07 RX ORDER — METHYLPREDNISOLONE ACETATE 40 MG/ML
40 INJECTION, SUSPENSION INTRA-ARTICULAR; INTRALESIONAL; INTRAMUSCULAR; SOFT TISSUE ONCE
Status: COMPLETED | OUTPATIENT
Start: 2019-03-07 | End: 2019-03-07

## 2019-03-07 RX ORDER — BUPIVACAINE HYDROCHLORIDE 5 MG/ML
10 INJECTION, SOLUTION PERINEURAL ONCE
Status: COMPLETED | OUTPATIENT
Start: 2019-03-07 | End: 2019-03-07

## 2019-03-07 RX ORDER — IOPAMIDOL 612 MG/ML
15 INJECTION, SOLUTION INTRATHECAL ONCE
Status: COMPLETED | OUTPATIENT
Start: 2019-03-07 | End: 2019-03-07

## 2019-03-07 RX ORDER — DEXAMETHASONE SODIUM PHOSPHATE 10 MG/ML
20 INJECTION, SOLUTION INTRAMUSCULAR; INTRAVENOUS ONCE
Status: COMPLETED | OUTPATIENT
Start: 2019-03-07 | End: 2019-03-07

## 2019-03-07 RX ADMIN — IOPAMIDOL 1 ML: 612 INJECTION, SOLUTION INTRATHECAL at 09:01

## 2019-03-07 RX ADMIN — LIDOCAINE HYDROCHLORIDE 1.5 ML: 10 INJECTION, SOLUTION EPIDURAL; INFILTRATION; INTRACAUDAL; PERINEURAL at 09:00

## 2019-03-07 RX ADMIN — BUPIVACAINE HYDROCHLORIDE 1 ML: 5 INJECTION, SOLUTION EPIDURAL; INTRACAUDAL; PERINEURAL at 08:59

## 2019-03-07 RX ADMIN — DEXAMETHASONE SODIUM PHOSPHATE 5 MG: 10 INJECTION, SOLUTION INTRAMUSCULAR; INTRAVENOUS at 08:58

## 2019-03-07 RX ADMIN — METHYLPREDNISOLONE ACETATE 40 MG: 40 INJECTION, SUSPENSION INTRA-ARTICULAR; INTRALESIONAL; INTRAMUSCULAR; SOFT TISSUE at 08:58

## 2019-03-07 ASSESSMENT — PAIN SCALES - GENERAL
PAINLEVEL: MODERATE PAIN (4)
PAINLEVEL: EXTREME PAIN (8)

## 2019-03-07 NOTE — PROGRESS NOTES
Pre procedure Diagnosis: lumbar radiculopathy, lumbar degenerative disc disease   Post procedure Diagnosis: Same  Procedure performed: lumbar transforaminal epidural steroid injection at L5-S1 on the left, fluoroscopically guided, contrast controlled  Anesthesia: none  Complications: none  Operators: Sandro Hoyt MD      Indications:   Delvin Echevarria is a 53 year old male was sent by Dr. Shiva Mallory for repeat lumbar epidural steroid injection.  They have a history of chronic lower back pain with pain radiating into the left buttock and upper thigh posteriorly.  Exam shows antalgic gait with a single based cane, limited lumbar ROM, lumbar tenderness, and equivocal to positive SLR and they have tried conservative treatment including physical therapy, medications.     MRI was done on 1/7/19 which showed   FINDINGS: There are 5 lumbar-type vertebral bodies assumed for the  purposes of this dictation. The distal spinal cord and cauda equina  appear normal.      Alignment of the lumbar spine is normal. No loss of vertebral body  height. There are no destructive osseous lesions. T2 and mildly T1  hyperintense lesion in the L1 vertebral body likely represents a  venous malformation. Marked loss of intervertebral disc height with  disc desiccation and degenerative endplate changes at L5-S1 asymmetric  on the left. There is endplate marrow edema at L5-S1 particularly on  the left. The other disc levels appear normal.      Level by level as follows:      T12-L1: No significant spinal canal or neural foraminal narrowing.      L1-L2: No significant spinal canal or neural foraminal narrowing.      L2-L3: No significant spinal canal or neural foraminal narrowing.      L3-L4: No significant spinal canal or neural foraminal narrowing.      L4-L5: No significant spinal canal or neural foraminal narrowing.      L5-S1: Circumferential disc bulge asymmetric in the left foraminal and  far lateral region with associated endplate  osteophytic spurring.  Findings result in moderate left neural foraminal narrowing. The disc  osteophyte complex may also impinge upon the traversing left S1 nerve  root in the lateral recess. No significant right neural foraminal  narrowing or spinal canal stenosis.      Paraspinous soft tissues: Normal.       IMPRESSION:  Degenerative disc changes at L5-S1 asymmetric towards the  left resulting in moderate left neural foraminal narrowing and  possible impingement on the traversing left S1 nerve root in the  lateral recess. The other disc levels appear relatively normal.      LILLIAN RG MD     Options/alternatives, benefits and risks were discussed with the patient including bleeding, infection, tissue trauma, numbness, weakness, paralysis, spinal cord injury, radiation exposure, headache and reaction to medications. Questions were answered to his satisfaction and he agrees to proceed. Voluntary informed consent was obtained and signed.      Vitals were reviewed: Yes  /78   Pulse 70   Resp 16   Allergies were reviewed:  Yes   Medications were reviewed:  Yes   Pre-procedure pain score: 8/10     Procedure:  After getting informed consent, patient was brought into the procedure suite and was placed in a prone position on the procedure table.   A Pause for the Cause was performed.  Patient was prepped and draped in sterile fashion.      After identifying the left L5-S1 neuroforamen, the C-arm was rotated to a left lateral oblique angle.  A total of 1.5 ml of Lidocaine 1% was used to anesthetize the skin and the needle track at a skin entry site coaxial with the fluoroscopy beam, and overriding the superior aspect of the neuroforamen.  A 25 gauge 5 inch spinal needle was advanced under intermittent fluoroscopy until it entered the foramen superiorly.     The needle position was then inspected from anteroposterior and lateral views, and the needle adjusted appropriately.  A total of 1 ml of Isovue-300 was  injected, confirming appropriate position, with spread into the nerve root sheath and the epidural space, with no intravascular uptake. 14 ml was wasted     Then, after repeated negative aspiration, 2.5 ml of a combination of Depomedrol 40 mg, Decadron 5 mg, 0.5% bupivacaine 1 ml was injected and the needle was flushed with lidocaine and removed.     During the procedure, there was a paresthesia described as a pressure sensation with instillation of medication.    Hemostasis was achieved, the area was cleaned, and bandaids were placed when appropriate.  The patient tolerated the procedure well, and was taken to the recovery room.    Images were saved to PACS.     Post-procedure pain score: 4/10  Follow-up includes:   -f/u phone call in one week  -f/u with referring provider     Sandro Hoyt MD  [facility] [place]

## 2019-03-07 NOTE — IP AVS SNAPSHOT
MRN:[id number]                      After Visit Summary   3/7/2019    Delvin Echevarria    MRN: [id number]           Visit Information        Provider Department      3/7/2019  8:45 AM [facility]           Review of your medicines      UNREVIEWED medicines. Ask your doctor about these medicines       Dose / Directions   * ACETAMINOPHEN PO      Dose:  650 mg  Take 650 mg by mouth every 4 hours as needed for pain  Refills:  0     * acetaminophen 325 MG tablet  Commonly known as:  TYLENOL  Used for:  DDD (degenerative disc disease), lumbar  Ask about: Should I take this medication?      Dose:  650 mg  Take 2 tablets (650 mg) by mouth every 4 hours  Quantity:  360 tablet  Refills:  0     amLODIPine 5 MG tablet  Commonly known as:  NORVASC  Used for:  Benign essential hypertension      Dose:  5 mg  Take 1 tablet (5 mg) by mouth daily  Quantity:  30 tablet  Refills:  3     betamethasone dipropionate 0.05 % external cream  Commonly known as:  DIPROSONE  Used for:  Eczema, unspecified type      Apply sparingly to affected area twice daily as needed.  Do not apply to face.  Quantity:  45 g  Refills:  0     cyclobenzaprine 10 MG tablet  Commonly known as:  FLEXERIL      Dose:  10 mg  Take 1 tablet (10 mg) by mouth 3 times daily as needed for muscle spasms  Quantity:  30 tablet  Refills:  1     dexamethasone 4 MG tablet  Commonly known as:  DECADRON  Used for:  DDD (degenerative disc disease), lumbar      Dose:  4 mg  Take 4 mg by mouth 2 times daily (with meals) for 5 days.  Quantity:  10 tablet  Refills:  0     HYDROmorphone 2 MG tablet  Commonly known as:  DILAUDID      Dose:  2-4 mg  Take 1-2 tablets (2-4 mg) by mouth every 6 hours as needed for pain  Quantity:  15 tablet  Refills:  0     hydrOXYzine 25 MG tablet  Commonly known as:  ATARAX  Used for:  Other eczema      Dose:  25-50 mg  Take 1-2 tablets (25-50 mg) by mouth every 6 hours as needed for itching  Quantity:  60  tablet  Refills:  2     ibuprofen 600 MG tablet  Commonly known as:  ADVIL/MOTRIN  Used for:  DDD (degenerative disc disease), lumbar  Ask about: Should I take this medication?      Dose:  600 mg  Take 1 tablet (600 mg) by mouth every 6 hours as needed for moderate pain  Quantity:  60 tablet  Refills:  1     LORazepam 0.5 MG tablet  Commonly known as:  ATIVAN  Used for:  DDD (degenerative disc disease), lumbar  Ask about: Should I take this medication?      Dose:  0.5 mg  Take 1 tablet (0.5 mg) by mouth every 4 hours as needed for anxiety  Quantity:  20 tablet  Refills:  0     ondansetron 4 MG ODT tab  Commonly known as:  ZOFRAN-ODT  Used for:  DDD (degenerative disc disease), lumbar  Ask about: Should I take this medication?      Dose:  4 mg  Take 1 tablet (4 mg) by mouth every 6 hours as needed for nausea or vomiting  Quantity:  20 tablet  Refills:  0     * oxyCODONE HCl 5 MG Taba  Commonly known as:  OXAYDO      Dose:  5-10 mg  Take 5-10 mg by mouth every 3 hours as needed  Refills:  0     * oxyCODONE 5 MG tablet  Commonly known as:  ROXICODONE  Used for:  DDD (degenerative disc disease), lumbar  Ask about: Should I take this medication?      Dose:  5-10 mg  Take 1-2 tablets (5-10 mg) by mouth every 3 hours as needed for breakthrough pain  Quantity:  30 tablet  Refills:  0     polyethylene glycol packet  Commonly known as:  MIRALAX/GLYCOLAX  Used for:  DDD (degenerative disc disease), lumbar  Ask about: Should I take this medication?      Dose:  17 g  Take 17 g by mouth daily as needed for constipation  Quantity:  100 packet  Refills:  3     triamcinolone 0.1 % external cream  Commonly known as:  KENALOG  Used for:  Other eczema      Use sparingly on face for the e xcema  Quantity:  60 g  Refills:  0         * This list has 4 medication(s) that are the same as other medications prescribed for you. Read the directions carefully, and ask your doctor or other care provider to review them with you.             CONTINUE these medicines which have NOT CHANGED       Dose / Directions   order for DME      Equipment being ordered: rolling walker.  Quantity:  1 Units  Refills:  0              Protect others around you: Learn how to safely use, store and throw away your medicines at www.disposemymeds.org.       Follow-ups after your visit       Care Instructions       Further instructions from your care team       [facility]   Procedure Discharge Instructions    Today you saw:    Dr. Sandro Hoyt    You had an:  Lumbar transforaminal Epidural steroid injection       Medications used:  Lidocaine   Bupivacaine   Dexamethasone Depo-medrol  IsoVue            Be cautious when walking. Numbness and/or weakness in the lower extremities may occur for up to 6-8 hours after the procedure due to effect of the local anesthetic    Do not drive for 6 hours. The effect of the local anesthetic could slow your reflexes.     You may resume your regular activities after 24 hours    Avoid strenuous activity for the first 24 hours    You may shower, however avoid swimming, tub baths or hot tubs for 24 hours following your procedure    You may have a mild to moderate increase in pain for several days following the injection.    It may take up to 14 days for the steroid medication to start working although you may feel the effect as early as a few days after the procedure.       You may use ice packs for 10-15 minutes, 3 to 4 times a day at the injection site for comfort    Do not use heat to painful areas for 6 to 8 hours. This will give the local anesthetic time to wear off and prevent you from accidentally burning your skin.     You may use anti-inflammatory medications (such as Ibuprofen or Aleve or Advil) or Tylenol for pain control if necessary    Possible side effects of steroids that you may experience include flushing, elevated blood pressure, increased appetite, mild headaches and restlessness.  All of these  symptoms will get better with time.    If you experience any of the following, call the Pain Clinic during work hours at [phone] or the Provider Line after hours at [phone]:  -Fever over 100 degree F  -Swelling, bleeding, redness, drainage, warmth at the injection site  -Progressive weakness or numbness in your legs or arms  -Loss of bowel or bladder function  -Unusual headache that is not relieved by Tylenol or other pain reliever  -Unusual new onset of pain that is not improving        Additional Information About Your Visit       Care EveryWhere ID    This is your Care EveryWhere ID. This could be used by other organizations to access your [facility] medical records  [id number]        Primary Care Provider Office Phone # Fax #    Krystian Streeter [phone]      Equal Access to Services    [facility] Center[facility]ILAG[facility] : Zainab Bills, waanita inmanqsheyla, qaybgautam kaalmatere valverde, charleen zambrano . So [facility] [phone].    ATENCIÓN: Si habla español, tiene a alcazar disposición servicios gratuitos de asistencia lingüística. Larisa al [phone].    We comply with applicable federal civil rights laws and [place] laws. We do not discriminate on the basis of race, color, national origin, age, disability, sex, sexual orientation, or gender identity.           Thank you!    Thank you for choosing [facility] for your care. Our goal is always to provide you with excellent care. Hearing back from our patients is one way we can continue to improve our services. Please take a few minutes to complete the written survey that you may receive in the mail after you visit with us. Thank you!            Medication List      Medications          Morning Afternoon Evening Bedtime As Needed    order for DME  INSTRUCTIONS:  Equipment being ordered: rolling walker.                       ASK your doctor about these medications          Morning Afternoon Evening Bedtime As  Needed    * ACETAMINOPHEN PO  INSTRUCTIONS:  Take 650 mg by mouth every 4 hours as needed for pain                     * acetaminophen 325 MG tablet  Also known as:  TYLENOL  INSTRUCTIONS:  Take 2 tablets (650 mg) by mouth every 4 hours  Ask about: Should I take this medication?                     amLODIPine 5 MG tablet  Also known as:  NORVASC  INSTRUCTIONS:  Take 1 tablet (5 mg) by mouth daily                     betamethasone dipropionate 0.05 % external cream  Also known as:  DIPROSONE  INSTRUCTIONS:  Apply sparingly to affected area twice daily as needed.  Do not apply to face.                     cyclobenzaprine 10 MG tablet  Also known as:  FLEXERIL  INSTRUCTIONS:  Take 1 tablet (10 mg) by mouth 3 times daily as needed for muscle spasms                     dexamethasone 4 MG tablet  Also known as:  DECADRON  INSTRUCTIONS:  Take 4 mg by mouth 2 times daily (with meals) for 5 days.                     HYDROmorphone 2 MG tablet  Also known as:  DILAUDID  INSTRUCTIONS:  Take 1-2 tablets (2-4 mg) by mouth every 6 hours as needed for pain                     hydrOXYzine 25 MG tablet  Also known as:  ATARAX  INSTRUCTIONS:  Take 1-2 tablets (25-50 mg) by mouth every 6 hours as needed for itching                     ibuprofen 600 MG tablet  Also known as:  ADVIL/MOTRIN  INSTRUCTIONS:  Take 1 tablet (600 mg) by mouth every 6 hours as needed for moderate pain  Ask about: Should I take this medication?                     LORazepam 0.5 MG tablet  Also known as:  ATIVAN  INSTRUCTIONS:  Take 1 tablet (0.5 mg) by mouth every 4 hours as needed for anxiety  Ask about: Should I take this medication?                     ondansetron 4 MG ODT tab  Also known as:  ZOFRAN-ODT  INSTRUCTIONS:  Take 1 tablet (4 mg) by mouth every 6 hours as needed for nausea or vomiting  Ask about: Should I take this medication?                     * oxyCODONE HCl 5 MG Taba  Also known as:  OXAYDO  INSTRUCTIONS:  Take 5-10 mg by mouth every 3 hours  as needed                     * oxyCODONE 5 MG tablet  Also known as:  ROXICODONE  INSTRUCTIONS:  Take 1-2 tablets (5-10 mg) by mouth every 3 hours as needed for breakthrough pain  Ask about: Should I take this medication?                     polyethylene glycol packet  Also known as:  MIRALAX/GLYCOLAX  INSTRUCTIONS:  Take 17 g by mouth daily as needed for constipation  Ask about: Should I take this medication?                     triamcinolone 0.1 % external cream  Also known as:  KENALOG  INSTRUCTIONS:  Use sparingly on face for the e xcema                        * This list has 4 medication(s) that are the same as other medications prescribed for you. Read the directions carefully, and ask your doctor or other care provider to review them with you.

## 2019-03-07 NOTE — IP AVS SNAPSHOT
[facility]  [address]  [facility] [address]  Phone:  [phone]  Fax:  [phone]                                    After Visit Summary   3/7/2019    Delvin Echevarria    MRN: [id number]           After Visit Summary Signature Page    I have received my discharge instructions, and my questions have been answered. I have discussed any challenges I see with this plan with the nurse or doctor.    ..........................................................................................................................................  Patient/Patient Representative Signature      ..........................................................................................................................................  Patient Representative Print Name and Relationship to Patient    ..................................................               ................................................  Date                                   Time    ..........................................................................................................................................  Reviewed by Signature/Title    ...................................................              ..............................................  Date                                               Time          22EPIC Rev 08/18

## 2019-03-14 ENCOUNTER — TELEPHONE (OUTPATIENT)
Dept: RADIOLOGY | Facility: CLINIC | Age: 54
End: 2019-03-14

## 2019-03-14 NOTE — TELEPHONE ENCOUNTER
Patient had a lumbar transforaminal epidural steroid injection at L5-S1 on the left, fluoroscopically guided, contrast controlled on 3/7/19.  Called patient for an update.      Unable to leave .

## 2019-03-25 ENCOUNTER — HOSPITAL ENCOUNTER (OUTPATIENT)
Dept: PHYSICAL THERAPY | Facility: CLINIC | Age: 54
Setting detail: THERAPIES SERIES
End: 2019-03-25
Attending: ORTHOPAEDIC SURGERY
Payer: COMMERCIAL

## 2019-03-25 PROCEDURE — 97110 THERAPEUTIC EXERCISES: CPT | Mod: GP | Performed by: PHYSICAL THERAPIST

## 2019-04-03 ENCOUNTER — HOSPITAL ENCOUNTER (OUTPATIENT)
Dept: PHYSICAL THERAPY | Facility: CLINIC | Age: 54
Setting detail: THERAPIES SERIES
End: 2019-04-03
Attending: ORTHOPAEDIC SURGERY
Payer: COMMERCIAL

## 2019-04-03 PROCEDURE — 97110 THERAPEUTIC EXERCISES: CPT | Mod: GP | Performed by: PHYSICAL THERAPIST

## 2019-04-03 PROCEDURE — 97530 THERAPEUTIC ACTIVITIES: CPT | Mod: GP | Performed by: PHYSICAL THERAPIST

## 2019-04-10 ENCOUNTER — HOSPITAL ENCOUNTER (OUTPATIENT)
Dept: PHYSICAL THERAPY | Facility: CLINIC | Age: 54
Setting detail: THERAPIES SERIES
End: 2019-04-10
Attending: ORTHOPAEDIC SURGERY
Payer: COMMERCIAL

## 2019-04-10 PROCEDURE — 97110 THERAPEUTIC EXERCISES: CPT | Mod: GP | Performed by: PHYSICAL THERAPIST

## 2019-04-11 NOTE — PROGRESS NOTES
"Outpatient Physical Therapy Discharge Note     Patient: Delvin Echevarria  : 1965    Beginning/End Dates of Reporting Period:  19 to 2019    Referring Provider: Jaye Pollock Diagnosis: low back pain     Client Self Report: Pt notes that back is feeling better this week. Overall 90% improved since start of PT. Walking has gotten much easier, and starting to do some lifting but staying aware of mechanics of lifting.     Objective Measurements:    Objective Measure: Strength  Details: Hip abd   Objective Measure: Lumbar ROM  Details: Flexion fingertips to mid shin, extension limited however painfree. 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 Pt has met 3/5 goals with progress toward others. Pt independent with HEP to continue. Demonstrating improvements in lumbar ROM and activity tolerance. 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     Plan:  Discharge from " therapy.    Discharge:    Reason for Discharge: Patient has made progress toward goals, will continue HEP for management.  No further expectation of progress.    Equipment Issued: na    Discharge Plan: Patient to continue home program.

## 2019-05-01 NOTE — ADDENDUM NOTE
Encounter addended by: Clementina Bertrand PT on: 5/1/2019 10:46 AM   Actions taken: Sign clinical note, Episode resolved

## 2019-08-12 ENCOUNTER — OFFICE VISIT (OUTPATIENT)
Dept: FAMILY MEDICINE | Facility: CLINIC | Age: 54
End: 2019-08-12
Payer: COMMERCIAL

## 2019-08-12 VITALS
DIASTOLIC BLOOD PRESSURE: 80 MMHG | SYSTOLIC BLOOD PRESSURE: 146 MMHG | WEIGHT: 134 LBS | TEMPERATURE: 97.7 F | HEIGHT: 71 IN | RESPIRATION RATE: 18 BRPM | BODY MASS INDEX: 18.76 KG/M2 | HEART RATE: 84 BPM

## 2019-08-12 DIAGNOSIS — L30.9 ECZEMA, UNSPECIFIED TYPE: Primary | ICD-10-CM

## 2019-08-12 DIAGNOSIS — R63.4 WEIGHT LOSS: ICD-10-CM

## 2019-08-12 DIAGNOSIS — I10 BENIGN ESSENTIAL HYPERTENSION: ICD-10-CM

## 2019-08-12 DIAGNOSIS — R19.06 ABDOMINAL WALL MASS OF EPIGASTRIC REGION: ICD-10-CM

## 2019-08-12 PROBLEM — M54.9 BACK PAIN: Status: RESOLVED | Noted: 2019-01-07 | Resolved: 2019-08-12

## 2019-08-12 PROBLEM — M51.369 DDD (DEGENERATIVE DISC DISEASE), LUMBAR: Status: RESOLVED | Noted: 2019-01-08 | Resolved: 2019-08-12

## 2019-08-12 LAB
ALBUMIN SERPL-MCNC: 3.6 G/DL (ref 3.4–5)
ALP SERPL-CCNC: 103 U/L (ref 40–150)
ALT SERPL W P-5'-P-CCNC: 44 U/L (ref 0–70)
ANION GAP SERPL CALCULATED.3IONS-SCNC: 6 MMOL/L (ref 3–14)
AST SERPL W P-5'-P-CCNC: 32 U/L (ref 0–45)
BILIRUB SERPL-MCNC: 0.4 MG/DL (ref 0.2–1.3)
BUN SERPL-MCNC: 20 MG/DL (ref 7–30)
CALCIUM SERPL-MCNC: 8.6 MG/DL (ref 8.5–10.1)
CHLORIDE SERPL-SCNC: 110 MMOL/L (ref 94–109)
CO2 SERPL-SCNC: 25 MMOL/L (ref 20–32)
CREAT SERPL-MCNC: 0.75 MG/DL (ref 0.66–1.25)
ERYTHROCYTE [DISTWIDTH] IN BLOOD BY AUTOMATED COUNT: 14 % (ref 10–15)
GFR SERPL CREATININE-BSD FRML MDRD: >90 ML/MIN/{1.73_M2}
GLUCOSE SERPL-MCNC: 124 MG/DL (ref 70–99)
HCT VFR BLD AUTO: 39.5 % (ref 40–53)
HGB BLD-MCNC: 13.4 G/DL (ref 13.3–17.7)
MCH RBC QN AUTO: 30.9 PG (ref 26.5–33)
MCHC RBC AUTO-ENTMCNC: 33.9 G/DL (ref 31.5–36.5)
MCV RBC AUTO: 91 FL (ref 78–100)
PLATELET # BLD AUTO: 270 10E9/L (ref 150–450)
POTASSIUM SERPL-SCNC: 4.3 MMOL/L (ref 3.4–5.3)
PROT SERPL-MCNC: 7.3 G/DL (ref 6.8–8.8)
RBC # BLD AUTO: 4.34 10E12/L (ref 4.4–5.9)
SODIUM SERPL-SCNC: 141 MMOL/L (ref 133–144)
TSH SERPL DL<=0.005 MIU/L-ACNC: 2.05 MU/L (ref 0.4–4)
WBC # BLD AUTO: 9.5 10E9/L (ref 4–11)

## 2019-08-12 PROCEDURE — 80053 COMPREHEN METABOLIC PANEL: CPT | Performed by: FAMILY MEDICINE

## 2019-08-12 PROCEDURE — 85027 COMPLETE CBC AUTOMATED: CPT | Performed by: FAMILY MEDICINE

## 2019-08-12 PROCEDURE — 84443 ASSAY THYROID STIM HORMONE: CPT | Performed by: FAMILY MEDICINE

## 2019-08-12 PROCEDURE — 36415 COLL VENOUS BLD VENIPUNCTURE: CPT | Performed by: FAMILY MEDICINE

## 2019-08-12 PROCEDURE — 99214 OFFICE O/P EST MOD 30 MIN: CPT | Performed by: FAMILY MEDICINE

## 2019-08-12 RX ORDER — BETAMETHASONE DIPROPIONATE 0.5 MG/G
OINTMENT, AUGMENTED TOPICAL 2 TIMES DAILY
Qty: 50 G | Refills: 11 | Status: SHIPPED | OUTPATIENT
Start: 2019-08-12 | End: 2020-08-17

## 2019-08-12 RX ORDER — AMLODIPINE BESYLATE 5 MG/1
5 TABLET ORAL DAILY
Qty: 90 TABLET | Refills: 3 | Status: SHIPPED | OUTPATIENT
Start: 2019-08-12 | End: 2021-02-17

## 2019-08-12 ASSESSMENT — MIFFLIN-ST. JEOR: SCORE: 1459.95

## 2019-08-12 NOTE — LETTER
August 14, 2019      Delvin Echevarria  [address] [place] [address]        Dear ,    We are writing to inform you of your test results.    Your labs look stable.  I hope the surgery visit is helpful.     Resulted Orders   TSH with free T4 reflex   Result Value Ref Range    TSH 2.05 0.40 - 4.00 mU/L   Comprehensive metabolic panel (BMP + Alb, Alk Phos, ALT, AST, Total. Bili, TP)   Result Value Ref Range    Sodium 141 133 - 144 mmol/L    Potassium 4.3 3.4 - 5.3 mmol/L    Chloride 110 (H) 94 - 109 mmol/L    Carbon Dioxide 25 20 - 32 mmol/L    Anion Gap 6 3 - 14 mmol/L    Glucose 124 (H) 70 - 99 mg/dL      Comment:      Non Fasting    Urea Nitrogen 20 7 - 30 mg/dL    Creatinine 0.75 0.66 - 1.25 mg/dL    GFR Estimate >90 >60 mL/min/[1.73_m2]      Comment:      Non  GFR Calc  Starting 12/18/2018, serum creatinine based estimated GFR (eGFR) will be   calculated using the Chronic Kidney Disease Epidemiology Collaboration   (CKD-EPI) equation.      GFR Estimate If Black >90 >60 mL/min/[1.73_m2]      Comment:       GFR Calc  Starting 12/18/2018, serum creatinine based estimated GFR (eGFR) will be   calculated using the Chronic Kidney Disease Epidemiology Collaboration   (CKD-EPI) equation.      Calcium 8.6 8.5 - 10.1 mg/dL    Bilirubin Total 0.4 0.2 - 1.3 mg/dL    Albumin 3.6 3.4 - 5.0 g/dL    Protein Total 7.3 6.8 - 8.8 g/dL    Alkaline Phosphatase 103 40 - 150 U/L    ALT 44 0 - 70 U/L    AST 32 0 - 45 U/L   CBC with platelets   Result Value Ref Range    WBC 9.5 4.0 - 11.0 10e9/L    RBC Count 4.34 (L) 4.4 - 5.9 10e12/L    Hemoglobin 13.4 13.3 - 17.7 g/dL    Hematocrit 39.5 (L) 40.0 - 53.0 %    MCV 91 78 - 100 fl    MCH 30.9 26.5 - 33.0 pg    MCHC 33.9 31.5 - 36.5 g/dL    RDW 14.0 10.0 - 15.0 %    Platelet Count 270 150 - 450 10e9/L       If you have any questions or concerns, please call the clinic at the number listed above.       Sincerely,        Delvin Henning MD/  mlt,,ma

## 2019-08-12 NOTE — PROGRESS NOTES
"Subjective     Delvin Echevarria is a 54 year old male who presents to clinic today for the following health issues:    HPI     Patient would like to discuss a different medication for his eczema. The Diprosone cream is no longer helping.    S: Delvin Echevarria is a 54 year old male with severe hand eczema.  Cream not working, ointment has helped in past.  Many years on this.    Wt loss.  30+ pounds over past year.  Not intentional.  Feels his appetite is normal.  No pain.  No mass noted.  No blood in stool or eating/swallowing issues.      Mass on abdomen: soft, just to the L of middle mid belly.  Not tender or firm.   Can't put it back in.  Few months    Problem list, med list, additional histories reviewed and updated, as indicated.      No cp or sob    O:BP (!) 146/80 (BP Location: Right arm, Cuff Size: Adult Regular)   Pulse 84   Temp 97.7  F (36.5  C) (Tympanic)   Resp 18   Ht 1.803 m (5' 11\")   Wt 60.8 kg (134 lb)   BMI 18.69 kg/m    GEN: Alert and oriented, in no acute distress  No groin mass or nodes, no axillary or neck nodes  Has 3 inch soft mass, can't reduce it, L mid abd.  Fluid?  Soft lipoma?   CV: RRR, no murmur  RESP: lungs clear bilaterally, good effort  Neck: neck supple without mass or lymphadenopathy  ENT: oropharynx clear, no exudate or palate/tonsil asymmetry  Severe peeling, dry skin on hands, some on forearms.      A: wt loss, nos       Abdominal wall mass, nos        Hand eczema, severe    P: ointment for hands, arms.  Surgery for mass, bothers him, growing?  Not sure what it is exactly.     Labs for wt loss.  Back in 2 months.  If weight stable, may follow for a while, if down more will need CT chest/abd/pelvis.  colonoscopy discussion needed as well.      "

## 2019-10-02 ENCOUNTER — TELEPHONE (OUTPATIENT)
Dept: FAMILY MEDICINE | Facility: CLINIC | Age: 54
End: 2019-10-02

## 2019-10-02 NOTE — TELEPHONE ENCOUNTER
Patient is calling as he is seen by [facility] and they recommended he talk to his PCP and have some lab work done because he has been taking 800 mg 3 times daily of Ibuprofen. He does back off on it when he is feeling better but they suggested he try to get an order for some lab work. Please advise.    Miroslava Fernandez-[place]

## 2019-10-02 NOTE — TELEPHONE ENCOUNTER
We did labs last visit, they were OK.  He was due to follow up with me in October for a follow up visit, recheck his weight, etc.  Please let him know

## 2019-12-18 ENCOUNTER — ANCILLARY PROCEDURE (OUTPATIENT)
Dept: GENERAL RADIOLOGY | Facility: CLINIC | Age: 54
End: 2019-12-18
Attending: NURSE PRACTITIONER
Payer: COMMERCIAL

## 2019-12-18 ENCOUNTER — OFFICE VISIT (OUTPATIENT)
Dept: URGENT CARE | Facility: URGENT CARE | Age: 54
End: 2019-12-18
Payer: COMMERCIAL

## 2019-12-18 VITALS
RESPIRATION RATE: 16 BRPM | HEART RATE: 60 BPM | DIASTOLIC BLOOD PRESSURE: 72 MMHG | TEMPERATURE: 97.5 F | SYSTOLIC BLOOD PRESSURE: 130 MMHG

## 2019-12-18 DIAGNOSIS — S93.401A SPRAIN OF RIGHT ANKLE, UNSPECIFIED LIGAMENT, INITIAL ENCOUNTER: Primary | ICD-10-CM

## 2019-12-18 DIAGNOSIS — S99.911A RIGHT ANKLE INJURY, INITIAL ENCOUNTER: ICD-10-CM

## 2019-12-18 PROCEDURE — 73610 X-RAY EXAM OF ANKLE: CPT | Mod: RT

## 2019-12-18 PROCEDURE — 99214 OFFICE O/P EST MOD 30 MIN: CPT | Performed by: NURSE PRACTITIONER

## 2019-12-19 NOTE — PATIENT INSTRUCTIONS
RICE advice.    Follow-up with your primary care provider next week and as needed.    Indications for emergent return to emergency department discussed with patient, who verbalized good understanding and agreement.  Patient understands the limitations of today's evaluation.         Patient Education     Treating Ankle Sprains  Treatment will depend on how bad your sprain is. For a severe sprain, healing may take 3 months or more.  Right after your injury: Use R.I.C.E.    BIG: Rest: At first, keep weight off the ankle as much as you can. You may be given crutches to help you walk without putting weight on the ankle.    BIG: Ice: Put an ice pack on the ankle for 20 minutes. Remove the pack and wait at least 30 minutes. Repeat for up to 3 days. This helps reduce swelling.    BIG: Compression: To reduce swelling and keep the joint stable, you may need to wrap the ankle with an elastic bandage. For more severe sprains, you may need an ankle brace, a boot, or a cast.    BIG: Elevation: To reduce swelling, keep your ankle raised above your heart when you sit or lie down.  Medicine  Your healthcare provider may suggest oral nonsteroidal anti-inflammatory medicine (NSAIDs), such as ibuprofen. This relieves the pain and helps reduce swelling. Be sure to take your medicine as directed.  Exercises    After about 2 to 3 weeks, you may be given exercises to strengthen the ligaments and muscles in the ankle. Doing these exercises will help prevent another ankle sprain. Exercises may include standing on your toes and then on your heels and doing ankle curls.    Sit on the edge of a sturdy table or lie on your back.    Pull your toes toward you. Then point them away from you. Repeat for 2 to 3 minutes.  Date Last Reviewed: 1/1/2018 2000-2018 [employer]. [address] [place] [address], [place] [address]. All rights reserved. This information is not intended as a substitute for professional medical care. Always follow your  healthcare professional's instructions.           Patient Education     Understanding Ankle Sprain    The ankle is the joint where the leg and foot meet. Bones are held in place by connective tissue called ligaments. When ankle ligaments are stretched to the point of pain and injury, it is called an ankle sprain. A sprain can tear the ligaments. These tears can be very small but still cause pain. Ankle sprains can be mild or severe.  What causes an ankle sprain?  A sprain may occur when you twist your ankle or bend it too far. This can happen when you stumble or fall. Things that can make an ankle sprain more likely include:    Having had an ankle sprain before    Playing sports that involve running and jumping. Or playing contact sports such as football or hockey.    Wearing shoes that don t support your feet and ankles well    Having ankles with poor strength and flexibility  Symptoms of an ankle sprain  Symptoms may include:    Pain or soreness in the ankle    Swelling    Redness or bruising    Not being able to walk or put weight on the affected foot    Reduced range of motion in the ankle    A popping or tearing feeling at the time the sprain occurs    An abnormal or dislocated look to the ankle    Instability or too much range of motion in the ankle  Treatment for an ankle sprain  Treatment focuses on reducing pain and swelling, and avoiding further injury. Treatments may include:    Resting the ankle. Avoid putting weight on it. This may mean using crutches until the sprain heals.    Prescription or over-the-counter pain medicines. These help reduce swelling and pain.    Cold packs. These help reduce pain and swelling.    Raising your ankle above your heart. This helps reduce swelling.    Wrapping the ankle with an elastic bandage or ankle brace. This helps reduce swelling and gives some support to the ankle. In rare cases, you may need a cast or boot.    Stretching and other exercises. These improve  flexibility and strength.    Heat packs. These may be recommended before doing ankle exercises.  Possible complications of an ankle sprain  An ankle that has been weakened by a sprain can be more likely to have repeated sprains afterward. Doing exercises to strengthen your ankle and improve balance can reduce your risk for repeated sprains. Other possible complications are long-term (chronic) pain or an ankle that remains unstable.  When to call your healthcare provider  Call your healthcare provider right away if you have any of these:    Fever of 100.4 F (38 C) or higher, or as directed    Pain, numbness, discoloration, or coldness in the foot or toes    Pain that gets worse    Symptoms that don t get better, or get worse    New symptoms   Date Last Reviewed: 3/10/2016    5417-7972 [employer]. [address] [place] [address]. All rights reserved. This information is not intended as a substitute for professional medical care. Always follow your healthcare professional's instructions.           Patient Education     RICE    RICE stands for rest, ice, compression, and elevation. Doing these things helps limit pain and swelling after an injury. RICE also helps injuries heal faster. Use RICE for sprains, strains, and severe bruises or bumps. Follow the tips on this handout and begin RICE as soon as possible after an injury.  Rest  Pain is your body s way of telling you to rest an injured area. Whether you have hurt an elbow, hand, foot, or knee, limiting its use will prevent further injury and help you heal.  Ice  Applying ice right after an injury helps prevent swelling and reduce pain. Don t place ice directly on your skin.    Wrap a cold pack or bag of ice in a thin cloth. Place it over the injured area.    Ice for 10 minutes every 3 hours. Don t ice for more than 20 minutes at a time.  Compression  Putting pressure (compression) on an injury helps prevent swelling and provides  support.    Wrap the injured area firmly with an elastic bandage. If your hand or foot tingles, becomes discolored, or feels cold to the touch, the bandage may be too tight. Rewrap it more loosely.    If your bandage becomes too loose, rewrap it.    Do not wear an elastic bandage overnight.  Elevation  Keeping an injury elevated helps reduce swelling, pain, and throbbing. Elevation is most effective when the injury is kept elevated higher than the heart.     Call your healthcare provider if you notice any of the following:    Fingers or toes feel numb, are cold to the touch, or change color.    Skin looks shiny or tight.    Pain, swelling, or bruising worsens and is not improved with elevation.   Date Last Reviewed: 5/1/2018 2000-2018 [employer]. [address] [place] [address], [place] [address]. All rights reserved. This information is not intended as a substitute for professional medical care. Always follow your healthcare professional's instructions.

## 2019-12-19 NOTE — PROGRESS NOTES
Subjective     Delvin Echevarria is a 54 year old male who presents to clinic today for the following health issues:    HPI   Chief Complaint   Patient presents with     Musculoskeletal Problem     Happened yesterday morning.  Right ankle.  Was cutting wood and twisted ankle pretty bad.  Has been aching pretty bad, and is swollen.  Hx of ankle surgery with hardware in it.            Patient Active Problem List   Diagnosis     Inguinal hernia     Essential hypertension, benign     CARDIOVASCULAR SCREENING; LDL GOAL LESS THAN 130     Aftercare for healing traumatic fracture of lower leg, unspecified laterality, closed     ACP (advance care planning)     Lumbar nerve root impingement     Tobacco dependence syndrome     Marijuana abuse     Weight loss     Past Surgical History:   Procedure Laterality Date     APPLY EXTERNAL FIXATOR LOWER EXTREMITY Right 9/12/2016    Procedure: APPLY EXTERNAL FIXATOR LOWER EXTREMITY;  Surgeon: John Gonzales MD;  Location: [facility]     HERNIORRHAPHY INGUINAL  7/2/2012    Procedure: HERNIORRHAPHY INGUINAL;  Open Repair Right Inguinal Hernia with Mesh;  Surgeon: Avni Maxwell MD;  Location: [facility]     INCISION AND DRAINAGE BONE LOWER EXTREMITY, COMBINED Right 8/11/2017    Procedure: COMBINED INCISION AND DRAINAGE BONE LOWER EXTREMITY;  Irrigation and Debridement Right Tibia,  Removal of Hardware;  Surgeon: Kenroy Dos Santos MD;  Location: [facility]     LARYNGECTOMY  Several Years ago    Partail removal due to fish bone     OPEN REDUCTION INTERNAL FIXATION TIBIA Right 9/15/2016    Procedure: OPEN REDUCTION INTERNAL FIXATION TIBIA;  Surgeon: Miguel A Green MD;  Location: [facility] OR     OPEN REDUCTION INTERNAL FIXATION TIBIA Right 11/7/2017    Procedure: OPEN REDUCTION INTERNAL FIXATION TIBIA;  Right Tibia Fibula Open Reduction Internal Fixation;  Surgeon: Miguel A Green MD;  Location:  OR     REMOVE HARDWARE LOWER EXTREMITY Right 8/11/2017    Procedure:  REMOVE HARDWARE LOWER EXTREMITY;;  Surgeon: Kenroy Dos Santos MD;  Location: [facility]       Social History     Tobacco Use     Smoking status: Current Every Day Smoker     Packs/day: 1.00     Years: 25.00     Pack years: 25.00     Smokeless tobacco: Never Used   Substance Use Topics     Alcohol use: No     Family History   Problem Relation Age of Onset     Hypertension Mother      Hypertension Brother          Current Outpatient Medications   Medication Sig Dispense Refill     amLODIPine (NORVASC) 5 MG tablet Take 1 tablet (5 mg) by mouth daily 90 tablet 3     augmented betamethasone dipropionate (DIPROLENE-AF) 0.05 % external ointment Apply topically 2 times daily As needed to dry skin on hands, arms, and foot 50 g 11     betamethasone dipropionate (DIPROSONE) 0.05 % cream Apply sparingly to affected area twice daily as needed.  Do not apply to face. 45 g 0     hydrOXYzine (ATARAX) 25 MG tablet Take 1-2 tablets (25-50 mg) by mouth every 6 hours as needed for itching 60 tablet 2     triamcinolone (KENALOG) 0.1 % cream Use sparingly on face for the e xcema 60 g 0     Allergies   Allergen Reactions     Seasonal Allergies Other (See Comments)     Congestion sinuses     Codeine Nausea         Reviewed and updated as needed this visit by Provider  Tobacco  Allergies  Meds  Problems  Med Hx  Surg Hx  Fam Hx         Review of Systems   ROS COMP: Constitutional, HEENT, cardiovascular, pulmonary, GI, , musculoskeletal, neuro, skin, endocrine and psych systems are negative, except as otherwise noted.      Objective    /72 (BP Location: Right arm, Patient Position: Chair, Cuff Size: Adult Regular)   Pulse 60   Temp 97.5  F (36.4  C) (Tympanic)   Resp 16   There is no height or weight on file to calculate BMI.  Physical Exam   GENERAL: healthy, alert and no distress, nontoxic in appearance  EYES: Eyes grossly normal to inspection, PERRL and conjunctivae and sclerae normal  HENT:normocephalic  NECK: supple  with full ROM  ABDOMEN: soft, nontender  MS: no gross musculoskeletal defects noted, no edema, right ankle is not swollen, old scars from 2 years ago skidster crush accident. Integument intact. No bruising. Tender to palpation on medial aspect.    Diagnostic Test Results: I do not see any new acute fracture. Hardware appears to be in place. Will follow up with over read as indicated.    XR ANKLE RT G/E 3 VW 12/18/2019 7:06 PM      HISTORY: Right ankle injury, initial encounter     COMPARISON: 1/23/2018     FINDINGS: Old healed fracture deformities of the distal tibia and  fibula metadiaphyses. Intact plate and screw fixation distal tibial.  No evidence for acute fracture. Osteoporosis. Ankle mortise and  syndesmosis are maintained.                                                                      IMPRESSION: No acute osseous abnormality demonstrated.      CHANTELLE PATEL MD         Labs reviewed in Epic  No results found for this or any previous visit (from the past 24 hour(s)).        Assessment & Plan   Problem List Items Addressed This Visit     None      Visit Diagnoses     Sprain of right ankle, unspecified ligament, initial encounter    -  Primary    Relevant Orders    Ankle/Foot Bracing Supplies Order for DME - ONLY FOR DME    Right ankle injury, initial encounter        Relevant Orders    XR Ankle Right G/E 3 Views (Completed)             Tobacco Cessation:   reports that he has been smoking. He has a 25.00 pack-year smoking history. He has never used smokeless tobacco.  Tobacco Cessation Action Plan: Information offered: Patient not interested at this time        Patient Instructions     RICE advice.    Follow-up with your primary care provider next week and as needed.    Indications for emergent return to emergency department discussed with patient, who verbalized good understanding and agreement.  Patient understands the limitations of today's evaluation.         Patient Education     Treating Ankle  Sprains  Treatment will depend on how bad your sprain is. For a severe sprain, healing may take 3 months or more.  Right after your injury: Use R.I.C.E.    BIG: Rest: At first, keep weight off the ankle as much as you can. You may be given crutches to help you walk without putting weight on the ankle.    BIG: Ice: Put an ice pack on the ankle for 20 minutes. Remove the pack and wait at least 30 minutes. Repeat for up to 3 days. This helps reduce swelling.    BIG: Compression: To reduce swelling and keep the joint stable, you may need to wrap the ankle with an elastic bandage. For more severe sprains, you may need an ankle brace, a boot, or a cast.    BIG: Elevation: To reduce swelling, keep your ankle raised above your heart when you sit or lie down.  Medicine  Your healthcare provider may suggest oral nonsteroidal anti-inflammatory medicine (NSAIDs), such as ibuprofen. This relieves the pain and helps reduce swelling. Be sure to take your medicine as directed.  Exercises    After about 2 to 3 weeks, you may be given exercises to strengthen the ligaments and muscles in the ankle. Doing these exercises will help prevent another ankle sprain. Exercises may include standing on your toes and then on your heels and doing ankle curls.    Sit on the edge of a sturdy table or lie on your back.    Pull your toes toward you. Then point them away from you. Repeat for 2 to 3 minutes.  Date Last Reviewed: 1/1/2018 2000-2018 [employer]. [address] [place] [address], [place] [address]. All rights reserved. This information is not intended as a substitute for professional medical care. Always follow your healthcare professional's instructions.           Patient Education     Understanding Ankle Sprain    The ankle is the joint where the leg and foot meet. Bones are held in place by connective tissue called ligaments. When ankle ligaments are stretched to the point of pain and injury, it is called an ankle sprain. A  sprain can tear the ligaments. These tears can be very small but still cause pain. Ankle sprains can be mild or severe.  What causes an ankle sprain?  A sprain may occur when you twist your ankle or bend it too far. This can happen when you stumble or fall. Things that can make an ankle sprain more likely include:    Having had an ankle sprain before    Playing sports that involve running and jumping. Or playing contact sports such as football or hockey.    Wearing shoes that don t support your feet and ankles well    Having ankles with poor strength and flexibility  Symptoms of an ankle sprain  Symptoms may include:    Pain or soreness in the ankle    Swelling    Redness or bruising    Not being able to walk or put weight on the affected foot    Reduced range of motion in the ankle    A popping or tearing feeling at the time the sprain occurs    An abnormal or dislocated look to the ankle    Instability or too much range of motion in the ankle  Treatment for an ankle sprain  Treatment focuses on reducing pain and swelling, and avoiding further injury. Treatments may include:    Resting the ankle. Avoid putting weight on it. This may mean using crutches until the sprain heals.    Prescription or over-the-counter pain medicines. These help reduce swelling and pain.    Cold packs. These help reduce pain and swelling.    Raising your ankle above your heart. This helps reduce swelling.    Wrapping the ankle with an elastic bandage or ankle brace. This helps reduce swelling and gives some support to the ankle. In rare cases, you may need a cast or boot.    Stretching and other exercises. These improve flexibility and strength.    Heat packs. These may be recommended before doing ankle exercises.  Possible complications of an ankle sprain  An ankle that has been weakened by a sprain can be more likely to have repeated sprains afterward. Doing exercises to strengthen your ankle and improve balance can reduce your risk for  repeated sprains. Other possible complications are long-term (chronic) pain or an ankle that remains unstable.  When to call your healthcare provider  Call your healthcare provider right away if you have any of these:    Fever of 100.4 F (38 C) or higher, or as directed    Pain, numbness, discoloration, or coldness in the foot or toes    Pain that gets worse    Symptoms that don t get better, or get worse    New symptoms   Date Last Reviewed: 3/10/2016    7040-3019 [employer]. [address] [place], [place] [id number]. All rights reserved. This information is not intended as a substitute for professional medical care. Always follow your healthcare professional's instructions.           Patient Education     RICE    RICE stands for rest, ice, compression, and elevation. Doing these things helps limit pain and swelling after an injury. RICE also helps injuries heal faster. Use RICE for sprains, strains, and severe bruises or bumps. Follow the tips on this handout and begin RICE as soon as possible after an injury.  Rest  Pain is your body s way of telling you to rest an injured area. Whether you have hurt an elbow, hand, foot, or knee, limiting its use will prevent further injury and help you heal.  Ice  Applying ice right after an injury helps prevent swelling and reduce pain. Don t place ice directly on your skin.    Wrap a cold pack or bag of ice in a thin cloth. Place it over the injured area.    Ice for 10 minutes every 3 hours. Don t ice for more than 20 minutes at a time.  Compression  Putting pressure (compression) on an injury helps prevent swelling and provides support.    Wrap the injured area firmly with an elastic bandage. If your hand or foot tingles, becomes discolored, or feels cold to the touch, the bandage may be too tight. Rewrap it more loosely.    If your bandage becomes too loose, rewrap it.    Do not wear an elastic bandage overnight.  Elevation  Keeping an injury elevated helps  reduce swelling, pain, and throbbing. Elevation is most effective when the injury is kept elevated higher than the heart.     Call your healthcare provider if you notice any of the following:    Fingers or toes feel numb, are cold to the touch, or change color.    Skin looks shiny or tight.    Pain, swelling, or bruising worsens and is not improved with elevation.   Date Last Reviewed: 5/1/2018 2000-2018 [employer]. [address] [place] [address], [place] [id number]. All rights reserved. This information is not intended as a substitute for professional medical care. Always follow your healthcare professional's instructions.             Return in about 1 week (around 12/25/2019) for Follow up with your primary care provider.    XIN Grider [facility]

## 2020-08-15 DIAGNOSIS — L30.9 ECZEMA, UNSPECIFIED TYPE: ICD-10-CM

## 2020-08-17 RX ORDER — BETAMETHASONE DIPROPIONATE 0.5 MG/G
OINTMENT, AUGMENTED TOPICAL
Qty: 50 G | Refills: 0 | Status: SHIPPED | OUTPATIENT
Start: 2020-08-17 | End: 2021-02-17

## 2020-08-17 NOTE — TELEPHONE ENCOUNTER
Medication is being filled for 1 time refill only due to:  Needs med review. Reminder placed on pharmacy notes.    SARAH Bains

## 2020-10-15 DIAGNOSIS — L30.8 OTHER ECZEMA: ICD-10-CM

## 2020-10-16 RX ORDER — HYDROXYZINE HYDROCHLORIDE 25 MG/1
25-50 TABLET, FILM COATED ORAL EVERY 6 HOURS PRN
Qty: 60 TABLET | Refills: 2 | OUTPATIENT
Start: 2020-10-16

## 2021-02-17 ENCOUNTER — OFFICE VISIT (OUTPATIENT)
Dept: FAMILY MEDICINE | Facility: CLINIC | Age: 56
End: 2021-02-17
Payer: COMMERCIAL

## 2021-02-17 VITALS
TEMPERATURE: 99.3 F | BODY MASS INDEX: 19.46 KG/M2 | WEIGHT: 139 LBS | HEIGHT: 71 IN | DIASTOLIC BLOOD PRESSURE: 90 MMHG | OXYGEN SATURATION: 97 % | RESPIRATION RATE: 16 BRPM | SYSTOLIC BLOOD PRESSURE: 150 MMHG | HEART RATE: 84 BPM

## 2021-02-17 DIAGNOSIS — L30.9 ECZEMA, UNSPECIFIED TYPE: ICD-10-CM

## 2021-02-17 DIAGNOSIS — R73.09 ELEVATED GLUCOSE: ICD-10-CM

## 2021-02-17 DIAGNOSIS — R19.05 PERIUMBILICAL MASS: ICD-10-CM

## 2021-02-17 DIAGNOSIS — I10 BENIGN ESSENTIAL HYPERTENSION: Primary | ICD-10-CM

## 2021-02-17 DIAGNOSIS — L30.8 OTHER ECZEMA: ICD-10-CM

## 2021-02-17 LAB — HBA1C MFR BLD: 5.5 % (ref 0–5.6)

## 2021-02-17 PROCEDURE — 99214 OFFICE O/P EST MOD 30 MIN: CPT | Performed by: FAMILY MEDICINE

## 2021-02-17 PROCEDURE — 36415 COLL VENOUS BLD VENIPUNCTURE: CPT | Performed by: FAMILY MEDICINE

## 2021-02-17 PROCEDURE — 83036 HEMOGLOBIN GLYCOSYLATED A1C: CPT | Performed by: FAMILY MEDICINE

## 2021-02-17 RX ORDER — HYDROXYZINE HYDROCHLORIDE 25 MG/1
25-50 TABLET, FILM COATED ORAL EVERY 6 HOURS PRN
Qty: 90 TABLET | Refills: 11 | Status: ON HOLD | OUTPATIENT
Start: 2021-02-17 | End: 2021-03-02

## 2021-02-17 RX ORDER — BETAMETHASONE DIPROPIONATE 0.5 MG/G
OINTMENT, AUGMENTED TOPICAL
Qty: 50 G | Refills: 1 | Status: SHIPPED | OUTPATIENT
Start: 2021-02-17 | End: 2023-07-21

## 2021-02-17 RX ORDER — AMLODIPINE BESYLATE 5 MG/1
5 TABLET ORAL 2 TIMES DAILY
Qty: 180 TABLET | Refills: 3 | Status: SHIPPED | OUTPATIENT
Start: 2021-02-17 | End: 2022-07-18

## 2021-02-17 ASSESSMENT — MIFFLIN-ST. JEOR: SCORE: 1477.63

## 2021-02-17 NOTE — LETTER
February 18, 2021      Delvin Echevarria  [address] [place] [address]        Dear ,    We are writing to inform you of your test results.    No sign of diabetes on the labwork today.     I hope things are going well     Resulted Orders   Hemoglobin A1c   Result Value Ref Range    Hemoglobin A1C 5.5 0 - 5.6 %      Comment:      Normal <5.7% Prediabetes 5.7-6.4%  Diabetes 6.5% or higher - adopted from ADA   consensus guidelines.         If you have any questions or concerns, please call the clinic at the number listed above.       Sincerely,      Delvin Henning MD/orlando

## 2021-02-17 NOTE — PROGRESS NOTES
"s :Delvin Echevarria is a 55 year old male with     Eczema: severe.  Diprolene helps ,  But should see derm.  He agrees.  Dry skin everywhere.    Itches too, wants hydroxyzine filled as well    Abdominal mass: above belly button.  Soft, round.  Several years.  Can't put it back in.  Doesn't hurt, but notices it, doesn't want it to grow.  Wonders if it should be fixed.  Has had hernias in groin before, surgery in past.  No upper abd surgery    Htn: not controlled.  Out of meds.  Was on norvasc 5mg.      O;.BP (!) 150/90   Pulse 84   Temp 99.3  F (37.4  C) (Tympanic)   Resp 16   Ht 1.803 m (5' 11\")   Wt 63 kg (139 lb)   SpO2 97%   BMI 19.39 kg/m    GEN: Alert and oriented, in no acute distress  CV: RRR, no murmur  EXT: no edema or lesions noted in lower extremities  Extensive dry skin everywhere, cracking on hands, arms    A: eczema, severe       Abdominal mass, nos     Htn, not controlled      Elevated glucose, check A1C    P: derm and gen surg.  Diprolene and hydroxyzine for now    5mg bid on norvasc.  He wasn't sure on colon cancer screening.  'I'll be back after I deal with all this stuff first.\"    Next few months he says.      Update A1C, elevated glucose in past as well.  ,   "

## 2021-02-28 ENCOUNTER — HOSPITAL ENCOUNTER (OUTPATIENT)
Facility: CLINIC | Age: 56
Setting detail: OBSERVATION
Discharge: HOME | End: 2021-03-02
Attending: NURSE PRACTITIONER | Admitting: INTERNAL MEDICINE
Payer: COMMERCIAL

## 2021-02-28 ENCOUNTER — APPOINTMENT (OUTPATIENT)
Dept: GENERAL RADIOLOGY | Facility: CLINIC | Age: 56
End: 2021-02-28
Attending: NURSE PRACTITIONER
Payer: COMMERCIAL

## 2021-02-28 DIAGNOSIS — S82.042A CLOSED COMMINUTED FRACTURE OF PATELLA, LEFT, INITIAL ENCOUNTER: Primary | ICD-10-CM

## 2021-02-28 LAB
ANION GAP SERPL CALCULATED.3IONS-SCNC: 2 MMOL/L (ref 3–14)
BASOPHILS # BLD AUTO: 0.1 10E9/L (ref 0–0.2)
BASOPHILS NFR BLD AUTO: 0.7 %
BUN SERPL-MCNC: 20 MG/DL (ref 7–30)
CALCIUM SERPL-MCNC: 8.7 MG/DL (ref 8.5–10.1)
CHLORIDE SERPL-SCNC: 108 MMOL/L (ref 94–109)
CO2 SERPL-SCNC: 26 MMOL/L (ref 20–32)
CREAT SERPL-MCNC: 0.71 MG/DL (ref 0.66–1.25)
DIFFERENTIAL METHOD BLD: ABNORMAL
EOSINOPHIL # BLD AUTO: 0.5 10E9/L (ref 0–0.7)
EOSINOPHIL NFR BLD AUTO: 4.7 %
ERYTHROCYTE [DISTWIDTH] IN BLOOD BY AUTOMATED COUNT: 13.7 % (ref 10–15)
GFR SERPL CREATININE-BSD FRML MDRD: >90 ML/MIN/{1.73_M2}
GLUCOSE SERPL-MCNC: 87 MG/DL (ref 70–99)
HCT VFR BLD AUTO: 41.4 % (ref 40–53)
HGB BLD-MCNC: 13.8 G/DL (ref 13.3–17.7)
IMM GRANULOCYTES # BLD: 0.1 10E9/L (ref 0–0.4)
IMM GRANULOCYTES NFR BLD: 0.4 %
LABORATORY COMMENT REPORT: NORMAL
LYMPHOCYTES # BLD AUTO: 2 10E9/L (ref 0.8–5.3)
LYMPHOCYTES NFR BLD AUTO: 17.9 %
MCH RBC QN AUTO: 31.3 PG (ref 26.5–33)
MCHC RBC AUTO-ENTMCNC: 33.3 G/DL (ref 31.5–36.5)
MCV RBC AUTO: 94 FL (ref 78–100)
MONOCYTES # BLD AUTO: 0.6 10E9/L (ref 0–1.3)
MONOCYTES NFR BLD AUTO: 5.3 %
NEUTROPHILS # BLD AUTO: 8.1 10E9/L (ref 1.6–8.3)
NEUTROPHILS NFR BLD AUTO: 71 %
NRBC # BLD AUTO: 0 10*3/UL
NRBC BLD AUTO-RTO: 0 /100
PLATELET # BLD AUTO: 297 10E9/L (ref 150–450)
POTASSIUM SERPL-SCNC: 4.5 MMOL/L (ref 3.4–5.3)
RBC # BLD AUTO: 4.41 10E12/L (ref 4.4–5.9)
SARS-COV-2 RNA RESP QL NAA+PROBE: NEGATIVE
SODIUM SERPL-SCNC: 136 MMOL/L (ref 133–144)
SPECIMEN SOURCE: NORMAL
WBC # BLD AUTO: 11.4 10E9/L (ref 4–11)

## 2021-02-28 PROCEDURE — 80048 BASIC METABOLIC PNL TOTAL CA: CPT | Performed by: NURSE PRACTITIONER

## 2021-02-28 PROCEDURE — 99220 PR INITIAL OBSERVATION CARE,LEVEL III: CPT | Performed by: PHYSICIAN ASSISTANT

## 2021-02-28 PROCEDURE — 258N000003 HC RX IP 258 OP 636: Performed by: PHYSICIAN ASSISTANT

## 2021-02-28 PROCEDURE — 99285 EMERGENCY DEPT VISIT HI MDM: CPT | Mod: 25 | Performed by: FAMILY MEDICINE

## 2021-02-28 PROCEDURE — G0378 HOSPITAL OBSERVATION PER HR: HCPCS

## 2021-02-28 PROCEDURE — 93005 ELECTROCARDIOGRAM TRACING: CPT | Performed by: FAMILY MEDICINE

## 2021-02-28 PROCEDURE — 87635 SARS-COV-2 COVID-19 AMP PRB: CPT | Performed by: NURSE PRACTITIONER

## 2021-02-28 PROCEDURE — 250N000013 HC RX MED GY IP 250 OP 250 PS 637: Performed by: NURSE PRACTITIONER

## 2021-02-28 PROCEDURE — 85025 COMPLETE CBC W/AUTO DIFF WBC: CPT | Performed by: NURSE PRACTITIONER

## 2021-02-28 PROCEDURE — 250N000013 HC RX MED GY IP 250 OP 250 PS 637: Performed by: PHYSICIAN ASSISTANT

## 2021-02-28 PROCEDURE — 93010 ELECTROCARDIOGRAM REPORT: CPT | Performed by: FAMILY MEDICINE

## 2021-02-28 PROCEDURE — 73562 X-RAY EXAM OF KNEE 3: CPT | Mod: LT

## 2021-02-28 RX ORDER — ONDANSETRON 4 MG/1
4 TABLET, ORALLY DISINTEGRATING ORAL EVERY 6 HOURS PRN
Status: DISCONTINUED | OUTPATIENT
Start: 2021-02-28 | End: 2021-02-28

## 2021-02-28 RX ORDER — AMOXICILLIN 250 MG
1 CAPSULE ORAL 2 TIMES DAILY
Status: DISCONTINUED | OUTPATIENT
Start: 2021-02-28 | End: 2021-03-02 | Stop reason: HOSPADM

## 2021-02-28 RX ORDER — NALOXONE HYDROCHLORIDE 0.4 MG/ML
0.4 INJECTION, SOLUTION INTRAMUSCULAR; INTRAVENOUS; SUBCUTANEOUS
Status: DISCONTINUED | OUTPATIENT
Start: 2021-02-28 | End: 2021-03-02 | Stop reason: HOSPADM

## 2021-02-28 RX ORDER — ACETAMINOPHEN 500 MG
1000 TABLET ORAL ONCE
Status: COMPLETED | OUTPATIENT
Start: 2021-02-28 | End: 2021-02-28

## 2021-02-28 RX ORDER — PROCHLORPERAZINE MALEATE 5 MG
10 TABLET ORAL EVERY 6 HOURS PRN
Status: DISCONTINUED | OUTPATIENT
Start: 2021-02-28 | End: 2021-03-02 | Stop reason: HOSPADM

## 2021-02-28 RX ORDER — ONDANSETRON 2 MG/ML
4 INJECTION INTRAMUSCULAR; INTRAVENOUS EVERY 6 HOURS PRN
Status: DISCONTINUED | OUTPATIENT
Start: 2021-02-28 | End: 2021-02-28

## 2021-02-28 RX ORDER — AMOXICILLIN 250 MG
2 CAPSULE ORAL 2 TIMES DAILY
Status: DISCONTINUED | OUTPATIENT
Start: 2021-02-28 | End: 2021-03-02 | Stop reason: HOSPADM

## 2021-02-28 RX ORDER — POLYETHYLENE GLYCOL 3350 17 G/17G
17 POWDER, FOR SOLUTION ORAL DAILY PRN
Status: DISCONTINUED | OUTPATIENT
Start: 2021-02-28 | End: 2021-03-02 | Stop reason: HOSPADM

## 2021-02-28 RX ORDER — ACETAMINOPHEN 325 MG/1
975 TABLET ORAL EVERY 8 HOURS
Status: DISCONTINUED | OUTPATIENT
Start: 2021-02-28 | End: 2021-03-02 | Stop reason: HOSPADM

## 2021-02-28 RX ORDER — ONDANSETRON 4 MG/1
4 TABLET, ORALLY DISINTEGRATING ORAL EVERY 6 HOURS PRN
Status: DISCONTINUED | OUTPATIENT
Start: 2021-02-28 | End: 2021-03-02 | Stop reason: HOSPADM

## 2021-02-28 RX ORDER — NALOXONE HYDROCHLORIDE 0.4 MG/ML
0.2 INJECTION, SOLUTION INTRAMUSCULAR; INTRAVENOUS; SUBCUTANEOUS
Status: DISCONTINUED | OUTPATIENT
Start: 2021-02-28 | End: 2021-03-02 | Stop reason: HOSPADM

## 2021-02-28 RX ORDER — ACETAMINOPHEN 325 MG/1
650 TABLET ORAL EVERY 4 HOURS PRN
Status: CANCELLED | OUTPATIENT
Start: 2021-02-28

## 2021-02-28 RX ORDER — IBUPROFEN 200 MG
400 TABLET ORAL ONCE
Status: COMPLETED | OUTPATIENT
Start: 2021-02-28 | End: 2021-02-28

## 2021-02-28 RX ORDER — SODIUM CHLORIDE, SODIUM LACTATE, POTASSIUM CHLORIDE, CALCIUM CHLORIDE 600; 310; 30; 20 MG/100ML; MG/100ML; MG/100ML; MG/100ML
INJECTION, SOLUTION INTRAVENOUS CONTINUOUS
Status: DISCONTINUED | OUTPATIENT
Start: 2021-03-01 | End: 2021-03-01

## 2021-02-28 RX ORDER — OXYCODONE HYDROCHLORIDE 5 MG/1
5-10 TABLET ORAL
Status: DISCONTINUED | OUTPATIENT
Start: 2021-02-28 | End: 2021-03-02 | Stop reason: HOSPADM

## 2021-02-28 RX ORDER — PROCHLORPERAZINE 25 MG
25 SUPPOSITORY, RECTAL RECTAL EVERY 12 HOURS PRN
Status: DISCONTINUED | OUTPATIENT
Start: 2021-02-28 | End: 2021-03-02 | Stop reason: HOSPADM

## 2021-02-28 RX ORDER — ONDANSETRON 2 MG/ML
4 INJECTION INTRAMUSCULAR; INTRAVENOUS EVERY 6 HOURS PRN
Status: DISCONTINUED | OUTPATIENT
Start: 2021-02-28 | End: 2021-03-02 | Stop reason: HOSPADM

## 2021-02-28 RX ORDER — HYDROXYZINE HYDROCHLORIDE 25 MG/1
25-50 TABLET, FILM COATED ORAL EVERY 6 HOURS PRN
Status: DISCONTINUED | OUTPATIENT
Start: 2021-02-28 | End: 2021-03-02 | Stop reason: HOSPADM

## 2021-02-28 RX ORDER — AMLODIPINE BESYLATE 5 MG/1
5 TABLET ORAL 2 TIMES DAILY
Status: DISCONTINUED | OUTPATIENT
Start: 2021-02-28 | End: 2021-03-02 | Stop reason: HOSPADM

## 2021-02-28 RX ORDER — OXYCODONE HYDROCHLORIDE 5 MG/1
5 TABLET ORAL ONCE
Status: COMPLETED | OUTPATIENT
Start: 2021-02-28 | End: 2021-02-28

## 2021-02-28 RX ADMIN — OXYCODONE HYDROCHLORIDE 5 MG: 5 TABLET ORAL at 16:49

## 2021-02-28 RX ADMIN — IBUPROFEN 400 MG: 200 TABLET, FILM COATED ORAL at 16:49

## 2021-02-28 RX ADMIN — OXYCODONE HYDROCHLORIDE 10 MG: 5 TABLET ORAL at 21:31

## 2021-02-28 RX ADMIN — ACETAMINOPHEN 975 MG: 325 TABLET, FILM COATED ORAL at 21:31

## 2021-02-28 RX ADMIN — ACETAMINOPHEN 1000 MG: 500 TABLET, FILM COATED ORAL at 16:49

## 2021-02-28 RX ADMIN — DOCUSATE SODIUM AND SENNOSIDES 1 TABLET: 8.6; 5 TABLET ORAL at 21:31

## 2021-02-28 RX ADMIN — AMLODIPINE BESYLATE 5 MG: 5 TABLET ORAL at 21:30

## 2021-02-28 RX ADMIN — SODIUM CHLORIDE, POTASSIUM CHLORIDE, SODIUM LACTATE AND CALCIUM CHLORIDE: 600; 310; 30; 20 INJECTION, SOLUTION INTRAVENOUS at 21:30

## 2021-02-28 ASSESSMENT — ENCOUNTER SYMPTOMS
ABDOMINAL PAIN: 0
DIFFICULTY URINATING: 0
BLOOD IN STOOL: 0
MYALGIAS: 0
DYSURIA: 0
SHORTNESS OF BREATH: 0
EYE PAIN: 0
WOUND: 0
NAUSEA: 0
SORE THROAT: 0
HEMATURIA: 0
DIAPHORESIS: 0
FEVER: 0
DIARRHEA: 0
SPEECH DIFFICULTY: 0
CHILLS: 0
COUGH: 0
WHEEZING: 0
VOMITING: 0
JOINT SWELLING: 1

## 2021-02-28 ASSESSMENT — MIFFLIN-ST. JEOR: SCORE: 1479.13

## 2021-02-28 NOTE — ED PROVIDER NOTES
History     Chief Complaint   Patient presents with     Knee Injury     pt fell onto left knee, at approx 1400, unable to bear wt on it.     HPI  Delvin Echevarria is a 55 year old male with past medical history of essential hypertension, tobacco dependence who presents to urgent care due to acute knee pain.  Patient reports that he fell on the knee today when his 1 foot was caught on the other.  Patient states his knee hit the cement or ice below.  Patient reports severe pain.  Patient rating his pain a 10 out of a 10.  Patient states it is a burning stabbing/throbbing pain.  Patient denies any other injury.  Patient denying any mental status changes, head injury, vision changes, speech difficulty.    Allergies:  Allergies   Allergen Reactions     Seasonal Allergies Other (See Comments)     Congestion sinuses     Codeine Nausea       Problem List:    Patient Active Problem List    Diagnosis Date Noted     Closed comminuted fracture of patella, left, initial encounter 02/28/2021     Priority: Medium     Weight loss 08/12/2019     Priority: Medium     Lumbar nerve root impingement 01/08/2019     Priority: Medium     Tobacco dependence syndrome 01/08/2019     Priority: Medium     Marijuana abuse 01/08/2019     Priority: Medium     ACP (advance care planning) 02/10/2017     Priority: Medium     Advance Care Planning 2/10/2017: Receipt of ACP document:  Received: POLST which was signed and dated by provider on 9-27-16.  Document previously scanned on 12-8-16.  Order reviewed and found to be valid.  Code Status reflects choices in most recent ACP document.  Confirmed/documented designated decision maker(s).  Added by Ashlee Batres RN Advance Care Planning Liaison with Evelyn Aguirre           Aftercare for healing traumatic fracture of lower leg, unspecified laterality, closed 10/16/2016     Priority: Medium     CARDIOVASCULAR SCREENING; LDL GOAL LESS THAN 130 08/12/2013     Priority: Medium     Essential  hypertension, benign 03/15/2013     Priority: Medium     Inguinal hernia 06/28/2012     Priority: Medium     Problem list name updated by automated process. Provider to review          Past Medical History:    Past Medical History:   Diagnosis Date     Abscess 8/11/2017     Acute posthemorrhagic anemia 12/14/2016     Anemia due to blood loss, acute 9/30/2016     Closed fracture of right tibia and fibula with routine healing 9/29/2016     DDD (degenerative disc disease), lumbar      Hypertension 3/15/2013     Marijuana use      Staph aureus infection 9/13/2017     Tibial plateau fracture 9/11/2016     Tobacco use      Wound infection 9/29/2016       Past Surgical History:    Past Surgical History:   Procedure Laterality Date     APPLY EXTERNAL FIXATOR LOWER EXTREMITY Right 9/12/2016    Procedure: APPLY EXTERNAL FIXATOR LOWER EXTREMITY;  Surgeon: John Gonzales MD;  Location: [facility]     HERNIORRHAPHY INGUINAL  7/2/2012    Procedure: HERNIORRHAPHY INGUINAL;  Open Repair Right Inguinal Hernia with Mesh;  Surgeon: Avni Maxwell MD;  Location: [facility]     INCISION AND DRAINAGE BONE LOWER EXTREMITY, COMBINED Right 8/11/2017    Procedure: COMBINED INCISION AND DRAINAGE BONE LOWER EXTREMITY;  Irrigation and Debridement Right Tibia,  Removal of Hardware;  Surgeon: Kenroy Dos Santos MD;  Location: [facility]     LARYNGECTOMY  Several Years ago    Partail removal due to fish bone     OPEN REDUCTION INTERNAL FIXATION TIBIA Right 9/15/2016    Procedure: OPEN REDUCTION INTERNAL FIXATION TIBIA;  Surgeon: Miguel A Green MD;  Location: [facility]     OPEN REDUCTION INTERNAL FIXATION TIBIA Right 11/7/2017    Procedure: OPEN REDUCTION INTERNAL FIXATION TIBIA;  Right Tibia Fibula Open Reduction Internal Fixation;  Surgeon: Miguel A Green MD;  [facility]     REMOVE HARDWARE LOWER EXTREMITY Right 8/11/2017    Procedure: REMOVE HARDWARE LOWER EXTREMITY;;  Surgeon: Kenroy Dos Santos MD;  Location:  "[facility] OR       Family History:    Family History   Problem Relation Age of Onset     Hypertension Mother      Hypertension Brother        Social History:  Marital Status:  Single [1]  Social History     Tobacco Use     Smoking status: Current Every Day Smoker     Packs/day: 0.50     Years: 25.00     Pack years: 12.50     Smokeless tobacco: Never Used   Substance Use Topics     Alcohol use: No     Drug use: Not Currently     Types: Marijuana     Comment: Previously smoked 4x daily        Medications:    No current outpatient medications on file.    Review of Systems   Constitutional: Negative for chills, diaphoresis and fever.   HENT: Negative for ear pain and sore throat.    Eyes: Negative for pain.   Respiratory: Negative for cough, shortness of breath and wheezing.    Cardiovascular: Negative for chest pain.   Gastrointestinal: Negative for abdominal pain, blood in stool, diarrhea, nausea and vomiting.   Genitourinary: Negative for difficulty urinating, dysuria and hematuria.   Musculoskeletal: Positive for joint swelling (right knee and severe pain). Negative for myalgias.   Skin: Negative for rash and wound.   Neurological: Negative for speech difficulty.   Psychiatric/Behavioral: Negative for self-injury.   All other systems reviewed and are negative.      Physical Exam   BP: (!) 145/81  Pulse: 73  Temp: 98  F (36.7  C)  Resp: 20  Height: 180.3 cm (5' 11\")  Weight: 63.5 kg (140 lb)  SpO2: 100 %      Physical Exam  Vitals signs and nursing note reviewed.   Constitutional:       General: He is not in acute distress.     Appearance: He is well-developed and underweight. He is not ill-appearing, toxic-appearing or diaphoretic.   HENT:      Head: Normocephalic and atraumatic.      Right Ear: Hearing and external ear normal.      Left Ear: Hearing and external ear normal.      Nose: Nose normal.   Eyes:      General:         Right eye: No discharge.         Left eye: No discharge.      Conjunctiva/sclera: Conjunctivae " normal.   Cardiovascular:      Rate and Rhythm: Normal rate and regular rhythm.      Heart sounds: Normal heart sounds. No murmur. No friction rub. No gallop.    Pulmonary:      Effort: Pulmonary effort is normal. No respiratory distress.      Breath sounds: Normal breath sounds. No stridor. No wheezing or rales.   Abdominal:      General: Bowel sounds are normal.      Tenderness: There is no abdominal tenderness. There is no guarding.   Musculoskeletal:      Left knee: He exhibits decreased range of motion and swelling (generalized knee). He exhibits no ecchymosis, no deformity, no laceration and no erythema. Tenderness found. Medial joint line (and patella are equally painful) tenderness noted.   Skin:     General: Skin is warm.      Capillary Refill: Capillary refill takes less than 2 seconds.      Findings: No rash.   Neurological:      Mental Status: He is alert and oriented to person, place, and time.   Psychiatric:         Behavior: Behavior is cooperative.         ED Course        Procedures         EKG Interpretation:      EKG Number: 1  Interpreted by Grant Gillespie MD  Symptoms at time of EKG: None   Rhythm: Normal sinus   Rate: Normal  Axis: Normal  Ectopy: None  Conduction: Normal  ST Segments/ T Waves: T wave flattening II, aVL, aVR, V5 and V6  Q Waves: None  Comparison to prior: slight changes from 08/10/2017, uncertain etiology,    Clinical Impression: non-specific EKG    Results for orders placed or performed during the hospital encounter of 02/28/21 (from the past 24 hour(s))   XR Knee Left 3 Views    Narrative    EXAM: XR KNEE LT 3 VW  LOCATION: [facility]  DATE/TIME: 2/28/2021 4:57 PM    INDICATION: Tripped and fell  COMPARISON: None.      Impression    IMPRESSION: Mildly comminuted fracture of the patella. There is distraction with an estimated maximal distraction of 7 mm. Lipohemarthrosis. Anterior soft tissue swelling. Mild lateral patellar tilting.   Asymptomatic SARS-CoV-2  COVID-19 Virus (Coronavirus) by PCR    Specimen: Nasopharyngeal   Result Value Ref Range    SARS-CoV-2 Virus Specimen Source Nasopharyngeal     SARS-CoV-2 PCR Result NEGATIVE     SARS-CoV-2 PCR Comment (Note)    CBC with platelets differential   Result Value Ref Range    WBC 11.4 (H) 4.0 - 11.0 10e9/L    RBC Count 4.41 4.4 - 5.9 10e12/L    Hemoglobin 13.8 13.3 - 17.7 g/dL    Hematocrit 41.4 40.0 - 53.0 %    MCV 94 78 - 100 fl    MCH 31.3 26.5 - 33.0 pg    MCHC 33.3 31.5 - 36.5 g/dL    RDW 13.7 10.0 - 15.0 %    Platelet Count 297 150 - 450 10e9/L    Diff Method Automated Method     % Neutrophils 71.0 %    % Lymphocytes 17.9 %    % Monocytes 5.3 %    % Eosinophils 4.7 %    % Basophils 0.7 %    % Immature Granulocytes 0.4 %    Nucleated RBCs 0 0 /100    Absolute Neutrophil 8.1 1.6 - 8.3 10e9/L    Absolute Lymphocytes 2.0 0.8 - 5.3 10e9/L    Absolute Monocytes 0.6 0.0 - 1.3 10e9/L    Absolute Eosinophils 0.5 0.0 - 0.7 10e9/L    Absolute Basophils 0.1 0.0 - 0.2 10e9/L    Abs Immature Granulocytes 0.1 0 - 0.4 10e9/L    Absolute Nucleated RBC 0.0    Basic metabolic panel   Result Value Ref Range    Sodium 136 133 - 144 mmol/L    Potassium 4.5 3.4 - 5.3 mmol/L    Chloride 108 94 - 109 mmol/L    Carbon Dioxide 26 20 - 32 mmol/L    Anion Gap 2 (L) 3 - 14 mmol/L    Glucose 87 70 - 99 mg/dL    Urea Nitrogen 20 7 - 30 mg/dL    Creatinine 0.71 0.66 - 1.25 mg/dL    GFR Estimate >90 >60 mL/min/[1.73_m2]    GFR Estimate If Black >90 >60 mL/min/[1.73_m2]    Calcium 8.7 8.5 - 10.1 mg/dL       Medications   naloxone (NARCAN) injection 0.2 mg (has no administration in time range)     Or   naloxone (NARCAN) injection 0.4 mg (has no administration in time range)     Or   naloxone (NARCAN) injection 0.2 mg (has no administration in time range)     Or   naloxone (NARCAN) injection 0.4 mg (has no administration in time range)   oxyCODONE (ROXICODONE) tablet 5-10 mg (10 mg Oral Given 2/28/21 2131)   ondansetron (ZOFRAN-ODT) ODT tab 4 mg (has  no administration in time range)     Or   ondansetron (ZOFRAN) injection 4 mg (has no administration in time range)   hydrOXYzine (ATARAX) tablet 25-50 mg (has no administration in time range)   acetaminophen (TYLENOL) tablet 975 mg (975 mg Oral Given 2/28/21 2131)   melatonin tablet 1 mg (has no administration in time range)   senna-docusate (SENOKOT-S/PERICOLACE) 8.6-50 MG per tablet 1 tablet (1 tablet Oral Given 2/28/21 2131)     Or   senna-docusate (SENOKOT-S/PERICOLACE) 8.6-50 MG per tablet 2 tablet ( Oral See Alternative 2/28/21 2131)   polyethylene glycol (MIRALAX) Packet 17 g (has no administration in time range)   prochlorperazine (COMPAZINE) injection 10 mg (has no administration in time range)     Or   prochlorperazine (COMPAZINE) tablet 10 mg (has no administration in time range)     Or   prochlorperazine (COMPAZINE) suppository 25 mg (has no administration in time range)   amLODIPine (NORVASC) tablet 5 mg (5 mg Oral Given 2/28/21 2130)   lactated ringers infusion (0 mLs Intravenous Hold 3/1/21 0000)   oxyCODONE (ROXICODONE) tablet 5 mg (5 mg Oral Given 2/28/21 1649)   acetaminophen (TYLENOL) tablet 1,000 mg (1,000 mg Oral Given 2/28/21 1649)   ibuprofen (ADVIL/MOTRIN) tablet 400 mg (400 mg Oral Given 2/28/21 1649)       Assessments & Plan (with Medical Decision Making)  55-year-old male presenting to urgent care with acute knee pain following a slip and fall injury that occurred this afternoon.  Patient states he was outside walking and his 1 foot caught on the other foot and he fell bluntly on his left knee with immediate severe pain.  Patient reports unable to bear weight since injury.  Patient rating his pain is severe.  Patient denies loss of sensation distal to his or injury.  On examination patient appears underweight and disheveled.  He is utilizing bread bags for socks in his shoes.  His clothing appears well worn with some holes in his sweatpants.  His left knee is exquisitely tender on the  patella and medial joint line and reveals generalized swelling and unable to move due to pain.  X-ray of the knee reveals a triquetral patellar fracture that is comminuted.  Discussed this with patient and after discussion patient reports he will be unable to care for himself in his current living situation and there is no one to assist him.  Patient reports being in severe pain.  Consultation completed with orthopedics and they advised that they will be here in the a.m. and will consult in the a.m. and possible surgery in the afternoon and requested n.p.o. after midnight.  Consultation completed with hospitalist who agreed to admission due to inability to care for self at home and pain management.  Observation orders placed.  Dr. Grant Gillespie was collaborative physician in care of this patient.  Of note at time of admission a CBC, basic metabolic panel, and EKG ordered for surgical prep and EKG revealed flattening in leads to aVR, aVL, V5, and V6 and in the setting of known acute chest pain do not feel this to be worrisome presently.     I have reviewed the nursing notes.    I have reviewed the findings, diagnosis, plan and need for follow up with the patient.    Current Discharge Medication List          Final diagnoses:   Closed comminuted fracture of patella, left, initial encounter       2/28/2021   [facility] EMERGENCY DEPT     Donna Mc, XIN CNP  02/28/21 0814

## 2021-03-01 ENCOUNTER — ANESTHESIA (OUTPATIENT)
Dept: SURGERY | Facility: CLINIC | Age: 56
End: 2021-03-01
Payer: COMMERCIAL

## 2021-03-01 ENCOUNTER — APPOINTMENT (OUTPATIENT)
Dept: GENERAL RADIOLOGY | Facility: CLINIC | Age: 56
End: 2021-03-01
Attending: INTERNAL MEDICINE
Payer: COMMERCIAL

## 2021-03-01 ENCOUNTER — ANESTHESIA EVENT (OUTPATIENT)
Dept: SURGERY | Facility: CLINIC | Age: 56
End: 2021-03-01
Payer: COMMERCIAL
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
## 2021-03-01 PROBLEM — Z20.822 COVID-19 RULED OUT: Status: ACTIVE | Noted: 2021-03-01

## 2021-03-01 PROBLEM — F17.200 TOBACCO DEPENDENCE SYNDROME: Chronic | Status: ACTIVE | Noted: 2019-01-08

## 2021-03-01 PROBLEM — Z98.890 S/P LEFT KNEE SURGERY: Status: ACTIVE | Noted: 2021-03-01

## 2021-03-01 LAB
ERYTHROCYTE [DISTWIDTH] IN BLOOD BY AUTOMATED COUNT: 13.7 % (ref 10–15)
HCT VFR BLD AUTO: 38.7 % (ref 40–53)
HGB BLD-MCNC: 12.7 G/DL (ref 13.3–17.7)
MCH RBC QN AUTO: 31.4 PG (ref 26.5–33)
MCHC RBC AUTO-ENTMCNC: 32.8 G/DL (ref 31.5–36.5)
MCV RBC AUTO: 96 FL (ref 78–100)
PLATELET # BLD AUTO: 262 10E9/L (ref 150–450)
RBC # BLD AUTO: 4.05 10E12/L (ref 4.4–5.9)
WBC # BLD AUTO: 8.4 10E9/L (ref 4–11)

## 2021-03-01 PROCEDURE — 99225 PR SUBSEQUENT OBSERVATION CARE,LEVEL II: CPT | Performed by: PHYSICIAN ASSISTANT

## 2021-03-01 PROCEDURE — 360N000084 HC SURGERY LEVEL 4 W/ FLUORO, PER MIN: Performed by: ORTHOPAEDIC SURGERY

## 2021-03-01 PROCEDURE — 250N000011 HC RX IP 250 OP 636: Performed by: NURSE PRACTITIONER

## 2021-03-01 PROCEDURE — 258N000003 HC RX IP 258 OP 636: Performed by: ORTHOPAEDIC SURGERY

## 2021-03-01 PROCEDURE — 258N000003 HC RX IP 258 OP 636: Performed by: PHYSICIAN ASSISTANT

## 2021-03-01 PROCEDURE — C1713 ANCHOR/SCREW BN/BN,TIS/BN: HCPCS | Performed by: ORTHOPAEDIC SURGERY

## 2021-03-01 PROCEDURE — 272N000001 HC OR GENERAL SUPPLY STERILE: Performed by: ORTHOPAEDIC SURGERY

## 2021-03-01 PROCEDURE — 999N000179 XR SURGERY CARM FLUORO LESS THAN 5 MIN W STILLS: Mod: TC

## 2021-03-01 PROCEDURE — 370N000017 HC ANESTHESIA TECHNICAL FEE, PER MIN: Performed by: ORTHOPAEDIC SURGERY

## 2021-03-01 PROCEDURE — 99207 PR CDG-CODE CATEGORY CHANGED: CPT | Performed by: PHYSICIAN ASSISTANT

## 2021-03-01 PROCEDURE — 710N000009 HC RECOVERY PHASE 1, LEVEL 1, PER MIN: Performed by: ORTHOPAEDIC SURGERY

## 2021-03-01 PROCEDURE — 250N000013 HC RX MED GY IP 250 OP 250 PS 637: Performed by: PHYSICIAN ASSISTANT

## 2021-03-01 PROCEDURE — 250N000009 HC RX 250: Performed by: NURSE ANESTHETIST, CERTIFIED REGISTERED

## 2021-03-01 PROCEDURE — 250N000013 HC RX MED GY IP 250 OP 250 PS 637: Performed by: NURSE PRACTITIONER

## 2021-03-01 PROCEDURE — 36415 COLL VENOUS BLD VENIPUNCTURE: CPT | Performed by: PHYSICIAN ASSISTANT

## 2021-03-01 PROCEDURE — 250N000011 HC RX IP 250 OP 636: Performed by: NURSE ANESTHETIST, CERTIFIED REGISTERED

## 2021-03-01 PROCEDURE — 250N000011 HC RX IP 250 OP 636: Performed by: ORTHOPAEDIC SURGERY

## 2021-03-01 PROCEDURE — G0378 HOSPITAL OBSERVATION PER HR: HCPCS

## 2021-03-01 PROCEDURE — 85027 COMPLETE CBC AUTOMATED: CPT | Performed by: PHYSICIAN ASSISTANT

## 2021-03-01 PROCEDURE — 250N000013 HC RX MED GY IP 250 OP 250 PS 637: Performed by: ORTHOPAEDIC SURGERY

## 2021-03-01 DEVICE — IMPLANTABLE DEVICE: Type: IMPLANTABLE DEVICE | Site: KNEE | Status: FUNCTIONAL

## 2021-03-01 RX ORDER — ONDANSETRON 4 MG/1
4 TABLET, ORALLY DISINTEGRATING ORAL EVERY 6 HOURS PRN
Status: DISCONTINUED | OUTPATIENT
Start: 2021-03-01 | End: 2021-03-02 | Stop reason: HOSPADM

## 2021-03-01 RX ORDER — ACETAMINOPHEN 325 MG/1
975 TABLET ORAL EVERY 8 HOURS
Status: DISCONTINUED | OUTPATIENT
Start: 2021-03-01 | End: 2021-03-01

## 2021-03-01 RX ORDER — HYDROMORPHONE HCL IN WATER/PF 6 MG/30 ML
0.2 PATIENT CONTROLLED ANALGESIA SYRINGE INTRAVENOUS
Status: DISCONTINUED | OUTPATIENT
Start: 2021-03-01 | End: 2021-03-02 | Stop reason: HOSPADM

## 2021-03-01 RX ORDER — SODIUM CHLORIDE, SODIUM LACTATE, POTASSIUM CHLORIDE, CALCIUM CHLORIDE 600; 310; 30; 20 MG/100ML; MG/100ML; MG/100ML; MG/100ML
INJECTION, SOLUTION INTRAVENOUS CONTINUOUS
Status: DISCONTINUED | OUTPATIENT
Start: 2021-03-01 | End: 2021-03-01

## 2021-03-01 RX ORDER — CEFAZOLIN SODIUM 1 G/50ML
1 INJECTION, SOLUTION INTRAVENOUS EVERY 8 HOURS
Status: COMPLETED | OUTPATIENT
Start: 2021-03-01 | End: 2021-03-02

## 2021-03-01 RX ORDER — ACETAMINOPHEN 325 MG/1
650 TABLET ORAL EVERY 4 HOURS PRN
Status: DISCONTINUED | OUTPATIENT
Start: 2021-03-04 | End: 2021-03-02 | Stop reason: HOSPADM

## 2021-03-01 RX ORDER — ONDANSETRON 2 MG/ML
4 INJECTION INTRAMUSCULAR; INTRAVENOUS EVERY 6 HOURS PRN
Status: DISCONTINUED | OUTPATIENT
Start: 2021-03-01 | End: 2021-03-02 | Stop reason: HOSPADM

## 2021-03-01 RX ORDER — ONDANSETRON 4 MG/1
4 TABLET, ORALLY DISINTEGRATING ORAL EVERY 30 MIN PRN
Status: DISCONTINUED | OUTPATIENT
Start: 2021-03-01 | End: 2021-03-01

## 2021-03-01 RX ORDER — OXYCODONE HYDROCHLORIDE 5 MG/1
5 TABLET ORAL EVERY 4 HOURS PRN
Status: DISCONTINUED | OUTPATIENT
Start: 2021-03-01 | End: 2021-03-02 | Stop reason: HOSPADM

## 2021-03-01 RX ORDER — KETOROLAC TROMETHAMINE 15 MG/ML
15 INJECTION, SOLUTION INTRAMUSCULAR; INTRAVENOUS EVERY 6 HOURS
Status: COMPLETED | OUTPATIENT
Start: 2021-03-01 | End: 2021-03-02

## 2021-03-01 RX ORDER — DIPHENHYDRAMINE HYDROCHLORIDE 50 MG/ML
INJECTION INTRAMUSCULAR; INTRAVENOUS PRN
Status: DISCONTINUED | OUTPATIENT
Start: 2021-03-01 | End: 2021-03-01

## 2021-03-01 RX ORDER — PROCHLORPERAZINE MALEATE 5 MG
10 TABLET ORAL EVERY 6 HOURS PRN
Status: DISCONTINUED | OUTPATIENT
Start: 2021-03-01 | End: 2021-03-02 | Stop reason: HOSPADM

## 2021-03-01 RX ORDER — HYDROXYZINE HYDROCHLORIDE 25 MG/1
25 TABLET, FILM COATED ORAL EVERY 6 HOURS PRN
Status: DISCONTINUED | OUTPATIENT
Start: 2021-03-01 | End: 2021-03-02 | Stop reason: HOSPADM

## 2021-03-01 RX ORDER — BUPIVACAINE HYDROCHLORIDE 7.5 MG/ML
INJECTION, SOLUTION INTRASPINAL PRN
Status: DISCONTINUED | OUTPATIENT
Start: 2021-03-01 | End: 2021-03-01

## 2021-03-01 RX ORDER — SODIUM CHLORIDE, SODIUM LACTATE, POTASSIUM CHLORIDE, CALCIUM CHLORIDE 600; 310; 30; 20 MG/100ML; MG/100ML; MG/100ML; MG/100ML
INJECTION, SOLUTION INTRAVENOUS CONTINUOUS
Status: DISCONTINUED | OUTPATIENT
Start: 2021-03-01 | End: 2021-03-02

## 2021-03-01 RX ORDER — NALOXONE HYDROCHLORIDE 0.4 MG/ML
0.2 INJECTION, SOLUTION INTRAMUSCULAR; INTRAVENOUS; SUBCUTANEOUS
Status: DISCONTINUED | OUTPATIENT
Start: 2021-03-01 | End: 2021-03-01

## 2021-03-01 RX ORDER — FENTANYL CITRATE 50 UG/ML
25-50 INJECTION, SOLUTION INTRAMUSCULAR; INTRAVENOUS
Status: DISCONTINUED | OUTPATIENT
Start: 2021-03-01 | End: 2021-03-01

## 2021-03-01 RX ORDER — BISACODYL 10 MG
10 SUPPOSITORY, RECTAL RECTAL DAILY PRN
Status: DISCONTINUED | OUTPATIENT
Start: 2021-03-01 | End: 2021-03-02 | Stop reason: HOSPADM

## 2021-03-01 RX ORDER — PROPOFOL 10 MG/ML
INJECTION, EMULSION INTRAVENOUS CONTINUOUS PRN
Status: DISCONTINUED | OUTPATIENT
Start: 2021-03-01 | End: 2021-03-01

## 2021-03-01 RX ORDER — FENTANYL CITRATE 50 UG/ML
INJECTION, SOLUTION INTRAMUSCULAR; INTRAVENOUS PRN
Status: DISCONTINUED | OUTPATIENT
Start: 2021-03-01 | End: 2021-03-01

## 2021-03-01 RX ORDER — ONDANSETRON 2 MG/ML
4 INJECTION INTRAMUSCULAR; INTRAVENOUS EVERY 30 MIN PRN
Status: DISCONTINUED | OUTPATIENT
Start: 2021-03-01 | End: 2021-03-01

## 2021-03-01 RX ORDER — ASPIRIN 81 MG/1
81 TABLET ORAL 2 TIMES DAILY
Status: DISCONTINUED | OUTPATIENT
Start: 2021-03-01 | End: 2021-03-02 | Stop reason: HOSPADM

## 2021-03-01 RX ORDER — NALOXONE HYDROCHLORIDE 0.4 MG/ML
0.4 INJECTION, SOLUTION INTRAMUSCULAR; INTRAVENOUS; SUBCUTANEOUS
Status: DISCONTINUED | OUTPATIENT
Start: 2021-03-01 | End: 2021-03-01

## 2021-03-01 RX ORDER — KETAMINE HYDROCHLORIDE 10 MG/ML
INJECTION, SOLUTION INTRAMUSCULAR; INTRAVENOUS PRN
Status: DISCONTINUED | OUTPATIENT
Start: 2021-03-01 | End: 2021-03-01

## 2021-03-01 RX ORDER — DIPHENHYDRAMINE HYDROCHLORIDE 50 MG/ML
25 INJECTION INTRAMUSCULAR; INTRAVENOUS EVERY 6 HOURS PRN
Status: DISCONTINUED | OUTPATIENT
Start: 2021-03-01 | End: 2021-03-01

## 2021-03-01 RX ORDER — POLYETHYLENE GLYCOL 3350 17 G/17G
17 POWDER, FOR SOLUTION ORAL DAILY
Status: DISCONTINUED | OUTPATIENT
Start: 2021-03-02 | End: 2021-03-02 | Stop reason: HOSPADM

## 2021-03-01 RX ORDER — AMOXICILLIN 250 MG
1 CAPSULE ORAL 2 TIMES DAILY
Status: DISCONTINUED | OUTPATIENT
Start: 2021-03-01 | End: 2021-03-01

## 2021-03-01 RX ORDER — LIDOCAINE 40 MG/G
CREAM TOPICAL
Status: DISCONTINUED | OUTPATIENT
Start: 2021-03-01 | End: 2021-03-02 | Stop reason: HOSPADM

## 2021-03-01 RX ORDER — DOCUSATE SODIUM 100 MG/1
100 CAPSULE, LIQUID FILLED ORAL 2 TIMES DAILY
Status: DISCONTINUED | OUTPATIENT
Start: 2021-03-01 | End: 2021-03-02 | Stop reason: HOSPADM

## 2021-03-01 RX ORDER — DEXAMETHASONE SODIUM PHOSPHATE 4 MG/ML
INJECTION, SOLUTION INTRA-ARTICULAR; INTRALESIONAL; INTRAMUSCULAR; INTRAVENOUS; SOFT TISSUE PRN
Status: DISCONTINUED | OUTPATIENT
Start: 2021-03-01 | End: 2021-03-01

## 2021-03-01 RX ORDER — OXYCODONE HYDROCHLORIDE 5 MG/1
10 TABLET ORAL EVERY 4 HOURS PRN
Status: DISCONTINUED | OUTPATIENT
Start: 2021-03-01 | End: 2021-03-02 | Stop reason: HOSPADM

## 2021-03-01 RX ORDER — CEFAZOLIN SODIUM 2 G/100ML
INJECTION, SOLUTION INTRAVENOUS PRN
Status: DISCONTINUED | OUTPATIENT
Start: 2021-03-01 | End: 2021-03-01

## 2021-03-01 RX ADMIN — KETOROLAC TROMETHAMINE 15 MG: 15 INJECTION, SOLUTION INTRAMUSCULAR; INTRAVENOUS at 20:10

## 2021-03-01 RX ADMIN — CEFAZOLIN SODIUM 2 G: 2 INJECTION, SOLUTION INTRAVENOUS at 10:31

## 2021-03-01 RX ADMIN — FENTANYL CITRATE 50 MCG: 50 INJECTION, SOLUTION INTRAMUSCULAR; INTRAVENOUS at 10:21

## 2021-03-01 RX ADMIN — OXYCODONE HYDROCHLORIDE 10 MG: 5 TABLET ORAL at 00:41

## 2021-03-01 RX ADMIN — AMLODIPINE BESYLATE 5 MG: 5 TABLET ORAL at 08:48

## 2021-03-01 RX ADMIN — OXYCODONE HYDROCHLORIDE 10 MG: 5 TABLET ORAL at 15:49

## 2021-03-01 RX ADMIN — OXYCODONE HYDROCHLORIDE 10 MG: 5 TABLET ORAL at 08:48

## 2021-03-01 RX ADMIN — DOCUSATE SODIUM AND SENNOSIDES 1 TABLET: 8.6; 5 TABLET ORAL at 08:48

## 2021-03-01 RX ADMIN — ONDANSETRON 4 MG: 2 INJECTION INTRAMUSCULAR; INTRAVENOUS at 11:19

## 2021-03-01 RX ADMIN — AMLODIPINE BESYLATE 5 MG: 5 TABLET ORAL at 20:10

## 2021-03-01 RX ADMIN — ASPIRIN 81 MG: 81 TABLET ORAL at 20:10

## 2021-03-01 RX ADMIN — DOCUSATE SODIUM 100 MG: 100 CAPSULE, LIQUID FILLED ORAL at 18:28

## 2021-03-01 RX ADMIN — DIPHENHYDRAMINE HYDROCHLORIDE 25 MG: 50 INJECTION, SOLUTION INTRAMUSCULAR; INTRAVENOUS at 11:32

## 2021-03-01 RX ADMIN — MIDAZOLAM 2 MG: 1 INJECTION INTRAMUSCULAR; INTRAVENOUS at 10:21

## 2021-03-01 RX ADMIN — DIPHENHYDRAMINE HYDROCHLORIDE 25 MG: 50 INJECTION, SOLUTION INTRAMUSCULAR; INTRAVENOUS at 11:55

## 2021-03-01 RX ADMIN — SODIUM CHLORIDE, POTASSIUM CHLORIDE, SODIUM LACTATE AND CALCIUM CHLORIDE: 600; 310; 30; 20 INJECTION, SOLUTION INTRAVENOUS at 11:23

## 2021-03-01 RX ADMIN — ACETAMINOPHEN 975 MG: 325 TABLET, FILM COATED ORAL at 14:02

## 2021-03-01 RX ADMIN — OXYCODONE HYDROCHLORIDE 10 MG: 5 TABLET ORAL at 20:10

## 2021-03-01 RX ADMIN — DEXAMETHASONE SODIUM PHOSPHATE 10 MG: 4 INJECTION, SOLUTION INTRA-ARTICULAR; INTRALESIONAL; INTRAMUSCULAR; INTRAVENOUS; SOFT TISSUE at 10:22

## 2021-03-01 RX ADMIN — SODIUM CHLORIDE, POTASSIUM CHLORIDE, SODIUM LACTATE AND CALCIUM CHLORIDE: 600; 310; 30; 20 INJECTION, SOLUTION INTRAVENOUS at 08:52

## 2021-03-01 RX ADMIN — BUPIVACAINE HYDROCHLORIDE IN DEXTROSE 1.6 ML: 7.5 INJECTION, SOLUTION SUBARACHNOID at 10:26

## 2021-03-01 RX ADMIN — ACETAMINOPHEN 975 MG: 325 TABLET, FILM COATED ORAL at 05:57

## 2021-03-01 RX ADMIN — FENTANYL CITRATE 50 MCG: 50 INJECTION, SOLUTION INTRAMUSCULAR; INTRAVENOUS at 10:31

## 2021-03-01 RX ADMIN — SODIUM CHLORIDE, POTASSIUM CHLORIDE, SODIUM LACTATE AND CALCIUM CHLORIDE: 600; 310; 30; 20 INJECTION, SOLUTION INTRAVENOUS at 10:03

## 2021-03-01 RX ADMIN — KETOROLAC TROMETHAMINE 15 MG: 15 INJECTION, SOLUTION INTRAMUSCULAR; INTRAVENOUS at 14:02

## 2021-03-01 RX ADMIN — CEFAZOLIN SODIUM 1 G: 1 INJECTION, SOLUTION INTRAVENOUS at 18:28

## 2021-03-01 RX ADMIN — DOCUSATE SODIUM AND SENNOSIDES 1 TABLET: 8.6; 5 TABLET ORAL at 20:10

## 2021-03-01 RX ADMIN — PROPOFOL 100 MCG/KG/MIN: 10 INJECTION, EMULSION INTRAVENOUS at 10:28

## 2021-03-01 RX ADMIN — KETAMINE HYDROCHLORIDE 20 MG: 10 INJECTION INTRAMUSCULAR; INTRAVENOUS at 10:31

## 2021-03-01 SDOH — HEALTH STABILITY: MENTAL HEALTH: CURRENT SMOKER: 1

## 2021-03-01 ASSESSMENT — ACTIVITIES OF DAILY LIVING (ADL): DEPENDENT_IADLS:: TRANSPORTATION

## 2021-03-01 ASSESSMENT — LIFESTYLE VARIABLES: TOBACCO_USE: 1

## 2021-03-01 NOTE — H&P
[facility]    History and Physical - Hospitalist Service       Date of Admission:  2/28/2021    Assessment & Plan   Delvin Echevarria is a 55 year old male admitted on 2/28/2021. He has history of hypertension and tobacco use.  He presents after a fall with left knee pain found to have a left patella fracture.    Left Patella Fracture, mildly comminuted   Fall from standing landing on left knee primarily.  No other injury.  X-ray shows a left patellar fracture that is comminuted.  The ED provider spoke with the on-call Ortho who recommended a knee immobilizer, nonweightbearing, and surgical correction.  The patient was unable to safely maintain his nonweightbearing status and so he was admitted for pain control, mobility training as well as potential surgical correction while in the hospital.  - knee immobilizer in place  - non-weight bearing until ortho evaluation  - orthopedics consult, will need surgical correction potentially tomorrow  - NPO at midnight  - IVF LR at 75 ml/hr while NPO     Surgery Clearance and RCRI Risk Assessment  Anesthesia issues: No known, walker  Baseline Activity: >4 METS   Chest Pain: Denies  Shortness of breath: Denies  Cardiac Risk Factors/Assessment:                High Risk Surgery: No              History Ischemic Heart Disease: No               History of Congestive Heart Failure: No              History of CVA: No              Preoperative Treatment with Insulin: No               Preoperative Creatinine greater than 2.0: No              Total Number of Points: 0 = 0.5% risk of major cardiac event  -Baseline labs and EKG ordered and reviewed.  EKG shows NSR with nonspecific T wave changes compared with prior.  -No further medical optimization needed prior to surgery, patient is cleared for surgery    Hypertension  Chronic. Blood pressures reviewed, stable.   - continue home amlodipine    Tobacco Use Disorder  Reports smoking 0.5 packs per day. Patch  available. Encouraged cessation.      Diet: NPO per Anesthesia Guidelines for Procedure/Surgery Except for: Meds  Regular Diet Adult  DVT Prophylaxis: VTE Prophylaxis contraindicated due to upcoming surgery  Rivera Catheter: not present  Code Status: Full Code, Discussed directly on admission.    Disposition Plan   Expected discharge: Tomorrow, recommended to prior living arrangement once recovering appropriately from surgery with adequate pain management and safe disposition.  Entered: Donna Licona PA-C 02/28/2021, 9:20 PM     The patient's care was discussed with the Attending Physician, Dr. Mukesh Tinoco and Patient.    Donna Licona PA-C  [facility]  Contact information available via [facility] Paging/Directory  ______________________________________________________________________    Chief Complaint   Fall, left knee pain    History is obtained from the patient    History of Present Illness   Delvin Echevarria is a 55 year old male who presents after a fall with left knee pain.    Today he was in his usual state of health when he tripped over his shoelace and fell to the ground landing primarily on his left knee.  He immediately had significant pain in that left knee.  He was able to get up but he was unable to bear any weight.  He denies any other injury.     He otherwise has been feeling well recently.  He denies any fevers, chills, lightheadedness, headaches, chest pain, shortness of breath, abdominal discomfort, nausea, vomiting, diarrhea, urinary changes or other concerning symptoms.     He does have a known abdominal hernia that does not give him any discomfort.  He will be getting that evaluated in the near future.     Review of Systems    The 10 point Review of Systems is negative other than noted in the HPI or here.     Past Medical History    I have reviewed this patient's medical history and updated it with pertinent information if needed.   Past Medical History:    Diagnosis Date     Abscess 8/11/2017     Acute posthemorrhagic anemia 12/14/2016     Anemia due to blood loss, acute 9/30/2016     Closed fracture of right tibia and fibula with routine healing 9/29/2016     DDD (degenerative disc disease), lumbar      Hypertension 3/15/2013     Marijuana use     4x daily     Staph aureus infection 9/13/2017     Tibial plateau fracture 9/11/2016     Tobacco use      Wound infection 9/29/2016       Past Surgical History   I have reviewed this patient's surgical history and updated it with pertinent information if needed.  Past Surgical History:   Procedure Laterality Date     APPLY EXTERNAL FIXATOR LOWER EXTREMITY Right 9/12/2016    Procedure: APPLY EXTERNAL FIXATOR LOWER EXTREMITY;  Surgeon: John Gonzales MD;  Location: [facility]     HERNIORRHAPHY INGUINAL  7/2/2012    Procedure: HERNIORRHAPHY INGUINAL;  Open Repair Right Inguinal Hernia with Mesh;  Surgeon: Avni Maxwell MD;  Location: [facility]     INCISION AND DRAINAGE BONE LOWER EXTREMITY, COMBINED Right 8/11/2017    Procedure: COMBINED INCISION AND DRAINAGE BONE LOWER EXTREMITY;  Irrigation and Debridement Right Tibia,  Removal of Hardware;  Surgeon: Kenroy Dos Santos MD;  Location: [facility]     LARYNGECTOMY  Several Years ago    Partail removal due to fish bone     OPEN REDUCTION INTERNAL FIXATION TIBIA Right 9/15/2016    Procedure: OPEN REDUCTION INTERNAL FIXATION TIBIA;  Surgeon: Miguel A Green MD;  [facility]: [facility]     OPEN REDUCTION INTERNAL FIXATION TIBIA Right 11/7/2017    Procedure: OPEN REDUCTION INTERNAL FIXATION TIBIA;  Right Tibia Fibula Open Reduction Internal Fixation;  Surgeon: Miguel A Green MD;  [facility]     REMOVE HARDWARE LOWER EXTREMITY Right 8/11/2017    Procedure: REMOVE HARDWARE LOWER EXTREMITY;;  Surgeon: Kenroy Dos Santos MD;  Location: [facility] OR       Social History   I have reviewed this patient's social history and updated it with pertinent information if  needed.  [place] on [place] farm in [place].  He is a .  He has his own living quarters which he tells me are a converted schoolbus that has heat through a wood burning stove.  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  He smokes 1/2 pack cigarettes daily.  He does not drink alcohol.  He previously was a marijuana smoker but no longer smokes.  Social History     Tobacco Use     Smoking status: Current Every Day Smoker     Packs/day: 0.50     Years: 25.00     Pack years: 12.50     Smokeless tobacco: Never Used   Substance Use Topics     Alcohol use: No     Drug use: Not Currently     Types: Marijuana     Comment: Previously smoked 4x daily       Family History   I have reviewed this patient's family history and updated it with pertinent information if needed.  Family History   Problem Relation Age of Onset     Hypertension Mother      Hypertension Brother        Prior to Admission Medications   Prior to Admission Medications   Prescriptions Last Dose Informant Patient Reported? Taking?   amLODIPine (NORVASC) 5 MG tablet 2/27/2021 at 1300  No Yes   Sig: Take 1 tablet (5 mg) by mouth 2 times daily   augmented betamethasone dipropionate (DIPROLENE-AF) 0.05 % external ointment 2/27/2021 at 2030  No Yes   Sig: APPLY TOPICALLY  TO DRY SKIN ON HANDS, ARMS AND FOOT TWO TIMES A DAY AS NEEDED.   betamethasone dipropionate (DIPROSONE) 0.05 % cream More than a month at Unknown time Self No No   Sig: Apply sparingly to affected area twice daily as needed.  Do not apply to face.   hydrOXYzine (ATARAX) 25 MG tablet 2/27/2021 at 2000  No Yes   Sig: Take 1-2 tablets (25-50 mg) by mouth every 6 hours as needed for itching   triamcinolone (KENALOG) 0.1 % cream More than a month at Unknown time Self No No   Sig: Use sparingly on face for the e xcema      Facility-Administered Medications: None     Allergies   Allergies   Allergen Reactions     Seasonal Allergies Other (See Comments)     Congestion  sinuses     Codeine Nausea     Physical Exam   Vital Signs: Temp: 97.6  F (36.4  C) Temp src: Oral BP: (!) 154/86 Pulse: 68   Resp: 18 SpO2: 100 %      Weight: 139 lbs 5.29 oz    Constitutional: Sitting up comfortably in bed eating a sandwich, somewhat poorly kempt with dirt on hands, awake, alert, cooperative, no apparent distress, and appears stated age  Respiratory: No increased work of breathing, good air exchange, few scattered rhonchi bilaterally at bases otherwise clear to auscultation bilaterally, no crackles or wheezing  Cardiovascular:  regular rate and rhythm, and no murmur noted.  No lower extremity edema.  GI: Thin, normal bowel sounds, soft, non-distended, non-tender, hernia noted around the umbilicus region soft, nontender.  Genitounirinary: Deferred  Skin: normal skin color, texture, turgor  Musculoskeletal: thin, normal tone.  Left knee in immobilizer.  Neurovascularly intact distally with good sensation and 2+ posterior tibialis pulses.  Neurologic: Awake, alert, oriented to name, place and time.   Neuropsychiatric: Calm, pleasant, cooperative.  Appropriate thought process and content.  Short-term memory intact.    Data   Data reviewed today: I reviewed all medications, new labs and imaging results over the last 24 hours. I personally reviewed no images or EKG's today.    Recent Labs   Lab 02/28/21  1935   WBC 11.4*   HGB 13.8   MCV 94         POTASSIUM 4.5   CHLORIDE 108   CO2 26   BUN 20   CR 0.71   ANIONGAP 2*   DINO 8.7   GLC 87       Recent Results (from the past 24 hour(s))   XR Knee Left 3 Views    Narrative    EXAM: XR KNEE LT 3 VW  LOCATION: [facility]  DATE/TIME: 2/28/2021 4:57 PM    INDICATION: Tripped and fell  COMPARISON: None.      Impression    IMPRESSION: Mildly comminuted fracture of the patella. There is distraction with an estimated maximal distraction of 7 mm. Lipohemarthrosis. Anterior soft tissue swelling. Mild lateral patellar tilting.

## 2021-03-01 NOTE — PROGRESS NOTES
"A&O. LR @ 75 ml/hr. NPO prior to surgery. Open reduction internal fixation of left patella completed this afternoon by Dr. Pena. Ice in place. Regular diet tolerated well after surgery with no reports of N&V. 10 mg Oxy PRN for pain management before and after surgery. As well as scheduled Tylenol and Toradol. CMS remains intact and unchanged. No tingling or numbness present. Up to chair after surgery for dinner. Immobilizer remains in place at all times. Capno in place with 2L O2 while asleep. /70   Pulse 66   Temp 97.4  F (36.3  C) (Oral)   Resp 18   Ht 1.803 m (5' 11\")   Wt 63.2 kg (139 lb 5.3 oz)   SpO2 95%   BMI 19.43 kg/m  .      Roxann Armas RN on 3/1/2021 at 6:42 PM    "

## 2021-03-01 NOTE — DISCHARGE INSTRUCTIONS
You have a follow up appointment with [facility] Ortho at the [place] location on March 15th, 2021 at 9:30 am    Weightbearing as tolerated left lower extremity with knee immobilizer in place. You should wear the knee immobilizer post op. You will address the discontinuation of this at follow up with orthopedics.     You should follow up with your PCP in 7-10 days to check CBC to make sure your white count and hemoglobin has improved.     Take tylenol for pain.    Take as needed oxycodone for breakthrough pain. 1-2 tablets every 4-6 hours as needed. You should not drive or operate heavy machinery while on this medication.

## 2021-03-01 NOTE — ANESTHESIA CARE TRANSFER NOTE
Patient: Delvin Echevarria    Procedure(s):  OPEN REDUCTION INTERNAL FIXATION, FRACTURE, PATELLA    Diagnosis: Closed comminuted fracture of patella, left, initial encounter [S82.042A]  Diagnosis Additional Information: No value filed.    Anesthesia Type:   Periph. Nerve Block for postop pain, Spinal     Note:      Level of Consciousness: awake  Oxygen Supplementation: face mask    Independent Airway: airway patency satisfactory and stable        Patient transferred to: PACU    Handoff Report: Identifed the Patient, Identified the Reponsible Provider, Reviewed the pertinent medical history, Discussed the surgical course, Reviewed Intra-OP anesthesia mangement and issues during anesthesia, Set expectations for post-procedure period and Allowed opportunity for questions and acknowledgement of understanding      Vitals: (Last set prior to Anesthesia Care Transfer)  CRNA VITALS  3/1/2021 1107 - 3/1/2021 1157      3/1/2021             Pulse:  67    SpO2:  99 %    Resp Rate (observed):  (!) 4        Electronically Signed By: XIN Adams CRNA  March 1, 2021  11:57 AM

## 2021-03-01 NOTE — ANESTHESIA PROCEDURE NOTES
Pre-Procedure   Staff -   CRNA: Sidra Jones APRN CRNA  Performed By: CRNA  Location: OR  Procedure Start/Stop Times: 3/1/2021 10:20 AM and 3/1/2021 10:27 AM  Pre-Anesthestic Checklist: patient identified, IV checked, site marked, risks and benefits discussed, informed consent, monitors and equipment checked, pre-op evaluation, at physician/surgeon's request and post-op pain management  Timeout:  Correct Patient: Yes   Correct Procedure: Yes   Correct Site: Yes   Correct Position: Yes   Correct Laterality: Yes   Site Marked: Yes    Procedure Documentation  Procedure: intrathecal  Patient Position:sitting  Patient Prep/Sterile Barriers: sterile gloves, mask, Betadine, patient draped  Insertion Site: L3-4. (midline approach).  Spinal Needle (gauge): 25   Spinal/LP Needle Length (inches): 3.5  Spinal Needle Type: Jean Pierre tipIntroducer used  # of attempts: 1 and # of redirects:     Assessment/Narrative      Paresthesias: No.  Sensory Level: T10  Time Injected: 10:26 while  CSF fluid: clear.

## 2021-03-01 NOTE — PROGRESS NOTES
S:  Patient inpatient at [facility]    B:  Admitted for left knee fracture    A:  Vitals stable on room air  Patient reports pain in left knee, relieved with oxycodone as ordered  Immobilizer on left knee  No weight bearing on left knee, uses canes and assist of 1 to get around  Has been NPO since midnight  LR running at 75mL.hr    R:  Patient is in bed, sleeping for much of NOC shift    Chris Orellana RN

## 2021-03-01 NOTE — PROGRESS NOTES
[place] [facility] TRANSPORT NOTE  Data:   Reason for Transport:  [facility] for knee surgery    Delvin Echevarria was transported to [facility] via cart at 1015.  Patient was accompanied by Registered Nurse. Equipment used for transport: None. Family was aware of reason for transport: No. Patient did not want any family notified.     Action:  Report: given to SARAH Hartley at about 9:45am.     Response:  Patient's condition when transferred off unit was Stable.    Roxann Armas RN

## 2021-03-01 NOTE — ED NOTES
Patient has  [place] to Observation  order. Patient has been given Patient Bill of Rights, Observation brochure and  What does Observation mean to me  forms.  Patient has been given the opportunity to ask questions about observation status and their plan of care.  Patient has been oriented to the observation room, bathroom, and call light is in place.    Caryn Armijo RN

## 2021-03-01 NOTE — CONSULTS
ORTHO CONSULT    REASON FOR CONSULTATION: Donna Licona PA-c requested orthopaedic consultation regarding left patella fracture    HPI: Delvin is a 55-year-old male admitted yesterday after a fall where he landed directly onto his left knee.  He noted immediate pain and went to the emergency department where x-rays demonstrated a transverse patella fracture.  He is admitted to the medicine service as he is unable to go home safely and optimized for surgery.  He works as a .  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   amLODIPine (NORVASC) 5 MG tablet, Take 1 tablet (5 mg) by mouth 2 times daily  augmented betamethasone dipropionate (DIPROLENE-AF) 0.05 % external ointment, APPLY TOPICALLY  TO DRY SKIN ON HANDS, ARMS AND FOOT TWO TIMES A DAY AS NEEDED.  hydrOXYzine (ATARAX) 25 MG tablet, Take 1-2 tablets (25-50 mg) by mouth every 6 hours as needed for itching  betamethasone dipropionate (DIPROSONE) 0.05 % cream, Apply sparingly to affected area twice daily as needed.  Do not apply to face.  triamcinolone (KENALOG) 0.1 % cream, Use sparingly on face for the e xcema        PMH:  Past Medical History:   Diagnosis Date     Abscess 8/11/2017     Acute posthemorrhagic anemia 12/14/2016     Anemia due to blood loss, acute 9/30/2016     Closed fracture of right tibia and fibula with routine healing 9/29/2016     DDD (degenerative disc disease), lumbar      Hypertension 3/15/2013     Marijuana use     4x daily     Staph aureus infection 9/13/2017     Tibial plateau fracture 9/11/2016     Tobacco use      Wound infection 9/29/2016       SOCIAL HISTORY: Works as a .  Smokes half pack per day    FAMILY HISTORY: Negative for bleeding disorders,  clotting disorders, or adverse reactions to anesthesia    EXAM:  General: Pleasant, oriented  Neuro: CN II-XII intact  Eyes: Extraocular movements intact  Skin:  No rashes or lesions  Resp:  Nonlabored breathing  CV: Peripheral pulses regular  Abd:  Soft, NT, ND  Lymph: No localized lympadenopathy  MSK:  Left knee with large effusion.  ROM not stressed.  Unable to do straight leg raise      IMAGING: X-rays of the left knee were reviewed.  These show a transverse patella fracture with about 7 mm of displacement.  There is no pre-existing degenerative change    ASSESSMENT:    1.  Left knee patella fracture   2.  Status post ORIF right tibial plateau fracture and right distal tibia fracture-now healed   3.  Comorbidities including nicotine use    PLAN:   1.  Findings discussed with Delvin.  Recommend we proceed with surgical fixation of his patella to restore his extensor mechanism and optimize long-term knee function.  He understands risk including infection, damage to vessels and nerves, stiffness, hardware failure, need for further surgery.  We will schedule surgery at the first mutually convenient time    Papa Pena MD

## 2021-03-01 NOTE — ANESTHESIA POSTPROCEDURE EVALUATION
Patient: Delvin Echevarria    Procedure(s):  OPEN REDUCTION INTERNAL FIXATION, FRACTURE, PATELLA    Diagnosis:Closed comminuted fracture of patella, left, initial encounter [S82.042A]  Diagnosis Additional Information: No value filed.    Anesthesia Type:  Periph. Nerve Block for postop pain, Spinal    Note:  Disposition: Inpatient   Postop Pain Control: Uneventful            Sign Out: Well controlled pain   PONV: No   Neuro/Psych: Uneventful            Sign Out: Acceptable/Baseline neuro status   Airway/Respiratory: Uneventful            Sign Out: Acceptable/Baseline resp. status   CV/Hemodynamics: Uneventful            Sign Out: Acceptable CV status   Other NRE: NONE   DID A NON-ROUTINE EVENT OCCUR? No         Last vitals:  Vitals:    03/01/21 1430 03/01/21 1500 03/01/21 1547   BP: 120/64 113/68 116/67   Pulse: 69 65 68   Resp:  16    Temp:  36.6  C (97.9  F)    SpO2:  92% 94%       Last vitals prior to Anesthesia Care Transfer:  CRNA VITALS  3/1/2021 1107 - 3/1/2021 1207      3/1/2021             Pulse:  67    SpO2:  99 %    Resp Rate (observed):  (!) 4          Electronically Signed By: Yesenia Savage CRNA, APRN CRNA  March 1, 2021  4:28 PM

## 2021-03-01 NOTE — PROCEDURES
3/1/2021    PREOPERATIVE DIAGNOSIS: Left patella fracture    POSTOPERATIVE DIAGNOSIS: Left patella fracture    PROCEDURE: Open reduction internal fixation left patella    SURGEON: Papa Pena MD    ASSISTANT: Kortney Noble PA-c.  The presence of a PA was necessary for positioning retraction, and safe progression of the case    ANESTHESIA:  Spinal    BLOOD LOSS: 50cc    COMPLICATIONS: None apparent    DISPOSITION: Stable to PACU    IMPLANTS: Synthes 4.5mm cannulated screws x 2    INDICATIONS: Delvin is a 55 year old male who fell and sustained a comminuted patella fracture with about 7 mm of displacement.  Noted immediate pain and was seen in the emergency department.  He was admitted to the medicine service as he was unable to go home safely.  He was unable to do a straight leg raise.  Given this as well as the displacement of the fracture, he elected to proceed with a an ORIF of the left patella to restore his extensor mechanism and optimize long term function, understanding the risks of stiffness, symptomatic hardware, infection, damage to vessels or nerves, and risks inherent to anesthesia.    PROCEDURE: Delvin was met in the preoperative holding area where the left knee was marked.  He was then transferred to the operating theater.  After smooth induction of a spinal anesthestic, he was positioned supine.  A timeout was performed verifying the correct patient, surgery, and location.  He received preoperative antibiotics.  The left knee was then prepped and draped in a standard sterile fashion.    We started by making a 10 cm incision over the anterior knee.  Dissection sharply carried down through the skin and subcutaneous tissue.  There is a large subcutaneous hematoma which was debrided.  We were then able to reduce his fracture with a large point to point clamp across the fracture site.  His retinaculum was actually not disrupted.  We then used a C arm to confirm that the fracture was acceptably reduced.   Replaced to guidewires in an antegrade fashion across the fracture site, one laterally and one medially.  We then overdrilled and placed two 4.5 mm partially-threaded cannulated screws with good purchase.  We ensure the screws were not too long so that we could get a tension band.  We passed a Smith and Nephew ultra braid suture through the screws and tied this in a figure-of-eight fashion over the patella in order to create a tension band.  The knee was then brought through range of motion and noted to get from full extension 135 degrees of flexion without any displacement.  Final x-rays obtained which showed acceptable fracture reduction and hardware placement.  The wound was thoroughly irrigated.  The subcutaneous layer is closed with 2-0 Monocryl and skin with staples.  A sterile compressive dressing followed by a hinged knee brace were applied and patient was woke from anesthesia and transferred recovery in stable condition.    POSTOPERATIVE PLAN:    1.  Weightbearing as tolerated left lower extremity with knee immobilizer in place.  At 2 weeks postoperatively, we will remove his knee immobilizer and get him into a hinged knee brace from 0-90.   2.  DVT prophylaxis: Early ambulation   3. Perioperative antibiotics   4. Follow up in 2 weeks for wound check and staple removal    Papa Pena MD

## 2021-03-01 NOTE — ANESTHESIA PREPROCEDURE EVALUATION
Anesthesia Evaluation     . Pt has had prior anesthetic. Type: General.    No history of anesthetic complications          ROS/MED HX    ENT/Pulmonary:     (+) tobacco use, Current use, Other pulmonary disease,     Neurologic:  - neg neurologic ROS  : history of laryngectomy.   Cardiovascular:     (+) hypertension-----      METS/Exercise Tolerance:  >4 METS   Hematologic:  - neg hematologic  ROS       Musculoskeletal:         GI/Hepatic:         Renal/Genitourinary:  - neg Renal ROS       Endo:  - neg endo ROS       Psychiatric:  - neg psychiatric ROS       Infectious Disease:  - neg infectious disease ROS       Malignancy:       Other: Comment: H/o marijuana use                    Physical Exam  Normal systems: dental    Airway   Mallampati: I  TM distance: >3 FB  Neck ROM: full    Dental     Cardiovascular   Rhythm and rate: regular      Pulmonary    breath sounds clear to auscultation                    Anesthesia Plan     ASA Status:  2  The patient is a current Smoker and Patient was instructed to abstain from smoking on day of procedure.AQI Quality Review: current smoker   instructed to abstain from smoking on day of procedure     H&P reviewed: Unable to attach H&P to encounter due to EHR limitations. H&P Update: appropriate H&P reviewed, patient examined. No interval changes since H&P (within 30 days).     NPO Status: > 6 hours  Anesthesia Plan Type Discussed: Periph. Nerve Block for postop pain and Spinal  Induction Technique/Agent: IntravenousMaintenance: Balanced.  PONV Management: Ondansetron (or other 5HT-3) and Dexamethasone or Solumedrol  H and P interval note is located on admit note to hospital        Postoperative Care  Pain management: Oral pain medications, Peripheral nerve block (Single Shot).    Consents  Anesthetic plan, risks, benefits and alternatives discussed with:  Patient..                          .    Anesthesia Evaluation   Pt has had prior anesthetic. Type: General.    No history of  anesthetic complications       ROS/MED HX  ENT/Pulmonary:     (+) tobacco use, Current use, Other pulmonary disease,     Neurologic:  - neg neurologic ROS  : history of laryngectomy.   Cardiovascular:     (+) hypertension-----    METS/Exercise Tolerance: >4 METS    Hematologic:  - neg hematologic  ROS     Musculoskeletal:       GI/Hepatic:       Renal/Genitourinary:  - neg Renal ROS     Endo:  - neg endo ROS     Psychiatric/Substance Use:  - neg psychiatric ROS     Infectious Disease:  - neg infectious disease ROS     Malignancy:  - neg malignancy ROS     Other: Comment: H/o marijuana use             Physical Exam    Airway        Mallampati: I   TM distance: >3 FB   Neck ROM: full     Respiratory Devices and Support         Dental  no notable dental history         Cardiovascular          Rhythm and rate: regular     Pulmonary           breath sounds clear to auscultation             Anesthesia Plan    ASA Status:  2      Anesthesia Type: Periph. Nerve Block for postop pain, Spinal.   Induction: Intravenous.   Maintenance: Balanced.        Consents    Anesthesia Plan(s) and associated risks, benefits, and realistic alternatives discussed. Questions answered and patient/representative(s) expressed understanding.     - Discussed with:  Patient         Postoperative Care    Pain management: Oral pain medications, Peripheral nerve block (Single Shot).   PONV prophylaxis: Ondansetron (or other 5HT-3), Dexamethasone or Solumedrol     Comments:    H and P interval note is located on admit note to hospital       The patient is a current Smoker and Patient was instructed to abstain from smoking on day of procedure.AQI Quality Review: current smoker   instructed to abstain from smoking on day of procedure     H&P reviewed: Unable to attach H&P to encounter due to EHR limitations. H&P Update: appropriate H&P reviewed, patient examined. No interval changes since H&P (within 30 days).

## 2021-03-01 NOTE — PROGRESS NOTES
[facility] TRANSPORT NOTE  Data:   Reason for Transport:  Back to inpatient Med-Surg from [facility] and PACU    Delvin Echevarria was transported to [facility]Surg via cart at 1415.  Patient was accompanied by Registered Nurse. Equipment used for transport: None. Family was aware of reason for transport: No, patient did not want any family notified.     Action:  Report: received from SARAH Gonzalez.     Response:  Patient's condition upon return was Stable.    Roxann Armas RN

## 2021-03-01 NOTE — PROGRESS NOTES
"[place] [facility] ADMISSION NOTE    Patient admitted to room 2302 at approximately 2015 via cart from emergency room. Patient was accompanied by transport tech.     Verbal SBAR report received from ER RN prior to patient arrival.     Patient ambulated to bed with one assist. Patient alert and oriented X 3. Pain is controlled with current analgesics.  Medication(s) being used: acetaminophen and narcotic analgesics including oral oxycodone.  . Admission vital signs: Blood pressure (!) 154/86, pulse 68, temperature 97.6  F (36.4  C), temperature source Oral, resp. rate 18, height 1.803 m (5' 11\"), weight 63.2 kg (139 lb 5.3 oz), SpO2 100 %. Patient was oriented to plan of care, call light, bed controls, tv, telephone, bathroom and visiting hours.     Risk Assessment    The following safety risks were identified during admission: fall. Yellow risk band applied: YES.     Skin Initial Assessment    This writer admitted this patient and completed a full skin assessment and Yosvany score in the Adult PCS flowsheet. Appropriate interventions initiated as needed.     Secondary skin check completed by ANDRES patient refused.         Education    Patient has a [place] to Observation order: Yes  Observation education completed and documented: No      Chris Orellana RN    "

## 2021-03-01 NOTE — PROGRESS NOTES
Progress Note 5:00pm:    Touched base with patient following procedure. He has not been able to ambulate yet. He has a lot of pain. He lives alone and would not feel safe returning home this evening. He also has no transportation this evening. He would like to stay another night under observation for continued pain management, physical therapy and occupational therapy evaluations and to establish a safe disposition plan. He has been on 2 L supplemental oxygen via NC post op. Denies significant shortness of breath. If unable to wean oxygen would get CXR.    - Up with assist, weight bearing as tolerated.    - Physical therapy and occupational therapy evals.    - Social work for discharge planning.    - Continue to optimize pain control.    - Continue to wean oxygen as tolerated.    - Likely early discharge tomorrow if remains stable overnight.     Ira White PA-C on 3/1/2021 at 5:21 PM          PAST SURGICAL HISTORY:  No significant past surgical history

## 2021-03-01 NOTE — PROGRESS NOTES
[facility] Medicine Progress Note  Date of Service: 03/01/2021    Assessment & Plan   Delvin Echevarria is a 55 year old male admitted on 2/28/2021. He has history of hypertension and tobacco use.  He presents after a fall with left knee pain found to have a left patella fracture.     Left Patella Fracture, mildly comminuted   Fall from standing due to trip over shoe lace  Fall from standing landing on left knee primarily. No other injury. X-ray shows a left patellar fracture that is comminuted. The ED provider spoke with the on-call Ortho who recommended a knee immobilizer, nonweightbearing, and surgical correction. The patient was unable to safely maintain his nonweightbearing status and so he was admitted for pain control, mobility training as well as potential surgical correction while in the hospital.  3/1/21: patient was kept NPO overnight. Pain has been controlled. Plan is for ortho consult today and to OR. Will wait for further recs.    - Knee immobilizer in place.   - Non-weight bearing until ortho evaluation.   - Orthopedics consult, will need surgical correction, will go to OR this morning.    - IVF LR at 75 ml/hr while NPO.   - Physical therapy following OR.      Surgery Clearance and RCRI Risk Assessment  Anesthesia issues: No known, walker  Baseline Activity: >4 METS   Chest Pain: Denies  Shortness of breath: Denies  Cardiac Risk Factors/Assessment:                High Risk Surgery: No              History Ischemic Heart Disease: No               History of Congestive Heart Failure: No              History of CVA: No              Preoperative Treatment with Insulin: No               Preoperative Creatinine greater than 2.0: No              Total Number of Points: 0 = 0.5% risk of major cardiac event  -Baseline labs and EKG ordered and reviewed.  EKG shows NSR with nonspecific T wave changes compared with prior.  -No further medical optimization needed prior to  surgery, patient is okay to proceed with surgery pending ortho eval.      Hypertension  Chronic. Blood pressures reviewed, stable.    - Continue home amlodipine.     Tobacco Use Disorder  Reports smoking 0.5 packs per day. Patch available. Encouraged cessation.     COVID 19 ruled out  Patient was tested for hospital admission, negative.    - No precautions.     DVT Prophylaxis: Holding at this time in case needs OR  Code Status: Full Code    Disposition: Anticipate discharge 1 - 2 days pending ortho eval and plan for surgical operation inpatient vs outpatient.     Attestation:  I have reviewed today's vital signs, notes, medications, labs and imaging.  I have discussed patient with attending physician, Dr. Cory Jimenez.     Ira White PA-C       Interval History   Has done relatively well overnight. Was able to rest after receiving pain medications for left knee pain. Was kept NPO since midnight, ortho will see this am. Plan is for OR this am.     Denies chest pain or shortness of breath. No abdominal pain, nausea or vomiting. Voiding spontaneously. He feels hungry.     Patient lives in [place], he lives alone, he works as a . 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     Physical Exam   Temp:  [97.6  F (36.4  C)-98  F (36.7  C)] 98  F (36.7  C)  Pulse:  [55-73] 55  Resp:  [16-20] 16  BP: (130-154)/(78-86) 144/82  SpO2:  [96 %-100 %] 97 %    Weights:   Vitals:    02/28/21 1622 02/28/21 2018   Weight: 63.5 kg (140 lb) 63.2 kg (139 lb 5.3 oz)    Body mass index is 19.43 kg/m .    Constitutional: Alert. No acute distress. Upset about not being seen by the surgeon yet.   CV: Regular rate and rhythm. No lower extremity edema.   Respiratory: CTA bilaterally  GI: Soft, nontender.  Skin: Warm and dry.  Musculoskeletal: left knee with significant edema and ecchymosis.     Data   Recent Labs   Lab 03/01/21  0420 02/28/21  1935   WBC 8.4 11.4*   HGB 12.7* 13.8   MCV 96 94     297   NA  --  136   POTASSIUM  --  4.5   CHLORIDE  --  108   CO2  --  26   BUN  --  20   CR  --  0.71   ANIONGAP  --  2*   DINO  --  8.7   GLC  --  87       Recent Labs   Lab 02/28/21  1935   GLC 87        Unresulted Labs Ordered in the Past 30 Days of this Admission     No orders found for last 31 day(s).           Imaging  Recent Results (from the past 24 hour(s))   XR Knee Left 3 Views    Narrative    EXAM: XR KNEE LT 3 VW  LOCATION: [facility]  DATE/TIME: 2/28/2021 4:57 PM    INDICATION: Tripped and fell  COMPARISON: None.      Impression    IMPRESSION: Mildly comminuted fracture of the patella. There is distraction with an estimated maximal distraction of 7 mm. Lipohemarthrosis. Anterior soft tissue swelling. Mild lateral patellar tilting.        I reviewed all new labs and imaging results over the last 24 hours.     Medications     lactated ringers 75 mL/hr at 03/01/21 0852       acetaminophen  975 mg Oral Q8H     amLODIPine  5 mg Oral BID     senna-docusate  1 tablet Oral BID    Or     senna-docusate  2 tablet Oral BID       Ira White PA-C

## 2021-03-01 NOTE — ANESTHESIA PROCEDURE NOTES
Pre-Procedure   Staff -   CRNA: Sidra Jones APRN CRNA  Performed By: CRNA  Location: post-op  Procedure Start/Stop Times: 3/1/2021 11:40 AM and 3/1/2021 11:52 AM  Pre-Anesthestic Checklist: patient identified, IV checked, site marked, risks and benefits discussed, informed consent, monitors and equipment checked, pre-op evaluation, at physician/surgeon's request and post-op pain management  Timeout:  Correct Patient: Yes   Correct Procedure: Yes   Correct Site: Yes   Correct Position: Yes   Correct Laterality: Yes   Site Marked: Yes    Procedure Documentation  Procedure: Adductor canal  Laterality: left  Patient Position:supine  Patient Prep/Sterile Barriers: sterile gloves, mask, Chloraprep, patient draped   Catheter threaded easily.    Ultrasound guided, Ultrasound used to identify targeted nerve, plexus, vascular marker, or fascial plane and place a needle adjacent to it in real-time, Ultrasound was used to visualize the spread of anesthetic in close proximity to the above referenced structure    Assessment/Narrative      Test dose of 5 mL lidocaine 2% w/ 1:200,000 epinephrine at 11:46.   .  Bolus given via needle. No blood aspirated via catheter.   Secured via.   Insertion/Infusion Method: Single Shot  Complications: none

## 2021-03-02 ENCOUNTER — APPOINTMENT (OUTPATIENT)
Dept: PHYSICAL THERAPY | Facility: CLINIC | Age: 56
End: 2021-03-02
Payer: COMMERCIAL

## 2021-03-02 VITALS
OXYGEN SATURATION: 93 % | TEMPERATURE: 97.1 F | DIASTOLIC BLOOD PRESSURE: 64 MMHG | HEART RATE: 59 BPM | HEIGHT: 71 IN | BODY MASS INDEX: 19.51 KG/M2 | WEIGHT: 139.33 LBS | SYSTOLIC BLOOD PRESSURE: 113 MMHG | RESPIRATION RATE: 16 BRPM

## 2021-03-02 LAB
ERYTHROCYTE [DISTWIDTH] IN BLOOD BY AUTOMATED COUNT: 13.5 % (ref 10–15)
HCT VFR BLD AUTO: 35.7 % (ref 40–53)
HGB BLD-MCNC: 11.6 G/DL (ref 13.3–17.7)
MCH RBC QN AUTO: 30.9 PG (ref 26.5–33)
MCHC RBC AUTO-ENTMCNC: 32.5 G/DL (ref 31.5–36.5)
MCV RBC AUTO: 95 FL (ref 78–100)
PLATELET # BLD AUTO: 248 10E9/L (ref 150–450)
RBC # BLD AUTO: 3.75 10E12/L (ref 4.4–5.9)
WBC # BLD AUTO: 13.9 10E9/L (ref 4–11)

## 2021-03-02 PROCEDURE — 250N000011 HC RX IP 250 OP 636: Performed by: ORTHOPAEDIC SURGERY

## 2021-03-02 PROCEDURE — 99207 PR CDG-CODE CATEGORY CHANGED: CPT | Performed by: PHYSICIAN ASSISTANT

## 2021-03-02 PROCEDURE — 85027 COMPLETE CBC AUTOMATED: CPT | Performed by: PHYSICIAN ASSISTANT

## 2021-03-02 PROCEDURE — 97161 PT EVAL LOW COMPLEX 20 MIN: CPT | Mod: GP

## 2021-03-02 PROCEDURE — 36415 COLL VENOUS BLD VENIPUNCTURE: CPT | Performed by: PHYSICIAN ASSISTANT

## 2021-03-02 PROCEDURE — 250N000013 HC RX MED GY IP 250 OP 250 PS 637: Performed by: PHYSICIAN ASSISTANT

## 2021-03-02 PROCEDURE — 99217 PR OBSERVATION CARE DISCHARGE: CPT | Performed by: PHYSICIAN ASSISTANT

## 2021-03-02 PROCEDURE — 250N000013 HC RX MED GY IP 250 OP 250 PS 637: Performed by: NURSE PRACTITIONER

## 2021-03-02 PROCEDURE — 97116 GAIT TRAINING THERAPY: CPT | Mod: GP

## 2021-03-02 PROCEDURE — G0378 HOSPITAL OBSERVATION PER HR: HCPCS

## 2021-03-02 PROCEDURE — 250N000013 HC RX MED GY IP 250 OP 250 PS 637: Performed by: ORTHOPAEDIC SURGERY

## 2021-03-02 RX ORDER — ACETAMINOPHEN 325 MG/1
650 TABLET ORAL EVERY 4 HOURS PRN
Start: 2021-03-04 | End: 2024-07-08

## 2021-03-02 RX ORDER — HYDROXYZINE HYDROCHLORIDE 25 MG/1
25-50 TABLET, FILM COATED ORAL EVERY 6 HOURS PRN
Qty: 40 TABLET | Refills: 0 | Status: SHIPPED | OUTPATIENT
Start: 2021-03-02 | End: 2023-07-21

## 2021-03-02 RX ORDER — POLYETHYLENE GLYCOL 3350 17 G/17G
17 POWDER, FOR SOLUTION ORAL DAILY
Qty: 510 G | Refills: 0 | Status: SHIPPED | OUTPATIENT
Start: 2021-03-02 | End: 2023-07-21

## 2021-03-02 RX ORDER — OXYCODONE HYDROCHLORIDE 5 MG/1
5 TABLET ORAL EVERY 4 HOURS PRN
Qty: 60 TABLET | Refills: 0 | Status: SHIPPED | OUTPATIENT
Start: 2021-03-02 | End: 2021-03-10

## 2021-03-02 RX ADMIN — DOCUSATE SODIUM AND SENNOSIDES 2 TABLET: 8.6; 5 TABLET ORAL at 08:55

## 2021-03-02 RX ADMIN — OXYCODONE HYDROCHLORIDE 10 MG: 5 TABLET ORAL at 00:56

## 2021-03-02 RX ADMIN — ASPIRIN 81 MG: 81 TABLET ORAL at 08:56

## 2021-03-02 RX ADMIN — ACETAMINOPHEN 975 MG: 325 TABLET, FILM COATED ORAL at 00:55

## 2021-03-02 RX ADMIN — ACETAMINOPHEN 975 MG: 325 TABLET, FILM COATED ORAL at 08:55

## 2021-03-02 RX ADMIN — CEFAZOLIN SODIUM 1 G: 1 INJECTION, SOLUTION INTRAVENOUS at 02:46

## 2021-03-02 RX ADMIN — OXYCODONE HYDROCHLORIDE 10 MG: 5 TABLET ORAL at 08:56

## 2021-03-02 RX ADMIN — KETOROLAC TROMETHAMINE 15 MG: 15 INJECTION, SOLUTION INTRAMUSCULAR; INTRAVENOUS at 09:15

## 2021-03-02 RX ADMIN — KETOROLAC TROMETHAMINE 15 MG: 15 INJECTION, SOLUTION INTRAMUSCULAR; INTRAVENOUS at 02:46

## 2021-03-02 NOTE — PROGRESS NOTES
[email or web address]                                                                              [facility]      OUTPATIENT PHYSICAL THERAPY EVALUATION  PLAN OF TREATMENT FOR OUTPATIENT REHABILITATION  (COMPLETE FOR INITIAL CLAIMS ONLY)  Patient's Last Name, First Name, M.I.  YOB: 1965  WaleDelvin GARCIA                        Provider's Name  [facility] Medical Record No.  [id number]                               Onset Date:  02/28/21   Start of Care Date:  03/02/21      Type:     _X_PT   ___OT   ___SLP Medical Diagnosis:  S/p L patellar fracture                        PT Diagnosis:  S/p L patellar fracture   Visits from SOC:  1   _________________________________________________________________________________  Plan of Treatment/Functional Goals    Planned Interventions: gait training, transfer training     Goals: See Physical Therapy Goals on Care Plan in [facility] electronic health record.    Therapy Frequency: One time eval and treatment only  Predicted Duration of Therapy Intervention: 1  _________________________________________________________________________________    I CERTIFY THE NEED FOR THESE SERVICES FURNISHED UNDER        THIS PLAN OF TREATMENT AND WHILE UNDER MY CARE     (Physician co-signature of this document indicates review and certification of the therapy plan).                Certification date from: 03/02/21, Certification date to: 03/02/21    Referring Physician: Papa Pena MD            Initial Assessment        See Physical Therapy evaluation dated 03/02/21 in Epic electronic health record.

## 2021-03-02 NOTE — PLAN OF CARE
Patient vital signs are at baseline: Yes  Patient able to ambulate as they were prior to admission or with assist devices provided by therapies during their stay:  Yes  Patient MUST void prior to discharge:  Yes  Patient able to tolerate oral intake:  Yes  Pain has adequate pain control using Oral analgesics:  Yes      No changes to report overnight.

## 2021-03-02 NOTE — PROGRESS NOTES
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 steps to enter   Self-Care   Usual Activity Tolerance good   Current Activity Tolerance fair   Equipment Currently Used at Home none   Activity/Exercise/Self-Care Comment Patient states he owns a FWW but never has used   Disability/Function   Concentrating, Remembering or Making Decisions Difficulty no   Difficulty Communicating no   Difficulty Eating/Swallowing no   Walking or Climbing Stairs Difficulty no   Dressing/Bathing Difficulty no   Toileting issues no   Doing Errands Independently Difficulty (such as shopping) no   Fall history within last six months yes   Number of times patient has fallen within last six months 1   General Information   Onset of Illness/Injury or Date of Surgery 02/28/21   Referring Physician Papa Pena MD   Patient/Family Therapy Goals Statement (PT) Go home today   Pertinent History of Current Problem (include personal factors and/or comorbidities that impact the POC) Patient is 54 y/o s/p L comminuted fx of the patella with ORIF on 3/1/21   Existing Precautions/Restrictions other (see comments)  (Knee immobilizer, 0* of knee flexion)   Weight-Bearing Status - LLE weight-bearing as tolerated  (0 degrees of flexion)   Weight-Bearing Status - RLE full weight-bearing   Cognition   Orientation Status (Cognition) oriented x 3   Affect/Mental Status (Cognition) WNL   Follows Commands (Cognition) WNL   Pain Assessment   Patient Currently in Pain Yes, see Vital Sign flowsheet  (4/10)   Integumentary/Edema    Integumentary/Edema no deficits were identifed   Posture    Posture Not impaired   Range of Motion (ROM)   ROM Comment RLE WFL, LLE immobilizer at 0* knee flexion   Strength   Strength Comments RLE WFL, LLE hip flexion WFL, ankle WFL   Bed Mobility   Bed Mobility supine-sit;sit-supine   Supine-Sit Gary (Bed Mobility) independent   Sit-Supine Gary (Bed Mobility) independent   Comment (Bed Mobility) Slightly increased pain with moving but able to complete safely and independently   Transfers   Transfers sit-stand transfer   Sit-Stand Transfer   Sit-Stand Gary (Transfers) modified independence   Assistive Device (Sit-Stand Transfers) crutches, axillary   Sit/Stand Transfer Comments Initial education on correct technique with crutches, patient able to complete sit to stand transfer safely and efficiently with no LOB   Gait/Stairs (Locomotion)   Gary Level (Gait) supervision   Assistive Device (Gait) crutches, axillary;walker, front-wheeled   Distance in Feet (Required for LE Total Joints) 50, 50   Pattern (Gait) step-through   Maintains Weight-bearing Status (Gait) able to maintain   Comment (Gait/Stairs) Patient ambulated 50' x2 once with FWW and once with axillary crutches - safe with both after initial education about body mechanics, posture and sequencing. Patient with minimal increase in pain with mobility but tolerated well. Patient prefers crutches for mobility as he often works in tight spaces and does not want to use FWW at those times.   Balance   Balance no deficits were identified   Sensory Examination   Sensory Perception WNL   Coordination   Coordination no deficits were identified   Muscle Tone   Muscle Tone no deficits were identified   Clinical Impression   Criteria for Skilled Therapeutic Intervention yes, treatment indicated   PT Diagnosis (PT) S/p L patellar fracture   Influenced by the following impairments Pain, decreased strength and activity tolerance    Functional limitations due to impairments Mobility, transfers   Clinical Presentation Stable/Uncomplicated   Clinical Presentation Rationale Musculoskeletal system primarily involved   Clinical Decision Making (Complexity) low complexity   Therapy Frequency (PT) One time eval and treatment only   Predicted Duration of Therapy Intervention (days/wks) 1   Planned Therapy Interventions (PT) gait training;transfer training   Anticipated Equipment Needs at Discharge (PT) crutches, axillary   Risk & Benefits of therapy have been explained evaluation/treatment results reviewed;care plan/treatment goals reviewed;risks/benefits reviewed;current/potential barriers reviewed;participants voiced agreement with care plan   PT Discharge Planning    PT Discharge Recommendation (DC Rec) home with outpatient physical therapy   PT Rationale for DC Rec Patient is ambulating safely and efficiently and is safe to return to home environment. Patient states he would prefer going to appointments and has friends available to drive him. Patient requiring crutches at this time    PT Brief overview of current status  SBA ambulation 50' with crutches, Mod I transfers and bed mobility   Total Evaluation Time   Total Evaluation Time (Minutes) 12     Nicholas Sahni, PT, DPT

## 2021-03-02 NOTE — PLAN OF CARE
[place] [facility] DISCHARGE NOTE    Patient discharged to home at 1040 AM via wheel chair. Accompanied by other: friend to meet at lobby doors and staff. Discharge instructions reviewed with patient, opportunity offered to ask questions. Prescriptions sent to patients preferred pharmacy. All belongings sent with patient.    This writer sent a message to Dr. Jean Padilla to see if he wanted outpatient physical therapy ordered.  Did not get a response.    Tanna Vazquez RN

## 2021-03-02 NOTE — PROGRESS NOTES
Care Management Note:.    SW received phone call from RN asking to assist pt with transportation home.    SW reviewed notes & learned that pt has [employer] and has transportation benefits via [employer].    SW provided pt with written information on how to call for a ride home.    ROSA Marquez  Care Transitions   Tele: [phone]

## 2021-03-02 NOTE — PLAN OF CARE
Patient vital signs are at baseline: Yes  Patient able to ambulate as they were prior to admission or with assist devices provided by therapies during their stay:  Yes  Patient MUST void prior to discharge:  Yes  Patient able to tolerate oral intake:  Yes  Pain has adequate pain control using Oral analgesics:  Yes      Pt up ambulating with standby assist. Pain controlled with oxycodone. Eating and drinking good.

## 2021-03-02 NOTE — PROGRESS NOTES
Ortho    No acute events overnight.  Pain reasonably well controlled    AFVSS  Pleasant, NAD  Nonlabored breathing  LeftLE: Dressing cdi.  Fires ehl/fhl/gsc/ta c SILT dp/sp/tib n.  Toes warm    IMPRESSION:   1.  s/p ORIF left patella fracture 3.1.21    PLAN:   --WBAT left leg inj knee immobilizer.  No flexion to protect patellar fixation.  PT for mobilization   --DVT prophylaxis: ASA 81mg bid x 30d   --Cont oxycodone, vistaril for pain control    --Appreciate social working seeing about home care   --Dispo: Home today pending pain control, progress with PT   --Follow up in 2 weeks    Papa Pena MD

## 2021-03-02 NOTE — CONSULTS
Care Management Initial Consult    General Information  Assessment completed with: Michelle Delvin  Type of CM/SW Visit: Initial Assessment    Primary Care Provider verified and updated as needed: Yes   Readmission within the last 30 days: no previous admission in last 30 days      Reason for Consult: discharge planning  Advance Care Planning: Advance Care Planning Reviewed: no concerns identified          Communication Assessment  Patient's communication style: spoken language (English or Bilingual)    Hearing Difficulty or Deaf: yes   Wear Glasses or Blind: no    Cognitive  Cognitive/Neuro/Behavioral: WDL                      Living Environment:   People in home: alone     Current living Arrangements: other (see comments)(school bus transformed into a camper)      Able to return to prior arrangements: yes       Family/Social Support:  Care provided by: self  Provides care for: no one  Marital Status: Single  Neighbor, Limited to, Other (specify)(friends)          Description of Support System: Uninvolved    Support Assessment: Limited social contact and support    Current Resources:   Patient receiving home care services: No     Community Resources: None  Equipment currently used at home: none  Supplies currently used at home: None    Employment/Financial:  Employment Status: employed full-time     Employment/ Comments: works as a   Financial Concerns: No concerns identified   Referral to Financial Counselor: No       Lifestyle & Psychosocial Needs:        Socioeconomic History     Marital status: Single     Spouse name: Not on file     Number of children: Not on file     Years of education: Not on file     Highest education level: Not on file     Tobacco Use     Smoking status: Current Every Day Smoker     Packs/day: 0.50     Years: 25.00     Pack years: 12.50     Smokeless tobacco: Never Used   Substance and Sexual Activity     Alcohol use: No     Drug use: Not Currently     Types: Marijuana      "Comment: Previously smoked 4x daily     Sexual activity: Yes       Functional Status:  Prior to admission patient needed assistance:   Dependent ADLs:: Independent  Dependent IADLs:: Transportation  Assesssment of Functional Status: Not at  functional baseline    Mental Health Status:  Mental Health Status: No Current Concerns       Chemical Dependency Status:  Chemical Dependency Status: No Current Concerns             Values/Beliefs:  Spiritual, Cultural Beliefs, Taoist Practices, Values that affect care: no               Additional Information:  Met with patient at bedside, introduced self and role.  Patient currently lives alone in a school bus transformed into a camper.  He lives in [place].  He also owns a \"main house\" on the property.  He plans to stay in the house upon discharge, as the camper requires a two foot step to fill the wood stove.  He is independent with ADLs at baseline, works as a  year round.  Patient states he has no [facility] , involvement.  At times, he has used [employer] for transportation to medical appointments, however he prefers not to, as they have a difficult time finding his property.      Patient reports he has no family/contacts that are supportive.  He states he would call 911 for emergencies.      Discussed discharge planning.  Patient feels safe to return to his \"main house\" on his property.  He is able to manage all on one floor.  Therapy evaluations pending.  Discussed home care services.  Patient declines, stating he prefers to use the [facility] for therapy.  Patient shares he is able to find a ride to [facility] to therapy when needed.      Discussed transportation options and insurance coverage for transport services.  Patient plans to arrange transportation home for 3/2.  If unable, he will notify [facility] to arrange transport upon discharge.      Of note:  Patient has an appointment with Dr. Jiang scheduled on 3/3 for hernia repair.  Patient would like to " reschedule if possible as he is unable to return to clinic for appt.  Notified [facility] to assist with rescheduling.      PLAN:  Home w/ OP therapy.  PT/OT evaluations pending.  Patient to arrange transport.    Jocelyn FENGN RN  Inpatient Care Coordinator  [facility] [phone]  [facility] [phone]

## 2021-03-02 NOTE — PLAN OF CARE
Physical Therapy Discharge Summary    Reason for therapy discharge:    Discharged to home with outpatient therapy.    Progress towards therapy goal(s). See goals on Care Plan in [facility] electronic health record for goal details.  Goals met    Therapy recommendation(s):    Continued therapy is recommended.  Rationale/Recommendations:  OP PT to progress patient to PLOF.

## 2021-03-02 NOTE — DISCHARGE SUMMARY
Discharge Summary  Hospital Medicine    Date of Admission:  2/28/2021  Date of Discharge:  3/2/2021   Discharging Provider: Ira White  Date of Service: 3/2/2021      Primary Care     Delvni Henning  [address] [place] [id number]      Identification and Chief Compaint: Delvin Echevarria is a 55 year old male who presented on 2/28/2021 with complaint of left knee pain after a fall.     Discharge Diagnoses     Closed comminuted fracture of patella, left, initial encounter, surgery on 3/1/21    Essential hypertension, benign    Tobacco dependence syndrome    Fall from standing    COVID-19 ruled out    S/P left knee surgery    Leukocytosis     * No resolved hospital problems. *    Discharge Disposition   Discharged to home    Discharge Orders      COMFORT-KNEE IMMOBILIZER 16     Reason for your hospital stay    You were seen in the hospital due to left knee pain following a fall. You were taken to the OR on 3/1/21 for surgery.     Follow-up and recommended labs and tests     Follow up with primary care provider, Delvin Henning, within 7 days to evaluate after surgery and for hospital follow- up.  The following labs/tests are recommended: CBC- check hemoglobin and wbc.      Follow up with orthopedics as scheduled.     Activity    Your activity upon discharge: activity as tolerated- weight bearing as tolerated. Keep knee immobilizer in place until follow up with orthopedics. Do not drive or operate heavy machinery while taking narcotics.     Aluminum Crutches Adult     Crutches DME    DME Documentation: Describe the reason for need to support medical necessity: Impaired gait status post knee surgery. I, the undersigned, certify that the above prescribed supplies are medically necessary for this patient and is both reasonable and necessary in reference to accepted standards of medical practice in the treatment of this patient's condition and is not prescribed as a convenience.     Diet    Follow this diet upon  discharge:Regular Diet Adult         Further instructions from your care team       You have a follow up appointment with [facility] at the [place] location on March 15th, 2021 at 9:30 am       Discharge Medications   Current Discharge Medication List      START taking these medications    Details   acetaminophen (TYLENOL) 325 MG tablet Take 2 tablets (650 mg) by mouth every 4 hours as needed for other (For optimal non-opioid multimodal pain management to improve pain control.)  Qty:      Associated Diagnoses: Closed comminuted fracture of patella, left, initial encounter      aspirin (ASA) 81 MG EC tablet Take 1 tablet (81 mg) by mouth 2 times daily  Qty: 60 tablet, Refills: 0    Associated Diagnoses: Closed comminuted fracture of patella, left, initial encounter      oxyCODONE (ROXICODONE) 5 MG tablet Take 1 tablet (5 mg) by mouth every 4 hours as needed  Qty: 60 tablet, Refills: 0    Associated Diagnoses: Closed comminuted fracture of patella, left, initial encounter      polyethylene glycol (MIRALAX) 17 GM/Dose powder Take 17 g by mouth daily  Qty: 510 g, Refills: 0    Associated Diagnoses: Closed comminuted fracture of patella, left, initial encounter         CONTINUE these medications which have CHANGED    Details   hydrOXYzine (ATARAX) 25 MG tablet Take 1-2 tablets (25-50 mg) by mouth every 6 hours as needed for other (adjuvant pain)  Qty: 40 tablet, Refills: 0    Associated Diagnoses: Closed comminuted fracture of patella, left, initial encounter         CONTINUE these medications which have NOT CHANGED    Details   amLODIPine (NORVASC) 5 MG tablet Take 1 tablet (5 mg) by mouth 2 times daily  Qty: 180 tablet, Refills: 3    Associated Diagnoses: Benign essential hypertension      augmented betamethasone dipropionate (DIPROLENE-AF) 0.05 % external ointment APPLY TOPICALLY  TO DRY SKIN ON HANDS, ARMS AND FOOT TWO TIMES A DAY AS NEEDED.  Qty: 50 g, Refills: 1    Associated Diagnoses: Eczema, unspecified  type      betamethasone dipropionate (DIPROSONE) 0.05 % cream Apply sparingly to affected area twice daily as needed.  Do not apply to face.  Qty: 45 g, Refills: 0    Associated Diagnoses: Eczema, unspecified type      triamcinolone (KENALOG) 0.1 % cream Use sparingly on face for the e xcema  Qty: 60 g, Refills: 0    Associated Diagnoses: Other eczema           Allergies   Allergies   Allergen Reactions     Seasonal Allergies Other (See Comments)     Congestion sinuses     Codeine Nausea       Consultations This Hospital Stay  ORTHOPEDIC SURGERY IP CONSULT  PHYSICAL THERAPY ADULT IP CONSULT  OCCUPATIONAL THERAPY ADULT IP CONSULT  CARE MANAGEMENT / SOCIAL WORK IP CONSULT    Significant Results and Procedures   Procedures    None    Data   Results for orders placed or performed during the hospital encounter of 02/28/21   XR Knee Left 3 Views    Narrative    EXAM: XR KNEE LT 3 VW  LOCATION: [facility]  DATE/TIME: 2/28/2021 4:57 PM    INDICATION: Tripped and fell  COMPARISON: None.      Impression    IMPRESSION: Mildly comminuted fracture of the patella. There is distraction with an estimated maximal distraction of 7 mm. Lipohemarthrosis. Anterior soft tissue swelling. Mild lateral patellar tilting.   XR Surgery MAIKEL L/T 5 Min Fluoro w Stills    Narrative    This exam was marked as non-reportable because it will not be read by a   radiologist or a [place] non-radiologist provider.             History of Present Illness   (From H&P)      Delvin Echevarria is a 55 year old male who presents after a fall with left knee pain.     Today he was in his usual state of health when he tripped over his shoelace and fell to the ground landing primarily on his left knee.  He immediately had significant pain in that left knee.  He was able to get up but he was unable to bear any weight.  He denies any other injury.      He otherwise has been feeling well recently.  He denies any fevers, chills, lightheadedness,  headaches, chest pain, shortness of breath, abdominal discomfort, nausea, vomiting, diarrhea, urinary changes or other concerning symptoms.      He does have a known abdominal hernia that does not give him any discomfort.  He will be getting that evaluated in the near future.        Hospital Course   Delvin Echevarria is a 55 year old male admitted on 2/28/2021. He has history of hypertension and tobacco use.  He presents after a fall with left knee pain found to have a left patella fracture.     Left Patella Fracture, mildly comminuted   Fall from standing due to trip over shoe lace  Fall from standing landing on left knee primarily. No other injury. X-ray shows a left patellar fracture that is comminuted. The ED provider spoke with the on-call Ortho who recommended a knee immobilizer, nonweightbearing, and surgical correction. The patient was unable to safely maintain his nonweightbearing status and so he was admitted for pain control, mobility training as well as potential surgical correction while in the hospital.  3/1/21: patient was kept NPO overnight. Pain has been controlled. Plan is for ortho consult today and to OR. Will wait for further recs.   3/2/21: patient had surgery on 3/1/21- open reduction, internal fixation of left patella. He was hospitalized over night for continued monitoring, pain control and for physical therapy, occupational therapy evals. Social work was also consulted for discharge planning. He did well overnight. He was able to ambulate with assist yesterday evening. He will be discharged home today.    - Pain control with tylenol and oxycodone, atarax.    - Aspirin Bid for prophylaxis.    - Follow up with [facility] on March 15, 2021, at 0930.      Hypertension  Chronic. Blood pressures reviewed, stable.    - Continue home amlodipine.     Tobacco Use Disorder  Reports smoking 0.5 packs per day. Patch available. Encouraged cessation.     Leukocytosis  Mild elevation of wbc, to  13.9, the morning of 3/2/21. Suspect due to procedure on 3/1/21. He received yoko op antibiotics. Denies fever. No other signs of acute infectious process.    - Follow up with PCP in 7-10 days for repeat labs.     Anemia  Slight drop in hemoglobin post op. 13.8--12.7--11.6. likely some dilutional component due to fluid resuscitation in combination with post op blood loss- 50 ml. Denies headache, dizziness, lightheadedness, shortness of breath.    - Follow up with PCP in 7-10 days for repeat labs.      COVID 19 ruled out  Patient was tested for hospital admission, negative.       Pending Results   Unresulted Labs Ordered in the Past 30 Days of this Admission     No orders found from 1/29/2021 to 3/1/2021.          Physical Exam   Temp:  [97.1  F (36.2  C)-98  F (36.7  C)] 97.1  F (36.2  C)  Pulse:  [55-76] 59  Resp:  [12-19] 16  BP: (107-144)/(64-88) 113/64  SpO2:  [88 %-100 %] 93 %  Vitals:    02/28/21 1622 02/28/21 2018   Weight: 63.5 kg (140 lb) 63.2 kg (139 lb 5.3 oz)       Constitutional: Pleasant. Alert. No acute distress.   CV: Regular rate and rhythm. No lower ext edema.   Respiratory: CTA bilaterally.  GI: Soft, nontender.  Skin: Warm and dry.  Musk: bandage intact to left leg, knee immobilizer in place.     The discharge plan was discussed with the patient and attending physician, Dr. Cory Jimenez.     Total time on this discharge was >30 mins.     Ira White PA-C

## 2021-03-03 ENCOUNTER — TELEPHONE (OUTPATIENT)
Dept: FAMILY MEDICINE | Facility: CLINIC | Age: 56
End: 2021-03-03

## 2021-03-03 NOTE — TELEPHONE ENCOUNTER
ED/UC/IP follow up phone call: [facility] discharge 3/2/21 Closed comminuted fracture of patella, Left, Initial encounter    RN please call to follow up.    Number of ED visits in past 12 months = 0

## 2021-03-10 ENCOUNTER — OFFICE VISIT (OUTPATIENT)
Dept: FAMILY MEDICINE | Facility: CLINIC | Age: 56
End: 2021-03-10
Payer: COMMERCIAL

## 2021-03-10 VITALS
DIASTOLIC BLOOD PRESSURE: 88 MMHG | RESPIRATION RATE: 16 BRPM | BODY MASS INDEX: 19.43 KG/M2 | HEIGHT: 71 IN | TEMPERATURE: 97.5 F | HEART RATE: 72 BPM | OXYGEN SATURATION: 96 % | SYSTOLIC BLOOD PRESSURE: 150 MMHG

## 2021-03-10 DIAGNOSIS — S82.042A CLOSED COMMINUTED FRACTURE OF PATELLA, LEFT, INITIAL ENCOUNTER: ICD-10-CM

## 2021-03-10 PROCEDURE — 99495 TRANSJ CARE MGMT MOD F2F 14D: CPT | Performed by: FAMILY MEDICINE

## 2021-03-10 RX ORDER — OXYCODONE HYDROCHLORIDE 5 MG/1
5 TABLET ORAL EVERY 4 HOURS PRN
Qty: 20 TABLET | Refills: 0 | Status: SHIPPED | OUTPATIENT
Start: 2021-03-10 | End: 2023-07-21

## 2021-03-10 NOTE — PROGRESS NOTES
Fredi Hargrove is a 55 year old who presents for the following health issues: L patella fx, internal fixation.  Out of pain meds, wonders about a few more for sleep, etc.       HPI         Hospital Follow-up Visit:    Hospital/Nursing Home/IP Rehab Facility: [facility]  Date of Admission: 2/28/2021  Date of Discharge: 3/2/2021  Reason(s) for Admission: left patella fracture      Was your hospitalization related to COVID-19? No   Problems taking medications regularly:  None  Medication changes since discharge:   START taking these medications     Details   acetaminophen (TYLENOL) 325 MG tablet Take 2 tablets (650 mg) by mouth every 4 hours as needed for other (For optimal non-opioid multimodal pain management to improve pain control.)  Qty:       Associated Diagnoses: Closed comminuted fracture of patella, left, initial encounter       aspirin (ASA) 81 MG EC tablet Take 1 tablet (81 mg) by mouth 2 times daily  Qty: 60 tablet, Refills: 0     Associated Diagnoses: Closed comminuted fracture of patella, left, initial encounter       oxyCODONE (ROXICODONE) 5 MG tablet Take 1 tablet (5 mg) by mouth every 4 hours as needed  Qty: 60 tablet, Refills: 0     Associated Diagnoses: Closed comminuted fracture of patella, left, initial encounter       polyethylene glycol (MIRALAX) 17 GM/Dose powder Take 17 g by mouth daily  Qty: 510 g, Refills: 0     Associated Diagnoses: Closed comminuted fracture of patella, left, initial encounter                CONTINUE these medications which have CHANGED     Details   hydrOXYzine (ATARAX) 25 MG tablet Take 1-2 tablets (25-50 mg) by mouth every 6 hours as needed for other (adjuvant pain)  Qty: 40 tablet, Refills: 0     Associated Diagnoses: Closed comminuted fracture of patella, left, initial encounter                          Problems adhering to non-medication therapy:  None    Summary of hospitalization:  [facility] discharge summary reviewed  Diagnostic  "Tests/Treatments reviewed.  Follow up needed: ortho next week    Other Healthcare Providers Involved in Patient s Care:         None  Update since discharge: improved.   Post Discharge Medication Reconciliation: discharge medications reconciled and changed, per note/orders.  Plan of care communicated with patient            S: Delvin Echevarria is a 55 year old male with patella fx, s/p surgery, internal fixation.  Has knee immobilizer.  Ortho f/u in a week.  Doing well.  No cp or sob    Out of pain meds. A  Few more?     O:BP (!) 150/88   Pulse 72   Temp 97.5  F (36.4  C) (Tympanic)   Resp 16   Ht 1.803 m (5' 11\")   SpO2 96%   BMI 19.43 kg/m    GEN: Alert and oriented, in no acute distress  Knee immobilizer.    EXT: no edema or lesions noted in lower extremities    A: L patellar fx, s/p surgery    P: 20 more pain tabs.  Not refilling, will stretch them out.  He agrees.  F/u with ortho next week.  Bp has been up/down.  Follow for now.  On norvasc.   "

## 2021-04-07 NOTE — PLAN OF CARE
April 7, 2021     Patient: Marcel Paulson   YOB: 1956   Date of Visit: 4/7/2021       To Whom it May Concern:    Jhoan Nettles is under my professional care  He was seen in my office on 4/7/2021  He will be excused from work from 04/05/2021 until further notice  If you have any questions or concerns, please don't hesitate to call           Sincerely,          Meg Babinski, MD        CC: No Recipients Problem: Goal Outcome Summary  Goal: Goal Outcome Summary  PT: Evaluation complete and treatment initiated. Pt is IND with bed mobility and SBA with sit<>stand transfers using axillary crutches. Pt amb 80 ft with crutches/CGA and requiring verbal cuing on proper gait mechanics and crutch use to reduce risk for falls and while maintain TTWB. PT recommends DC to TCU once medically stable d/t pts living environment is not conducive for pt's safety and will not have any assist at home.

## 2021-05-12 ENCOUNTER — IMMUNIZATION (OUTPATIENT)
Dept: LAB | Facility: CLINIC | Age: 56
End: 2021-05-12
Payer: COMMERCIAL

## 2021-05-18 ENCOUNTER — OFFICE VISIT (OUTPATIENT)
Dept: DERMATOLOGY | Facility: CLINIC | Age: 56
End: 2021-05-18
Payer: COMMERCIAL

## 2021-05-18 VITALS — HEART RATE: 115 BPM | SYSTOLIC BLOOD PRESSURE: 176 MMHG | OXYGEN SATURATION: 98 % | DIASTOLIC BLOOD PRESSURE: 117 MMHG

## 2021-05-18 DIAGNOSIS — L20.84 INTRINSIC ATOPIC DERMATITIS: Primary | ICD-10-CM

## 2021-05-18 PROCEDURE — 99244 OFF/OP CNSLTJ NEW/EST MOD 40: CPT | Performed by: DERMATOLOGY

## 2021-05-18 RX ORDER — BETAMETHASONE DIPROPIONATE 0.5 MG/G
CREAM TOPICAL
Qty: 100 G | Refills: 3 | Status: SHIPPED | OUTPATIENT
Start: 2021-05-18 | End: 2023-07-21

## 2021-05-18 RX ORDER — DOXYCYCLINE 100 MG/1
100 CAPSULE ORAL 2 TIMES DAILY
Qty: 14 CAPSULE | Refills: 0 | Status: SHIPPED | OUTPATIENT
Start: 2021-05-18 | End: 2023-07-21

## 2021-05-18 RX ORDER — PREDNISONE 10 MG/1
TABLET ORAL
Qty: 80 TABLET | Refills: 0 | Status: SHIPPED | OUTPATIENT
Start: 2021-05-18 | End: 2023-07-21

## 2021-05-18 RX ORDER — DESONIDE 0.5 MG/G
CREAM TOPICAL 2 TIMES DAILY
Qty: 60 G | Refills: 3 | Status: SHIPPED | OUTPATIENT
Start: 2021-05-18 | End: 2023-07-21

## 2021-05-18 RX ORDER — HYDROXYZINE HYDROCHLORIDE 25 MG/1
25 TABLET, FILM COATED ORAL AT BEDTIME
Qty: 60 TABLET | Refills: 3 | Status: SHIPPED | OUTPATIENT
Start: 2021-05-18 | End: 2022-06-28

## 2021-05-18 NOTE — NURSING NOTE
Chief Complaint   Patient presents with     Eczema       Vitals:    05/18/21 1158   BP: (!) 176/117   BP Location: Left arm   Patient Position: Sitting   Cuff Size: Adult Regular   Pulse: 115   SpO2: 98%     Wt Readings from Last 1 Encounters:   02/28/21 63.2 kg (139 lb 5.3 oz)       Keira De La Vega LPN .................5/18/2021

## 2021-05-18 NOTE — PATIENT INSTRUCTIONS
Claritin 2 tabs in the morning  Zyrtec 2 in the evening  Desonide to face  Betamethasone to body  Doxycyline   Prednisone    Proper skin care from Dr. López- [facility]     Eliminate harsh soaps, i.e. Dial, Zest, Amharic Spring;   Use mild soaps such as Cetaphil or Dove Sensitive Skin   Avoid hot or cold showers   After showering, lightly dry off.    Aggressive use of a moisturizer (including Vanicream, Cetaphil, Aquaphor or Cerave)   We recommend using a tub that needs to be scooped out, not a pump. This has more of an oil base. It will hold moisture in your skin much better than a water base moisturizer. The ones recommended are non- pore clogging.       If you have any questions call [phone] and follow the prompts to Dr. López's office.

## 2021-05-18 NOTE — LETTER
5/18/2021         RE: Delvin Echevarria  [address] [place] [address]        Dear Colleague,    Thank you for referring your patient, Delvin Echevarria, to the [facility]. Please see a copy of my visit note below.    Delvin Echevarria , a 56 year old year old male patient, I was asked to see by Dr. Flynn for rash.  Patient states this has been present for years.  Patient reports the following symptoms:  itching .  Patient reports the following previous treatments OTC.  Patient reports the following modifying factors none.  Associated symptoms: none.  Patient has no other skin complaints today.  Remainder of the HPI, Meds, PMH, Allergies, FH, and SH was reviewed in chart.      Past Medical History:   Diagnosis Date     Abscess 8/11/2017     Acute posthemorrhagic anemia 12/14/2016     Anemia due to blood loss, acute 9/30/2016     Closed fracture of right tibia and fibula with routine healing 9/29/2016     DDD (degenerative disc disease), lumbar      Hypertension 3/15/2013     Marijuana use     4x daily     Staph aureus infection 9/13/2017     Tibial plateau fracture 9/11/2016     Tobacco use      Wound infection 9/29/2016       Past Surgical History:   Procedure Laterality Date     APPLY EXTERNAL FIXATOR LOWER EXTREMITY Right 09/12/2016    Procedure: APPLY EXTERNAL FIXATOR LOWER EXTREMITY;  Surgeon: John Gonzales MD;  Location: [facility] OR     HERNIORRHAPHY INGUINAL  07/02/2012    Procedure: HERNIORRHAPHY INGUINAL;  Open Repair Right Inguinal Hernia with Mesh;  Surgeon: Avni Mxawell MD;  Location: [facility] OR     INCISION AND DRAINAGE BONE LOWER EXTREMITY, COMBINED Right 08/11/2017    Procedure: COMBINED INCISION AND DRAINAGE BONE LOWER EXTREMITY;  Irrigation and Debridement Right Tibia,  Removal of Hardware;  Surgeon: Kenroy Dos Santos MD;  Location: [facility] OR     KNEE SURGERY Left 03/01/2021    patella fracture repair     LARYNGECTOMY  Several Years ago    Partail  removal due to fish bone     OPEN REDUCTION INTERNAL FIXATION PATELLA Left 3/1/2021    Procedure: OPEN REDUCTION INTERNAL FIXATION, FRACTURE, PATELLA;  Surgeon: Papa Pena MD;  Location: [facility] OR     OPEN REDUCTION INTERNAL FIXATION TIBIA Right 09/15/2016    Procedure: OPEN REDUCTION INTERNAL FIXATION TIBIA;  Surgeon: Miguel A Green MD;  Location: [facility] OR     OPEN REDUCTION INTERNAL FIXATION TIBIA Right 11/07/2017    Procedure: OPEN REDUCTION INTERNAL FIXATION TIBIA;  Right Tibia Fibula Open Reduction Internal Fixation;  Surgeon: Miguel A Green MD;  Location: [facility] OR     REMOVE HARDWARE LOWER EXTREMITY Right 08/11/2017    Procedure: REMOVE HARDWARE LOWER EXTREMITY;;  Surgeon: Kenroy Dos Santos MD;  Location: [facility] OR        Family History   Problem Relation Age of Onset     Hypertension Mother      Hypertension Brother        Social History     Socioeconomic History     Marital status: Single     Spouse name: Not on file     Number of children: Not on file     Years of education: Not on file     Highest education level: Not on file   Occupational History     Not on file   Social Needs     Financial resource strain: Not on file     Food insecurity     Worry: Not on file     Inability: Not on file     Transportation needs     Medical: Not on file     Non-medical: Not on file   Tobacco Use     Smoking status: Current Every Day Smoker     Packs/day: 0.50     Years: 25.00     Pack years: 12.50     Smokeless tobacco: Never Used   Substance and Sexual Activity     Alcohol use: No     Drug use: Not Currently     Types: Marijuana     Comment: Previously smoked 4x daily     Sexual activity: Yes   Lifestyle     Physical activity     Days per week: Not on file     Minutes per session: Not on file     Stress: Not on file   Relationships     Social connections     Talks on phone: Not on file     Gets together: Not on file     Attends Shinto service: Not on file     Active member of club or organization:  Not on file     Attends meetings of clubs or organizations: Not on file     Relationship status: Not on file     Intimate partner violence     Fear of current or ex partner: Not on file     Emotionally abused: Not on file     Physically abused: Not on file     Forced sexual activity: Not on file   Other Topics Concern     Parent/sibling w/ CABG, MI or angioplasty before 65F 55M? Not Asked   Social History Narrative     Not on file       Outpatient Encounter Medications as of 5/18/2021   Medication Sig Dispense Refill     amLODIPine (NORVASC) 5 MG tablet Take 1 tablet (5 mg) by mouth 2 times daily 180 tablet 3     hydrOXYzine (ATARAX) 25 MG tablet Take 1-2 tablets (25-50 mg) by mouth every 6 hours as needed for other (adjuvant pain) 40 tablet 0     acetaminophen (TYLENOL) 325 MG tablet Take 2 tablets (650 mg) by mouth every 4 hours as needed for other (For optimal non-opioid multimodal pain management to improve pain control.) (Patient not taking: Reported on 5/18/2021)       aspirin (ASA) 81 MG EC tablet Take 1 tablet (81 mg) by mouth 2 times daily (Patient not taking: Reported on 5/18/2021) 60 tablet 0     augmented betamethasone dipropionate (DIPROLENE-AF) 0.05 % external ointment APPLY TOPICALLY  TO DRY SKIN ON HANDS, ARMS AND FOOT TWO TIMES A DAY AS NEEDED. (Patient not taking: Reported on 5/18/2021) 50 g 1     betamethasone dipropionate (DIPROSONE) 0.05 % cream Apply sparingly to affected area twice daily as needed.  Do not apply to face. (Patient not taking: Reported on 5/18/2021) 45 g 0     oxyCODONE (ROXICODONE) 5 MG tablet Take 1 tablet (5 mg) by mouth every 4 hours as needed (Patient not taking: Reported on 5/18/2021) 20 tablet 0     polyethylene glycol (MIRALAX) 17 GM/Dose powder Take 17 g by mouth daily (Patient not taking: Reported on 5/18/2021) 510 g 0     triamcinolone (KENALOG) 0.1 % cream Use sparingly on face for the e xcema (Patient not taking: Reported on 3/10/2021) 60 g 0     No  facility-administered encounter medications on file as of 5/18/2021.              Review Of Systems  Skin: As above  Eyes: negative  Ears/Nose/Throat: negative  Respiratory: No shortness of breath, dyspnea on exertion, cough, or hemoptysis  Cardiovascular: negative  Gastrointestinal: negative  Genitourinary: negative  Musculoskeletal: negative  Neurologic: negative  Psychiatric: negative  Hematologic/Lymphatic/Immunologic: negative  Endocrine: negative      O:   NAD, WDWN, Alert & Oriented, Mood & Affect wnl, Vitals stable   Here today alone   BP (!) 176/117 (BP Location: Left arm, Patient Position: Sitting, Cuff Size: Adult Regular)   Pulse 115   SpO2 98%    General appearance corbin ii   Vitals stable   Alert, oriented and in no acute distress   Eczematous and lichenified plaques on face, trunk and ext  No finger web lesions, no penis lesions  Honey colored plaques on neck and legs      The remainder of expanded problem focused exam was normal; the following areas were examined:  scalp/hair, conjunctiva/lids, face, neck, lis, back, legs, ab , chest, digits/nails, RUE, LUE.      Eyes: Conjunctivae/lids:Normal     ENT: Lips, buccal mucosa, tongue: normal    MSK:Normal    Cardiovascular: peripheral edema none    Pulm: Breathing Normal    Neuro/Psych: Orientation:Normal; Mood/Affect:Normal      A/P:  1. Atopic derm and impetigo  Pathophysiology discussed with pateint   Stop dial soap  claritin in am  Zyrtec pm  Hydroxyzine pm  Desonide for face  Prednisone taper  Betamethasone trunk and ext   Hydroxyzine bedtime  doxycyclien one week  Skin care discussed with patient   Return to clinic 2 weeks  It was a pleasure speaking to Delvin Echevarria today.  Previous clinic  notes and pertinent laboratory tests were reviewed prior to Delvin Echevarria's visit.  Skin care regimen reviewed with patient: Eliminate harsh soaps, i.e. Dial, zest, irsih spring; Mild soaps such as Cetaphil or Dove sensitive skin, avoid hot or cold  showers, aggressive use of emollients including vanicream, cetaphil or cerave discussed with patient.          Again, thank you for allowing me to participate in the care of your patient.        Sincerely,        Tyron López MD

## 2021-05-18 NOTE — PROGRESS NOTES
Delvin Echevarria , a 56 year old year old male patient, I was asked to see by Dr. Flynn for rash.  Patient states this has been present for years.  Patient reports the following symptoms:  itching .  Patient reports the following previous treatments OTC.  Patient reports the following modifying factors none.  Associated symptoms: none.  Patient has no other skin complaints today.  Remainder of the HPI, Meds, PMH, Allergies, FH, and SH was reviewed in chart.      Past Medical History:   Diagnosis Date     Abscess 8/11/2017     Acute posthemorrhagic anemia 12/14/2016     Anemia due to blood loss, acute 9/30/2016     Closed fracture of right tibia and fibula with routine healing 9/29/2016     DDD (degenerative disc disease), lumbar      Hypertension 3/15/2013     Marijuana use     4x daily     Staph aureus infection 9/13/2017     Tibial plateau fracture 9/11/2016     Tobacco use      Wound infection 9/29/2016       Past Surgical History:   Procedure Laterality Date     APPLY EXTERNAL FIXATOR LOWER EXTREMITY Right 09/12/2016    Procedure: APPLY EXTERNAL FIXATOR LOWER EXTREMITY;  Surgeon: John Gonzales MD;  Location: [facility]     HERNIORRHAPHY INGUINAL  07/02/2012    Procedure: HERNIORRHAPHY INGUINAL;  Open Repair Right Inguinal Hernia with Mesh;  Surgeon: Avni Maxwell MD;  Location: [facility]     INCISION AND DRAINAGE BONE LOWER EXTREMITY, COMBINED Right 08/11/2017    Procedure: COMBINED INCISION AND DRAINAGE BONE LOWER EXTREMITY;  Irrigation and Debridement Right Tibia,  Removal of Hardware;  Surgeon: Kenroy Dos Santos MD;  [facility]: [facility]     KNEE SURGERY Left 03/01/2021    patella fracture repair     LARYNGECTOMY  Several Years ago    Partail removal due to fish bone     OPEN REDUCTION INTERNAL FIXATION PATELLA Left 3/1/2021    Procedure: OPEN REDUCTION INTERNAL FIXATION, FRACTURE, PATELLA;  Surgeon: Papa Pena MD;  Location: [facility]     OPEN REDUCTION INTERNAL FIXATION TIBIA Right  09/15/2016    Procedure: OPEN REDUCTION INTERNAL FIXATION TIBIA;  Surgeon: Miguel A Green MD;  [facility]     OPEN REDUCTION INTERNAL FIXATION TIBIA Right 11/07/2017    Procedure: OPEN REDUCTION INTERNAL FIXATION TIBIA;  Right Tibia Fibula Open Reduction Internal Fixation;  Surgeon: Miguel A Green MD;  [facility]     REMOVE HARDWARE LOWER EXTREMITY Right 08/11/2017    Procedure: REMOVE HARDWARE LOWER EXTREMITY;;  Surgeon: Kenroy Dos Santos MD;  [facility]        Family History   Problem Relation Age of Onset     Hypertension Mother      Hypertension Brother        Social History     Socioeconomic History     Marital status: Single     Spouse name: Not on file     Number of children: Not on file     Years of education: Not on file     Highest education level: Not on file   Occupational History     Not on file   Social Needs     Financial resource strain: Not on file     Food insecurity     Worry: Not on file     Inability: Not on file     Transportation needs     Medical: Not on file     Non-medical: Not on file   Tobacco Use     Smoking status: Current Every Day Smoker     Packs/day: 0.50     Years: 25.00     Pack years: 12.50     Smokeless tobacco: Never Used   Substance and Sexual Activity     Alcohol use: No     Drug use: Not Currently     Types: Marijuana     Comment: Previously smoked 4x daily     Sexual activity: Yes   Lifestyle     Physical activity     Days per week: Not on file     Minutes per session: Not on file     Stress: Not on file   Relationships     Social connections     Talks on phone: Not on file     Gets together: Not on file     Attends Worship service: Not on file     Active member of club or organization: Not on file     Attends meetings of clubs or organizations: Not on file     Relationship status: Not on file     Intimate partner violence     Fear of current or ex partner: Not on file     Emotionally abused: Not on file     Physically abused: Not on  file     Forced sexual activity: Not on file   Other Topics Concern     Parent/sibling w/ CABG, MI or angioplasty before 65F 55M? Not Asked   Social History Narrative     Not on file       Outpatient Encounter Medications as of 5/18/2021   Medication Sig Dispense Refill     amLODIPine (NORVASC) 5 MG tablet Take 1 tablet (5 mg) by mouth 2 times daily 180 tablet 3     hydrOXYzine (ATARAX) 25 MG tablet Take 1-2 tablets (25-50 mg) by mouth every 6 hours as needed for other (adjuvant pain) 40 tablet 0     acetaminophen (TYLENOL) 325 MG tablet Take 2 tablets (650 mg) by mouth every 4 hours as needed for other (For optimal non-opioid multimodal pain management to improve pain control.) (Patient not taking: Reported on 5/18/2021)       aspirin (ASA) 81 MG EC tablet Take 1 tablet (81 mg) by mouth 2 times daily (Patient not taking: Reported on 5/18/2021) 60 tablet 0     augmented betamethasone dipropionate (DIPROLENE-AF) 0.05 % external ointment APPLY TOPICALLY  TO DRY SKIN ON HANDS, ARMS AND FOOT TWO TIMES A DAY AS NEEDED. (Patient not taking: Reported on 5/18/2021) 50 g 1     betamethasone dipropionate (DIPROSONE) 0.05 % cream Apply sparingly to affected area twice daily as needed.  Do not apply to face. (Patient not taking: Reported on 5/18/2021) 45 g 0     oxyCODONE (ROXICODONE) 5 MG tablet Take 1 tablet (5 mg) by mouth every 4 hours as needed (Patient not taking: Reported on 5/18/2021) 20 tablet 0     polyethylene glycol (MIRALAX) 17 GM/Dose powder Take 17 g by mouth daily (Patient not taking: Reported on 5/18/2021) 510 g 0     triamcinolone (KENALOG) 0.1 % cream Use sparingly on face for the e xcema (Patient not taking: Reported on 3/10/2021) 60 g 0     No facility-administered encounter medications on file as of 5/18/2021.              Review Of Systems  Skin: As above  Eyes: negative  Ears/Nose/Throat: negative  Respiratory: No shortness of breath, dyspnea on exertion, cough, or hemoptysis  Cardiovascular:  negative  Gastrointestinal: negative  Genitourinary: negative  Musculoskeletal: negative  Neurologic: negative  Psychiatric: negative  Hematologic/Lymphatic/Immunologic: negative  Endocrine: negative      O:   NAD, WDWN, Alert & Oriented, Mood & Affect wnl, Vitals stable   Here today alone   BP (!) 176/117 (BP Location: Left arm, Patient Position: Sitting, Cuff Size: Adult Regular)   Pulse 115   SpO2 98%    General appearance corbin ii   Vitals stable   Alert, oriented and in no acute distress   Eczematous and lichenified plaques on face, trunk and ext  No finger web lesions, no penis lesions  Honey colored plaques on neck and legs      The remainder of expanded problem focused exam was normal; the following areas were examined:  scalp/hair, conjunctiva/lids, face, neck, lis, back, legs, ab , chest, digits/nails, RUE, LUE.      Eyes: Conjunctivae/lids:Normal     ENT: Lips, buccal mucosa, tongue: normal    MSK:Normal    Cardiovascular: peripheral edema none    Pulm: Breathing Normal    Neuro/Psych: Orientation:Normal; Mood/Affect:Normal      A/P:  1. Atopic derm and impetigo  Pathophysiology discussed with pateint   Stop dial soap  claritin in am  Zyrtec pm  Hydroxyzine pm  Desonide for face  Prednisone taper  Betamethasone trunk and ext   Hydroxyzine bedtime  doxycyclien one week  Skin care discussed with patient   Return to clinic 2 weeks  It was a pleasure speaking to Delvin Echevarria today.  Previous clinic  notes and pertinent laboratory tests were reviewed prior to Delvin Echevarria's visit.  Skin care regimen reviewed with patient: Eliminate harsh soaps, i.e. Dial, zest, irsih spring; Mild soaps such as Cetaphil or Dove sensitive skin, avoid hot or cold showers, aggressive use of emollients including vanicream, cetaphil or cerave discussed with patient.

## 2021-06-01 ENCOUNTER — OFFICE VISIT (OUTPATIENT)
Dept: DERMATOLOGY | Facility: CLINIC | Age: 56
End: 2021-06-01
Payer: COMMERCIAL

## 2021-06-01 VITALS — OXYGEN SATURATION: 98 % | HEART RATE: 70 BPM | DIASTOLIC BLOOD PRESSURE: 87 MMHG | SYSTOLIC BLOOD PRESSURE: 141 MMHG

## 2021-06-01 DIAGNOSIS — L20.81 ATOPIC NEURODERMATITIS: Primary | ICD-10-CM

## 2021-06-01 PROCEDURE — 99213 OFFICE O/P EST LOW 20 MIN: CPT | Performed by: DERMATOLOGY

## 2021-06-01 NOTE — PROGRESS NOTES
Delvin Echevarria is an extremely pleasant 56 year old year old male patient here today for f/u eczema, skin much improved with topicals, no concerns today.  Patient has no other skin complaints today.  Remainder of the HPI, Meds, PMH, Allergies, FH, and SH was reviewed in chart.      Past Medical History:   Diagnosis Date     Abscess 8/11/2017     Acute posthemorrhagic anemia 12/14/2016     Anemia due to blood loss, acute 9/30/2016     Closed fracture of right tibia and fibula with routine healing 9/29/2016     DDD (degenerative disc disease), lumbar      Hypertension 3/15/2013     Marijuana use     4x daily     Staph aureus infection 9/13/2017     Tibial plateau fracture 9/11/2016     Tobacco use      Wound infection 9/29/2016       Past Surgical History:   Procedure Laterality Date     APPLY EXTERNAL FIXATOR LOWER EXTREMITY Right 09/12/2016    Procedure: APPLY EXTERNAL FIXATOR LOWER EXTREMITY;  Surgeon: John Gonzales MD;  Location:  OR     HERNIORRHAPHY INGUINAL  07/02/2012    Procedure: HERNIORRHAPHY INGUINAL;  Open Repair Right Inguinal Hernia with Mesh;  Surgeon: Avni Maxwell MD;  Location: [facility]     INCISION AND DRAINAGE BONE LOWER EXTREMITY, COMBINED Right 08/11/2017    Procedure: COMBINED INCISION AND DRAINAGE BONE LOWER EXTREMITY;  Irrigation and Debridement Right Tibia,  Removal of Hardware;  Surgeon: Kenroy Dos Santos MD;  [facility]     KNEE SURGERY Left 03/01/2021    patella fracture repair     LARYNGECTOMY  Several Years ago    Partail removal due to fish bone     OPEN REDUCTION INTERNAL FIXATION PATELLA Left 3/1/2021    Procedure: OPEN REDUCTION INTERNAL FIXATION, FRACTURE, PATELLA;  Surgeon: Papa Pena MD;  Location: [facility]     OPEN REDUCTION INTERNAL FIXATION TIBIA Right 09/15/2016    Procedure: OPEN REDUCTION INTERNAL FIXATION TIBIA;  Surgeon: Miguel A Green MD;  Location:  OR     OPEN REDUCTION INTERNAL FIXATION TIBIA Right 11/07/2017     Procedure: OPEN REDUCTION INTERNAL FIXATION TIBIA;  Right Tibia Fibula Open Reduction Internal Fixation;  Surgeon: Miguel A Green MD;  [facility]     REMOVE HARDWARE LOWER EXTREMITY Right 08/11/2017    Procedure: REMOVE HARDWARE LOWER EXTREMITY;;  Surgeon: Kenroy Dos Santos MD;  [facility]        Family History   Problem Relation Age of Onset     Hypertension Mother      Hypertension Brother        Social History     Socioeconomic History     Marital status: Single     Spouse name: Not on file     Number of children: Not on file     Years of education: Not on file     Highest education level: Not on file   Occupational History     Not on file   Social Needs     Financial resource strain: Not on file     Food insecurity     Worry: Not on file     Inability: Not on file     Transportation needs     Medical: Not on file     Non-medical: Not on file   Tobacco Use     Smoking status: Current Every Day Smoker     Packs/day: 0.50     Years: 25.00     Pack years: 12.50     Smokeless tobacco: Never Used   Substance and Sexual Activity     Alcohol use: No     Drug use: Not Currently     Types: Marijuana     Comment: Previously smoked 4x daily     Sexual activity: Yes   Lifestyle     Physical activity     Days per week: Not on file     Minutes per session: Not on file     Stress: Not on file   Relationships     Social connections     Talks on phone: Not on file     Gets together: Not on file     Attends Scientologist service: Not on file     Active member of club or organization: Not on file     Attends meetings of clubs or organizations: Not on file     Relationship status: Not on file     Intimate partner violence     Fear of current or ex partner: Not on file     Emotionally abused: Not on file     Physically abused: Not on file     Forced sexual activity: Not on file   Other Topics Concern     Parent/sibling w/ CABG, MI or angioplasty before 65F 55M? Not Asked   Social History Narrative     Not on file        Outpatient Encounter Medications as of 6/1/2021   Medication Sig Dispense Refill     amLODIPine (NORVASC) 5 MG tablet Take 1 tablet (5 mg) by mouth 2 times daily 180 tablet 3     augmented betamethasone dipropionate (DIPROLENE-AF) 0.05 % external cream Apply sparingly to affected area twice daily as needed.  Do not apply to face. 100 g 3     desonide (DESOWEN) 0.05 % external cream Apply topically 2 times daily To face 60 g 3     doxycycline monohydrate (MONODOX) 100 MG capsule Take 1 capsule (100 mg) by mouth 2 times daily 14 capsule 0     hydrOXYzine (ATARAX) 25 MG tablet Take 1 tablet (25 mg) by mouth At Bedtime 60 tablet 3     hydrOXYzine (ATARAX) 25 MG tablet Take 1-2 tablets (25-50 mg) by mouth every 6 hours as needed for other (adjuvant pain) 40 tablet 0     predniSONE (DELTASONE) 10 MG tablet 4 tablets by mouth for 4 days, decrease by one half tablet every 4 days 80 tablet 0     acetaminophen (TYLENOL) 325 MG tablet Take 2 tablets (650 mg) by mouth every 4 hours as needed for other (For optimal non-opioid multimodal pain management to improve pain control.) (Patient not taking: Reported on 5/18/2021)       aspirin (ASA) 81 MG EC tablet Take 1 tablet (81 mg) by mouth 2 times daily (Patient not taking: Reported on 5/18/2021) 60 tablet 0     augmented betamethasone dipropionate (DIPROLENE-AF) 0.05 % external ointment APPLY TOPICALLY  TO DRY SKIN ON HANDS, ARMS AND FOOT TWO TIMES A DAY AS NEEDED. (Patient not taking: Reported on 5/18/2021) 50 g 1     betamethasone dipropionate (DIPROSONE) 0.05 % cream Apply sparingly to affected area twice daily as needed.  Do not apply to face. (Patient not taking: Reported on 5/18/2021) 45 g 0     oxyCODONE (ROXICODONE) 5 MG tablet Take 1 tablet (5 mg) by mouth every 4 hours as needed (Patient not taking: Reported on 5/18/2021) 20 tablet 0     polyethylene glycol (MIRALAX) 17 GM/Dose powder Take 17 g by mouth daily (Patient not taking: Reported on 5/18/2021) 510 g 0      triamcinolone (KENALOG) 0.1 % cream Use sparingly on face for the e xcema (Patient not taking: Reported on 3/10/2021) 60 g 0     No facility-administered encounter medications on file as of 6/1/2021.              O:   NAD, WDWN, Alert & Oriented, Mood & Affect wnl, Vitals stable   Here today alone   BP (!) 141/87   Pulse 70   SpO2 98%    General appearance normal   Vitals stable   Alert, oriented and in no acute distress     Post inflammatory changes on hands and arms  Eyes: Conjunctivae/lids:Normal     ENT: Lips, buccal mucosa, tongue: normal    MSK:Normal    Cardiovascular: peripheral edema none    Pulm: Breathing Normal    Neuro/Psych: Orientation:Alert and Orientedx3 ; Mood/Affect:normal       A/P:  1. Atopic derm doing well  NBUVB< dupixient and systemics discussed with patient   He is happy with topicals  Return to clinic 3 months  It was a pleasure speaking to Delvin Echevarria today.  Skin care regimen reviewed with patient: Eliminate harsh soaps, i.e. Dial, zest, irsih spring; Mild soaps such as Cetaphil or Dove sensitive skin, avoid hot or cold showers, aggressive use of emollients including vanicream, cetaphil or cerave discussed with patient.

## 2021-06-01 NOTE — LETTER
6/1/2021         RE: Delvin Echevarria  [address] [place] [address]        Dear Colleague,    Thank you for referring your patient, Delvin Echevarria, to the [facility]. Please see a copy of my visit note below.    Delvin Echevarria is an extremely pleasant 56 year old year old male patient here today for f/u eczema, skin much improved with topicals, no concerns today.  Patient has no other skin complaints today.  Remainder of the HPI, Meds, PMH, Allergies, FH, and SH was reviewed in chart.      Past Medical History:   Diagnosis Date     Abscess 8/11/2017     Acute posthemorrhagic anemia 12/14/2016     Anemia due to blood loss, acute 9/30/2016     Closed fracture of right tibia and fibula with routine healing 9/29/2016     DDD (degenerative disc disease), lumbar      Hypertension 3/15/2013     Marijuana use     4x daily     Staph aureus infection 9/13/2017     Tibial plateau fracture 9/11/2016     Tobacco use      Wound infection 9/29/2016       Past Surgical History:   Procedure Laterality Date     APPLY EXTERNAL FIXATOR LOWER EXTREMITY Right 09/12/2016    Procedure: APPLY EXTERNAL FIXATOR LOWER EXTREMITY;  Surgeon: John Gonzales MD;  [facility]     HERNIORRHAPHY INGUINAL  07/02/2012    Procedure: HERNIORRHAPHY INGUINAL;  Open Repair Right Inguinal Hernia with Mesh;  Surgeon: Avni Maxwell MD;  [facility]     INCISION AND DRAINAGE BONE LOWER EXTREMITY, COMBINED Right 08/11/2017    Procedure: COMBINED INCISION AND DRAINAGE BONE LOWER EXTREMITY;  Irrigation and Debridement Right Tibia,  Removal of Hardware;  Surgeon: Kenroy Dos Santos MD;  Location:  OR     KNEE SURGERY Left 03/01/2021    patella fracture repair     LARYNGECTOMY  Several Years ago    Partail removal due to fish bone     OPEN REDUCTION INTERNAL FIXATION PATELLA Left 3/1/2021    Procedure: OPEN REDUCTION INTERNAL FIXATION, FRACTURE, PATELLA;  Surgeon: Papa Pena MD;   [facility]     OPEN REDUCTION INTERNAL FIXATION TIBIA Right 09/15/2016    Procedure: OPEN REDUCTION INTERNAL FIXATION TIBIA;  Surgeon: Miguel A Green MD;  [facility]     OPEN REDUCTION INTERNAL FIXATION TIBIA Right 11/07/2017    Procedure: OPEN REDUCTION INTERNAL FIXATION TIBIA;  Right Tibia Fibula Open Reduction Internal Fixation;  Surgeon: Miguel A Green MD;  [facility]     REMOVE HARDWARE LOWER EXTREMITY Right 08/11/2017    Procedure: REMOVE HARDWARE LOWER EXTREMITY;;  Surgeon: Kenroy Dos Santos MD;  [facility]        Family History   Problem Relation Age of Onset     Hypertension Mother      Hypertension Brother        Social History     Socioeconomic History     Marital status: Single     Spouse name: Not on file     Number of children: Not on file     Years of education: Not on file     Highest education level: Not on file   Occupational History     Not on file   Social Needs     Financial resource strain: Not on file     Food insecurity     Worry: Not on file     Inability: Not on file     Transportation needs     Medical: Not on file     Non-medical: Not on file   Tobacco Use     Smoking status: Current Every Day Smoker     Packs/day: 0.50     Years: 25.00     Pack years: 12.50     Smokeless tobacco: Never Used   Substance and Sexual Activity     Alcohol use: No     Drug use: Not Currently     Types: Marijuana     Comment: Previously smoked 4x daily     Sexual activity: Yes   Lifestyle     Physical activity     Days per week: Not on file     Minutes per session: Not on file     Stress: Not on file   Relationships     Social connections     Talks on phone: Not on file     Gets together: Not on file     Attends [employer] service: Not on file     Active member of club or organization: Not on file     Attends meetings of clubs or organizations: Not on file     Relationship status: Not on file     Intimate partner violence     Fear of current or ex partner: Not on file      Emotionally abused: Not on file     Physically abused: Not on file     Forced sexual activity: Not on file   Other Topics Concern     Parent/sibling w/ CABG, MI or angioplasty before 65F 55M? Not Asked   Social History Narrative     Not on file       Outpatient Encounter Medications as of 6/1/2021   Medication Sig Dispense Refill     amLODIPine (NORVASC) 5 MG tablet Take 1 tablet (5 mg) by mouth 2 times daily 180 tablet 3     augmented betamethasone dipropionate (DIPROLENE-AF) 0.05 % external cream Apply sparingly to affected area twice daily as needed.  Do not apply to face. 100 g 3     desonide (DESOWEN) 0.05 % external cream Apply topically 2 times daily To face 60 g 3     doxycycline monohydrate (MONODOX) 100 MG capsule Take 1 capsule (100 mg) by mouth 2 times daily 14 capsule 0     hydrOXYzine (ATARAX) 25 MG tablet Take 1 tablet (25 mg) by mouth At Bedtime 60 tablet 3     hydrOXYzine (ATARAX) 25 MG tablet Take 1-2 tablets (25-50 mg) by mouth every 6 hours as needed for other (adjuvant pain) 40 tablet 0     predniSONE (DELTASONE) 10 MG tablet 4 tablets by mouth for 4 days, decrease by one half tablet every 4 days 80 tablet 0     acetaminophen (TYLENOL) 325 MG tablet Take 2 tablets (650 mg) by mouth every 4 hours as needed for other (For optimal non-opioid multimodal pain management to improve pain control.) (Patient not taking: Reported on 5/18/2021)       aspirin (ASA) 81 MG EC tablet Take 1 tablet (81 mg) by mouth 2 times daily (Patient not taking: Reported on 5/18/2021) 60 tablet 0     augmented betamethasone dipropionate (DIPROLENE-AF) 0.05 % external ointment APPLY TOPICALLY  TO DRY SKIN ON HANDS, ARMS AND FOOT TWO TIMES A DAY AS NEEDED. (Patient not taking: Reported on 5/18/2021) 50 g 1     betamethasone dipropionate (DIPROSONE) 0.05 % cream Apply sparingly to affected area twice daily as needed.  Do not apply to face. (Patient not taking: Reported on 5/18/2021) 45 g 0     oxyCODONE (ROXICODONE) 5 MG  tablet Take 1 tablet (5 mg) by mouth every 4 hours as needed (Patient not taking: Reported on 5/18/2021) 20 tablet 0     polyethylene glycol (MIRALAX) 17 GM/Dose powder Take 17 g by mouth daily (Patient not taking: Reported on 5/18/2021) 510 g 0     triamcinolone (KENALOG) 0.1 % cream Use sparingly on face for the e xcema (Patient not taking: Reported on 3/10/2021) 60 g 0     No facility-administered encounter medications on file as of 6/1/2021.              O:   NAD, WDWN, Alert & Oriented, Mood & Affect wnl, Vitals stable   Here today alone   BP (!) 141/87   Pulse 70   SpO2 98%    General appearance normal   Vitals stable   Alert, oriented and in no acute distress     Post inflammatory changes on hands and arms  Eyes: Conjunctivae/lids:Normal     ENT: Lips, buccal mucosa, tongue: normal    MSK:Normal    Cardiovascular: peripheral edema none    Pulm: Breathing Normal    Neuro/Psych: Orientation:Alert and Orientedx3 ; Mood/Affect:normal       A/P:  1. Atopic derm doing well  NBUVB< dupixient and systemics discussed with patient   He is happy with topicals  Return to clinic 3 months  It was a pleasure speaking to Delvin Echevarria today.  Skin care regimen reviewed with patient: Eliminate harsh soaps, i.e. Dial, zest, irsih spring; Mild soaps such as Cetaphil or Dove sensitive skin, avoid hot or cold showers, aggressive use of emollients including vanicream, cetaphil or cerave discussed with patient.          Again, thank you for allowing me to participate in the care of your patient.        Sincerely,        Tyron López MD

## 2021-06-01 NOTE — NURSING NOTE
Chief Complaint   Patient presents with     Eczema       Vitals:    06/01/21 1353   BP: (!) 141/87   Pulse: 70   SpO2: 98%     Wt Readings from Last 1 Encounters:   02/28/21 63.2 kg (139 lb 5.3 oz)       Kiya Cuevas LPN.................6/1/2021

## 2022-03-25 NOTE — MR AVS SNAPSHOT
"              After Visit Summary   9/12/2018    Delvin Echevarria    MRN: [id number]           Patient Information     Date Of Birth          1965        Visit Information        Provider Department      9/12/2018 1:20 PM Krystian Streeter MD [facility]        Today's Diagnoses     Other eczema          Care Instructions    1. This is excema again and should clear    2. Lets stay with cream for now but call me if not improving.           Follow-ups after your visit        Who to contact     If you have questions or need follow up information about today's clinic visit or your schedule please contact [facility] directly at [phone].  Normal or non-critical lab and imaging results will be communicated to you by MyChart, letter or phone within 4 business days after the clinic has received the results. If you do not hear from us within 7 days, please contact the clinic through MyChart or phone. If you have a critical or abnormal lab result, we will notify you by phone as soon as possible.  Submit refill requests through [employer] or call your pharmacy and they will forward the refill request to us. Please allow 3 business days for your refill to be completed.          Additional Information About Your Visit        Care EveryWhere ID     This is your Care EveryWhere ID. This could be used by other organizations to access your [place] medical records  [id number]        Your Vitals Were     Pulse Temperature Height BMI (Body Mass Index)          64 99  F (37.2  C) (Tympanic) 5' 11\" (1.803 m) 21.2 kg/m2         Blood Pressure from Last 3 Encounters:   09/12/18 128/84   05/01/18 (!) 144/92   04/17/18 (!) 149/97    Weight from Last 3 Encounters:   09/12/18 152 lb (68.9 kg)   05/01/18 153 lb (69.4 kg)   04/17/18 161 lb (73 kg)              Today, you had the following     No orders found for display         Today's Medication Changes          These changes are accurate " as of 9/12/18  1:33 PM.  If you have any questions, ask your nurse or doctor.               These medicines have changed or have updated prescriptions.        Dose/Directions    triamcinolone 0.1 % cream   Commonly known as:  KENALOG   This may have changed:  Another medication with the same name was removed. Continue taking this medication, and follow the directions you see here.   Used for:  Other eczema        Use sparingly on face for the e xcema   Quantity:  60 g   Refills:  0            Where to get your medicines      These medications were sent to [facility] [place] [id number]     Phone:  [phone]     hydrOXYzine 25 MG tablet    triamcinolone 0.1 % cream                Primary Care Provider Office Phone # Fax #    Krystian Streeter [phone]       [address] [facility] [address]        Equal Access to Services     DION CRAWFORD : Hadii hernan costa hadasho Soomaali, waaxda luqadaha, qaybta kaalmada adeegyada, waxay louisein hayjaymie zambrano . So [facility] [phone].    ATENCIÓN: Si habla español, tiene a alcazar disposición servicios gratuitos de asistencia lingüística. Larisa al [phone].    We comply with applicable federal civil rights laws and [place] laws. We do not discriminate on the basis of race, color, national origin, age, disability, sex, sexual orientation, or gender identity.            Thank you!     Thank you for choosing [facility]  for your care. Our goal is always to provide you with excellent care. Hearing back from our patients is one way we can continue to improve our services. Please take a few minutes to complete the written survey that you may receive in the mail after your visit with us. Thank you!             Your Updated Medication List - Protect others around you: Learn how to safely use, store and throw away your medicines at  www.disposemymeds.org.          This list is accurate as of 9/12/18  1:33 PM.  Always use your most recent med list.                   Brand Name Dispense Instructions for use Diagnosis    ACETAMINOPHEN PO      Take 650 mg by mouth every 4 hours as needed for pain        amLODIPine 5 MG tablet    NORVASC    30 tablet    Take 1 tablet (5 mg) by mouth daily    Benign essential hypertension       betamethasone dipropionate 0.05 % cream    DIPROSONE    45 g    Apply sparingly to affected area twice daily as needed.  Do not apply to face.    Eczema, unspecified type       hydrOXYzine 25 MG tablet    ATARAX    60 tablet    Take 1-2 tablets (25-50 mg) by mouth every 6 hours as needed for itching    Other eczema       triamcinolone 0.1 % cream    KENALOG    60 g    Use sparingly on face for the e xcema    Other eczema          laparoscopy, endometriosis excision, left ovarian cystectomy

## 2022-06-28 DIAGNOSIS — L20.84 INTRINSIC ATOPIC DERMATITIS: ICD-10-CM

## 2022-06-28 RX ORDER — HYDROXYZINE HYDROCHLORIDE 25 MG/1
25 TABLET, FILM COATED ORAL AT BEDTIME
Qty: 60 TABLET | Refills: 3 | Status: SHIPPED | OUTPATIENT
Start: 2022-06-28 | End: 2023-07-21

## 2022-07-15 ENCOUNTER — TELEPHONE (OUTPATIENT)
Dept: FAMILY MEDICINE | Facility: CLINIC | Age: 57
End: 2022-07-15

## 2022-08-17 ENCOUNTER — VIRTUAL VISIT (OUTPATIENT)
Dept: FAMILY MEDICINE | Facility: CLINIC | Age: 57
End: 2022-08-17
Payer: COMMERCIAL

## 2022-08-17 ENCOUNTER — TELEPHONE (OUTPATIENT)
Dept: FAMILY MEDICINE | Facility: CLINIC | Age: 57
End: 2022-08-17

## 2022-08-17 DIAGNOSIS — Z12.11 SCREEN FOR COLON CANCER: ICD-10-CM

## 2022-08-17 DIAGNOSIS — Z11.59 NEED FOR HEPATITIS C SCREENING TEST: ICD-10-CM

## 2022-08-17 DIAGNOSIS — I10 BENIGN ESSENTIAL HYPERTENSION: ICD-10-CM

## 2022-08-17 DIAGNOSIS — Z11.4 SCREENING FOR HIV (HUMAN IMMUNODEFICIENCY VIRUS): ICD-10-CM

## 2022-08-17 DIAGNOSIS — Z13.220 SCREENING FOR HYPERLIPIDEMIA: ICD-10-CM

## 2022-08-17 PROBLEM — Z71.89 ACP (ADVANCE CARE PLANNING): Chronic | Status: RESOLVED | Noted: 2017-02-10 | Resolved: 2022-08-17

## 2022-08-17 PROBLEM — S82.042A: Status: RESOLVED | Noted: 2021-02-28 | Resolved: 2022-08-17

## 2022-08-17 PROBLEM — R63.4 WEIGHT LOSS: Status: RESOLVED | Noted: 2019-08-12 | Resolved: 2022-08-17

## 2022-08-17 PROBLEM — Z20.822 COVID-19 RULED OUT: Status: RESOLVED | Noted: 2021-03-01 | Resolved: 2022-08-17

## 2022-08-17 PROBLEM — Z98.890 S/P LEFT KNEE SURGERY: Status: RESOLVED | Noted: 2021-03-01 | Resolved: 2022-08-17
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
## 2022-08-17 PROCEDURE — 99213 OFFICE O/P EST LOW 20 MIN: CPT | Mod: 95 | Performed by: FAMILY MEDICINE

## 2022-08-17 RX ORDER — LISINOPRIL 10 MG/1
10 TABLET ORAL DAILY
Qty: 90 TABLET | Refills: 0 | Status: ON HOLD | OUTPATIENT
Start: 2022-08-17 | End: 2023-07-25

## 2022-08-17 RX ORDER — AMLODIPINE BESYLATE 5 MG/1
5 TABLET ORAL 2 TIMES DAILY
Qty: 180 TABLET | Refills: 3 | Status: ON HOLD | OUTPATIENT
Start: 2022-08-17 | End: 2023-07-25

## 2022-08-17 NOTE — PROGRESS NOTES
Delvin is a 57 year old who is being evaluated via a billable telephone visit.      What phone number would you like to be contacted at? [phone]  How would you like to obtain your AVS? Tina Rai   Delvin is a 57 year old presenting for the following health issues:  Hypertension    BP: 5mg bid on norvasc.  Would like refills.  BP up some at home, willing to add med and f/u in a month or so      HPI     Hypertension Follow-up      Do you check your blood pressure regularly outside of the clinic? Yes     Are you following a low salt diet? No    Are your blood pressures ever more than 140 on the top number (systolic) OR more   than 90 on the bottom number (diastolic), for example 140/90? Yes                Objective           Vitals:  No vitals were obtained today due to virtual visit.    Physical Exam   healthy, alert and no distress  PSYCH: Alert and oriented times 3; coherent speech, normal   rate and volume, able to articulate logical thoughts, able   to abstract reason, no tangential thoughts, no hallucinations   or delusions  His affect is normal  RESP: No cough, no audible wheezing, able to talk in full sentences  Remainder of exam unable to be completed due to telephone visits    A: htn, not controlled    P: fills on norvasc.  Add low dose lisinopril.  Back in clinic in a month or so,  Check labs.  Talk about colon cancer screening as well.          Phone call duration: 6 minutes    .  ..

## 2022-11-09 NOTE — LETTER
Transition Communication Hand-off for Care Transitions to Next Level of Care Provider    Name: Delvin Echevarria  MRN #: [id number]  Primary Care Provider: Krystian Streeter     Primary Clinic: [address] [place], [place] [id number]     Reason for Hospitalization:  Knee Abscess  Right tibial abcess  Abscess  Admit Date/Time: 8/10/2017  9:31 PM  Discharge Date: 8/15/17  Payor Source: Payor: BLUE PLUS / Plan: BLUE PLUS MA / Product Type: HMO /            Reason for Communication Hand-off Referral: Other discharge plan    Discharge Plan:  [facility] [phone].  Will be followed by [facility] MD and NP. (Dr Molina and Dede Thomson?) [phone]     Concern for non-adherence with plan of care:   Y/N N  Discharge Needs Assessment:  Needs       Most Recent Value    Equipment Currently Used at Home cane, straight    Transportation Available bicycle            Follow-up plan:  Future Appointments  Date Time Provider Department Center   8/15/2017 4:00 PM Krystian Streeter MD [facility]       Any outstanding tests or procedures:        Referrals     Future Labs/Procedures    Physical Therapy Adult Consult     Comments:    Evaluate and treat as clinically indicated.    Reason:  S/p tibia I&D and hardware removal            SHYAM Reddy/RODNEYW  8A/10A and Adult [facility]  [address] [address] [phone]  [email or web address]    AVS/Discharge Summary is the source of truth; this is a helpful guide for improved communication of patient story          
(4) walks frequently

## 2022-12-26 ENCOUNTER — TELEPHONE (OUTPATIENT)
Dept: FAMILY MEDICINE | Facility: CLINIC | Age: 57
End: 2022-12-26

## 2022-12-26 NOTE — TELEPHONE ENCOUNTER
Patient Quality Outreach    Patient is due for the following:   Hypertension -  Hypertension follow-up visit  Colon Cancer Screening  Physical Preventive Adult Physical    Next Steps:   Schedule a Adult Preventative    Type of outreach:    Phone, left message for patient/parent to call back.      Questions for provider review:    None     Yennifer Dillon MA

## 2023-06-07 ENCOUNTER — TELEPHONE (OUTPATIENT)
Dept: FAMILY MEDICINE | Facility: CLINIC | Age: 58
End: 2023-06-07
Payer: COMMERCIAL

## 2023-06-07 NOTE — LETTER
[facility]  [address] [place] [address]  Phone: [phone]  Fax: [phone]    06/07/23    Delvin Echevarria  [address] [address]      June 7, 2023      Delvin Echevarria  [address] [address]  [facility] [address]    Your team at [facility] cares about your health. We have reviewed your chart and based on our findings; we are making the following recommendations to better manage your health.     You are in particular need of attention regarding the following:     PREVENTATIVE VISIT: Physical and discuss colon cancer screening.    If you have already completed these items, please contact the clinic via phone or   [employer] so your care team can review and update your records. Thank you for   choosing [facility] for your healthcare needs. For any questions,   concerns, or to schedule an appointment please contact our clinic.    Healthy Regards,      Your [facility] Care Team

## 2023-06-07 NOTE — TELEPHONE ENCOUNTER
Patient Quality Outreach    Patient is due for the following:   Colon Cancer Screening  Physical Preventive Adult Physical    Next Steps:   Schedule a Adult Preventative    Type of outreach:    Sent letter.      Questions for provider review:    None           Mishel Servin CMA

## 2023-07-21 ENCOUNTER — APPOINTMENT (OUTPATIENT)
Dept: CT IMAGING | Facility: CLINIC | Age: 58
End: 2023-07-21
Attending: EMERGENCY MEDICINE
Payer: COMMERCIAL

## 2023-07-21 ENCOUNTER — HOSPITAL ENCOUNTER (INPATIENT)
Facility: CLINIC | Age: 58
LOS: 4 days | Discharge: HOME | End: 2023-07-25
Attending: INTERNAL MEDICINE | Admitting: INTERNAL MEDICINE
Payer: COMMERCIAL

## 2023-07-21 ENCOUNTER — HOSPITAL ENCOUNTER (EMERGENCY)
Facility: CLINIC | Age: 58
Discharge: ANOTHER HOSPITAL | End: 2023-07-21
Attending: EMERGENCY MEDICINE | Admitting: EMERGENCY MEDICINE
Payer: COMMERCIAL

## 2023-07-21 VITALS
HEART RATE: 65 BPM | WEIGHT: 145 LBS | TEMPERATURE: 96.9 F | RESPIRATION RATE: 18 BRPM | HEIGHT: 71 IN | OXYGEN SATURATION: 97 % | DIASTOLIC BLOOD PRESSURE: 89 MMHG | SYSTOLIC BLOOD PRESSURE: 119 MMHG | BODY MASS INDEX: 20.3 KG/M2

## 2023-07-21 DIAGNOSIS — I50.9 CONGESTIVE HEART FAILURE, UNSPECIFIED HF CHRONICITY, UNSPECIFIED HEART FAILURE TYPE (H): ICD-10-CM

## 2023-07-21 DIAGNOSIS — S82.042A CLOSED COMMINUTED FRACTURE OF PATELLA, LEFT, INITIAL ENCOUNTER: ICD-10-CM

## 2023-07-21 DIAGNOSIS — J96.01 ACUTE HYPOXEMIC RESPIRATORY FAILURE (H): ICD-10-CM

## 2023-07-21 DIAGNOSIS — R79.89 ELEVATED TROPONIN: ICD-10-CM

## 2023-07-21 DIAGNOSIS — I42.8 NONISCHEMIC CARDIOMYOPATHY (H): Primary | ICD-10-CM

## 2023-07-21 DIAGNOSIS — I21.4 NSTEMI (NON-ST ELEVATED MYOCARDIAL INFARCTION) (H): ICD-10-CM

## 2023-07-21 DIAGNOSIS — R94.31 ABNORMAL ELECTROCARDIOGRAM: ICD-10-CM

## 2023-07-21 DIAGNOSIS — J45.909 UNCOMPLICATED ASTHMA, UNSPECIFIED ASTHMA SEVERITY, UNSPECIFIED WHETHER PERSISTENT: ICD-10-CM

## 2023-07-21 PROBLEM — I16.0 HYPERTENSIVE URGENCY: Status: ACTIVE | Noted: 2023-07-21

## 2023-07-21 LAB
ANION GAP SERPL CALCULATED.3IONS-SCNC: 10 MMOL/L (ref 7–15)
BASE EXCESS BLDV CALC-SCNC: -0.7 MMOL/L (ref -7.7–1.9)
BASE EXCESS BLDV CALC-SCNC: -2.3 MMOL/L (ref -7.7–1.9)
BASOPHILS # BLD AUTO: 0 10E3/UL (ref 0–0.2)
BASOPHILS NFR BLD AUTO: 0 %
BUN SERPL-MCNC: 26.1 MG/DL (ref 8–23)
CALCIUM SERPL-MCNC: 8.7 MG/DL (ref 8.8–10.2)
CHLORIDE SERPL-SCNC: 107 MMOL/L (ref 98–107)
CREAT SERPL-MCNC: 1.28 MG/DL (ref 0.67–1.17)
D DIMER PPP FEU-MCNC: 1.38 UG/ML FEU (ref 0–0.5)
DEPRECATED HCO3 PLAS-SCNC: 22 MMOL/L (ref 22–29)
EOSINOPHIL # BLD AUTO: 0.3 10E3/UL (ref 0–0.7)
EOSINOPHIL NFR BLD AUTO: 3 %
ERYTHROCYTE [DISTWIDTH] IN BLOOD BY AUTOMATED COUNT: 14 % (ref 10–15)
ERYTHROCYTE [DISTWIDTH] IN BLOOD BY AUTOMATED COUNT: 14.1 % (ref 10–15)
GFR SERPL CREATININE-BSD FRML MDRD: 64 ML/MIN/1.73M2
GLUCOSE SERPL-MCNC: 146 MG/DL (ref 70–99)
HCO3 BLDV-SCNC: 25 MMOL/L (ref 21–28)
HCO3 BLDV-SCNC: 25 MMOL/L (ref 21–28)
HCT VFR BLD AUTO: 33.8 % (ref 40–53)
HCT VFR BLD AUTO: 34.4 % (ref 40–53)
HGB BLD-MCNC: 10.9 G/DL (ref 13.3–17.7)
HGB BLD-MCNC: 11.3 G/DL (ref 13.3–17.7)
IMM GRANULOCYTES # BLD: 0.1 10E3/UL
IMM GRANULOCYTES NFR BLD: 1 %
LYMPHOCYTES # BLD AUTO: 1.4 10E3/UL (ref 0.8–5.3)
LYMPHOCYTES NFR BLD AUTO: 14 %
MCH RBC QN AUTO: 30 PG (ref 26.5–33)
MCH RBC QN AUTO: 30.4 PG (ref 26.5–33)
MCHC RBC AUTO-ENTMCNC: 32.2 G/DL (ref 31.5–36.5)
MCHC RBC AUTO-ENTMCNC: 32.8 G/DL (ref 31.5–36.5)
MCV RBC AUTO: 91 FL (ref 78–100)
MCV RBC AUTO: 94 FL (ref 78–100)
MONOCYTES # BLD AUTO: 0.5 10E3/UL (ref 0–1.3)
MONOCYTES NFR BLD AUTO: 5 %
NEUTROPHILS # BLD AUTO: 7.7 10E3/UL (ref 1.6–8.3)
NEUTROPHILS NFR BLD AUTO: 77 %
NRBC # BLD AUTO: 0 10E3/UL
NRBC BLD AUTO-RTO: 0 /100
NT-PROBNP SERPL-MCNC: 3348 PG/ML (ref 0–900)
O2/TOTAL GAS SETTING VFR VENT: 1 %
O2/TOTAL GAS SETTING VFR VENT: 30 %
PCO2 BLDV: 45 MM HG (ref 40–50)
PCO2 BLDV: 51 MM HG (ref 40–50)
PH BLDV: 7.29 [PH] (ref 7.32–7.43)
PH BLDV: 7.35 [PH] (ref 7.32–7.43)
PLATELET # BLD AUTO: 236 10E3/UL (ref 150–450)
PLATELET # BLD AUTO: 238 10E3/UL (ref 150–450)
PO2 BLDV: 42 MM HG (ref 25–47)
PO2 BLDV: 50 MM HG (ref 25–47)
POTASSIUM SERPL-SCNC: 4.5 MMOL/L (ref 3.4–5.3)
RBC # BLD AUTO: 3.59 10E6/UL (ref 4.4–5.9)
RBC # BLD AUTO: 3.77 10E6/UL (ref 4.4–5.9)
SODIUM SERPL-SCNC: 139 MMOL/L (ref 136–145)
TROPONIN T SERPL HS-MCNC: 23 NG/L
TROPONIN T SERPL HS-MCNC: 32 NG/L
TROPONIN T SERPL HS-MCNC: 37 NG/L
TROPONIN T SERPL HS-MCNC: 50 NG/L
UFH PPP CHRO-ACNC: <0.1 IU/ML
WBC # BLD AUTO: 7.6 10E3/UL (ref 4–11)
WBC # BLD AUTO: 9.9 10E3/UL (ref 4–11)

## 2023-07-21 PROCEDURE — 99285 EMERGENCY DEPT VISIT HI MDM: CPT | Mod: 25 | Performed by: EMERGENCY MEDICINE

## 2023-07-21 PROCEDURE — 96366 THER/PROPH/DIAG IV INF ADDON: CPT | Performed by: EMERGENCY MEDICINE

## 2023-07-21 PROCEDURE — 96376 TX/PRO/DX INJ SAME DRUG ADON: CPT | Performed by: EMERGENCY MEDICINE

## 2023-07-21 PROCEDURE — 96361 HYDRATE IV INFUSION ADD-ON: CPT | Performed by: EMERGENCY MEDICINE

## 2023-07-21 PROCEDURE — 250N000011 HC RX IP 250 OP 636: Mod: JZ

## 2023-07-21 PROCEDURE — 93308 TTE F-UP OR LMTD: CPT | Mod: 26 | Performed by: EMERGENCY MEDICINE

## 2023-07-21 PROCEDURE — 93308 TTE F-UP OR LMTD: CPT | Performed by: EMERGENCY MEDICINE

## 2023-07-21 PROCEDURE — 99291 CRITICAL CARE FIRST HOUR: CPT | Mod: 25 | Performed by: EMERGENCY MEDICINE

## 2023-07-21 PROCEDURE — 99233 SBSQ HOSP IP/OBS HIGH 50: CPT | Performed by: INTERNAL MEDICINE

## 2023-07-21 PROCEDURE — 96365 THER/PROPH/DIAG IV INF INIT: CPT | Mod: 59 | Performed by: EMERGENCY MEDICINE

## 2023-07-21 PROCEDURE — 85027 COMPLETE CBC AUTOMATED: CPT

## 2023-07-21 PROCEDURE — 85379 FIBRIN DEGRADATION QUANT: CPT | Performed by: EMERGENCY MEDICINE

## 2023-07-21 PROCEDURE — 250N000013 HC RX MED GY IP 250 OP 250 PS 637

## 2023-07-21 PROCEDURE — 250N000011 HC RX IP 250 OP 636: Mod: JZ | Performed by: EMERGENCY MEDICINE

## 2023-07-21 PROCEDURE — 94660 CPAP INITIATION&MGMT: CPT

## 2023-07-21 PROCEDURE — 210N000002 HC R&B HEART CARE

## 2023-07-21 PROCEDURE — 84484 ASSAY OF TROPONIN QUANT: CPT | Performed by: EMERGENCY MEDICINE

## 2023-07-21 PROCEDURE — 94640 AIRWAY INHALATION TREATMENT: CPT

## 2023-07-21 PROCEDURE — 84484 ASSAY OF TROPONIN QUANT: CPT

## 2023-07-21 PROCEDURE — 250N000009 HC RX 250: Performed by: EMERGENCY MEDICINE

## 2023-07-21 PROCEDURE — 71275 CT ANGIOGRAPHY CHEST: CPT

## 2023-07-21 PROCEDURE — 250N000011 HC RX IP 250 OP 636: Performed by: EMERGENCY MEDICINE

## 2023-07-21 PROCEDURE — 999N000157 HC STATISTIC RCP TIME EA 10 MIN

## 2023-07-21 PROCEDURE — 36415 COLL VENOUS BLD VENIPUNCTURE: CPT | Performed by: EMERGENCY MEDICINE

## 2023-07-21 PROCEDURE — 99223 1ST HOSP IP/OBS HIGH 75: CPT | Performed by: INTERNAL MEDICINE

## 2023-07-21 PROCEDURE — 83880 ASSAY OF NATRIURETIC PEPTIDE: CPT | Performed by: EMERGENCY MEDICINE

## 2023-07-21 PROCEDURE — 85025 COMPLETE CBC W/AUTO DIFF WBC: CPT | Performed by: EMERGENCY MEDICINE

## 2023-07-21 PROCEDURE — 93010 ELECTROCARDIOGRAM REPORT: CPT | Mod: 59 | Performed by: EMERGENCY MEDICINE

## 2023-07-21 PROCEDURE — 258N000003 HC RX IP 258 OP 636: Performed by: EMERGENCY MEDICINE

## 2023-07-21 PROCEDURE — 96374 THER/PROPH/DIAG INJ IV PUSH: CPT | Mod: 59 | Performed by: EMERGENCY MEDICINE

## 2023-07-21 PROCEDURE — 85520 HEPARIN ASSAY: CPT

## 2023-07-21 PROCEDURE — 82803 BLOOD GASES ANY COMBINATION: CPT | Performed by: EMERGENCY MEDICINE

## 2023-07-21 PROCEDURE — 80048 BASIC METABOLIC PNL TOTAL CA: CPT | Performed by: EMERGENCY MEDICINE

## 2023-07-21 PROCEDURE — 99207 PR NO BILLABLE SERVICE THIS VISIT: CPT | Performed by: INTERNAL MEDICINE

## 2023-07-21 PROCEDURE — 36415 COLL VENOUS BLD VENIPUNCTURE: CPT

## 2023-07-21 PROCEDURE — 93005 ELECTROCARDIOGRAM TRACING: CPT | Mod: 59 | Performed by: EMERGENCY MEDICINE

## 2023-07-21 RX ORDER — LIDOCAINE 40 MG/G
CREAM TOPICAL
Status: DISCONTINUED | OUTPATIENT
Start: 2023-07-21 | End: 2023-07-22

## 2023-07-21 RX ORDER — IPRATROPIUM BROMIDE AND ALBUTEROL SULFATE 2.5; .5 MG/3ML; MG/3ML
3 SOLUTION RESPIRATORY (INHALATION) EVERY 4 HOURS PRN
Status: DISCONTINUED | OUTPATIENT
Start: 2023-07-21 | End: 2023-07-21 | Stop reason: HOSPADM

## 2023-07-21 RX ORDER — IPRATROPIUM BROMIDE AND ALBUTEROL SULFATE 2.5; .5 MG/3ML; MG/3ML
3 SOLUTION RESPIRATORY (INHALATION) EVERY 4 HOURS PRN
Status: DISCONTINUED | OUTPATIENT
Start: 2023-07-21 | End: 2023-07-25 | Stop reason: HOSPADM

## 2023-07-21 RX ORDER — NITROGLYCERIN 0.4 MG/1
0.4 TABLET SUBLINGUAL EVERY 5 MIN PRN
Status: DISCONTINUED | OUTPATIENT
Start: 2023-07-21 | End: 2023-07-25 | Stop reason: HOSPADM

## 2023-07-21 RX ORDER — ONDANSETRON 4 MG/1
4 TABLET, ORALLY DISINTEGRATING ORAL EVERY 6 HOURS PRN
Status: DISCONTINUED | OUTPATIENT
Start: 2023-07-21 | End: 2023-07-25 | Stop reason: HOSPADM

## 2023-07-21 RX ORDER — ALBUTEROL SULFATE 0.83 MG/ML
2.5 SOLUTION RESPIRATORY (INHALATION)
Status: DISCONTINUED | OUTPATIENT
Start: 2023-07-21 | End: 2023-07-21 | Stop reason: HOSPADM

## 2023-07-21 RX ORDER — HEPARIN SODIUM 10000 [USP'U]/100ML
0-5000 INJECTION, SOLUTION INTRAVENOUS CONTINUOUS
Status: DISCONTINUED | OUTPATIENT
Start: 2023-07-21 | End: 2023-07-21 | Stop reason: HOSPADM

## 2023-07-21 RX ORDER — ASPIRIN 81 MG/1
81 TABLET ORAL DAILY
Status: DISCONTINUED | OUTPATIENT
Start: 2023-07-22 | End: 2023-07-25 | Stop reason: HOSPADM

## 2023-07-21 RX ORDER — AMOXICILLIN 250 MG
2 CAPSULE ORAL 2 TIMES DAILY PRN
Status: DISCONTINUED | OUTPATIENT
Start: 2023-07-21 | End: 2023-07-25 | Stop reason: HOSPADM

## 2023-07-21 RX ORDER — ASPIRIN 81 MG/1
324 TABLET, CHEWABLE ORAL ONCE
Status: DISCONTINUED | OUTPATIENT
Start: 2023-07-21 | End: 2023-07-21

## 2023-07-21 RX ORDER — FUROSEMIDE 10 MG/ML
40 INJECTION INTRAMUSCULAR; INTRAVENOUS ONCE
Status: COMPLETED | OUTPATIENT
Start: 2023-07-21 | End: 2023-07-21

## 2023-07-21 RX ORDER — FUROSEMIDE 10 MG/ML
40 INJECTION INTRAMUSCULAR; INTRAVENOUS EVERY 12 HOURS
Status: DISCONTINUED | OUTPATIENT
Start: 2023-07-21 | End: 2023-07-23

## 2023-07-21 RX ORDER — NICOTINE 21 MG/24HR
1 PATCH, TRANSDERMAL 24 HOURS TRANSDERMAL DAILY
Status: DISCONTINUED | OUTPATIENT
Start: 2023-07-21 | End: 2023-07-22

## 2023-07-21 RX ORDER — ACETAMINOPHEN 325 MG/1
650 TABLET ORAL EVERY 4 HOURS PRN
Status: DISCONTINUED | OUTPATIENT
Start: 2023-07-21 | End: 2023-07-25 | Stop reason: HOSPADM

## 2023-07-21 RX ORDER — ONDANSETRON 2 MG/ML
4 INJECTION INTRAMUSCULAR; INTRAVENOUS EVERY 6 HOURS PRN
Status: DISCONTINUED | OUTPATIENT
Start: 2023-07-21 | End: 2023-07-25 | Stop reason: HOSPADM

## 2023-07-21 RX ORDER — MAGNESIUM HYDROXIDE/ALUMINUM HYDROXICE/SIMETHICONE 120; 1200; 1200 MG/30ML; MG/30ML; MG/30ML
30 SUSPENSION ORAL EVERY 4 HOURS PRN
Status: DISCONTINUED | OUTPATIENT
Start: 2023-07-21 | End: 2023-07-25 | Stop reason: HOSPADM

## 2023-07-21 RX ORDER — SODIUM CHLORIDE 9 MG/ML
1000 INJECTION, SOLUTION INTRAVENOUS CONTINUOUS
Status: DISCONTINUED | OUTPATIENT
Start: 2023-07-21 | End: 2023-07-21 | Stop reason: HOSPADM

## 2023-07-21 RX ORDER — HEPARIN SODIUM 10000 [USP'U]/100ML
0-5000 INJECTION, SOLUTION INTRAVENOUS CONTINUOUS
Status: DISCONTINUED | OUTPATIENT
Start: 2023-07-21 | End: 2023-07-24

## 2023-07-21 RX ORDER — ALBUTEROL SULFATE 0.83 MG/ML
2.5 SOLUTION RESPIRATORY (INHALATION)
Status: DISCONTINUED | OUTPATIENT
Start: 2023-07-21 | End: 2023-07-25 | Stop reason: HOSPADM

## 2023-07-21 RX ORDER — AMLODIPINE BESYLATE 5 MG/1
5 TABLET ORAL 2 TIMES DAILY
Status: DISCONTINUED | OUTPATIENT
Start: 2023-07-21 | End: 2023-07-24

## 2023-07-21 RX ORDER — IOPAMIDOL 755 MG/ML
62 INJECTION, SOLUTION INTRAVASCULAR ONCE
Status: COMPLETED | OUTPATIENT
Start: 2023-07-21 | End: 2023-07-21

## 2023-07-21 RX ORDER — ACETAMINOPHEN 650 MG/1
650 SUPPOSITORY RECTAL EVERY 4 HOURS PRN
Status: DISCONTINUED | OUTPATIENT
Start: 2023-07-21 | End: 2023-07-25 | Stop reason: HOSPADM

## 2023-07-21 RX ORDER — AMOXICILLIN 250 MG
1 CAPSULE ORAL 2 TIMES DAILY PRN
Status: DISCONTINUED | OUTPATIENT
Start: 2023-07-21 | End: 2023-07-25 | Stop reason: HOSPADM

## 2023-07-21 RX ORDER — CARBOXYMETHYLCELLULOSE SODIUM 5 MG/ML
1 SOLUTION/ DROPS OPHTHALMIC
Status: CANCELLED | OUTPATIENT
Start: 2023-07-21

## 2023-07-21 RX ORDER — POLYETHYLENE GLYCOL 3350 17 G/17G
17 POWDER, FOR SOLUTION ORAL DAILY PRN
Status: DISCONTINUED | OUTPATIENT
Start: 2023-07-21 | End: 2023-07-25 | Stop reason: HOSPADM

## 2023-07-21 RX ADMIN — IOPAMIDOL 62 ML: 755 INJECTION, SOLUTION INTRAVENOUS at 08:55

## 2023-07-21 RX ADMIN — HEPARIN SODIUM 800 UNITS/HR: 10000 INJECTION, SOLUTION INTRAVENOUS at 11:07

## 2023-07-21 RX ADMIN — SODIUM CHLORIDE 95 ML: 9 INJECTION, SOLUTION INTRAVENOUS at 08:56

## 2023-07-21 RX ADMIN — FUROSEMIDE 40 MG: 10 INJECTION, SOLUTION INTRAVENOUS at 11:14

## 2023-07-21 RX ADMIN — NICOTINE 1 PATCH: 21 PATCH, EXTENDED RELEASE TRANSDERMAL at 17:19

## 2023-07-21 RX ADMIN — AMLODIPINE BESYLATE 5 MG: 5 TABLET ORAL at 21:12

## 2023-07-21 RX ADMIN — SODIUM CHLORIDE 500 ML: 9 INJECTION, SOLUTION INTRAVENOUS at 07:08

## 2023-07-21 RX ADMIN — IPRATROPIUM BROMIDE AND ALBUTEROL SULFATE 3 ML: .5; 3 SOLUTION RESPIRATORY (INHALATION) at 07:21

## 2023-07-21 RX ADMIN — FUROSEMIDE 40 MG: 10 INJECTION, SOLUTION INTRAMUSCULAR; INTRAVENOUS at 17:19

## 2023-07-21 ASSESSMENT — ENCOUNTER SYMPTOMS
NEUROLOGICAL NEGATIVE: 1
HEMATOLOGIC/LYMPHATIC NEGATIVE: 1
CARDIOVASCULAR NEGATIVE: 1
ALLERGIC/IMMUNOLOGIC NEGATIVE: 1
PSYCHIATRIC NEGATIVE: 1
EYES NEGATIVE: 1
GASTROINTESTINAL NEGATIVE: 1
MUSCULOSKELETAL NEGATIVE: 1
SHORTNESS OF BREATH: 1
CONSTITUTIONAL NEGATIVE: 1
ENDOCRINE NEGATIVE: 1

## 2023-07-21 ASSESSMENT — ACTIVITIES OF DAILY LIVING (ADL)
ADLS_ACUITY_SCORE: 35

## 2023-07-21 NOTE — MEDICATION SCRIBE - ADMISSION MEDICATION HISTORY
Medication Scribe Admission Medication History    Admission medication history is complete. The information provided in this note is only as accurate as the sources available at the time of the update.    Medication reconciliation/reorder completed by provider prior to medication history? No    Information Source(s): Patient's pharmacy via  outside dispensing pharm    Pertinent Information: pt's only current med appears to amlodipine. PTA medications reviewed with outside dispensing hx. Pt asleep and on bi-pap.     Changes made to PTA medication list:  Added: None  Deleted: diprosone, desonide, monodox, atarax, oxycodone, miralax, prednisone, triamcinolone  Changed: None    Medication Affordability:  Not including over the counter (OTC) medications, was there a time in the past 3 months when you did not take your medications as prescribed because of cost?: Unable to Assess (pt asleep and on bi-pap)    Allergies reviewed with patient and updates made in EHR: unable to assess    Medication History Completed By: Lennie Dee 7/21/2023 12:26 PM    Prior to Admission medications    Medication Sig Last Dose Taking? Auth Provider Long Term End Date   acetaminophen (TYLENOL) 325 MG tablet Take 2 tablets (650 mg) by mouth every 4 hours as needed for other (For optimal non-opioid multimodal pain management to improve pain control.) Unknown Yes Papa Pena MD     amLODIPine (NORVASC) 5 MG tablet Take 1 tablet (5 mg) by mouth 2 times daily Past Month at unknown Yes Delvin Henning MD Yes    aspirin (ASA) 81 MG EC tablet Take 1 tablet (81 mg) by mouth 2 times daily Unknown Yes Papa Pena MD     lisinopril (ZESTRIL) 10 MG tablet Take 1 tablet (10 mg) by mouth daily Unknown at unknown Yes Delvin Henning MD Yes

## 2023-07-21 NOTE — CONSULTS
[facility]    Cardiology Consultation     Date of Admission:  7/21/2023    Review of the result(s) of each unique test - Last ECG troponin CBC BMP CT chest.     Assessment & Plan   Delvin Echevarria is a 58 year old male who was admitted on 7/21/2023.    NSTEMI  Hypertensive urgency  Pulmonary edema from above    Plan and recommendations  Patient has been weaned down to nasal cannula.  Blood pressure is much better now.  Patient has an NSTEMI but troponin is trending down.  No resting symptoms at this time.  He has just received contrast for his CT right now.  Recommend getting an echocardiogram tomorrow.  Keep on IV heparin.  I recommend coronary angiography given the clinical scenario of resting onset chest discomfort coronary calcifications on CT positive troponin and acute pulmonary edema.  I did recommend coronary angiography that we could potentially do this today on Friday evening however the patient wanted to wait to see his echo results before he proceeded.  We will plan on getting echo tomorrow.  Plan on coronary angiography depending on clinical course.  In the interim continue guideline directed medical therapy with aspirin statin beta-blockers.  If blood pressure elevated, okay to start nitroglycerin infusion.    High complexity     ANN MARIE Porter  Staff Cardiologist  [facility]    History of Present Illness   Delvin Echevarria is a 58 year old male who presents with resting onset of chest discomfort.  This is a 58-year-old male patient who does not take medications routinely at home however he has uncontrolled hypertension.  He has been out of hypertension medications for the last 1 month he says.  Denies any prior cardiac history.  He has a family history of bypass surgery in his father.  The patient has a history of ongoing smoking.  He presented to the hospital at [facility] due to resting onset of chest heaviness that started this morning.  In addition to  that he has been dyspneic.  Blood pressure was elevated on arrival.  He was given nitroglycerin with subsequent improvement of chest pain.  He had T wave inversions on ECG.  Troponin was positive however fortunately it is downtrending.  Bedside ER ultrasound was reported to not have any pericardial effusion.  Chest CT PE protocol ruled out PE but showed coronary calcifications.  Was on BiPAP this morning.  Now he has been weaned off to nasal cannula.  Cardiology was consulted as such.    Past Medical History   Past Medical History:   Diagnosis Date     Abscess 8/11/2017     Acute posthemorrhagic anemia 12/14/2016     Anemia due to blood loss, acute 9/30/2016     Closed fracture of right tibia and fibula with routine healing 9/29/2016     DDD (degenerative disc disease), lumbar      Hypertension 3/15/2013     Marijuana use     4x daily     Staph aureus infection 9/13/2017     Tibial plateau fracture 9/11/2016     Tobacco use      Wound infection 9/29/2016       Past Surgical History   Past Surgical History:   Procedure Laterality Date     APPLY EXTERNAL FIXATOR LOWER EXTREMITY Right 09/12/2016    Procedure: APPLY EXTERNAL FIXATOR LOWER EXTREMITY;  Surgeon: John Gonzales MD;  Location: [facility] OR     HERNIORRHAPHY INGUINAL  07/02/2012    Procedure: HERNIORRHAPHY INGUINAL;  Open Repair Right Inguinal Hernia with Mesh;  Surgeon: Avni Maxwell MD;  Location: [facility] OR     INCISION AND DRAINAGE BONE LOWER EXTREMITY, COMBINED Right 08/11/2017    Procedure: COMBINED INCISION AND DRAINAGE BONE LOWER EXTREMITY;  Irrigation and Debridement Right Tibia,  Removal of Hardware;  Surgeon: Kenroy Dos Santos MD;  Location: [facility] OR     KNEE SURGERY Left 03/01/2021    patella fracture repair     LARYNGECTOMY  Several Years ago    Partail removal due to fish bone     OPEN REDUCTION INTERNAL FIXATION PATELLA Left 3/1/2021    Procedure: OPEN REDUCTION INTERNAL FIXATION, FRACTURE, PATELLA;  Surgeon: Papa Pena,  MD;  Location: [facility] OR     OPEN REDUCTION INTERNAL FIXATION TIBIA Right 09/15/2016    Procedure: OPEN REDUCTION INTERNAL FIXATION TIBIA;  Surgeon: Miguel A Green MD;  Location: [facility] OR     OPEN REDUCTION INTERNAL FIXATION TIBIA Right 11/07/2017    Procedure: OPEN REDUCTION INTERNAL FIXATION TIBIA;  Right Tibia Fibula Open Reduction Internal Fixation;  Surgeon: Miguel A Green MD;  [facility] OR     REMOVE HARDWARE LOWER EXTREMITY Right 08/11/2017    Procedure: REMOVE HARDWARE LOWER EXTREMITY;;  Surgeon: Kenroy Dos Santos MD;  Location: [facility] OR       Prior to Admission Medications   Prior to Admission Medications   Prescriptions Last Dose Informant Patient Reported? Taking?   acetaminophen (TYLENOL) 325 MG tablet  Other No No   Sig: Take 2 tablets (650 mg) by mouth every 4 hours as needed for other (For optimal non-opioid multimodal pain management to improve pain control.)   amLODIPine (NORVASC) 5 MG tablet  Other No No   Sig: Take 1 tablet (5 mg) by mouth 2 times daily   aspirin (ASA) 81 MG EC tablet  Other No No   Sig: Take 1 tablet (81 mg) by mouth 2 times daily   lisinopril (ZESTRIL) 10 MG tablet  Other No No   Sig: Take 1 tablet (10 mg) by mouth daily      Facility-Administered Medications: None     Current Facility-Administered Medications   Medication Dose Route Frequency     amLODIPine  5 mg Oral BID     furosemide  40 mg Intravenous Q12H     nicotine  1 patch Transdermal Daily     nicotine   Transdermal Q8H     sodium chloride (PF)  3 mL Intracatheter Q8H     Current Facility-Administered Medications   Medication Last Rate     heparin       - MEDICATION INSTRUCTIONS -       Allergies   Allergies   Allergen Reactions     Seasonal Allergies Other (See Comments)     Congestion sinuses     Codeine Nausea       Social History    reports that he has been smoking. He has a 12.50 pack-year smoking history. He has never used smokeless tobacco. He reports that he does not currently use drugs after  "having used the following drugs: Marijuana. He reports that he does not drink alcohol.    Family History   I have reviewed this patient's family history and updated it with pertinent information if needed.  Family History   Problem Relation Age of Onset     Hypertension Mother      Hypertension Brother           Review of Systems   A comprehensive review of system was performed and is negative other than that noted in the HPI or here.     Physical Exam   Vital Signs with Ranges  Temp:  [96.9  F (36.1  C)-97.9  F (36.6  C)] 97.9  F (36.6  C)  Pulse:  [65-97] 70  Resp:  [14-32] 18  BP: (119-147)/() 138/94  FiO2 (%):  [30 %] 30 %  SpO2:  [91 %-99 %] 96 %  Wt Readings from Last 4 Encounters:   07/21/23 61.5 kg (135 lb 9.6 oz)   07/21/23 65.8 kg (145 lb)   02/28/21 63.2 kg (139 lb 5.3 oz)   02/17/21 63 kg (139 lb)     No intake/output data recorded.      Vitals: BP (!) 138/94   Pulse 70   Temp 97.9  F (36.6  C) (Oral)   Resp 18   Ht 1.803 m (5' 11\")   Wt 61.5 kg (135 lb 9.6 oz)   SpO2 96%   BMI 18.91 kg/m      Physical Exam:   General - Alert and in no acute distress  Respiratory -clear to auscultation  Cardiovascular -normal regular heart sounds of systolic murmur no rubs or gallops no edema  Gastrointestinal - Non distended  Psych - Appropriate affect     No lab results found in last 7 days.    Invalid input(s): TROPONINIES    Recent Labs   Lab 07/21/23  1519 07/21/23  0705   WBC 7.6 9.9   HGB 11.3* 10.9*   MCV 91 94    236   NA  --  139   POTASSIUM  --  4.5   CHLORIDE  --  107   CO2  --  22   BUN  --  26.1*   CR  --  1.28*   GFRESTIMATED  --  64   ANIONGAP  --  10   DINO  --  8.7*   GLC  --  146*     No results for input(s): CHOL, HDL, LDL, TRIG, CHOLHDLRATIO in the last 66430 hours.  Recent Labs   Lab 07/21/23  1519 07/21/23  0705   WBC 7.6 9.9   HGB 11.3* 10.9*   HCT 34.4* 33.8*   MCV 91 94    236     Recent Labs   Lab 07/21/23  1009 07/21/23  0705   PHV 7.35 7.29*   PO2V 42 50*   PCO2V 45 " 51*   HCO3V 25 25     Recent Labs   Lab 07/21/23  0705   NTBNPI 3,348*     Recent Labs   Lab 07/21/23  0705   DD 1.38*     No results for input(s): SED, CRP in the last 168 hours.  Recent Labs   Lab 07/21/23  1519 07/21/23  0705    236     No results for input(s): TSH in the last 168 hours.  No results for input(s): COLOR, APPEARANCE, URINEGLC, URINEBILI, URINEKETONE, SG, UBLD, URINEPH, PROTEIN, UROBILINOGEN, NITRITE, LEUKEST, RBCU, WBCU in the last 168 hours.    Imaging:  Recent Results (from the past 48 hour(s))   CT Chest Pulmonary Embolism w Contrast    Narrative    CT CHEST PULMONARY EMBOLISM WITH CONTRAST July 21, 2023 9:10 AM    CLINICAL HISTORY: Acute respiratory distress. Elevated troponin.  History of tobacco use disorder, hypertension, asthma. Evaluate for  pulmonary embolism versus other acute cardiopulmonary process.    TECHNIQUE: CT angiogram chest during arterial phase injection IV  contrast. 2D and 3D MIP reconstructions were performed by the CT  technologist. Dose reduction techniques were used.  CONTRAST: 62 mL Isovue 370.    COMPARISON: None.    FINDINGS:  ANGIOGRAM CHEST: Pulmonary arteries are normal caliber and negative  for pulmonary emboli. Thoracic aorta is not well opacified and is   indeterminate for dissection. No CT evidence of right heart strain.    LUNGS AND PLEURA: Small volume bilateral pleural effusions and  suspected adjacent atelectasis are noted. Diffuse interlobular septal  thickening is seen with multiple irregular nodular subsolid  ground-glass opacities in the right upper lobe. Upper lobe predominant  centrilobular and paraseptal emphysema is seen. Central airways are  patent. There is moderate peribronchovascular soft tissue thickening,  most notable at the andria.    MEDIASTINUM/AXILLAE: The heart size is enlarged. Thoracic aorta  measures at the upper limits of normal at 3.9 cm in AP dimension. Mild  thoracic aortic atherosclerosis is present. Mild to moderate  coronary  artery atherosclerosis is noted. A few prominent to mildly enlarged  mediastinal and hilar lymph nodes are noted. Largest mediastinal node  is seen in the distal right paratracheal/precarinal region measuring  12 mm in short axis. Largest right hilar lymph node measures 18 mm in  short axis.    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 Moderate to severe atherosclerosis  imaged upper abdominal aorta is noted. Partially imaged possible upper  anterior abdominal wall subcutaneous lipoma versus fat containing  hernia.    MUSCULOSKELETAL: Multilevel hypertrophic degenerative changes of the  spine. Likely chronic compression fracture deformity with anterior  wedging of the T10 vertebral body. Scoliotic curvature of the spine.  No acute osseous abnormality.      Impression    IMPRESSION:  1.  No pulmonary artery embolism.  2.  Findings consistent with cardiogenic pulmonary edema, including  cardiomegaly, small volume pleural fluid with adjacent atelectasis,  and diffuse interlobular septal thickening.  3.  Patchy nodular subsolid ground-glass opacities in the right upper  lobe, which may represent multifocal infectious or inflammatory  etiology and/or edema.    JAHAIRA VENTURA MD         SYSTEM ID:  [id number]   POC US ECHO LIMITED    Impression    [facility] Procedure Note      Limited Bedside ED Cardiac Ultrasound:    PROCEDURE: PERFORMED BY: Dr. Reji Smith MD  INDICATIONS/SYMPTOM:  Shortness of Breath  PROBE: Cardiac phased array probe  BODY LOCATION: Chest  FINDINGS:   The ultrasound was performed utilizing the subcostal and parasternal short axis views.  Cardiac contractility:  Present  Gross estimation of cardiac kinesis: depressed  Pericardial Effusion:  None  RV:LV ratio: LV > RV  IVC:    Diameter:  IVC diameter expiration (IVCe) not measured                                                   IVC diameter inspiration (IVCi)  not measured                                                     Collapsibility:  IVC collapses < 50% with inspiration  INTERPRETATION:    Chamber size and motion were grossly normal with LV > RV, normal cardiac kinesis.    No pericardial effusion was found.  IVC visualized and findings indicate normovolemia.  IMAGE DOCUMENTATION: Images were archived to PACs system.            Echo:  No results found for this or any previous visit (from the past 4320 hour(s)).    Clinically Significant Risk Factors Present on Admission                # Drug Induced Platelet Defect: home medication list includes an antiplatelet medication   # Hypertension: Noted on problem list                This note was completed in part using dictation via the Dragon voice recognition software. Some word and grammatical errors may occur and must be interpreted in the appropriate clinical context.  If there are any questions pertaining to this issue, please contact me for further clarification.

## 2023-07-21 NOTE — ED PROVIDER NOTES
History     Chief Complaint   Patient presents with    Shortness of Breath     HPI  Delvin Echevarria is a 58 year old male who presents by EMS with acute respiratory distress noninvasive ventilation with BiPAP.      Patient's medical records show history of hypertension, tobacco use disorder, marijuana use and history of lumbar nerve root impingement.    Patient's medications were reviewed.    On examination patient reports that he had run out of his blood pressure medications 3 weeks ago  Patient could not recall what he takes for his blood pressure but records show he is prescribed amlodipine 5 mg twice a day and lisinopril 10 mg daily.  EMS providers reported that patient woke up suddenly with shortness of breath and was wheezing and had crackles.  On their arrival patient's blood pressure was greater than 200/130.  He was placed on BiPAP and EKG revealed T wave depression in the lateral leads.  He received an albuterol neb and on arrival in transport his blood pressure was 140/87 with improved work of breathing.  Patient arrived on BiPAP of 11/6 FiO2 of 30%.  Patient admits that he smokes a pack per day. Patient reported no chest pain and no lower extremity swelling.  EMS providers administered aspirin    Allergies:  Allergies   Allergen Reactions    Seasonal Allergies Other (See Comments)     Congestion sinuses    Codeine Nausea       Problem List:    Patient Active Problem List    Diagnosis Date Noted    Lumbar nerve root impingement 01/08/2019     Priority: Medium    Tobacco dependence syndrome 01/08/2019     Priority: Medium    Marijuana abuse 01/08/2019     Priority: Medium    Essential hypertension, benign 03/15/2013     Priority: Medium    Inguinal hernia 06/28/2012     Priority: Medium     Problem list name updated by automated process. Provider to review          Past Medical History:    Past Medical History:   Diagnosis Date    Abscess 8/11/2017    Acute posthemorrhagic anemia 12/14/2016    Anemia  due to blood loss, acute 9/30/2016    Closed fracture of right tibia and fibula with routine healing 9/29/2016    DDD (degenerative disc disease), lumbar     Hypertension 3/15/2013    Marijuana use     Staph aureus infection 9/13/2017    Tibial plateau fracture 9/11/2016    Tobacco use     Wound infection 9/29/2016       Past Surgical History:    Past Surgical History:   Procedure Laterality Date    APPLY EXTERNAL FIXATOR LOWER EXTREMITY Right 09/12/2016    Procedure: APPLY EXTERNAL FIXATOR LOWER EXTREMITY;  Surgeon: John Gonzales MD;  Location: [facility]    HERNIORRHAPHY INGUINAL  07/02/2012    Procedure: HERNIORRHAPHY INGUINAL;  Open Repair Right Inguinal Hernia with Mesh;  Surgeon: Avni Maxwell MD;  Location: [facility]    INCISION AND DRAINAGE BONE LOWER EXTREMITY, COMBINED Right 08/11/2017    Procedure: COMBINED INCISION AND DRAINAGE BONE LOWER EXTREMITY;  Irrigation and Debridement Right Tibia,  Removal of Hardware;  Surgeon: Kenroy Dos Santos MD;  Location: [facility]    KNEE SURGERY Left 03/01/2021    patella fracture repair    LARYNGECTOMY  Several Years ago    Partail removal due to fish bone    OPEN REDUCTION INTERNAL FIXATION PATELLA Left 3/1/2021    Procedure: OPEN REDUCTION INTERNAL FIXATION, FRACTURE, PATELLA;  Surgeon: Papa Pena MD;  Location: [facility]    OPEN REDUCTION INTERNAL FIXATION TIBIA Right 09/15/2016    Procedure: OPEN REDUCTION INTERNAL FIXATION TIBIA;  Surgeon: Miguel A Green MD;  [facility]    OPEN REDUCTION INTERNAL FIXATION TIBIA Right 11/07/2017    Procedure: OPEN REDUCTION INTERNAL FIXATION TIBIA;  Right Tibia Fibula Open Reduction Internal Fixation;  Surgeon: Miguel A Green MD;  [facility]    REMOVE HARDWARE LOWER EXTREMITY Right 08/11/2017    Procedure: REMOVE HARDWARE LOWER EXTREMITY;;  Surgeon: Kenroy Dos Santos MD;  Location: [facility]       Family History:    Family History   Problem Relation Age of Onset    Hypertension  "Mother     Hypertension Brother        Social History:  Marital Status:  Single [1]  Social History     Tobacco Use    Smoking status: Every Day     Packs/day: 0.50     Years: 25.00     Pack years: 12.50     Types: Cigarettes    Smokeless tobacco: Never   Substance Use Topics    Alcohol use: No    Drug use: Not Currently     Types: Marijuana     Comment: Previously smoked 4x daily        Medications:    acetaminophen (TYLENOL) 325 MG tablet  amLODIPine (NORVASC) 5 MG tablet  aspirin (ASA) 81 MG EC tablet  augmented betamethasone dipropionate (DIPROLENE-AF) 0.05 % external cream  augmented betamethasone dipropionate (DIPROLENE-AF) 0.05 % external ointment  betamethasone dipropionate (DIPROSONE) 0.05 % cream  desonide (DESOWEN) 0.05 % external cream  doxycycline monohydrate (MONODOX) 100 MG capsule  hydrOXYzine (ATARAX) 25 MG tablet  hydrOXYzine (ATARAX) 25 MG tablet  lisinopril (ZESTRIL) 10 MG tablet  oxyCODONE (ROXICODONE) 5 MG tablet  polyethylene glycol (MIRALAX) 17 GM/Dose powder  predniSONE (DELTASONE) 10 MG tablet  triamcinolone (KENALOG) 0.1 % cream          Review of Systems   Constitutional: Negative.    HENT: Negative.     Eyes: Negative.    Respiratory:  Positive for shortness of breath.    Cardiovascular: Negative.    Gastrointestinal: Negative.    Endocrine: Negative.    Genitourinary: Negative.    Musculoskeletal: Negative.    Skin: Negative.    Allergic/Immunologic: Negative.    Neurological: Negative.    Hematological: Negative.    Psychiatric/Behavioral: Negative.     All other systems reviewed and are negative.      Physical Exam   BP: (!) 147/93  Pulse: 88  Temp: 96.9  F (36.1  C)  Resp: 14  Height: 180.3 cm (5' 11\")  Weight: 65.8 kg (145 lb)  SpO2: 97 %      Physical Exam  Constitutional:       Appearance: He is not ill-appearing, toxic-appearing or diaphoretic.   HENT:      Head: Normocephalic and atraumatic.   Eyes:      Extraocular Movements: Extraocular movements intact.      Pupils: Pupils " are equal, round, and reactive to light.   Cardiovascular:      Rate and Rhythm: Normal rate and regular rhythm.   Pulmonary:      Effort: Tachypnea present.      Breath sounds: Examination of the left-middle field reveals wheezing. Examination of the right-lower field reveals wheezing. Examination of the left-lower field reveals wheezing. Wheezing present.   Abdominal:      Palpations: There is mass.      Tenderness: There is no guarding.   Musculoskeletal:      Right lower leg: No tenderness. No edema.      Left lower leg: No tenderness. No edema.   Skin:     Capillary Refill: Capillary refill takes less than 2 seconds.      Coloration: Skin is not cyanotic or pale.      Findings: No ecchymosis, erythema or rash.      Nails: There is no clubbing.   Neurological:      General: No focal deficit present.      Mental Status: He is alert and oriented to person, place, and time.   Psychiatric:         Mood and Affect: Mood is anxious.         Behavior: Behavior normal. Behavior is not agitated.         ED Course                 Procedures           EKG Interpretation:      Interpreted by Reji Smith MD  Time reviewed: 0729  Symptoms at time of EKG: shortness of breath   Rhythm: normal sinus   Rate: Normal  Axis: Normal  Ectopy: none  Conduction: normal  ST Segments/ T Waves: ST depression in lead I, and II, V5 and V6.   Q Waves: nonspecific  Comparison to prior: When compared with EKG dated 2/28/2021 T wave changes in high lateral and inferior leads appear new.    Clinical Impression: ST inversion and depression in 1, aVL, V5, V6        Critical Care time:  45 minutes.           ED medications:  Medications   albuterol (PROVENTIL) neb solution 2.5 mg (has no administration in time range)   ipratropium - albuterol 0.5 mg/2.5 mg/3 mL (DUONEB) neb solution 3 mL (3 mLs Nebulization $Given 7/21/23 1758)   sodium chloride 0.9% infusion (has no administration in time range)   heparin loading dose for LOW INTENSITY  TREATMENT * Give BEFORE starting heparin infusion (has no administration in time range)   heparin 25,000 units in 0.45% NaCl 250 mL ANTICOAGULANT infusion (has no administration in time range)   0.9% sodium chloride BOLUS (500 mLs Intravenous $New Bag 7/21/23 0708)   iopamidol (ISOVUE-370) solution 62 mL (62 mLs Intravenous $Given 7/21/23 0855)   sodium chloride 0.9 % bag 500mL for CT scan flush use (95 mLs Intravenous $Given 7/21/23 0856)        ED Vitals:  Vitals:    07/21/23 0708 07/21/23 0721 07/21/23 0800 07/21/23 1024   BP:   130/87    Pulse:  79 84 74   Resp:  20  30   Temp: 96.9  F (36.1  C)      TempSrc: Axillary      SpO2:  98% 99% 99%   Weight:       Height:          Vitals:    07/21/23 1200 07/21/23 1230 07/21/23 1246 07/21/23 1300   BP: 136/88 130/81 (!) 121/102 119/89   Pulse: 71 82 71 65   Resp: 22 20 23 18   Temp:       TempSrc:       SpO2:   97%    Weight:       Height:          ED labs and imaging:  Results for orders placed or performed during the hospital encounter of 07/21/23   CT Chest Pulmonary Embolism w Contrast     Status: None    Narrative    CT CHEST PULMONARY EMBOLISM WITH CONTRAST July 21, 2023 9:10 AM    CLINICAL HISTORY: Acute respiratory distress. Elevated troponin.  History of tobacco use disorder, hypertension, asthma. Evaluate for  pulmonary embolism versus other acute cardiopulmonary process.    TECHNIQUE: CT angiogram chest during arterial phase injection IV  contrast. 2D and 3D MIP reconstructions were performed by the CT  technologist. Dose reduction techniques were used.  CONTRAST: 62 mL Isovue 370.    COMPARISON: None.    FINDINGS:  ANGIOGRAM CHEST: Pulmonary arteries are normal caliber and negative  for pulmonary emboli. Thoracic aorta is not well opacified and is   indeterminate for dissection. No CT evidence of right heart strain.    LUNGS AND PLEURA: Small volume bilateral pleural effusions and  suspected adjacent atelectasis are noted. Diffuse interlobular  septal  thickening is seen with multiple irregular nodular subsolid  ground-glass opacities in the right upper lobe. Upper lobe predominant  centrilobular and paraseptal emphysema is seen. Central airways are  patent. There is moderate peribronchovascular soft tissue thickening,  most notable at the andria.    MEDIASTINUM/AXILLAE: The heart size is enlarged. Thoracic aorta  measures at the upper limits of normal at 3.9 cm in AP dimension. Mild  thoracic aortic atherosclerosis is present. Mild to moderate coronary  artery atherosclerosis is noted. A few prominent to mildly enlarged  mediastinal and hilar lymph nodes are noted. Largest mediastinal node  is seen in the distal right paratracheal/precarinal region measuring  12 mm in short axis. Largest right hilar lymph node measures 18 mm in  short axis.    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 Moderate to severe atherosclerosis  imaged upper abdominal aorta is noted. Partially imaged possible upper  anterior abdominal wall subcutaneous lipoma versus fat containing  hernia.    MUSCULOSKELETAL: Multilevel hypertrophic degenerative changes of the  spine. Likely chronic compression fracture deformity with anterior  wedging of the T10 vertebral body. Scoliotic curvature of the spine.  No acute osseous abnormality.      Impression    IMPRESSION:  1.  No pulmonary artery embolism.  2.  Findings consistent with cardiogenic pulmonary edema, including  cardiomegaly, small volume pleural fluid with adjacent atelectasis,  and diffuse interlobular septal thickening.  3.  Patchy nodular subsolid ground-glass opacities in the right upper  lobe, which may represent multifocal infectious or inflammatory  etiology and/or edema.    JAHAIRA VENTURA MD         SYSTEM ID:  [id number]   POC US ECHO LIMITED     Status: None (In process)    Impression    [facility] Procedure Note      Limited Bedside ED Cardiac  Ultrasound:    PROCEDURE: PERFORMED BY: Dr. Reji Smith MD  INDICATIONS/SYMPTOM:  Shortness of Breath  PROBE: Cardiac phased array probe  BODY LOCATION: Chest  FINDINGS:   The ultrasound was performed utilizing the subcostal and parasternal short axis views.  Cardiac contractility:  Present  Gross estimation of cardiac kinesis: depressed  Pericardial Effusion:  None  RV:LV ratio: LV > RV  IVC:    Diameter:  IVC diameter expiration (IVCe) not measured                                                   IVC diameter inspiration (IVCi) not measured                                                     Collapsibility:  IVC collapses < 50% with inspiration  INTERPRETATION:    Chamber size and motion were grossly normal with LV > RV, normal cardiac kinesis.    No pericardial effusion was found.  IVC visualized and findings indicate normovolemia.  IMAGE DOCUMENTATION: Images were archived to PACs system.        Basic metabolic panel     Status: Abnormal   Result Value Ref Range    Sodium 139 136 - 145 mmol/L    Potassium 4.5 3.4 - 5.3 mmol/L    Chloride 107 98 - 107 mmol/L    Carbon Dioxide (CO2) 22 22 - 29 mmol/L    Anion Gap 10 7 - 15 mmol/L    Urea Nitrogen 26.1 (H) 8.0 - 23.0 mg/dL    Creatinine 1.28 (H) 0.67 - 1.17 mg/dL    Calcium 8.7 (L) 8.8 - 10.2 mg/dL    Glucose 146 (H) 70 - 99 mg/dL    GFR Estimate 64 >60 mL/min/1.73m2   Troponin T, High Sensitivity     Status: Abnormal   Result Value Ref Range    Troponin T, High Sensitivity 23 (H) <=22 ng/L   NT pro BNP     Status: Abnormal   Result Value Ref Range    N terminal Pro BNP Inpatient 3,348 (H) 0 - 900 pg/mL   Blood gas venous     Status: Abnormal   Result Value Ref Range    pH Venous 7.29 (L) 7.32 - 7.43    pCO2 Venous 51 (H) 40 - 50 mm Hg    pO2 Venous 50 (H) 25 - 47 mm Hg    Bicarbonate Venous 25 21 - 28 mmol/L    Base Excess/Deficit (+/-) -2.3 -7.7 - 1.9 mmol/L    FIO2 30    D dimer quantitative     Status: Abnormal   Result Value Ref Range    D-Dimer  Quantitative 1.38 (H) 0.00 - 0.50 ug/mL FEU    Narrative    This D-dimer assay is intended for use in conjunction with a clinical pretest probability assessment model to exclude pulmonary embolism (PE) and deep venous thrombosis (DVT) in outpatients suspected of PE or DVT. The cut-off value is 0.50 ug/mL FEU.   CBC with platelets and differential     Status: Abnormal   Result Value Ref Range    WBC Count 9.9 4.0 - 11.0 10e3/uL    RBC Count 3.59 (L) 4.40 - 5.90 10e6/uL    Hemoglobin 10.9 (L) 13.3 - 17.7 g/dL    Hematocrit 33.8 (L) 40.0 - 53.0 %    MCV 94 78 - 100 fL    MCH 30.4 26.5 - 33.0 pg    MCHC 32.2 31.5 - 36.5 g/dL    RDW 14.1 10.0 - 15.0 %    Platelet Count 236 150 - 450 10e3/uL    % Neutrophils 77 %    % Lymphocytes 14 %    % Monocytes 5 %    % Eosinophils 3 %    % Basophils 0 %    % Immature Granulocytes 1 %    NRBCs per 100 WBC 0 <1 /100    Absolute Neutrophils 7.7 1.6 - 8.3 10e3/uL    Absolute Lymphocytes 1.4 0.8 - 5.3 10e3/uL    Absolute Monocytes 0.5 0.0 - 1.3 10e3/uL    Absolute Eosinophils 0.3 0.0 - 0.7 10e3/uL    Absolute Basophils 0.0 0.0 - 0.2 10e3/uL    Absolute Immature Granulocytes 0.1 <=0.4 10e3/uL    Absolute NRBCs 0.0 10e3/uL   Troponin T, High Sensitivity     Status: Abnormal   Result Value Ref Range    Troponin T, High Sensitivity 50 (H) <=22 ng/L   Blood gas venous     Status: None   Result Value Ref Range    pH Venous 7.35 7.32 - 7.43    pCO2 Venous 45 40 - 50 mm Hg    pO2 Venous 42 25 - 47 mm Hg    Bicarbonate Venous 25 21 - 28 mmol/L    Base Excess/Deficit (+/-) -0.7 -7.7 - 1.9 mmol/L    FIO2 1    CBC with platelets differential     Status: Abnormal    Narrative    The following orders were created for panel order CBC with platelets differential.  Procedure                               Abnormality         Status                     ---------                               -----------         ------                     CBC with platelets and d...[[id number]]  Abnormal            Final  result                 Please view results for these tests on the individual orders.       Assessments & Plan (with Medical Decision Making)   Assessment Summary and Clinical Impression: 58-year-old male who presented by EMS with acute respiratory distress with concern for hypoxemia and hypertensive emergency.  He arrived on noninvasive ventilation with BiPAP.  EMS providers reported patient's blood pressure was greater than 200/130 on arrival he was wheezing and had crackles.  In transport blood pressure improved.  On arrival blood pressure was 140/80 he was on BiPAP-11/7 FiO2 of 30%.  He admitted to tobacco use a pack per day and no chest pain but noting that he has been out of his antihypertensives for the last 3 weeks.  Records show he is prescribed amlodipine and lisinopril.  He had wheezing more pronounced in the left lung base.  He was monitored with frequent vital signs and remained on BiPAP while work-up was broadened to exclude pulmonary edema, hypertensive emergency, asthma exacerbation versus other acute and/or emergent cardiopulmonary process.  CT revealed cardiogenic pulmonary edema with nodular subsolid groundglass opacity in the right upper lobe.  Patient was able to come off noninvasive ventilation  For a brief time but did have some hypoxia at rest saturations were 86% on 2 L.  He required minimal support on noninvasive ventilation but seemed to rest more comfortably and did not require any nitrates.  Bedside point-of-care echocardiogram revealed decreased systolic function with LVH but no large pericardial effusion.  With abnormal EKG on arrival and upward trend in troponin patient is transferred to high-level care for further cardiology evaluation and care.      ED course and Plan:  Reviewed the medical record.  Patient was seen upon arrival rapid assessment due to concern for respiratory failure required noninvasive ventilation.  EKG on arrival and compared with EKG from 2/20/2021 revealed ST  depression with inversion in high lateral and precordial leads.With  history of hypertension and asthma now with acute respiratory distress in the context of being out of his antihypertensives  he was monitored with frequent vital signs.  Chest imaging obtained, blood work and bronchodilators were initially provided he remained initially on BiPAP on arrival although when he was taken off BiPAP briefly his sats were 95% on room air.  Work-up on arrival revealed a positive troponin 23, trended upwards to 50.  Could be demand ischemia.  With EKG changes troponin was trended.  Patient was acidotic and D-dimer was positive at 1.38.  Given history of acute dyspnea requiring noninvasive ventilation on arrival CT chest PE protocol was obtained revealing no evidence of pulmonary embolism.  Cardiogenic pulmonary edema was noted with small volume pleural fluid and diffuse intralobular septal thickening.  There is patchy nodular subsolid groundglass opacities in the right upper lobe which radiology felt to represent multifocal infectious inflammatory etiology or edema.  See details outlined in the radiology report.    BNP- 3348. Patient was able to come off noninvasive ventilation and remained on supplemental oxygen nasal cannula 1 L was 96%.  Repeat venous gas revealed resolution of acidosis   He did not require any nitrate therapy.  Because he is naïve to diuretics and no prior echocardiogram on file and no ability to complete TTE at [facility] until Monday- (7/24/23) bedside point-of-care ultrasound was completed-revealed LVH with reduced EF.  No large pericardial effusion with tamponade physiology.  Patient appears clinically euvolemic.  See images in PACS  With EKG changes and upward trend of troponin- query demand ischemia  I reviewed need to transfer to high-level care with cardiology where a formal TTE can be completed.  Patient was started on IV heparin.    Spoke with Dr Jimenez at 10.56am admitting provider at [facility]. We discussed ED presentation, interventions,work-up and working diagnosis.  We agreed to a one-time dose of IV furosemide prior to transfer. Patient remained on NIV with BiPAP- 12/6, FiO2- 30% during his ED course with some hypoxia after he was taken off NIV but no significant respiratory distress.      Disclaimer: This note consists of symbols derived from keyboarding, dictation and/or voice recognition software. As a result, there may be errors in the script that have gone undetected. Please consider this when interpreting information found in this chart.   I have reviewed the nursing notes.    I have reviewed the findings, diagnosis, plan and need for follow up with the patient.         Medical Decision Making  The patient's presentation was of high complexity (a chronic illness severe exacerbation, progression, or side effect of treatment).    The patient's evaluation involved:  ordering and/or review of 2 test(s) in this encounter (diagnostic imaging and labs)    The patient's management necessitated high risk (a decision regarding hospitalization).        New Prescriptions    No medications on file       Final diagnoses:   Acute hypoxemic respiratory failure (H)   Congestive heart failure, unspecified HF chronicity, unspecified heart failure type (H)   Elevated troponin   Abnormal electrocardiogram       7/21/2023   [facility] EMERGENCY DEPT       Reji Smith MD  07/21/23 0514

## 2023-07-21 NOTE — ED TRIAGE NOTES
"Pt woke up this morning SOB, arrives via EMS on BiPap @ 30% and 11/7, pt was hypertensive for EMS, BG \"in the 280s\". EMS administered 324 mg ASA and one albuterol nebulizer.    "

## 2023-07-21 NOTE — PROGRESS NOTES
Pt is back on the BIPAP per MD request. BIPAP setting of 12/6, 30%. Pt is tolerating well so far. Rt will continue to monitor.

## 2023-07-21 NOTE — PROVIDER NOTIFICATION
Pt placed on BiPAP in ED for support.  12/6, 30%. Pt tolerates well.      BiPAP settings/Parameters:        07/21/23 0700   Tech Time   $Tech Time (10 minute increments) 2   Mode: CPAP/ BiPAP/ AVAPS/ AVAPS AE   CPAP/BiPAP/ AVAPS/ AVAPS AE Mode BiPAP S/T   Equipment   Device V-60   CPAP/BiPAP/Settings   $CPAP/BiPAP Initial completed   BIPAP/CPAP On Standby On   IPAP/EPAP (cmH2O) 12/6   Rate (breaths/min) 14   Oxygen (%) 30   Timed Inspiration (sec) 0.8   IPAP RISE  Settings (V60) 2   CPAP/BiPAP Patient Parameters   IPAP (cm H2O) 12 cmH2O   EPAP (cm H2O) 6 cmH2O   Pressure Support (cm H2O) 6 cmH2O   RR Total (breaths/min) 28 breaths/min   Vt (mL) 700 mL   Minute Ventilation (L/min) 15 L/min   Peak Inspiratory Pressure (cm H2O) 12 cmH2O   Pt.  Leak (L/min) 21 L/min   CPAP/BiPAP/AVAPS/AVAPS AE Alarms   High Pressure (cm H2O) 25 cmH2O   Low Pressure (cm H2O) 5   Low Pressure Delay (sec) 20 sec   Lo Min Vent 2   High Rate (breaths/min) 50 breaths/min   Low Rate (breaths/min) 14   RT Device Skin Assessment   Oxygen Delivery Device CPAP/BiPAP Mask   Interface Face Mask - Medium   Site Appearance neck circumference Clean and dry   Site Appearance bridge of nose Clean and dry   Site Appearance occiput Clean and dry   Strap Tightness Finger Allowance between head and device strap   Device Skin Interventions Taken No adjustments needed

## 2023-07-21 NOTE — PLAN OF CARE
Goal Outcome Evaluation:      Plan of Care Reviewed With: patient      Patient is up with stand by assist, uses urinal at bedside, voiding large amounts, tolerated dinner, denies pain, VSS, on 2 L NC sats 94-96%( oxygen saturation goes down during sleep). On heparin drip. SR.

## 2023-07-21 NOTE — Clinical Note
Removed intact 5-Fu Counseling: 5-Fluorouracil Counseling:  I discussed with the patient the risks of 5-fluorouracil including but not limited to erythema, scaling, itching, weeping, crusting, and pain.

## 2023-07-21 NOTE — H&P
[facility]    History and Physical - Hospitalist Service       Date of Admission:  07/21/23    Assessment & Plan      Delvin Echevarria is a 58 year old male directly admitted from [facility] on 07/21/23 who presents with shortness of breath. He has a past medical history of HTN, asthma and is a current 1 ppd smoker.       Concern for acute new onset congestive heart failure   Hypertensive urgency   [PTA regime 5 mg amlodipine BID and 10 mg lisinopril daily]   EMS /130. He has been off his antihypertensives for 3 weeks as he has run out of medications due to transportation issues. BNP 3,348.  Was given one time 40 mg IV lasix. Weaned off BiPAP to NC on admission to [facility]   -Complete echo orders placed   -Daily weights   -Strict I&O   -Continue lasix 40 mg IV BID   -resume PTA amlodipine and hold PTA lisinopril pending renal function and diuresis     Acute hypoxic respiratory failure secondary to likely cardiogenic pulmonary edema   Respiratory acidosis- resolved   Current 1 ppd cigarette smoker  Hx Asthma    CT chest negative of PE in ED and shows pleural effusion and pulmonary edema. Patchy nodular subsolid ground-glass opacities in the right upper lobe which may represent multifocal infectious or inflammatory etiology and/or edema.  Repeat VBG shows resolution of respiratory acidosis. Weaned off BiPAP (12/6 at 30%) prior to arrival. Does not follow with a Pulmonologist nor is he on long term medications.    -Duoneb q 4 hrs as needed   -Albuterol neb q 2 hrs as needed   -Nicotine patch available    -CPAP for overnight     NSTEMI   EKG SR with nonspecific ST depression +Negative T-waves -Seen with left ventricular hypertrophy (strain) or digitalis effect. Changes in lateral leads. Troponin 23 -->50-->37   - Third troponin draw on arrival to [facility]   - Continue low intensity IV heparin infusion   - Keep NPO pending Cardiology input on further work up  - Consult Cardiology as above      Acute kidney injury   ED Cr. 1.28 Received 500 mL NS in ED.   -Monitor bmp in the morning     Hx of eczema  Multiple old, scabbed scratches to bilateral upper extremities and face. Has been prescribed creams before however not taking any currently.         Diet: NPO for Medical/Clinical Reasons Except for: Meds, Ice Chips  DVT Prophylaxis: Pneumatic Compression Devices  Rivera Catheter: Not present  Lines: None     Cardiac Monitoring: ACTIVE order. Indication: AMI (NSTEMI/ STEMI) (48 hours)  Code Status: Full Code     Clinically Significant Risk Factors Present on Admission                  # Hypertension: Noted on problem list               Disposition Plan      Expected Discharge Date: 07/23/2023             The patient's care was discussed with the Attending Physician, Dr. Sanon.    Keira Ríos NP  Hospitalist Service  [facility]  Securely message with [employer] (more info)  Text page via [facility] Paging/Directory     ______________________________________________________________________    Chief Complaint   Shortness of breath     History is obtained from the patient    History of Present Illness   Delvin Echevarria is a 58 year old male directly admitted from [facility] on 07/21/23 who presents with shortness of breath. He has a past medical history of HTN, asthma and is a current 1 ppd smoker. He awoke from sleep ~2 am gasping for air. He is orthopneic and has not slept in days. Do to transportation issues he was unable to get his prescription antihypertension medications for over 3 weeks. Since being in [facility] ED his breathing has greatly improved with BiPAP and lasix. Denies SOB, chest pain, or pressure at present. He has had normal urine and bowel patterns. No fever or chills. Patient [place] ref[place] the [place].       Past Medical History    Past Medical History:   Diagnosis Date     Abscess 8/11/2017     Acute posthemorrhagic anemia  12/14/2016     Anemia due to blood loss, acute 9/30/2016     Closed fracture of right tibia and fibula with routine healing 9/29/2016     DDD (degenerative disc disease), lumbar      Hypertension 3/15/2013     Marijuana use     4x daily     Staph aureus infection 9/13/2017     Tibial plateau fracture 9/11/2016     Tobacco use      Wound infection 9/29/2016       Past Surgical History   Past Surgical History:   Procedure Laterality Date     APPLY EXTERNAL FIXATOR LOWER EXTREMITY Right 09/12/2016    Procedure: APPLY EXTERNAL FIXATOR LOWER EXTREMITY;  Surgeon: John Gonzales MD;  Location: [facility] OR     HERNIORRHAPHY INGUINAL  07/02/2012    Procedure: HERNIORRHAPHY INGUINAL;  Open Repair Right Inguinal Hernia with Mesh;  Surgeon: Avni Maxwell MD;  Location: [facility] OR     INCISION AND DRAINAGE BONE LOWER EXTREMITY, COMBINED Right 08/11/2017    Procedure: COMBINED INCISION AND DRAINAGE BONE LOWER EXTREMITY;  Irrigation and Debridement Right Tibia,  Removal of Hardware;  Surgeon: Kenroy Dos Santos MD;  Location: [facility] OR     KNEE SURGERY Left 03/01/2021    patella fracture repair     LARYNGECTOMY  Several Years ago    Partail removal due to fish bone     OPEN REDUCTION INTERNAL FIXATION PATELLA Left 3/1/2021    Procedure: OPEN REDUCTION INTERNAL FIXATION, FRACTURE, PATELLA;  Surgeon: Papa Pena MD;  Location: [facility] OR     OPEN REDUCTION INTERNAL FIXATION TIBIA Right 09/15/2016    Procedure: OPEN REDUCTION INTERNAL FIXATION TIBIA;  Surgeon: Miguel A Green MD;  [facility] OR     OPEN REDUCTION INTERNAL FIXATION TIBIA Right 11/07/2017    Procedure: OPEN REDUCTION INTERNAL FIXATION TIBIA;  Right Tibia Fibula Open Reduction Internal Fixation;  Surgeon: Miguel A Green MD;  Location:  OR     REMOVE HARDWARE LOWER EXTREMITY Right 08/11/2017    Procedure: REMOVE HARDWARE LOWER EXTREMITY;;  Surgeon: Kenroy Dos Santos MD;  Location: [facility] OR       Prior to Admission Medications  "  Cannot display prior to admission medications because the patient has not been admitted in this contact.        Review of Systems    The 10 point Review of Systems is negative other than noted in the HPI or here.     Social History   I have reviewed this patient's social history and updated it with pertinent information if needed.  Social History     Tobacco Use     Smoking status: Every Day     Packs/day: 0.50     Years: 25.00     Pack years: 12.50     Types: Cigarettes     Smokeless tobacco: Never   Substance Use Topics     Alcohol use: No     Drug use: Not Currently     Types: Marijuana     Comment: Previously smoked 4x daily       Family History   I have reviewed this patient's family history and updated it with pertinent information if needed.  Family History   Problem Relation Age of Onset     Hypertension Mother      Hypertension Brother         Physical Exam   Vital Signs:BP (!) 138/94   Pulse 70   Temp 97.9  F (36.6  C) (Oral)   Resp 18   Ht 1.803 m (5' 11\")   Wt 61.5 kg (135 lb 9.6 oz)   SpO2 96%   BMI 18.91 kg/m    Weight: 135 lbs 9.6 oz    Physical Exam  Constitutional:       General: He is not in acute distress.     Appearance: He is normal weight. He is not diaphoretic.   HENT:      Head: Normocephalic.      Mouth/Throat:      Mouth: Mucous membranes are dry.   Eyes:      Extraocular Movements: Extraocular movements intact.      Pupils: Pupils are equal, round, and reactive to light.   Cardiovascular:      Rate and Rhythm: Normal rate and regular rhythm.      Pulses: Normal pulses.      Heart sounds: No murmur heard.  Pulmonary:      Effort: Pulmonary effort is normal.      Breath sounds: Examination of the right-middle field reveals rales. Examination of the right-lower field reveals rhonchi and rales. Examination of the left-lower field reveals rales. Rhonchi and rales present.   Abdominal:      General: Bowel sounds are normal.      Palpations: Abdomen is soft.      Hernia: A hernia is " present.       Musculoskeletal:         General: Normal range of motion.      Cervical back: Normal range of motion.      Right lower leg: No edema.      Left lower leg: No edema.   Skin:     General: Skin is warm and dry.      Capillary Refill: Capillary refill takes less than 2 seconds.      Findings: Petechiae present.   Neurological:      Mental Status: He is alert and oriented to person, place, and time.   Psychiatric:         Mood and Affect: Mood normal.         Behavior: Behavior normal.         Thought Content: Thought content normal.           Medical Decision Making       75 MINUTES SPENT BY ME on the date of service doing chart review, history, exam, documentation & further activities per the note.      Data    Recent Labs   Lab 07/21/23  1519 07/21/23  0705   WBC 7.6 9.9   HGB 11.3* 10.9*   HCT 34.4* 33.8*   MCV 91 94    236     Recent Labs   Lab 07/21/23  0705      POTASSIUM 4.5   CHLORIDE 107   CO2 22   ANIONGAP 10   *   BUN 26.1*   CR 1.28*   GFRESTIMATED 64   DINO 8.7*     No results for input(s): TROPONIN, TROPI, TROPR, TROPONINIS in the last 168 hours.    Invalid input(s): TROPT, TROP, TROPONINIES, TNIH        Imaging results reviewed over the past 24 hrs:   Recent Results (from the past 24 hour(s))   CT Chest Pulmonary Embolism w Contrast    Narrative    CT CHEST PULMONARY EMBOLISM WITH CONTRAST July 21, 2023 9:10 AM    CLINICAL HISTORY: Acute respiratory distress. Elevated troponin.  History of tobacco use disorder, hypertension, asthma. Evaluate for  pulmonary embolism versus other acute cardiopulmonary process.    TECHNIQUE: CT angiogram chest during arterial phase injection IV  contrast. 2D and 3D MIP reconstructions were performed by the CT  technologist. Dose reduction techniques were used.  CONTRAST: 62 mL Isovue 370.    COMPARISON: None.    FINDINGS:  ANGIOGRAM CHEST: Pulmonary arteries are normal caliber and negative  for pulmonary emboli. Thoracic aorta is not well  opacified and is   indeterminate for dissection. No CT evidence of right heart strain.    LUNGS AND PLEURA: Small volume bilateral pleural effusions and  suspected adjacent atelectasis are noted. Diffuse interlobular septal  thickening is seen with multiple irregular nodular subsolid  ground-glass opacities in the right upper lobe. Upper lobe predominant  centrilobular and paraseptal emphysema is seen. Central airways are  patent. There is moderate peribronchovascular soft tissue thickening,  most notable at the andria.    MEDIASTINUM/AXILLAE: The heart size is enlarged. Thoracic aorta  measures at the upper limits of normal at 3.9 cm in AP dimension. Mild  thoracic aortic atherosclerosis is present. Mild to moderate coronary  artery atherosclerosis is noted. A few prominent to mildly enlarged  mediastinal and hilar lymph nodes are noted. Largest mediastinal node  is seen in the distal right paratracheal/precarinal region measuring  12 mm in short axis. Largest right hilar lymph node measures 18 mm in  short axis.    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 Moderate to severe atherosclerosis  imaged upper abdominal aorta is noted. Partially imaged possible upper  anterior abdominal wall subcutaneous lipoma versus fat containing  hernia.    MUSCULOSKELETAL: Multilevel hypertrophic degenerative changes of the  spine. Likely chronic compression fracture deformity with anterior  wedging of the T10 vertebral body. Scoliotic curvature of the spine.  No acute osseous abnormality.      Impression    IMPRESSION:  1.  No pulmonary artery embolism.  2.  Findings consistent with cardiogenic pulmonary edema, including  cardiomegaly, small volume pleural fluid with adjacent atelectasis,  and diffuse interlobular septal thickening.  3.  Patchy nodular subsolid ground-glass opacities in the right upper  lobe, which may represent multifocal infectious or  inflammatory  etiology and/or edema.    JAHAIRA VENTURA MD         SYSTEM ID:  [id number]   POC US ECHO LIMITED    [facility] Procedure Note      Limited Bedside ED Cardiac Ultrasound:    PROCEDURE: PERFORMED BY: Dr. Reji Smith MD  INDICATIONS/SYMPTOM:  Shortness of Breath  PROBE: Cardiac phased array probe  BODY LOCATION: Chest  FINDINGS:   The ultrasound was performed utilizing the subcostal and parasternal short axis views.  Cardiac contractility:  Present  Gross estimation of cardiac kinesis: depressed  Pericardial Effusion:  None  RV:LV ratio: LV > RV  IVC:    Diameter:  IVC diameter expiration (IVCe) not measured                                                   IVC diameter inspiration (IVCi) not measured                                                     Collapsibility:  IVC collapses < 50% with inspiration  INTERPRETATION:    Chamber size and motion were grossly normal with LV > RV, normal cardiac kinesis.    No pericardial effusion was found.  IVC visualized and findings indicate normovolemia.  IMAGE DOCUMENTATION: Images were archived to PACs system.

## 2023-07-22 ENCOUNTER — APPOINTMENT (OUTPATIENT)
Dept: CARDIOLOGY | Facility: CLINIC | Age: 58
End: 2023-07-22
Payer: COMMERCIAL

## 2023-07-22 LAB
ANION GAP SERPL CALCULATED.3IONS-SCNC: 15 MMOL/L (ref 7–15)
BUN SERPL-MCNC: 27.2 MG/DL (ref 8–23)
CALCIUM SERPL-MCNC: 8.9 MG/DL (ref 8.8–10.2)
CHLORIDE SERPL-SCNC: 99 MMOL/L (ref 98–107)
CREAT SERPL-MCNC: 1.02 MG/DL (ref 0.67–1.17)
DEPRECATED HCO3 PLAS-SCNC: 24 MMOL/L (ref 22–29)
ERYTHROCYTE [DISTWIDTH] IN BLOOD BY AUTOMATED COUNT: 13.9 % (ref 10–15)
GFR SERPL CREATININE-BSD FRML MDRD: 84 ML/MIN/1.73M2
GLUCOSE SERPL-MCNC: 95 MG/DL (ref 70–99)
HCT VFR BLD AUTO: 37 % (ref 40–53)
HGB BLD-MCNC: 12.1 G/DL (ref 13.3–17.7)
LVEF ECHO: NORMAL
MCH RBC QN AUTO: 30.2 PG (ref 26.5–33)
MCHC RBC AUTO-ENTMCNC: 32.7 G/DL (ref 31.5–36.5)
MCV RBC AUTO: 92 FL (ref 78–100)
PLATELET # BLD AUTO: 248 10E3/UL (ref 150–450)
POTASSIUM SERPL-SCNC: 4 MMOL/L (ref 3.4–5.3)
RBC # BLD AUTO: 4.01 10E6/UL (ref 4.4–5.9)
SODIUM SERPL-SCNC: 138 MMOL/L (ref 136–145)
UFH PPP CHRO-ACNC: 0.11 IU/ML
UFH PPP CHRO-ACNC: 0.29 IU/ML
UFH PPP CHRO-ACNC: 0.33 IU/ML
WBC # BLD AUTO: 8.1 10E3/UL (ref 4–11)

## 2023-07-22 PROCEDURE — 36415 COLL VENOUS BLD VENIPUNCTURE: CPT

## 2023-07-22 PROCEDURE — 93306 TTE W/DOPPLER COMPLETE: CPT | Mod: 26 | Performed by: INTERNAL MEDICINE

## 2023-07-22 PROCEDURE — 85520 HEPARIN ASSAY: CPT | Performed by: INTERNAL MEDICINE

## 2023-07-22 PROCEDURE — 99232 SBSQ HOSP IP/OBS MODERATE 35: CPT | Performed by: INTERNAL MEDICINE

## 2023-07-22 PROCEDURE — 250N000013 HC RX MED GY IP 250 OP 250 PS 637: Performed by: INTERNAL MEDICINE

## 2023-07-22 PROCEDURE — 210N000002 HC R&B HEART CARE

## 2023-07-22 PROCEDURE — 250N000013 HC RX MED GY IP 250 OP 250 PS 637

## 2023-07-22 PROCEDURE — 99232 SBSQ HOSP IP/OBS MODERATE 35: CPT | Mod: 25 | Performed by: INTERNAL MEDICINE

## 2023-07-22 PROCEDURE — 36415 COLL VENOUS BLD VENIPUNCTURE: CPT | Performed by: INTERNAL MEDICINE

## 2023-07-22 PROCEDURE — 85027 COMPLETE CBC AUTOMATED: CPT

## 2023-07-22 PROCEDURE — 80048 BASIC METABOLIC PNL TOTAL CA: CPT

## 2023-07-22 PROCEDURE — 250N000011 HC RX IP 250 OP 636: Mod: JZ

## 2023-07-22 PROCEDURE — 255N000002 HC RX 255 OP 636: Performed by: INTERNAL MEDICINE

## 2023-07-22 PROCEDURE — 999N000208 ECHOCARDIOGRAM COMPLETE

## 2023-07-22 PROCEDURE — 258N000003 HC RX IP 258 OP 636: Performed by: INTERNAL MEDICINE

## 2023-07-22 PROCEDURE — 93005 ELECTROCARDIOGRAM TRACING: CPT

## 2023-07-22 RX ORDER — LIDOCAINE 40 MG/G
CREAM TOPICAL
Status: DISCONTINUED | OUTPATIENT
Start: 2023-07-22 | End: 2023-07-24 | Stop reason: HOSPADM

## 2023-07-22 RX ORDER — NICOTINE 21 MG/24HR
1 PATCH, TRANSDERMAL 24 HOURS TRANSDERMAL DAILY
Status: DISCONTINUED | OUTPATIENT
Start: 2023-07-22 | End: 2023-07-22

## 2023-07-22 RX ORDER — POTASSIUM CHLORIDE 1500 MG/1
20 TABLET, EXTENDED RELEASE ORAL
Status: DISCONTINUED | OUTPATIENT
Start: 2023-07-22 | End: 2023-07-24 | Stop reason: HOSPADM

## 2023-07-22 RX ORDER — SODIUM CHLORIDE 9 MG/ML
INJECTION, SOLUTION INTRAVENOUS CONTINUOUS
Status: DISCONTINUED | OUTPATIENT
Start: 2023-07-22 | End: 2023-07-24 | Stop reason: HOSPADM

## 2023-07-22 RX ORDER — NICOTINE 21 MG/24HR
1 PATCH, TRANSDERMAL 24 HOURS TRANSDERMAL DAILY
Status: DISCONTINUED | OUTPATIENT
Start: 2023-07-22 | End: 2023-07-25 | Stop reason: HOSPADM

## 2023-07-22 RX ORDER — METOPROLOL TARTRATE 25 MG/1
25 TABLET, FILM COATED ORAL 2 TIMES DAILY
Status: DISCONTINUED | OUTPATIENT
Start: 2023-07-23 | End: 2023-07-24

## 2023-07-22 RX ORDER — ROSUVASTATIN CALCIUM 10 MG/1
10 TABLET, COATED ORAL DAILY
Status: DISCONTINUED | OUTPATIENT
Start: 2023-07-22 | End: 2023-07-25 | Stop reason: HOSPADM

## 2023-07-22 RX ADMIN — FUROSEMIDE 40 MG: 10 INJECTION, SOLUTION INTRAMUSCULAR; INTRAVENOUS at 05:29

## 2023-07-22 RX ADMIN — AMLODIPINE BESYLATE 5 MG: 5 TABLET ORAL at 20:52

## 2023-07-22 RX ADMIN — HEPARIN SODIUM 1100 UNITS/HR: 10000 INJECTION, SOLUTION INTRAVENOUS at 05:30

## 2023-07-22 RX ADMIN — ROSUVASTATIN CALCIUM 10 MG: 10 TABLET, FILM COATED ORAL at 15:33

## 2023-07-22 RX ADMIN — FUROSEMIDE 40 MG: 10 INJECTION, SOLUTION INTRAMUSCULAR; INTRAVENOUS at 15:33

## 2023-07-22 RX ADMIN — AMLODIPINE BESYLATE 5 MG: 5 TABLET ORAL at 09:47

## 2023-07-22 RX ADMIN — NICOTINE 1 PATCH: 21 PATCH, EXTENDED RELEASE TRANSDERMAL at 09:47

## 2023-07-22 RX ADMIN — HUMAN ALBUMIN MICROSPHERES AND PERFLUTREN 9 ML: 10; .22 INJECTION, SOLUTION INTRAVENOUS at 11:14

## 2023-07-22 RX ADMIN — ASPIRIN 81 MG: 81 TABLET, COATED ORAL at 09:47

## 2023-07-22 RX ADMIN — SODIUM CHLORIDE 150 ML/HR: 9 INJECTION, SOLUTION INTRAVENOUS at 09:50

## 2023-07-22 RX ADMIN — HEPARIN SODIUM 1250 UNITS/HR: 10000 INJECTION, SOLUTION INTRAVENOUS at 15:53

## 2023-07-22 ASSESSMENT — ACTIVITIES OF DAILY LIVING (ADL)
WERE_AUXILIARY_AIDS_OFFERED?: YES
WEAR_GLASSES_OR_BLIND: NO
ADLS_ACUITY_SCORE: 35
DESCRIBE_HEARING_LOSS: BILATERAL HEARING LOSS
WALKING_OR_CLIMBING_STAIRS_DIFFICULTY: NO
ADLS_ACUITY_SCORE: 36
ADLS_ACUITY_SCORE: 36
THE_FOLLOWING_AIDS_WERE_PROVIDED;: PATIENT DECLINED OFFER OF AUXILIARY AIDS
ADLS_ACUITY_SCORE: 36
ADLS_ACUITY_SCORE: 35
DIFFICULTY_EATING/SWALLOWING: NO
DRESSING/BATHING_DIFFICULTY: NO
ADLS_ACUITY_SCORE: 21
CHANGE_IN_FUNCTIONAL_STATUS_SINCE_ONSET_OF_CURRENT_ILLNESS/INJURY: NO
PATIENT'S_PREFERRED_MEANS_OF_COMMUNICATION: ENGLISH SPEAKER WITH HEARING LOSS, NO SPEECH PROBLEMS.
CONCENTRATING,_REMEMBERING_OR_MAKING_DECISIONS_DIFFICULTY: NO
ADLS_ACUITY_SCORE: 21
ADLS_ACUITY_SCORE: 35
DOING_ERRANDS_INDEPENDENTLY_DIFFICULTY: NO
TOILETING_ISSUES: NO
DIFFICULTY_COMMUNICATING: NO
FALL_HISTORY_WITHIN_LAST_SIX_MONTHS: NO
HEARING_DIFFICULTY_OR_DEAF: YES
ADLS_ACUITY_SCORE: 35
ADLS_ACUITY_SCORE: 21
ADLS_ACUITY_SCORE: 35
ADLS_ACUITY_SCORE: 21

## 2023-07-22 NOTE — PROGRESS NOTES
[facility]    Medicine Progress Note - Hospitalist Service    Date of Admission:  7/21/2023    Assessment & Plan   Delvin Echevarria is a 58 year old male directly admitted from [facility] on 07/21/23 who presents with shortness of breath. He has a past medical history of HTN, asthma and is a current 1 ppd smoker.      Concern for acute new onset congestive heart failure   Acute hypoxic respiratory failure secondary to likely cardiogenic pulmonary edema   Respiratory acidosis- resolved   Hypertensive urgency  [PTA regime 5 mg amlodipine BID and 10 mg lisinopril daily]   EMS /130. He has been off his antihypertensives for 3 weeks as he has run out of medications due to transportation issues. BNP 3,348.  Was given one time 40 mg IV lasix. Weaned off BiPAP to NC on admission to [facility]   EKG SR with nonspecific ST depression +Negative T-waves -Seen with left ventricular hypertrophy (strain) or digitalis effect. Changes in lateral leads. Troponin 23 -->50-->37   - Monitor on telemetry   - Daily weights and strict I&Os   - Continue lasix 40 mg IV BID   - PTA amlodipine   - Holding PTA lisinopril, defer to cardiology when to resume   - Lopressor 25 mg BID   - Heparin drip   - Echo PENDING   - Cardiology following and appreciate their recommendations.  Plan for angiogram today     Respiratory acidosis- resolved   Current 1 ppd cigarette smoker  Hx Asthma    CT chest negative of PE in ED and shows pleural effusion and pulmonary edema. Patchy nodular subsolid ground-glass opacities in the right upper lobe which may represent multifocal infectious or inflammatory etiology and/or edema.  Repeat VBG shows resolution of respiratory acidosis. Weaned off BiPAP (12/6 at 30%) prior to arrival. Does not follow with a Pulmonologist nor is he on long term medications.    - Duoneb q 4 hrs as needed   -  Albuterol neb q 2 hrs as needed   - Nicotine patch available    - CPAP for overnight      Mild DONTE  In  ED Cr. 1.28.  Received 500 mL NS in ED and Cr now 1.02.   - Monitor      Hx of eczema  Multiple old, scabbed scratches to bilateral upper extremities and face. Has been prescribed creams before however not taking any currently.        Diet: NPO for Medical/Clinical Reasons Except for: Meds  NPO for Medical/Clinical Reasons Except for: Meds    DVT Prophylaxis: Heparin drip   Rivera Catheter: Not present  Lines: None     Cardiac Monitoring: ACTIVE order. Indication: AMI (NSTEMI/ STEMI) (48 hours)  Code Status: Full Code      Clinically Significant Risk Factors Present on Admission                # Drug Induced Platelet Defect: home medication list includes an antiplatelet medication   # Hypertension: Noted on problem list               Disposition Plan     Expected Discharge Date: 07/23/2023                  Jonatan Hua DO  Hospitalist Service  [facility]  Securely message with [employer] (more info)  Text page via [employer] Paging/Directory   ______________________________________________________________________    Interval History   Patient seen and examined.  No acute events over night.  No fevers or chills.  No chest pain or SOB.  No nausea or vomiting.      Physical Exam   Vital Signs: Temp: 97.3  F (36.3  C) Temp src: Oral BP: (!) 146/109 Pulse: 75   Resp: 16 SpO2: 96 % O2 Device: Nasal cannula Oxygen Delivery: 2 LPM  Weight: 132 lbs 7.94 oz    General Appearance: Resting comfortably. NAD  Respiratory: Clear to auscultation.  No respiratory distress  Cardiovascular: RRR.  Appears adequately perfused at this time  GI: Soft.  Non-distended   Skin: No obvious rashes or cyanosis  Other: Alert.  Moving all extremities grossly      Medical Decision Making       55 MINUTES SPENT BY ME on the date of service doing chart review, history, exam, documentation & further activities per the note.      Data   ------------------------- PAST 24 HR DATA REVIEWED  -----------------------------------------------    I have personally reviewed the following data over the past 24 hrs:    8.1  \   12.1 (L)   / 248     138 99 27.2 (H) /  95   4.0 24 1.02 \       Trop: 32 (H) BNP: N/A       Imaging results reviewed over the past 24 hrs:   No results found for this or any previous visit (from the past 24 hour(s)).

## 2023-07-22 NOTE — UTILIZATION REVIEW
Admission Status; Secondary Review Determination       Under the authority of the Utilization Management Committee, the utilization review process indicated a secondary review on the above patient. The review outcome is based on review of the medical records, discussions with staff, and applying clinical experience noted on the date of the review.     (x) Inpatient Status Appropriate - This patient's medical care is consistent with medical management for inpatient care and reasonable inpatient medical practice.     RATIONALE FOR DETERMINATION    Patient requires inpatient admission versus short stay observation or outpatient treatment for the following reasons:    58-year-old man with past medical history significant for hypertension, asthma and smoker of 1 pack a day called because he suddenly woke up with shortness of breath and wheezes.  The ambulance found the blood pressure greater than 200/130 and hypoxemic, he was placed on BiPAP 11/6 FiO2 of 30% and emergency room for evaluation.  In emergency room the troponin T has been between 32 and 60, BNP elevated at 3348 and DONTE with creatinine of 1.28 (prior creatinine of 0.71).  EKG with evidence of left ventricular hypertrophy/strain in lateral leads, CXR findings of IMPRESSION: cardiogenic pulmonary edema, cardiomegaly, small volume pleural fluid an RUL opacities.  57 y/o man admitted with non-STEMI, hypertensive urgency, acute congestive heart failure with volume overload, pulmonary edema requires IV furosemide twice a day with close monitoring of the volume status, electrolytes and creatinine.  The patient is scheduled for coronary angiogram for further evaluation of the shortness of breath.  Overnight the patient was forgetful, lethargic.  Still hypoxemic requiring 2 days of oxygen.    The expected length of stay at the time of admission was more than 2 nights because of the severity of illness, intensity of service provided, and risk for adverse outcome.  Inpatient admission is appropriate.       This document was produced using voice recognition software       The information on this document is developed by the utilization review team in order for the business office to ensure compliance. This only denotes the appropriateness of proper admission status and does not reflect the quality of care rendered.   The definitions of Inpatient Status and Observation Status used in making the determination above are those provided in the CMS Coverage Manual, Chapter 1 and Chapter 6, section 70.4.   Sincerely,   ANTON MALDONADO MD   Utilization Review  Physician Advisor  [facility]

## 2023-07-22 NOTE — PLAN OF CARE
Goal Outcome Evaluation:      Plan of Care Reviewed With: patient      Patient is up in the room with stand by assist tolerated food denies pain, VSS, RA all day on heparin drip. Waiting for angiogram on lasix IV push.

## 2023-07-22 NOTE — PLAN OF CARE
A&OX4, forgetful, hypertensive, lethargic, 2 L oxymask, Tele-SR. Heparin gtt infusing at 1100 units/hr. Up with SBA, on cardiac diet.

## 2023-07-23 LAB
ANION GAP SERPL CALCULATED.3IONS-SCNC: 14 MMOL/L (ref 7–15)
BUN SERPL-MCNC: 34.1 MG/DL (ref 8–23)
CALCIUM SERPL-MCNC: 9.4 MG/DL (ref 8.8–10.2)
CHLORIDE SERPL-SCNC: 97 MMOL/L (ref 98–107)
CHOLEST SERPL-MCNC: 152 MG/DL
CREAT SERPL-MCNC: 1.02 MG/DL (ref 0.67–1.17)
DEPRECATED HCO3 PLAS-SCNC: 24 MMOL/L (ref 22–29)
GFR SERPL CREATININE-BSD FRML MDRD: 84 ML/MIN/1.73M2
GLUCOSE SERPL-MCNC: 110 MG/DL (ref 70–99)
HDLC SERPL-MCNC: 37 MG/DL
LDLC SERPL CALC-MCNC: 99 MG/DL
NONHDLC SERPL-MCNC: 115 MG/DL
POTASSIUM SERPL-SCNC: 4.1 MMOL/L (ref 3.4–5.3)
SODIUM SERPL-SCNC: 135 MMOL/L (ref 136–145)
TRIGL SERPL-MCNC: 78 MG/DL
UFH PPP CHRO-ACNC: 0.35 IU/ML

## 2023-07-23 PROCEDURE — 250N000011 HC RX IP 250 OP 636: Mod: JZ | Performed by: INTERNAL MEDICINE

## 2023-07-23 PROCEDURE — 250N000013 HC RX MED GY IP 250 OP 250 PS 637

## 2023-07-23 PROCEDURE — 250N000013 HC RX MED GY IP 250 OP 250 PS 637: Performed by: INTERNAL MEDICINE

## 2023-07-23 PROCEDURE — 85520 HEPARIN ASSAY: CPT | Performed by: FAMILY MEDICINE

## 2023-07-23 PROCEDURE — 36415 COLL VENOUS BLD VENIPUNCTURE: CPT | Performed by: FAMILY MEDICINE

## 2023-07-23 PROCEDURE — 80048 BASIC METABOLIC PNL TOTAL CA: CPT | Performed by: INTERNAL MEDICINE

## 2023-07-23 PROCEDURE — 250N000011 HC RX IP 250 OP 636: Mod: JZ

## 2023-07-23 PROCEDURE — 210N000002 HC R&B HEART CARE

## 2023-07-23 PROCEDURE — 99232 SBSQ HOSP IP/OBS MODERATE 35: CPT | Performed by: INTERNAL MEDICINE

## 2023-07-23 PROCEDURE — 80061 LIPID PANEL: CPT | Performed by: INTERNAL MEDICINE

## 2023-07-23 RX ORDER — BETAMETHASONE DIPROPIONATE 0.5 MG/G
CREAM TOPICAL 2 TIMES DAILY PRN
COMMUNITY
End: 2024-03-25

## 2023-07-23 RX ORDER — BETAMETHASONE DIPROPIONATE 0.5 MG/G
LOTION TOPICAL 2 TIMES DAILY
Status: ON HOLD | COMMUNITY
End: 2023-07-23

## 2023-07-23 RX ORDER — FUROSEMIDE 10 MG/ML
40 INJECTION INTRAMUSCULAR; INTRAVENOUS DAILY
Status: DISCONTINUED | OUTPATIENT
Start: 2023-07-23 | End: 2023-07-24

## 2023-07-23 RX ADMIN — HEPARIN SODIUM 1250 UNITS/HR: 10000 INJECTION, SOLUTION INTRAVENOUS at 02:17

## 2023-07-23 RX ADMIN — ROSUVASTATIN CALCIUM 10 MG: 10 TABLET, FILM COATED ORAL at 09:18

## 2023-07-23 RX ADMIN — AMLODIPINE BESYLATE 5 MG: 5 TABLET ORAL at 09:18

## 2023-07-23 RX ADMIN — NICOTINE 1 PATCH: 21 PATCH, EXTENDED RELEASE TRANSDERMAL at 09:18

## 2023-07-23 RX ADMIN — ASPIRIN 81 MG: 81 TABLET, COATED ORAL at 09:18

## 2023-07-23 RX ADMIN — AMLODIPINE BESYLATE 5 MG: 5 TABLET ORAL at 20:49

## 2023-07-23 RX ADMIN — METOPROLOL TARTRATE 25 MG: 25 TABLET, FILM COATED ORAL at 20:49

## 2023-07-23 RX ADMIN — HEPARIN SODIUM 1250 UNITS/HR: 10000 INJECTION, SOLUTION INTRAVENOUS at 20:54

## 2023-07-23 RX ADMIN — SENNOSIDES AND DOCUSATE SODIUM 1 TABLET: 50; 8.6 TABLET ORAL at 09:21

## 2023-07-23 RX ADMIN — FUROSEMIDE 40 MG: 10 INJECTION, SOLUTION INTRAMUSCULAR; INTRAVENOUS at 09:19

## 2023-07-23 RX ADMIN — FUROSEMIDE 40 MG: 10 INJECTION, SOLUTION INTRAMUSCULAR; INTRAVENOUS at 05:24

## 2023-07-23 RX ADMIN — METOPROLOL TARTRATE 25 MG: 25 TABLET, FILM COATED ORAL at 09:18

## 2023-07-23 ASSESSMENT — ACTIVITIES OF DAILY LIVING (ADL)
ADLS_ACUITY_SCORE: 20
ADLS_ACUITY_SCORE: 21
ADLS_ACUITY_SCORE: 20
ADLS_ACUITY_SCORE: 21
ADLS_ACUITY_SCORE: 20
ADLS_ACUITY_SCORE: 21
ADLS_ACUITY_SCORE: 20
ADLS_ACUITY_SCORE: 20

## 2023-07-23 NOTE — PROGRESS NOTES
[facility] Cardiology Progress Note      Subjective   58-year-old male who presented with hypertensive urgency and an NSTEMI.  Now found to have an LVEF of 30 to 35%.    Overnight no major events, without chest pain this morning.    Objective     VITAL SIGNS  Temp:  [97.3  F (36.3  C)-98.2  F (36.8  C)] 98.2  F (36.8  C)  Pulse:  [69-89] 72  Resp:  [16] 16  BP: (125-150)/() 136/88  Cuff Mean (mmHg):  [99] 99  FiO2 (%):  [1 %] 1 %  SpO2:  [90 %-100 %] 95 %  Admission Weight: 61.5 kg (135 lb 9.6 oz)  Current Weight: 58.2 kg (128 lb 4.9 oz)    PHYSICAL EXAMINATION  General: Cachectic.  No acute distress. Alert and oriented x 3.  Pleasant and cooperative.  HEENT:  Extraocular movements grossly intact. Sclera Anicteric  Cardiovascular:  Regular rate and rhythm.  S1/S2. No murmurs  Lungs:  Normal work of breathing. Clear to auscultation bilaterally.   Abdomen:  Soft, nontender, midline hernia noted  Extremities:  Warm, well perfused. 2+ radial pulses bilaterally.  No significant edema.   Neuro: Moving all extremities, no gross deficits noted    DIAGNOSTICS    Most Recent 3 CBC's:  Recent Labs   Lab Test 07/22/23  0252 07/21/23  1519 07/21/23  0705   WBC 8.1 7.6 9.9   HGB 12.1* 11.3* 10.9*   MCV 92 91 94    238 236     Most Recent 3 BMP's:  Recent Labs   Lab Test 07/23/23  0435 07/22/23  0251 07/21/23  0705   * 138 139   POTASSIUM 4.1 4.0 4.5   CHLORIDE 97* 99 107   CO2 24 24 22   BUN 34.1* 27.2* 26.1*   CR 1.02 1.02 1.28*   ANIONGAP 14 15 10   DINO 9.4 8.9 8.7*   * 95 146*     Most Recent 3 Troponin's:No lab results found.  Most Recent 3 BNP's:  Recent Labs   Lab Test 07/21/23  0705   NTBNPI 3,348*     Most Recent Cholesterol Panel:No lab results found.      Assessment & Plan     Mr. Echevarria is a pleasant 58 year old male who presented with hypertensive urgency and an NSTEMI.  He has been initiated on heparin and aspirin, in addition to amlodipine  for blood pressure.  Volume overloaded, diuresing with Lasix.  TTE demonstrated a reduced LVEF at 30 to 35% with moderate to severe global hypokinesia.    # NSTEMI  # Ischemic Cardiomyopathy  # HFrEF, LVEF 30-35%  # Pulmonary edema, improving  # Hypertensive urgency, improved    Plans for coronary angiogram plus or minus PCI tomorrow.  We discussed the potential outcomes.  We will back off on his diuresis, continue with aspirin, heparin, metoprolol, statin.  I would resume his lisinopril after the angiogram.  NPO after midnight tonight    Plan:  - Coronary Angiogram +/- PCI on Monday, consent obtained; NPO after midnight tonight  - Continue Asa/Heparin  - Amlodipine, Metoprolol  - Decreasing Lasix to daily, Strict I/O's, daily weights  - Smoking cessation  - Will need cardiac rehab      Russ Stone MD  7/23/2023      Medical Decision Making       30 MINUTES SPENT BY ME on the date of service doing chart review, history, exam, documentation & further activities per the note.

## 2023-07-23 NOTE — PROGRESS NOTES
A&O x 4, calm and cooperative for cares. VSS on 1LNC, saturation low 90s. Up SBA, using urinal voiding well. Heparin infusing at 1250 units/hr. Tele SR. Plan for Angio on Monday.

## 2023-07-23 NOTE — PROGRESS NOTES
[facility]    Medicine Progress Note - Hospitalist Service    Date of Admission:  7/21/2023    Assessment & Plan   Delvin Echevarria is a 58 year old male directly admitted from [facility] on 07/21/23 who presents with shortness of breath. He has a past medical history of HTN, asthma and is a current 1 ppd smoker.      Newly found HFrEF (EF 30-35%) with decompensation   Acute hypoxic respiratory failure secondary to above. Resolved   Respiratory acidosis. Resolved   Possible NSTEMI   Hypertensive urgency  [PTA regime 5 mg amlodipine BID and 10 mg lisinopril daily]   EMS /130. He has been off his antihypertensives for 3 weeks as he has run out of medications due to transportation issues. BNP 3,348.  Was given one time 40 mg IV lasix. Weaned off BiPAP to NC on admission to [facility]   EKG SR with nonspecific ST depression +Negative T-waves -Seen with left ventricular hypertrophy (strain) or digitalis effect. Changes in lateral leads. Troponin 23 -->50-->37   - Monitor on telemetry   - Daily weights and strict I&Os   - Lasix 40 mg IV daily  - PTA amlodipine   - Holding PTA lisinopril, defer to cardiology when to resume   - Lopressor 25 mg BID   - Crestor 10 mg   - Heparin drip   - Echo PENDING   - Cardiology following and appreciate their recommendations. Plan for angiogram on Monday     Respiratory acidosis- resolved   Current 1 ppd cigarette smoker  Hx Asthma    CT chest negative of PE in ED and shows pleural effusion and pulmonary edema. Patchy nodular subsolid ground-glass opacities in the right upper lobe which may represent multifocal infectious or inflammatory etiology and/or edema.  Repeat VBG shows resolution of respiratory acidosis. Weaned off BiPAP (12/6 at 30%) prior to arrival. Does not follow with a Pulmonologist nor is he on long term medications.    - Duoneb q 4 hrs as needed   -  Albuterol neb q 2 hrs as needed   - Nicotine patch available    - CPAP for overnight      Mild  "DONTE  In ED Cr. 1.28.  Received 500 mL NS in ED and Cr now 1.02.   - Monitor      Hx of eczema  Multiple old, scabbed scratches to bilateral upper extremities and face. Has been prescribed creams before however not taking any currently.          Diet: Low Saturated Fat Na <2400 mg    DVT Prophylaxis: Heparin drip  Rivera Catheter: Not present  Lines: None     Cardiac Monitoring: ACTIVE order. Indication: AMI (NSTEMI/ STEMI) (48 hours)  Code Status: Full Code      Clinically Significant Risk Factors                  # Hypertension: Noted on problem list  # Acute heart failure with reduced ejection fraction: last echo with EF <40% and receiving IV diuretics       # Cachexia: Estimated body mass index is 17.9 kg/m  as calculated from the following:    Height as of this encounter: 1.803 m (5' 11\").    Weight as of this encounter: 58.2 kg (128 lb 4.9 oz)., PRESENT ON ADMISSION          Disposition Plan      Expected Discharge Date: 07/25/2023                  Jonatan Hua DO  Hospitalist Service  [facility]  Securely message with [employer] (more info)  Text page via [employer] Paging/Directory   ______________________________________________________________________    Interval History   Patient seen and examined.  No acute events over night.  No chest pain or trouble breathing at this time. No nausea or vomiting.  Tolerating PO     Physical Exam   Vital Signs: Temp: 98  F (36.7  C) Temp src: Oral BP: (!) 133/91 Pulse: 79   Resp: 16 SpO2: 96 % O2 Device: Nasal cannula Oxygen Delivery: 1 LPM  Weight: 128 lbs 4.92 oz    General Appearance: Resting comfortably. NAD  Respiratory: Lungs sound clear.  No respiratorty distress  Cardiovascular: RRR.  Appears adequately perfused  GI: Soft. Non-distended  Skin: No obvious rashes or cyanosis  Other: Alert.  No edema      Medical Decision Making       45 MINUTES SPENT BY ME on the date of service doing chart review, history, exam, documentation & further " activities per the note.      Data   ------------------------- PAST 24 HR DATA REVIEWED -----------------------------------------------    I have personally reviewed the following data over the past 24 hrs:    N/A  \   N/A   / N/A     135 (L) 97 (L) 34.1 (H) /  110 (H)   4.1 24 1.02 \       Imaging results reviewed over the past 24 hrs:   Recent Results (from the past 24 hour(s))   Echocardiogram Complete   Result Value    LVEF  30-35%    Narrative    [id number]  [facility]  [id number]  [id number]^KAYLAN^DANIE     [facility]  Echocardiography Laboratory  [address] [place] [id number]     Name: VINH CATALAN  MRN: [id number]  : 1965  Study Date: 2023 10:55 AM  Age: 58 yrs  Gender: Male  Patient Location: [facility]  Reason For Study: Respiratory Failure  Ordering Physician: SUZY KIMBROUGH  Referring Physician: RAMÓN ROMAN  Performed By: Meghan Reddy     BSA: 1.8 m2  Height: 71 in  Weight: 135 lb  HR: 75  BP: 147/109 mmHg  ______________________________________________________________________________  Procedure  Complete Echo Adult. Optison (NDC #[id number]) given intravenously.  ______________________________________________________________________________  Interpretation Summary     Left ventricular systolic function is moderate to severely reduced.  The visual ejection fraction is 30-35%.  There is mod-severe global hypokinesia of the left ventricle.  There is no comparison study available.  ______________________________________________________________________________  Left Ventricle  (The upper limit of normal is 5.6cm for left ventricular size in end-  diastole.). Grade I or early diastolic dysfunction. Left ventricular systolic  function is moderate to severely reduced. The visual ejection fraction is 30-  35%. There is mod-severe global hypokinesia of the left ventricle.     Right Ventricle  The right ventricle is normal in structure, function and  size.     Atria  Normal left atrial size. Right atrium not well visualized. Right atrial size  is normal.     Mitral Valve  The mitral valve is normal in structure and function. There is no mitral  regurgitation noted. There is no mitral valve stenosis.     Tricuspid Valve  The tricuspid valve is not well visualized, but is grossly normal. Right  ventricular systolic pressure could not be approximated due to inadequate  tricuspid regurgitation.     Aortic Valve  The aortic valve is not well visualized. No aortic regurgitation is present.  No hemodynamically significant valvular aortic stenosis.     Pulmonic Valve  The pulmonic valve is not well visualized.     Vessels  Mild aortic root dilatation. The inferior vena cava is normal.     Pericardium  There is no pericardial effusion.     ______________________________________________________________________________  MMode/2D Measurements & Calculations  IVSd: 1.1 cm  LVIDd: 5.6 cm  LVIDs: 5.1 cm  LVPWd: 1.0 cm  FS: 8.3 %  LV mass(C)d: 234.3 grams  LV mass(C)dI: 131.3 grams/m2     Ao root diam: 4.0 cm  LA dimension: 2.7 cm  asc Aorta Diam: 3.5 cm  LA/Ao: 0.67  LVOT diam: 2.0 cm  LVOT area: 3.3 cm2  RV Base: 3.9 cm  RWT: 0.36  TAPSE: 1.8 cm     Doppler Measurements & Calculations  MV E max roberto: 43.4 cm/sec  MV A max roberto: 57.7 cm/sec  MV E/A: 0.75  MV dec time: 0.23 sec  Ao V2 max: 91.9 cm/sec  Ao max PG: 3.0 mmHg  Ao V2 mean: 62.1 cm/sec  Ao mean P.0 mmHg  Ao V2 VTI: 19.1 cm  SOHAIL(I,D): 2.9 cm2  SOHAIL(V,D): 3.3 cm2  LV V1 max PG: 3.4 mmHg  LV V1 max: 92.6 cm/sec  LV V1 VTI: 16.7 cm  SV(LVOT): 55.1 ml  SI(LVOT): 30.9 ml/m2  PA acc time: 0.15 sec  AV Roberto Ratio (DI): 1.0  SOHAIL Index (cm2/m2): 1.6     E/E' av.1  Lateral E/e': 7.7  Medial E/e': 10.5  RV S Roberto: 13.3 cm/sec     ______________________________________________________________________________  Report approved by: Denny Sullivan 2023 02:05 PM

## 2023-07-23 NOTE — PROGRESS NOTES
A&O x4. VSS on room air. Tele SR. Denies CP/SOB/pain. Up with standby assist. Heparin infusing at 1250 units/ recheck hep 10A tomorrow. Plan for NPO at 0000, angiogram tomorrow.

## 2023-07-24 LAB
ACT BLD: 155 SECONDS (ref 74–150)
ERYTHROCYTE [DISTWIDTH] IN BLOOD BY AUTOMATED COUNT: 13.8 % (ref 10–15)
HCT VFR BLD AUTO: 40.1 % (ref 40–53)
HGB BLD-MCNC: 13.5 G/DL (ref 13.3–17.7)
MCH RBC QN AUTO: 29.9 PG (ref 26.5–33)
MCHC RBC AUTO-ENTMCNC: 33.7 G/DL (ref 31.5–36.5)
MCV RBC AUTO: 89 FL (ref 78–100)
PLATELET # BLD AUTO: 331 10E3/UL (ref 150–450)
RBC # BLD AUTO: 4.51 10E6/UL (ref 4.4–5.9)
UFH PPP CHRO-ACNC: 0.33 IU/ML
WBC # BLD AUTO: 7.4 10E3/UL (ref 4–11)

## 2023-07-24 PROCEDURE — 36415 COLL VENOUS BLD VENIPUNCTURE: CPT

## 2023-07-24 PROCEDURE — 210N000002 HC R&B HEART CARE

## 2023-07-24 PROCEDURE — B2151ZZ FLUOROSCOPY OF LEFT HEART USING LOW OSMOLAR CONTRAST: ICD-10-PCS | Performed by: INTERNAL MEDICINE

## 2023-07-24 PROCEDURE — 250N000011 HC RX IP 250 OP 636: Mod: JZ | Performed by: INTERNAL MEDICINE

## 2023-07-24 PROCEDURE — 250N000013 HC RX MED GY IP 250 OP 250 PS 637: Performed by: INTERNAL MEDICINE

## 2023-07-24 PROCEDURE — 250N000013 HC RX MED GY IP 250 OP 250 PS 637: Performed by: PHYSICIAN ASSISTANT

## 2023-07-24 PROCEDURE — 99233 SBSQ HOSP IP/OBS HIGH 50: CPT | Mod: 25 | Performed by: PHYSICIAN ASSISTANT

## 2023-07-24 PROCEDURE — B2111ZZ FLUOROSCOPY OF MULTIPLE CORONARY ARTERIES USING LOW OSMOLAR CONTRAST: ICD-10-PCS | Performed by: INTERNAL MEDICINE

## 2023-07-24 PROCEDURE — 85347 COAGULATION TIME ACTIVATED: CPT

## 2023-07-24 PROCEDURE — 250N000009 HC RX 250: Performed by: INTERNAL MEDICINE

## 2023-07-24 PROCEDURE — 258N000003 HC RX IP 258 OP 636: Performed by: PHYSICIAN ASSISTANT

## 2023-07-24 PROCEDURE — 272N000001 HC OR GENERAL SUPPLY STERILE: Performed by: INTERNAL MEDICINE

## 2023-07-24 PROCEDURE — 93458 L HRT ARTERY/VENTRICLE ANGIO: CPT | Mod: 26 | Performed by: INTERNAL MEDICINE

## 2023-07-24 PROCEDURE — 99153 MOD SED SAME PHYS/QHP EA: CPT | Performed by: INTERNAL MEDICINE

## 2023-07-24 PROCEDURE — 85520 HEPARIN ASSAY: CPT | Performed by: INTERNAL MEDICINE

## 2023-07-24 PROCEDURE — 93005 ELECTROCARDIOGRAM TRACING: CPT

## 2023-07-24 PROCEDURE — 250N000013 HC RX MED GY IP 250 OP 250 PS 637

## 2023-07-24 PROCEDURE — 250N000011 HC RX IP 250 OP 636: Performed by: INTERNAL MEDICINE

## 2023-07-24 PROCEDURE — 4A023N7 MEASUREMENT OF CARDIAC SAMPLING AND PRESSURE, LEFT HEART, PERCUTANEOUS APPROACH: ICD-10-PCS | Performed by: INTERNAL MEDICINE

## 2023-07-24 PROCEDURE — 93458 L HRT ARTERY/VENTRICLE ANGIO: CPT | Performed by: INTERNAL MEDICINE

## 2023-07-24 PROCEDURE — 99152 MOD SED SAME PHYS/QHP 5/>YRS: CPT | Mod: GC | Performed by: INTERNAL MEDICINE

## 2023-07-24 PROCEDURE — 99232 SBSQ HOSP IP/OBS MODERATE 35: CPT | Performed by: STUDENT IN AN ORGANIZED HEALTH CARE EDUCATION/TRAINING PROGRAM

## 2023-07-24 PROCEDURE — 99152 MOD SED SAME PHYS/QHP 5/>YRS: CPT | Performed by: INTERNAL MEDICINE

## 2023-07-24 PROCEDURE — 85018 HEMOGLOBIN: CPT

## 2023-07-24 RX ORDER — SODIUM CHLORIDE 9 MG/ML
INJECTION, SOLUTION INTRAVENOUS CONTINUOUS
Status: DISCONTINUED | OUTPATIENT
Start: 2023-07-24 | End: 2023-07-24 | Stop reason: HOSPADM

## 2023-07-24 RX ORDER — ASPIRIN 325 MG
325 TABLET ORAL ONCE
Status: COMPLETED | OUTPATIENT
Start: 2023-07-24 | End: 2023-07-24

## 2023-07-24 RX ORDER — LIDOCAINE 40 MG/G
CREAM TOPICAL
Status: DISCONTINUED | OUTPATIENT
Start: 2023-07-24 | End: 2023-07-24

## 2023-07-24 RX ORDER — ACETAMINOPHEN 325 MG/1
650 TABLET ORAL EVERY 4 HOURS PRN
Status: DISCONTINUED | OUTPATIENT
Start: 2023-07-24 | End: 2023-07-24

## 2023-07-24 RX ORDER — IOPAMIDOL 755 MG/ML
INJECTION, SOLUTION INTRAVASCULAR
Status: DISCONTINUED | OUTPATIENT
Start: 2023-07-24 | End: 2023-07-24 | Stop reason: HOSPADM

## 2023-07-24 RX ORDER — NALOXONE HYDROCHLORIDE 0.4 MG/ML
0.4 INJECTION, SOLUTION INTRAMUSCULAR; INTRAVENOUS; SUBCUTANEOUS
Status: ACTIVE | OUTPATIENT
Start: 2023-07-24 | End: 2023-07-25

## 2023-07-24 RX ORDER — SODIUM CHLORIDE 9 MG/ML
75 INJECTION, SOLUTION INTRAVENOUS CONTINUOUS
Status: ACTIVE | OUTPATIENT
Start: 2023-07-24 | End: 2023-07-24

## 2023-07-24 RX ORDER — POTASSIUM CHLORIDE 1500 MG/1
20 TABLET, EXTENDED RELEASE ORAL
Status: DISCONTINUED | OUTPATIENT
Start: 2023-07-24 | End: 2023-07-24 | Stop reason: HOSPADM

## 2023-07-24 RX ORDER — FENTANYL CITRATE 50 UG/ML
INJECTION, SOLUTION INTRAMUSCULAR; INTRAVENOUS
Status: DISCONTINUED | OUTPATIENT
Start: 2023-07-24 | End: 2023-07-24 | Stop reason: HOSPADM

## 2023-07-24 RX ORDER — OXYCODONE HYDROCHLORIDE 5 MG/1
10 TABLET ORAL EVERY 4 HOURS PRN
Status: DISCONTINUED | OUTPATIENT
Start: 2023-07-24 | End: 2023-07-25 | Stop reason: HOSPADM

## 2023-07-24 RX ORDER — FLUMAZENIL 0.1 MG/ML
0.2 INJECTION, SOLUTION INTRAVENOUS
Status: ACTIVE | OUTPATIENT
Start: 2023-07-24 | End: 2023-07-25

## 2023-07-24 RX ORDER — FUROSEMIDE 20 MG
20 TABLET ORAL DAILY
Status: DISCONTINUED | OUTPATIENT
Start: 2023-07-25 | End: 2023-07-25

## 2023-07-24 RX ORDER — NALOXONE HYDROCHLORIDE 0.4 MG/ML
0.2 INJECTION, SOLUTION INTRAMUSCULAR; INTRAVENOUS; SUBCUTANEOUS
Status: ACTIVE | OUTPATIENT
Start: 2023-07-24 | End: 2023-07-25

## 2023-07-24 RX ORDER — ASPIRIN 81 MG/1
243 TABLET, CHEWABLE ORAL ONCE
Status: DISCONTINUED | OUTPATIENT
Start: 2023-07-24 | End: 2023-07-24

## 2023-07-24 RX ORDER — CARVEDILOL 6.25 MG/1
6.25 TABLET ORAL 2 TIMES DAILY WITH MEALS
Status: DISCONTINUED | OUTPATIENT
Start: 2023-07-24 | End: 2023-07-25 | Stop reason: HOSPADM

## 2023-07-24 RX ORDER — METOPROLOL SUCCINATE 25 MG/1
25 TABLET, EXTENDED RELEASE ORAL 2 TIMES DAILY
Status: DISCONTINUED | OUTPATIENT
Start: 2023-07-24 | End: 2023-07-24

## 2023-07-24 RX ORDER — ATROPINE SULFATE 0.1 MG/ML
0.5 INJECTION INTRAVENOUS
Status: ACTIVE | OUTPATIENT
Start: 2023-07-24 | End: 2023-07-25

## 2023-07-24 RX ORDER — LORAZEPAM 2 MG/ML
0.5 INJECTION INTRAMUSCULAR
Status: DISCONTINUED | OUTPATIENT
Start: 2023-07-24 | End: 2023-07-24 | Stop reason: HOSPADM

## 2023-07-24 RX ORDER — LORAZEPAM 0.5 MG/1
0.5 TABLET ORAL
Status: DISCONTINUED | OUTPATIENT
Start: 2023-07-24 | End: 2023-07-24 | Stop reason: HOSPADM

## 2023-07-24 RX ORDER — FENTANYL CITRATE 50 UG/ML
25 INJECTION, SOLUTION INTRAMUSCULAR; INTRAVENOUS
Status: DISCONTINUED | OUTPATIENT
Start: 2023-07-24 | End: 2023-07-25 | Stop reason: HOSPADM

## 2023-07-24 RX ORDER — OXYCODONE HYDROCHLORIDE 5 MG/1
5 TABLET ORAL EVERY 4 HOURS PRN
Status: DISCONTINUED | OUTPATIENT
Start: 2023-07-24 | End: 2023-07-25 | Stop reason: HOSPADM

## 2023-07-24 RX ADMIN — NICOTINE 1 PATCH: 21 PATCH, EXTENDED RELEASE TRANSDERMAL at 09:13

## 2023-07-24 RX ADMIN — FUROSEMIDE 40 MG: 10 INJECTION, SOLUTION INTRAMUSCULAR; INTRAVENOUS at 09:14

## 2023-07-24 RX ADMIN — AMLODIPINE BESYLATE 5 MG: 5 TABLET ORAL at 09:14

## 2023-07-24 RX ADMIN — CARVEDILOL 6.25 MG: 6.25 TABLET, FILM COATED ORAL at 18:17

## 2023-07-24 RX ADMIN — ROSUVASTATIN CALCIUM 10 MG: 10 TABLET, FILM COATED ORAL at 09:14

## 2023-07-24 RX ADMIN — SODIUM CHLORIDE: 9 INJECTION, SOLUTION INTRAVENOUS at 13:05

## 2023-07-24 RX ADMIN — ASPIRIN 325 MG ORAL TABLET 325 MG: 325 PILL ORAL at 09:14

## 2023-07-24 RX ADMIN — METOPROLOL TARTRATE 25 MG: 25 TABLET, FILM COATED ORAL at 09:14

## 2023-07-24 ASSESSMENT — ACTIVITIES OF DAILY LIVING (ADL)
ADLS_ACUITY_SCORE: 20
DEPENDENT_IADLS:: TRANSPORTATION
ADLS_ACUITY_SCORE: 20

## 2023-07-24 NOTE — CONSULTS
Patient has Medical Assistance through [facility].    Farxiga: $0/mo.   Jardiance: $0/mo.       Entresto: $0/mo.     Clementina Fylnn  Pharmacy Technician/Liaison, Discharge Pharmacy   [phone] (voice or text)  [email or web address]

## 2023-07-24 NOTE — PLAN OF CARE
VSS. Tele: SR. Denies any CP. Remains NPO for angio later this afternoon. Heparin and NS running. Cardiology to adjust medications tomorrow.

## 2023-07-24 NOTE — PROGRESS NOTES
[facility]    Medicine Progress Note - Hospitalist Service    Date of Admission:  7/21/2023    Assessment & Plan   Delvin Echevarria is a 58 year old male directly admitted from [facility] on 07/21/23 who presents with shortness of breath. He has a past medical history of HTN, asthma and is a current 1 ppd smoker.      Newly found HFrEF (EF 30-35%) with decompensation   Acute hypoxic respiratory failure secondary to above. Resolved   Respiratory acidosis. Resolved   Possible NSTEMI   Hypertensive urgency  [PTA regime 5 mg amlodipine BID and 10 mg lisinopril daily]   EMS /130. He has been off his antihypertensives for 3 weeks as he has run out of medications due to transportation issues. BNP 3,348.  Was given one time 40 mg IV lasix. Weaned off BiPAP to NC on admission to [facility]   EKG SR with nonspecific ST depression +Negative T-waves -Seen with left ventricular hypertrophy (strain) or digitalis effect. Changes in lateral leads. Troponin 23 -->50-->37   - Monitor on telemetry   - Daily weights and strict I&Os   - Lasix 40 mg IV daily  - PTA amlodipine   - Holding PTA lisinopril, defer to cardiology when to resume   - Lopressor 25 mg BID   - Crestor 10 mg   - Heparin drip   - Echo Left ventricular systolic function is moderate to severely reduced.  The visual ejection fraction is 30-35%.  There is mod-severe global hypokinesia of the left ventricle.  There is no comparison study available.    - Cardiology following and appreciate their recommendations. Plan for angiogram today      Respiratory acidosis- resolved   Current 1 ppd cigarette smoker  Hx Asthma    CT chest negative of PE in ED and shows pleural effusion and pulmonary edema. Patchy nodular subsolid ground-glass opacities in the right upper lobe which may represent multifocal infectious or inflammatory etiology and/or edema.  Repeat VBG shows resolution of respiratory acidosis. Weaned off BiPAP (12/6 at 30%) prior to arrival.  "Does not follow with a Pulmonologist nor is he on long term medications.    - Duoneb q 4 hrs as needed   -  Albuterol neb q 2 hrs as needed   - Nicotine patch available    - CPAP for overnight   -Outpatient pulmonology referral      Mild DONTE  In ED Cr. 1.28.  Received 500 mL NS in ED and Cr now 1.02.   - Monitor      Hx of eczema  Multiple old, scabbed scratches to bilateral upper extremities and face. Has been prescribed creams before however not taking any currently.        Diet: NPO per Anesthesia Guidelines for Procedure/Surgery Except for: Meds  NPO for Medical/Clinical Reasons Except for: Meds    DVT Prophylaxis: Heparin drip  Rivera Catheter: Not present  Lines: None     Cardiac Monitoring: ACTIVE order. Indication: AMI (NSTEMI/ STEMI) (48 hours)  Code Status: Full Code      Clinically Significant Risk Factors                  # Hypertension: Noted on problem list  # Acute heart failure with reduced ejection fraction: last echo with EF <40% and receiving IV diuretics       # Cachexia: Estimated body mass index is 17.91 kg/m  as calculated from the following:    Height as of this encounter: 1.803 m (5' 11\").    Weight as of this encounter: 58.2 kg (128 lb 6.4 oz)., PRESENT ON ADMISSION          Disposition Plan      Expected Discharge Date: 07/25/2023      Destination: home (possibly stay with his sister at discharge)            Maribel Garcia MD  Hospitalist Service  [facility]  Securely message with [employer] (more info)  Text page via [employer] Paging/Directory   ______________________________________________________________________    Interval History   Patient seen and examined at bedside  No chest pain or shortness of breath  Feels well   Physical Exam   Vital Signs: Temp: 97.5  F (36.4  C) Temp src: Oral BP: (!) 134/95 Pulse: 64   Resp: 18 SpO2: 95 % O2 Device: None (Room air)    Weight: 128 lbs 6.4 oz  Physical Exam  Cardiovascular:      Rate and Rhythm: Normal rate and " regular rhythm.      Heart sounds: Normal heart sounds.   Pulmonary:      Effort: No respiratory distress.      Breath sounds: Normal breath sounds.   Abdominal:      General: There is no distension.      Palpations: Abdomen is soft.   Musculoskeletal:      Right lower leg: No edema.      Left lower leg: No edema.        Medical Decision Making       Data     I have personally reviewed the following data over the past 24 hrs:    7.4  \   13.5   / 331     N/A N/A N/A /  N/A   N/A N/A N/A \

## 2023-07-24 NOTE — PLAN OF CARE
[id number] A&Ox4, denies CP or SOB. Angiogram today without intervention. Right groin site WDL, CMS intact. Started coreg tonight; entresto and PO lasix will start tomorrow.

## 2023-07-24 NOTE — PROGRESS NOTES
[id number] A/O.  VSS.  Tele SR.  Denies pain.  Continues on IV Heparin.  NPO after midnight for Angiogram today.

## 2023-07-24 NOTE — PRE-PROCEDURE
GENERAL PRE-PROCEDURE:   Procedure:  Heart catheterization possible intervention  Date/Time:  7/24/2023 3:19 PM    Written consent obtained?: Yes    Consent given by:  Patient  Expected level of sedation:  Moderate  Risk benefit indication for cath poss intervention discussed with pt  Discussed med compliance and poss dapt  All questions answered and he wishes to proceed

## 2023-07-24 NOTE — CONSULTS
Care Management Initial Consult    General Information  Assessment completed with: PatientDelvin  Type of CM/SW Visit: Initial Assessment    Primary Care Provider verified and updated as needed: Yes (Delvin [facility])   Readmission within the last 30 days: no previous admission in last 30 days      Reason for Consult: discharge planning  Advance Care Planning: Advance Care Planning Reviewed: no concerns identified          Communication Assessment  Patient's communication style: spoken language (English or Bilingual)    Hearing Difficulty or Deaf: yes   Wear Glasses or Blind: no    Cognitive  Cognitive/Neuro/Behavioral: WDL  Level of Consciousness: alert  Arousal Level: opens eyes spontaneously  Orientation: oriented x 4  Mood/Behavior: calm, cooperative  Best Language: 0 - No aphasia  Speech: clear, spontaneous    Living Environment:   People in home: other (see comments) (roommate)     Current living Arrangements: mobile home      Able to return to prior arrangements:         Family/Social Support:  Care provided by: self  Provides care for: no one  Marital Status: Single  Limited to ( has 2 friends that are somewhat involved.  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  Encouraged him to call her to discuss other resources that may be available to him through the [facility], such as waiver for housing)           Does the patient's insurance plan have a 3 day  qualifying hospital stay waiver?  No    Lifestyle & Psychosocial Needs:  Social Determinants of Health     Tobacco Use: High Risk (8/17/2022)    Patient History     Smoking Tobacco Use: Every Day     Smokeless Tobacco Use: Never     Passive Exposure: Not on file   Alcohol Use: Not on file   Financial Resource Strain: Not on file   Food Insecurity: Not on file   Transportation Needs: Not on file   Physical Activity: Not on file   Stress: Not on file   Social Connections: Not on file   Intimate Partner Violence: Not on file   Depression: Not at risk (8/17/2022)    PHQ-2     PHQ-2 Score: 0   Housing Stability: Not on file       Functional Status:  Prior to admission patient needed assistance:   Dependent ADLs:: Independent  Dependent IADLs:: Transportation       Mental Health Status:  Mental Health Status: No Current Concerns       Chemical Dependency Status:  Chemical Dependency Status: No Current Concerns             Values/Beliefs:  Spiritual, Cultural Beliefs, Episcopal Practices, Values that affect care: no               Additional Information:  Consult for discharge planning. Pt shared he lives independently in a trailer with a room mate and is currently sleeping on the couch and this address is different from the address on his face [facility]-[id number] [place] [address] [address], [place] [address].  His cell phone number is [phone].      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  Pt does not drive, as his license is not current.  Discussed with patient that he has transportation coverage through his insurance for "[employer] rides for medical appointments.  Pt given information with [employer] phone number.  CC verified with [employer] that pt address is within coverage area and informed they can provide rides throughout the [facility], they just need at least 48 hours notice when setting up rides and discussed this with patient.    Pt has a county worker named, Keira  that he goes through for EBT card for food through the [facility].  Encouraged pt to call Keira to see if he is eligible for any other services through the [facility], as he is not currently working and staying on a friends couch.    Pt shared that he may stay with his sister, Peggy Nichols in [place], at discharge if he doesn't feel he can stay in his current place.  Pt shared that his sister is blind and does not drive.  Pt shared he has a brother that had a stroke and is unable to care for himself.    Pt stated he has a couple of friends that are limited in availability for support for rides, Mike Byers who is listed in his facesheet and Galina Byers (friend) [phone].    CC to follow for discharge planning-setting up appointments prior to discharge.    Yareli Solis RN, BS  Care Coordinator  [email or web address]

## 2023-07-24 NOTE — PROGRESS NOTES
[facility]  Cardiology Progress Note    Date of Service (when I saw the patient): 07/24/2023  Summary: Delvin Echevarria is a 58 year old male with history of hypertension, asthma, and ongoing tobacco use (1 PPD) who was admitted on 7/21/2023 as a transfer from [facility] with resting chest pain and heart failure.    Interval History   No complaints this morning. Denies chest pain or shortness of breath. BPs improved.   Assessment & Plan   NSTEMI. Admitted with resting chest pain. Troponin 23 on admit, subsequently 50 and trending down. EKG on admission with TWI. Coronary calcification noted on chest CT.   Suspected ischemic cardiomyopathy. Echo shows EF 35% with moderate to severe global hypokinesis.   Acute hypoxic respiratory failure secondary to acute systolic congestive heart failure. With pulmonary edema on admission CXR. NTproBNP significantly elevated.  Diuresed with IV lasix on admission.  - Now on lasix 40 mg daily. Weaned to RA.  - Weight down from 135 lb on admission to 128 lbs today.  Hypertensive urgency. BPs significantly elevated on admission. He had been out of his medications PTA. On amlodipine 5 mg BID and lisinopril 10 mg daily.   Ongoing tobacco use. Cessation.     Plan:  Coronary angiography today. Discussed risks/benefits. Consent has already been signed.  Continue IV heparin, aspirin, statin, beta blocker.  3.   Further recommendations pending coronary angiography results.   4.   Regardless of angiogram results, needs optimization of his GDMT. Would stop amlodipine and will look into cost of Entresto and SGLT2. If affordable, would start low dose Entresto in am.   5.   Will plan to change metoprolol tartrate to carvedilol or metoprolol succinate given his LV dysfunction.   6.   On 40 mg of IV lasix. Can likely transition to oral diuretics tomorrow.   7.   Will need cardiology follow up at [facility]. Of note, he does not have a car and relies on friends for  transportation.       Meghan Rodríguez PA-C    Physical Exam   Temp: 97.5  F (36.4  C) Temp src: Oral BP: (!) 134/95 Pulse: 64   Resp: 18 SpO2: 95 % O2 Device: None (Room air)    Vitals:    07/21/23 1445 07/22/23 0640 07/23/23 0626 07/24/23 0551   Weight: 61.5 kg (135 lb 9.6 oz) 60.1 kg (132 lb 7.9 oz) 58.2 kg (128 lb 4.9 oz) 58.2 kg (128 lb 6.4 oz)     Vital Signs with Ranges  Temp:  [97.5  F (36.4  C)-97.7  F (36.5  C)] 97.5  F (36.4  C)  Pulse:  [59-66] 64  Resp:  [16-19] 18  BP: (102-134)/(60-95) 134/95  SpO2:  [92 %-95 %] 95 %  07/19 0700 - 07/24 0659  In: 2369.5 [P.O.:2220; I.V.:149.5]  Out: 7050 [Urine:7050]  Net: -4680.5  Constitutional: NAD.   Respiratory: CTAB.   Cardiovascular: RRR, s1s2, no murmur  GI: soft, BS+  Skin: warm, no rashes  Musculoskeletal: Moving all extremities. No edema.  Neurologic: Alert, oriented x 3  Neuropsychiatric: Normal affect   Data   Results for orders placed or performed during the hospital encounter of 07/21/23 (from the past 24 hour(s))   CBC with platelets   Result Value Ref Range    WBC Count 7.4 4.0 - 11.0 10e3/uL    RBC Count 4.51 4.40 - 5.90 10e6/uL    Hemoglobin 13.5 13.3 - 17.7 g/dL    Hematocrit 40.1 40.0 - 53.0 %    MCV 89 78 - 100 fL    MCH 29.9 26.5 - 33.0 pg    MCHC 33.7 31.5 - 36.5 g/dL    RDW 13.8 10.0 - 15.0 %    Platelet Count 331 150 - 450 10e3/uL   Heparin Unfractionated Anti Xa Level   Result Value Ref Range    Anti Xa Unfractionated Heparin 0.33 For Reference Range, See Comment IU/mL    Narrative    Therapeutic Range: UFH: 0.25-0.50 IU/mL for low intensity dosing,  0.30-0.70 IU/mL for high intensity dosing DVT and PE.  This test is not validated for other direct factor X inhibitors (e.g. rivaroxaban, apixaban, edoxaban, betrixaban, fondaparinux) and should not be used for monitoring of other medications.   EKG 12-lead, tracing only - Hospital locations:  [facility]   Result Value Ref Range    Systolic Blood Pressure  mmHg    Diastolic Blood Pressure   mmHg    Ventricular Rate 70 BPM    Atrial Rate 70 BPM    ME Interval 144 ms    QRS Duration 84 ms     ms    QTc 427 ms    P Axis 56 degrees    R AXIS 55 degrees    T Axis 244 degrees    Interpretation ECG       Sinus rhythm  Left ventricular hypertrophy with repolarization abnormality  Abnormal ECG  When compared with ECG of 22-JUL-2023 09:10, (unconfirmed)  QRS axis Shifted right  T wave inversion now evident in Lateral leads  QT has shortened       Medications    heparin 1,250 Units/hr (07/23/23 2054)    - MEDICATION INSTRUCTIONS -      - MEDICATION INSTRUCTIONS -      sodium chloride      sodium chloride Stopped (07/22/23 1030)      amLODIPine  5 mg Oral BID    aspirin  81 mg Oral Daily    furosemide  40 mg Intravenous Daily    metoprolol tartrate  25 mg Oral BID    nicotine  1 patch Transdermal Daily    nicotine   Transdermal Q8H    rosuvastatin  10 mg Oral Daily    sodium chloride (PF)  3 mL Intracatheter Q8H    sodium chloride (PF)  3 mL Intracatheter Q8H

## 2023-07-25 VITALS
WEIGHT: 128.3 LBS | HEIGHT: 71 IN | BODY MASS INDEX: 17.96 KG/M2 | OXYGEN SATURATION: 96 % | DIASTOLIC BLOOD PRESSURE: 98 MMHG | TEMPERATURE: 97.8 F | RESPIRATION RATE: 18 BRPM | HEART RATE: 77 BPM | SYSTOLIC BLOOD PRESSURE: 147 MMHG

## 2023-07-25 LAB
ANION GAP SERPL CALCULATED.3IONS-SCNC: 15 MMOL/L (ref 7–15)
ATRIAL RATE - MUSE: 76 BPM
BUN SERPL-MCNC: 42.3 MG/DL (ref 8–23)
CALCIUM SERPL-MCNC: 9.5 MG/DL (ref 8.8–10.2)
CATH EF ESTIMATED: 35 %
CHLORIDE SERPL-SCNC: 95 MMOL/L (ref 98–107)
CREAT SERPL-MCNC: 1.15 MG/DL (ref 0.67–1.17)
DEPRECATED HCO3 PLAS-SCNC: 25 MMOL/L (ref 22–29)
DIASTOLIC BLOOD PRESSURE - MUSE: NORMAL MMHG
ERYTHROCYTE [DISTWIDTH] IN BLOOD BY AUTOMATED COUNT: 13.6 % (ref 10–15)
GFR SERPL CREATININE-BSD FRML MDRD: 73 ML/MIN/1.73M2
GLUCOSE SERPL-MCNC: 99 MG/DL (ref 70–99)
HCT VFR BLD AUTO: 40.2 % (ref 40–53)
HGB BLD-MCNC: 13.7 G/DL (ref 13.3–17.7)
INTERPRETATION ECG - MUSE: NORMAL
MCH RBC QN AUTO: 30.6 PG (ref 26.5–33)
MCHC RBC AUTO-ENTMCNC: 34.1 G/DL (ref 31.5–36.5)
MCV RBC AUTO: 90 FL (ref 78–100)
P AXIS - MUSE: 64 DEGREES
PLATELET # BLD AUTO: 336 10E3/UL (ref 150–450)
POTASSIUM SERPL-SCNC: 4.7 MMOL/L (ref 3.4–5.3)
PR INTERVAL - MUSE: 140 MS
QRS DURATION - MUSE: 86 MS
QT - MUSE: 436 MS
QTC - MUSE: 490 MS
R AXIS - MUSE: -44 DEGREES
RBC # BLD AUTO: 4.47 10E6/UL (ref 4.4–5.9)
SODIUM SERPL-SCNC: 135 MMOL/L (ref 136–145)
SYSTOLIC BLOOD PRESSURE - MUSE: NORMAL MMHG
T AXIS - MUSE: -77 DEGREES
TSH SERPL DL<=0.005 MIU/L-ACNC: 2.38 UIU/ML (ref 0.3–4.2)
VENTRICULAR RATE- MUSE: 76 BPM
WBC # BLD AUTO: 8 10E3/UL (ref 4–11)

## 2023-07-25 PROCEDURE — 250N000013 HC RX MED GY IP 250 OP 250 PS 637: Performed by: INTERNAL MEDICINE

## 2023-07-25 PROCEDURE — 250N000013 HC RX MED GY IP 250 OP 250 PS 637: Performed by: PHYSICIAN ASSISTANT

## 2023-07-25 PROCEDURE — 84443 ASSAY THYROID STIM HORMONE: CPT | Performed by: PHYSICIAN ASSISTANT

## 2023-07-25 PROCEDURE — 36415 COLL VENOUS BLD VENIPUNCTURE: CPT | Performed by: INTERNAL MEDICINE

## 2023-07-25 PROCEDURE — 85027 COMPLETE CBC AUTOMATED: CPT | Performed by: INTERNAL MEDICINE

## 2023-07-25 PROCEDURE — 82310 ASSAY OF CALCIUM: CPT | Performed by: INTERNAL MEDICINE

## 2023-07-25 PROCEDURE — 99239 HOSP IP/OBS DSCHRG MGMT >30: CPT | Performed by: STUDENT IN AN ORGANIZED HEALTH CARE EDUCATION/TRAINING PROGRAM

## 2023-07-25 PROCEDURE — 99233 SBSQ HOSP IP/OBS HIGH 50: CPT | Mod: FS | Performed by: PHYSICIAN ASSISTANT

## 2023-07-25 RX ORDER — CARVEDILOL 6.25 MG/1
6.25 TABLET ORAL 2 TIMES DAILY WITH MEALS
Qty: 120 TABLET | Refills: 0 | Status: SHIPPED | OUTPATIENT
Start: 2023-07-25 | End: 2024-03-25

## 2023-07-25 RX ORDER — ALBUTEROL SULFATE 90 UG/1
2 AEROSOL, METERED RESPIRATORY (INHALATION) EVERY 6 HOURS PRN
Qty: 18 G | Refills: 0 | Status: SHIPPED | OUTPATIENT
Start: 2023-07-25 | End: 2024-03-01

## 2023-07-25 RX ORDER — ASPIRIN 81 MG/1
81 TABLET ORAL DAILY
Qty: 30 TABLET | Refills: 0 | Status: SHIPPED | OUTPATIENT
Start: 2023-07-25 | End: 2023-08-24

## 2023-07-25 RX ORDER — ROSUVASTATIN CALCIUM 10 MG/1
10 TABLET, COATED ORAL DAILY
Qty: 90 TABLET | Refills: 0 | Status: SHIPPED | OUTPATIENT
Start: 2023-07-26 | End: 2024-03-25

## 2023-07-25 RX ORDER — FUROSEMIDE 20 MG
20 TABLET ORAL DAILY
Qty: 30 TABLET | Refills: 0 | Status: SHIPPED | OUTPATIENT
Start: 2023-07-26 | End: 2024-04-25

## 2023-07-25 RX ORDER — FUROSEMIDE 20 MG
20 TABLET ORAL DAILY
Status: DISCONTINUED | OUTPATIENT
Start: 2023-07-26 | End: 2023-07-25 | Stop reason: HOSPADM

## 2023-07-25 RX ADMIN — SACUBITRIL AND VALSARTAN 1 TABLET: 24; 26 TABLET, FILM COATED ORAL at 08:48

## 2023-07-25 RX ADMIN — NICOTINE 1 PATCH: 21 PATCH, EXTENDED RELEASE TRANSDERMAL at 08:51

## 2023-07-25 RX ADMIN — FUROSEMIDE 20 MG: 20 TABLET ORAL at 08:48

## 2023-07-25 RX ADMIN — CARVEDILOL 6.25 MG: 6.25 TABLET, FILM COATED ORAL at 08:48

## 2023-07-25 RX ADMIN — ROSUVASTATIN CALCIUM 10 MG: 10 TABLET, FILM COATED ORAL at 08:48

## 2023-07-25 RX ADMIN — ASPIRIN 81 MG: 81 TABLET, COATED ORAL at 08:48

## 2023-07-25 ASSESSMENT — ACTIVITIES OF DAILY LIVING (ADL)
ADLS_ACUITY_SCORE: 20

## 2023-07-25 NOTE — PROGRESS NOTES
"Care Management Follow Up    Length of Stay (days): 4    Expected Discharge Date: 07/27/2023     Concerns to be Addressed:       Patient plan of care discussed at interdisciplinary rounds: Yes    Anticipated Discharge Disposition: Home     Anticipated Discharge Services:    Anticipated Discharge DME:      Patient/family educated on Medicare website which has current facility and service quality ratings:    Education Provided on the Discharge Plan:    Patient/Family in Agreement with the Plan: yes    Referrals Placed by CM/SW:    Private pay costs discussed: Not applicable    Additional Information:  The following appointments have been scheduled and added to AVS:    Jul 31, 2023 10:00 AM  (Arrive by 9:45 AM)  ED/Hospital Follow Up with Dillon Conway MD  [facility] ([facility] ) [address] [place] [id number]  [phone]       Please plan to arrive at the clinic at your \"Arrive By\" time for your appointment. Our late policy will be enforced based on this time.      Aug 03, 2023  1:15 PM  LAB with [place] [facility]  [facility] ([facility] ) [address] [address]  [phone]       Please do not eat 10-12 hours before your appointment if you are coming in fasting for labs on lipids, cholesterol, or glucose (sugar). Does not apply to pregnant women. Water, tea and black coffee (with nothing added) is okay. Do not drink other fluids, diet soda or gum. If you have concerns about taking your medications, please send a message by clicking on Secure Messaging, Message Your Care Team.      Aug 03, 2023  1:45 PM  (Arrive by 1:40 PM)  Return Cardiology with XIN Butcher CNP  [facility] ([facility] ) [address] [address]  [phone]             Aug 28, 2023  9:15 AM  (Arrive by 9:10 " [facility])  ENROLLMENT CORE with Meghan Rodríguez PA-C  [facility] [place] ([facility] ) [address] [facility] [address] [phone]     Addendum, 12:48 PM  Writer notified by bedside RN that patient will need transport home.  Patient has transportation benefits thru his insurance:  [employer] ([phone]).  Writer will assist patient in scheduling transportation home.    Clementina William RN  Care Coordinator  [facility]  [phone] (text or call)

## 2023-07-25 NOTE — PROGRESS NOTES
"SPIRITUAL HEALTH SERVICES Progress Note  [facility]    Saw pt Delvin Echevarria per routine consult request.    Patient/Family Understanding of Illness and Goals of Care - Had been anticipating surgery, but learned this morning that he \"wouldn't be having it.\"     Distress and Loss   Delvin was tearful as he shared regarding his fear about the potential for surgery. \"I wanted to have someone pray for me and for the surgeons, but I'm feeling better now that no surgery is needed.\"   Delvin shared that he is the last of his family, \"My parents and siblings are all gone.\"    Strengths, Coping, and Resources - Delvin named an \"older friend\" who has been a support for him \"when I've needed prayer or someone to encourage me in my ranjana.\"    Meaning, Beliefs, and Spirituality - Delvin was raised in the [employer]. He expressed his trust that \"God is present\" and asked me to \"pray for guidance\" for himself and for others in need that \"they might find happiness.\"     I offered spiritual and emotional support through reflective listening that affirmed emotions, experience, and meaning. Offered assurance through prayer which incorporated conversational themes.    PLAN: Delivered an olive wood holding cross. Nothing further at this time due to anticipated discharge.    Vane Eagle MDiv  Associate   [facility] pager [phone]  [facility] phone [phone]    [facility] available 24/7 for emergent requests/referrals, either by having the on-call  paged or by entering an ASAP/STAT consult in Epic (this will also page the on-call ).      "

## 2023-07-25 NOTE — PLAN OF CARE
Neuro: A&Ox4, flat affect  Tele/Cardiac: SR  Resp: WDL  Activity: SBA  Pain: denies  Drips/IV: SL  GI/: WDL  Skin: R groin WDL  Diet: Diet: Low Saturated Fat Na <2400 mg     Test/Procedures: n/a  Plan: start entresto, transition to PO lasix. Discharge pending progress

## 2023-07-25 NOTE — PLAN OF CARE
Report given from previous RN at 1515, pt taxi ride at scheduled 1600. Discharge med's given to patient, AVS and discharge instructions was provided by previous RN. Dressed and brought down to door 6 for taxi pickup.

## 2023-07-25 NOTE — DISCHARGE INSTRUCTIONS
Cardiac Angiogram Discharge Instructions - Femoral    After you go home:    Have an adult stay with you until tomorrow.  Drink extra fluids for 2 days.  You may resume your normal diet.  No smoking       For 24 hours - due to the sedation you received:  Relax and take it easy.  Do NOT make any important or legal decisions.  Do NOT drive or operate machines at home or at work.  Do NOT drink alcohol.    Care of Groin Puncture Site:    For the first 24 hrs - check the puncture site every 1-2 hours while awake.  For 2 days, when you cough, sneeze, laugh or move your bowels, hold your hand over the puncture site and press firmly.  Remove the bandaid after 24 hours. If there is minor oozing, apply another bandaid and remove it after 12 hours.  It is normal to have a small bruise or pea size lump at the site.  You may shower tomorrow. Do NOT take a bath, or use a hot tub or pool for at least 3 days. Do NOT scrub the site. Do not use lotion or powder near the puncture site.    Activity:            For 2 days:  No stooping or squatting  Do NOT do any heavy activity such as exercise, lifting, or straining.   No housework, yard work or any activity that make you sweat  Do NOT lift more than 10 pounds    Bleeding:    If you start bleeding from the site in your groin, lie down flat and press firmly on/above the site for 10 minutes.   Once bleeding stops, lay flat for 2 hours.   Call [facility] as soon as you can.       Call 911 right away if you have heavy bleeding or bleeding that does not stop.      Medicines:    If you are taking an antiplatelet medication such as Plavix, Brilinta or Effient, do not stop taking it until you talk to your cardiologist.    If you are on Metformin (Glucophage), do not restart it until you have blood tests (within 2 to 3 days after discharge).  After you have your blood drawn, you may restart the Metformin.   Take your medications, including blood thinners, unless your provider tells you not to.     If you take Coumadin (Warfarin), have your INR checked by your provider in  3-5 days. Call your clinic to schedule this.  If you have stopped any medicines, check with your provider about when to restart them.    Follow Up Appointments:    Follow up with [facility] Nurse Practitioner at [facility] of patient preference in 7-10 days.    Call the clinic if:    You have increased pain or a large or growing hard lump around the site.  The site is red, swollen, hot or tender.  Blood or fluid is draining from the site.  You have chills or a fever greater than 101 F (38 C).  Your leg feels numb, cool or changes color.  You have hives, a rash or unusual itching.  New pain in the back or belly that you cannot control with Tylenol.  Any questions or concerns.          [facility] [place]:    [phone] [facility] (7 days a week)

## 2023-07-25 NOTE — PROGRESS NOTES
[facility]  Cardiology Progress Note    Date of Service (when I saw the patient): 07/25/2023  Summary: Delvin Echevarria is a 58 year old male with history of hypertension, asthma, and ongoing tobacco use (1 PPD) who was admitted on 7/21/2023 as a transfer from [facility] with resting chest pain and heart failure.  Interval History   No chest pain or dyspnea yesterday. We reviewed his angiogram results. He denies any known family history of CHF. No hx of thyroid problems. He admits to heavy alcohol use in the past (4 - 6 whiskeys a night after work) but has been sober for 10 years. No hx of drug use. No recent infections.  No issues with his femoral access site.   Assessment & Plan   NSTEMI. Admitted with resting chest pain. Troponin 23 on admit, subsequently 50 and trending down. EKG on admission with TWI. Coronary calcification noted on chest CT.   Newly diagnosed nonischemic cardiomyopathy. Echo shows EF 35% with moderate to severe global hypokinesis.   - Coronary angiography on 7/24/23 with nonobstructive CAD (30% mid LAD and 50% 2nd diagonal lesion).  - Unclear etiology - history only notable for heavy alcohol use in the past (now sober for 10 years)  Acute hypoxic respiratory failure secondary to acute systolic congestive heart failure. With pulmonary edema on admission CXR. NTproBNP significantly elevated.  Diuresed with IV lasix on admission.  - Now on lasix 40 mg daily. Weaned to RA.  - Weight down from 135 lb on admission to 128 lbs today.  Hypertensive urgency. BPs significantly elevated on admission. He had been out of his medications PTA. On amlodipine 5 mg BID and lisinopril 10 mg daily.   Ongoing tobacco use. Cessation.   Hx alcohol use. Sober for 10 years now.     Plan:  Stop PTA amlodipine and continue optimization of GDMT for his nonischemic cardiomyopathy  Continue carvedilol 6.25 mg BID  Started Entresto 24/26 mg BID today  Continue lasix 20 mg daily.  Will plan to add  SGLT2 and spironolactone in follow up.   Continue aspirin 81 mg daily and crestor 10 mg daily for nonobstructive CAD noted on cath  Smoking cessation  Will arrange for cardiology follow up at [facility]. Of note, he does not have a car and relies on friends for transportation.   Would recommend cardiac MRI as an outpatient and Ziopatch as well to evaluate for arrhythmias.     Okay for discharge today from a cardiology perspective. Follow up has been arranged.     Meghan Rodríguez PA-C    Physical Exam   Temp: 97.8  F (36.6  C) Temp src: Oral BP: (!) 147/98 Pulse: 77   Resp: 18 SpO2: 96 % O2 Device: None (Room air)    Vitals:    07/21/23 1445 07/22/23 0640 07/23/23 0626 07/24/23 0551   Weight: 61.5 kg (135 lb 9.6 oz) 60.1 kg (132 lb 7.9 oz) 58.2 kg (128 lb 4.9 oz) 58.2 kg (128 lb 6.4 oz)    07/25/23 0416   Weight: 58.2 kg (128 lb 4.8 oz)     Vital Signs with Ranges  Temp:  [97.3  F (36.3  C)-98.1  F (36.7  C)] 97.8  F (36.6  C)  Pulse:  [54-78] 77  Resp:  [18] 18  BP: (116-148)/(67-98) 147/98  SpO2:  [92 %-96 %] 96 %  07/20 0700 - 07/25 0659  In: 3799.5 [P.O.:3500; I.V.:299.5]  Out: 7450 [Urine:7450]  Net: -3650.5  Constitutional: NAD.   Respiratory: CTAB.   Cardiovascular: RRR, s1s2, no murmur  GI: soft, BS+  Skin: warm, no rashes  Musculoskeletal: Moving all extremities. No edema.  Neurologic: Alert, oriented x 3  Neuropsychiatric: Normal affect   Data   Results for orders placed or performed during the hospital encounter of 07/21/23 (from the past 24 hour(s))   Activated clotting time celite, POCT   Result Value Ref Range    Activated Clotting Time (Celite) POCT 155 (H) 74 - 150 seconds   Cardiac Catheterization   Result Value Ref Range    Cath EF Estimated 35 %    Narrative    1.  No obstructive CAD to account for patient's underlying cardiomyopathy  2.  Normal left-sided filling pressures (LVEDP 6 mmHg)  3.  Moderate to severely reduced ejection fraction on left   ventriculography with global hypokinesis     Basic  metabolic panel   Result Value Ref Range    Sodium 135 (L) 136 - 145 mmol/L    Potassium 4.7 3.4 - 5.3 mmol/L    Chloride 95 (L) 98 - 107 mmol/L    Carbon Dioxide (CO2) 25 22 - 29 mmol/L    Anion Gap 15 7 - 15 mmol/L    Urea Nitrogen 42.3 (H) 8.0 - 23.0 mg/dL    Creatinine 1.15 0.67 - 1.17 mg/dL    Calcium 9.5 8.8 - 10.2 mg/dL    Glucose 99 70 - 99 mg/dL    GFR Estimate 73 >60 mL/min/1.73m2   CBC with platelets   Result Value Ref Range    WBC Count 8.0 4.0 - 11.0 10e3/uL    RBC Count 4.47 4.40 - 5.90 10e6/uL    Hemoglobin 13.7 13.3 - 17.7 g/dL    Hematocrit 40.2 40.0 - 53.0 %    MCV 90 78 - 100 fL    MCH 30.6 26.5 - 33.0 pg    MCHC 34.1 31.5 - 36.5 g/dL    RDW 13.6 10.0 - 15.0 %    Platelet Count 336 150 - 450 10e3/uL     Medications    - MEDICATION INSTRUCTIONS -        aspirin  81 mg Oral Daily    carvedilol  6.25 mg Oral BID w/meals    nicotine  1 patch Transdermal Daily    nicotine   Transdermal Q8H    rosuvastatin  10 mg Oral Daily    sacubitril-valsartan  1 tablet Oral BID

## 2023-07-25 NOTE — DISCHARGE SUMMARY
[facility]    Hospitalist Discharge Summary       Date of Admission:  7/21/2023  Date of Discharge:  7/25/2023  4:56 PM  Discharging Provider: Maribel Garcia MD      Discharge Diagnoses   Newly found HFrEF (EF 30-35%) with decompensation   Acute hypoxic respiratory failure secondary to above. Resolved   Respiratory acidosis. Resolved   Possible NSTEMI   Hypertensive urgeny    Patchy nodular subsolid ground-glass opacities in the right upper lobe        Follow-ups Needed After Discharge   Follow-up Appointments     Follow-up and recommended labs and tests       ----------------------------------------------------------------------  Follow up with primary care provider, Delvin Henning, within 7 days for   hospital follow- up.  The following labs/tests are recommended: cbc and   bmp   **see appointment details below    Follow with Pulmonology in 2 months    **Clinic will contact you.  See   number above if you have not been contacted within 72 hrs.    Follow with Cardiology   **see appointment details below          Unresulted Labs Ordered in the Past 30 Days of this Admission       No orders found from 6/21/2023 to 7/22/2023.        These results will be followed up by  PCP    Hospital Course    Delvin Echevarria is a 58 year old male directly admitted from [facility] on 07/21/23 who presents with shortness of breath. He has a past medical history of HTN, asthma and is a current 1 ppd smoker.      Newly found HFrEF (EF 30-35%) with decompensation   Acute hypoxic respiratory failure secondary to above. Resolved   Respiratory acidosis. Resolved   Possible NSTEMI   Hypertensive urgency  [PTA regime 5 mg amlodipine BID and 10 mg lisinopril daily]   EMS /130. He has been off his antihypertensives for 3 weeks as he has run out of medications due to transportation issues. BNP 3,348.  Was given one time 40 mg IV lasix. Weaned off BiPAP to NC on admission to [facility]   EKG SR with  nonspecific ST depression +Negative T-waves -Seen with left ventricular hypertrophy (strain) or digitalis effect. Changes in lateral leads. Troponin 23 -->50-->37   - Monitor on telemetry   - Daily weights and strict I&Os   - - PTA amlodipine   - Holding PTA lisinopril, defer to cardiology when to resume   - Lopressor 25 mg BID   - Crestor 10 mg   - Heparin drip   - Echo Left ventricular systolic function is moderate to severely reduced.  The visual ejection fraction is 30-35%.  There is mod-severe global hypokinesia of the left ventricle.  There is no comparison study available.   Angiogram showed normal on 7/24/23 normal coronararies  -Continue lasix  -Discharged on entresto  -Cardiology plan to add  SGLT2 and spironolactone in follow up.    Continue carvedilol 6.25 mg BID   -Outpatient cardiology follow up           Respiratory acidosis- resolved   Current 1 ppd cigarette smoker  Hx Asthma    CT chest negative of PE in ED and shows pleural effusion and pulmonary edema. Patchy nodular subsolid ground-glass opacities in the right upper lobe which may represent multifocal infectious or inflammatory etiology and/or edema.  Repeat VBG shows resolution of respiratory acidosis. Weaned off BiPAP (12/6 at 30%) prior to arrival. Does not follow with a Pulmonologist nor is he on long term medications.    - Duoneb q 4 hrs as needed   -  Albuterol neb q 2 hrs as needed   - Nicotine patch available    - CPAP for overnight   -Outpatient pulmonology referral placed and patient made aware      Mild DONTE  In ED Cr. 1.28.  Received 500 mL NS in ED and Cr now 1.02.   - Monitor      Hx of eczema  Multiple old, scabbed scratches to bilateral upper extremities and face. Has been prescribed creams before however not taking any currently.           Consultations This Hospital Stay   CARDIOLOGY IP CONSULT  PHARMACY IP CONSULT  PHARMACY LIAISON FOR MEDICATION COVERAGE CONSULT  CARE MANAGEMENT / SOCIAL WORK IP CONSULT  PHARMACY IP  CONSULT  PHARMACY IP CONSULT  SPIRITUAL HEALTH SERVICES IP CONSULT  SMOKING CESSATION PROGRAM IP CONSULT    Code Status   Prior    Time Spent on this Encounter   I,Maribel Garcia, personally saw the patient today and spent approximately greater than 30 minutes discharging this patient.       Maribel Garcia MD  [facility]  ______________________________________________________________________    Physical Exam   Vital Signs:                   Weight: 128 lbs 4.8 oz    Physical Exam  Cardiovascular:      Rate and Rhythm: Normal rate and regular rhythm.      Heart sounds: Normal heart sounds.   Pulmonary:      Effort: Pulmonary effort is normal. No respiratory distress.      Breath sounds: Normal breath sounds.   Abdominal:      General: There is no distension.      Palpations: Abdomen is soft.      Tenderness: There is no abdominal tenderness.   Musculoskeletal:      Right lower leg: No edema.      Left lower leg: No edema.              Primary Care Physician   Delvin Henning    Discharge Disposition   Discharged to home  Condition at discharge: Stable    Significant Results and Procedures   Most Recent 3 CBC's:  Recent Labs   Lab Test 07/25/23  0545 07/24/23  0615 07/22/23  0252   WBC 8.0 7.4 8.1   HGB 13.7 13.5 12.1*   MCV 90 89 92    331 248     Most Recent 3 BMP's:  Recent Labs   Lab Test 07/25/23  0545 07/23/23  0435 07/22/23  0251   * 135* 138   POTASSIUM 4.7 4.1 4.0   CHLORIDE 95* 97* 99   CO2 25 24 24   BUN 42.3* 34.1* 27.2*   CR 1.15 1.02 1.02   ANIONGAP 15 14 15   DINO 9.5 9.4 8.9   GLC 99 110* 95     Most Recent 2 LFT's:  Recent Labs   Lab Test 08/12/19  1451 09/21/17  0702   AST 32 37   ALT 44 25   ALKPHOS 103 107   BILITOTAL 0.4 0.4     Most Recent 3 INR's:  Recent Labs   Lab Test 08/11/17  0649 12/14/16  1604 09/10/16  2235   INR 1.08 1.12 1.11     Most Recent 3 Troponin's:No lab results found.  Most Recent 3 BNP's:  Recent Labs   Lab Test  23  0705   NTBNPI 3,348*     Most Recent D-dimer:  Recent Labs   Lab Test 23  0705   DD 1.38*     Most Recent Cholesterol Panel:  Recent Labs   Lab Test 23  0435   CHOL 152   LDL 99   HDL 37*   TRIG 78       Results for orders placed or performed during the hospital encounter of 23   Echocardiogram Complete     Value    LVEF  30-35%    Narrative    [id number]  [id number]  [id number]  [id number]^KAYLAN^SUZY^ROSANA     [facility]  Echocardiography Laboratory  [address] [place] [address]     Name: VINH CATALAN  MRN: [id number]  : 1965  Study Date: 2023 10:55 AM  Age: 58 yrs  Gender: Male  Patient Location: [facility]  Reason For Study: Respiratory Failure  Ordering Physician: SUZY KIMBROUGH  Referring Physician: RAMÓN ROMAN  Performed By: Meghan Reddy     BSA: 1.8 m2  Height: 71 in  Weight: 135 lb  HR: 75  BP: 147/109 mmHg  ______________________________________________________________________________  Procedure  Complete Echo Adult. Optison (NDC #[id number]) given intravenously.  ______________________________________________________________________________  Interpretation Summary     Left ventricular systolic function is moderate to severely reduced.  The visual ejection fraction is 30-35%.  There is mod-severe global hypokinesia of the left ventricle.  There is no comparison study available.  ______________________________________________________________________________  Left Ventricle  (The upper limit of normal is 5.6cm for left ventricular size in end-  diastole.). Grade I or early diastolic dysfunction. Left ventricular systolic  function is moderate to severely reduced. The visual ejection fraction is 30-  35%. There is mod-severe global hypokinesia of the left ventricle.     Right Ventricle  The right ventricle is normal in structure, function and size.     Atria  Normal left atrial size. Right atrium not well visualized. Right  atrial size  is normal.     Mitral Valve  The mitral valve is normal in structure and function. There is no mitral  regurgitation noted. There is no mitral valve stenosis.     Tricuspid Valve  The tricuspid valve is not well visualized, but is grossly normal. Right  ventricular systolic pressure could not be approximated due to inadequate  tricuspid regurgitation.     Aortic Valve  The aortic valve is not well visualized. No aortic regurgitation is present.  No hemodynamically significant valvular aortic stenosis.     Pulmonic Valve  The pulmonic valve is not well visualized.     Vessels  Mild aortic root dilatation. The inferior vena cava is normal.     Pericardium  There is no pericardial effusion.     ______________________________________________________________________________  MMode/2D Measurements & Calculations  IVSd: 1.1 cm  LVIDd: 5.6 cm  LVIDs: 5.1 cm  LVPWd: 1.0 cm  FS: 8.3 %  LV mass(C)d: 234.3 grams  LV mass(C)dI: 131.3 grams/m2     Ao root diam: 4.0 cm  LA dimension: 2.7 cm  asc Aorta Diam: 3.5 cm  LA/Ao: 0.67  LVOT diam: 2.0 cm  LVOT area: 3.3 cm2  RV Base: 3.9 cm  RWT: 0.36  TAPSE: 1.8 cm     Doppler Measurements & Calculations  MV E max roberto: 43.4 cm/sec  MV A max roberto: 57.7 cm/sec  MV E/A: 0.75  MV dec time: 0.23 sec  Ao V2 max: 91.9 cm/sec  Ao max PG: 3.0 mmHg  Ao V2 mean: 62.1 cm/sec  Ao mean P.0 mmHg  Ao V2 VTI: 19.1 cm  SOHAIL(I,D): 2.9 cm2  SOHAIL(V,D): 3.3 cm2  LV V1 max PG: 3.4 mmHg  LV V1 max: 92.6 cm/sec  LV V1 VTI: 16.7 cm  SV(LVOT): 55.1 ml  SI(LVOT): 30.9 ml/m2  PA acc time: 0.15 sec  AV Roberto Ratio (DI): 1.0  SOHAIL Index (cm2/m2): 1.6     E/E' av.1  Lateral E/e': 7.7  Medial E/e': 10.5  RV S Roberto: 13.3 cm/sec     ______________________________________________________________________________  Report approved by: Denny Sullivan 2023 02:05 PM         Cardiac Catheterization     Value    Cath EF Estimated 35    Narrative    1.  No obstructive CAD to account for patient's  underlying cardiomyopathy  2.  Normal left-sided filling pressures (LVEDP 6 mmHg)  3.  Moderate to severely reduced ejection fraction on left   ventriculography with global hypokinesis         Discharge Orders      Basic metabolic panel     Basic metabolic panel     Follow-Up with cardiac sub-specialty clinic      Follow-Up with Cardiology CLARENCE      Adult Pulmonary Medicine [facility] Referral      Primary Care - Care Coordination Referral      Brief Discharge Instructions    Do NOT stop your aspirin or platelet inhibitor unless directed by your Cardiologist.  These medications help to prevent platelets in your blood from sticking together and forming a clot.  Examples of these medications are:  Ticagrelor (Brilinta), Clopidigrel (Plavix), Prasugrel (Effient)     When to call - Contact the Heart Clinic    You may experience symptoms that require follow-up before your scheduled appointment. Contact the Heart Clinic if you develop: Fever over 100.4o Fahrenheit, that lasts more than one day; Redness, heat, or pus at the puncture site; Change in color or temperature in your groin or leg.     When to call - Reasons to Call 911    If your groin starts to bleed or begins to swell suddenly after leaving the hospital, lie flat and apply firm pressure just above the puncture site for 15 minutes.  If bleeding continues, call 9-1-1.     Precautions - Lifting    NO lifting of more than 10 pounds for at least 3 days.  If you usually lift 50 pounds or more daily, talk with your Cardiologist.     Precautions - Household Activities    Avoid any hard work or tiring activities.  NO physical activity such as mowing the lawn, raking, vacuuming, changing sheets on your bed, snow shoveling, or using a .     Precautions - Active Sports Activities    Avoid any tiring sports activities.  This includes, yard work, jogging, biking, bowling, swimming, tennis or golf, and sexual activity.     Precautions - Elective Dental Work    NO  elective dental work for 6 weeks after receiving a stent.     Comfort and Pain Management - Pain after Surgery    Pain after surgery is normal and expected.  Your leg may be sore or stiff for a few days, and your pain will improve with time. You may take Tylenol or a pain medicine recommended by your Cardiologist.     Comfort and Pain Management - Bruising after Surgery    Bruising around the groin area is normal.  It may take 2-3 weeks for this to go away.  It is normal for the bruised area to turn green and/or yellow as it is healing.  A small lump may also be present and may last 2-3 months.     Activity - Daily Walking    During the day get up and walk around every 2 hours.     Activity - Light Household Activities    Light household activities are ok.     Activity - Elevate Legs    Elevate legs in between all activities.     Activity - Cardiac Rehab    You are encouraged to enroll in an Outpatient Cardiac Rehab program after discharge from the hospital.  Our Cardiac Rehab staff may visit briefly with you while you're in the hospital.  If they miss you, someone will contact you after you are home.     Return to Driving    Driving is NOT permitted for 24 hours after surgery     Return to work    You may return to work after 72 hours if you are feeling well and your job does not involve heavy lifting.     Dressing Removal    You may take off the dressing on your groin the day after your procedure.     Incision Care    Keep the incision area dry and clean.  You do not need to use a bandage on your incision.     Shower / Bathing    It is ok to shower with regular soap. Pat dry, do not rub. No tub bath for 3 days. No swimming in a pool or hot tub immersion for 1 week     Follow-up and recommended labs and tests     ----------------------------------------------------------------------  Follow up with primary care provider, Delvin Henning, within 7 days for hospital follow- up.  The following labs/tests are  recommended: cbc and bmp   **see appointment details below    Follow with Pulmonology in 2 months    **Clinic will contact you.  See number above if you have not been contacted within 72 hrs.    Follow with Cardiology   **see appointment details below     Activity    Your activity upon discharge: activity as tolerated     Reason for your hospital stay    Heart Failure exacerbation     Diet    Follow this diet upon discharge: Orders Placed This Encounter      Low Saturated Fat Na <2400 mg     Discharge Medications   Discharge Medication List as of 7/25/2023  2:02 PM        START taking these medications    Details   albuterol (PROAIR HFA/PROVENTIL HFA/VENTOLIN HFA) 108 (90 Base) MCG/ACT inhaler Inhale 2 puffs into the lungs every 6 hours as needed for shortness of breath, wheezing or cough, Disp-18 g, R-0, E-PrescribePharmacy may dispense brand covered by insurance (Proair, or proventil or ventolin or generic albuterol inhaler)      carvedilol (COREG) 6.25 MG tablet Take 1 tablet (6.25 mg) by mouth 2 times daily (with meals) for 60 days, Disp-120 tablet, R-0, E-Prescribe      furosemide (LASIX) 20 MG tablet Take 1 tablet (20 mg) by mouth daily for 30 days, Disp-30 tablet, R-0, E-Prescribe      rosuvastatin (CRESTOR) 10 MG tablet Take 1 tablet (10 mg) by mouth daily for 90 days, Disp-90 tablet, R-0, E-Prescribe      sacubitril-valsartan (ENTRESTO) 24-26 MG per tablet Take 1 tablet by mouth 2 times daily for 30 days, Disp-60 tablet, R-0, E-Prescribe           CONTINUE these medications which have CHANGED    Details   aspirin 81 MG EC tablet Take 1 tablet (81 mg) by mouth daily for 30 days, Disp-30 tablet, R-0, E-Prescribe           CONTINUE these medications which have NOT CHANGED    Details   acetaminophen (TYLENOL) 325 MG tablet Take 2 tablets (650 mg) by mouth every 4 hours as needed for other (For optimal non-opioid multimodal pain management to improve pain control.), No Print Out      betamethasone dipropionate  (DIPROSONE) 0.05 % external cream Apply topically 2 times daily as needed (twice daily) Apply sparingly to affected area twice daily as needed.  Do not apply to face.   Historical           STOP taking these medications       amLODIPine (NORVASC) 5 MG tablet Comments:   Reason for Stopping:         lisinopril (ZESTRIL) 10 MG tablet Comments:   Reason for Stopping:             Allergies   Allergies   Allergen Reactions    Seasonal Allergies Other (See Comments)     Congestion sinuses    Codeine Nausea

## 2023-07-25 NOTE — PLAN OF CARE
Goal Outcome Evaluation:       Discharge home with taxi, up in the room independent, tolerated food denies pain. VSS, CHARLY.                   PEDRO call scheduled for Mon 8/16 at 10am. NKR kit sent per .

## 2023-07-25 NOTE — PROGRESS NOTES
Care Management Discharge Note    Discharge Date: 07/25/2023       Discharge Disposition: Home    Discharge Services:      Discharge DME:      Discharge Transportation:      Private pay costs discussed: Not applicable    Does the patient's insurance plan have a 3 day qualifying hospital stay waiver?  No    PAS Confirmation Code:    Patient/family educated on Medicare website which has current facility and service quality ratings:      Education Provided on the Discharge Plan:    Persons Notified of Discharge Plans: patient, bedside rn  Patient/Family in Agreement with the Plan: yes    Handoff Referral Completed: Yes    Additional Information:  Patient to discharge home via transportation provided by his health insurance plan; [employer] ([phone]).  Writer has arranged ride for 4:30 pm today with  at door 6.    3:45 PM Addendum:    Ride arranged with Denae at [employer].   time is 4:30 pm at door 6.  Taxi service is [employer]: [phone].  Writer updated bedside RN with above contact info.    Clementina William RN  Care Coordinator  [facility]  [phone] (text or call)

## 2023-07-26 ENCOUNTER — TELEPHONE (OUTPATIENT)
Dept: CARDIOLOGY | Facility: CLINIC | Age: 58
End: 2023-07-26
Payer: COMMERCIAL

## 2023-07-26 ENCOUNTER — PATIENT OUTREACH (OUTPATIENT)
Dept: CARE COORDINATION | Facility: CLINIC | Age: 58
End: 2023-07-26
Payer: COMMERCIAL

## 2023-07-26 ASSESSMENT — ACTIVITIES OF DAILY LIVING (ADL): DEPENDENT_IADLS:: TRANSPORTATION

## 2023-07-26 NOTE — LETTER
[facility] CARE COORDINATION  [address] [facility] [address]     August 2, 2023    Delvin Echevarria  [address] [facility] [address]      Dear Delvin,        I am a  clinic care coordinator who works with Delvin Henning MD with the [facility]. I recently tried to call and was unable to reach you. Below is a description of clinic care coordination and how I can further assist you.       The clinic care coordination team is made up of a registered nurse, , financial resource worker and community health worker who understand the health care system. The goal of clinic care coordination is to help you manage your health and improve access to the health care system. Our team works alongside your provider to assist you in determining your health and social needs. We can help you obtain health care and community resources, providing you with necessary information and education. We can work with you through any barriers and develop a care plan that helps coordinate and strengthen the communication between you and your care team.  Our services are voluntary and are offered without charge to you personally.    Please feel free to contact me with any questions or concerns regarding care coordination and what we can offer.      We are focused on providing you with the highest-quality healthcare experience possible.    Sincerely,     [facility]   Sagrario Reyez RN, Care Coordinator   [facility]'s   E-mail [email or web address]   [phone]     Enclosed: I have enclosed a copy of a 24 Hour Access Plan. This has helpful phone numbers for you to call when needed. Please keep this in an easy to access place to use as needed.

## 2023-07-26 NOTE — TELEPHONE ENCOUNTER
Writer attempted to call pt for a cardiology post discharge phone call, but no answer. VM left to return my phone call.  MAX John RN.

## 2023-07-26 NOTE — PROGRESS NOTES
Clinic Care Coordination Contact  [facility]/Voicemail    Referral Source: IP Handoff  Clinical Data:   7/21/2023 - 7/25/2023 (4 days)  [facility]  NSTEMI      Care Coordinator Outreach  Outreach attempted x 1.  Left message on patient's voicemail with call back information and requested return call.  Plan: . Care Coordinator will try to reach patient again in 1-2 business days.    [facility]   Sagrario Reyez RN, Care Coordinator   [facility]'s   E-mail [email or web address]   [phone]

## 2023-07-26 NOTE — TELEPHONE ENCOUNTER
Patient was admitted to [facility] on 7/21/23 as a transfer from [facility] with resting chest pain and heart failure. Hypertensive urgency. BP's significantly elevated on admission. He had been out of his medications PTA.     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     7/22/23: Echo showed EF of 30-35%. There is mod-severe global hypokinesia of the left ventricle.    7/24/23: Coronary angiogram via RFA showed no obstructive CAD to account for patient's underlying cardiomyopathy. Normal left-sided filling pressures (LVEDP 6 mmHg). Moderate to severely reduced ejection fraction on left ventriculography with global hypokinesis.    Unclear etiology - history only notable for heavy alcohol use in the past (now sober for 10 years).    IV Lasix diuresed 8 lbs and transitioned to po.    Pt was started on Entresto, Crestor, Coreg, Lasix. PTA Norvasc and Lisinopril were discontinued at time of discharge. Would recommend cardiac MRI as an outpatient and Ziopatch as well to evaluate for arrhythmias.     Pt is scheduled for labs on 8/3/23 at 1315, followed with an OV at 1340 with CLARENCE Roxann German at our [facility]. cMRI or Ziopatch monitor were ordered. Will route this message to CLARENCE Zina Rodríguez for further review. MAX John RN.

## 2023-07-27 LAB
ATRIAL RATE - MUSE: 70 BPM
DIASTOLIC BLOOD PRESSURE - MUSE: NORMAL MMHG
INTERPRETATION ECG - MUSE: NORMAL
P AXIS - MUSE: 56 DEGREES
PR INTERVAL - MUSE: 144 MS
QRS DURATION - MUSE: 84 MS
QT - MUSE: 396 MS
QTC - MUSE: 427 MS
R AXIS - MUSE: 55 DEGREES
SYSTOLIC BLOOD PRESSURE - MUSE: NORMAL MMHG
T AXIS - MUSE: 244 DEGREES
VENTRICULAR RATE- MUSE: 70 BPM

## 2023-07-27 ASSESSMENT — ACTIVITIES OF DAILY LIVING (ADL): DEPENDENT_IADLS:: TRANSPORTATION

## 2023-07-27 NOTE — PROGRESS NOTES
Clinic Care Coordination Contact  [facility]/Voicemail    Referral Source: IP Handoff  Clinical Data: Care Coordinator Outreach  Outreach attempted x 2.  Left message on patient's voicemail with call back information and requested return call.  Plan: . Care Coordinator will await a return call from the patient  .    [facility]   Sagrario Reyez RN, Care Coordinator   [facility]'s   E-mail [email or web address]   [phone]

## 2023-07-28 ENCOUNTER — TELEPHONE (OUTPATIENT)
Dept: FAMILY MEDICINE | Facility: CLINIC | Age: 58
End: 2023-07-28
Payer: COMMERCIAL

## 2023-07-28 NOTE — TELEPHONE ENCOUNTER
Patient Quality Outreach    Patient is due for the following:   IVD  -  BP Check and IVD Follow-up Visit    Next Steps:   Patient has upcoming appointment, these items will be addressed at that time.    Type of outreach:    Chart review performed, no outreach needed.      Questions for provider review:    None           Rosaura Batres CMA

## 2023-07-31 ENCOUNTER — OFFICE VISIT (OUTPATIENT)
Dept: FAMILY MEDICINE | Facility: CLINIC | Age: 58
End: 2023-07-31
Payer: COMMERCIAL

## 2023-07-31 VITALS
DIASTOLIC BLOOD PRESSURE: 84 MMHG | HEART RATE: 75 BPM | TEMPERATURE: 97.5 F | SYSTOLIC BLOOD PRESSURE: 126 MMHG | RESPIRATION RATE: 18 BRPM | WEIGHT: 142 LBS | OXYGEN SATURATION: 94 % | HEIGHT: 71 IN | BODY MASS INDEX: 19.88 KG/M2

## 2023-07-31 DIAGNOSIS — D64.9 MILD ANEMIA: Primary | ICD-10-CM

## 2023-07-31 DIAGNOSIS — R91.8 GROUND GLASS OPACITY PRESENT ON IMAGING OF LUNG: ICD-10-CM

## 2023-07-31 DIAGNOSIS — Z87.891 EX-SMOKER: ICD-10-CM

## 2023-07-31 DIAGNOSIS — I42.8 NONISCHEMIC CARDIOMYOPATHY (H): ICD-10-CM

## 2023-07-31 DIAGNOSIS — I25.9 IHD (ISCHEMIC HEART DISEASE): ICD-10-CM

## 2023-07-31 DIAGNOSIS — Z92.89 HISTORY OF RECENT HOSPITALIZATION: Primary | ICD-10-CM

## 2023-07-31 LAB
ANION GAP SERPL CALCULATED.3IONS-SCNC: 9 MMOL/L (ref 7–15)
BUN SERPL-MCNC: 26.6 MG/DL (ref 8–23)
CALCIUM SERPL-MCNC: 8.9 MG/DL (ref 8.8–10.2)
CHLORIDE SERPL-SCNC: 105 MMOL/L (ref 98–107)
CREAT SERPL-MCNC: 0.97 MG/DL (ref 0.67–1.17)
DEPRECATED HCO3 PLAS-SCNC: 24 MMOL/L (ref 22–29)
ERYTHROCYTE [DISTWIDTH] IN BLOOD BY AUTOMATED COUNT: 14.4 % (ref 10–15)
GFR SERPL CREATININE-BSD FRML MDRD: 89 ML/MIN/1.73M2
GLUCOSE SERPL-MCNC: 118 MG/DL (ref 70–99)
HCT VFR BLD AUTO: 36.1 % (ref 40–53)
HGB BLD-MCNC: 11.5 G/DL (ref 13.3–17.7)
MCH RBC QN AUTO: 29.9 PG (ref 26.5–33)
MCHC RBC AUTO-ENTMCNC: 31.9 G/DL (ref 31.5–36.5)
MCV RBC AUTO: 94 FL (ref 78–100)
PLATELET # BLD AUTO: 264 10E3/UL (ref 150–450)
POTASSIUM SERPL-SCNC: 5.1 MMOL/L (ref 3.4–5.3)
RBC # BLD AUTO: 3.85 10E6/UL (ref 4.4–5.9)
SODIUM SERPL-SCNC: 138 MMOL/L (ref 136–145)
WBC # BLD AUTO: 8 10E3/UL (ref 4–11)

## 2023-07-31 PROCEDURE — 85027 COMPLETE CBC AUTOMATED: CPT | Performed by: FAMILY MEDICINE

## 2023-07-31 PROCEDURE — 36415 COLL VENOUS BLD VENIPUNCTURE: CPT | Performed by: FAMILY MEDICINE

## 2023-07-31 PROCEDURE — 99495 TRANSJ CARE MGMT MOD F2F 14D: CPT | Performed by: FAMILY MEDICINE

## 2023-07-31 PROCEDURE — 80048 BASIC METABOLIC PNL TOTAL CA: CPT | Performed by: FAMILY MEDICINE

## 2023-07-31 ASSESSMENT — PATIENT HEALTH QUESTIONNAIRE - PHQ9
10. IF YOU CHECKED OFF ANY PROBLEMS, HOW DIFFICULT HAVE THESE PROBLEMS MADE IT FOR YOU TO DO YOUR WORK, TAKE CARE OF THINGS AT HOME, OR GET ALONG WITH OTHER PEOPLE: SOMEWHAT DIFFICULT
SUM OF ALL RESPONSES TO PHQ QUESTIONS 1-9: 8
SUM OF ALL RESPONSES TO PHQ QUESTIONS 1-9: 8

## 2023-07-31 ASSESSMENT — PAIN SCALES - GENERAL: PAINLEVEL: NO PAIN (0)

## 2023-07-31 NOTE — PROGRESS NOTES
Assessment & Plan     History of recent hospitalization  Patient was hospitalized recently with possible non-STEMI, congestive cardiac failure and acute hypoxic respiratory failure.  Cardiac catheterization showed no obstructive coronary artery disease and echocardiogram findings were remarkable for moderate to severely reduced left ventricular ejection fraction.  Patient has been feeling well since hospital discharge, quit smoking as well.  Recommended to continue Entresto, rosuvastatin, furosemide and carvedilol.  CBC and BMP ordered.  Patient has an appointment with cardiologist in coming days.  Reminded to schedule appointment with PCP for routine physical exam    Nonischemic cardiomyopathy (H)  - CBC with platelets; Future  - Basic metabolic panel  (Ca, Cl, CO2, Creat, Gluc, K, Na, BUN); Future    IHD (ischemic heart disease)  As above    Ex-smoker  Quit smoking since hospital discharge    Ground glass opacity present on imaging of lung  Reminded to schedule appointment with pulmonology      Dillon Conway MD  [facility]    Fredi Hargrove is a 58 year old, presenting for the following health issues:  Hospital F/U      [facility]       Hospital Follow-up Visit:    Hospital/Nursing Home/ Rehab Facility: [facility]  Date of Admission: 7/21/23  Date of Discharge: 7/25/23  Reason(s) for Admission: Newly found HFrEF (EF 30-35%) with decompensation   Acute hypoxic respiratory failure secondary to above.   Respiratory acidosis.   Possible NSTEMI   Hypertensive urgeny    Was your hospitalization related to COVID-19? No   Problems taking medications regularly:  None  Medication changes since discharge: None  Problems adhering to non-medication therapy:  None    Summary of hospitalization:  [facility] discharge summary reviewed  Diagnostic Tests/Treatments reviewed.  Follow up needed: Labs  Other Healthcare Providers Involved in Patient s Care:         " None  Update since discharge: stable.       Plan of care communicated with patient       Review of Systems   Constitutional, HEENT, cardiovascular, pulmonary, GI, , musculoskeletal, neuro, skin, endocrine and psych systems are negative, except as otherwise noted.      Objective    /84 (Cuff Size: Adult Regular)   Pulse 75   Temp 97.5  F (36.4  C) (Tympanic)   Resp 18   Ht 1.803 m (5' 11\")   Wt 64.4 kg (142 lb)   SpO2 94%   BMI 19.80 kg/m    Body mass index is 19.8 kg/m .  Physical Exam   GENERAL: alert and no distress  EYES: Eyes grossly normal to inspection, PERRL and conjunctivae and sclerae normal  HENT: normal cephalic/atraumatic, nose and mouth without ulcers or lesions, oropharynx clear, and oral mucous membranes moist  NECK: no adenopathy, no asymmetry, masses, or scars and thyroid normal to palpation  RESP: Few bibasilar crackles, no wheeze or rhonchi auscultated  CV: regular rates and rhythm, normal S1 S2, no S3 or S4, and no murmur, click or rub  ABDOMEN: soft, nontender  MS: no gross musculoskeletal defects noted, no edema  NEURO: Normal strength and tone, mentation intact and speech normal  PSYCH: mentation appears normal, affect normal/bright                "

## 2023-07-31 NOTE — LETTER
August 3, 2023      Delvin Echevarria  [address] [address] [address]        Dear ,    We are writing to inform you of your test results.  Lab results consistent with mild chronic anemia otherwise unremarkable.      Will recommend to have vitamin B12, iron stores and folic acid level checked, order placed, kindly schedule lab only appointment.       Resulted Orders   Basic metabolic panel   Result Value Ref Range    Sodium 138 136 - 145 mmol/L    Potassium 5.1 3.4 - 5.3 mmol/L    Chloride 105 98 - 107 mmol/L    Carbon Dioxide (CO2) 24 22 - 29 mmol/L    Anion Gap 9 7 - 15 mmol/L    Urea Nitrogen 26.6 (H) 8.0 - 23.0 mg/dL    Creatinine 0.97 0.67 - 1.17 mg/dL    Calcium 8.9 8.8 - 10.2 mg/dL    Glucose 118 (H) 70 - 99 mg/dL    GFR Estimate 89 >60 mL/min/1.73m2   CBC with platelets   Result Value Ref Range    WBC Count 8.0 4.0 - 11.0 10e3/uL    RBC Count 3.85 (L) 4.40 - 5.90 10e6/uL    Hemoglobin 11.5 (L) 13.3 - 17.7 g/dL    Hematocrit 36.1 (L) 40.0 - 53.0 %    MCV 94 78 - 100 fL    MCH 29.9 26.5 - 33.0 pg    MCHC 31.9 31.5 - 36.5 g/dL    RDW 14.4 10.0 - 15.0 %    Platelet Count 264 150 - 450 10e3/uL       If you have any questions or concerns, please call the clinic at the number listed above.       Sincerely,      Dillon Conway MD/orlando

## 2023-07-31 NOTE — PATIENT INSTRUCTIONS
Maribel Garcia MD   [address]   [place] [address]   Phone: [phone]   Fax: [phone]    Diagnoses: Uncomplicated asthma, unspecified asthma severity, unspecified whether persistent   Order: Adult Pulmonary Medicine  Referral       Comment: Please be aware that coverage of these services is subject to the terms and limitations of your health insurance plan.  Call member services at your health plan with any benefit or coverage questions.   [facility] will call you to coordinate your care as prescribed by the provider.  If you don t hear from a representative within 2 business days, please call [phone].

## 2023-08-03 ENCOUNTER — OFFICE VISIT (OUTPATIENT)
Dept: CARDIOLOGY | Facility: CLINIC | Age: 58
End: 2023-08-03
Payer: COMMERCIAL

## 2023-08-03 ENCOUNTER — LAB (OUTPATIENT)
Dept: LAB | Facility: CLINIC | Age: 58
End: 2023-08-03
Payer: COMMERCIAL

## 2023-08-03 VITALS
WEIGHT: 139 LBS | BODY MASS INDEX: 19.39 KG/M2 | DIASTOLIC BLOOD PRESSURE: 76 MMHG | HEART RATE: 68 BPM | OXYGEN SATURATION: 96 % | SYSTOLIC BLOOD PRESSURE: 123 MMHG

## 2023-08-03 DIAGNOSIS — I42.8 NONISCHEMIC CARDIOMYOPATHY (H): Primary | ICD-10-CM

## 2023-08-03 DIAGNOSIS — I25.10 NONOBSTRUCTIVE ATHEROSCLEROSIS OF CORONARY ARTERY: ICD-10-CM

## 2023-08-03 DIAGNOSIS — I10 BENIGN ESSENTIAL HYPERTENSION: ICD-10-CM

## 2023-08-03 DIAGNOSIS — D64.9 MILD ANEMIA: ICD-10-CM

## 2023-08-03 LAB
FERRITIN SERPL-MCNC: 35 NG/ML (ref 31–409)
FOLATE SERPL-MCNC: 4.1 NG/ML (ref 4.6–34.8)
IRON BINDING CAPACITY (ROCHE): 341 UG/DL (ref 240–430)
IRON SATN MFR SERPL: 17 % (ref 15–46)
IRON SERPL-MCNC: 59 UG/DL (ref 61–157)
VIT B12 SERPL-MCNC: 256 PG/ML (ref 232–1245)

## 2023-08-03 PROCEDURE — 82728 ASSAY OF FERRITIN: CPT

## 2023-08-03 PROCEDURE — 36415 COLL VENOUS BLD VENIPUNCTURE: CPT

## 2023-08-03 PROCEDURE — 83540 ASSAY OF IRON: CPT

## 2023-08-03 PROCEDURE — 82746 ASSAY OF FOLIC ACID SERUM: CPT

## 2023-08-03 PROCEDURE — 99207 PR NO CHARGE LOS: CPT | Performed by: NURSE PRACTITIONER

## 2023-08-03 PROCEDURE — 83550 IRON BINDING TEST: CPT

## 2023-08-03 PROCEDURE — 82607 VITAMIN B-12: CPT

## 2023-08-03 RX ORDER — SPIRONOLACTONE 25 MG/1
12.5 TABLET ORAL DAILY
Qty: 15 TABLET | Refills: 11 | Status: CANCELLED | OUTPATIENT
Start: 2023-08-03

## 2023-08-03 RX ORDER — ROSUVASTATIN CALCIUM 10 MG/1
10 TABLET, COATED ORAL DAILY
Qty: 90 TABLET | Refills: 3 | Status: CANCELLED | OUTPATIENT
Start: 2023-08-03

## 2023-08-03 RX ORDER — FUROSEMIDE 20 MG
20 TABLET ORAL DAILY
Qty: 30 TABLET | Refills: 11 | Status: CANCELLED | OUTPATIENT
Start: 2023-08-03

## 2023-08-03 RX ORDER — CARVEDILOL 6.25 MG/1
6.25 TABLET ORAL 2 TIMES DAILY WITH MEALS
Qty: 180 TABLET | Refills: 3 | Status: CANCELLED | OUTPATIENT
Start: 2023-08-03

## 2023-08-03 NOTE — LETTER
8/3/2023    Delvin Henning MD  [address] [place] [id number]    RE: Delvin Echevarria       Dear Colleague,     I had the pleasure of seeing Delvin JOSE Echevarria in the [facility].  Patient left before being seen.      Thank you for allowing me to participate in the care of your patient.      Sincerely,     XIN Solorzano [facility]  cc:   Meghan Rodríguez PA-C  [address] [place] [address],  [place] [address]

## 2023-08-04 ENCOUNTER — DOCUMENTATION ONLY (OUTPATIENT)
Dept: FAMILY MEDICINE | Facility: CLINIC | Age: 58
End: 2023-08-04
Payer: COMMERCIAL

## 2023-08-04 DIAGNOSIS — E61.1 IRON DEFICIENCY: ICD-10-CM

## 2023-08-04 DIAGNOSIS — E53.8 FOLIC ACID DEFICIENCY: Primary | ICD-10-CM

## 2023-08-04 RX ORDER — LANOLIN ALCOHOL/MO/W.PET/CERES
400 CREAM (GRAM) TOPICAL DAILY
Qty: 90 TABLET | Refills: 1 | Status: SHIPPED | OUTPATIENT
Start: 2023-08-04 | End: 2024-07-08

## 2023-08-04 RX ORDER — FERROUS SULFATE 325(65) MG
325 TABLET ORAL
Qty: 90 TABLET | Refills: 1 | Status: SHIPPED | OUTPATIENT
Start: 2023-08-04 | End: 2024-07-08

## 2023-08-04 NOTE — PROGRESS NOTES
Patient is coming in for labs on Monday Aug. 7th per Dr. Conway.  Please place orders if needed.  Thank you!!

## 2023-08-07 ENCOUNTER — DOCUMENTATION ONLY (OUTPATIENT)
Dept: FAMILY MEDICINE | Facility: CLINIC | Age: 58
End: 2023-08-07

## 2023-08-07 NOTE — CONFIDENTIAL NOTE
I talked with Delvin and told him no need for lab appointment today.  Needs to follow up with lab in 2-3 months from the 8/2/23 date  Kenzie Gregg RN

## 2023-08-07 NOTE — PROGRESS NOTES
SECOND REQUEST.     Please order future labs for today's (8-7) appointment @ [phone]. Thanks, Debibe OCHOA

## 2023-09-29 ENCOUNTER — DOCUMENTATION ONLY (OUTPATIENT)
Dept: FAMILY MEDICINE | Facility: CLINIC | Age: 58
End: 2023-09-29
Payer: COMMERCIAL

## 2023-09-29 NOTE — PROGRESS NOTES
Delvin Echevarria has an upcoming lab appointment:    Future Appointments   Date Time Provider Department Center   10/2/2023  1:30 PM NB LAB NBLABR [facility]NB   10/11/2023  3:15 PM Roxann German APRN CNP [facility] PSA CLIN   10/16/2023  1:45 PM Meghan Rodríguez PA-C [facility] PSA CLIN   10/23/2023  3:30 PM Delvin Henning MD NBFAM [facility]   11/30/2023 11:00 AM [place] PULMONARY FUNCTION [facility]   11/30/2023  1:00 PM Connie Steinberg MD [facility]     Patient is scheduled for the following lab(s): labs per Dr. Conway    There is no order available. Please review and place either future orders or HMPO (Review of Health Maintenance Protocol Orders), as appropriate.    Health Maintenance Due   Topic    HIV SCREENING     HEPATITIS C SCREENING     ALT      Katherine Schoenhals

## 2024-03-01 DIAGNOSIS — J45.909 UNCOMPLICATED ASTHMA, UNSPECIFIED ASTHMA SEVERITY, UNSPECIFIED WHETHER PERSISTENT: ICD-10-CM

## 2024-03-01 RX ORDER — ALBUTEROL SULFATE 90 UG/1
2 AEROSOL, METERED RESPIRATORY (INHALATION) EVERY 6 HOURS PRN
Qty: 18 G | Refills: 0 | Status: SHIPPED | OUTPATIENT
Start: 2024-03-01 | End: 2024-03-25

## 2024-03-01 RX ORDER — ALBUTEROL SULFATE 90 UG/1
2 AEROSOL, METERED RESPIRATORY (INHALATION) EVERY 6 HOURS PRN
Qty: 18 G | Refills: 0 | Status: CANCELLED | OUTPATIENT
Start: 2024-03-01

## 2024-03-01 ASSESSMENT — ASTHMA QUESTIONNAIRES: ACT_TOTALSCORE: 12

## 2024-03-01 NOTE — TELEPHONE ENCOUNTER
Patient is requesting refill of albuterol (PROAIR HFA/PROVENTIL HFA/VENTOLIN HFA) 108 (90 Base) MCG/ACT inhaler .  He is out of the medication and needs refill today.  Please call to discuss this.  OK to Lm on VM.     Patient uses [facility]

## 2024-03-01 NOTE — TELEPHONE ENCOUNTER
Pt is due for 6 month asthma check and updated ACT. Please call to schedule, so devon refill can be considered.    Nathaly Gifford RN  [facility]

## 2024-03-01 NOTE — TELEPHONE ENCOUNTER
Routed to provider for refill consideration.    Pt called back.    Pt says he works as a  and has a lot of difficulty with dust.  He denies exacerbation today but says he has been out of his inhaler for about 3 weeks.  Pt asks if can be refilled today?    Pt intends to make appt to see PCP at the end of this month.    ACT updated and is 12.    Olivia Thompson RN

## 2024-03-11 DIAGNOSIS — J45.909 ASTHMA: Primary | ICD-10-CM

## 2024-03-11 NOTE — PROGRESS NOTES
Orders for PFT's and chest x-ray have been placed per protocol.     Dolores Montano RN   [facility]

## 2024-03-11 NOTE — Clinical Note
Future Appointments 3/25/2024  3:30 PM    [facility] Henning MD          [facility]               [facility] 4/25/2024  11:15 AM   [facility] German[facility] 4/30/2024  2:30 PM    [place] PULMONARY FUNCTION      [facility]              [facility] 5/8/2024   1:30 PM    Nolan Fields MD   [facility]

## 2024-03-25 ENCOUNTER — OFFICE VISIT (OUTPATIENT)
Dept: FAMILY MEDICINE | Facility: CLINIC | Age: 59
End: 2024-03-25
Payer: COMMERCIAL

## 2024-03-25 VITALS
WEIGHT: 140 LBS | OXYGEN SATURATION: 98 % | DIASTOLIC BLOOD PRESSURE: 90 MMHG | TEMPERATURE: 97.4 F | BODY MASS INDEX: 19.53 KG/M2 | SYSTOLIC BLOOD PRESSURE: 150 MMHG | RESPIRATION RATE: 18 BRPM | HEART RATE: 86 BPM

## 2024-03-25 DIAGNOSIS — D64.9 ANEMIA, UNSPECIFIED TYPE: ICD-10-CM

## 2024-03-25 DIAGNOSIS — R91.1 LESION OF LUNG: ICD-10-CM

## 2024-03-25 DIAGNOSIS — I10 BENIGN ESSENTIAL HYPERTENSION: ICD-10-CM

## 2024-03-25 DIAGNOSIS — J45.909 UNCOMPLICATED ASTHMA, UNSPECIFIED ASTHMA SEVERITY, UNSPECIFIED WHETHER PERSISTENT: Primary | ICD-10-CM

## 2024-03-25 DIAGNOSIS — L30.9 ECZEMA, UNSPECIFIED TYPE: ICD-10-CM

## 2024-03-25 DIAGNOSIS — I42.8 NONISCHEMIC CARDIOMYOPATHY (H): ICD-10-CM

## 2024-03-25 PROBLEM — F12.10 MARIJUANA ABUSE: Status: RESOLVED | Noted: 2019-01-08 | Resolved: 2024-03-25

## 2024-03-25 PROBLEM — K43.9 ABDOMINAL WALL HERNIA: Status: ACTIVE | Noted: 2024-03-25

## 2024-03-25 PROBLEM — F17.200 TOBACCO DEPENDENCE SYNDROME: Chronic | Status: RESOLVED | Noted: 2019-01-08 | Resolved: 2024-03-25

## 2024-03-25 PROBLEM — M54.16 LUMBAR NERVE ROOT IMPINGEMENT: Status: RESOLVED | Noted: 2019-01-08 | Resolved: 2024-03-25

## 2024-03-25 PROBLEM — I16.0 HYPERTENSIVE URGENCY: Status: RESOLVED | Noted: 2023-07-21 | Resolved: 2024-03-25

## 2024-03-25 LAB
ANION GAP SERPL CALCULATED.3IONS-SCNC: 12 MMOL/L (ref 7–15)
BUN SERPL-MCNC: 20.8 MG/DL (ref 8–23)
CALCIUM SERPL-MCNC: 9.1 MG/DL (ref 8.8–10.2)
CHLORIDE SERPL-SCNC: 100 MMOL/L (ref 98–107)
CREAT SERPL-MCNC: 0.83 MG/DL (ref 0.67–1.17)
DEPRECATED HCO3 PLAS-SCNC: 24 MMOL/L (ref 22–29)
EGFRCR SERPLBLD CKD-EPI 2021: >90 ML/MIN/1.73M2
ERYTHROCYTE [DISTWIDTH] IN BLOOD BY AUTOMATED COUNT: 14.6 % (ref 10–15)
FOLATE SERPL-MCNC: 10.4 NG/ML (ref 4.6–34.8)
GLUCOSE SERPL-MCNC: 134 MG/DL (ref 70–99)
HCT VFR BLD AUTO: 39.8 % (ref 40–53)
HGB BLD-MCNC: 13.2 G/DL (ref 13.3–17.7)
MCH RBC QN AUTO: 30.8 PG (ref 26.5–33)
MCHC RBC AUTO-ENTMCNC: 33.2 G/DL (ref 31.5–36.5)
MCV RBC AUTO: 93 FL (ref 78–100)
PLATELET # BLD AUTO: 280 10E3/UL (ref 150–450)
POTASSIUM SERPL-SCNC: 4.4 MMOL/L (ref 3.4–5.3)
RBC # BLD AUTO: 4.28 10E6/UL (ref 4.4–5.9)
SODIUM SERPL-SCNC: 136 MMOL/L (ref 135–145)
WBC # BLD AUTO: 8.1 10E3/UL (ref 4–11)

## 2024-03-25 PROCEDURE — 36415 COLL VENOUS BLD VENIPUNCTURE: CPT | Performed by: FAMILY MEDICINE

## 2024-03-25 PROCEDURE — 85027 COMPLETE CBC AUTOMATED: CPT | Performed by: FAMILY MEDICINE

## 2024-03-25 PROCEDURE — 80048 BASIC METABOLIC PNL TOTAL CA: CPT | Performed by: FAMILY MEDICINE

## 2024-03-25 PROCEDURE — 99215 OFFICE O/P EST HI 40 MIN: CPT | Performed by: FAMILY MEDICINE

## 2024-03-25 PROCEDURE — 82746 ASSAY OF FOLIC ACID SERUM: CPT | Performed by: FAMILY MEDICINE

## 2024-03-25 RX ORDER — HYDROXYZINE HYDROCHLORIDE 25 MG/1
25-50 TABLET, FILM COATED ORAL EVERY 6 HOURS PRN
Qty: 60 TABLET | Refills: 11 | Status: SHIPPED | OUTPATIENT
Start: 2024-03-25

## 2024-03-25 RX ORDER — ALBUTEROL SULFATE 90 UG/1
2 AEROSOL, METERED RESPIRATORY (INHALATION) EVERY 6 HOURS PRN
Qty: 18 G | Refills: 11 | Status: SHIPPED | OUTPATIENT
Start: 2024-03-25

## 2024-03-25 RX ORDER — ROSUVASTATIN CALCIUM 10 MG/1
10 TABLET, COATED ORAL DAILY
Qty: 90 TABLET | Refills: 3 | Status: SHIPPED | OUTPATIENT
Start: 2024-03-25 | End: 2024-04-11

## 2024-03-25 RX ORDER — BETAMETHASONE DIPROPIONATE 0.5 MG/G
CREAM TOPICAL 2 TIMES DAILY PRN
Qty: 45 G | Refills: 11 | Status: SHIPPED | OUTPATIENT
Start: 2024-03-25

## 2024-03-25 RX ORDER — CARVEDILOL 6.25 MG/1
6.25 TABLET ORAL 2 TIMES DAILY WITH MEALS
Qty: 180 TABLET | Refills: 3 | Status: SHIPPED | OUTPATIENT
Start: 2024-03-25 | End: 2024-07-08

## 2024-03-25 ASSESSMENT — PAIN SCALES - GENERAL: PAINLEVEL: NO PAIN (0)

## 2024-03-25 NOTE — PROGRESS NOTES
S :Delvin Echevarria is a 58 year old male with a lot going on, didn't have insurance, now on MA.    Copd/asthma: albuterol monotherapy.  Ok with that for now.  Has spriometry ordered to better assess his lungs    Lung lesion: upper lobe ground glass findings, unclear cause, a year ago.  Needs repeat CT was in fluid overload at that time.  Transient?  Something else?   Has pulmonary appt set up for May    Htn: not controlled: willing to g et back on beta blocker    Cardiomyopathy: 35% EF last year.  Willing to restart statin and beta blocker.  Has cards follow-up scheduled.  Repeat echo will be needed    Anemia: nos.  Was low on folate level, but didn't take folic acid regularly.  Recheck     Eczema: severe, lifelong.  Potent steroids help.  Hydroxyzine for itching    O:BP (!) 150/90   Pulse 86   Temp 97.4  F (36.3  C) (Tympanic)   Resp 18   Wt 63.5 kg (140 lb)   SpO2 98%   BMI 19.53 kg/m    GEN: Alert and oriented, in no acute distress  Eczema scattered on face, arms, neck  CV: RRR, no murmur  EXT: no edema or lesions noted in lower extremities  Normal gait  Slender build    A: Copd/asthma: albuterol monotherapy.  Ok with that for now.    Lung lesion: re-image   Has pulmonary appt set up for May  Htn: not controlled  Cardiomyopathy: 35% EF last year.  Willing to restart statin and beta blocker.    Anemia: nos.  recheck  Eczema: severe    P: back on carvedilol and statin.  Fills on albuterol and hydroxyzine and steroid cream  Update cbc and met panel  CT chest follow up    Will need echo, had PFTs and lung follow up as well.  If CT shows resolution of findings, can probably cancel pulm referral    Follow up as above     40  min spent on date of encounter reviewing chart, history, physical, documenting and counseling as mentioned in this note

## 2024-03-25 NOTE — LETTER
March 27, 2024      Delvin Echevarria  [address] [address]        Dear ,    We are writing to inform you of your test results.  Labs improving.  Good to see.  I hope things are going well.         Resulted Orders   Basic metabolic panel  (Ca, Cl, CO2, Creat, Gluc, K, Na, BUN)   Result Value Ref Range    Sodium 136 135 - 145 mmol/L      Comment:      Reference intervals for this test were updated on 09/26/2023 to more accurately reflect our healthy population. There may be differences in the flagging of prior results with similar values performed with this method. Interpretation of those prior results can be made in the context of the updated reference intervals.     Potassium 4.4 3.4 - 5.3 mmol/L    Chloride 100 98 - 107 mmol/L    Carbon Dioxide (CO2) 24 22 - 29 mmol/L    Anion Gap 12 7 - 15 mmol/L    Urea Nitrogen 20.8 8.0 - 23.0 mg/dL    Creatinine 0.83 0.67 - 1.17 mg/dL    GFR Estimate >90 >60 mL/min/1.73m2    Calcium 9.1 8.8 - 10.2 mg/dL    Glucose 134 (H) 70 - 99 mg/dL   Folate   Result Value Ref Range    Folic Acid 10.4 4.6 - 34.8 ng/mL   CBC with platelets   Result Value Ref Range    WBC Count 8.1 4.0 - 11.0 10e3/uL    RBC Count 4.28 (L) 4.40 - 5.90 10e6/uL    Hemoglobin 13.2 (L) 13.3 - 17.7 g/dL    Hematocrit 39.8 (L) 40.0 - 53.0 %    MCV 93 78 - 100 fL    MCH 30.8 26.5 - 33.0 pg    MCHC 33.2 31.5 - 36.5 g/dL    RDW 14.6 10.0 - 15.0 %    Platelet Count 280 150 - 450 10e3/uL       If you have any questions or concerns, please call the clinic at the number listed above.       Sincerely,      Delvin Henning MD

## 2024-04-10 ENCOUNTER — HOSPITAL ENCOUNTER (OUTPATIENT)
Dept: CT IMAGING | Facility: CLINIC | Age: 59
Discharge: HOME | End: 2024-04-10
Attending: FAMILY MEDICINE | Admitting: FAMILY MEDICINE
Payer: COMMERCIAL

## 2024-04-10 PROCEDURE — 250N000011 HC RX IP 250 OP 636: Performed by: FAMILY MEDICINE

## 2024-04-10 PROCEDURE — 250N000009 HC RX 250: Performed by: FAMILY MEDICINE

## 2024-04-10 PROCEDURE — 71260 CT THORAX DX C+: CPT

## 2024-04-10 RX ORDER — IOPAMIDOL 755 MG/ML
68 INJECTION, SOLUTION INTRAVASCULAR ONCE
Status: COMPLETED | OUTPATIENT
Start: 2024-04-10 | End: 2024-04-10

## 2024-04-10 RX ADMIN — SODIUM CHLORIDE 57 ML: 9 INJECTION, SOLUTION INTRAVENOUS at 15:48

## 2024-04-10 RX ADMIN — IOPAMIDOL 68 ML: 755 INJECTION, SOLUTION INTRAVENOUS at 15:48

## 2024-04-11 ENCOUNTER — HOSPITAL ENCOUNTER (EMERGENCY)
Facility: HOSPITAL | Age: 59
Discharge: HOME | End: 2024-04-11
Attending: EMERGENCY MEDICINE | Admitting: EMERGENCY MEDICINE
Payer: COMMERCIAL

## 2024-04-11 ENCOUNTER — TELEPHONE (OUTPATIENT)
Dept: FAMILY MEDICINE | Facility: CLINIC | Age: 59
End: 2024-04-11
Payer: COMMERCIAL

## 2024-04-11 ENCOUNTER — APPOINTMENT (OUTPATIENT)
Dept: CT IMAGING | Facility: HOSPITAL | Age: 59
End: 2024-04-11
Attending: EMERGENCY MEDICINE
Payer: COMMERCIAL

## 2024-04-11 VITALS
RESPIRATION RATE: 18 BRPM | HEART RATE: 52 BPM | OXYGEN SATURATION: 94 % | BODY MASS INDEX: 19.53 KG/M2 | SYSTOLIC BLOOD PRESSURE: 144 MMHG | TEMPERATURE: 97.9 F | DIASTOLIC BLOOD PRESSURE: 77 MMHG | HEIGHT: 71 IN

## 2024-04-11 DIAGNOSIS — I77.79 DISSECTION OF OTHER SPECIFIED ARTERY (H): Primary | ICD-10-CM

## 2024-04-11 DIAGNOSIS — R93.5 ABNORMAL CT OF THE ABDOMEN: ICD-10-CM

## 2024-04-11 LAB
ANION GAP SERPL CALCULATED.3IONS-SCNC: 12 MMOL/L (ref 7–15)
APTT PPP: 29 SECONDS (ref 22–38)
BASOPHILS # BLD AUTO: 0 10E3/UL (ref 0–0.2)
BASOPHILS NFR BLD AUTO: 1 %
BUN SERPL-MCNC: 21.1 MG/DL (ref 8–23)
CALCIUM SERPL-MCNC: 9 MG/DL (ref 8.8–10.2)
CHLORIDE SERPL-SCNC: 103 MMOL/L (ref 98–107)
CREAT SERPL-MCNC: 0.87 MG/DL (ref 0.67–1.17)
DEPRECATED HCO3 PLAS-SCNC: 22 MMOL/L (ref 22–29)
EGFRCR SERPLBLD CKD-EPI 2021: >90 ML/MIN/1.73M2
EOSINOPHIL # BLD AUTO: 0.6 10E3/UL (ref 0–0.7)
EOSINOPHIL NFR BLD AUTO: 8 %
ERYTHROCYTE [DISTWIDTH] IN BLOOD BY AUTOMATED COUNT: 14.3 % (ref 10–15)
GLUCOSE SERPL-MCNC: 91 MG/DL (ref 70–99)
HCT VFR BLD AUTO: 37 % (ref 40–53)
HGB BLD-MCNC: 12.3 G/DL (ref 13.3–17.7)
IMM GRANULOCYTES # BLD: 0 10E3/UL
IMM GRANULOCYTES NFR BLD: 0 %
INR PPP: 1.12 (ref 0.85–1.15)
LACTATE SERPL-SCNC: 1.6 MMOL/L (ref 0.7–2)
LYMPHOCYTES # BLD AUTO: 1.5 10E3/UL (ref 0.8–5.3)
LYMPHOCYTES NFR BLD AUTO: 20 %
MCH RBC QN AUTO: 30.5 PG (ref 26.5–33)
MCHC RBC AUTO-ENTMCNC: 33.2 G/DL (ref 31.5–36.5)
MCV RBC AUTO: 92 FL (ref 78–100)
MONOCYTES # BLD AUTO: 0.6 10E3/UL (ref 0–1.3)
MONOCYTES NFR BLD AUTO: 8 %
NEUTROPHILS # BLD AUTO: 4.4 10E3/UL (ref 1.6–8.3)
NEUTROPHILS NFR BLD AUTO: 63 %
NRBC # BLD AUTO: 0 10E3/UL
NRBC BLD AUTO-RTO: 0 /100
PLATELET # BLD AUTO: 241 10E3/UL (ref 150–450)
POTASSIUM SERPL-SCNC: 5 MMOL/L (ref 3.4–5.3)
RADIOLOGIST FLAGS: ABNORMAL
RBC # BLD AUTO: 4.03 10E6/UL (ref 4.4–5.9)
SODIUM SERPL-SCNC: 137 MMOL/L (ref 135–145)
WBC # BLD AUTO: 7.1 10E3/UL (ref 4–11)

## 2024-04-11 PROCEDURE — 85730 THROMBOPLASTIN TIME PARTIAL: CPT | Performed by: EMERGENCY MEDICINE

## 2024-04-11 PROCEDURE — 36415 COLL VENOUS BLD VENIPUNCTURE: CPT | Performed by: EMERGENCY MEDICINE

## 2024-04-11 PROCEDURE — 80048 BASIC METABOLIC PNL TOTAL CA: CPT | Performed by: EMERGENCY MEDICINE

## 2024-04-11 PROCEDURE — 85004 AUTOMATED DIFF WBC COUNT: CPT | Performed by: EMERGENCY MEDICINE

## 2024-04-11 PROCEDURE — 74174 CTA ABD&PLVS W/CONTRAST: CPT

## 2024-04-11 PROCEDURE — 250N000011 HC RX IP 250 OP 636: Performed by: EMERGENCY MEDICINE

## 2024-04-11 PROCEDURE — 85610 PROTHROMBIN TIME: CPT | Performed by: EMERGENCY MEDICINE

## 2024-04-11 PROCEDURE — 99285 EMERGENCY DEPT VISIT HI MDM: CPT | Mod: 25

## 2024-04-11 PROCEDURE — 83605 ASSAY OF LACTIC ACID: CPT | Performed by: EMERGENCY MEDICINE

## 2024-04-11 RX ORDER — ASPIRIN 81 MG/1
81 TABLET ORAL DAILY
Qty: 30 TABLET | Refills: 0 | Status: SHIPPED | OUTPATIENT
Start: 2024-04-11 | End: 2024-05-11

## 2024-04-11 RX ORDER — ROSUVASTATIN CALCIUM 10 MG/1
40 TABLET, COATED ORAL DAILY
Qty: 30 TABLET | Refills: 0 | Status: SHIPPED | OUTPATIENT
Start: 2024-04-11 | End: 2024-05-01 | Stop reason: DRUGHIGH

## 2024-04-11 RX ORDER — IOPAMIDOL 755 MG/ML
90 INJECTION, SOLUTION INTRAVASCULAR ONCE
Status: COMPLETED | OUTPATIENT
Start: 2024-04-11 | End: 2024-04-11

## 2024-04-11 RX ADMIN — IOPAMIDOL 90 ML: 755 INJECTION, SOLUTION INTRAVENOUS at 15:18

## 2024-04-11 ASSESSMENT — ACTIVITIES OF DAILY LIVING (ADL)
ADLS_ACUITY_SCORE: 37

## 2024-04-11 ASSESSMENT — COLUMBIA-SUICIDE SEVERITY RATING SCALE - C-SSRS
1. IN THE PAST MONTH, HAVE YOU WISHED YOU WERE DEAD OR WISHED YOU COULD GO TO SLEEP AND NOT WAKE UP?: NO
2. HAVE YOU ACTUALLY HAD ANY THOUGHTS OF KILLING YOURSELF IN THE PAST MONTH?: NO
6. HAVE YOU EVER DONE ANYTHING, STARTED TO DO ANYTHING, OR PREPARED TO DO ANYTHING TO END YOUR LIFE?: NO

## 2024-04-11 NOTE — ED PROVIDER NOTES
EMERGENCY DEPARTMENT SIGN OUT NOTE        ED COURSE AND MEDICAL DECISION MAKING  Patient was signed out to me by Dr Hernando Tobar at 1540.    In brief, Delvin Echevarria is a 58 year old male who initially presented with concern for aortic aneurysm. 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  Patient denies abdominal pain, lightheadedness, any nausea, vomiting, diarrhea, or any recent injuries.     At time of sign out, disposition was pending CT abdomen/pelvis results and final disposition.    4:12 PM Met with the patient. Rechecked and updated patient on imaging results.    ED Course as of 04/11/24 1650   Thu Apr 11, 2024   1540 Incidental imaging showed incidental SMA dissection. No abdominal pain. Pending CTA and discussion with vascular surgery.   1602 CTA showed:  1. 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 Associated aneurysmal degeneration/dilatation, up to 1.8 cm in diameter. All branches appear perfused as   true and false lumens are patent.  2. Slight aneurysmal dilatation with moderate atherosclerotic calcification of the infrarenal abdominal aorta, measuring up to 2.9 x 3.1 cm.     1614 I discussed with vascular surgery, Dr. Leon.  Given patient has no abdominal pain or other concerning symptoms if he can tolerate PO they would recommend discharge and to start on a baby aspirin and high dose statin. I discussed with [facility] regarding this and they recommended 40 mg of rovastatin. Vascular surgery will contact patient for close outpatient follow up.    I confirmed with patient he has no abdominal pain, no chest pain, no numbness/focal weakness. He has equal peripheral pulses. He is eager to try to eat something and go home.    1648 Patient tolerated PO without issues and feels comfortable with discharge with  close outpatient follow up with vascular surgery. Strict return precautions given and his questions were answered.            FINAL IMPRESSION    1. Abnormal CT of the abdomen        ED MEDS  Medications   iopamidol (ISOVUE-370) solution 90 mL (90 mLs Intravenous $Given 4/11/24 9956)       LAB  Labs Ordered and Resulted from Time of ED Arrival to Time of ED Departure   CBC WITH PLATELETS AND DIFFERENTIAL - Abnormal       Result Value    WBC Count 7.1      RBC Count 4.03 (*)     Hemoglobin 12.3 (*)     Hematocrit 37.0 (*)     MCV 92      MCH 30.5      MCHC 33.2      RDW 14.3      Platelet Count 241      % Neutrophils 63      % Lymphocytes 20      % Monocytes 8      % Eosinophils 8      % Basophils 1      % Immature Granulocytes 0      NRBCs per 100 WBC 0      Absolute Neutrophils 4.4      Absolute Lymphocytes 1.5      Absolute Monocytes 0.6      Absolute Eosinophils 0.6      Absolute Basophils 0.0      Absolute Immature Granulocytes 0.0      Absolute NRBCs 0.0     BASIC METABOLIC PANEL - Normal    Sodium 137      Potassium 5.0      Chloride 103      Carbon Dioxide (CO2) 22      Anion Gap 12      Urea Nitrogen 21.1      Creatinine 0.87      GFR Estimate >90      Calcium 9.0      Glucose 91     INR - Normal    INR 1.12     PARTIAL THROMBOPLASTIN TIME - Normal    aPTT 29     LACTIC ACID WHOLE BLOOD - Normal    Lactic Acid 1.6       RADIOLOGY    CTA Abdomen Pelvis with Contrast   Preliminary Result   IMPRESSION:   1. 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 Associated aneurysmal degeneration/dilatation, up to 1.8 cm in diameter. All branches appear perfused as    true and false lumens are patent.   2. Slight aneurysmal dilatation with moderate atherosclerotic calcification of the infrarenal abdominal aorta, measuring up to 2.9 x 3.1 cm.          DISCHARGE MEDS  New Prescriptions    No medications on file       I,  Vivi Muñoz, am serving as a scribe to document services personally performed by Dr. Adia Zimmer based on my observation and the provider's statements to me. I, Adia Zimmer MD attest that Vivi Muñoz is acting in a scribe capacity, has observed my performance of the services and has documented them in accordance with my direction.    Adia Zimmer M.D.  [facility] EMERGENCY DEPARTMENT  [address] [place] [id number] [phone]          Adia Zimmer MD  04/11/24 6786

## 2024-04-11 NOTE — ED TRIAGE NOTES
Patient presents via ambulance for concerns of aortic aneurysm.  Patient had CT scan done yesterday and was called today to get seen immediately for concerns of aortic aneurysm.Patient denies any pain.     Triage Assessment (Adult)       Row Name 04/11/24 1157          Triage Assessment    Airway WDL WDL        Respiratory WDL    Respiratory WDL WDL        Skin Circulation/Temperature WDL    Skin Circulation/Temperature WDL WDL        Cardiac WDL    Cardiac WDL WDL     Cardiac Rhythm NSR        Peripheral/Neurovascular WDL    Peripheral Neurovascular WDL WDL        Cognitive/Neuro/Behavioral WDL    Cognitive/Neuro/Behavioral WDL WDL

## 2024-04-11 NOTE — TELEPHONE ENCOUNTER
Delvin returned our call and advised of note below and he will call 911 right now.    Sera Nunes RN

## 2024-04-11 NOTE — ED PROVIDER NOTES
EMERGENCY DEPARTMENT ENCOUNTER      NAME: Delvin Echevarria  AGE: 58 year old male  YOB: 1965  MRN: [id number]  EVALUATION DATE & TIME: 2024 11:42 AM    PCP: Delvin Henning    ED PROVIDER: Hernando Tobar D.O.      Chief Complaint   Patient presents with    Concern for aortic aneurysm       FINAL IMPRESSION:  1. Abnormal CT of the abdomen        ED COURSE & MEDICAL DECISION MAKIN:54 PM I met with the patient to gather history and to perform my initial exam. I discussed the plan for care while in the Emergency Department.  3:44 PM patient care turned over to Dr. Corona, pending results of CT imaging  4:00 PM patient care turned over to Dr. Zimmer pending CTA abdomen        Pertinent Labs & Imaging studies reviewed. (See chart for details)  58 year old male presents to the Emergency Department for evaluation of concern for superior mesenteric artery dissection.  Patient had CT scan yesterday that was performed for follow-up for CHF exacerbation.  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  Patient was completely asymptomatic at the time of my evaluation.  Not having abdominal pain, and his abdominal exam was quite benign.  However based on the concern for this partially visualized potential dissection of the superior mesenteric artery, CTA has been ordered.  Final disposition will be pending results of the CTA imaging, and recommendations from vascular surgery.  Patient is stable at time of turnover.    Medical Decision Making  Obtained supplemental history:Supplemental history obtained?: Documented in chart  Reviewed external records: External records reviewed?: Documented in chart  Care impacted by chronic illness:Hypertension and Other: Chronic systolic heart failure.   Care significantly affected by social determinants of health:N/A  Did you consider but not order tests?: Work up considered but not performed and documented  in chart, if applicable  Did you interpret images independently?: Independent interpretation of ECG and images noted in documentation, when applicable.  Consultation discussion with other provider:Did you involve another provider (consultant, , pharmacy, etc.)?: No  Admission considered. Patient was signed out to the oncoming physician, disposition pending.    At the conclusion of the encounter I discussed the results of all of the tests and the disposition. The questions were answered. The patient or family acknowledged understanding and was agreeable with the care plan.      HPI    Patient information was obtained from: Patient     Use of : N/A      Delvin Echevarria is a 58 year old male who presents to the ED by EMS for evaluation of a concern for aortic aneurysm.  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  Patient has no abdominal pain.  Denies any lightheadedness.  Denies any nausea vomiting or diarrhea.  He has not had any recent injuries.  Denies additional complaints at this time.    Per Chart Review:     CT dated 10 April 2024    IMPRESSION:   1.  Interval improvement in previously seen irregular nodular subsolid  opacities.  2.  Technically indeterminate subcentimeter pulmonary nodules.  3.  Chronic changes of the lungs, including pulmonary emphysema,  pleural parenchymal scarring, and mild bronchiectasis.  4.  Partially imaged superior mesenteric artery dissection and  aneurysmal dilation.  5.  Partially imaged infrarenal abdominal aortic aneurysm.  6.  Severe atherosclerotic vascular disease, including 3 vessel  coronary artery atherosclerosis.    REVIEW OF SYSTEMS  Constitutional:  Denies fever, chills, weight loss or weakness  Eyes:  No pain, discharge, redness  HENT:  Denies sore throat, ear pain, congestion  Respiratory: No SOB, wheeze or cough  Cardiovascular:  No CP,  palpitations  GI:  Denies abdominal pain, nausea, vomiting, diarrhea  : Denies dysuria, hematuria  Musculoskeletal:  Denies any new muscle/joint pain, swelling or loss of function.    All other systems negative unless noted in HPI.    PAST MEDICAL HISTORY:  Past Medical History:   Diagnosis Date    Abscess 8/11/2017    Acute posthemorrhagic anemia 12/14/2016    Anemia due to blood loss, acute 9/30/2016    Closed fracture of right tibia and fibula with routine healing 9/29/2016    DDD (degenerative disc disease), lumbar     Hypertension 3/15/2013    Marijuana use     4x daily    Nonobstructive atherosclerosis of coronary artery 8/10/2023    Staph aureus infection 9/13/2017    Tibial plateau fracture 9/11/2016    Tobacco use     Wound infection 9/29/2016       PAST SURGICAL HISTORY:  Past Surgical History:   Procedure Laterality Date    APPLY EXTERNAL FIXATOR LOWER EXTREMITY Right 09/12/2016    Procedure: APPLY EXTERNAL FIXATOR LOWER EXTREMITY;  Surgeon: John Gonzales MD;  Location: [facility] OR    CV CORONARY ANGIOGRAM N/A 7/24/2023    Procedure: Coronary Angiogram;  Surgeon: Boubacar Camarena MD;  Location: [facility] CARDIAC CATH LAB    CV LEFT HEART CATH N/A 7/24/2023    Procedure: Left Heart Catheterization;  Surgeon: Boubacar Camarena MD;  Location: [facility] CARDIAC CATH LAB    CV LEFT VENTRICULOGRAM N/A 7/24/2023    Procedure: Left Ventriculogram;  Surgeon: Boubacar Camarena MD;  Location: [facility] CARDIAC CATH LAB    HERNIORRHAPHY INGUINAL  07/02/2012    Procedure: HERNIORRHAPHY INGUINAL;  Open Repair Right Inguinal Hernia with Mesh;  Surgeon: Avni Maxwell MD;  Location: [facility] OR    INCISION AND DRAINAGE BONE LOWER EXTREMITY, COMBINED Right 08/11/2017    Procedure: COMBINED INCISION AND DRAINAGE BONE LOWER EXTREMITY;  Irrigation and Debridement Right Tibia,  Removal of Hardware;  Surgeon: Kenroy Dos Santos MD;  Location: [facility] OR    KNEE SURGERY Left 03/01/2021    patella fracture repair     LARYNGECTOMY  Several Years ago    Partail removal due to fish bone    OPEN REDUCTION INTERNAL FIXATION PATELLA Left 3/1/2021    Procedure: OPEN REDUCTION INTERNAL FIXATION, FRACTURE, PATELLA;  Surgeon: Papa Pena MD;  Location: [facility] OR    OPEN REDUCTION INTERNAL FIXATION TIBIA Right 09/15/2016    Procedure: OPEN REDUCTION INTERNAL FIXATION TIBIA;  Surgeon: Miguel A Green MD;  Location: [facility] OR    OPEN REDUCTION INTERNAL FIXATION TIBIA Right 11/07/2017    Procedure: OPEN REDUCTION INTERNAL FIXATION TIBIA;  Right Tibia Fibula Open Reduction Internal Fixation;  Surgeon: Miguel A Green MD;  [facility]    REMOVE HARDWARE LOWER EXTREMITY Right 08/11/2017    Procedure: REMOVE HARDWARE LOWER EXTREMITY;;  Surgeon: Kenroy Dos Santos MD;  Location: [facility] OR         CURRENT MEDICATIONS:    No current facility-administered medications for this encounter.     Current Outpatient Medications   Medication Sig Dispense Refill    acetaminophen (TYLENOL) 325 MG tablet Take 2 tablets (650 mg) by mouth every 4 hours as needed for other (For optimal non-opioid multimodal pain management to improve pain control.) (Patient not taking: Reported on 3/25/2024)      albuterol (PROAIR HFA/PROVENTIL HFA/VENTOLIN HFA) 108 (90 Base) MCG/ACT inhaler Inhale 2 puffs into the lungs every 6 hours as needed for shortness of breath, wheezing or cough 18 g 11    betamethasone dipropionate (DIPROSONE) 0.05 % external cream Apply topically 2 times daily as needed (twice daily) Apply bid to areas on chest, forehead, arms for severe eczema. 45 g 11    carvedilol (COREG) 6.25 MG tablet Take 1 tablet (6.25 mg) by mouth 2 times daily (with meals) 180 tablet 3    ferrous sulfate (FEROSUL) 325 (65 Fe) MG tablet Take 1 tablet (325 mg) by mouth daily (with breakfast) (Patient not taking: Reported on 3/25/2024) 90 tablet 1    folic acid (FOLVITE) 400 MCG tablet Take 1 tablet (400 mcg) by mouth daily (Patient not taking: Reported on  "3/25/2024) 90 tablet 1    furosemide (LASIX) 20 MG tablet Take 1 tablet (20 mg) by mouth daily for 30 days 30 tablet 0    hydrOXYzine HCl (ATARAX) 25 MG tablet Take 1-2 tablets (25-50 mg) by mouth every 6 hours as needed for itching 60 tablet 11    rosuvastatin (CRESTOR) 10 MG tablet Take 1 tablet (10 mg) by mouth daily 90 tablet 3    sacubitril-valsartan (ENTRESTO) 24-26 MG per tablet Take 1 tablet by mouth 2 times daily for 30 days 60 tablet 0         ALLERGIES:  Allergies   Allergen Reactions    Seasonal Allergies Other (See Comments)     Congestion sinuses    Codeine Nausea       FAMILY HISTORY:  Family History   Problem Relation Age of Onset    Hypertension Mother     Hypertension Brother        SOCIAL HISTORY:  Social History     Socioeconomic History    Marital status: Single   Tobacco Use    Smoking status: Former     Current packs/day: 0.50     Average packs/day: 0.5 packs/day for 25.0 years (12.5 ttl pk-yrs)     Types: Cigarettes    Smokeless tobacco: Never   Vaping Use    Vaping status: Never Used   Substance and Sexual Activity    Alcohol use: No    Drug use: Not Currently     Types: Marijuana     Comment: Previously smoked 4x daily    Sexual activity: Yes     Social Determinants of Health     Interpersonal Safety: Low Risk  (3/25/2024)    Interpersonal Safety     Do you feel physically and emotionally safe where you currently live?: Yes     Within the past 12 months, have you been hit, slapped, kicked or otherwise physically hurt by someone?: No     Within the past 12 months, have you been humiliated or emotionally abused in other ways by your partner or ex-partner?: No       VITALS:  Patient Vitals for the past 24 hrs:   BP Temp Temp src Pulse Resp SpO2 Height   04/11/24 1150 139/82 97.9  F (36.6  C) Oral 56 16 94 % 1.803 m (5' 11\")       PHYSICAL EXAM    VITAL SIGNS: /82   Pulse 56   Temp 97.9  F (36.6  C) (Oral)   Resp 16   Ht 1.803 m (5' 11\")   SpO2 94%   BMI 19.53 kg/m      General " Appearance: Well-appearing, well-nourished, no acute distress   Head:  Normocephalic, without obvious abnormality, atraumatic  Eyes:  PERRL, conjunctiva/corneas clear, EOM's intact,  ENT:  Lips, mucosa, and tongue normal, membranes are moist without pallor  Neck:  Normal ROM, symmetrical, trachea midline    Cardio:  Regular rate and rhythm, no murmur, rub or gallop, 2+ pulses symmetric in all extremities  Pulm:  Clear to auscultation bilaterally, respirations unlabored,  Abdomen:  Soft, non-tender, no rebound or guarding.  Musculoskeletal: Full ROM, no edema, no cyanosis, good ROM of major joints  Integument:  Warm, Dry, No erythema, No rash.          LABS  Results for orders placed or performed during the hospital encounter of 04/11/24 (from the past 24 hour(s))   CBC with platelets + differential    Narrative    The following orders were created for panel order CBC with platelets + differential.  Procedure                               Abnormality         Status                     ---------                               -----------         ------                     CBC with platelets and d...[[id number]]  Abnormal            Final result                 Please view results for these tests on the individual orders.   Basic metabolic panel   Result Value Ref Range    Sodium 137 135 - 145 mmol/L    Potassium 5.0 3.4 - 5.3 mmol/L    Chloride 103 98 - 107 mmol/L    Carbon Dioxide (CO2) 22 22 - 29 mmol/L    Anion Gap 12 7 - 15 mmol/L    Urea Nitrogen 21.1 8.0 - 23.0 mg/dL    Creatinine 0.87 0.67 - 1.17 mg/dL    GFR Estimate >90 >60 mL/min/1.73m2    Calcium 9.0 8.8 - 10.2 mg/dL    Glucose 91 70 - 99 mg/dL   INR   Result Value Ref Range    INR 1.12 0.85 - 1.15   PTT   Result Value Ref Range    aPTT 29 22 - 38 Seconds   Lactic acid whole blood   Result Value Ref Range    Lactic Acid 1.6 0.7 - 2.0 mmol/L   CBC with platelets and differential   Result Value Ref Range    WBC Count 7.1 4.0 - 11.0 10e3/uL    RBC Count 4.03 (L)  4.40 - 5.90 10e6/uL    Hemoglobin 12.3 (L) 13.3 - 17.7 g/dL    Hematocrit 37.0 (L) 40.0 - 53.0 %    MCV 92 78 - 100 fL    MCH 30.5 26.5 - 33.0 pg    MCHC 33.2 31.5 - 36.5 g/dL    RDW 14.3 10.0 - 15.0 %    Platelet Count 241 150 - 450 10e3/uL    % Neutrophils 63 %    % Lymphocytes 20 %    % Monocytes 8 %    % Eosinophils 8 %    % Basophils 1 %    % Immature Granulocytes 0 %    NRBCs per 100 WBC 0 <1 /100    Absolute Neutrophils 4.4 1.6 - 8.3 10e3/uL    Absolute Lymphocytes 1.5 0.8 - 5.3 10e3/uL    Absolute Monocytes 0.6 0.0 - 1.3 10e3/uL    Absolute Eosinophils 0.6 0.0 - 0.7 10e3/uL    Absolute Basophils 0.0 0.0 - 0.2 10e3/uL    Absolute Immature Granulocytes 0.0 <=0.4 10e3/uL    Absolute NRBCs 0.0 10e3/uL         RADIOLOGY  CTA Abdomen Pelvis with Contrast    (Results Pending)          EKG:    Rate: 61 bpm  Rhythm: Normal Sinus Rhythm  Axis: Normal  Interval: Normal  Conduction: Normal  QRS: Normal  ST: Normal  T-wave: Inverted lateral leads  QT: Not prolonged  Comparison EKG: no significant change compared to 24 July 2023  Impression:  No acute ischemic change   I have independently reviewed and interpreted today's EKG, pending Cardiologist read          MEDICATIONS GIVEN IN THE EMERGENCY:  Medications   iopamidol (ISOVUE-370) solution 90 mL (90 mLs Intravenous $Given 4/11/24 5810)       NEW PRESCRIPTIONS STARTED AT TODAY'S ER VISIT  New Prescriptions    No medications on file        I, Kelsie Cohen, am serving as a scribe to document services personally performed by Hernando Tobar D.O., based on my observations and the provider's statements to me.  I, Hernando Tobar D.O., attest that Kelsie Cohen is acting in a scribe capacity, has observed my performance of the services and has documented them in accordance with my direction.     Hernando Tobar D.O.  Emergency Medicine  [facility] EMERGENCY DEPARTMENT  [address] [place] [id number] [phone]  Dept: [phone]       Amadou  Hernando Cox, DO  04/11/24 1843

## 2024-04-11 NOTE — PROGRESS NOTES
"BIPAP is on SB, pt insisted to come off the BIPAP and said, \" take me off of it now\"!. Pt is on 1L NC sating 96%. Rt will continue to monitor.   " Spoke to patient and she is aware of pap smear results. Colposcopy has already been scheduled.

## 2024-04-11 NOTE — DISCHARGE INSTRUCTIONS
Please follow up with vascular surgery as soon as possible, they will contact you to schedule this but I also listed their number    They recommended starting you on a higher statin dose and a baby aspirin daily    Return to the Emergency Room with chest pain, abdominal pain, numbness, focal weakness, fainting or any other worsening symptoms or concerns

## 2024-04-11 NOTE — TELEPHONE ENCOUNTER
I am provider of day and attempted to reach Delvin about his urgent CT result.  Wife reached but is having trouble finding [place].  I asked her to find him and immediately have him call clinic to speak with RN.    Lung CT has urgent finding of a belly artery dissecting (tearing apart).  This was only partially seen on imaging.  This can cause sudden death from blood loss.  I advise he go by ambulance to ED in the [facility] for further evaluation.

## 2024-04-11 NOTE — TELEPHONE ENCOUNTER
[facility] calls with an urgent finding on [facility] CT of his chest from yesterday. Please see report. Routing to Dr Henning and Rose (provider of the day as Dr Henning is not in clinic today). Thank you!    4.  Partially imaged superior mesenteric artery dissection and  aneurysmal dilation.    Sera Nunes RN

## 2024-04-25 ENCOUNTER — OFFICE VISIT (OUTPATIENT)
Dept: CARDIOLOGY | Facility: CLINIC | Age: 59
End: 2024-04-25
Payer: COMMERCIAL

## 2024-04-25 VITALS
HEIGHT: 71 IN | DIASTOLIC BLOOD PRESSURE: 84 MMHG | RESPIRATION RATE: 20 BRPM | WEIGHT: 147.2 LBS | BODY MASS INDEX: 20.61 KG/M2 | HEART RATE: 68 BPM | SYSTOLIC BLOOD PRESSURE: 138 MMHG | OXYGEN SATURATION: 95 %

## 2024-04-25 DIAGNOSIS — R06.09 DOE (DYSPNEA ON EXERTION): ICD-10-CM

## 2024-04-25 DIAGNOSIS — I25.10 NONOBSTRUCTIVE ATHEROSCLEROSIS OF CORONARY ARTERY: ICD-10-CM

## 2024-04-25 DIAGNOSIS — I10 BENIGN ESSENTIAL HYPERTENSION: ICD-10-CM

## 2024-04-25 DIAGNOSIS — I42.8 NONISCHEMIC CARDIOMYOPATHY (H): Primary | ICD-10-CM

## 2024-04-25 PROCEDURE — 99214 OFFICE O/P EST MOD 30 MIN: CPT | Performed by: NURSE PRACTITIONER

## 2024-04-25 NOTE — LETTER
4/25/2024    Delvin Henning MD  [address] [place] [id number]    RE: Delvin Echevarria       Dear Colleague,     I had the pleasure of seeing Delvin Echevarria in the [facility].  Cardiology Clinic Progress Note  Delvin Echevarria MRN# [id number]   YOB: 1965 Age: 58 year old      Primary Cardiologist:   Dr. Anne  Patient presents today for 9-month follow-up after hospitalization last summer        History of Presenting Illness:      Delvin Echevarria is a pleasant 58 year old with a past cardiac history significant for   Nonischemic cardiomyopathy with chronic systolic heart failure  EF 30 to 35% 7/2023  Unclear etiology ?  Hypertensive  Nonobstructive CAD on angiogram, No family history of heart failure, History of alcoholism but sober for 10 years, No recent infections or drug use, normal TSH  Nonobstructive atherosclerosis of coronary artery  NSTEMI 7/2023 30% mid LAD, 50% D2.    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  He does not have a car and relies on friends for transportation.        He was admitted to [facility] on 7/21/2023 with chest pain and heart failure.  Blood pressure was significantly elevated.  He was out of his medications at home and had not been taking antihypertensives.  Echocardiogram showed EF 30 to 35% with moderately severe global hypokinesia of the LV.  Coronary angiogram 7/24/2023 showed normal LV filling pressures and no obstructive CAD 30% mid LAD, 50% D2.  He was diuresed and started on Entresto, rosuvastatin, carvedilol, Lasix.  Amlodipine and lisinopril were discontinued at discharge.  They recommended outpatient cardiac MRI and Zio patch to assess for arrhythmias.  Hospital  notes and discharge summary reviewed today.      Since his hospitalization last summer, he has not had cardiology follow-up and did not come for his core enrollment.     Most recent lipid profile, BMP, hemoglobin, TSH reviewed today.         Taking cardiac medications? Yes, but not taking entresto or lasix   Taking medications consistently? Yes   Weights? Doesn't have a scale at home, Wt in clinic 147 lb today and was 140 last month  Chest CT 4/10/2024 showing resolved pleural effusions  Heart failure symptoms? GOODMAN    BPs? Controlled   Angina? None   Chronic GOODMAN somewhat improved with inhaler, but cannot walk as far as he did 1 year ago without need to stop and rest  Smoking cessation?  Quit 8 months ago         Zio patch if EF remains low      Patient reports no chest pain, shortness of breath, PND, orthopnea, presyncope, syncope, edema, heart racing, or palpitations.                       Assessment and Plan:       Plan  Patient Instructions   Medication Changes:  None     Recommendations:  Check daily weights and call the clinic if your weight has increased more than 2 lbs in one day or 5 lbs in one week; if you feel more short of breath or have worsening swelling in your legs or abdomen.  2000 mg sodium diet   4-8 8 ounce glasses of fluids per day, not more than 2000 mL (2 L) per day.   Check blood pressure at least 1 hour after medications. Call the clinic if your blood pressure is consistently greater than 130/80.     Follow-up:  fasting lab within 2 months (lipid/ALT)-after previously starting statin  Exercise stress test   Echo   Cardiology follow up at [facility]: racheal in 1-2 months    Cardiology Scheduling~[phone]  Cardiology Clinic RN~[phone] (Elsa RN, Elena RN; Adrianna RN)              Nonischemic cardiomyopathy with systolic heart failure  GOODMAN   NYHA class II  Continue GDMT  Uptitrate cardiomyopathy medications as tolerated  Reassess EF   Consider Zio patch if EF remains  reduced    Nonobstructive atherosclerosis of coronary artery  GOODMAN   Continue GDMT    Benign essential hypertension  Controlled  Continue current medications              Respiratory:  clear to auscultation; normal symmetry        Cardiac: regular rate and rhythm     GI:  abdomen nondistended     Extremities and Muscular Skeletal:  no edema          Thank you for allowing me to participate in this delightful patient's care.      This note was completed in part using Dragon voice recognition software. Although reviewed after completion, some word and grammatical errors may occur.    XIN Solorzano CNP                  Thank you for allowing me to participate in the care of your patient.      Sincerely,     XIN Solorzano CNP     [facility]  cc:   XIN Buthcer CNP  [address] [place] [address]

## 2024-04-25 NOTE — PATIENT INSTRUCTIONS
Medication Changes:  None     Recommendations:  Check daily weights and call the clinic if your weight has increased more than 2 lbs in one day or 5 lbs in one week; if you feel more short of breath or have worsening swelling in your legs or abdomen.  2000 mg sodium diet   4-8 8 ounce glasses of fluids per day, not more than 2000 mL (2 L) per day.   Check blood pressure at least 1 hour after medications. Call the clinic if your blood pressure is consistently greater than 130/80.     Follow-up:  fasting lab within 2 months (lipid/ALT)-after previously starting statin  Exercise stress test   Echo   Cardiology follow up at [facility]: racheal in 1-2 months    Cardiology Scheduling~[phone]  Cardiology Clinic RN~[phone] (Elsa RN, Elena RN; Adrianna RN)

## 2024-04-25 NOTE — PROGRESS NOTES
Cardiology Clinic Progress Note  Delvin Echevarria MRN# [id number]   YOB: 1965 Age: 58 year old      Primary Cardiologist:   Dr. Anne  Patient presents today for 9-month follow-up after hospitalization last summer        History of Presenting Illness:      Delvin Echevarria is a pleasant 58 year old with a past cardiac history significant for   Nonischemic cardiomyopathy with chronic systolic heart failure  EF 30 to 35% 7/2023  Unclear etiology ?  Hypertensive  Nonobstructive CAD on angiogram, No family history of heart failure, History of alcoholism but sober for 10 years, No recent infections or drug use, normal TSH  Nonobstructive atherosclerosis of coronary artery  NSTEMI 7/2023 30% mid LAD, 50% D2.    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  He does not have a car and relies on friends for transportation.        He was admitted to [facility] on 7/21/2023 with chest pain and heart failure.  Blood pressure was significantly elevated.  He was out of his medications at home and had not been taking antihypertensives.  Echocardiogram showed EF 30 to 35% with moderately severe global hypokinesia of the LV.  Coronary angiogram 7/24/2023 showed normal LV filling pressures and no obstructive CAD 30% mid LAD, 50% D2.  He was diuresed and started on Entresto, rosuvastatin, carvedilol, Lasix.  Amlodipine and lisinopril were discontinued at discharge.  They recommended outpatient cardiac MRI and Zio patch to assess for arrhythmias.  Hospital notes and discharge summary reviewed today.      Since his hospitalization last summer, he has not had cardiology follow-up and did not come for his core enrollment.     Most recent lipid profile, BMP,  hemoglobin, TSH reviewed today.         Taking cardiac medications? Yes, but not taking entresto or lasix   Taking medications consistently? Yes   Weights? Doesn't have a scale at home, Wt in clinic 147 lb today and was 140 last month  Chest CT 4/10/2024 showing resolved pleural effusions  Heart failure symptoms? GOODMAN    BPs? Controlled   Angina? None   Chronic GOODMAN somewhat improved with inhaler, but cannot walk as far as he did 1 year ago without need to stop and rest  Smoking cessation?  Quit 8 months ago         Zio patch if EF remains low      Patient reports no chest pain, shortness of breath, PND, orthopnea, presyncope, syncope, edema, heart racing, or palpitations.                       Assessment and Plan:       Plan  Patient Instructions   Medication Changes:  None     Recommendations:  Check daily weights and call the clinic if your weight has increased more than 2 lbs in one day or 5 lbs in one week; if you feel more short of breath or have worsening swelling in your legs or abdomen.  2000 mg sodium diet   4-8 8 ounce glasses of fluids per day, not more than 2000 mL (2 L) per day.   Check blood pressure at least 1 hour after medications. Call the clinic if your blood pressure is consistently greater than 130/80.     Follow-up:  fasting lab within 2 months (lipid/ALT)-after previously starting statin  Exercise stress test   Echo   Cardiology follow up at [facility]: racheal in 1-2 months    Cardiology Scheduling~[phone]  Cardiology Clinic RN~[phone] (Elsa RN, Elena RN; Adrianna RN)              Nonischemic cardiomyopathy with systolic heart failure  GOODMAN   NYHA class II  Continue GDMT  Uptitrate cardiomyopathy medications as tolerated  Reassess EF   Consider Zio patch if EF remains reduced    Nonobstructive atherosclerosis of coronary artery  GOODMAN   Continue GDMT    Benign essential hypertension  Controlled  Continue current medications              Respiratory:  clear to auscultation; normal  symmetry        Cardiac: regular rate and rhythm     GI:  abdomen nondistended     Extremities and Muscular Skeletal:  no edema          Thank you for allowing me to participate in this delightful patient's care.      This note was completed in part using Dragon voice recognition software. Although reviewed after completion, some word and grammatical errors may occur.    XIN Solorzano CNP

## 2024-04-30 ENCOUNTER — TELEPHONE (OUTPATIENT)
Dept: CARDIOLOGY | Facility: CLINIC | Age: 59
End: 2024-04-30
Payer: COMMERCIAL

## 2024-04-30 ENCOUNTER — HOSPITAL ENCOUNTER (OUTPATIENT)
Dept: RESPIRATORY THERAPY | Facility: CLINIC | Age: 59
Discharge: HOME | End: 2024-04-30
Payer: COMMERCIAL

## 2024-04-30 DIAGNOSIS — E78.5 DYSLIPIDEMIA: ICD-10-CM

## 2024-04-30 DIAGNOSIS — I25.10 NONOBSTRUCTIVE ATHEROSCLEROSIS OF CORONARY ARTERY: Primary | ICD-10-CM

## 2024-04-30 DIAGNOSIS — J45.909 ASTHMA: ICD-10-CM

## 2024-04-30 PROCEDURE — 271N000002 HC RX 271

## 2024-04-30 PROCEDURE — 94060 EVALUATION OF WHEEZING: CPT

## 2024-04-30 PROCEDURE — 94729 DIFFUSING CAPACITY: CPT

## 2024-04-30 PROCEDURE — 94726 PLETHYSMOGRAPHY LUNG VOLUMES: CPT

## 2024-04-30 RX ORDER — INHALER, ASSIST DEVICES
1 SPACER (EA) MISCELLANEOUS ONCE
Status: COMPLETED | OUTPATIENT
Start: 2024-04-30 | End: 2024-04-30

## 2024-04-30 RX ADMIN — Medication 1 EACH: at 15:41

## 2024-04-30 NOTE — TELEPHONE ENCOUNTER
Sounds like pt is out of rosuvastatin.   He was taking 10mg 1qd, but then said cardiology had him take 4qd. Pt would like a refill of Rosuvastatin 40mg 1qd, but he is not sure who I should contact for this.  Thanks  Kenzie Rice [facility]  [phone]

## 2024-05-01 RX ORDER — ROSUVASTATIN CALCIUM 40 MG/1
40 TABLET, COATED ORAL DAILY
Qty: 90 TABLET | Refills: 0 | Status: SHIPPED | OUTPATIENT
Start: 2024-05-01

## 2024-05-01 NOTE — TELEPHONE ENCOUNTER
New rx sent to pharmacy for 40mg tabs.    [facility] Cardiology Refill Guideline reviewed.  Medication meets criteria for refill.      Elena Espinoza RN

## 2024-05-01 NOTE — CONFIDENTIAL NOTE
Looks like his rosuvastatin was increased to 40 mg daily at the beginning of April.   Okay to refill rosuvastatin at 40 mg daily.   He is scheduled to recheck fasting labs on 5/22/2024 and we can then decide if that dose is okay for him.    XIN Solorzano CNP

## 2024-05-02 LAB
DLCOCOR-%PRED-PRE: 73 %
DLCOCOR-PRE: 20.84 ML/MIN/MMHG
DLCOUNC-%PRED-PRE: 68 %
DLCOUNC-PRE: 19.35 ML/MIN/MMHG
DLCOUNC-PRED: 28.21 ML/MIN/MMHG
ERV-%PRED-PRE: 37 %
ERV-PRE: 0.6 L
ERV-PRED: 1.62 L
EXPTIME-PRE: 10.13 SEC
FEF2575-%PRED-POST: 50 %
FEF2575-%PRED-PRE: 47 %
FEF2575-POST: 1.43 L/SEC
FEF2575-PRE: 1.36 L/SEC
FEF2575-PRED: 2.85 L/SEC
FEFMAX-%PRED-PRE: 69 %
FEFMAX-PRE: 6.54 L/SEC
FEFMAX-PRED: 9.38 L/SEC
FEV1-%PRED-PRE: 71 %
FEV1-PRE: 2.42 L
FEV1FEV6-PRE: 72 %
FEV1FEV6-PRED: 79 %
FEV1FVC-PRE: 69 %
FEV1FVC-PRED: 78 %
FEV1SVC-PRE: 71 %
FEV1SVC-PRED: 75 %
FIFMAX-PRE: 5.65 L/SEC
FRCPLETH-%PRED-PRE: 93 %
FRCPLETH-PRE: 3.44 L
FRCPLETH-PRED: 3.68 L
FVC-%PRED-PRE: 80 %
FVC-PRE: 3.54 L
FVC-PRED: 4.39 L
IC-%PRED-PRE: 87 %
IC-PRE: 2.82 L
IC-PRED: 3.24 L
RVPLETH-%PRED-PRE: 114 %
RVPLETH-PRE: 2.84 L
RVPLETH-PRED: 2.47 L
TLCPLETH-%PRED-PRE: 85 %
TLCPLETH-PRE: 6.26 L
TLCPLETH-PRED: 7.33 L
VA-%PRED-PRE: 77 %
VA-PRE: 5.23 L
VC-%PRED-PRE: 75 %
VC-PRE: 3.42 L
VC-PRED: 4.56 L

## 2024-05-08 ENCOUNTER — ANCILLARY PROCEDURE (OUTPATIENT)
Dept: GENERAL RADIOLOGY | Facility: CLINIC | Age: 59
End: 2024-05-08
Payer: COMMERCIAL

## 2024-05-08 ENCOUNTER — OFFICE VISIT (OUTPATIENT)
Dept: PULMONOLOGY | Facility: CLINIC | Age: 59
End: 2024-05-08
Payer: COMMERCIAL

## 2024-05-08 VITALS
RESPIRATION RATE: 16 BRPM | DIASTOLIC BLOOD PRESSURE: 88 MMHG | OXYGEN SATURATION: 97 % | WEIGHT: 150 LBS | SYSTOLIC BLOOD PRESSURE: 130 MMHG | HEART RATE: 78 BPM | TEMPERATURE: 98.1 F | BODY MASS INDEX: 20.92 KG/M2

## 2024-05-08 DIAGNOSIS — J45.909 ASTHMA: ICD-10-CM

## 2024-05-08 DIAGNOSIS — J44.9 MODERATE COPD (CHRONIC OBSTRUCTIVE PULMONARY DISEASE) (H): Primary | ICD-10-CM

## 2024-05-08 DIAGNOSIS — R59.0 HILAR LYMPHADENOPATHY: ICD-10-CM

## 2024-05-08 PROCEDURE — 99205 OFFICE O/P NEW HI 60 MIN: CPT

## 2024-05-08 PROCEDURE — 71046 X-RAY EXAM CHEST 2 VIEWS: CPT | Mod: TC | Performed by: RADIOLOGY

## 2024-05-08 RX ORDER — INHALER, ASSIST DEVICES
SPACER (EA) MISCELLANEOUS
Qty: 1 EACH | Refills: 2 | Status: SHIPPED | OUTPATIENT
Start: 2024-05-08

## 2024-05-08 RX ORDER — FLUTICASONE FUROATE AND VILANTEROL 100; 25 UG/1; UG/1
1 POWDER RESPIRATORY (INHALATION) DAILY
Qty: 1 EACH | Refills: 2 | Status: SHIPPED | OUTPATIENT
Start: 2024-05-08 | End: 2024-05-24

## 2024-05-08 ASSESSMENT — PAIN SCALES - GENERAL: PAINLEVEL: NO PAIN (0)

## 2024-05-08 NOTE — PROGRESS NOTES
Does Delvin have home oxygen?  No     [facility]  Pulmonary Medicine  Visit Clinic Note  May 8, 2024         ASSESSMENT & PLAN       Moderate COPD  Emphysema  History of smoking  Right hilar lymphadenopathy    We spent most of the visit today discussing what COPD is.  He has some paraseptal and centrilobular emphysema on his chest CT scan.  The bases of his lungs, his airways look a little bit thickened and inflamed.  He has minimal actual bronchitis symptoms though such as cough and mucus production.  He is only on an albuterol inhaler.  I would like to start some long-acting medications with him, and so we discussed this.  Since his absolute eosinophil count is 600, I will start him on a low-dose inhaled corticosteroid and long-acting beta agonist.  I prescribed Breo, and went over inhaler technique in the clinic today.  He can continue to use albuterol as needed.  I prescribed him a spacer to use with his albuterol inhaler.    I reviewed his chest CT scan images with him.  I talked about his previous lymphadenopathy, which mostly has improved over time.  He does have 1 right hilar lymph node that is greater than a centimeter in diameter.  I think we should still follow this up in the future, and I placed an order for a CT scan in 6 months with a follow-up with me.  At this point, I do not have a high concern for cancer given the fact that the surrounding lymph nodes all have decreased in size.  However I do think we should follow this up with a chest CT scan.    One of his main concerns was if he is going to die soon from his lung disease.  I explained that I think he has mild to moderate disease, and he is already done the best that he could buy quitting smoking.  I do not expect him to die soon from lung related complications.  I stressed that it is imperative for him to follow-up with the vascular surgeon though to discuss his peripheral vascular disease, as this seems in my mind to be more  concerning medical problem for him.        RTC in 6 months      Jose Fields MD     I spent 68 minutes on the date of the encounter doing chart review, history and exam, documentation and discussing COPD and related therapies         Today's visit note:     Chief Complaint: Consult (PFT Results )      HISTORY OF PRESENT ILLNESS:    Delvin Echevarria is a 59 year old year old male who is being seen for evaluation of his lungs.    He has a history of vascular disease, and was hospitalized in July 2023 with dissection of his mesenteric artery.  This was managed conservatively.  He has follow-up visits scheduled with the vascular surgeon soon.  He is not experiencing any abdominal pain or claudication type symptoms currently.  He does have shortness of breath with exertion.  Stairs are little tough for him.  He also noticed that [place], and exposed to a lot of dust that bothers his breathing.  Walking from the house to the barn is a couple 100 feet.  He will need to use his albuterol inhaler after making this walk.    He used to smoke cigarettes, but quit in July 2023 after his hospitalization.  He smoked for 42 years with an average of 1-1/2 packs/day.  He has not been told that he has any particular lung disease, but he knows that there are some abnormalities with his lungs.  In 2004, he was told that he had mold in his lungs, but this was never treated.    When he was smoking, he used to have a cough and a wheeze.  Both of these have improved since quitting smoking.  Occasionally when he is in the supine position he will feel a gurgling in the center of his chest when he is breathing, and this breaks loose with inhalers.    He states that he had some rib fractures in the past when he was just trying to bend over and he felt a pop at some point.  There is no associated trauma.  This was years ago             Past Medical and Surgical History:     Past Medical History:   Diagnosis Date     Abscess 8/11/2017    Acute posthemorrhagic anemia 12/14/2016    Anemia due to blood loss, acute 9/30/2016    Closed fracture of right tibia and fibula with routine healing 9/29/2016    DDD (degenerative disc disease), lumbar     Hypertension 3/15/2013    Marijuana use     4x daily    Nonobstructive atherosclerosis of coronary artery 8/10/2023    Staph aureus infection 9/13/2017    Tibial plateau fracture 9/11/2016    Tobacco use     Wound infection 9/29/2016     Past Surgical History:   Procedure Laterality Date    APPLY EXTERNAL FIXATOR LOWER EXTREMITY Right 09/12/2016    Procedure: APPLY EXTERNAL FIXATOR LOWER EXTREMITY;  Surgeon: John Gonzales MD;  Location: [facility] OR    CV CORONARY ANGIOGRAM N/A 7/24/2023    Procedure: Coronary Angiogram;  Surgeon: Boubacar Camarena MD;  Location: [facility] CARDIAC CATH LAB    CV LEFT HEART CATH N/A 7/24/2023    Procedure: Left Heart Catheterization;  Surgeon: Boubacar Camarena MD;  Location: [facility] CARDIAC CATH LAB    CV LEFT VENTRICULOGRAM N/A 7/24/2023    Procedure: Left Ventriculogram;  Surgeon: Boubacar Camarena MD;  Location: [facility] CARDIAC CATH LAB    HERNIORRHAPHY INGUINAL  07/02/2012    Procedure: HERNIORRHAPHY INGUINAL;  Open Repair Right Inguinal Hernia with Mesh;  Surgeon: Avni Maxwell MD;  Location: [facility] OR    INCISION AND DRAINAGE BONE LOWER EXTREMITY, COMBINED Right 08/11/2017    Procedure: COMBINED INCISION AND DRAINAGE BONE LOWER EXTREMITY;  Irrigation and Debridement Right Tibia,  Removal of Hardware;  Surgeon: Kenroy Dos Santos MD;  Location: [facility] OR    KNEE SURGERY Left 03/01/2021    patella fracture repair    LARYNGECTOMY  Several Years ago    Partail removal due to fish bone    OPEN REDUCTION INTERNAL FIXATION PATELLA Left 3/1/2021    Procedure: OPEN REDUCTION INTERNAL FIXATION, FRACTURE, PATELLA;  Surgeon: Papa Pena MD;  Location: [facility] OR    OPEN REDUCTION INTERNAL FIXATION TIBIA Right 09/15/2016    Procedure: OPEN  REDUCTION INTERNAL FIXATION TIBIA;  Surgeon: Miugel A Green MD;  Location: [facility] OR    OPEN REDUCTION INTERNAL FIXATION TIBIA Right 11/07/2017    Procedure: OPEN REDUCTION INTERNAL FIXATION TIBIA;  Right Tibia Fibula Open Reduction Internal Fixation;  Surgeon: Miguel A Green MD;  Location: [facility] OR    REMOVE HARDWARE LOWER EXTREMITY Right 08/11/2017    Procedure: REMOVE HARDWARE LOWER EXTREMITY;;  Surgeon: Kenroy Dos Santos MD;  Location: [facility] OR           Family History:     Family History   Problem Relation Age of Onset    Hypertension Mother     Hypertension Brother               Social History:     Social History     Socioeconomic History    Marital status: Single     Spouse name: Not on file    Number of children: Not on file    Years of education: Not on file    Highest education level: Not on file   Occupational History    Not on file   Tobacco Use    Smoking status: Former     Current packs/day: 0.50     Average packs/day: 0.5 packs/day for 25.0 years (12.5 ttl pk-yrs)     Types: Cigarettes    Smokeless tobacco: Never   Vaping Use    Vaping status: Never Used   Substance and Sexual Activity    Alcohol use: No    Drug use: Not Currently     Types: Marijuana     Comment: Previously smoked 4x daily    Sexual activity: Yes   Other Topics Concern    Parent/sibling w/ CABG, MI or angioplasty before 65F 55M? Not Asked   Social History Narrative    Not on file     Social Determinants of Health     Financial Resource Strain: Not on file   Food Insecurity: Not on file   Transportation Needs: Not on file   Physical Activity: Not on file   Stress: Not on file   Social Connections: Not on file   Interpersonal Safety: Low Risk  (3/25/2024)    Interpersonal Safety     Do you feel physically and emotionally safe where you currently live?: Yes     Within the past 12 months, have you been hit, slapped, kicked or otherwise physically hurt by someone?: No     Within the past 12 months, have you been humiliated or  emotionally abused in other ways by your partner or ex-partner?: No   Housing Stability: Not on file            Medications:     Current Outpatient Medications   Medication Sig Dispense Refill    albuterol (PROAIR HFA/PROVENTIL HFA/VENTOLIN HFA) 108 (90 Base) MCG/ACT inhaler Inhale 2 puffs into the lungs every 6 hours as needed for shortness of breath, wheezing or cough 18 g 11    aspirin 81 MG EC tablet Take 1 tablet (81 mg) by mouth daily for 30 days 30 tablet 0    betamethasone dipropionate (DIPROSONE) 0.05 % external cream Apply topically 2 times daily as needed (twice daily) Apply bid to areas on chest, forehead, arms for severe eczema. 45 g 11    carvedilol (COREG) 6.25 MG tablet Take 1 tablet (6.25 mg) by mouth 2 times daily (with meals) 180 tablet 3    hydrOXYzine HCl (ATARAX) 25 MG tablet Take 1-2 tablets (25-50 mg) by mouth every 6 hours as needed for itching 60 tablet 11    rosuvastatin (CRESTOR) 40 MG tablet Take 1 tablet (40 mg) by mouth daily 90 tablet 0    acetaminophen (TYLENOL) 325 MG tablet Take 2 tablets (650 mg) by mouth every 4 hours as needed for other (For optimal non-opioid multimodal pain management to improve pain control.)      ferrous sulfate (FEROSUL) 325 (65 Fe) MG tablet Take 1 tablet (325 mg) by mouth daily (with breakfast) (Patient not taking: Reported on 5/8/2024) 90 tablet 1    folic acid (FOLVITE) 400 MCG tablet Take 1 tablet (400 mcg) by mouth daily (Patient not taking: Reported on 5/8/2024) 90 tablet 1     No current facility-administered medications for this visit.            Review of Systems:       A complete review of systems was otherwise negative except as noted in the HPI.      PHYSICAL EXAM:  /88   Pulse 78   Temp 98.1  F (36.7  C) (Temporal)   Resp 16   Wt 68 kg (150 lb)   SpO2 97%   BMI 20.92 kg/m       General: Comfortable, No apparent distress  Eyes: Anicteric  Ears: Hearing grossly normal  Mouth: Oral mucosa is moist, without any lesions. No  oropharyngeal exudate.  Neck: supple, no thyromegaly  Lymphatics: No cervical or supraclavicular nodes  Respiratory: Good air movement. No crackles. No rhonchi. No wheezes  Cardiac: RRR, normal S1, S2. No murmurs.   Musculoskeletal: Extremities normal. No clubbing. No cyanosis. No edema.  Skin: No rash on limited exam  Neuro: Normal mentation. Normal speech.  Psych:Normal affect           Data:   All laboratory and imaging data reviewed.       Latest Reference Range & Units 04/11/24 12:39   WBC 4.0 - 11.0 10e3/uL 7.1   Hemoglobin 13.3 - 17.7 g/dL 12.3 (L)   Hematocrit 40.0 - 53.0 % 37.0 (L)   Platelet Count 150 - 450 10e3/uL 241   RBC Count 4.40 - 5.90 10e6/uL 4.03 (L)   MCV 78 - 100 fL 92   MCH 26.5 - 33.0 pg 30.5   MCHC 31.5 - 36.5 g/dL 33.2   RDW 10.0 - 15.0 % 14.3   % Neutrophils % 63   % Lymphocytes % 20   % Monocytes % 8   % Eosinophils % 8   % Basophils % 1   Absolute Basophils 0.0 - 0.2 10e3/uL 0.0   Absolute Eosinophils 0.0 - 0.7 10e3/uL 0.6   Absolute Immature Granulocytes <=0.4 10e3/uL 0.0   Absolute Lymphocytes 0.8 - 5.3 10e3/uL 1.5   Absolute Monocytes 0.0 - 1.3 10e3/uL 0.6   % Immature Granulocytes % 0   Absolute Neutrophils 1.6 - 8.3 10e3/uL 4.4   Absolute NRBCs 10e3/uL 0.0   NRBCs per 100 WBC <1 /100 0   (L): Data is abnormally low    PFT:       PFT Interpretation:  Moderate airflow obstruction  High normal residual volume  Impaired diffusion capacity  Valid Maneuver    CXR: I have reviewed the CXR images from May 8, 2024 and agree with the radiologist interpretation below:  IMPRESSION: Stable size of cardiomediastinal silhouette. No focal  airspace consolidation, pleural effusion or pneumothorax. No acute  bony abnormality. Healed right rib fractures.    Chest CT: I have reviewed the chest CT images from April 10, 2024 and agree with the radiologist interpretation below:  LUNGS AND PLEURA: Biapical pleural-parenchymal scarring. Upper lobe  predominant centrilobular and paraseptal emphysema. There  has been  interval resolution of previously seen bilateral pleural fusions and  intralobular septal thickening. Previously seen multiple irregular  nodular subsolid and ground-glass opacities in the right upper lobe  have also significantly improved. A couple small irregular nodules  persist in the right mid lung with the largest measuring 6 x 4 mm,  mean diameter of 5 mm (4-158). A few additional scattered 2-3 mm  scarlike pulmonary nodules and probable fissural lymph nodes are seen  elsewhere. Mild cylindrical bronchiectasis and bronchial wall  thickening are noted, suggestive of chronic bronchiolitis. Large  airways are patent. No airspace consolidation.     MEDIASTINUM/AXILLAE: Heart size is mildly enlarged, but decreased in  comparison. No pericardial fluid. Thoracic aorta is normal in course  and caliber. Mild thoracic aortic atherosclerosis. Interval decrease  in size of previously seen prominent and mildly enlarged mediastinal  and hilar lymph nodes. A mildly enlarged right hilar lymph node  persists at 14 mm in short axis (3-70), previously 18 mm in short  axis. Severe coronary artery atherosclerosis is present.     UPPER ABDOMEN: Severe atherosclerosis of the infrarenal abdominal  aorta. There is an apparent dissection involving the superior  mesenteric artery beginning near the origin and extending inferiorly  to the edge of the study. Both the true and false lumen appear equally  opacified with contrast. The superior mesenteric artery is aneurysmal  at 1.6 cm in diameter. There is partial visualization of an infrarenal  abdominal aortic aneurysm measuring at least 3.1 cm in AP dimension. A  fat-containing noninflamed ventral midline abdominal wall hernia is  present with the mouth of the hernia measuring 2 cm in diameter.     MUSCULOSKELETAL: Multilevel hypertrophic and degenerative changes of  the spine are present. Similar appearing moderate compression fracture  deformity of the T10 vertebral body.  Scoliotic curvature of the spine.  Remote, healed right rib fractures. No acute osseous abnormality.                                                                      IMPRESSION:   1.  Interval improvement in previously seen irregular nodular subsolid  opacities.  2.  Technically indeterminate subcentimeter pulmonary nodules.  3.  Chronic changes of the lungs, including pulmonary emphysema,  pleural parenchymal scarring, and mild bronchiectasis.  4.  Partially imaged superior mesenteric artery dissection and  aneurysmal dilation.  5.  Partially imaged infrarenal abdominal aortic aneurysm.  6.  Severe atherosclerotic vascular disease, including 3 vessel  coronary artery atherosclerosis.      No results found for this or any previous visit (from the past 168 hour(s)).

## 2024-05-09 ENCOUNTER — TELEPHONE (OUTPATIENT)
Facility: CLINIC | Age: 59
End: 2024-05-09
Payer: COMMERCIAL

## 2024-05-09 DIAGNOSIS — J44.9 STAGE 2 MODERATE COPD BY GOLD CLASSIFICATION (H): Primary | ICD-10-CM

## 2024-05-09 NOTE — TELEPHONE ENCOUNTER
Prior Authorization Retail Medication Request    Medication/Dose: FLUTICASONE FUROATE-SADE 100-25 AEPB  Diagnosis and ICD code (if different than what is on RX):    New/renewal/insurance change PA/secondary ins. PA:  Previously Tried and Failed:    Rationale:      Insurance   Primary: [employer] [place] PM[place]   Insurance ID: [id number]     Secondary (if applicable):  Insurance ID:      Pharmacy Information (if different than what is on RX)  Name: [facility] [place]    Phone: [phone]    Fax:      Thank you,  Natasha Foote, Pharm Tech  [facility]

## 2024-05-13 ENCOUNTER — HOSPITAL ENCOUNTER (OUTPATIENT)
Dept: CT IMAGING | Facility: HOSPITAL | Age: 59
Discharge: HOME | End: 2024-05-13
Attending: SURGERY
Payer: COMMERCIAL

## 2024-05-13 ENCOUNTER — OFFICE VISIT (OUTPATIENT)
Dept: VASCULAR SURGERY | Facility: CLINIC | Age: 59
End: 2024-05-13
Attending: SURGERY
Payer: COMMERCIAL

## 2024-05-13 VITALS
TEMPERATURE: 98 F | SYSTOLIC BLOOD PRESSURE: 150 MMHG | RESPIRATION RATE: 20 BRPM | WEIGHT: 149.9 LBS | HEART RATE: 60 BPM | OXYGEN SATURATION: 96 % | BODY MASS INDEX: 20.98 KG/M2 | HEIGHT: 71 IN | DIASTOLIC BLOOD PRESSURE: 90 MMHG

## 2024-05-13 DIAGNOSIS — I77.79 DISSECTION OF OTHER SPECIFIED ARTERY (H): Primary | ICD-10-CM

## 2024-05-13 DIAGNOSIS — I77.79 DISSECTION OF OTHER SPECIFIED ARTERY (H): ICD-10-CM

## 2024-05-13 PROCEDURE — 250N000009 HC RX 250: Performed by: SURGERY

## 2024-05-13 PROCEDURE — 250N000011 HC RX IP 250 OP 636: Performed by: SURGERY

## 2024-05-13 PROCEDURE — 99203 OFFICE O/P NEW LOW 30 MIN: CPT | Performed by: SURGERY

## 2024-05-13 PROCEDURE — G0463 HOSPITAL OUTPT CLINIC VISIT: HCPCS | Mod: 25 | Performed by: SURGERY

## 2024-05-13 PROCEDURE — 74174 CTA ABD&PLVS W/CONTRAST: CPT

## 2024-05-13 RX ORDER — IOPAMIDOL 755 MG/ML
64 INJECTION, SOLUTION INTRAVASCULAR ONCE
Status: COMPLETED | OUTPATIENT
Start: 2024-05-13 | End: 2024-05-13

## 2024-05-13 RX ADMIN — SODIUM CHLORIDE 90 ML: 9 INJECTION, SOLUTION INTRAVENOUS at 10:53

## 2024-05-13 RX ADMIN — IOPAMIDOL 64 ML: 755 INJECTION, SOLUTION INTRAVENOUS at 10:49

## 2024-05-13 ASSESSMENT — PAIN SCALES - GENERAL: PAINLEVEL: NO PAIN (0)

## 2024-05-13 NOTE — PROGRESS NOTES
VASCULAR SURGERY CLINIC CONSULTATION    VASCULAR SURGEON: Sawyer Leon MD, RPVI     LOCATION:  [facility]     Delvin Echevarria   Medical Record #:  [id number]  YOB: 1965  Age:  59 year old     Date of Service: 5/13/2024    PRIMARY CARE PROVIDER: Delvin Henning      Reason for visit: SMA dissection    IMPRESSION: 59-year-old male with isolated SMA dissection, age undetermined.  Remains asymptomatic from vascular and GI standpoint.  True lumen is patent without hemodynamic significant disease based on CT scan.  No need for surgical intervention.  Patient does have a small AAA which will require follow-up every 5 years    RECOMMENDATION/RISKS: Follow-up in 1 year with repeat CT scan to evaluate SMA dissection.  If remains stable, I would recommend stopping follow-up.  Patient will need another ultrasound of infrarenal abdominal aorta in about 5 years    HPI:  Delvin Echevarria is a 59 year old male who was seen today in consultation for SMA dissection.  Patient is asymptomatic    REVIEW OF SYSTEMS:    A 12 point ROS was reviewed and is negative   PHH:    Past Medical History:   Diagnosis Date    Abscess 8/11/2017    Acute posthemorrhagic anemia 12/14/2016    Anemia due to blood loss, acute 9/30/2016    Closed fracture of right tibia and fibula with routine healing 9/29/2016    DDD (degenerative disc disease), lumbar     Hypertension 3/15/2013    Marijuana use     4x daily    Nonobstructive atherosclerosis of coronary artery 8/10/2023    Staph aureus infection 9/13/2017    Tibial plateau fracture 9/11/2016    Tobacco use     Wound infection 9/29/2016          Past Surgical History:   Procedure Laterality Date    APPLY EXTERNAL FIXATOR LOWER EXTREMITY Right 09/12/2016    Procedure: APPLY EXTERNAL FIXATOR LOWER EXTREMITY;  Surgeon: John Gonzales MD;  Location: [facility] OR    CV CORONARY ANGIOGRAM N/A 7/24/2023    Procedure: Coronary Angiogram;  Surgeon: Boubacar Camarena MD;   Location:  HEART CARDIAC CATH LAB    CV LEFT HEART CATH N/A 7/24/2023    Procedure: Left Heart Catheterization;  Surgeon: Boubacar Camarena MD;  Location:  HEART CARDIAC CATH LAB    CV LEFT VENTRICULOGRAM N/A 7/24/2023    Procedure: Left Ventriculogram;  Surgeon: Boubacar Camarena MD;  Location: [facility] CARDIAC CATH LAB    HERNIORRHAPHY INGUINAL  07/02/2012    Procedure: HERNIORRHAPHY INGUINAL;  Open Repair Right Inguinal Hernia with Mesh;  Surgeon: Avni Maxwell MD;  Location: [facility] OR    INCISION AND DRAINAGE BONE LOWER EXTREMITY, COMBINED Right 08/11/2017    Procedure: COMBINED INCISION AND DRAINAGE BONE LOWER EXTREMITY;  Irrigation and Debridement Right Tibia,  Removal of Hardware;  Surgeon: Kenroy Dos Santos MD;  Location: [facility] OR    KNEE SURGERY Left 03/01/2021    patella fracture repair    LARYNGECTOMY  Several Years ago    Partail removal due to fish bone    OPEN REDUCTION INTERNAL FIXATION PATELLA Left 3/1/2021    Procedure: OPEN REDUCTION INTERNAL FIXATION, FRACTURE, PATELLA;  Surgeon: Papa Pena MD;  Location: [facility] OR    OPEN REDUCTION INTERNAL FIXATION TIBIA Right 09/15/2016    Procedure: OPEN REDUCTION INTERNAL FIXATION TIBIA;  Surgeon: Miguel A Green MD;  Location: [facility] OR    OPEN REDUCTION INTERNAL FIXATION TIBIA Right 11/07/2017    Procedure: OPEN REDUCTION INTERNAL FIXATION TIBIA;  Right Tibia Fibula Open Reduction Internal Fixation;  Surgeon: Miguel A Green MD;  [facility]    REMOVE HARDWARE LOWER EXTREMITY Right 08/11/2017    Procedure: REMOVE HARDWARE LOWER EXTREMITY;;  Surgeon: Kenroy Dos Santos MD;  Location: [facility] OR       ALLERGIES:  Seasonal allergies and Codeine    MEDS:    Current Outpatient Medications:     acetaminophen (TYLENOL) 325 MG tablet, Take 2 tablets (650 mg) by mouth every 4 hours as needed for other (For optimal non-opioid multimodal pain management to improve pain control.), Disp:  , Rfl:     albuterol (PROAIR HFA/PROVENTIL  "HFA/VENTOLIN HFA) 108 (90 Base) MCG/ACT inhaler, Inhale 2 puffs into the lungs every 6 hours as needed for shortness of breath, wheezing or cough, Disp: 18 g, Rfl: 11    betamethasone dipropionate (DIPROSONE) 0.05 % external cream, Apply topically 2 times daily as needed (twice daily) Apply bid to areas on chest, forehead, arms for severe eczema., Disp: 45 g, Rfl: 11    carvedilol (COREG) 6.25 MG tablet, Take 1 tablet (6.25 mg) by mouth 2 times daily (with meals), Disp: 180 tablet, Rfl: 3    fluticasone-vilanterol (BREO ELLIPTA) 100-25 MCG/ACT inhaler, Inhale 1 puff into the lungs daily, Disp: 1 each, Rfl: 2    hydrOXYzine HCl (ATARAX) 25 MG tablet, Take 1-2 tablets (25-50 mg) by mouth every 6 hours as needed for itching, Disp: 60 tablet, Rfl: 11    rosuvastatin (CRESTOR) 40 MG tablet, Take 1 tablet (40 mg) by mouth daily, Disp: 90 tablet, Rfl: 0    spacer (OPTICHAMBER SAIDA) holding chamber, Use with albuterol, Disp: 1 each, Rfl: 2    ferrous sulfate (FEROSUL) 325 (65 Fe) MG tablet, Take 1 tablet (325 mg) by mouth daily (with breakfast) (Patient not taking: Reported on 5/8/2024), Disp: 90 tablet, Rfl: 1    folic acid (FOLVITE) 400 MCG tablet, Take 1 tablet (400 mcg) by mouth daily (Patient not taking: Reported on 5/8/2024), Disp: 90 tablet, Rfl: 1  No current facility-administered medications for this visit.    SOCIAL HABITS:    History   Smoking Status    Former    Types: Cigarettes   Smokeless Tobacco    Never     Social History    Substance and Sexual Activity      Alcohol use: No      History   Drug Use Unknown     Comment: Previously smoked 4x daily       FAMILY HISTORY:    Family History   Problem Relation Age of Onset    Hypertension Mother     Hypertension Brother        PE:  BP (!) 150/90   Pulse 60   Temp 98  F (36.7  C)   Resp 20   Ht 1.803 m (5' 11\")   Wt 68 kg (149 lb 14.4 oz)   SpO2 96%   BMI 20.91 kg/m    Wt Readings from Last 1 Encounters:   05/13/24 68 kg (149 lb 14.4 oz)     Body mass " index is 20.91 kg/m .    EXAM:  GENERAL: This is a well-developed 59 year old male who appears his stated age  EYES: Grossly normal.  MOUTH: Buccal mucosa normal   CARDIAC: Normal   CHEST/LUNG: Clear to auscultation bilaterally  GASTROINTESINAL soft nontender nondistended  MUSCULOSKELETAL: Grossly normal and both lower extremities are intact.  HEME/LYMPH: No lymphedema  NEUROLOGIC: Focally intact, Alert and oriented x 3.   PSYCH: appropriate affect            DIAGNOSTIC STUDIES:     Images:  XR Chest 2 Views    Result Date: 5/8/2024  XR CHEST 2 VIEWS   5/8/2024 1:20 PM HISTORY: Asthma COMPARISON: Chest CT 4/10/2024     IMPRESSION: Stable size of cardiomediastinal silhouette. No focal airspace consolidation, pleural effusion or pneumothorax. No acute bony abnormality. Healed right rib fractures. DOUGLAS GOMEZ MD   SYSTEM ID:  [id number]          LABS:      Sodium   Date Value Ref Range Status   04/11/2024 137 135 - 145 mmol/L Final     Comment:     Reference intervals for this test were updated on 09/26/2023 to more accurately reflect our healthy population. There may be differences in the flagging of prior results with similar values performed with this method. Interpretation of those prior results can be made in the context of the updated reference intervals.    03/25/2024 136 135 - 145 mmol/L Final     Comment:     Reference intervals for this test were updated on 09/26/2023 to more accurately reflect our healthy population. There may be differences in the flagging of prior results with similar values performed with this method. Interpretation of those prior results can be made in the context of the updated reference intervals.    07/31/2023 138 136 - 145 mmol/L Final   02/28/2021 136 133 - 144 mmol/L Final   08/12/2019 141 133 - 144 mmol/L Final   01/09/2019 136 133 - 144 mmol/L Final     Urea Nitrogen   Date Value Ref Range Status   04/11/2024 21.1 8.0 - 23.0 mg/dL Final   03/25/2024 20.8 8.0 - 23.0 mg/dL Final    07/31/2023 26.6 (H) 8.0 - 23.0 mg/dL Final   02/28/2021 20 7 - 30 mg/dL Final   08/12/2019 20 7 - 30 mg/dL Final   01/09/2019 31 (H) 7 - 30 mg/dL Final     Hemoglobin   Date Value Ref Range Status   04/11/2024 12.3 (L) 13.3 - 17.7 g/dL Final   03/25/2024 13.2 (L) 13.3 - 17.7 g/dL Final   07/31/2023 11.5 (L) 13.3 - 17.7 g/dL Final   03/02/2021 11.6 (L) 13.3 - 17.7 g/dL Final   03/01/2021 12.7 (L) 13.3 - 17.7 g/dL Final   02/28/2021 13.8 13.3 - 17.7 g/dL Final     Platelet Count   Date Value Ref Range Status   04/11/2024 241 150 - 450 10e3/uL Final   03/25/2024 280 150 - 450 10e3/uL Final   07/31/2023 264 150 - 450 10e3/uL Final   03/02/2021 248 150 - 450 10e9/L Final   03/01/2021 262 150 - 450 10e9/L Final   02/28/2021 297 150 - 450 10e9/L Final     INR   Date Value Ref Range Status   04/11/2024 1.12 0.85 - 1.15 Final   08/11/2017 1.08 0.86 - 1.14 Final   12/14/2016 1.12 0.86 - 1.14 Final   09/10/2016 1.11 0.86 - 1.14 Final       30 minutes spent on the day of encounter doing chart review, history and exam, documentation, and further activities as noted.         Sawyer Leon MD, [facility]  VASCULAR SURGERY

## 2024-05-13 NOTE — PATIENT INSTRUCTIONS
This is the plan that was discussed at your appointment.    We will contact you to schedule your 1 year with and repeat CTA scan  We will also have you see Dr. Bishop (Vascular Medicine)         I am including additional information on these things and our contact information if you have any questions or concerns.   Please do not hesitate to reach out to us if you felt we did not answer your questions or you are unsure of the treatment plan after your visit today. Our number is [phone].  Thank you for trusting us with your care.         Again thank you for your time.

## 2024-05-13 NOTE — PROGRESS NOTES
"[facility]        Patient is here for a consult to discuss SMA dissection. No pain.     Pt is currently taking Statin.    BP (!) 165/99   Pulse 60   Temp 98  F (36.7  C)   Resp 20   Ht 5' 11\" (1.803 m)   Wt 149 lb 14.4 oz (68 kg)   SpO2 96%   BMI 20.91 kg/m      The provider has been notified that the patient has no concerns.     Questions patient would like addressed today are: What is the plan? He has an exercise stress test and echocardiogram next week and wondering if these tests will aggravate the problem.     Refills are needed: N/A    Has homecare services and agency name:  No           "

## 2024-05-17 ENCOUNTER — TELEPHONE (OUTPATIENT)
Dept: CARDIOLOGY | Facility: CLINIC | Age: 59
End: 2024-05-17
Payer: COMMERCIAL

## 2024-05-17 NOTE — TELEPHONE ENCOUNTER
Attempted to call pt back. No answer. Left voicemail for pt to call back if he had any further questions.     Adrianna Loredo RN

## 2024-05-17 NOTE — TELEPHONE ENCOUNTER
[facility] Call Center    Phone Message    May a detailed message be left on voicemail: yes     Reason for Call: Other: Delvin called returning a missed call from the clinic. Unable to find any notes in pt chart about what was supposed to be discussed. Confirmed Delvin's appt times and location on 5/22. Please reach out to Delvin to discuss. Thank you!     Action Taken: Other: Cardiology    Travel Screening: Not Applicable    Thank you!  [facility]

## 2024-05-22 ENCOUNTER — LAB (OUTPATIENT)
Dept: LAB | Facility: CLINIC | Age: 59
End: 2024-05-22
Payer: COMMERCIAL

## 2024-05-22 DIAGNOSIS — I25.10 NONOBSTRUCTIVE ATHEROSCLEROSIS OF CORONARY ARTERY: ICD-10-CM

## 2024-05-22 LAB
ALT SERPL W P-5'-P-CCNC: 59 U/L (ref 0–70)
CHOLEST SERPL-MCNC: 125 MG/DL
FASTING STATUS PATIENT QL REPORTED: YES
HDLC SERPL-MCNC: 31 MG/DL
LDLC SERPL CALC-MCNC: 77 MG/DL
NONHDLC SERPL-MCNC: 94 MG/DL
TRIGL SERPL-MCNC: 84 MG/DL

## 2024-05-22 PROCEDURE — 84460 ALANINE AMINO (ALT) (SGPT): CPT | Performed by: NURSE PRACTITIONER

## 2024-05-22 PROCEDURE — 36415 COLL VENOUS BLD VENIPUNCTURE: CPT | Performed by: NURSE PRACTITIONER

## 2024-05-22 PROCEDURE — 80061 LIPID PANEL: CPT | Performed by: NURSE PRACTITIONER

## 2024-05-23 NOTE — TELEPHONE ENCOUNTER
Retail Pharmacy Prior Authorization Team   Phone: [phone]    PA DENIED  If the provider would like to appeal we will need a detailed   letter of medical necessity with clinical reasoning to start the process.   Please close the encounter when finished.   Thank you,  Maria T Allan CPhT    PA Team

## 2024-05-23 NOTE — TELEPHONE ENCOUNTER
Retail Pharmacy Prior Authorization Team   Phone: [phone]    PA Initiation    Medication: FLUTICASONE FUROATE-VILANTEROL 100-25 MCG/ACT IN AEPB  Insurance Company: [employer] - Phone [phone] Fax [phone]  Pharmacy Filling the Rx: [facility] [place], [place] - [address] [place] [address]  Filling Pharmacy Phone: [phone]  Filling Pharmacy Fax:    Start Date: 5/23/2024      Note: Due to record-high volumes, our turn-around time is taking longer than usual . We are currently 2 weeks behind in the pools.   We are working diligently to submit all requests in a timely manner and in the order they are received. Please only flag TRUE URGENT requests as high priority to the pool at this time.   If you have questions on status of PA's,  please send a note/message in the active PA encounter and send back to the [facility] PA pool [[id number]].    If you have questions about the turn-around time or about our process, please reach out to our supervisor Nikki Wall.   Thank you!   RPPA (Retail Pharmacy Prior Authorization) team

## 2024-05-23 NOTE — TELEPHONE ENCOUNTER
Retail Pharmacy Prior Authorization Team   Phone: [phone]    PRIOR AUTHORIZATION DENIED    Medication: FLUTICASONE FUROATE-VILANTEROL 100-25 MCG/ACT IN AEPB  Insurance Company: [employer] - Phone [phone] Fax [phone]  Denial Date: 5/23/2024  Denial Reason(s): SEE BELOW          Appeal Information:

## 2024-05-24 RX ORDER — FLUTICASONE PROPIONATE AND SALMETEROL 250; 50 UG/1; UG/1
1 POWDER RESPIRATORY (INHALATION) EVERY 12 HOURS
Qty: 60 EACH | Refills: 3 | Status: SHIPPED | OUTPATIENT
Start: 2024-05-24

## 2024-05-24 NOTE — TELEPHONE ENCOUNTER
Patient informed of message below via telephone, and will  medication from pharmacy. No further questions were asked.  Alma Lerma RN on 5/24/2024 at 9:09 AM

## 2024-06-03 ENCOUNTER — HOSPITAL ENCOUNTER (OUTPATIENT)
Dept: NUCLEAR MEDICINE | Facility: CLINIC | Age: 59
Setting detail: NUCLEAR MEDICINE
Discharge: HOME | End: 2024-06-03
Attending: NURSE PRACTITIONER
Payer: COMMERCIAL

## 2024-06-03 ENCOUNTER — HOSPITAL ENCOUNTER (OUTPATIENT)
Dept: CARDIOLOGY | Facility: CLINIC | Age: 59
Discharge: HOME | End: 2024-06-03
Attending: NURSE PRACTITIONER
Payer: COMMERCIAL

## 2024-06-03 DIAGNOSIS — R06.09 DOE (DYSPNEA ON EXERTION): ICD-10-CM

## 2024-06-03 LAB
CV STRESS MAX HR HE: 110
NUC STRESS EJECTION FRACTION: 26 %
RATE PRESSURE PRODUCT: NORMAL
STRESS ECHO BASELINE DIASTOLIC HE: 98
STRESS ECHO BASELINE HR: 80 BPM
STRESS ECHO BASELINE SYSTOLIC BP: 172
STRESS ECHO CALCULATED PERCENT HR: 69 %
STRESS ECHO LAST STRESS DIASTOLIC BP: 100
STRESS ECHO LAST STRESS SYSTOLIC BP: 160
STRESS ECHO TARGET HR: 159

## 2024-06-03 PROCEDURE — 93017 CV STRESS TEST TRACING ONLY: CPT

## 2024-06-03 PROCEDURE — 343N000001 HC RX 343: Performed by: NURSE PRACTITIONER

## 2024-06-03 PROCEDURE — 78452 HT MUSCLE IMAGE SPECT MULT: CPT | Mod: 26 | Performed by: INTERNAL MEDICINE

## 2024-06-03 PROCEDURE — 93016 CV STRESS TEST SUPVJ ONLY: CPT | Performed by: INTERNAL MEDICINE

## 2024-06-03 PROCEDURE — 250N000011 HC RX IP 250 OP 636: Performed by: NURSE PRACTITIONER

## 2024-06-03 PROCEDURE — A9502 TC99M TETROFOSMIN: HCPCS | Performed by: NURSE PRACTITIONER

## 2024-06-03 PROCEDURE — 78452 HT MUSCLE IMAGE SPECT MULT: CPT

## 2024-06-03 PROCEDURE — 93018 CV STRESS TEST I&R ONLY: CPT | Performed by: INTERNAL MEDICINE

## 2024-06-03 RX ORDER — REGADENOSON 0.08 MG/ML
0.4 INJECTION, SOLUTION INTRAVENOUS ONCE
Status: COMPLETED | OUTPATIENT
Start: 2024-06-03 | End: 2024-06-03

## 2024-06-03 RX ADMIN — REGADENOSON 0.4 MG: 0.4 INJECTION INTRAVENOUS at 15:08

## 2024-06-03 RX ADMIN — TETROFOSMIN 23.1 MILLICURIE: 1.38 INJECTION, POWDER, LYOPHILIZED, FOR SOLUTION INTRAVENOUS at 15:15

## 2024-06-03 RX ADMIN — TETROFOSMIN 7.8 MILLICURIE: 1.38 INJECTION, POWDER, LYOPHILIZED, FOR SOLUTION INTRAVENOUS at 12:55

## 2024-06-18 ENCOUNTER — HOSPITAL ENCOUNTER (OUTPATIENT)
Dept: CARDIOLOGY | Facility: CLINIC | Age: 59
Discharge: HOME | End: 2024-06-18
Attending: NURSE PRACTITIONER | Admitting: NURSE PRACTITIONER
Payer: COMMERCIAL

## 2024-06-18 DIAGNOSIS — I42.8 NONISCHEMIC CARDIOMYOPATHY (H): ICD-10-CM

## 2024-06-18 LAB
BI-PLANE LVEF ECHO: NORMAL
LVEF ECHO: NORMAL

## 2024-06-18 PROCEDURE — 93306 TTE W/DOPPLER COMPLETE: CPT | Mod: 26 | Performed by: INTERNAL MEDICINE

## 2024-06-18 PROCEDURE — 93306 TTE W/DOPPLER COMPLETE: CPT

## 2024-06-19 ENCOUNTER — ALLIED HEALTH/NURSE VISIT (OUTPATIENT)
Dept: FAMILY MEDICINE | Facility: CLINIC | Age: 59
End: 2024-06-19
Payer: COMMERCIAL

## 2024-06-19 ENCOUNTER — TELEPHONE (OUTPATIENT)
Dept: FAMILY MEDICINE | Facility: CLINIC | Age: 59
End: 2024-06-19

## 2024-06-19 VITALS — DIASTOLIC BLOOD PRESSURE: 76 MMHG | HEART RATE: 64 BPM | SYSTOLIC BLOOD PRESSURE: 134 MMHG

## 2024-06-19 DIAGNOSIS — I10 BENIGN ESSENTIAL HYPERTENSION: Primary | ICD-10-CM

## 2024-06-19 PROCEDURE — 99207 PR NO CHARGE NURSE ONLY: CPT

## 2024-06-19 RX ORDER — CARVEDILOL 12.5 MG/1
12.5 TABLET ORAL 2 TIMES DAILY WITH MEALS
Qty: 180 TABLET | Refills: 3 | Status: SHIPPED | OUTPATIENT
Start: 2024-06-19 | End: 2024-06-19 | Stop reason: DRUGHIGH

## 2024-06-19 RX ORDER — CARVEDILOL 6.25 MG/1
6.25 TABLET ORAL 2 TIMES DAILY WITH MEALS
Qty: 180 TABLET | Refills: 3 | Status: SHIPPED | OUTPATIENT
Start: 2024-06-19

## 2024-06-19 NOTE — TELEPHONE ENCOUNTER
Delvin Echevarria is a 59 year old year old patient who comes in today for a Blood Pressure check because of medication change, states his carvedilol was increased to 12.5 mg po daily to begin on 6/20/24 per Dr. Delvin Henning.  Vital Signs as repeated by RN /76 P 64.  Patient is taking medication as prescribed  Patient is tolerating medications well.  Patient is not monitoring Blood Pressure at home.  Average readings if yes are states yesterday at [facility] was 150/77  Current complaints: none  Disposition:  BP readings routed to Dr. Delvin Henning to review, patient to continue with the same medication and patient reminded to call as needed.    Patient is also requesting an order for a home BP machine, will pend up order and routed to Dr. Delvin Henning for consideration.     Please notify patient once addressed so he can contact [facility] [phone] to coordinate .    Julie Behrendt RN

## 2024-06-19 NOTE — TELEPHONE ENCOUNTER
Medication Question or Refill        What medication are you calling about (include dose and sig)?: Carvedilol    Preferred Pharmacy:   [facility] [place], [place] - [address] [place] [address]  [address] [place] [address]  Phone: [phone] Fax: [phone]        Controlled Substance Agreement on file:   CSA -- Patient Level:    CSA: None found at the patient level.       Who prescribed the medication?: Delvin Henning    Do you need a refill? No    When did you use the medication last? today    Patient offered an appointment? No    Do you have any questions or concerns?  Yes: Bp was checked yesterday at appt and was 150/77 (should be in chart, seen in [place]). Pt feels BP is still running to high with the meds      Okay to leave a detailed message?: Yes at Home number on file [phone] (home)     Carac Counseling:  I discussed with the patient the risks of Carac including but not limited to erythema, scaling, itching, weeping, crusting, and pain.

## 2024-06-19 NOTE — RESULT ENCOUNTER NOTE
EF 40-45% (30-35% on 7/22/23 echo); WMAs as described; no valve disease; aortic root at 4.0 cm. Follow up with Roxann German NP on 6/28/24

## 2024-06-19 NOTE — TELEPHONE ENCOUNTER
Will go back to his normal dose.  Follow for now.  May be up and down for a while, but let's stay o the 6.25mg bid.

## 2024-06-19 NOTE — PROGRESS NOTES
Delvin Echevarria is a 59 year old year old patient who comes in today for a Blood Pressure check because of medication change, states his carvedilol was increased to 12.5 mg po daily to begin on 6/20/24 per Dr. Delvin Henning.  Vital Signs as repeated by RN /76 P 64.  Patient is taking medication as prescribed  Patient is tolerating medications well.  Patient is not monitoring Blood Pressure at home.  Average readings if yes are states yesterday at [facility] was 150/77  Current complaints: none  Disposition:  BP readings routed to Dr. Delvin Henning to review, patient to continue with the same medication and patient reminded to call as needed.    Patient is also requesting an order for a home BP machine, will pend up order and routed to Dr. Delvin Henning for consideration.     Please notify patient once addressed.    Julie Behrendt RN

## 2024-06-21 NOTE — TELEPHONE ENCOUNTER
2nd attempt to contact patient.    Left message on unidentified VM requesting patient return call re: see message below from Dr. Delvin Henning.     Julie Behrendt RN

## 2024-06-25 ENCOUNTER — TELEPHONE (OUTPATIENT)
Dept: FAMILY MEDICINE | Facility: CLINIC | Age: 59
End: 2024-06-25

## 2024-06-28 ENCOUNTER — OFFICE VISIT (OUTPATIENT)
Dept: CARDIOLOGY | Facility: CLINIC | Age: 59
End: 2024-06-28
Attending: NURSE PRACTITIONER
Payer: COMMERCIAL

## 2024-06-28 VITALS
OXYGEN SATURATION: 97 % | DIASTOLIC BLOOD PRESSURE: 80 MMHG | HEART RATE: 64 BPM | SYSTOLIC BLOOD PRESSURE: 126 MMHG | HEIGHT: 71 IN | BODY MASS INDEX: 21.36 KG/M2 | RESPIRATION RATE: 14 BRPM | WEIGHT: 152.6 LBS

## 2024-06-28 DIAGNOSIS — I42.8 NONISCHEMIC CARDIOMYOPATHY (H): Primary | ICD-10-CM

## 2024-06-28 DIAGNOSIS — R06.09 DOE (DYSPNEA ON EXERTION): ICD-10-CM

## 2024-06-28 DIAGNOSIS — I25.10 CORONARY ARTERY DISEASE INVOLVING NATIVE CORONARY ARTERY OF NATIVE HEART, UNSPECIFIED WHETHER ANGINA PRESENT: ICD-10-CM

## 2024-06-28 DIAGNOSIS — I42.8 NONISCHEMIC CARDIOMYOPATHY (H): ICD-10-CM

## 2024-06-28 DIAGNOSIS — I71.40 ABDOMINAL AORTIC ANEURYSM (AAA) WITHOUT RUPTURE, UNSPECIFIED PART (H): ICD-10-CM

## 2024-06-28 DIAGNOSIS — I50.22 CHRONIC SYSTOLIC HEART FAILURE (H): ICD-10-CM

## 2024-06-28 DIAGNOSIS — I25.10 NONOBSTRUCTIVE ATHEROSCLEROSIS OF CORONARY ARTERY: ICD-10-CM

## 2024-06-28 DIAGNOSIS — I10 BENIGN ESSENTIAL HYPERTENSION: ICD-10-CM

## 2024-06-28 PROCEDURE — 99207 PR NO CHARGE NURSE ONLY: CPT | Performed by: NURSE PRACTITIONER

## 2024-06-28 PROCEDURE — 99215 OFFICE O/P EST HI 40 MIN: CPT | Mod: 25 | Performed by: NURSE PRACTITIONER

## 2024-06-28 PROCEDURE — 93242 EXT ECG>48HR<7D RECORDING: CPT | Performed by: NURSE PRACTITIONER

## 2024-06-28 RX ORDER — FUROSEMIDE 20 MG
20 TABLET ORAL DAILY
Qty: 30 TABLET | Refills: 1 | Status: SHIPPED | OUTPATIENT
Start: 2024-06-28

## 2024-06-28 RX ORDER — ASPIRIN 81 MG/1
81 TABLET, CHEWABLE ORAL DAILY
Qty: 90 TABLET | Refills: 3 | Status: SHIPPED | OUTPATIENT
Start: 2024-06-28

## 2024-06-28 RX ORDER — SACUBITRIL AND VALSARTAN 24; 26 MG/1; MG/1
1 TABLET, FILM COATED ORAL 2 TIMES DAILY
Qty: 180 TABLET | Refills: 3 | Status: SHIPPED | OUTPATIENT
Start: 2024-06-28 | End: 2024-08-22 | Stop reason: DRUGHIGH

## 2024-06-28 ASSESSMENT — PAIN SCALES - GENERAL: PAINLEVEL: NO PAIN (0)

## 2024-06-28 NOTE — LETTER
6/28/2024    Delvin Henning MD  [address] [place] [address]    RE: Delvin Echevarria       Dear Colleague,     I had the pleasure of seeing Delvin Echevarria in the [facility].  Cardiology Clinic Progress Note  Delvin Echevarria MRN# [id number]   YOB: 1965 Age: 59 year old      Primary Cardiologist:   Dr. Anne, 2-month follow-up           History of Presenting Illness:      History of heavy alcohol use 4-6 shots of whiskey per night but sober for 10 years.  He does not have a car and relies on friends for transportation.      He was admitted to [facility] on 7/21/2023 with chest pain and heart failure.  Blood pressure was significantly elevated.  He was out of his medications at home and had not been taking antihypertensives.  Echocardiogram showed EF 30 to 35% with moderately severe global hypokinesia of the LV.  Coronary angiogram 7/24/2023 showed normal LV filling pressures and no obstructive CAD 30% mid LAD, 50% D2.  He was diuresed and started on Entresto, rosuvastatin, carvedilol, Lasix.  Amlodipine and lisinopril were discontinued at discharge.  They recommended outpatient cardiac MRI and Zio patch to assess for arrhythmias.       Since his hospitalization last summer, he did not have routine cardiology follow-up and did not come for his core enrollment.     Patient was seen by me in April 2024.  He was not taking Entresto or Lasix but was taking statin, carvedilol and aspirin.  Weight was up 7 pounds and reported dyspnea on exertion.  However this was improved with inhaler and was stable over the prior year.  Chest CT a week prior showed resolved pleural effusions.  Blood pressure was controlled and he denied any anginal symptoms.  He did not have a scale at home to check weights.    Most recent lipid profile, BMP, ALT reviewed today. Lexiscan nuclear stress test June 2024 showing a small area of apical infarction, but no ischemia.  Echo June 2024 showing EF improved  from 30 to 35% up to 40 to 45%, wall motion abnormalities as described, no significant valvular disease and aortic root 4 cm.  Results reviewed today.     Angina? None   BP? Controlled   Weight? Up 5 in 2 months   Heart failure symptoms? Stable GOODMAN somewhat improved with inhaler, will try lasix trial   Taking aspirin? Ran out and stopped it     Patient reports no chest pain, PND, orthopnea, presyncope, syncope, edema, heart racing, or palpitations.                    Assessment and Plan:     Plan  Patient Instructions   Medication Changes:  Start lasix 20 mg daily   Aspirin 81 mg daily   Start entresto 24-26 mg twice a day if affordable. If not affordable call my nurse so we can start lisinopril instead     Recommendations:  Check daily weights and call the clinic if your weight has increased more than 2 lbs in one day or 5 lbs in one week; if you feel more short of breath or have worsening swelling in your legs or abdomen.  2000 mg sodium diet   4-8 8 ounce glasses of fluids per day, not more than 2000 mL (2 L) per day.   Check blood pressure at least 1 hour after medications. Call the clinic if your blood pressure is consistently greater than 130/80.     Follow-up:  7 day zio patch to look for causes of cardiomyopathy  Nonfasting lab in 1 week (BMP) after starting lasix and entresto  Cardiology follow up at [facility]: roxann in 2 months .  To uptitrate cardiomyopathy medications.    Cardiology Scheduling~[phone]  Cardiology Clinic RN~[phone] (Elsa RN, Elena RN; Adrianna RN)          Problem List as of 6/28/2024 Reviewed: 4/11/2024 12:54 PM by Kelsie Cohen            Noted       Active Problems    1. Benign essential hypertension 3/15/2013     Last Assessment & Plan 6/28/2024 Office Visit Written 6/28/2024  1:48 PM by Roxann German APRN CNP      Controlled  Continue current medications          Relevant Orders     Basic metabolic panel     Follow-Up with Cardiology    2. Nonischemic  cardiomyopathy (H) - Primary 8/10/2023     Overview Signed 6/28/2024  1:50 PM by Roxann German APRN CNP      EF 30 to 35% 7/2023  EF 40 to 45% 6/2024  Unclear etiology ?  Hypertensive  Nonobstructive CAD on angiogram, No family history of heart failure, History of alcoholism but sober for 10 years, No recent infections or drug use, normal TSH          Last Assessment & Plan 6/28/2024 Office Visit Written 6/28/2024  1:50 PM by Roxann German APRN CNP      GOODMAN  Continue GDMT  uptitrate cardiomyopathy medications as tolerated            Relevant Medications     furosemide (LASIX) 20 MG tablet     sacubitril-valsartan (ENTRESTO) 24-26 MG per tablet     Other Relevant Orders     Basic metabolic panel     ZIO PATCH 3-7 DAYS (additional cost to patient)     ZIO PATCH 3-7 DAYS APPLICATION (Completed)     Follow-Up with Cardiology    3. Chronic systolic heart failure (H) 8/10/2023     Relevant Medications     furosemide (LASIX) 20 MG tablet     sacubitril-valsartan (ENTRESTO) 24-26 MG per tablet     Other Relevant Orders     Follow-Up with Cardiology    4. Coronary artery disease involving native coronary artery of native heart, unspecified whether angina present 8/10/2023     Overview Signed 6/28/2024  1:51 PM by Roxann German APRN CNP      Nonobstructive   NSTEMI 7/2023 30% mid LAD, 50% D2.            Last Assessment & Plan 6/28/2024 Office Visit Written 6/28/2024  1:51 PM by Roxann German APRN CNP      No angina  Continue GDMT          Relevant Medications     aspirin (ASA) 81 MG chewable tablet     Other Relevant Orders     Basic metabolic panel     Follow-Up with Cardiology    5. 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        Thank you for allowing me to participate in this delightful patient's care.   This note was completed in part using Dragon voice recognition software. Although reviewed after completion, some word and grammatical errors may occur.    XIN Solorzano CNP      Thank you for allowing me to participate in the care of your patient.      Sincerely,     XIN Solorzano CNP     [facility]  cc:   XIN Butcher CNP  [address] [place] [address]

## 2024-06-28 NOTE — PROGRESS NOTES
Delvin Echevarria arrived here on 6/28/2024 3:46 PM for 3-7 Days  Zio monitor placement per ordering provider Maxi for the diagnosis nonischemic cardiomyopathy.  Patient s skin was prepped per protocol. Dr. Reed is the supervising MD.  Zio monitor was placed.  Instructions were reviewed with and given to the patient.  Patient verbalized understanding of wear, troubleshooting and monitor return instructions.

## 2024-06-28 NOTE — PATIENT INSTRUCTIONS
Medication Changes:  Start lasix 20 mg daily   Aspirin 81 mg daily   Start entresto 24-26 mg twice a day if affordable. If not affordable call my nurse so we can start lisinopril instead     Recommendations:  Check daily weights and call the clinic if your weight has increased more than 2 lbs in one day or 5 lbs in one week; if you feel more short of breath or have worsening swelling in your legs or abdomen.  2000 mg sodium diet   4-8 8 ounce glasses of fluids per day, not more than 2000 mL (2 L) per day.   Check blood pressure at least 1 hour after medications. Call the clinic if your blood pressure is consistently greater than 130/80.     Follow-up:  7 day zio patch    Nonfasting lab in 1 week ([facility]) after starting lasix and entresto  Cardiology follow up at [facility]: racheal in 2 months .     Cardiology Scheduling~[phone]  Cardiology Clinic RN~[phone] (Elsa RN, Elena RN; Adrianna RN)

## 2024-06-28 NOTE — PROGRESS NOTES
Cardiology Clinic Progress Note  Delvin Echevarria MRN# [id number]   YOB: 1965 Age: 59 year old      Primary Cardiologist:   Dr. Anne, 2-month follow-up           History of Presenting Illness:      History of heavy alcohol use 4-6 shots of whiskey per night but sober for 10 years.  He does not have a car and relies on friends for transportation.      He was admitted to [facility] on 7/21/2023 with chest pain and heart failure.  Blood pressure was significantly elevated.  He was out of his medications at home and had not been taking antihypertensives.  Echocardiogram showed EF 30 to 35% with moderately severe global hypokinesia of the LV.  Coronary angiogram 7/24/2023 showed normal LV filling pressures and no obstructive CAD 30% mid LAD, 50% D2.  He was diuresed and started on Entresto, rosuvastatin, carvedilol, Lasix.  Amlodipine and lisinopril were discontinued at discharge.  They recommended outpatient cardiac MRI and Zio patch to assess for arrhythmias.       Since his hospitalization last summer, he did not have routine cardiology follow-up and did not come for his core enrollment.     Patient was seen by me in April 2024.  He was not taking Entresto or Lasix but was taking statin, carvedilol and aspirin.  Weight was up 7 pounds and reported dyspnea on exertion.  However this was improved with inhaler and was stable over the prior year.  Chest CT a week prior showed resolved pleural effusions.  Blood pressure was controlled and he denied any anginal symptoms.  He did not have a scale at home to check weights.    Most recent lipid profile, BMP, ALT reviewed today. Lexiscan nuclear stress test June 2024 showing a small area of apical infarction, but no ischemia.  Echo June 2024 showing EF improved from 30 to 35% up to 40 to 45%, wall motion abnormalities as described, no significant valvular disease and aortic root 4 cm.  Results reviewed today.     Angina? None   BP? Controlled   Weight? Up 5 in 2  months   Heart failure symptoms? Stable GOODMAN somewhat improved with inhaler, will try lasix trial   Taking aspirin? Ran out and stopped it     Patient reports no chest pain, PND, orthopnea, presyncope, syncope, edema, heart racing, or palpitations.                    Assessment and Plan:     Plan  Patient Instructions   Medication Changes:  Start lasix 20 mg daily   Aspirin 81 mg daily   Start entresto 24-26 mg twice a day if affordable. If not affordable call my nurse so we can start lisinopril instead     Recommendations:  Check daily weights and call the clinic if your weight has increased more than 2 lbs in one day or 5 lbs in one week; if you feel more short of breath or have worsening swelling in your legs or abdomen.  2000 mg sodium diet   4-8 8 ounce glasses of fluids per day, not more than 2000 mL (2 L) per day.   Check blood pressure at least 1 hour after medications. Call the clinic if your blood pressure is consistently greater than 130/80.     Follow-up:  7 day zio patch to look for causes of cardiomyopathy  Nonfasting lab in 1 week ([facility]) after starting lasix and entresto  Cardiology follow up at [facility]: roxann in 2 months .  To uptitrate cardiomyopathy medications.    Cardiology Scheduling~[phone]  Cardiology Clinic RN~[phone] (Elsa RN, Elena RN; Adrianna RN)          Problem List as of 6/28/2024 Reviewed: 4/11/2024 12:54 PM by Kelsie Cohen            Noted       Active Problems    1. Benign essential hypertension 3/15/2013     Last Assessment & Plan 6/28/2024 Office Visit Written 6/28/2024  1:48 PM by Roxann German APRN CNP      Controlled  Continue current medications          Relevant Orders     Basic metabolic panel     Follow-Up with Cardiology    2. Nonischemic cardiomyopathy (H) - Primary 8/10/2023     Overview Signed 6/28/2024  1:50 PM by Roxann German APRN CNP      EF 30 to 35% 7/2023  EF 40 to 45% 6/2024  Unclear etiology ?   Hypertensive  Nonobstructive CAD on angiogram, No family history of heart failure, History of alcoholism but sober for 10 years, No recent infections or drug use, normal TSH          Last Assessment & Plan 6/28/2024 Office Visit Written 6/28/2024  1:50 PM by Roxann German APRN CNP      GOODMAN  Continue GDMT  uptitrate cardiomyopathy medications as tolerated            Relevant Medications     furosemide (LASIX) 20 MG tablet     sacubitril-valsartan (ENTRESTO) 24-26 MG per tablet     Other Relevant Orders     Basic metabolic panel     ZIO PATCH 3-7 DAYS (additional cost to patient)     ZIO PATCH 3-7 DAYS APPLICATION (Completed)     Follow-Up with Cardiology    3. Chronic systolic heart failure (H) 8/10/2023     Relevant Medications     furosemide (LASIX) 20 MG tablet     sacubitril-valsartan (ENTRESTO) 24-26 MG per tablet     Other Relevant Orders     Follow-Up with Cardiology    4. Coronary artery disease involving native coronary artery of native heart, unspecified whether angina present 8/10/2023     Overview Signed 6/28/2024  1:51 PM by Roxann German APRN CNP      Nonobstructive   NSTEMI 7/2023 30% mid LAD, 50% D2.            Last Assessment & Plan 6/28/2024 Office Visit Written 6/28/2024  1:51 PM by Roxann German APRN CNP      No angina  Continue GDMT          Relevant Medications     aspirin (ASA) 81 MG chewable tablet     Other Relevant Orders     Basic metabolic panel     Follow-Up with Cardiology    5. 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        Thank you for allowing me to participate in this delightful patient's care.   This note was completed in part using Dragon voice recognition software. Although reviewed after completion, some word and grammatical errors may occur.    XIN Solorzano CNP

## 2024-07-05 ENCOUNTER — LAB (OUTPATIENT)
Dept: LAB | Facility: CLINIC | Age: 59
End: 2024-07-05
Payer: COMMERCIAL

## 2024-07-05 DIAGNOSIS — I25.10 CORONARY ARTERY DISEASE INVOLVING NATIVE CORONARY ARTERY OF NATIVE HEART, UNSPECIFIED WHETHER ANGINA PRESENT: ICD-10-CM

## 2024-07-05 DIAGNOSIS — Z11.4 SCREENING FOR HIV (HUMAN IMMUNODEFICIENCY VIRUS): Primary | ICD-10-CM

## 2024-07-05 DIAGNOSIS — Z11.59 NEED FOR HEPATITIS C SCREENING TEST: ICD-10-CM

## 2024-07-05 DIAGNOSIS — I42.8 NONISCHEMIC CARDIOMYOPATHY (H): ICD-10-CM

## 2024-07-05 DIAGNOSIS — I10 BENIGN ESSENTIAL HYPERTENSION: ICD-10-CM

## 2024-07-05 LAB
ANION GAP SERPL CALCULATED.3IONS-SCNC: 14 MMOL/L (ref 7–15)
BUN SERPL-MCNC: 32.7 MG/DL (ref 8–23)
CALCIUM SERPL-MCNC: 9.4 MG/DL (ref 8.8–10.2)
CHLORIDE SERPL-SCNC: 104 MMOL/L (ref 98–107)
CREAT SERPL-MCNC: 0.99 MG/DL (ref 0.67–1.17)
DEPRECATED HCO3 PLAS-SCNC: 20 MMOL/L (ref 22–29)
EGFRCR SERPLBLD CKD-EPI 2021: 87 ML/MIN/1.73M2
GLUCOSE SERPL-MCNC: 115 MG/DL (ref 70–99)
POTASSIUM SERPL-SCNC: 5.6 MMOL/L (ref 3.4–5.3)
SODIUM SERPL-SCNC: 138 MMOL/L (ref 135–145)

## 2024-07-05 PROCEDURE — 80048 BASIC METABOLIC PNL TOTAL CA: CPT

## 2024-07-05 PROCEDURE — 36415 COLL VENOUS BLD VENIPUNCTURE: CPT

## 2024-07-08 ENCOUNTER — APPOINTMENT (OUTPATIENT)
Dept: GENERAL RADIOLOGY | Facility: CLINIC | Age: 59
End: 2024-07-08
Attending: FAMILY MEDICINE
Payer: COMMERCIAL

## 2024-07-08 ENCOUNTER — HOSPITAL ENCOUNTER (EMERGENCY)
Facility: CLINIC | Age: 59
Discharge: ANOTHER HOSPITAL | End: 2024-07-08
Attending: FAMILY MEDICINE | Admitting: FAMILY MEDICINE
Payer: COMMERCIAL

## 2024-07-08 VITALS
HEART RATE: 80 BPM | SYSTOLIC BLOOD PRESSURE: 156 MMHG | TEMPERATURE: 97.4 F | RESPIRATION RATE: 18 BRPM | BODY MASS INDEX: 21.28 KG/M2 | OXYGEN SATURATION: 94 % | DIASTOLIC BLOOD PRESSURE: 124 MMHG | HEIGHT: 71 IN

## 2024-07-08 DIAGNOSIS — I71.40 ABDOMINAL AORTIC ANEURYSM (AAA) WITHOUT RUPTURE, UNSPECIFIED PART (H): ICD-10-CM

## 2024-07-08 DIAGNOSIS — I42.8 NONISCHEMIC CARDIOMYOPATHY (H): ICD-10-CM

## 2024-07-08 DIAGNOSIS — I50.22 CHRONIC SYSTOLIC HEART FAILURE (H): ICD-10-CM

## 2024-07-08 DIAGNOSIS — I10 BENIGN ESSENTIAL HYPERTENSION: ICD-10-CM

## 2024-07-08 DIAGNOSIS — S72.499A: ICD-10-CM

## 2024-07-08 LAB
ABO/RH(D): NORMAL
ALBUMIN SERPL BCG-MCNC: 3.9 G/DL (ref 3.5–5.2)
ALP SERPL-CCNC: 88 U/L (ref 40–150)
ALT SERPL W P-5'-P-CCNC: 37 U/L (ref 0–70)
ANION GAP SERPL CALCULATED.3IONS-SCNC: 11 MMOL/L (ref 7–15)
ANTIBODY SCREEN: NEGATIVE
AST SERPL W P-5'-P-CCNC: 40 U/L (ref 0–45)
BASOPHILS # BLD AUTO: 0.1 10E3/UL (ref 0–0.2)
BASOPHILS NFR BLD AUTO: 1 %
BILIRUB SERPL-MCNC: 0.4 MG/DL
BUN SERPL-MCNC: 26.9 MG/DL (ref 8–23)
CALCIUM SERPL-MCNC: 8.7 MG/DL (ref 8.8–10.2)
CHLORIDE SERPL-SCNC: 105 MMOL/L (ref 98–107)
CREAT SERPL-MCNC: 1.1 MG/DL (ref 0.67–1.17)
DEPRECATED HCO3 PLAS-SCNC: 22 MMOL/L (ref 22–29)
EGFRCR SERPLBLD CKD-EPI 2021: 76 ML/MIN/1.73M2
EOSINOPHIL # BLD AUTO: 0.3 10E3/UL (ref 0–0.7)
EOSINOPHIL NFR BLD AUTO: 3 %
ERYTHROCYTE [DISTWIDTH] IN BLOOD BY AUTOMATED COUNT: 14.6 % (ref 10–15)
GLUCOSE SERPL-MCNC: 146 MG/DL (ref 70–99)
HCT VFR BLD AUTO: 36.3 % (ref 40–53)
HGB BLD-MCNC: 12.4 G/DL (ref 13.3–17.7)
IMM GRANULOCYTES # BLD: 0 10E3/UL
IMM GRANULOCYTES NFR BLD: 0 %
LYMPHOCYTES # BLD AUTO: 1.5 10E3/UL (ref 0.8–5.3)
LYMPHOCYTES NFR BLD AUTO: 15 %
MCH RBC QN AUTO: 31.2 PG (ref 26.5–33)
MCHC RBC AUTO-ENTMCNC: 34.2 G/DL (ref 31.5–36.5)
MCV RBC AUTO: 91 FL (ref 78–100)
MONOCYTES # BLD AUTO: 0.6 10E3/UL (ref 0–1.3)
MONOCYTES NFR BLD AUTO: 6 %
NEUTROPHILS # BLD AUTO: 7.3 10E3/UL (ref 1.6–8.3)
NEUTROPHILS NFR BLD AUTO: 74 %
NRBC # BLD AUTO: 0 10E3/UL
NRBC BLD AUTO-RTO: 0 /100
PLATELET # BLD AUTO: 203 10E3/UL (ref 150–450)
POTASSIUM SERPL-SCNC: 5 MMOL/L (ref 3.4–5.3)
PROT SERPL-MCNC: 7 G/DL (ref 6.4–8.3)
RBC # BLD AUTO: 3.98 10E6/UL (ref 4.4–5.9)
SODIUM SERPL-SCNC: 138 MMOL/L (ref 135–145)
SPECIMEN EXPIRATION DATE: NORMAL
WBC # BLD AUTO: 9.8 10E3/UL (ref 4–11)

## 2024-07-08 PROCEDURE — 96376 TX/PRO/DX INJ SAME DRUG ADON: CPT | Performed by: FAMILY MEDICINE

## 2024-07-08 PROCEDURE — 96375 TX/PRO/DX INJ NEW DRUG ADDON: CPT | Performed by: FAMILY MEDICINE

## 2024-07-08 PROCEDURE — 86850 RBC ANTIBODY SCREEN: CPT | Performed by: FAMILY MEDICINE

## 2024-07-08 PROCEDURE — 96375 TX/PRO/DX INJ NEW DRUG ADDON: CPT

## 2024-07-08 PROCEDURE — 99285 EMERGENCY DEPT VISIT HI MDM: CPT | Mod: 25

## 2024-07-08 PROCEDURE — 96374 THER/PROPH/DIAG INJ IV PUSH: CPT | Performed by: FAMILY MEDICINE

## 2024-07-08 PROCEDURE — 258N000003 HC RX IP 258 OP 636: Performed by: FAMILY MEDICINE

## 2024-07-08 PROCEDURE — 99285 EMERGENCY DEPT VISIT HI MDM: CPT | Performed by: FAMILY MEDICINE

## 2024-07-08 PROCEDURE — 96374 THER/PROPH/DIAG INJ IV PUSH: CPT

## 2024-07-08 PROCEDURE — 93010 ELECTROCARDIOGRAM REPORT: CPT | Performed by: FAMILY MEDICINE

## 2024-07-08 PROCEDURE — 73562 X-RAY EXAM OF KNEE 3: CPT | Mod: RT

## 2024-07-08 PROCEDURE — 250N000011 HC RX IP 250 OP 636: Performed by: FAMILY MEDICINE

## 2024-07-08 PROCEDURE — 84075 ASSAY ALKALINE PHOSPHATASE: CPT | Performed by: FAMILY MEDICINE

## 2024-07-08 PROCEDURE — 85025 COMPLETE CBC W/AUTO DIFF WBC: CPT | Performed by: FAMILY MEDICINE

## 2024-07-08 PROCEDURE — 96376 TX/PRO/DX INJ SAME DRUG ADON: CPT

## 2024-07-08 PROCEDURE — 85018 HEMOGLOBIN: CPT | Performed by: FAMILY MEDICINE

## 2024-07-08 PROCEDURE — 120N000001 HC R&B MED SURG/OB

## 2024-07-08 PROCEDURE — 250N000013 HC RX MED GY IP 250 OP 250 PS 637: Performed by: EMERGENCY MEDICINE

## 2024-07-08 PROCEDURE — 96361 HYDRATE IV INFUSION ADD-ON: CPT | Performed by: FAMILY MEDICINE

## 2024-07-08 PROCEDURE — 99285 EMERGENCY DEPT VISIT HI MDM: CPT | Mod: 25 | Performed by: FAMILY MEDICINE

## 2024-07-08 PROCEDURE — 86900 BLOOD TYPING SEROLOGIC ABO: CPT | Performed by: FAMILY MEDICINE

## 2024-07-08 PROCEDURE — 36415 COLL VENOUS BLD VENIPUNCTURE: CPT | Performed by: FAMILY MEDICINE

## 2024-07-08 PROCEDURE — 71046 X-RAY EXAM CHEST 2 VIEWS: CPT

## 2024-07-08 PROCEDURE — 96361 HYDRATE IV INFUSION ADD-ON: CPT

## 2024-07-08 PROCEDURE — 93005 ELECTROCARDIOGRAM TRACING: CPT | Performed by: FAMILY MEDICINE

## 2024-07-08 RX ORDER — CEFAZOLIN SODIUM 2 G/100ML
2 INJECTION, SOLUTION INTRAVENOUS
Status: CANCELLED | OUTPATIENT
Start: 2024-07-08

## 2024-07-08 RX ORDER — ONDANSETRON 2 MG/ML
4 INJECTION INTRAMUSCULAR; INTRAVENOUS ONCE
Status: COMPLETED | OUTPATIENT
Start: 2024-07-08 | End: 2024-07-08

## 2024-07-08 RX ORDER — CARVEDILOL 6.25 MG/1
6.25 TABLET ORAL 2 TIMES DAILY WITH MEALS
Status: DISCONTINUED | OUTPATIENT
Start: 2024-07-08 | End: 2024-07-08 | Stop reason: HOSPADM

## 2024-07-08 RX ORDER — OXYCODONE AND ACETAMINOPHEN 5; 325 MG/1; MG/1
2 TABLET ORAL ONCE
Status: COMPLETED | OUTPATIENT
Start: 2024-07-08 | End: 2024-07-08

## 2024-07-08 RX ORDER — HYDROMORPHONE HYDROCHLORIDE 1 MG/ML
0.5 INJECTION, SOLUTION INTRAMUSCULAR; INTRAVENOUS; SUBCUTANEOUS
Status: COMPLETED | OUTPATIENT
Start: 2024-07-08 | End: 2024-07-08

## 2024-07-08 RX ORDER — OXYCODONE HYDROCHLORIDE 5 MG/1
10 TABLET ORAL EVERY 4 HOURS PRN
Status: DISCONTINUED | OUTPATIENT
Start: 2024-07-08 | End: 2024-07-08 | Stop reason: HOSPADM

## 2024-07-08 RX ORDER — ROSUVASTATIN CALCIUM 20 MG/1
40 TABLET, COATED ORAL DAILY
Status: DISCONTINUED | OUTPATIENT
Start: 2024-07-09 | End: 2024-07-08 | Stop reason: HOSPADM

## 2024-07-08 RX ORDER — HYDROMORPHONE HYDROCHLORIDE 1 MG/ML
0.5 INJECTION, SOLUTION INTRAMUSCULAR; INTRAVENOUS; SUBCUTANEOUS ONCE
Status: COMPLETED | OUTPATIENT
Start: 2024-07-08 | End: 2024-07-08

## 2024-07-08 RX ORDER — CEFAZOLIN SODIUM 2 G/100ML
2 INJECTION, SOLUTION INTRAVENOUS SEE ADMIN INSTRUCTIONS
Status: CANCELLED | OUTPATIENT
Start: 2024-07-08

## 2024-07-08 RX ORDER — TRANEXAMIC ACID 650 MG/1
1950 TABLET ORAL ONCE
Status: CANCELLED | OUTPATIENT
Start: 2024-07-08 | End: 2024-07-08

## 2024-07-08 RX ADMIN — HYDROMORPHONE HYDROCHLORIDE 0.5 MG: 1 INJECTION, SOLUTION INTRAMUSCULAR; INTRAVENOUS; SUBCUTANEOUS at 18:50

## 2024-07-08 RX ADMIN — HYDROMORPHONE HYDROCHLORIDE 0.5 MG: 1 INJECTION, SOLUTION INTRAMUSCULAR; INTRAVENOUS; SUBCUTANEOUS at 16:39

## 2024-07-08 RX ADMIN — OXYCODONE HYDROCHLORIDE AND ACETAMINOPHEN 2 TABLET: 5; 325 TABLET ORAL at 19:35

## 2024-07-08 RX ADMIN — ONDANSETRON 4 MG: 2 INJECTION INTRAMUSCULAR; INTRAVENOUS at 11:55

## 2024-07-08 RX ADMIN — OXYCODONE HYDROCHLORIDE 10 MG: 5 TABLET ORAL at 20:04

## 2024-07-08 RX ADMIN — HYDROMORPHONE HYDROCHLORIDE 0.5 MG: 1 INJECTION, SOLUTION INTRAMUSCULAR; INTRAVENOUS; SUBCUTANEOUS at 11:54

## 2024-07-08 RX ADMIN — CARVEDILOL 6.25 MG: 6.25 TABLET, FILM COATED ORAL at 19:42

## 2024-07-08 RX ADMIN — HYDROMORPHONE HYDROCHLORIDE 0.5 MG: 1 INJECTION, SOLUTION INTRAMUSCULAR; INTRAVENOUS; SUBCUTANEOUS at 14:31

## 2024-07-08 RX ADMIN — SODIUM CHLORIDE 1000 ML: 9 INJECTION, SOLUTION INTRAVENOUS at 14:13

## 2024-07-08 ASSESSMENT — ACTIVITIES OF DAILY LIVING (ADL)
ADLS_ACUITY_SCORE: 37

## 2024-07-08 ASSESSMENT — COLUMBIA-SUICIDE SEVERITY RATING SCALE - C-SSRS
6. HAVE YOU EVER DONE ANYTHING, STARTED TO DO ANYTHING, OR PREPARED TO DO ANYTHING TO END YOUR LIFE?: NO
1. IN THE PAST MONTH, HAVE YOU WISHED YOU WERE DEAD OR WISHED YOU COULD GO TO SLEEP AND NOT WAKE UP?: NO
2. HAVE YOU ACTUALLY HAD ANY THOUGHTS OF KILLING YOURSELF IN THE PAST MONTH?: NO

## 2024-07-08 NOTE — RESULT ENCOUNTER NOTE
K+ elevated over previous and now abnormal; BUN elevated but creatinine WNL. Done after starting lasix 20 mg daily and Entresto 24-26 BID on 6/28/24. Was seen by Dr Anne in consult during original hospitalization in 7/2023 and has only seen Roxann German NP in clinic since then. Has follow up with Roxann German NP on 8/22/24. Roxann out of the office until Wednesday. Will discuss with Dr Anne in her absence.

## 2024-07-08 NOTE — PROGRESS NOTES
Patient evaluated for surgery. Given unresolved vascular disease and and prior cardiac history, patient is not appropriate for surgery at this facility, without vascular surgery available. Recommend transfer of care to facility with vascular available. Recommendations discussed with patient and ED physician.

## 2024-07-08 NOTE — ED PROVIDER NOTES
History     Chief Complaint   Patient presents with    Knee Pain     Right Knee Injury. Caught between a hog and the fence. Hx of prior knee surgery.      HPI  Delvin Echevarria is a 59 year old male, past medical history is significant for AAA, nonischemic cardiomyopathy, chronic systolic heart failure, ASCVD, hypertension, presents to the emergency department by EMS from worksite where he was trying to load a 600 pound boar into a transport vehicle.  The 600 pound boar was not being cooperative and unfortunately pinned his right knee against the metal bar of the transport trailer.  The patient experienced immediate pain in his right knee and is unable to weight-bear.  He denies any other traumatic concerns he was not crushed in the head neck chest back or abdomen.  Only the right knee isolated extremity trauma.  He reports no paresthesias only pain.      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  Hypertensive  Nonobstructive CAD on angiogram, No family history of heart failure, History of alcoholism but sober for 10 years, No recent infections or drug use, normal TSH      Chronic systolic heart failure (H) 08/10/2023     Priority: Medium    Coronary artery disease involving native coronary artery of native  heart, unspecified whether angina present 08/10/2023     Priority: Medium     Nonobstructive   NSTEMI 7/2023 30% mid LAD, 50% D2.        Benign essential hypertension 03/15/2013     Priority: Medium        Past Medical History:    Past Medical History:   Diagnosis Date    Abscess 8/11/2017    Acute posthemorrhagic anemia 12/14/2016    Anemia due to blood loss, acute 9/30/2016    Closed fracture of right tibia and fibula with routine healing 9/29/2016    DDD (degenerative disc disease), lumbar     Hypertension 3/15/2013    Marijuana use     Nonobstructive atherosclerosis of coronary artery 8/10/2023    Staph aureus infection 9/13/2017    Tibial plateau fracture 9/11/2016    Tobacco use     Wound infection 9/29/2016       Past Surgical History:    Past Surgical History:   Procedure Laterality Date    APPLY EXTERNAL FIXATOR LOWER EXTREMITY Right 09/12/2016    Procedure: APPLY EXTERNAL FIXATOR LOWER EXTREMITY;  Surgeon: John Gonzales MD;  Location: [facility] OR    CV CORONARY ANGIOGRAM N/A 7/24/2023    Procedure: Coronary Angiogram;  Surgeon: Boubacar Camarena MD;  Location: [facility] CARDIAC CATH LAB    CV LEFT HEART CATH N/A 7/24/2023    Procedure: Left Heart Catheterization;  Surgeon: Boubacar Camarena MD;  Location: [facility] CARDIAC CATH LAB    CV LEFT VENTRICULOGRAM N/A 7/24/2023    Procedure: Left Ventriculogram;  Surgeon: Boubacar Camarena MD;  Location: [facility] CARDIAC CATH LAB    HERNIORRHAPHY INGUINAL  07/02/2012    Procedure: HERNIORRHAPHY INGUINAL;  Open Repair Right Inguinal Hernia with Mesh;  Surgeon: Avni Maxwell MD;  Location: [facility] OR    INCISION AND DRAINAGE BONE LOWER EXTREMITY, COMBINED Right 08/11/2017    Procedure: COMBINED INCISION AND DRAINAGE BONE LOWER EXTREMITY;  Irrigation and Debridement Right Tibia,  Removal of Hardware;  Surgeon: Kenroy Dos Santos MD;  Location: [facility] OR    KNEE SURGERY Left 03/01/2021    patella fracture repair    LARYNGECTOMY  Several Years ago     Partail removal due to fish bone    OPEN REDUCTION INTERNAL FIXATION PATELLA Left 3/1/2021    Procedure: OPEN REDUCTION INTERNAL FIXATION, FRACTURE, PATELLA;  Surgeon: Papa Pena MD;  Location: [facility] OR    OPEN REDUCTION INTERNAL FIXATION TIBIA Right 09/15/2016    Procedure: OPEN REDUCTION INTERNAL FIXATION TIBIA;  Surgeon: Miguel A Green MD;  Location: [facility] OR    OPEN REDUCTION INTERNAL FIXATION TIBIA Right 11/07/2017    Procedure: OPEN REDUCTION INTERNAL FIXATION TIBIA;  Right Tibia Fibula Open Reduction Internal Fixation;  Surgeon: Miguel A Green MD;  [facility] OR    REMOVE HARDWARE LOWER EXTREMITY Right 08/11/2017    Procedure: REMOVE HARDWARE LOWER EXTREMITY;;  Surgeon: Kenroy Dos Santos MD;  Location: [facility] OR       Family History:    Family History   Problem Relation Age of Onset    Hypertension Mother     Hypertension Brother        Social History:  Marital Status:  Single [1]  Social History     Tobacco Use    Smoking status: Former     Current packs/day: 0.50     Average packs/day: 0.5 packs/day for 25.0 years (12.5 ttl pk-yrs)     Types: Cigarettes    Smokeless tobacco: Never   Vaping Use    Vaping status: Never Used   Substance Use Topics    Alcohol use: No    Drug use: Not Currently     Types: Marijuana     Comment: Previously smoked 4x daily        Medications:    albuterol (PROAIR HFA/PROVENTIL HFA/VENTOLIN HFA) 108 (90 Base) MCG/ACT inhaler  aspirin (ASA) 81 MG chewable tablet  betamethasone dipropionate (DIPROSONE) 0.05 % external cream  carvedilol (COREG) 6.25 MG tablet  fluticasone-salmeterol (ADVAIR) 250-50 MCG/ACT inhaler  furosemide (LASIX) 20 MG tablet  hydrOXYzine HCl (ATARAX) 25 MG tablet  rosuvastatin (CRESTOR) 40 MG tablet  sacubitril-valsartan (ENTRESTO) 24-26 MG per tablet  spacer (OPTICHAMBER SAIDA) holding chamber  acetaminophen (TYLENOL) 500 MG tablet  apixaban ANTICOAGULANT (ELIQUIS) 2.5 MG tablet  methocarbamol (ROBAXIN) 500 MG tablet  oxyCODONE  "(ROXICODONE) 5 MG tablet  polyethylene glycol (MIRALAX) 17 g packet  tamsulosin (FLOMAX) 0.4 MG capsule          Review of Systems   All other systems reviewed and are negative.      Physical Exam   BP: 127/89  Pulse: 54  Temp: 97.4  F (36.3  C)  Resp: 18  Height: 180.3 cm (5' 11\")  SpO2: 92 %      Physical Exam  Vitals and nursing note reviewed.   Constitutional:       General: He is not in acute distress.     Appearance: Normal appearance. He is normal weight. He is not ill-appearing.   HENT:      Head: Normocephalic and atraumatic.      Right Ear: Tympanic membrane, ear canal and external ear normal.      Left Ear: Tympanic membrane, ear canal and external ear normal.      Nose: Nose normal.      Mouth/Throat:      Mouth: Mucous membranes are dry.      Pharynx: Oropharynx is clear.   Eyes:      Extraocular Movements: Extraocular movements intact.      Conjunctiva/sclera: Conjunctivae normal.      Pupils: Pupils are equal, round, and reactive to light.   Cardiovascular:      Rate and Rhythm: Normal rate and regular rhythm.      Pulses: Normal pulses.      Heart sounds: Normal heart sounds.   Pulmonary:      Effort: Pulmonary effort is normal.      Breath sounds: Normal breath sounds.   Abdominal:      General: Bowel sounds are normal.      Palpations: Abdomen is soft.   Musculoskeletal:      Cervical back: Normal range of motion and neck supple.        Legs:       Comments: Swelling without gross deformity.  Intact neurovascular status bilateral lower extremities.   Neurological:      Mental Status: He is alert.         ED Course        Procedures       EKG Interpretation:      Interpreted by Jovan Gregory MD  Time reviewed: Time obtained 1:26 PM time interpreted same.  59 bpm sinus bradycardia, flipped T's are noted in V4 through V6 and these are similar to changes noted on previous cardiogram dated 7/31/2023 upon review.  No significant changes.  This is a preop EKG.  The patient is asymptomatic at the " time of EKG.         No results found for this or any previous visit (from the past 24 hour(s)).  12:44 PM  [facility] orthopedics on-call Dr. Jah Pena was paged.    1:47 PM  No return call, orthopedics paged again.      2:44 PM  No response.    3:25 PM  Spoke with Dr. Jah Pena for [facility] orthopedics.  He felt that this injury would be amenable to surgical repair here at our facility at [facility].  I will speak with the hospitalist to admit the patient plan to check with anesthesia here first with respect to the patient's comorbidities with specific reference to intra-abdominal aneurysm findings previously.    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  Bed search was initiated and I spoke ultimately with hospitalist at [facility] for the [place] as we would need to have vascular surgery present in-house should they be necessary.  Unfortunately I was informed that the patient would be placed on a waiting list and they anticipate that it may be a day or 2 before they are able to accept him at the [facility].    This patient was signed out at shift change to my colleague Dr. Arvin Hawkins who will actively search for a bed for the patient outside of system with the criteria that the patient will need an orthopedic surgeon obviously for the repair and also should probably have vascular surgery available should they be needed.        Medications   HYDROmorphone (PF) (DILAUDID) injection 0.5 mg (0.5 mg Intravenous $Given 7/8/24 7896)   ondansetron (ZOFRAN) injection 4 mg (4 mg Intravenous $Given 7/8/24  1155)   sodium chloride 0.9% BOLUS 1,000 mL (0 mLs Intravenous Stopped 7/8/24 1513)   HYDROmorphone (PF) (DILAUDID) injection 0.5 mg (0.5 mg Intravenous $Given 7/8/24 1850)   oxyCODONE-acetaminophen (PERCOCET) 5-325 MG per tablet 2 tablet (2 tablets Oral $Given 7/8/24 1935)       Assessments & Plan (with Medical Decision Making)   Assessment and plan with medical decision making at the time stamp above.      Disclaimer: This note consists of symbols derived from keyboarding, dictation and/or voice recognition software. As a result, there may be errors in the script that have gone undetected. Please consider this when interpreting information found in this chart.      I have reviewed the nursing notes.    I have reviewed the findings, diagnosis, plan and need for follow up with the patient.          Discharge Medication List as of 7/8/2024  9:43 PM          Final diagnoses:   Closed comminuted intra-articular fracture of distal femur, unspecified laterality, initial encounter (H)   Chronic systolic heart failure (H)   Benign essential hypertension   Nonischemic cardiomyopathy (H)   Abdominal aortic aneurysm (AAA) without rupture, unspecified part (H24)       7/8/2024   [facility] EMERGENCY DEPT       Jovan Gregory MD  07/09/24 7340       Jovan Gregory MD  08/05/24 5495

## 2024-07-08 NOTE — ED TRIAGE NOTES
Got pinned up against a fence by a hog he was trying to get in the pen. Pain to right knee, was replaced in 2016     Triage Assessment (Adult)       Row Name 07/08/24 1139          Triage Assessment    Airway WDL WDL        Respiratory WDL    Respiratory WDL WDL        Skin Circulation/Temperature WDL    Skin Circulation/Temperature WDL WDL        Cardiac WDL    Cardiac WDL WDL        Peripheral/Neurovascular WDL    Peripheral Neurovascular WDL WDL        Cognitive/Neuro/Behavioral WDL    Cognitive/Neuro/Behavioral WDL WDL

## 2024-07-08 NOTE — MEDICATION SCRIBE - ADMISSION MEDICATION HISTORY
Medication Scribe Admission Medication History    Admission medication history is complete. The information provided in this note is only as accurate as the sources available at the time of the update.    Information Source(s): Patient and CareEverywhere/SureScripts via  with patient in room and finished at desk.    Pertinent Information: None.    Changes made to PTA medication list:  Added: None  Deleted: Acetaminophen 325 mg, Ferrous Sulfate 325 mg, Folic Acid 400 mcg.  Changed: None    Allergies reviewed with patient and updates made in EHR: yes, no change.    Medication History Completed By: Simin Live 7/8/2024 4:06 PM    PTA Med List   Medication Sig Last Dose    albuterol (PROAIR HFA/PROVENTIL HFA/VENTOLIN HFA) 108 (90 Base) MCG/ACT inhaler Inhale 2 puffs into the lungs every 6 hours as needed for shortness of breath, wheezing or cough 7/8/2024 at am    aspirin (ASA) 81 MG chewable tablet Take 1 tablet (81 mg) by mouth daily 7/8/2024 at am    betamethasone dipropionate (DIPROSONE) 0.05 % external cream Apply topically 2 times daily as needed (twice daily) Apply bid to areas on chest, forehead, arms for severe eczema. 7/7/2024 at pm    carvedilol (COREG) 6.25 MG tablet Take 1 tablet (6.25 mg) by mouth 2 times daily (with meals) 7/8/2024 at am    fluticasone-salmeterol (ADVAIR) 250-50 MCG/ACT inhaler Inhale 1 puff into the lungs every 12 hours 7/7/2024 at pm    furosemide (LASIX) 20 MG tablet Take 1 tablet (20 mg) by mouth daily 7/7/2024 at pm    hydrOXYzine HCl (ATARAX) 25 MG tablet Take 1-2 tablets (25-50 mg) by mouth every 6 hours as needed for itching 7/8/2024 at am 2 tabs    rosuvastatin (CRESTOR) 40 MG tablet Take 1 tablet (40 mg) by mouth daily 7/8/2024 at am    sacubitril-valsartan (ENTRESTO) 24-26 MG per tablet Take 1 tablet by mouth 2 times daily 7/8/2024 at am    spacer (OPTICHAMBER SAIDA) holding chamber Use with albuterol sometimes at prn

## 2024-07-08 NOTE — ED NOTES
"[facility]   Admission Handoff    The patient is Delvin Echevarria, 59 year old who arrived in the ED by AMBULANCE from home with a complaint of Knee Pain (Right Knee Injury. Caught between a hog and the fence. Hx of prior knee surgery. )  . The patient's current symptoms are new and during this time the symptoms have remained the same. In the ED, patient was diagnosed with   Final diagnoses:   Closed comminuted intra-articular fracture of distal femur, unspecified laterality, initial encounter (H)         Needed?: No    Allergies:    Allergies   Allergen Reactions    Seasonal Allergies Other (See Comments)     Congestion sinuses    Codeine Nausea       Past Medical Hx:   Past Medical History:   Diagnosis Date    Abscess 8/11/2017    Acute posthemorrhagic anemia 12/14/2016    Anemia due to blood loss, acute 9/30/2016    Closed fracture of right tibia and fibula with routine healing 9/29/2016    DDD (degenerative disc disease), lumbar     Hypertension 3/15/2013    Marijuana use     4x daily    Nonobstructive atherosclerosis of coronary artery 8/10/2023    Staph aureus infection 9/13/2017    Tibial plateau fracture 9/11/2016    Tobacco use     Wound infection 9/29/2016       Initial vitals were: BP: 127/89  Pulse: 54  Temp: 97.4  F (36.3  C)  Resp: 18  Height: 180.3 cm (5' 11\")  SpO2: 92 %   Recent vital Signs: BP (!) 147/87   Pulse 57   Temp 97.4  F (36.3  C) (Oral)   Resp 18   Ht 1.803 m (5' 11\")   SpO2 91%   BMI 21.28 kg/m      Elimination Status: Continent: Yes     Activity Level: SBA. No weight bearing right leg.     Fall Status: Reason for falls risk:  Mobility  nonskid shoes/slippers when out of bed, patient and family education, assistive device/personal items within reach, and activity supervised    Baseline Mental status: WDL  Current Mental Status changes: at basesline    Infection present or suspected this encounter: no  Sepsis suspected: No    Isolation type: " None    Bariatric equipment needed?: No    In the ED these meds were given:   Medications   HYDROmorphone (PF) (DILAUDID) injection 0.5 mg (0.5 mg Intravenous $Given 7/8/24 1431)   HYDROmorphone (PF) (DILAUDID) injection 0.5 mg (0.5 mg Intravenous $Given 7/8/24 1154)   ondansetron (ZOFRAN) injection 4 mg (4 mg Intravenous $Given 7/8/24 1155)   sodium chloride 0.9% BOLUS 1,000 mL (1,000 mLs Intravenous $New Bag 7/8/24 1413)       Drips running?  No    Home pump  No    Current LDAs: Peripheral IV: Site Right AC; Gauge 20  none     Results:   Labs/Imaging  Ordered and Resulted from Time of ED Arrival Up to the Time of Departure from the ED  Results for orders placed or performed during the hospital encounter of 07/08/24 (from the past 24 hour(s))   XR Knee Right 3 Views    Narrative    XR KNEE RIGHT 3 VIEWS   7/8/2024 12:15 PM     HISTORY: Right knee pain, crush  COMPARISON: None.       Impression    IMPRESSION: Acute mildly displaced and angulated fracture of the  distal femur centered at the metaphysis, probably extending into the  trochlear groove. Chronic deformity of the proximal tibia .  Degenerative arthritis right knee, severe lateral compartment. No  significant effusion.    KUMAR CALLES MD         SYSTEM ID:  [id number]   CBC with platelets, differential    Narrative    The following orders were created for panel order CBC with platelets, differential.  Procedure                               Abnormality         Status                     ---------                               -----------         ------                     CBC with platelets and d...[[id number]]  Abnormal            Final result               RBC and Platelet Morphology[[id number]]                                                   Please view results for these tests on the individual orders.   ABO/Rh type and screen *Canceled*    Narrative    The following orders were created for panel order ABO/Rh type and screen.  Procedure                                Abnormality         Status                     ---------                               -----------         ------                     Adult Type and Screen[[id number]]                                                         Please view results for these tests on the individual orders.   XR Chest 2 Views    Narrative    CHEST TWO VIEWS 7/8/2024 2:29 PM     HISTORY: Pre-op. Pain.    COMPARISON: 5/8/2024      Impression    IMPRESSION: Mild cardiac enlargement is unchanged. Normal pulmonary  vascularity. No focal infiltrates. No pleural effusions. Old healed  right rib fractures. The bones are demineralized. Multiple mild  compression deformities in the thoracic spine are unchanged.    NITA DWYER MD         SYSTEM ID:  [id number]   Comprehensive metabolic panel   Result Value Ref Range    Sodium 138 135 - 145 mmol/L    Potassium 5.0 3.4 - 5.3 mmol/L    Carbon Dioxide (CO2) 22 22 - 29 mmol/L    Anion Gap 11 7 - 15 mmol/L    Urea Nitrogen 26.9 (H) 8.0 - 23.0 mg/dL    Creatinine 1.10 0.67 - 1.17 mg/dL    GFR Estimate 76 >60 mL/min/1.73m2    Calcium 8.7 (L) 8.8 - 10.2 mg/dL    Chloride 105 98 - 107 mmol/L    Glucose 146 (H) 70 - 99 mg/dL    Alkaline Phosphatase 88 40 - 150 U/L    AST 40 0 - 45 U/L    ALT 37 0 - 70 U/L    Protein Total 7.0 6.4 - 8.3 g/dL    Albumin 3.9 3.5 - 5.2 g/dL    Bilirubin Total 0.4 <=1.2 mg/dL   CBC with platelets and differential   Result Value Ref Range    WBC Count 9.8 4.0 - 11.0 10e3/uL    RBC Count 3.98 (L) 4.40 - 5.90 10e6/uL    Hemoglobin 12.4 (L) 13.3 - 17.7 g/dL    Hematocrit 36.3 (L) 40.0 - 53.0 %    MCV 91 78 - 100 fL    MCH 31.2 26.5 - 33.0 pg    MCHC 34.2 31.5 - 36.5 g/dL    RDW 14.6 10.0 - 15.0 %    Platelet Count 203 150 - 450 10e3/uL    % Neutrophils 74 %    % Lymphocytes 15 %    % Monocytes 6 %    % Eosinophils 3 %    % Basophils 1 %    % Immature Granulocytes 0 %    NRBCs per 100 WBC 0 <1 /100    Absolute Neutrophils 7.3 1.6 - 8.3 10e3/uL    Absolute Lymphocytes 1.5  0.8 - 5.3 10e3/uL    Absolute Monocytes 0.6 0.0 - 1.3 10e3/uL    Absolute Eosinophils 0.3 0.0 - 0.7 10e3/uL    Absolute Basophils 0.1 0.0 - 0.2 10e3/uL    Absolute Immature Granulocytes 0.0 <=0.4 10e3/uL    Absolute NRBCs 0.0 10e3/uL   ABO/Rh type and screen    Narrative    The following orders were created for panel order ABO/Rh type and screen.  Procedure                               Abnormality         Status                     ---------                               -----------         ------                     Adult Type and Screen[[id number]]                            Preliminary result           Please view results for these tests on the individual orders.   Adult Type and Screen   Result Value Ref Range    ABO/RH(D) A POS     Antibody Screen Negative Negative    SPECIMEN EXPIRATION DATE [id number]        For the majority of the shift this patient's behavior was Green     Cardiac Rhythm: N/A  Pt needs tele? No  Skin/wound Issues: None    Code Status: Full Code    Pain control: fair    Nausea control: good    Abnormal labs/tests/findings requiring intervention: Closed comminuted intra-articular fx of distal femur right side    Patient tested for COVID 19 prior to admission: NO     OBS brochure/video discussed/provided to patient/family: No     Family present during ED course? No     Family Comments/Social Situation comments: None    Tasks needing completion: None    Tori Chery RN

## 2024-07-08 NOTE — PROGRESS NOTES
"Transfer Type: [facility]  Transfer Triage Note    Date of call: 07/08/24  Time of call: 6:36 PM    Current Patient Location:  [facility]   Current Level of Care: ED    Vitals: Temp: 97.4  F (36.3  C) Temp src: Oral BP: (!) 159/100 Pulse: 67   Resp: 18 SpO2: 94 % Height: 180.3 cm (5' 11\")    O2 Device: None (Room air) at    Diagnosis: Distal femur fracture  Reason for requested transfer: Procedure can be done here and not at referring hospital   Isolation Needs: None    Care everywhere has been updated and reviewed: Yes  Necessary images have been sent through PACS: Yes    If patient is transferring for specialty care or specific procedure, the specialist required has participated in the transfer call and agreed with need for transfer and anticipated timeline: No; Dr. Osborne from Orthopedics was contacted after initial transfer call to further clarify appropriate campus for transfer.     Transfer accepted: Yes  Stability of Patient: Patient is vitally stable, with no critical labs, and will likely remain stable throughout the transfer process  Is the patient appropriate for [facility]? No, What specific Rodney needs are anticipated? Vascular Surgery  Level of Care Needed: Med Surg  Telemetry Needed:  None  Expected Time of Arrival for Transfer: 8-24 hours  Arrival Location:  [facility]     Recommendations for Management and Stabilization: Not needed    Additional Comments:   58 y/o with history of AAA, SMA dissection and dilation, NICM, and HTN presented to [facility] after having his R leg pinned between a large (reportedly 600 lb) boar and a trailer. He suffered a R distal femur fracture. Orthopedics at [facility] was planning for surgery, however anesthesia was concerned about the safety of procedure due to patient's significant vascular history. They recommended transfer to a center where vascular surgery is available. This would mean wait list at " [facility] as no Vascular surgery available at [facility]. Initially pended to [facility] while  [place] also looked at other facilities.     Spoke to Dr. Osborne from Orthopedics here at [facility] who was concerned given the mechanism of injury that this patient may need a level I trauma center which we cannot provide. Orthopedics also cannot operate at [facility].     New Transfer call was initiated to discuss with [facility] ED provider and Orthopedics, however in the interim patient has found placement and was discharged to [facility] for further care, so will remove patient from our triage list.     Eileen Garcia MD

## 2024-07-09 ENCOUNTER — HOSPITAL ENCOUNTER (OUTPATIENT)
Facility: CLINIC | Age: 59
End: 2024-07-09
Attending: ORTHOPAEDIC SURGERY | Admitting: ORTHOPAEDIC SURGERY
Payer: COMMERCIAL

## 2024-07-09 NOTE — ED PROVIDER NOTES
ED signout note:    In short, patient is a 59-year-old who has a distal femur fracture.  This could not be operated on here locally with the exception of patient's significant vascular related issues.  Therefore, patient requires transfer to facility with anesthesia capabilities in addition to potential for vascular and cardiology specialists for optimization prior to and perioperative management.    Patient does have history of hypertension, which is currently controlled.  He has nonischemic cardiomyopathy with EF of 40 to 45% as of 1 month ago.  Has nonobstructive coronary artery disease on angiogram.  Patient also with chronic systolic heart failure, with questionable compliance on his home medications.  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  Plans to follow-up with vascular surgery as an outpatient.    CTA 5/13/24:   IMPRESSION:  1.  No significant change in complex SMA dissection/aneurysm dilation.  2.  No significant change of infrarenal abdominal aortic aneurysm measuring 4.3 cm in maximal diameter.   3.  No significant change in stable focal saccular aneurysm dilation of the mid right common iliac artery measuring up to 1.8 cm.  4.  No significant change in calcified right distal renal artery aneurysm measuring up to 9 mm in diameter.    Original attempts at transfer shows no beds available at the [facility].  Patient on wait list.    I had discussion with Dr. Bassett, orthopedic surgeon at [facility] at 7:40 PM.  He is agreeable to helping manage patient's orthopedic injury.  Awaiting callback from medicine/hospitalist team for final acceptance to [facility].    I had conversation with ED provider at 7:55 PM, Dr. Whitfield.  Patient was accepted for transfer to [facility] emergency department.  Patient has remained hemodynamically stable throughout ED course.   Will allow patient to eat up until midnight tonight per orthopedic provider recommendations.    1. Closed comminuted intra-articular fracture of distal femur, unspecified laterality, initial encounter (H)    2. Chronic systolic heart failure (H)    3. Benign essential hypertension    4. Nonischemic cardiomyopathy (H)    5. Abdominal aortic aneurysm (AAA) without rupture, unspecified part (H24)           Arvin Hawkins MD  07/08/24 2003

## 2024-07-09 NOTE — ED NOTES
Pt states that he will drink the ice out of his ice pack if we don't give him water and he needs something stronger/lasts longer for his pain. MD notified, ok to give him something to drink

## 2024-07-12 PROCEDURE — 93244 EXT ECG>48HR<7D REV&INTERPJ: CPT | Performed by: INTERNAL MEDICINE

## 2024-07-17 ENCOUNTER — LAB REQUISITION (OUTPATIENT)
Dept: LAB | Facility: CLINIC | Age: 59
End: 2024-07-17
Payer: COMMERCIAL

## 2024-07-17 DIAGNOSIS — I50.22 CHRONIC SYSTOLIC (CONGESTIVE) HEART FAILURE (H): ICD-10-CM

## 2024-07-17 DIAGNOSIS — I10 ESSENTIAL (PRIMARY) HYPERTENSION: ICD-10-CM

## 2024-07-17 NOTE — RESULT ENCOUNTER NOTE
Inpatient at [facility] for bilateral leg fractures then tx to [facility] on 7/13/24.

## 2024-07-18 LAB
ANION GAP SERPL CALCULATED.3IONS-SCNC: 12 MMOL/L (ref 7–15)
BASOPHILS # BLD AUTO: 0 10E3/UL (ref 0–0.2)
BASOPHILS NFR BLD AUTO: 1 %
BUN SERPL-MCNC: 24 MG/DL (ref 8–23)
CALCIUM SERPL-MCNC: 8.4 MG/DL (ref 8.8–10.4)
CHLORIDE SERPL-SCNC: 98 MMOL/L (ref 98–107)
CREAT SERPL-MCNC: 0.74 MG/DL (ref 0.67–1.17)
EGFRCR SERPLBLD CKD-EPI 2021: >90 ML/MIN/1.73M2
EOSINOPHIL # BLD AUTO: 0.5 10E3/UL (ref 0–0.7)
EOSINOPHIL NFR BLD AUTO: 7 %
ERYTHROCYTE [DISTWIDTH] IN BLOOD BY AUTOMATED COUNT: 14.4 % (ref 10–15)
GLUCOSE SERPL-MCNC: 97 MG/DL (ref 70–99)
HBA1C MFR BLD: 5.6 %
HCO3 SERPL-SCNC: 24 MMOL/L (ref 22–29)
HCT VFR BLD AUTO: 26.4 % (ref 40–53)
HGB BLD-MCNC: 8.8 G/DL (ref 13.3–17.7)
IMM GRANULOCYTES # BLD: 0.1 10E3/UL
IMM GRANULOCYTES NFR BLD: 1 %
LYMPHOCYTES # BLD AUTO: 1.5 10E3/UL (ref 0.8–5.3)
LYMPHOCYTES NFR BLD AUTO: 18 %
MCH RBC QN AUTO: 30.3 PG (ref 26.5–33)
MCHC RBC AUTO-ENTMCNC: 33.3 G/DL (ref 31.5–36.5)
MCV RBC AUTO: 91 FL (ref 78–100)
MONOCYTES # BLD AUTO: 0.8 10E3/UL (ref 0–1.3)
MONOCYTES NFR BLD AUTO: 10 %
NEUTROPHILS # BLD AUTO: 5.3 10E3/UL (ref 1.6–8.3)
NEUTROPHILS NFR BLD AUTO: 65 %
NRBC # BLD AUTO: 0 10E3/UL
NRBC BLD AUTO-RTO: 0 /100
PLATELET # BLD AUTO: 409 10E3/UL (ref 150–450)
POTASSIUM SERPL-SCNC: 4.3 MMOL/L (ref 3.4–5.3)
RBC # BLD AUTO: 2.9 10E6/UL (ref 4.4–5.9)
SODIUM SERPL-SCNC: 134 MMOL/L (ref 135–145)
WBC # BLD AUTO: 8.2 10E3/UL (ref 4–11)

## 2024-07-18 PROCEDURE — 80048 BASIC METABOLIC PNL TOTAL CA: CPT | Mod: ORL | Performed by: FAMILY MEDICINE

## 2024-07-18 PROCEDURE — 83036 HEMOGLOBIN GLYCOSYLATED A1C: CPT | Mod: ORL | Performed by: FAMILY MEDICINE

## 2024-07-18 PROCEDURE — P9603 ONE-WAY ALLOW PRORATED MILES: HCPCS | Mod: ORL | Performed by: FAMILY MEDICINE

## 2024-07-18 PROCEDURE — 36415 COLL VENOUS BLD VENIPUNCTURE: CPT | Mod: ORL | Performed by: FAMILY MEDICINE

## 2024-07-18 PROCEDURE — 85025 COMPLETE CBC W/AUTO DIFF WBC: CPT | Mod: ORL | Performed by: FAMILY MEDICINE

## 2024-07-24 ENCOUNTER — LAB REQUISITION (OUTPATIENT)
Dept: LAB | Facility: CLINIC | Age: 59
End: 2024-07-24
Payer: COMMERCIAL

## 2024-07-24 ENCOUNTER — OFFICE VISIT (OUTPATIENT)
Dept: VASCULAR SURGERY | Facility: CLINIC | Age: 59
End: 2024-07-24
Attending: HOSPITALIST
Payer: COMMERCIAL

## 2024-07-24 VITALS
HEART RATE: 53 BPM | RESPIRATION RATE: 14 BRPM | DIASTOLIC BLOOD PRESSURE: 56 MMHG | SYSTOLIC BLOOD PRESSURE: 94 MMHG | OXYGEN SATURATION: 97 %

## 2024-07-24 DIAGNOSIS — D64.9 ANEMIA, UNSPECIFIED: ICD-10-CM

## 2024-07-24 DIAGNOSIS — I72.2 RENAL ARTERY ANEURYSM (H): ICD-10-CM

## 2024-07-24 DIAGNOSIS — I71.40 ABDOMINAL AORTIC ANEURYSM (AAA) WITHOUT RUPTURE, UNSPECIFIED PART (H): ICD-10-CM

## 2024-07-24 DIAGNOSIS — R06.02 SHORTNESS OF BREATH: ICD-10-CM

## 2024-07-24 DIAGNOSIS — I77.79 DISSECTION OF OTHER SPECIFIED ARTERY (H): ICD-10-CM

## 2024-07-24 DIAGNOSIS — I72.8 SUPERIOR MESENTERIC ARTERY ANEURYSM (H): Primary | ICD-10-CM

## 2024-07-24 PROCEDURE — 99214 OFFICE O/P EST MOD 30 MIN: CPT | Performed by: HOSPITALIST

## 2024-07-24 PROCEDURE — G0463 HOSPITAL OUTPT CLINIC VISIT: HCPCS | Performed by: HOSPITALIST

## 2024-07-24 RX ORDER — ACETAMINOPHEN 500 MG
1000 TABLET ORAL
COMMUNITY
Start: 2024-07-12

## 2024-07-24 RX ORDER — OXYCODONE HYDROCHLORIDE 5 MG/1
5 TABLET ORAL
COMMUNITY
Start: 2024-07-12

## 2024-07-24 RX ORDER — ONDANSETRON 4 MG/1
8 TABLET, ORALLY DISINTEGRATING ORAL
COMMUNITY
Start: 2024-07-12 | End: 2024-07-27

## 2024-07-24 RX ORDER — TAMSULOSIN HYDROCHLORIDE 0.4 MG/1
0.4 CAPSULE ORAL
COMMUNITY
Start: 2024-07-13

## 2024-07-24 RX ORDER — POLYETHYLENE GLYCOL 3350 17 G/17G
17 POWDER, FOR SOLUTION ORAL
COMMUNITY
Start: 2024-07-12

## 2024-07-24 RX ORDER — METHOCARBAMOL 500 MG/1
500 TABLET, FILM COATED ORAL
COMMUNITY
Start: 2024-07-12

## 2024-07-24 ASSESSMENT — PAIN SCALES - GENERAL: PAINLEVEL: NO PAIN (0)

## 2024-07-24 NOTE — PROGRESS NOTES
VASCULAR MEDICINE CONSULT NOTE          LOCATION:  [facility]       Date of Service: 7/24/2024      Primary Care Provider: Delvin Henning  Referring provider;  Sawyer Eugene*      Reason for the visit/chief complaint:   SMA dissection and aneurysm  AAA      HPI:  Delvin Echevarria is a pleasant 59 year old male who presents to our Vascular Medicine clinic for the above mentioned reason.    Mr. 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    He currently presents from TCU after he was discharged from the hospital on 7/13 at [facility] following his hospitalization for his recent fractures (pinned between a metal post and a 600 pound pig).    Earlier this year specifically April 10, 2024, patient underwent CT chest with contrast to follow-up on pulmonary nodules.  This showed incidental finding of SMA dissection, aneurysm and abdominal aortic aneurysm.  This was subsequently followed up by CTA abdomen pelvis the following day April 11, 2024 that confirmed the dissection of the SMA throughout the majority of its length approximately 10 cm.  Associated aneurysmal dilatation was noted 1.8 cm.  Both 2 and fulsome's are patent with all branches patent.  No dissection elsewhere.  There was infrarenal abdominal aortic aneurysm also detected at that time measured 2.9 x 3.1 cm with scattered atherosclerotic disease.    Patient was subsequently seen by vascular surgery/Dr. Leon on 5/13/2024 with repeat CTA abdomen pelvis.  His scan showed stability in the SMA dissection as well as aneurysm that was unchanged from a month ago.  The infrarenal  abdominal aortic aneurysm.  Additionally noted right renal artery aneurysm measuring 9 mm and right common iliac artery ectasia measuring 1.8 cm both felt to be stable when compared to the scan a month prior.  Given the unknown chronicity and stability of his SMA dissection, he was recommended to be managed conservatively and follow-up with our vascular medicine clinic in a year.      He was subsequently scheduled with us today after only 2 months.    Mr. Echevarria feels nauseated this afternoon.  No vomiting.  Reports that the nausea has been going on for at least 2-3 weeks since his hospitalization with the fracture.  It is intermittent.  Never had issues with nausea or vomiting prior.  Denies any abdominal pain.  Denies ever having any abdominal pain that is significant or postprandial pain.  Denies any known abdominal trauma or MVA.    Denies family history of aneurysms, dissections or sudden death.  Smoking: Smoked 0.5 pack a day for at least 25 years.  Quit a year ago July 2023.          REVIEW OF SYSTEMS:    A 12 point ROS was reviewed and is negative except what is mentioned in HPI.       Past medical history, surgical history, medications, family history, social history and allergies were reviewed. Pertinent points mentioned under HPI.      OBJECTIVE:    Vital signs:  BP 94/56   Pulse 53   Resp 14   SpO2 97%   Wt Readings from Last 1 Encounters:   06/28/24 152 lb 9.6 oz (69.2 kg)     There is no height or weight on file to calculate BMI.    Physical exam:  General appearance: Pleasant male in no apparent distress.  In wheelchair, appears uncomfortable with dry mouth but does not speak in full sentences and interacts normally.  HEENT: NC/AT.    Neck: Carotids +2/2 bilaterally without bruits.  No jugular venous distension.   Heart: RRR. Normal S1, S2. No murmur, rub, click, or gallop.   Chest: Clear to auscultation bilaterally.  Abdomen: Very limited exam to the abdomen while seated and unable to lay down  due to nonweightbearing and in availability of Sudarshan lift in the clinic.  There is visible ventral hernia that is nontender and compressible.  With a limited exam, no tenderness or bruit heard.  Extremities: Both lower extremities are wrapped with what appears to be an immobilizer applied on the left lower extremity.  Limited exam for peripheral pulses.  Neurological: Alert, awake and oriented       DIAGNOSTIC STUDIES:   Labs and diagnostics reviewed including outside records. Pertinent points are mentioned under HPI and assessment and plan sections.        ASSESSMENT AND PLAN:    Stable SMA dissection with associated aneurysm  1.8 cm of undetermined chronicity  Asymptomatic infrarenal abdominal aortic aneurysm 2.9 x 4.3 cm with mural thrombus  Right common iliac artery ectasia 1.8 cm  Right renal artery aneurysm 9 mm  Aortic root dilatation 4.0 cm  NICM most recent LVEF 40-45%  Nonobstructive CAD from coronary angiogram 2023  Former smoker with approximately 13-pack-year quit 1 year ago July 2023    Mr. Echevarria has evidence of atherosclerosis.  His infrarenal abdominal aortic aneurysm is in the setting of atherosclerosis with mural thrombus.  Risk factors include age, gender, smoking history.  This is now measuring 2.9 in AP diameter and 4.3 in transverse diameter.  This was stable on May scan when compared to a month prior when it was first noted April 2024.    On the other hand, with regard to his SMA dissection.  This is of unclear chronicity.  I was able to review his CT PE study from last year 2023 and I do believe that his SMA dissection was present at that time limited by the contrast.  However all remained stable between April and May.  We would recommend repeating a scan in 6 months to assess if there is any worsening.  If this continues to be stable, then we will plan on repeating in 1 year and if remains stable, could consider very periodic assessment based on symptoms.  The CTA can also follow-up on  his abdominal aortic aneurysm, right DOYLE and right renal artery aneurysm.    Etiology of the SMA dissection and aneurysm is unclear.  He does not report any history suggestive of trauma.  Although atherosclerosis could be involved, it is an unusual site.  Consideration for condition such as segmental arterial mediolysis CARLO could be entertained.  However, this is typically a diagnosis of exclusion with one of the exclusion criteria being atherosclerosis.  We discussed that ideology at this point would not necessarily make a difference.  We would recommend ongoing surveillance.    With his position, knee immobilizer and lower extremity wraps, we were unable to evaluate clinically for popliteal artery aneurysm today.  We hope that we get better examination when he is back to see us in few months.  Echocardiogram performed last month 6/18/2024 showed aortic root diameter 4.0 cm and ascending aorta 3.5 cm. Ao root diam index BSA (cm/m2): 2.1. Aortic valve is trileaflet with normal function.  This will need follow-up as well for the aortic root diameter once a year.    Given the clear evidence of atherosclerosis, agree to antiplatelet therapy and statins.  LDL was 70 7 May 2024 but his rosuvastatin dose was recently increased from 10 mg to 40 mg by cardiology.      Recommendations:  Repeat CTA abdomen pelvis in 6 months around December 2024 with follow-up visit in our clinic.  Continue with aspirin 81 mg and high intensity statin.  Of note, patient was noted to have lower blood pressure in the visit today 94/56 .  Our RN communicated with his RN at his current TCU and he apparently took all his morning medications including carvedilol 6.25 mg, furosemide 20 mg as well as his pain medication oxycodone when his systolic was around 104.  We discussed obtaining parameters on these medications prior to giving them all at once.  Additionally communicated to his facility through writing that nausea should be considered as a  side effect of his current pain medications and that they will need to check with their providers to assess medication adjustment.  Patient has been nauseated for approximately 2 to 3 weeks now.    It was a pleasure meeting Mr. Echevarria in our clinic today.    Mary Bishop MD  Vascular Medicine  July 24, 2024

## 2024-07-24 NOTE — PATIENT INSTRUCTIONS
Abhay Hargrove,    Thank you for entrusting your care with us today. After your visit today with MD Mary Bishop this is the plan that was discussed at your appointment.    No changes to medications at this time.     Please consider putting parameters on medications that can affect the blood pressure.     Patient very nauseated, he states this has been happening since he had his fracture.  Please consider alternative pain medication or alternative medication to treat his nausea.      Follow up in December with Dr. Bishop and a CTA scan prior.  A  will reach out closer to that time to schedule.     I am including additional information on these things and our contact information if you have any questions or concerns.   Please do not hesitate to reach out to us if you felt we did not answer your questions or you are unsure of the treatment plan after your visit today. Our number is [phone].Thank you for trusting us with your care.         Again thank you for your time.

## 2024-07-24 NOTE — Clinical Note
Patient needs follow up in December 2024 with Dr. Bishop and CTA scan prior.  6 month follow up AAA, SMA dissection, and renal artery aneurysm.

## 2024-07-24 NOTE — PROGRESS NOTES
[facility]        Patient is here for a follow up  to discuss SMA dissection, AAA, and renal artery aneurysm.     Pt is currently taking Aspirin, Statin, and Eliquis.    BP 94/56   Pulse 53   Resp 14   SpO2 97%     The provider has been notified that the patient has no concerns.     Questions patient would like addressed today are: N/A.    Refills are needed: N/A    Has homecare services and agency name:  No, is currently at [facility] [phone]      Patient pale and very nauseated at visit today.  Called TCU regarding lower blood pressure, low heart rate, and decreased respirations.  Patient had systolic BP this morning of 107 and was given carvedilol, lasix, and oxycodone.  Asked nurse to please discuss with provider as patient may need some parameters entered to medication orders.  She agrees with plan.

## 2024-07-25 ENCOUNTER — TELEPHONE (OUTPATIENT)
Dept: VASCULAR SURGERY | Facility: CLINIC | Age: 59
End: 2024-07-25

## 2024-07-25 LAB
ANION GAP SERPL CALCULATED.3IONS-SCNC: 14 MMOL/L (ref 7–15)
BASOPHILS # BLD AUTO: 0.1 10E3/UL (ref 0–0.2)
BASOPHILS NFR BLD AUTO: 1 %
BUN SERPL-MCNC: 34.7 MG/DL (ref 8–23)
CALCIUM SERPL-MCNC: 9 MG/DL (ref 8.8–10.4)
CHLORIDE SERPL-SCNC: 99 MMOL/L (ref 98–107)
CREAT SERPL-MCNC: 0.79 MG/DL (ref 0.67–1.17)
EGFRCR SERPLBLD CKD-EPI 2021: >90 ML/MIN/1.73M2
EOSINOPHIL # BLD AUTO: 0.2 10E3/UL (ref 0–0.7)
EOSINOPHIL NFR BLD AUTO: 2 %
ERYTHROCYTE [DISTWIDTH] IN BLOOD BY AUTOMATED COUNT: 13.9 % (ref 10–15)
GLUCOSE SERPL-MCNC: 86 MG/DL (ref 70–99)
HCO3 SERPL-SCNC: 23 MMOL/L (ref 22–29)
HCT VFR BLD AUTO: 26.3 % (ref 40–53)
HGB BLD-MCNC: 8.7 G/DL (ref 13.3–17.7)
IMM GRANULOCYTES # BLD: 0.1 10E3/UL
IMM GRANULOCYTES NFR BLD: 1 %
LYMPHOCYTES # BLD AUTO: 1.6 10E3/UL (ref 0.8–5.3)
LYMPHOCYTES NFR BLD AUTO: 23 %
MCH RBC QN AUTO: 30.7 PG (ref 26.5–33)
MCHC RBC AUTO-ENTMCNC: 33.1 G/DL (ref 31.5–36.5)
MCV RBC AUTO: 93 FL (ref 78–100)
MONOCYTES # BLD AUTO: 0.7 10E3/UL (ref 0–1.3)
MONOCYTES NFR BLD AUTO: 10 %
NEUTROPHILS # BLD AUTO: 4.5 10E3/UL (ref 1.6–8.3)
NEUTROPHILS NFR BLD AUTO: 63 %
NRBC # BLD AUTO: 0 10E3/UL
NRBC BLD AUTO-RTO: 0 /100
PLATELET # BLD AUTO: 510 10E3/UL (ref 150–450)
POTASSIUM SERPL-SCNC: 4 MMOL/L (ref 3.4–5.3)
RBC # BLD AUTO: 2.83 10E6/UL (ref 4.4–5.9)
SODIUM SERPL-SCNC: 136 MMOL/L (ref 135–145)
WBC # BLD AUTO: 7.1 10E3/UL (ref 4–11)

## 2024-07-25 PROCEDURE — 80048 BASIC METABOLIC PNL TOTAL CA: CPT | Mod: ORL | Performed by: FAMILY MEDICINE

## 2024-07-25 PROCEDURE — P9603 ONE-WAY ALLOW PRORATED MILES: HCPCS | Mod: ORL | Performed by: FAMILY MEDICINE

## 2024-07-25 PROCEDURE — 85025 COMPLETE CBC W/AUTO DIFF WBC: CPT | Mod: ORL | Performed by: FAMILY MEDICINE

## 2024-07-25 PROCEDURE — 36415 COLL VENOUS BLD VENIPUNCTURE: CPT | Mod: ORL

## 2024-07-25 NOTE — TELEPHONE ENCOUNTER
Attempt #1. Someone answered the phone then hung up mid introduction.  Follow up due in December with Dr. Bishop with CTA a few days before.  Calling early d/t Dr. Bishop's schedule filling up very quickly d/t maternity leave. [phone]      Yasmin Leonard RN  [facility]  Patient needs follow up in December 2024 with Dr. Bishop and CTA scan prior.  6 month follow up AAA, SMA dissection, and renal artery aneurysm.

## 2024-07-29 NOTE — TELEPHONE ENCOUNTER
Good morning, I noticed both Dr. Bishop and Dr. Mares have orders for the CTA (due Dec 2024 and due May 2025)  just wondering for future follow up should Peter being following up with AA or ET or maybe both?  thanks

## 2024-08-22 ENCOUNTER — OFFICE VISIT (OUTPATIENT)
Dept: CARDIOLOGY | Facility: CLINIC | Age: 59
End: 2024-08-22
Attending: NURSE PRACTITIONER
Payer: COMMERCIAL

## 2024-08-22 VITALS
HEART RATE: 68 BPM | HEIGHT: 71 IN | DIASTOLIC BLOOD PRESSURE: 78 MMHG | RESPIRATION RATE: 20 BRPM | SYSTOLIC BLOOD PRESSURE: 116 MMHG | OXYGEN SATURATION: 95 % | BODY MASS INDEX: 18.84 KG/M2 | WEIGHT: 134.6 LBS

## 2024-08-22 DIAGNOSIS — I50.22 CHRONIC SYSTOLIC HEART FAILURE (H): ICD-10-CM

## 2024-08-22 DIAGNOSIS — R06.09 DOE (DYSPNEA ON EXERTION): ICD-10-CM

## 2024-08-22 DIAGNOSIS — I25.10 NONOBSTRUCTIVE ATHEROSCLEROSIS OF CORONARY ARTERY: ICD-10-CM

## 2024-08-22 DIAGNOSIS — I42.8 NONISCHEMIC CARDIOMYOPATHY (H): ICD-10-CM

## 2024-08-22 DIAGNOSIS — I71.40 ABDOMINAL AORTIC ANEURYSM (AAA) WITHOUT RUPTURE, UNSPECIFIED PART (H): ICD-10-CM

## 2024-08-22 DIAGNOSIS — I25.10 CORONARY ARTERY DISEASE INVOLVING NATIVE CORONARY ARTERY OF NATIVE HEART, UNSPECIFIED WHETHER ANGINA PRESENT: ICD-10-CM

## 2024-08-22 DIAGNOSIS — I10 BENIGN ESSENTIAL HYPERTENSION: Primary | ICD-10-CM

## 2024-08-22 PROCEDURE — 99214 OFFICE O/P EST MOD 30 MIN: CPT | Performed by: NURSE PRACTITIONER

## 2024-08-22 NOTE — PATIENT INSTRUCTIONS
Medication Changes:  Increase entresto to 49-51 mg twice a day     Recommendations:  Check blood pressure at least 1 hour after medications. Call the clinic if your blood pressure is consistently greater than 130/80.   Check daily weights and call the clinic if your weight has increased more than 2 lbs in one day or 5 lbs in one week; if you feel more short of breath or have worsening swelling in your legs or abdomen.  Call if blood pressure is less than 90 on top or less than 100 with lightheadedness.       Follow-up:   Nonfasting lab in 1-2 weeks (VICKY)   Cardiology follow up at [facility]: racheal 1-2 months.     Cardiology Scheduling~[phone]  Cardiology Clinic RN~[phone] (Elsa RN, Elena RN; Adrianna RN)

## 2024-08-22 NOTE — PROGRESS NOTES
Cardiology Clinic Progress Note  Delvin Echevarria MRN# [id number]   YOB: 1965 Age: 59 year old      Primary Cardiologist:   Dr. Anne for 2-month heart failure follow-up          History of Presenting Illness:      History of heavy alcohol use 4-6 shots of whiskey per night but sober for 10 years.  He does not have a car and relies on friends for transportation.     In July 2023 he was hospitalized with heart failure.  He had not been taking medications as he ran out of them.  Echocardiogram showed EF down to 30 to 35%.  Coronary angiogram showed nonobstructive CAD.  He was diuresed and started on GDMT.  He unfortunately did not have appropriate follow-up and did not have his core enrollment or cardiac MRI.    Patient return for follow-up in April 2024 and had not been taking Entresto or Lasix but was taking other cardiac medications appropriately.  His weight was up and reported GOODMAN that was improved with inhaler.  He did not have access to a scale at home.  Cony scan June 2024 showed apical infarction but no ischemia.  Echocardiogram at that time showed slight improvement in EF up to 40 to 45% with WMA's, no significant valvular disease and aortic root 4 cm.    Patient was seen by me in June 2024 and restarted on Entresto and Lasix trial to see if shortness of breath had any improvement.  I recommended he  baby aspirin as he had run out of this and was not taking it.    Most recent lipid profile, BMP, ALT, hemoglobin reviewed today. Zio patch July 2024 showed no significant causes of cardiomyopathy, sinus rhythm with 2 runs of nonsustained VT longest lasting 6 beats. Results reviewed today.    Since he was last seen, he unfortunately fractured his femur was hospitalized and then discharged to TCU.    Side effect of Entresto?  None    Weight? Down 18 lbs likely d/t  hospitalization.   Heart failure symptoms? None, reports intermittent pedal edema that improves by morning  BP? Controlled,  occasional soft BPs at TCU but denies any lightheadedness  Angina? None     Patient reports no chest pain, shortness of breath, PND, orthopnea, presyncope, syncope, edema, heart racing, or palpitations.                    Assessment and Plan:     Plan  Patient Instructions   Medication Changes:  Increase entresto to 49-51 mg twice a day     Recommendations:  Check blood pressure at least 1 hour after medications. Call the clinic if your blood pressure is consistently greater than 130/80.   Check daily weights and call the clinic if your weight has increased more than 2 lbs in one day or 5 lbs in one week; if you feel more short of breath or have worsening swelling in your legs or abdomen.  Call if blood pressure is less than 90 on top or less than 100 with lightheadedness.       Follow-up:   Nonfasting lab in 1-2 weeks (VICKY)   Cardiology follow up at [facility]: roxann 1-2 months to uptitrate cardiomyopathy medications    Cardiology Scheduling~[phone]  Cardiology Clinic RN~[phone] (Elsa RN, Elena RN; Adrianna RN)          Problem List as of 8/22/2024 Reviewed: 4/11/2024 12:54 PM by Kelsie Cohen            Noted       Active Problems    1. Benign essential hypertension - Primary 3/15/2013     Last Assessment & Plan 6/28/2024 Office Visit Written 6/28/2024  1:48 PM by Roxann German APRN CNP      Controlled  Continue current medications          Relevant Orders     Follow-Up with Cardiology    2. Nonischemic cardiomyopathy (H) 8/10/2023     Overview Signed 6/28/2024  1:50 PM by Roxann German APRN CNP      EF 30 to 35% 7/2023  EF 40 to 45% 6/2024  Unclear etiology ?  Hypertensive  Nonobstructive CAD on angiogram, No family history of heart failure, History of alcoholism but sober for 10 years, No recent infections or drug use, normal TSH          Last Assessment & Plan 6/28/2024 Office Visit Written 6/28/2024  1:50 PM by Roxann German APRN CNP      No symptoms of  heart failure   Continue GDMT  uptitrate cardiomyopathy medications as tolerated            Relevant Medications     sacubitril-valsartan (ENTRESTO) 49-51 MG per tablet     Other Relevant Orders     Basic metabolic panel     Follow-Up with Cardiology    3. Chronic systolic heart failure (H) 8/10/2023     Relevant Orders     Follow-Up with Cardiology    4. Coronary artery disease involving native coronary artery of native heart, unspecified whether angina present 8/10/2023     Overview Signed 6/28/2024  1:51 PM by Roxann German APRN CNP      Nonobstructive   NSTEMI 7/2023 30% mid LAD, 50% D2.            Last Assessment & Plan 6/28/2024 Office Visit Written 6/28/2024  1:51 PM by Roxann German APRN CNP      No angina  Continue GDMT          Relevant Orders     Follow-Up with Cardiology    5. 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   This note was completed in part using Dragon voice recognition software. Although reviewed after completion, some word and grammatical errors may occur.    XIN Solorzano CNP

## 2024-08-22 NOTE — LETTER
8/22/2024    Delvin Henning MD  [address] [place] [id number]    RE: Delvin Echevarria       Dear Colleague,     I had the pleasure of seeing Delvin Echevarria in the [facility].  Cardiology Clinic Progress Note  Delvin Echevarria MRN# [id number]   YOB: 1965 Age: 59 year old      Primary Cardiologist:   Dr. Anne for 2-month heart failure follow-up          History of Presenting Illness:      History of heavy alcohol use 4-6 shots of whiskey per night but sober for 10 years.  He does not have a car and relies on friends for transportation.     In July 2023 he was hospitalized with heart failure.  He had not been taking medications as he ran out of them.  Echocardiogram showed EF down to 30 to 35%.  Coronary angiogram showed nonobstructive CAD.  He was diuresed and started on GDMT.  He unfortunately did not have appropriate follow-up and did not have his core enrollment or cardiac MRI.    Patient return for follow-up in April 2024 and had not been taking Entresto or Lasix but was taking other cardiac medications appropriately.  His weight was up and reported GOODMAN that was improved with inhaler.  He did not have access to a scale at home.  Cony scan June 2024 showed apical infarction but no ischemia.  Echocardiogram at that time showed slight improvement in EF up to 40 to 45% with WMA's, no significant valvular disease and aortic root 4 cm.    Patient was seen by me in June 2024 and restarted on Entresto and Lasix trial to see if shortness of breath had any improvement.  I recommended he  baby aspirin as he had run out of this and was not taking it.    Most recent lipid profile, BMP, ALT, hemoglobin reviewed today. Zio patch July 2024 showed no significant causes of cardiomyopathy, sinus rhythm with 2 runs of nonsustained VT longest lasting 6 beats. Results reviewed today.    Since he was last seen, he unfortunately fractured his femur was hospitalized and then discharged to  TCU.    Side effect of Entresto?  None    Weight? Down 18 lbs likely d/t  hospitalization.   Heart failure symptoms? None, reports intermittent pedal edema that improves by morning  BP? Controlled, occasional soft BPs at TCU but denies any lightheadedness  Angina? None     Patient reports no chest pain, shortness of breath, PND, orthopnea, presyncope, syncope, edema, heart racing, or palpitations.                    Assessment and Plan:     Plan  Patient Instructions   Medication Changes:  Increase entresto to 49-51 mg twice a day     Recommendations:  Check blood pressure at least 1 hour after medications. Call the clinic if your blood pressure is consistently greater than 130/80.   Check daily weights and call the clinic if your weight has increased more than 2 lbs in one day or 5 lbs in one week; if you feel more short of breath or have worsening swelling in your legs or abdomen.  Call if blood pressure is less than 90 on top or less than 100 with lightheadedness.       Follow-up:   Nonfasting lab in 1-2 weeks (VICKY)   Cardiology follow up at [facility]: roxann 1-2 months to uptitrate cardiomyopathy medications    Cardiology Scheduling~[phone]  Cardiology Clinic RN~[phone] (Elsa RN, Elena RN; Adrianna RN)          Problem List as of 8/22/2024 Reviewed: 4/11/2024 12:54 PM by Kelsie Cohen            Noted       Active Problems    1. Benign essential hypertension - Primary 3/15/2013     Last Assessment & Plan 6/28/2024 Office Visit Written 6/28/2024  1:48 PM by Roxann German APRN CNP      Controlled  Continue current medications          Relevant Orders     Follow-Up with Cardiology    2. Nonischemic cardiomyopathy (H) 8/10/2023     Overview Signed 6/28/2024  1:50 PM by Roxann German APRN CNP      EF 30 to 35% 7/2023  EF 40 to 45% 6/2024  Unclear etiology ?  Hypertensive  Nonobstructive CAD on angiogram, No family history of heart failure, History of alcoholism but sober for  10 years, No recent infections or drug use, normal TSH          Last Assessment & Plan 6/28/2024 Office Visit Written 6/28/2024  1:50 PM by Roxann German APRN CNP      No symptoms of heart failure   Continue GDMT  uptitrate cardiomyopathy medications as tolerated            Relevant Medications     sacubitril-valsartan (ENTRESTO) 49-51 MG per tablet     Other Relevant Orders     Basic metabolic panel     Follow-Up with Cardiology    3. Chronic systolic heart failure (H) 8/10/2023     Relevant Orders     Follow-Up with Cardiology    4. Coronary artery disease involving native coronary artery of native heart, unspecified whether angina present 8/10/2023     Overview Signed 6/28/2024  1:51 PM by Roxann German APRN CNP      Nonobstructive   NSTEMI 7/2023 30% mid LAD, 50% D2.            Last Assessment & Plan 6/28/2024 Office Visit Written 6/28/2024  1:51 PM by Roxann German APRN CNP      No angina  Continue GDMT          Relevant Orders     Follow-Up with Cardiology    5. 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   This note was completed in part using Dragon voice recognition software. Although reviewed after completion, some word and grammatical errors may occur.    XIN Solorzano CNP                  Thank you for allowing me to participate in the care of your patient.      Sincerely,     Roxann LEE  XIN Ramírez CNP     [facility]  cc:   XIN Butcher CNP  [address] [place] [address]

## 2024-08-28 ENCOUNTER — LAB REQUISITION (OUTPATIENT)
Dept: LAB | Facility: CLINIC | Age: 59
End: 2024-08-28
Payer: COMMERCIAL

## 2024-08-28 DIAGNOSIS — S82.301A UNSPECIFIED FRACTURE OF LOWER END OF RIGHT TIBIA, INITIAL ENCOUNTER FOR CLOSED FRACTURE: ICD-10-CM

## 2024-08-29 LAB
ANION GAP SERPL CALCULATED.3IONS-SCNC: 9 MMOL/L (ref 7–15)
BUN SERPL-MCNC: 21.6 MG/DL (ref 8–23)
CALCIUM SERPL-MCNC: 9.1 MG/DL (ref 8.8–10.4)
CHLORIDE SERPL-SCNC: 104 MMOL/L (ref 98–107)
CREAT SERPL-MCNC: 0.69 MG/DL (ref 0.67–1.17)
EGFRCR SERPLBLD CKD-EPI 2021: >90 ML/MIN/1.73M2
GLUCOSE SERPL-MCNC: 80 MG/DL (ref 70–99)
HCO3 SERPL-SCNC: 27 MMOL/L (ref 22–29)
POTASSIUM SERPL-SCNC: 4.5 MMOL/L (ref 3.4–5.3)
SODIUM SERPL-SCNC: 140 MMOL/L (ref 135–145)

## 2024-08-29 PROCEDURE — 80048 BASIC METABOLIC PNL TOTAL CA: CPT | Mod: ORL | Performed by: FAMILY MEDICINE

## 2024-10-16 ENCOUNTER — LAB REQUISITION (OUTPATIENT)
Dept: LAB | Facility: CLINIC | Age: 59
End: 2024-10-16
Payer: COMMERCIAL

## 2024-10-16 DIAGNOSIS — R06.00 DYSPNEA, UNSPECIFIED: ICD-10-CM

## 2024-10-17 LAB
ANION GAP SERPL CALCULATED.3IONS-SCNC: 15 MMOL/L (ref 7–15)
BASOPHILS # BLD AUTO: 0.1 10E3/UL (ref 0–0.2)
BASOPHILS NFR BLD AUTO: 1 %
BUN SERPL-MCNC: 23.7 MG/DL (ref 8–23)
CALCIUM SERPL-MCNC: 8.5 MG/DL (ref 8.8–10.4)
CHLORIDE SERPL-SCNC: 106 MMOL/L (ref 98–107)
CREAT SERPL-MCNC: 0.69 MG/DL (ref 0.67–1.17)
EGFRCR SERPLBLD CKD-EPI 2021: >90 ML/MIN/1.73M2
EOSINOPHIL # BLD AUTO: 0.4 10E3/UL (ref 0–0.7)
EOSINOPHIL NFR BLD AUTO: 6 %
ERYTHROCYTE [DISTWIDTH] IN BLOOD BY AUTOMATED COUNT: 15.5 % (ref 10–15)
GLUCOSE SERPL-MCNC: 80 MG/DL (ref 70–99)
HCO3 SERPL-SCNC: 19 MMOL/L (ref 22–29)
HCT VFR BLD AUTO: 32.5 % (ref 40–53)
HGB BLD-MCNC: 10.6 G/DL (ref 13.3–17.7)
IMM GRANULOCYTES # BLD: 0 10E3/UL
IMM GRANULOCYTES NFR BLD: 0 %
LYMPHOCYTES # BLD AUTO: 2.2 10E3/UL (ref 0.8–5.3)
LYMPHOCYTES NFR BLD AUTO: 33 %
MCH RBC QN AUTO: 30.7 PG (ref 26.5–33)
MCHC RBC AUTO-ENTMCNC: 32.6 G/DL (ref 31.5–36.5)
MCV RBC AUTO: 94 FL (ref 78–100)
MONOCYTES # BLD AUTO: 0.6 10E3/UL (ref 0–1.3)
MONOCYTES NFR BLD AUTO: 9 %
NEUTROPHILS # BLD AUTO: 3.5 10E3/UL (ref 1.6–8.3)
NEUTROPHILS NFR BLD AUTO: 52 %
NRBC # BLD AUTO: 0 10E3/UL
NRBC BLD AUTO-RTO: 0 /100
NT-PROBNP SERPL-MCNC: 57 PG/ML (ref 0–900)
PLATELET # BLD AUTO: 140 10E3/UL (ref 150–450)
POTASSIUM SERPL-SCNC: 4 MMOL/L (ref 3.4–5.3)
RBC # BLD AUTO: 3.45 10E6/UL (ref 4.4–5.9)
SODIUM SERPL-SCNC: 140 MMOL/L (ref 135–145)
WBC # BLD AUTO: 6.8 10E3/UL (ref 4–11)

## 2024-10-17 PROCEDURE — 80048 BASIC METABOLIC PNL TOTAL CA: CPT | Mod: ORL | Performed by: FAMILY MEDICINE

## 2024-10-17 PROCEDURE — 83880 ASSAY OF NATRIURETIC PEPTIDE: CPT | Mod: ORL | Performed by: FAMILY MEDICINE

## 2024-10-17 PROCEDURE — 85025 COMPLETE CBC W/AUTO DIFF WBC: CPT | Mod: ORL | Performed by: FAMILY MEDICINE

## 2024-10-17 PROCEDURE — 36415 COLL VENOUS BLD VENIPUNCTURE: CPT | Mod: ORL | Performed by: FAMILY MEDICINE

## 2024-10-17 PROCEDURE — P9603 ONE-WAY ALLOW PRORATED MILES: HCPCS | Mod: ORL | Performed by: FAMILY MEDICINE

## 2024-10-24 ENCOUNTER — OFFICE VISIT (OUTPATIENT)
Dept: CARDIOLOGY | Facility: CLINIC | Age: 59
End: 2024-10-24
Attending: NURSE PRACTITIONER
Payer: COMMERCIAL

## 2024-10-24 VITALS
OXYGEN SATURATION: 97 % | SYSTOLIC BLOOD PRESSURE: 104 MMHG | HEIGHT: 71 IN | DIASTOLIC BLOOD PRESSURE: 70 MMHG | BODY MASS INDEX: 20.44 KG/M2 | HEART RATE: 63 BPM | WEIGHT: 146 LBS

## 2024-10-24 DIAGNOSIS — I42.8 NONISCHEMIC CARDIOMYOPATHY (H): ICD-10-CM

## 2024-10-24 DIAGNOSIS — I10 BENIGN ESSENTIAL HYPERTENSION: ICD-10-CM

## 2024-10-24 DIAGNOSIS — I71.40 ABDOMINAL AORTIC ANEURYSM (AAA) WITHOUT RUPTURE, UNSPECIFIED PART (H): ICD-10-CM

## 2024-10-24 DIAGNOSIS — I50.22 CHRONIC SYSTOLIC HEART FAILURE (H): ICD-10-CM

## 2024-10-24 DIAGNOSIS — I25.10 NONOBSTRUCTIVE ATHEROSCLEROSIS OF CORONARY ARTERY: ICD-10-CM

## 2024-10-24 DIAGNOSIS — I25.10 CORONARY ARTERY DISEASE INVOLVING NATIVE CORONARY ARTERY OF NATIVE HEART, UNSPECIFIED WHETHER ANGINA PRESENT: ICD-10-CM

## 2024-10-24 DIAGNOSIS — R06.09 DOE (DYSPNEA ON EXERTION): ICD-10-CM

## 2024-10-24 PROCEDURE — 99214 OFFICE O/P EST MOD 30 MIN: CPT | Performed by: NURSE PRACTITIONER

## 2024-10-24 RX ORDER — SACUBITRIL AND VALSARTAN 24; 26 MG/1; MG/1
1 TABLET, FILM COATED ORAL 2 TIMES DAILY
Qty: 180 TABLET | Refills: 3 | Status: SHIPPED | OUTPATIENT
Start: 2024-10-24

## 2024-10-24 NOTE — PROGRESS NOTES
Cardiology Clinic Progress Note  Delvin Echevarria MRN# [id number]   YOB: 1965 Age: 59 year old      Primary Cardiologist:   Dr. Anne for 2-month heart failure follow-up          History of Presenting Illness:      History of heavy alcohol use 4-6 shots of whiskey per night but sober for 10 years.  He does not have a car and relies on friends for transportation.      In July 2023 he was hospitalized with heart failure.  He had not been taking medications as he ran out of them.  Echocardiogram showed EF down to 30 to 35%.  Coronary angiogram showed nonobstructive CAD.  He was diuresed and started on GDMT.  He unfortunately did not have appropriate follow-up and did not have his core enrollment or cardiac MRI.     Patient returned for follow-up in April 2024 and had not been taking Entresto or Lasix but was taking other cardiac medications appropriately.  His weight was up and reported GOODMAN that was improved with inhaler.  He did not have access to a scale at home.  Cony scan June 2024 showed apical infarction but no ischemia.  Echocardiogram at that time showed slight improvement in EF up to 40 to 45% with WMA's, no significant valvular disease and aortic root 4 cm.     Patient was seen by me in June 2024 and restarted on Entresto and Lasix trial to see if shortness of breath had any improvement.  I recommended he  baby aspirin as he had run out of this and was not taking it.    In August 2024 Zio patch showed no significant causes of cardiomyopathy but did have 2 runs of nonsustained VT lasting 6 beats.  Weight was down 18 pounds after his hospitalization and had only intermittent pedal edema that was improved by morning.  Entresto was uptitrated.    Most recent lipid profile, BMP, ALT, hemoglobin reviewed today.    Side effect of increasing Entresto? None   Weight? Up 12 lbs.  Weight was previously down 15 pounds after hospitalization for femur fractures as he was not eating well.  His current  weight is likely getting back to normal weight as he denies heart failure symptoms.   Heart failure symptoms?  A couple weeks ago he had orthopnea a couple nights but this has resolved.  BP? Soft SBP 87 at check in, improved to 104. Reports that the assisted living is not administering carvedilol very often because SBP is less than 100. They have been checking blood pressures prior to medications but not after and they have been checking daily weights in the afternoon at times.  Angina? None   Assisted living, will likely be staying there long term       Patient reports no chest pain, shortness of breath, PND, orthopnea, presyncope, syncope, edema, heart racing, or palpitations.                    Assessment and Plan:     Plan  Patient Instructions   Medication Changes:  Decrease entresto back to 24-26 mg twice a day d/t soft BPs     Recommendations:  Check blood pressure at least 1 hour after medications. Call the clinic if your blood pressure is consistently greater than 130/80.   Call if blood pressure is less than 90 on top or less than 100 with lightheadedness.    Check daily weights, in the morning prior to eating, and call the clinic if your weight has increased more than 2 lbs in one day or 5 lbs in one week; if you feel more short of breath or have worsening swelling in your legs or abdomen.     Follow-up:  Cardiology follow up at [facility]: roxann in 2 months with echo prior    Cardiology Scheduling~[phone]  Cardiology Clinic RN~[phone] (Elsa RN, Elena RN; Adrianna RN)          Problem List as of 10/24/2024 Reviewed: 4/11/2024 12:54 PM by Kelsie Cohen            Noted       Active Problems    1. Benign essential hypertension 3/15/2013     Last Assessment & Plan 6/28/2024 Office Visit Written 6/28/2024  1:48 PM by Roxann German APRN CNP      Controlled  Continue current medications          Relevant Orders     Follow-Up with Cardiology    2. Nonischemic cardiomyopathy (H)  8/10/2023     Overview Signed 6/28/2024  1:50 PM by Roxann German APRN CNP      EF 30 to 35% 7/2023  EF 40 to 45% 6/2024  Unclear etiology ?  Hypertensive  Nonobstructive CAD on angiogram, No family history of heart failure, History of alcoholism but sober for 10 years, No recent infections or drug use, normal TSH          Last Assessment & Plan 6/28/2024 Office Visit Written 6/28/2024  1:50 PM by Roxann German APRN CNP      No symptoms of heart failure   Continue GDMT  Unable to uptitrate cardiomyopathy medications due to soft BP  Reassess with echo            Relevant Medications     sacubitril-valsartan (ENTRESTO) 24-26 MG per tablet     Other Relevant Orders     Echocardiogram Complete     Follow-Up with Cardiology    3. Chronic systolic heart failure (H) 8/10/2023     Relevant Orders     Follow-Up with Cardiology    4. Coronary artery disease involving native coronary artery of native heart, unspecified whether angina present 8/10/2023     Overview Signed 6/28/2024  1:51 PM by Roxann German APRN CNP      Nonobstructive   NSTEMI 7/2023 30% mid LAD, 50% D2.            Last Assessment & Plan 6/28/2024 Office Visit Written 6/28/2024  1:51 PM by Roxann German APRN CNP      No angina  Continue GDMT          Relevant Orders     Follow-Up with Cardiology    5. 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   This note  was completed in part using Dragon voice recognition software. Although reviewed after completion, some word and grammatical errors may occur.    Roxann Ramírez, APRN CNP

## 2024-10-24 NOTE — LETTER
10/24/2024    Delvin Henning MD  [address] [place] [id number]    RE: Delvin Echevarria       Dear Colleague,     I had the pleasure of seeing Delvin Echevarria in the [facility].  Cardiology Clinic Progress Note  Delvin Echevarria MRN# [id number]   YOB: 1965 Age: 59 year old      Primary Cardiologist:   Dr. Anne for 2-month heart failure follow-up          History of Presenting Illness:      History of heavy alcohol use 4-6 shots of whiskey per night but sober for 10 years.  He does not have a car and relies on friends for transportation.      In July 2023 he was hospitalized with heart failure.  He had not been taking medications as he ran out of them.  Echocardiogram showed EF down to 30 to 35%.  Coronary angiogram showed nonobstructive CAD.  He was diuresed and started on GDMT.  He unfortunately did not have appropriate follow-up and did not have his core enrollment or cardiac MRI.     Patient returned for follow-up in April 2024 and had not been taking Entresto or Lasix but was taking other cardiac medications appropriately.  His weight was up and reported GOODMAN that was improved with inhaler.  He did not have access to a scale at home.  Cony scan June 2024 showed apical infarction but no ischemia.  Echocardiogram at that time showed slight improvement in EF up to 40 to 45% with WMA's, no significant valvular disease and aortic root 4 cm.     Patient was seen by me in June 2024 and restarted on Entresto and Lasix trial to see if shortness of breath had any improvement.  I recommended he  baby aspirin as he had run out of this and was not taking it.    In August 2024 Zio patch showed no significant causes of cardiomyopathy but did have 2 runs of nonsustained VT lasting 6 beats.  Weight was down 18 pounds after his hospitalization and had only intermittent pedal edema that was improved by morning.  Entresto was uptitrated.    Most recent lipid profile, BMP, ALT,  hemoglobin reviewed today.    Side effect of increasing Entresto? None   Weight? Up 12 lbs.  Weight was previously down 15 pounds after hospitalization for femur fractures as he was not eating well.  His current weight is likely getting back to normal weight as he denies heart failure symptoms.   Heart failure symptoms?  A couple weeks ago he had orthopnea a couple nights but this has resolved.  BP? Soft SBP 87 at check in, improved to 104. Reports that the assisted living is not administering carvedilol very often because SBP is less than 100. They have been checking blood pressures prior to medications but not after and they have been checking daily weights in the afternoon at times.  Angina? None   Assisted living, will likely be staying there long term       Patient reports no chest pain, shortness of breath, PND, orthopnea, presyncope, syncope, edema, heart racing, or palpitations.                    Assessment and Plan:     Plan  Patient Instructions   Medication Changes:  Decrease entresto back to 24-26 mg twice a day d/t soft BPs     Recommendations:  Check blood pressure at least 1 hour after medications. Call the clinic if your blood pressure is consistently greater than 130/80.   Call if blood pressure is less than 90 on top or less than 100 with lightheadedness.    Check daily weights, in the morning prior to eating, and call the clinic if your weight has increased more than 2 lbs in one day or 5 lbs in one week; if you feel more short of breath or have worsening swelling in your legs or abdomen.     Follow-up:  Cardiology follow up at [facility]: racheal in 2 months with echo prior    Cardiology Scheduling~[phone]  Cardiology Clinic RN~[phone] (Elsa RN, Elena RN; Adrianna RN)          Problem List as of 10/24/2024 Reviewed: 4/11/2024 12:54 PM by Kelsie Cohen            Noted       Active Problems    1. Benign essential hypertension 3/15/2013     Last Assessment & Plan 6/28/2024 Office  Visit Written 6/28/2024  1:48 PM by Roxann German APRN CNP      Controlled  Continue current medications          Relevant Orders     Follow-Up with Cardiology    2. Nonischemic cardiomyopathy (H) 8/10/2023     Overview Signed 6/28/2024  1:50 PM by Roxann German APRN CNP      EF 30 to 35% 7/2023  EF 40 to 45% 6/2024  Unclear etiology ?  Hypertensive  Nonobstructive CAD on angiogram, No family history of heart failure, History of alcoholism but sober for 10 years, No recent infections or drug use, normal TSH          Last Assessment & Plan 6/28/2024 Office Visit Written 6/28/2024  1:50 PM by Roxann German APRN CNP      No symptoms of heart failure   Continue GDMT  Unable to uptitrate cardiomyopathy medications due to soft BP  Reassess with echo            Relevant Medications     sacubitril-valsartan (ENTRESTO) 24-26 MG per tablet     Other Relevant Orders     Echocardiogram Complete     Follow-Up with Cardiology    3. Chronic systolic heart failure (H) 8/10/2023     Relevant Orders     Follow-Up with Cardiology    4. Coronary artery disease involving native coronary artery of native heart, unspecified whether angina present 8/10/2023     Overview Signed 6/28/2024  1:51 PM by Roxann German APRN CNP      Nonobstructive   NSTEMI 7/2023 30% mid LAD, 50% D2.            Last Assessment & Plan 6/28/2024 Office Visit Written 6/28/2024  1:51 PM by Roxann German APRN CNP      No angina  Continue GDMT          Relevant Orders     Follow-Up with Cardiology    5. 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   This note was completed in part using Dragon voice recognition software. Although reviewed after completion, some word and grammatical errors may occur.    XIN Solorzano CNP                  Thank you for allowing me to participate in the care of your patient.      Sincerely,     XIN Solorzano CNP     [facility]  cc:   XIN Butcher CNP  [address] [place] [address]

## 2024-10-24 NOTE — PATIENT INSTRUCTIONS
Medication Changes:  Decrease entresto back to 24-26 mg twice a day     Recommendations:  Check blood pressure at least 1 hour after medications. Call the clinic if your blood pressure is consistently greater than 130/80.   Call if blood pressure is less than 90 on top or less than 100 with lightheadedness.    Check daily weights, in the morning prior to eating, and call the clinic if your weight has increased more than 2 lbs in one day or 5 lbs in one week; if you feel more short of breath or have worsening swelling in your legs or abdomen.     Follow-up:  Cardiology follow up at [facility]: racheal in 2 months with echo prior    Cardiology Scheduling~[phone]  Cardiology Clinic RN~[phone] (Elsa RN, Elena RN; Adrianna RN)

## 2024-11-08 ENCOUNTER — HOSPITAL ENCOUNTER (OUTPATIENT)
Dept: CT IMAGING | Facility: CLINIC | Age: 59
Discharge: HOME | End: 2024-11-08
Payer: COMMERCIAL

## 2024-11-08 DIAGNOSIS — R59.0 HILAR LYMPHADENOPATHY: ICD-10-CM

## 2024-11-08 PROCEDURE — 250N000011 HC RX IP 250 OP 636: Performed by: RADIOLOGY

## 2024-11-08 PROCEDURE — 71260 CT THORAX DX C+: CPT

## 2024-11-08 PROCEDURE — 250N000009 HC RX 250: Performed by: RADIOLOGY

## 2024-11-08 RX ORDER — IOPAMIDOL 755 MG/ML
72 INJECTION, SOLUTION INTRAVASCULAR ONCE
Status: COMPLETED | OUTPATIENT
Start: 2024-11-08 | End: 2024-11-08

## 2024-11-08 RX ADMIN — SODIUM CHLORIDE 62 ML: 9 INJECTION, SOLUTION INTRAVENOUS at 09:38

## 2024-11-08 RX ADMIN — IOPAMIDOL 72 ML: 755 INJECTION, SOLUTION INTRAVENOUS at 09:38

## 2024-12-11 ENCOUNTER — LAB REQUISITION (OUTPATIENT)
Dept: LAB | Facility: CLINIC | Age: 59
End: 2024-12-11
Payer: COMMERCIAL

## 2024-12-11 DIAGNOSIS — I10 ESSENTIAL (PRIMARY) HYPERTENSION: ICD-10-CM

## 2024-12-12 LAB
ANION GAP SERPL CALCULATED.3IONS-SCNC: 12 MMOL/L (ref 7–15)
BUN SERPL-MCNC: 22 MG/DL (ref 8–23)
CALCIUM SERPL-MCNC: 8.9 MG/DL (ref 8.8–10.4)
CHLORIDE SERPL-SCNC: 106 MMOL/L (ref 98–107)
CREAT SERPL-MCNC: 0.85 MG/DL (ref 0.67–1.17)
EGFRCR SERPLBLD CKD-EPI 2021: >90 ML/MIN/1.73M2
GLUCOSE SERPL-MCNC: 94 MG/DL (ref 70–99)
HCO3 SERPL-SCNC: 22 MMOL/L (ref 22–29)
POTASSIUM SERPL-SCNC: 4.2 MMOL/L (ref 3.4–5.3)
SODIUM SERPL-SCNC: 140 MMOL/L (ref 135–145)

## 2024-12-12 PROCEDURE — 80048 BASIC METABOLIC PNL TOTAL CA: CPT | Mod: ORL | Performed by: FAMILY MEDICINE

## 2024-12-12 PROCEDURE — P9603 ONE-WAY ALLOW PRORATED MILES: HCPCS | Mod: ORL | Performed by: FAMILY MEDICINE

## 2024-12-12 PROCEDURE — 36415 COLL VENOUS BLD VENIPUNCTURE: CPT | Mod: ORL | Performed by: FAMILY MEDICINE

## 2024-12-13 ENCOUNTER — HOSPITAL ENCOUNTER (OUTPATIENT)
Dept: CT IMAGING | Facility: CLINIC | Age: 59
Discharge: HOME | End: 2024-12-13
Attending: HOSPITALIST | Admitting: HOSPITALIST
Payer: COMMERCIAL

## 2024-12-13 DIAGNOSIS — I77.79 DISSECTION OF OTHER SPECIFIED ARTERY (H): ICD-10-CM

## 2024-12-13 DIAGNOSIS — I72.2 RENAL ARTERY ANEURYSM (H): ICD-10-CM

## 2024-12-13 DIAGNOSIS — I71.40 ABDOMINAL AORTIC ANEURYSM (AAA) WITHOUT RUPTURE, UNSPECIFIED PART (H): ICD-10-CM

## 2024-12-13 PROCEDURE — 250N000009 HC RX 250: Performed by: HOSPITALIST

## 2024-12-13 PROCEDURE — 74174 CTA ABD&PLVS W/CONTRAST: CPT

## 2024-12-13 PROCEDURE — 250N000011 HC RX IP 250 OP 636: Performed by: HOSPITALIST

## 2024-12-13 RX ORDER — IOPAMIDOL 755 MG/ML
72 INJECTION, SOLUTION INTRAVASCULAR ONCE
Status: COMPLETED | OUTPATIENT
Start: 2024-12-13 | End: 2024-12-13

## 2024-12-13 RX ADMIN — IOPAMIDOL 72 ML: 755 INJECTION, SOLUTION INTRAVENOUS at 08:34

## 2024-12-13 RX ADMIN — SODIUM CHLORIDE 100 ML: 9 INJECTION, SOLUTION INTRAVENOUS at 08:34

## 2024-12-17 ENCOUNTER — OFFICE VISIT (OUTPATIENT)
Dept: VASCULAR SURGERY | Facility: CLINIC | Age: 59
End: 2024-12-17
Attending: HOSPITALIST
Payer: COMMERCIAL

## 2024-12-17 VITALS
DIASTOLIC BLOOD PRESSURE: 76 MMHG | SYSTOLIC BLOOD PRESSURE: 118 MMHG | RESPIRATION RATE: 16 BRPM | OXYGEN SATURATION: 94 % | HEART RATE: 55 BPM

## 2024-12-17 DIAGNOSIS — I71.40 ABDOMINAL AORTIC ANEURYSM (AAA) WITHOUT RUPTURE, UNSPECIFIED PART (H): Primary | ICD-10-CM

## 2024-12-17 DIAGNOSIS — I77.79 DISSECTION OF OTHER SPECIFIED ARTERY (H): ICD-10-CM

## 2024-12-17 DIAGNOSIS — I72.2 RENAL ARTERY ANEURYSM (H): ICD-10-CM

## 2024-12-17 DIAGNOSIS — I72.8 SUPERIOR MESENTERIC ARTERY ANEURYSM (H): ICD-10-CM

## 2024-12-17 PROCEDURE — G0463 HOSPITAL OUTPT CLINIC VISIT: HCPCS | Performed by: HOSPITALIST

## 2024-12-17 RX ORDER — CARVEDILOL 12.5 MG/1
TABLET ORAL
COMMUNITY
Start: 2024-11-26

## 2024-12-17 RX ORDER — MECLIZINE HYDROCHLORIDE 25 MG/1
TABLET ORAL
COMMUNITY
Start: 2024-09-14

## 2024-12-17 RX ORDER — ONDANSETRON 4 MG/1
TABLET, FILM COATED ORAL
COMMUNITY
Start: 2024-07-17

## 2024-12-17 RX ORDER — APIXABAN 2.5 MG/1
TABLET, FILM COATED ORAL
COMMUNITY
Start: 2024-12-13

## 2024-12-17 RX ORDER — BETAMETHASONE DIPROPIONATE 0.5 MG/G
CREAM TOPICAL
COMMUNITY
Start: 2024-07-13

## 2024-12-17 ASSESSMENT — PAIN SCALES - GENERAL: PAINLEVEL_OUTOF10: NO PAIN (0)

## 2024-12-17 NOTE — Clinical Note
Needs 1 year follow up around 12/17/25 with Dr. Bishop and CTA abdo pelvis prior, order is in. AAA, renal artery aneurysm, and SMA dissection.

## 2024-12-17 NOTE — PROGRESS NOTES
[facility]        Patient is here for a 6 month follow up  to discuss SMA dissection, AAA, and renal artery aneurysm     Pt is currently taking Aspirin, Statin, and Eliquis.    /76   Pulse 55   Resp 16   SpO2 94%     The provider has been notified that the patient has no concerns.     Questions patient would like addressed today are: N/A.    Refills are needed: N/A    Has homecare services and agency name:  No

## 2024-12-17 NOTE — PATIENT INSTRUCTIONS
Abhay Hargrove,    Thank you for entrusting your care with us today. After your visit today with MD Mary Bishop this is the plan that was discussed at your appointment.      Follow up in 1 year with Dr. Bishop and CTA scan prior.  A  will reach out to you closer to that time to schedule.     I am including additional information on these things and our contact information if you have any questions or concerns.   Please do not hesitate to reach out to us if you felt we did not answer your questions or you are unsure of the treatment plan after your visit today. Our number is [phone].Thank you for trusting us with your care.         Again thank you for your time.     Computed Tomography (CT) Scan: About This Test    What is it?  A computed tomography (CT) scan uses X-rays to make detailed pictures of parts of your body and the structures inside your body. During the test, you will lie on a table that is attached to the CT scanner. The CT scanner is a large doughnut-shaped machine.    Why is this test done?  Doctors use CT scans to study areas of the body, such as the brain, chest, belly, spine, bones, or joints. CT scans are also used to assist with or check on the success of a procedure or surgery.    How do you prepare for the test?  In general, there's nothing you have to do before this test, unless your doctor tells you to.    Tell your doctor if you get nervous in tight spaces. You may get a medicine to help you relax. If you think you'll get this medicine, be sure you have someone to take you home.    How is the test done?  Before the test  You may have to take off jewelry.  You will take off all or most of your clothes and change into a gown. If you do leave some clothes on, make sure you take everything out of your pockets.  You may have contrast material (dye) put into your arm through a tube called an IV.    During the test  You will lie on a table that is attached to the CT scanner.  The table  slides into the round opening of the scanner. The table will move during the scan. The scanner moves within the doughnut-shaped casing around your body.  You will be asked to hold still during the scan. You may be asked to hold your breath for short periods.  You may be alone in the scanning room. But a technologist will watch you through a window and talk with you during the test.    How does the test feel?  The test will not cause pain, but some people feel nervous inside the CT scanner.    If a medicine to help you relax (sedative) or dye is used, you may feel a quick sting or pinch when the IV is started. The dye may make you feel warm and flushed and give you a metallic taste in your mouth. Some people feel sick to their stomach or get a headache. Tell the technologist or your doctor how you are feeling.    How long does the test take?  The test will take about 30 to 60 minutes. Most of this time is spent getting ready for the scan. The actual test takes a few minutes.    What happens after the test?  You will probably be able to go home right away.  You can go back to your usual activities right away.  If dye was used, drink plenty of fluids for 24 hours after the test, unless your doctor tells you not to.  Follow-up care is a key part of your treatment and safety. Be sure to make and go to all appointments, and call your doctor if you are having problems. It's also a good idea to keep a list of the medicines you take. Ask your doctor when you can expect to have your test results.    Current as of: December 19, 2022  Author: [employer] Staff  Medical Review:Gui Brown MD - Family Medicine & ALLYN Ugalde MD - Internal Medicine & Juan Francisco Pierre MD - Family Medicine & Bernard Cardozo MD - Family Medicine & Maycol Garcia MD - Diagnostic Radiology

## 2024-12-17 NOTE — PROGRESS NOTES
VASCULAR MEDICINE PROGRESS NOTE          LOCATION:  [facility]       Date of Service: 12/17/2024      Primary Care Provider: Delvin Henning      Reason for the visit/chief complaint:   Follow up on SMA dissection and aneurysm and AAA         Subjective:  Delvin Echevarria is a pleasant 59 year old male who presents to our Vascular Medicine clinic to follow up.    We first met approximately 6 months ago on 7/24/2024.  I refer the reader to my initial consultation note for details.    Briefly, Mr. 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     He was discovered incidentally to have SMA dissection, aneurysm and abdominal aortic aneurysm on initially CT chest done for pulmonary nodules followed by CTA abdomen pelvis April 11, 2024.    Today, he denies any abdominal or back pain.  He had recovered fully from his tibial fracture and walking with no limitations.  Denies intermittent claudication.    He remains in TCU pending placement and working with the social workers.    Past medical history, surgical history, medications, family history, social history and allergies were reviewed. Pertinent points mentioned under HPI.        OBJECTIVE:    Vital signs:  /76   Pulse 55   Resp 16   SpO2 94%   Wt Readings from Last 1 Encounters:   10/24/24 146 lb (66.2 kg)     There is no height or weight on file to calculate BMI.      Physical exam:  General appearance: Pleasant male in no apparent distress.    HEENT: NC/AT.    Neck: Carotids +2/2 bilaterally without bruits.  No jugular venous distension.   Heart: RRR. Normal S1, S2.   Chest: Clear to auscultation  bilaterally.  Abdomen: Visible ventral hernia that is reducible.  Otherwise abdomen is soft, nontender with no pulsatile mass or bruit.  Extremities: No edema.  Popliteal as well as DP and PT 2/2 bilaterally.  Skin: Normal color and temperature.  Neurological: Alert, awake and oriented         DIAGNOSTIC STUDIES:   Labs and diagnostics reviewed including outside records. Pertinent points are mentioned under HPI and assessment and plan sections.        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  This continues to show very stable findings when compared to April scan approximately 8 months ago.  We would recommend continuing current medical management and repeat CTA abdomen pelvis in 1 year for active surveillance.    I was able to do lower extremity exam on him today with no concern for popliteal artery aneurysm based on clinical examination.    Recommendations:  Repeat CTA abdomen pelvis in 1 year.  Continue with aspirin 81 mg and high intensity statin rosuvastatin 40 mg.  LDL 77 before rosuvastatin dose was increased.  Will need repeat lipid panel.  Goal less than 70.  Following with cardiology.  Continue working with cardiology to titrate medications for blood pressure in the setting of HFrEF.  Goal less than 130/80.    It was a pleasure meeting Mr. Echevarria in our clinic again today.      Mary Bishop MD, [facility]  Vascular Medicine  December 17, 2024

## 2025-01-02 ENCOUNTER — HOSPITAL ENCOUNTER (OUTPATIENT)
Dept: CARDIOLOGY | Facility: CLINIC | Age: 60
Discharge: HOME | End: 2025-01-02
Attending: NURSE PRACTITIONER
Payer: COMMERCIAL

## 2025-01-02 ENCOUNTER — OFFICE VISIT (OUTPATIENT)
Dept: CARDIOLOGY | Facility: CLINIC | Age: 60
End: 2025-01-02
Attending: NURSE PRACTITIONER
Payer: COMMERCIAL

## 2025-01-02 VITALS
BODY MASS INDEX: 20.36 KG/M2 | DIASTOLIC BLOOD PRESSURE: 55 MMHG | SYSTOLIC BLOOD PRESSURE: 93 MMHG | HEART RATE: 70 BPM | OXYGEN SATURATION: 94 % | RESPIRATION RATE: 20 BRPM | WEIGHT: 146 LBS

## 2025-01-02 DIAGNOSIS — I25.10 CORONARY ARTERY DISEASE INVOLVING NATIVE CORONARY ARTERY OF NATIVE HEART, UNSPECIFIED WHETHER ANGINA PRESENT: ICD-10-CM

## 2025-01-02 DIAGNOSIS — I50.22 CHRONIC SYSTOLIC HEART FAILURE (H): ICD-10-CM

## 2025-01-02 DIAGNOSIS — I42.8 NONISCHEMIC CARDIOMYOPATHY (H): ICD-10-CM

## 2025-01-02 DIAGNOSIS — I71.40 ABDOMINAL AORTIC ANEURYSM (AAA) WITHOUT RUPTURE, UNSPECIFIED PART (H): ICD-10-CM

## 2025-01-02 DIAGNOSIS — I25.10 NONOBSTRUCTIVE ATHEROSCLEROSIS OF CORONARY ARTERY: ICD-10-CM

## 2025-01-02 DIAGNOSIS — I10 BENIGN ESSENTIAL HYPERTENSION: ICD-10-CM

## 2025-01-02 DIAGNOSIS — R06.09 DOE (DYSPNEA ON EXERTION): ICD-10-CM

## 2025-01-02 LAB — LVEF ECHO: NORMAL

## 2025-01-02 PROCEDURE — 93306 TTE W/DOPPLER COMPLETE: CPT

## 2025-01-02 RX ORDER — CARVEDILOL 12.5 MG/1
12.5 TABLET ORAL 2 TIMES DAILY WITH MEALS
COMMUNITY
Start: 2025-01-02

## 2025-01-02 NOTE — LETTER
1/2/2025    Delvin Henning MD  [address] [place] [id number]    RE: Delvin Echevarria       Dear Colleague,     I had the pleasure of seeing Delvin Echevarria in the [facility].  Cardiology Clinic Progress Note  Delvin Echevarria MRN# [id number]   YOB: 1965 Age: 59 year old      Primary Cardiologist:   Dr. Anne for 2-month follow-up on EF          History of Presenting Illness:      History of heavy alcohol use 4-6 shots of whiskey per night but sober for 10 years.  He does not have a car and relies on friends for transportation.    In July 2023 he had run out of his medications.  He was hospitalized and found to have new onset cardiomyopathy with heart failure, EF 30 to 35%.  He unfortunately did not have appropriate follow-up and did not return until April 2024.  Again he had not been taking medications appropriately.  Echocardiogram at that time showed EF improved to 40 to 45% with aortic root 4 cm.  In June 2024 he restarted Entresto and Lasix along with other cardiac medications.  There were no significant causes of cardiomyopathy found this was thought to possibly be hypertensive.    Summer 2024 patient had femur fractures and has been residing in assisted living since then.  They are in charge of medication administration and if SBP is less than 100 they hold his carvedilol.    Patient was seen by me in October 2020 for.  Due to soft BPs sometimes down in the 80s, I decreased Entresto back to 24-26 mg twice daily.    Most recent lipid profile, BMP, ALT reviewed today.  Echocardiogram performed today is still in process.  This was reviewed by echo tech who noted that LVEF was likely normalized.    BP? Soft 93/55  He notes his carvedilol is 12.5 mg BID, which I confirmed on his med list from the [facility]. I'm not sure if a provider there increased the medication, as we have him getting 6.25 mg BID. We will verify with their nurse.   Weight? Stable   Heart failure symptoms? None        Patient reports no chest pain, shortness of breath, PND, orthopnea, presyncope, syncope, edema, heart racing, or palpitations.                    Assessment and Plan:     Plan  Patient Instructions   Medication Changes:  None     Recommendations:  Call if blood pressure is less than 90 on top or less than 100 with lightheadedness.     Check blood pressure at least 1 hour after medications. Call the clinic if your blood pressure is consistently greater than 130/80.    Check daily weights and call the clinic if your weight has increased more than 2 lbs in one day or 5 lbs in one week; if you feel more short of breath or have worsening swelling in your legs or abdomen.     Follow-up:  Cardiology follow up at [facility]: Dr. Anne in 6 months.  Call in 1 to 2 weeks to schedule.     Cardiology Scheduling~[phone]  Cardiology Clinic RN~[phone] (Elsa RN, Elena RN; Adrianna RN)          Problem List as of 1/2/2025 Reviewed: 4/11/2024 12:54 PM by Kelsie Cohen            Noted       Active Problems    1. Nonischemic cardiomyopathy (H) 8/10/2023     Overview Addendum 1/2/2025  1:26 PM by Roxann German APRN CNP      EF 30 to 35% 7/2023  EF 40 to 45% 6/2024  EF normalized 1/2025 echo   Unclear etiology ?  Hypertensive  Nonobstructive CAD on angiogram, No family history of heart failure, History of alcoholism but sober for 10 years, No recent infections or drug use, normal TSH          Last Assessment & Plan 1/2/2025 Office Visit Written 1/2/2025 12:46 PM by Roxann German APRN CNP      No symptoms of heart failure   Continue GDMT  uptitrate cardiomyopathy medications as tolerated            Relevant Orders     Follow-Up with Cardiology    2. Benign essential hypertension 3/15/2013     Last Assessment & Plan 6/28/2024 Office Visit Written 6/28/2024  1:48 PM by Roxann German APRN CNP      Controlled  Continue current medications          Relevant Orders     Follow-Up with  Cardiology    3. Chronic systolic heart failure (H) 8/10/2023     Relevant Orders     Follow-Up with Cardiology    4. Coronary artery disease involving native coronary artery of native heart, unspecified whether angina present 8/10/2023     Overview Signed 6/28/2024  1:51 PM by Roxann German APRN CNP      Nonobstructive   NSTEMI 7/2023 30% mid LAD, 50% D2.            Last Assessment & Plan 6/28/2024 Office Visit Written 6/28/2024  1:51 PM by Roxann German APRN CNP      No angina  Continue GDMT          Relevant Orders     Follow-Up with Cardiology    5. 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         Respiratory:  clear to auscultation; normal symmetry        Cardiac: regular rate and rhythm     GI:  abdomen nondistended     Extremities and Muscular Skeletal:   no edema                Thank you for allowing me to participate in this delightful patient's care.   This note was completed in part using Dragon voice recognition software. Although reviewed after completion, some word and grammatical errors may occur.    XIN Solorzano CNP                  Thank you for allowing me to participate in the care of your patient.      Sincerely,     XIN Solorzano CNP     [facility]  cc:   XIN Butcher CNP  [address] [place] [address]

## 2025-01-02 NOTE — PROGRESS NOTES
Cardiology Clinic Progress Note  Delvin Echevarria MRN# [id number]   YOB: 1965 Age: 59 year old      Primary Cardiologist:   Dr. Anne for 2-month follow-up on EF          History of Presenting Illness:      History of heavy alcohol use 4-6 shots of whiskey per night but sober for 10 years.  He does not have a car and relies on friends for transportation.    In July 2023 he had run out of his medications.  He was hospitalized and found to have new onset cardiomyopathy with heart failure, EF 30 to 35%.  He unfortunately did not have appropriate follow-up and did not return until April 2024.  Again he had not been taking medications appropriately.  Echocardiogram at that time showed EF improved to 40 to 45% with aortic root 4 cm.  In June 2024 he restarted Entresto and Lasix along with other cardiac medications.  There were no significant causes of cardiomyopathy found this was thought to possibly be hypertensive.    Summer 2024 patient had femur fractures and has been residing in assisted living since then.  They are in charge of medication administration and if SBP is less than 100 they hold his carvedilol.    Patient was seen by me in October 2020 for.  Due to soft BPs sometimes down in the 80s, I decreased Entresto back to 24-26 mg twice daily.    Most recent lipid profile, BMP, ALT reviewed today.  Echocardiogram performed today is still in process.  This was reviewed by echo tech who noted that LVEF was likely normalized.    BP? Soft 93/55  He notes his carvedilol is 12.5 mg BID, which I confirmed on [facility]. I'm not sure if a provider there increased the medication, as we have him getting 6.25 mg BID. We will verify with their nurse.   Weight? Stable   Heart failure symptoms? None       Patient reports no chest pain, shortness of breath, PND, orthopnea, presyncope, syncope, edema, heart racing, or palpitations.                    Assessment and Plan:     Plan  Patient Instructions    Medication Changes:  None     Recommendations:  Call if blood pressure is less than 90 on top or less than 100 with lightheadedness.     Check blood pressure at least 1 hour after medications. Call the clinic if your blood pressure is consistently greater than 130/80.    Check daily weights and call the clinic if your weight has increased more than 2 lbs in one day or 5 lbs in one week; if you feel more short of breath or have worsening swelling in your legs or abdomen.     Follow-up:  Cardiology follow up at [facility]: Dr. Anne in 6 months.  Call in 1 to 2 weeks to schedule.     Cardiology Scheduling~[phone]  Cardiology Clinic RN~[phone] (Elsa RN, Elena RN; Adrianna RN)          Problem List as of 1/2/2025 Reviewed: 4/11/2024 12:54 PM by Kelsie Cohen            Noted       Active Problems    1. Nonischemic cardiomyopathy (H) 8/10/2023     Overview Addendum 1/2/2025  1:26 PM by Roxann German APRN CNP      EF 30 to 35% 7/2023  EF 40 to 45% 6/2024  EF normalized 1/2025 echo   Unclear etiology ?  Hypertensive  Nonobstructive CAD on angiogram, No family history of heart failure, History of alcoholism but sober for 10 years, No recent infections or drug use, normal TSH          Last Assessment & Plan 1/2/2025 Office Visit Written 1/2/2025 12:46 PM by Roxann German APRN CNP      No symptoms of heart failure   Continue GDMT  uptitrate cardiomyopathy medications as tolerated            Relevant Orders     Follow-Up with Cardiology    2. Benign essential hypertension 3/15/2013     Last Assessment & Plan 6/28/2024 Office Visit Written 6/28/2024  1:48 PM by Roxann German APRN CNP      Controlled  Continue current medications          Relevant Orders     Follow-Up with Cardiology    3. Chronic systolic heart failure (H) 8/10/2023     Relevant Orders     Follow-Up with Cardiology    4. Coronary artery disease involving native coronary artery of native heart, unspecified  whether angina present 8/10/2023     Overview Signed 6/28/2024  1:51 PM by Roxann Greman APRN CNP      Nonobstructive   NSTEMI 7/2023 30% mid LAD, 50% D2.            Last Assessment & Plan 6/28/2024 Office Visit Written 6/28/2024  1:51 PM by Roxann German APRN CNP      No angina  Continue GDMT          Relevant Orders     Follow-Up with Cardiology    5. 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         Respiratory:  clear to auscultation; normal symmetry        Cardiac: regular rate and rhythm     GI:  abdomen nondistended     Extremities and Muscular Skeletal:   no edema                Thank you for allowing me to participate in this delightful patient's care.   This note was completed in part using Dragon voice recognition software. Although reviewed after completion, some word and grammatical errors may occur.    XIN Solorzano CNP

## 2025-01-02 NOTE — PATIENT INSTRUCTIONS
Medication Changes:  None     Recommendations:  Call if blood pressure is less than 90 on top or less than 100 with lightheadedness.     Check blood pressure at least 1 hour after medications. Call the clinic if your blood pressure is consistently greater than 130/80.    Check daily weights and call the clinic if your weight has increased more than 2 lbs in one day or 5 lbs in one week; if you feel more short of breath or have worsening swelling in your legs or abdomen.     Follow-up:  Cardiology follow up at [facility]: Dr. Anne in 6 months.  Call in 1 to 2 weeks to schedule.     Cardiology Scheduling~[phone]  Cardiology Clinic RN~[phone] (Elsa RN, Elena RN; Adrianna RN)

## 2025-01-03 ENCOUNTER — APPOINTMENT (OUTPATIENT)
Dept: GENERAL RADIOLOGY | Facility: CLINIC | Age: 60
End: 2025-01-03
Payer: COMMERCIAL

## 2025-01-03 ENCOUNTER — HOSPITAL ENCOUNTER (INPATIENT)
Facility: CLINIC | Age: 60
LOS: 3 days | Discharge: NURSING, REHAB OR LONG-TERM CARE | End: 2025-01-06
Admitting: INTERNAL MEDICINE
Payer: COMMERCIAL

## 2025-01-03 DIAGNOSIS — R09.89 LABILE BLOOD PRESSURE: ICD-10-CM

## 2025-01-03 DIAGNOSIS — J10.1 INFLUENZA A: Primary | ICD-10-CM

## 2025-01-03 DIAGNOSIS — I50.9 HEART FAILURE, UNSPECIFIED HF CHRONICITY, UNSPECIFIED HEART FAILURE TYPE (H): ICD-10-CM

## 2025-01-03 DIAGNOSIS — J18.9 PNEUMONIA DUE TO INFECTIOUS ORGANISM, UNSPECIFIED LATERALITY, UNSPECIFIED PART OF LUNG: ICD-10-CM

## 2025-01-03 DIAGNOSIS — I71.40 ABDOMINAL AORTIC ANEURYSM (AAA) WITHOUT RUPTURE, UNSPECIFIED PART: ICD-10-CM

## 2025-01-03 DIAGNOSIS — I25.10 ATHEROSCLEROSIS OF NATIVE CORONARY ARTERY WITHOUT ANGINA PECTORIS, UNSPECIFIED WHETHER NATIVE OR TRANSPLANTED HEART: ICD-10-CM

## 2025-01-03 DIAGNOSIS — I11.9 MALIGNANT HYPERTENSIVE HEART DISEASE WITHOUT HEART FAILURE: ICD-10-CM

## 2025-01-03 DIAGNOSIS — I42.8 OBSCURE CARDIOMYOPATHY OF AFRICA (H): ICD-10-CM

## 2025-01-03 LAB
ANION GAP SERPL CALCULATED.3IONS-SCNC: 12 MMOL/L (ref 7–15)
BUN SERPL-MCNC: 14.4 MG/DL (ref 8–23)
CALCIUM SERPL-MCNC: 8.1 MG/DL (ref 8.8–10.4)
CHLORIDE SERPL-SCNC: 104 MMOL/L (ref 98–107)
CREAT SERPL-MCNC: 0.86 MG/DL (ref 0.67–1.17)
EGFRCR SERPLBLD CKD-EPI 2021: >90 ML/MIN/1.73M2
ERYTHROCYTE [DISTWIDTH] IN BLOOD BY AUTOMATED COUNT: 17.3 % (ref 10–15)
FLUAV RNA SPEC QL NAA+PROBE: POSITIVE
FLUBV RNA RESP QL NAA+PROBE: NEGATIVE
GLUCOSE SERPL-MCNC: 100 MG/DL (ref 70–99)
HCO3 SERPL-SCNC: 20 MMOL/L (ref 22–29)
HCT VFR BLD AUTO: 31.9 % (ref 40–53)
HGB BLD-MCNC: 10.5 G/DL (ref 13.3–17.7)
LACTATE SERPL-SCNC: 0.6 MMOL/L (ref 0.7–2)
MCH RBC QN AUTO: 30.3 PG (ref 26.5–33)
MCHC RBC AUTO-ENTMCNC: 32.9 G/DL (ref 31.5–36.5)
MCV RBC AUTO: 92 FL (ref 78–100)
PLATELET # BLD AUTO: 126 10E3/UL (ref 150–450)
POTASSIUM SERPL-SCNC: 3.9 MMOL/L (ref 3.4–5.3)
PROCALCITONIN SERPL IA-MCNC: 0.57 NG/ML
RBC # BLD AUTO: 3.47 10E6/UL (ref 4.4–5.9)
RSV RNA SPEC NAA+PROBE: NEGATIVE
SARS-COV-2 RNA RESP QL NAA+PROBE: NEGATIVE
SODIUM SERPL-SCNC: 136 MMOL/L (ref 135–145)
TROPONIN T SERPL HS-MCNC: 14 NG/L
TROPONIN T SERPL HS-MCNC: 14 NG/L
WBC # BLD AUTO: 4.5 10E3/UL (ref 4–11)

## 2025-01-03 PROCEDURE — 71046 X-RAY EXAM CHEST 2 VIEWS: CPT

## 2025-01-03 PROCEDURE — 87040 BLOOD CULTURE FOR BACTERIA: CPT

## 2025-01-03 PROCEDURE — 93010 ELECTROCARDIOGRAM REPORT: CPT

## 2025-01-03 PROCEDURE — 96360 HYDRATION IV INFUSION INIT: CPT

## 2025-01-03 PROCEDURE — 84484 ASSAY OF TROPONIN QUANT: CPT | Performed by: STUDENT IN AN ORGANIZED HEALTH CARE EDUCATION/TRAINING PROGRAM

## 2025-01-03 PROCEDURE — 84145 PROCALCITONIN (PCT): CPT | Performed by: STUDENT IN AN ORGANIZED HEALTH CARE EDUCATION/TRAINING PROGRAM

## 2025-01-03 PROCEDURE — 258N000003 HC RX IP 258 OP 636

## 2025-01-03 PROCEDURE — 80048 BASIC METABOLIC PNL TOTAL CA: CPT

## 2025-01-03 PROCEDURE — 82310 ASSAY OF CALCIUM: CPT

## 2025-01-03 PROCEDURE — 99285 EMERGENCY DEPT VISIT HI MDM: CPT | Mod: 25

## 2025-01-03 PROCEDURE — 36415 COLL VENOUS BLD VENIPUNCTURE: CPT

## 2025-01-03 PROCEDURE — 120N000001 HC R&B MED SURG/OB

## 2025-01-03 PROCEDURE — 96361 HYDRATE IV INFUSION ADD-ON: CPT

## 2025-01-03 PROCEDURE — 83605 ASSAY OF LACTIC ACID: CPT

## 2025-01-03 PROCEDURE — 250N000011 HC RX IP 250 OP 636

## 2025-01-03 PROCEDURE — 250N000013 HC RX MED GY IP 250 OP 250 PS 637

## 2025-01-03 PROCEDURE — 93308 TTE F-UP OR LMTD: CPT

## 2025-01-03 PROCEDURE — 93308 TTE F-UP OR LMTD: CPT | Mod: 26

## 2025-01-03 PROCEDURE — 85027 COMPLETE CBC AUTOMATED: CPT

## 2025-01-03 PROCEDURE — 87637 SARSCOV2&INF A&B&RSV AMP PRB: CPT

## 2025-01-03 PROCEDURE — 36415 COLL VENOUS BLD VENIPUNCTURE: CPT | Performed by: STUDENT IN AN ORGANIZED HEALTH CARE EDUCATION/TRAINING PROGRAM

## 2025-01-03 PROCEDURE — 93005 ELECTROCARDIOGRAM TRACING: CPT

## 2025-01-03 RX ORDER — POLYETHYLENE GLYCOL 3350 17 G/17G
17 POWDER, FOR SOLUTION ORAL 2 TIMES DAILY PRN
Status: DISCONTINUED | OUTPATIENT
Start: 2025-01-03 | End: 2025-01-06 | Stop reason: HOSPADM

## 2025-01-03 RX ORDER — ONDANSETRON 4 MG/1
4 TABLET, ORALLY DISINTEGRATING ORAL EVERY 6 HOURS PRN
Status: DISCONTINUED | OUTPATIENT
Start: 2025-01-03 | End: 2025-01-06 | Stop reason: HOSPADM

## 2025-01-03 RX ORDER — IBUPROFEN 400 MG/1
400 TABLET, FILM COATED ORAL ONCE
Status: COMPLETED | OUTPATIENT
Start: 2025-01-03 | End: 2025-01-03

## 2025-01-03 RX ORDER — AMOXICILLIN 250 MG
1 CAPSULE ORAL 2 TIMES DAILY PRN
Status: DISCONTINUED | OUTPATIENT
Start: 2025-01-03 | End: 2025-01-06 | Stop reason: HOSPADM

## 2025-01-03 RX ORDER — FLUTICASONE FUROATE AND VILANTEROL 100; 25 UG/1; UG/1
1 POWDER RESPIRATORY (INHALATION) DAILY
Status: DISCONTINUED | OUTPATIENT
Start: 2025-01-04 | End: 2025-01-06 | Stop reason: HOSPADM

## 2025-01-03 RX ORDER — OSELTAMIVIR PHOSPHATE 75 MG/1
75 CAPSULE ORAL 2 TIMES DAILY
Status: DISCONTINUED | OUTPATIENT
Start: 2025-01-03 | End: 2025-01-06 | Stop reason: HOSPADM

## 2025-01-03 RX ORDER — CARVEDILOL 12.5 MG/1
12.5 TABLET ORAL 2 TIMES DAILY WITH MEALS
Status: DISCONTINUED | OUTPATIENT
Start: 2025-01-04 | End: 2025-01-04

## 2025-01-03 RX ORDER — IPRATROPIUM BROMIDE AND ALBUTEROL SULFATE 2.5; .5 MG/3ML; MG/3ML
3 SOLUTION RESPIRATORY (INHALATION) EVERY 4 HOURS PRN
Status: DISCONTINUED | OUTPATIENT
Start: 2025-01-03 | End: 2025-01-06 | Stop reason: HOSPADM

## 2025-01-03 RX ORDER — ONDANSETRON 2 MG/ML
4 INJECTION INTRAMUSCULAR; INTRAVENOUS EVERY 6 HOURS PRN
Status: DISCONTINUED | OUTPATIENT
Start: 2025-01-03 | End: 2025-01-06 | Stop reason: HOSPADM

## 2025-01-03 RX ORDER — TAMSULOSIN HYDROCHLORIDE 0.4 MG/1
0.4 CAPSULE ORAL DAILY
Status: DISCONTINUED | OUTPATIENT
Start: 2025-01-04 | End: 2025-01-06 | Stop reason: HOSPADM

## 2025-01-03 RX ORDER — ALBUTEROL SULFATE 90 UG/1
2 INHALANT RESPIRATORY (INHALATION) EVERY 6 HOURS PRN
Status: DISCONTINUED | OUTPATIENT
Start: 2025-01-03 | End: 2025-01-06 | Stop reason: HOSPADM

## 2025-01-03 RX ORDER — PROCHLORPERAZINE MALEATE 5 MG/1
10 TABLET ORAL EVERY 6 HOURS PRN
Status: DISCONTINUED | OUTPATIENT
Start: 2025-01-03 | End: 2025-01-06 | Stop reason: HOSPADM

## 2025-01-03 RX ORDER — ACETAMINOPHEN 325 MG/1
650 TABLET ORAL EVERY 4 HOURS PRN
Status: DISCONTINUED | OUTPATIENT
Start: 2025-01-03 | End: 2025-01-06 | Stop reason: HOSPADM

## 2025-01-03 RX ORDER — ROSUVASTATIN CALCIUM 20 MG/1
40 TABLET, COATED ORAL DAILY
Status: DISCONTINUED | OUTPATIENT
Start: 2025-01-04 | End: 2025-01-06 | Stop reason: HOSPADM

## 2025-01-03 RX ORDER — HYDROXYZINE HYDROCHLORIDE 25 MG/1
25-50 TABLET, FILM COATED ORAL EVERY 6 HOURS PRN
Status: DISCONTINUED | OUTPATIENT
Start: 2025-01-03 | End: 2025-01-06 | Stop reason: HOSPADM

## 2025-01-03 RX ORDER — CEFTRIAXONE 2 G/1
2 INJECTION, POWDER, FOR SOLUTION INTRAMUSCULAR; INTRAVENOUS EVERY 24 HOURS
Status: DISCONTINUED | OUTPATIENT
Start: 2025-01-03 | End: 2025-01-06 | Stop reason: HOSPADM

## 2025-01-03 RX ORDER — ACETAMINOPHEN 500 MG
1000 TABLET ORAL ONCE
Status: COMPLETED | OUTPATIENT
Start: 2025-01-03 | End: 2025-01-03

## 2025-01-03 RX ORDER — ASPIRIN 81 MG/1
81 TABLET, CHEWABLE ORAL DAILY
Status: DISCONTINUED | OUTPATIENT
Start: 2025-01-04 | End: 2025-01-06 | Stop reason: HOSPADM

## 2025-01-03 RX ORDER — CALCIUM CARBONATE 500 MG/1
1000 TABLET, CHEWABLE ORAL 4 TIMES DAILY PRN
Status: DISCONTINUED | OUTPATIENT
Start: 2025-01-03 | End: 2025-01-06 | Stop reason: HOSPADM

## 2025-01-03 RX ORDER — AMOXICILLIN 250 MG
2 CAPSULE ORAL 2 TIMES DAILY PRN
Status: DISCONTINUED | OUTPATIENT
Start: 2025-01-03 | End: 2025-01-06 | Stop reason: HOSPADM

## 2025-01-03 RX ORDER — LIDOCAINE 40 MG/G
CREAM TOPICAL
Status: DISCONTINUED | OUTPATIENT
Start: 2025-01-03 | End: 2025-01-06 | Stop reason: HOSPADM

## 2025-01-03 RX ORDER — FUROSEMIDE 20 MG/1
20 TABLET ORAL DAILY
Status: DISCONTINUED | OUTPATIENT
Start: 2025-01-04 | End: 2025-01-06 | Stop reason: HOSPADM

## 2025-01-03 RX ADMIN — CEFTRIAXONE 2 G: 2 INJECTION, POWDER, FOR SOLUTION INTRAMUSCULAR; INTRAVENOUS at 23:58

## 2025-01-03 RX ADMIN — ACETAMINOPHEN 650 MG: 325 TABLET, FILM COATED ORAL at 23:54

## 2025-01-03 RX ADMIN — SODIUM CHLORIDE 1000 ML: 9 INJECTION, SOLUTION INTRAVENOUS at 12:14

## 2025-01-03 RX ADMIN — IBUPROFEN 400 MG: 400 TABLET, FILM COATED ORAL at 09:55

## 2025-01-03 RX ADMIN — OSELTAMAVIR PHOSPHATE 75 MG: 75 CAPSULE ORAL at 23:56

## 2025-01-03 RX ADMIN — APIXABAN 2.5 MG: 2.5 TABLET, FILM COATED ORAL at 23:57

## 2025-01-03 RX ADMIN — SACUBITRIL AND VALSARTAN 1 TABLET: 24; 26 TABLET, FILM COATED ORAL at 23:57

## 2025-01-03 RX ADMIN — HYDROXYZINE HYDROCHLORIDE 25 MG: 25 TABLET, FILM COATED ORAL at 23:55

## 2025-01-03 RX ADMIN — Medication 5 MG: at 23:55

## 2025-01-03 ASSESSMENT — COLUMBIA-SUICIDE SEVERITY RATING SCALE - C-SSRS
2. HAVE YOU ACTUALLY HAD ANY THOUGHTS OF KILLING YOURSELF IN THE PAST MONTH?: NO
1. IN THE PAST MONTH, HAVE YOU WISHED YOU WERE DEAD OR WISHED YOU COULD GO TO SLEEP AND NOT WAKE UP?: NO
6. HAVE YOU EVER DONE ANYTHING, STARTED TO DO ANYTHING, OR PREPARED TO DO ANYTHING TO END YOUR LIFE?: NO

## 2025-01-03 ASSESSMENT — ACTIVITIES OF DAILY LIVING (ADL)
ADLS_ACUITY_SCORE: 54
ADLS_ACUITY_SCORE: 38
ADLS_ACUITY_SCORE: 54
ADLS_ACUITY_SCORE: 54

## 2025-01-03 NOTE — MEDICATION SCRIBE - ADMISSION MEDICATION HISTORY
Medication Scribe Admission Medication History    Admission medication history is complete. The information provided in this note is only as accurate as the sources available at the time of the update.    Information Source(s): Patient via in-person    Pertinent Information:     Changes made to PTA medication list:  Added: None  Deleted: Betamethasone Dipropionate 0.05%, Antivert 25 mg, Robaxin 500 mg, Oxycodone 5 mg, Miralax 17 g    Changed: None    Allergies reviewed with patient and updates made in EHR: yes    Medication History Completed By: Liam Brown 1/3/2025 4:34 PM    PTA Med List   Medication Sig Last Dose/Taking    acetaminophen (TYLENOL) 500 MG tablet Take 1,000 mg by mouth 1/3/2025 Morning    albuterol (PROAIR HFA/PROVENTIL HFA/VENTOLIN HFA) 108 (90 Base) MCG/ACT inhaler Inhale 2 puffs into the lungs every 6 hours as needed for shortness of breath, wheezing or cough More than a month    aspirin (ASA) 81 MG chewable tablet Take 1 tablet (81 mg) by mouth daily 1/3/2025 Morning    carvedilol (COREG) 12.5 MG tablet Take 1 tablet (12.5 mg) by mouth 2 times daily (with meals). Hold if systolic less then 110 Past Week    ELIQUIS ANTICOAGULANT 2.5 MG tablet  1/3/2025 Morning    fluticasone-salmeterol (ADVAIR) 250-50 MCG/ACT inhaler Inhale 1 puff into the lungs every 12 hours 1/3/2025 Morning    furosemide (LASIX) 20 MG tablet Take 1 tablet (20 mg) by mouth daily 1/3/2025 Morning    hydrOXYzine HCl (ATARAX) 25 MG tablet Take 1-2 tablets (25-50 mg) by mouth every 6 hours as needed for itching More than a month    magnesium hydroxide (MOM) 2400 MG/10ML SUSP Take 5 mLs by mouth daily as needed for constipation. Past Month    ondansetron (ZOFRAN) 4 MG tablet  Past Month    rosuvastatin (CRESTOR) 40 MG tablet Take 1 tablet (40 mg) by mouth daily 1/3/2025 Morning    sacubitril-valsartan (ENTRESTO) 24-26 MG per tablet Take 1 tablet by mouth 2 times daily. 1/3/2025 Morning    spacer (OPTICHAMBER SAIDA) holding  chamber Use with albuterol More than a month    tamsulosin (FLOMAX) 0.4 MG capsule Take 0.4 mg by mouth 1/3/2025 Morning

## 2025-01-03 NOTE — ED PROVIDER NOTES
"[facility]  Emergency Department Visit Note    PATIENT:  Delvin Echevarria     59 year old     male      [id number]    Chief complaint:  Chief Complaint   Patient presents with    Cough     Pt is from [facility], pt reports cough and fever for the past couple days, weakness. Pt temp was 104.6 en route. Pt reports sob    Fever          History of present illness:  Patient is a 59 year old male with nonischemic cardiomyopathy, CHF, CAD, AAA, HTN, orthopedic injuries (right distal femur, left tibial plateau) necessitating ongoing assisted living residence presenting for evaluation of fever.    Reports 2 days of fever, generalized bodyaches, cough, generalized weakness, \"raspy\" throat.  Has chest pain associated with cough.  No vomiting, abdominal pain, changes in bowel or bladder patterns.  To his knowledge, nobody else is sick at home.      Review of Systems:  As in HPI above    /80   Pulse 81   Temp (!) 101  F (38.3  C) (Oral)   Resp 22   Ht 1.803 m (5' 11\")   Wt 63.5 kg (140 lb)   SpO2 90%   BMI 19.53 kg/m        Physical Exam:  Constitutional: laying in hospital bed, alert, oriented, and appears uncomfortable  HEENT: normocephalic, atraumatic, pupils 3mm, equal, round, and reactive to light, sclerae anicteric, and extraocular motions intact  Neck: no stridor  Cardiovascular: regular rate and rhythm and no murmurs, rubs, or extra heart sounds  Pulmonary: breathing comfortably on room air and lungs clear to auscultation bilaterally  Abdominal: soft, non-tender, non-distended and large ventral hernia easily reducible without overlying skin change  Extremities/MSK: no peripheral edema  Skin: diaphoretic  Neurologic: moves all four extremities spontaneously, 5/5  strength bilaterally, and 5/5 strength in dorsiflexion and plantarflexion bilaterally  Psychiatric: calm, appropriate      MDM:  Patient is a 59 year old male with above history presenting for evaluation of fever and " associated upper respiratory symptoms.    Vitals notable for fever (received acetaminophen by EMS), borderline hypoxia with oxygen saturations in the 85 to 91% range on room air. Exam notable for patient who appears uncomfortable though who is alert and answering questions appropriately.  Diaphoretic..    High community prevalence of viral URIs, most likely explanation.  Bacterial pneumonia possible.  Chest pain likely musculoskeletal related to coughing, generally low concern for ACS though patient at high risk given underlying cardiomyopathy.  Doubt PE given more likely explanation as above.    Labs, ECG, chest x-ray.  Ibuprofen for additional antipyretic.  Patient tolerating oral intake.    Disposition pending above workup and response to therapeutics. Remainder of ED course below.    ED COURSE:  ED Course as of 01/03/25 1601   Fri Jan 03, 2025   1016 CBC with platelets(!)  Reassuring, chronic anemia and thrombocytopenia.  No leukocytosis reassuring against significant systemic infectious or inflammatory process.   1024 EKG 12 lead  ECG:  - Ventricular rate 69 bpm, regular  - CO, QRS, QT intervals normal  - Axis borderline left deviated  - no ST segment or T wave changes concerning for acute ischemia  - Comparison to prior ECGs: No significant change  - My independent interpretation: Sinus rhythm, evidence of prior inferolateral ischemic event   1025 Influenza A(!): Positive  Likely explain symptoms   1103 Basic metabolic panel(!)  Grossly within normal limits.  No concerning findings.   1123 Chest XR,  PA & LAT  No clearly acute finding that would otherwise point toward bacterial pneumonia.   1123 Overall, clinical picture most consistent with influenza A causing symptoms.  Will continue monitoring to see whether he has acute oxygen need.   1201 Patient now hypotensive to the 60s to 80s including on recheck several times and on different arm.  Adding 1 L saline.   1212 POC US ECHO LIMITED  Bedside echo grossly  normal systolic function.  No pericardial effusion.  RV not dilated compared to LV.  I did also review formal echocardiogram done yesterday with normal EF, normal RV size and function, no gross change from yesterday.   1546 Patient reassessed, resting comfortably.  Blood pressure has improved into the 90s systolic again.  Patient still with labile blood pressures over the past several hours, borderline hypoxia.  Feel he warrants a night of observation to declare himself 1 way or the other.  Hospitalist paged.   1600 Spoke with hospitalist, planning for admission.  Bed requested.       Encounter Diagnoses:  Final diagnoses:   Influenza A   Labile blood pressure       Final disposition: [facility] admission    Grant Barcenas MD  1/3/2025  9:55 AM   Emergency Medicine  [facility]      Grant Barcenas MD  01/03/25 3871

## 2025-01-03 NOTE — ED NOTES
Took call form [place], spoke to Wendy, update given. Please call [phone] with further updates

## 2025-01-03 NOTE — ED TRIAGE NOTES
Pt is from [facility], Pt is there for rehab after a right leg femur fx, pt has been there since July 2024. Pt reports cough and fever for the past couple days, weakness. Pt temp was 104.6 en route. Pt reports sob

## 2025-01-04 PROBLEM — S82.122A CLOSED FRACTURE OF LATERAL PORTION OF LEFT TIBIAL PLATEAU: Status: RESOLVED | Noted: 2024-07-10 | Resolved: 2025-01-04

## 2025-01-04 LAB
ALBUMIN SERPL BCG-MCNC: 3.4 G/DL (ref 3.5–5.2)
ALP SERPL-CCNC: 81 U/L (ref 40–150)
ALT SERPL W P-5'-P-CCNC: 25 U/L (ref 0–70)
ANION GAP SERPL CALCULATED.3IONS-SCNC: 15 MMOL/L (ref 7–15)
AST SERPL W P-5'-P-CCNC: 33 U/L (ref 0–45)
ATRIAL RATE - MUSE: 69 BPM
BACTERIA SPT CULT: NORMAL
BASOPHILS # BLD AUTO: 0 10E3/UL (ref 0–0.2)
BASOPHILS NFR BLD AUTO: 1 %
BILIRUB SERPL-MCNC: 0.4 MG/DL
BUN SERPL-MCNC: 17.6 MG/DL (ref 8–23)
CALCIUM SERPL-MCNC: 8.3 MG/DL (ref 8.8–10.4)
CHLORIDE SERPL-SCNC: 102 MMOL/L (ref 98–107)
CREAT SERPL-MCNC: 0.83 MG/DL (ref 0.67–1.17)
DIASTOLIC BLOOD PRESSURE - MUSE: NORMAL MMHG
EGFRCR SERPLBLD CKD-EPI 2021: >90 ML/MIN/1.73M2
EOSINOPHIL # BLD AUTO: 0 10E3/UL (ref 0–0.7)
EOSINOPHIL NFR BLD AUTO: 1 %
ERYTHROCYTE [DISTWIDTH] IN BLOOD BY AUTOMATED COUNT: 17.6 % (ref 10–15)
GLUCOSE SERPL-MCNC: 87 MG/DL (ref 70–99)
GRAM STAIN RESULT: NORMAL
HCO3 SERPL-SCNC: 18 MMOL/L (ref 22–29)
HCT VFR BLD AUTO: 30.6 % (ref 40–53)
HGB BLD-MCNC: 10.3 G/DL (ref 13.3–17.7)
IMM GRANULOCYTES # BLD: 0 10E3/UL
IMM GRANULOCYTES NFR BLD: 0 %
INTERPRETATION ECG - MUSE: NORMAL
L PNEUMO1 AG UR QL IA: NEGATIVE
LYMPHOCYTES # BLD AUTO: 1.3 10E3/UL (ref 0.8–5.3)
LYMPHOCYTES NFR BLD AUTO: 22 %
MCH RBC QN AUTO: 31 PG (ref 26.5–33)
MCHC RBC AUTO-ENTMCNC: 33.7 G/DL (ref 31.5–36.5)
MCV RBC AUTO: 92 FL (ref 78–100)
MONOCYTES # BLD AUTO: 0.5 10E3/UL (ref 0–1.3)
MONOCYTES NFR BLD AUTO: 9 %
NEUTROPHILS # BLD AUTO: 4 10E3/UL (ref 1.6–8.3)
NEUTROPHILS NFR BLD AUTO: 68 %
NRBC # BLD AUTO: 0 10E3/UL
NRBC BLD AUTO-RTO: 0 /100
P AXIS - MUSE: 45 DEGREES
PLATELET # BLD AUTO: 123 10E3/UL (ref 150–450)
POTASSIUM SERPL-SCNC: 4.2 MMOL/L (ref 3.4–5.3)
PR INTERVAL - MUSE: 146 MS
PROT SERPL-MCNC: 5.9 G/DL (ref 6.4–8.3)
QRS DURATION - MUSE: 84 MS
QT - MUSE: 354 MS
QTC - MUSE: 379 MS
R AXIS - MUSE: -12 DEGREES
RBC # BLD AUTO: 3.32 10E6/UL (ref 4.4–5.9)
S PNEUM AG SPEC QL: NEGATIVE
SODIUM SERPL-SCNC: 135 MMOL/L (ref 135–145)
SYSTOLIC BLOOD PRESSURE - MUSE: NORMAL MMHG
T AXIS - MUSE: 262 DEGREES
VENTRICULAR RATE- MUSE: 69 BPM
WBC # BLD AUTO: 5.8 10E3/UL (ref 4–11)

## 2025-01-04 PROCEDURE — 99222 1ST HOSP IP/OBS MODERATE 55: CPT | Performed by: INTERNAL MEDICINE

## 2025-01-04 PROCEDURE — 36415 COLL VENOUS BLD VENIPUNCTURE: CPT

## 2025-01-04 PROCEDURE — 258N000003 HC RX IP 258 OP 636

## 2025-01-04 PROCEDURE — 250N000011 HC RX IP 250 OP 636

## 2025-01-04 PROCEDURE — 87899 AGENT NOS ASSAY W/OPTIC: CPT

## 2025-01-04 PROCEDURE — 120N000001 HC R&B MED SURG/OB

## 2025-01-04 PROCEDURE — 87070 CULTURE OTHR SPECIMN AEROBIC: CPT

## 2025-01-04 PROCEDURE — 87205 SMEAR GRAM STAIN: CPT

## 2025-01-04 PROCEDURE — 85025 COMPLETE CBC W/AUTO DIFF WBC: CPT

## 2025-01-04 PROCEDURE — 82040 ASSAY OF SERUM ALBUMIN: CPT

## 2025-01-04 PROCEDURE — 80053 COMPREHEN METABOLIC PANEL: CPT

## 2025-01-04 PROCEDURE — 250N000012 HC RX MED GY IP 250 OP 636 PS 637: Performed by: STUDENT IN AN ORGANIZED HEALTH CARE EDUCATION/TRAINING PROGRAM

## 2025-01-04 PROCEDURE — 99207 PR NO BILLABLE SERVICE THIS VISIT: CPT | Performed by: STUDENT IN AN ORGANIZED HEALTH CARE EDUCATION/TRAINING PROGRAM

## 2025-01-04 PROCEDURE — 250N000013 HC RX MED GY IP 250 OP 250 PS 637

## 2025-01-04 RX ORDER — PREDNISONE 20 MG/1
40 TABLET ORAL DAILY
Status: DISCONTINUED | OUTPATIENT
Start: 2025-01-04 | End: 2025-01-06 | Stop reason: HOSPADM

## 2025-01-04 RX ORDER — AZITHROMYCIN 250 MG/1
500 TABLET, FILM COATED ORAL DAILY
Status: COMPLETED | OUTPATIENT
Start: 2025-01-05 | End: 2025-01-06

## 2025-01-04 RX ORDER — CARVEDILOL 6.25 MG/1
6.25 TABLET ORAL 2 TIMES DAILY WITH MEALS
Status: DISCONTINUED | OUTPATIENT
Start: 2025-01-04 | End: 2025-01-06 | Stop reason: HOSPADM

## 2025-01-04 RX ADMIN — PREDNISONE 40 MG: 20 TABLET ORAL at 14:47

## 2025-01-04 RX ADMIN — TAMSULOSIN HYDROCHLORIDE 0.4 MG: 0.4 CAPSULE ORAL at 08:34

## 2025-01-04 RX ADMIN — APIXABAN 2.5 MG: 2.5 TABLET, FILM COATED ORAL at 08:35

## 2025-01-04 RX ADMIN — OSELTAMAVIR PHOSPHATE 75 MG: 75 CAPSULE ORAL at 20:07

## 2025-01-04 RX ADMIN — ASPIRIN 81 MG 81 MG: 81 TABLET ORAL at 08:35

## 2025-01-04 RX ADMIN — CARVEDILOL 12.5 MG: 12.5 TABLET, FILM COATED ORAL at 08:34

## 2025-01-04 RX ADMIN — FLUTICASONE FUROATE AND VILANTEROL TRIFENATATE 1 PUFF: 100; 25 POWDER RESPIRATORY (INHALATION) at 08:37

## 2025-01-04 RX ADMIN — ACETAMINOPHEN 650 MG: 325 TABLET, FILM COATED ORAL at 14:49

## 2025-01-04 RX ADMIN — ROSUVASTATIN CALCIUM 40 MG: 20 TABLET, FILM COATED ORAL at 08:34

## 2025-01-04 RX ADMIN — OSELTAMAVIR PHOSPHATE 75 MG: 75 CAPSULE ORAL at 08:34

## 2025-01-04 RX ADMIN — AZITHROMYCIN MONOHYDRATE 500 MG: 500 INJECTION, POWDER, LYOPHILIZED, FOR SOLUTION INTRAVENOUS at 01:04

## 2025-01-04 ASSESSMENT — ACTIVITIES OF DAILY LIVING (ADL)
ADLS_ACUITY_SCORE: 38
ADLS_ACUITY_SCORE: 41
ADLS_ACUITY_SCORE: 41
ADLS_ACUITY_SCORE: 40
ADLS_ACUITY_SCORE: 41
ADLS_ACUITY_SCORE: 40
ADLS_ACUITY_SCORE: 40
ADLS_ACUITY_SCORE: 41
ADLS_ACUITY_SCORE: 40
ADLS_ACUITY_SCORE: 41
ADLS_ACUITY_SCORE: 40
ADLS_ACUITY_SCORE: 40
ADLS_ACUITY_SCORE: 41
ADLS_ACUITY_SCORE: 38
ADLS_ACUITY_SCORE: 41
ADLS_ACUITY_SCORE: 38

## 2025-01-04 NOTE — H&P
[facility]    History and Physical - Hospitalist Service       Date of Admission:  1/3/2025    Assessment & Plan      Delvin Echevarria is a 59 year old male admitted on 1/3/2025. He presented to the ED with fevers and was found to be positive for Influenza A.     Sepsis secondary to Influenza A  Acute respiratory failure  COPD  He presented to the ED with shortness of breath and was found to have influenza A. I do not suepect any hypoxia from CHF given his imaging and physical exam.,   - admit to hospitalist service  - c/w oseltamivir  - c/w supplemental oxygen  - c/w albuterol as needed  - c/w Advair    CAD  HFrEF  His last EF is 30-35%. He is not overloaded currently.   - I/O, daily weights  - c/w carvedilol  - c/w aspirin  - c/w Entresto  - c/w rosuvastatin  - c/w furosemide    History of DVT  - c/w apixaban    BPH  - c/w tamsulosin         Diet: 2 Gram Sodium Diet  DVT Prophylaxis: DOAC  Rivera Catheter: Not present  Lines: None     Code Status: Full Code    Clinically Significant Risk Factors Present on Admission                # Drug Induced Coagulation Defect: home medication list includes an anticoagulant medication  # Drug Induced Platelet Defect: home medication list includes an antiplatelet medication   # Hypertension: Noted on problem list  # Heart failure with preserved ejection fraction: EF >50% and home medication list includes sacubitril/valsartan (Entresto)     # Anemia: based on hgb <11           # Financial/Environmental Concerns:           Disposition Plan      Expected Discharge Date: 01/05/2025                The patient's care was discussed with the Bedside Nurse and Patient.        Ge Read MD  [facility]  Securely message with the Vocera Web Console (learn more here)  Text page via [employer] Paging/Directory      Visit/Communication Style   Virtual (Video) communication was used to evaluate Delvin.  Delvin consented to the use of video  communication: yes  Video START time: 2240, 1/4/2025  Video STOP time: 2300, 1/4/2025   Patient's location: [facility]   Provider's location during the visit: [facility] Tele-medicine site        ______________________________________________________________________    Chief Complaint   Shortness of breath, Fever    History is obtained from the patient    History of Present Illness   Delvin Echevarria is a 59 year old male who presented to the ED with shortness of breath, cough and fever. He states that it started 3 days ago with chills and body aches. It progressed to involve coughing shortness of breath. He did not measure his temperature. He did not have any swelling in his legs, orthopnea or chest pain, except when he coughs. He denies any wheezing. He has a little phlegm when he coughs. He quit smoking last month, but he still smokes cannabis once in a while, the last time being on 1/1/2025.     Review of Systems    General: negative for sweats, weakness  Eyes: negative for blurred vision, loss of vision  Ear Nose and Throat: negative for pharyngitis, speech or swallowing difficulties  Respiratory:  negative for sputum production, GOODMAN, pleuritic pain  Cardiology:  negative for palpitations, orthopnea, PND, edema, syncope   Gastrointestinal: negative for abdominal pain, nausea, vomiting, diarrhea, constipation, hematemesis, melena or hematochezia  Genitourinary: negative for frequency, urgency, dysuria, hematuria   Neurological: negative for focal weakness, paresthesia    Past Medical History    I have reviewed this patient's medical history and updated it with pertinent information if needed.   Past Medical History:   Diagnosis Date    AAA (abdominal aortic aneurysm) (H)     Abscess 08/11/2017    Acute posthemorrhagic anemia 12/14/2016    Anemia due to blood loss, acute 09/30/2016    CAD (coronary artery disease)     Chronic systolic heart failure (H)     Closed fracture of lateral  portion of left tibial plateau 07/10/2024    Closed fracture of right tibia and fibula with routine healing 09/29/2016    DDD (degenerative disc disease), lumbar     Dissection of mesenteric artery (H)     Stable    DVT (deep venous thrombosis) (H)     Hypertension 03/15/2013    Marijuana use     4x daily    Non-ischemic cardiomyopathy (H)     Nonobstructive atherosclerosis of coronary artery 08/10/2023    Staph aureus infection 09/13/2017    Tibial plateau fracture 09/11/2016    Tobacco use     Wound infection 09/29/2016       Past Surgical History   I have reviewed this patient's surgical history and updated it with pertinent information if needed.  Past Surgical History:   Procedure Laterality Date    APPLY EXTERNAL FIXATOR LOWER EXTREMITY Right 09/12/2016    Procedure: APPLY EXTERNAL FIXATOR LOWER EXTREMITY;  Surgeon: John Gonzales MD;  Location: [facility] OR    CV CORONARY ANGIOGRAM N/A 7/24/2023    Procedure: Coronary Angiogram;  Surgeon: Boubacar Camarena MD;  Location: [facility] CARDIAC CATH LAB    CV LEFT HEART CATH N/A 7/24/2023    Procedure: Left Heart Catheterization;  Surgeon: Boubacar Camarena MD;  Location: [facility] CARDIAC CATH LAB    CV LEFT VENTRICULOGRAM N/A 7/24/2023    Procedure: Left Ventriculogram;  Surgeon: Boubacar Camarena MD;  Location: [facility] CARDIAC CATH LAB    HERNIORRHAPHY INGUINAL  07/02/2012    Procedure: HERNIORRHAPHY INGUINAL;  Open Repair Right Inguinal Hernia with Mesh;  Surgeon: Avni Maxwell MD;  Location: [facility] OR    INCISION AND DRAINAGE BONE LOWER EXTREMITY, COMBINED Right 08/11/2017    Procedure: COMBINED INCISION AND DRAINAGE BONE LOWER EXTREMITY;  Irrigation and Debridement Right Tibia,  Removal of Hardware;  Surgeon: Kenroy Dos Santos MD;  Location: [facility] OR    KNEE SURGERY Left 03/01/2021    patella fracture repair    LARYNGECTOMY  Several Years ago    Partail removal due to fish bone    OPEN REDUCTION INTERNAL FIXATION PATELLA Left 3/1/2021     Procedure: OPEN REDUCTION INTERNAL FIXATION, FRACTURE, PATELLA;  Surgeon: Papa Pena MD;  Location: [facility] OR    OPEN REDUCTION INTERNAL FIXATION TIBIA Right 09/15/2016    Procedure: OPEN REDUCTION INTERNAL FIXATION TIBIA;  Surgeon: Miguel A Green MD;  Location: [facility] OR    OPEN REDUCTION INTERNAL FIXATION TIBIA Right 2017    Procedure: OPEN REDUCTION INTERNAL FIXATION TIBIA;  Right Tibia Fibula Open Reduction Internal Fixation;  Surgeon: Miguel A Green MD;  [facility] OR    REMOVE HARDWARE LOWER EXTREMITY Right 2017    Procedure: REMOVE HARDWARE LOWER EXTREMITY;;  Surgeon: Kenroy Dos Santos MD;  Location: [facility] OR       Social History   I have reviewed this patient's social history and updated it with pertinent information if needed.  Social History     Tobacco Use    Smoking status: Former     Current packs/day: 0.00     Average packs/day: 0.5 packs/day for 25.0 years (12.5 ttl pk-yrs)     Types: Cigarettes     Start date: 12/10/1999     Quit date: 12/10/2024     Years since quittin.0    Smokeless tobacco: Never    Tobacco comments:     10 Cigarettes a day   Vaping Use    Vaping status: Never Used   Substance Use Topics    Alcohol use: No    Drug use: Not Currently     Types: Marijuana     Comment: Previously smoked 4x daily       Family History   I have reviewed this patient's family history and updated it with pertinent information if needed.  Family History   Problem Relation Age of Onset    Hypertension Mother     Hypertension Brother        Prior to Admission Medications   Prior to Admission Medications   Prescriptions Last Dose Informant Patient Reported? Taking?   ELIQUIS ANTICOAGULANT 2.5 MG tablet 1/3/2025 Morning  Yes Yes   Sig: Take 2.5 mg by mouth 2 times daily.   acetaminophen (TYLENOL) 500 MG tablet 1/3/2025 Morning  Yes Yes   Sig: Take 1,000 mg by mouth   albuterol (PROAIR HFA/PROVENTIL HFA/VENTOLIN HFA) 108 (90 Base) MCG/ACT inhaler More than a month Self  No Yes   Sig: Inhale 2 puffs into the lungs every 6 hours as needed for shortness of breath, wheezing or cough   aspirin (ASA) 81 MG chewable tablet 1/3/2025 Morning Self No Yes   Sig: Take 1 tablet (81 mg) by mouth daily   carvedilol (COREG) 12.5 MG tablet Past Week  Yes Yes   Sig: Take 1 tablet (12.5 mg) by mouth 2 times daily (with meals). Hold if systolic less then 110   fluticasone-salmeterol (ADVAIR) 250-50 MCG/ACT inhaler 1/3/2025 Morning Self No Yes   Sig: Inhale 1 puff into the lungs every 12 hours   furosemide (LASIX) 20 MG tablet 1/3/2025 Morning Self No Yes   Sig: Take 1 tablet (20 mg) by mouth daily   hydrOXYzine HCl (ATARAX) 25 MG tablet More than a month Self No Yes   Sig: Take 1-2 tablets (25-50 mg) by mouth every 6 hours as needed for itching   magnesium hydroxide (MOM) 2400 MG/10ML SUSP Past Month  Yes Yes   Sig: Take 5 mLs by mouth daily as needed for constipation.   ondansetron (ZOFRAN) 4 MG tablet Past Month  Yes Yes   rosuvastatin (CRESTOR) 40 MG tablet 1/3/2025 Morning Self No Yes   Sig: Take 1 tablet (40 mg) by mouth daily   sacubitril-valsartan (ENTRESTO) 24-26 MG per tablet 1/3/2025 Morning  No Yes   Sig: Take 1 tablet by mouth 2 times daily.   spacer (OPTICHAMBER SAIDA) holding chamber More than a month Self No Yes   Sig: Use with albuterol   tamsulosin (FLOMAX) 0.4 MG capsule 1/3/2025 Morning  Yes Yes   Sig: Take 0.4 mg by mouth      Facility-Administered Medications: None     Allergies   Allergies   Allergen Reactions    Seasonal Allergies Other (See Comments)     Congestion sinuses    Codeine Nausea       Physical Exam   Vital Signs: Temp: 100  F (37.8  C) Temp src: Oral BP: 132/68 Pulse: 65   Resp: 24 SpO2: 91 % O2 Device: Nasal cannula Oxygen Delivery: 2 LPM  Weight: 140 lbs 0 oz    Gen:  Well-developed, well-nourished, in no acute distress, lying semi-supine in hospital stretcher  HEENT:  Anicteric sclera, PER, hearing intact to voice  Resp:  No accessory muscle use, breath sounds  clear; no wheezes no rales no rhonchi  Card:  Regular rhythm and rate, no murmur, normal S1, S2, no JVD, no LE edema  Abd:  Soft per RN exam, no TTP, non-distended, normoactive bowel sounds are present  Musc:  Normal strength and movement of the major muscle groups without obvious deformity  Skin: Intact, no rashes noted   Psych:  Good insight, oriented to person, place and time, not anxious, not agitated  Neuro: CN 2-12 intact, no focal deficits or sensory deficits  Data     Recent Labs   Lab 01/03/25  0958   WBC 4.5   HGB 10.5*   MCV 92   *      POTASSIUM 3.9   CHLORIDE 104   CO2 20*   BUN 14.4   CR 0.86   ANIONGAP 12   DINO 8.1*   *         Recent Results (from the past 24 hours)   Chest XR,  PA & LAT    Narrative    CHEST TWO VIEWS January 3, 2025 11:01 AM     HISTORY: Cough.    COMPARISON: 7/8/2024.      Impression    IMPRESSION: No significant interval change. Heart size upper limits of  normal. Pulmonary vascularity is within normal limits. Lungs clear. No  pleural effusions. Mild thoracic vertebral body compressions are  unchanged. Old right rib fracture.    CHANTELLE GARCIA MD         SYSTEM ID:  [id number]   POC US ECHO LIMITED    Impression    Limited Bedside Cardiac Ultrasound, performed and interpreted by me.   Indication: Hypotension/shock.  Parasternal long axis, parasternal short axis, apical 4 chamber, and subcostal views were acquired.   Image quality was satisfactory.    Findings:    Global left ventricular function appears intact.  Chambers do not appear dilated.  There is no evidence of free fluid within the pericardium.    IMPRESSION: Grossly normal left ventricular function and chamber size.  No pericardial effusion..

## 2025-01-04 NOTE — PLAN OF CARE
"[facility] ADMISSION NOTE    Patient admitted to room 2301 at approximately 2200 via cart from emergency room. Patient was accompanied by transport tech.     Verbal SBAR report received from Shereen SMITH prior to patient arrival.     Patient ambulated to bed with stand-by assist. Patient alert and oriented X 3. The patient is not having any pain.  . Admission vital signs: Blood pressure 116/65, pulse 58, temperature 98.2  F (36.8  C), temperature source Oral, resp. rate 22, height 1.803 m (5' 11\"), weight 63.5 kg (140 lb), SpO2 94%. Patient was oriented to plan of care, call light, bed controls, tv, telephone, bathroom, and visiting hours.     Risk Assessment    The following safety risks were identified during admission: fall. Yellow risk band applied: YES.     Skin Initial Assessment    This writer admitted this patient and completed a full skin assessment and Yosvany score in the Adult PCS flowsheet.   Photo documentation of skin problem and/or wound competed via [employer] application (located under Media):  N/A    Appropriate interventions initiated as needed.     Secondary skin check completed by declined by patient due to high fever and chills. He states no skin issues..         Education    Patient has a Jeffers to Observation order: No  Observation education completed and documented: N/A      Donna Arguelles RN                            "

## 2025-01-04 NOTE — PROGRESS NOTES
BP 88/38 HR 47-51. Dr. Ugalde notified. Provider held Entresto and Carvedilol tonight.     Leia Mak RN on 1/4/2025 at 5:59 PM

## 2025-01-04 NOTE — CONSULTS
Care Management Initial Consult    General Information  Assessment completed with: Caregiver, Patient, Rajiv Nguyen RN at [facility]  Type of CM/SW Visit: Initial Assessment    Primary Care Provider verified and updated as needed: Yes   Readmission within the last 30 days: no previous admission in last 30 days      Reason for Consult: discharge planning  Advance Care Planning: Advance Care Planning Reviewed: present on chart          Communication Assessment  Patient's communication style: spoken language (English or Bilingual)    Hearing Difficulty or Deaf: yes   Wear Glasses or Blind: no    Cognitive  Cognitive/Neuro/Behavioral: WDL                      Living Environment:   People in home: facility resident     Current living Arrangements: extended care facility  Name of Facility: [facility]   Able to return to prior arrangements: yes       Family/Social Support:  Care provided by: self (Facility staff)  Provides care for: no one  Marital Status: Single  Support system: Facility resident(s)/Staff, Friend          Description of Support System: Supportive, Involved    Support Assessment: Adequate family and caregiver support, Adequate social supports    Current Resources:   Patient receiving home care services: No        Community Resources: [facility] Programs, [facility] Worker  Equipment currently used at home: grab bar, tub/shower, hospital bed, shower chair  Supplies currently used at home:      Employment/Financial:  Employment Status: unemployed        Financial Concerns: none   Referral to Financial Worker: No       Does the patient's insurance plan have a 3 day qualifying hospital stay waiver?  Yes     Which insurance plan 3 day waiver is available? Alternative insurance waiver    Will the waiver be used for post-acute placement? Yes    Lifestyle & Psychosocial Needs:  Social Drivers of Health     Food Insecurity: Low Risk  (1/3/2025)    Food Insecurity     Within the past 12 months,  did you worry that your food would run out before you got money to buy more?: No     Within the past 12 months, did the food you bought just not last and you didn t have money to get more?: No   Depression: Not at risk (1/2/2025)    PHQ-2     PHQ-2 Score: 2   Housing Stability: High Risk (1/4/2025)    Housing Stability     Do you have housing? : Yes     Are you worried about losing your housing?: Yes   Tobacco Use: Medium Risk (1/4/2025)    Patient History     Smoking Tobacco Use: Former     Smokeless Tobacco Use: Never     Passive Exposure: Not on file   Financial Resource Strain: Low Risk  (1/3/2025)    Financial Resource Strain     Within the past 12 months, have you or your family members you live with been unable to get utilities (heat, electricity) when it was really needed?: No   Alcohol Use: Not on file   Transportation Needs: Low Risk  (1/3/2025)    Transportation Needs     Within the past 12 months, has lack of transportation kept you from medical appointments, getting your medicines, non-medical meetings or appointments, work, or from getting things that you need?: No   Physical Activity: Not on file   Interpersonal Safety: Low Risk  (1/3/2025)    Interpersonal Safety     Do you feel physically and emotionally safe where you currently live?: Yes     Within the past 12 months, have you been hit, slapped, kicked or otherwise physically hurt by someone?: No     Within the past 12 months, have you been humiliated or emotionally abused in other ways by your partner or ex-partner?: No   Stress: Not on file   Social Connections: Socially Integrated (7/9/2024)    Received from [facility]    Social Connections     Do you often feel lonely or isolated from those around you?: 0   Health Literacy: Not on file     Functional Status:  Prior to admission patient needed assistance: walked with walker in the TCU       Mental Health Status:  no needs        Chemical Dependency Status:  no  needs            Values/Beliefs:  Spiritual, Cultural Beliefs, Taoist Practices, Values that affect care: no               Discussed  Partnership in Safe Discharge Planning  document with patient/family: Yes: patient    Additional Information:  CM received referral for discharge planning as pt from [facility] (Phone: [phone] Fax: [phone]) TCU.     SAMIR spoke with SARAH Nguyen at [facility] and confirmed pt has a bed hold in place.  Wendy shared pt uses a walker at baseline and that he can return to facility when medically stable.       Transportation discussed [facility] transportation will be used.      Care Management team to follow for discharge plans.    ROSA Marquez

## 2025-01-04 NOTE — PROGRESS NOTES
[facility]    Medicine Progress Note - Hospitalist Service    Date of Admission:  1/3/2025    Assessment & Plan     Delvin Echevarria is a 59 year old male with a past medical history of non-ischemic cardiomyopathy, HFrEF, HTN, CAD, AAA, and alcohol use disorder in remission that presnted with fever on 01/03/2025 with fever and cough admitted with acute hypoxic respiratory failure likely 2/2 influenza leading to COPD exacerbation.     # Sepsis secondary to Influenza A  # Acute respiratory failure, improved   # COPD with acute exacerbation   He presented to the ED with shortness of breath fevers, and myalgias. He was found to have influenza A. Did rapidly improve his hypoxia after antibiotics so will continue even though CXR is clear. Low suspicion for contribution from CHF and patient appear dry to euvolemic on exam.   - Continued  oseltamivir  - Weaning off oxygen   - Continued azithromycin with switched to PO to complete three doses of 500 mg   - Continued ceftriaxone   - Continued PTA PTA inhalers with formulary substitution     # Chronic systolic heart failure with recovered ejection fraction   # NICM of unclear etiology with recovered ejection fraction   # Non obstructive CAD. Per previous angiogram   # Benign essential hypertension   Follows with cardiology and last seen in clinic on 01/02/2025. TTE from that day with ejection fraction recovered at 60-65%. Ef had previously been in the 30's. He is not overloaded currently.   - Continued PTA sacubitril-valsartan 24-26  bid   - Reduced PTA carvedolol 12.5 bid to 6.25 with lower BP   - Holding PTA furosemide 20 mg every day with decreased intake   - I/O, daily weights    # Asymptomatic infrarenal abdominal aortic aneurysm   # Stable SMA dissection   Follows with vascular medicine. Last seen in clinic 12/17/2024, plan at that time for annual CT.   - Continued follow up with vascular outpatient   - Continued PTA ASA 81  - Continued PTA  rosuvastatin 40 mg every day      # Hx of right distal femur fracture s/p ORIF 7/9/2024   Patient had fracture after being pinned between a post and a 600 lb pig. This was surgical repaired at [facility]. He was started on apixaban for DVT prophylaxis at that time. It appears that apixaban the plan had been to do apixaban for 12 weeks however this was never stopped. It is present on the medication list for his vascular serge and cardiology clinic visits but is not addressed. In a later Orthopedics note it erroneously documented that he was on asa bid for prophylaxis.   - Stopping PTA apixaban      # BPH  - Continued PTA  tamsulosin     # Former smoker   Quit 7/2023           Diet: 2 Gram Sodium Diet    DVT Prophylaxis: Pneumatic compression devices   Rivera Catheter: Not present  Lines: None     Cardiac Monitoring: None  Code Status: Full Code      Clinically Significant Risk Factors Present on Admission           # Hypocalcemia: Lowest Ca = 8.1 mg/dL in last 2 days, will monitor and replace as appropriate     # Hypoalbuminemia: Lowest albumin = 3.4 g/dL at 1/4/2025  5:18 AM, will monitor as appropriate  # Drug Induced Coagulation Defect: home medication list includes an anticoagulant medication  # Drug Induced Platelet Defect: home medication list includes an antiplatelet medication   # Hypertension: Noted on problem list  # Heart failure with preserved ejection fraction: EF >50% and home medication list includes sacubitril/valsartan (Entresto)     # Anemia: based on hgb <11           # Financial/Environmental Concerns: none         Social Drivers of Health    Housing Stability: High Risk (1/4/2025)    Housing Stability     Do you have housing? : Yes     Are you worried about losing your housing?: Yes   Tobacco Use: Medium Risk (1/4/2025)    Patient History     Smoking Tobacco Use: Former     Smokeless Tobacco Use: Never          Disposition Plan     Medically Ready for Discharge: Anticipated Tomorrow       Sd NORRIS  MD Aftab  Hospitalist Service  [employer]  Securely message with [employer] (more info)  Text page via [employer] Paging/Directory   ______________________________________________________________________    Interval History   Admitted overnight. Noted to have some pain with coughing and to struggle with nasal canula with nose congestion. Reporting to be tired and with a poor appetite. He states that he feels that he is improving he continues to have a subjective fever but feels that his breathing is better he also reports myalgias. He request that he be allowed to continue resting.      Physical Exam   Vital Signs: Temp: 98.9  F (37.2  C) Temp src: Oral BP: 106/60 Pulse: 52   Resp: 18 SpO2: 90 % O2 Device: None (Room air) Oxygen Delivery: 2 LPM  Weight: 145 lbs 11.58 oz    General Appearance: Laying in bed in no acute distress, appears tired.   Respiratory: Breathing easily on room air, lungs clear to auscultation bilaterally   Cardiovascular: Regular rate and rhythm, extremities warm and well perfused, no edema in bilateral lower extremities   GI: Abdomen soft, non-tender, and non-distended   Skin: No rashes or lesions   Other: Appropriate mood and affect      Medical Decision Making       45 MINUTES SPENT BY ME on the date of service doing chart review, history, exam, documentation & further activities per the note.      Data     I have personally reviewed the following data over the past 24 hrs:    5.8  \   10.3 (L)   / 123 (L)     135 102 17.6 /  87   4.2 18 (L) 0.83 \     ALT: 25 AST: 33 AP: 81 TBILI: 0.4   ALB: 3.4 (L) TOT PROTEIN: 5.9 (L) LIPASE: N/A     Trop: 14 BNP: N/A     Procal: 0.57 (H) CRP: N/A Lactic Acid: N/A         Imaging results reviewed over the past 24 hrs:   No results found for this or any previous visit (from the past 24 hours).

## 2025-01-04 NOTE — PLAN OF CARE
Patient temp on admission 102.2 with chills. Given warm blanket. Vocera messaged Nancy LEON at 2207 about fever. O2 sat 91% on room air. Patient denies shortness of breath. O2 started at 2 LPM per nasal canula. O2 sats up to 96%.        Update at 9438-- Nancy LEON verbally notified of fever.

## 2025-01-04 NOTE — PLAN OF CARE
"  Problem: Adult Inpatient Plan of Care  Goal: Patient-Specific Goal (Individualized)  Description: You can add care plan individualizations to a care plan. Examples of Individualization might be:  \"Parent requests to be called daily at 9am for status\", \"I have a hard time hearing out of my right ear\", or \"Do not touch me to wake me up as it startles  me\".  Outcome: Not Progressing     Problem: Gas Exchange Impaired  Goal: Optimal Gas Exchange  Outcome: Progressing  Intervention: Optimize Oxygenation and Ventilation  Recent Flowsheet Documentation  Taken 1/4/2025 0830 by Charlotte Menchaca, RN  Head of Bed (HOB) Positioning: (able to self adjust HOB) HOB at 30-45 degrees    Patient \"very tired\" today per his report. Appetite poor. Did not want to get washed up or get up OOB into chair. Using urinal to void. 02 sats maintained 90%, LS diminished. Denies pain.                         "

## 2025-01-04 NOTE — ED NOTES
"[facility]   Admission Handoff    The patient is Delvin Echevarria, 59 year old who arrived in the ED by AMBULANCE from home with a complaint of Cough (Pt is from [facility], pt reports cough and fever for the past couple days, weakness. Pt temp was 104.6 en route. Pt reports sob) and Fever  . The patient's current symptoms are new and during this time the symptoms have remained the same. In the ED, patient was diagnosed with   Final diagnoses:   Influenza A   Labile blood pressure         Needed?: No    Allergies:    Allergies   Allergen Reactions    Seasonal Allergies Other (See Comments)     Congestion sinuses    Codeine Nausea       Past Medical Hx:   Past Medical History:   Diagnosis Date    Abscess 8/11/2017    Acute posthemorrhagic anemia 12/14/2016    Anemia due to blood loss, acute 9/30/2016    Closed fracture of right tibia and fibula with routine healing 9/29/2016    DDD (degenerative disc disease), lumbar     Hypertension 3/15/2013    Marijuana use     4x daily    Nonobstructive atherosclerosis of coronary artery 8/10/2023    Staph aureus infection 9/13/2017    Tibial plateau fracture 9/11/2016    Tobacco use     Wound infection 9/29/2016       Initial vitals were: BP: 113/80  Pulse: 81  Temp: (!) 101  F (38.3  C)  Resp: 22  Height: 180.3 cm (5' 11\")  Weight: 63.5 kg (140 lb)  SpO2: 90 %   Recent vital Signs: /79   Pulse 58   Temp 98.2  F (36.8  C) (Oral)   Resp 22   Ht 1.803 m (5' 11\")   Wt 63.5 kg (140 lb)   SpO2 95%   BMI 19.53 kg/m      Elimination Status: Continent: Yes     Activity Level: Independent    Fall Status: Reason for falls risk: Other- n/a  nonskid shoes/slippers when out of bed and patient and family education    Baseline Mental status: WDL  Current Mental Status changes: at basesline    Infection present or suspected this encounter: no  Sepsis suspected: No    Isolation type: Droplet    Bariatric equipment needed?: No    In the " ED these meds were given:   Medications   acetaminophen (TYLENOL) tablet 1,000 mg (1,000 mg Oral Not Given 1/3/25 0936)   ibuprofen (ADVIL/MOTRIN) tablet 400 mg (400 mg Oral $Given 1/3/25 0922)   sodium chloride 0.9% BOLUS 1,000 mL (0 mLs Intravenous Stopped 1/3/25 1547)       Drips running?  No    Home pump  No    Current LDAs: Peripheral IV: Site Left upper arm; Gauge 18g  none     Results:   Labs/Imaging  Ordered and Resulted from Time of ED Arrival Up to the Time of Departure from the ED  Results for orders placed or performed during the hospital encounter of 01/03/25 (from the past 24 hours)   Influenza A/B, RSV and SARS-CoV2 PCR (COVID-19) Nose    Specimen: Nose; Swab   Result Value Ref Range    Influenza A PCR Positive (A) Negative    Influenza B PCR Negative Negative    RSV PCR Negative Negative    SARS CoV2 PCR Negative Negative    Narrative    Testing was performed using the Xpert Xpress CoV2/Flu/RSV Assay on the [employer] GeneXpert Instrument. This test should be ordered for the detection of SARS-CoV2, influenza, and RSV viruses in individuals with signs and symptoms of respiratory tract infection. This test is for in vitro diagnostic use under the [place] FDA for laboratories certified under CLIA to perform high or moderate complexity testing. This test has been [place] FDA cleared. A negative result does not rule out the presence of PCR inhibitors in the specimen or target RNA in concentration below the limit of detection for the assay. If only one viral target is positive but coinfection with multiple targets is suspected, the sample should be re-tested with another FDA cleared, approved, or authorized test, if coninfection would change clinical management. This test was validated by the [facility]. These laboratories are certified under the Clinical Laboratory Improvement Amendments of 1988 (CLIA-88) as qualified to perfom high complexity laboratory testing.   CBC with platelets   Result Value  Ref Range    WBC Count 4.5 4.0 - 11.0 10e3/uL    RBC Count 3.47 (L) 4.40 - 5.90 10e6/uL    Hemoglobin 10.5 (L) 13.3 - 17.7 g/dL    Hematocrit 31.9 (L) 40.0 - 53.0 %    MCV 92 78 - 100 fL    MCH 30.3 26.5 - 33.0 pg    MCHC 32.9 31.5 - 36.5 g/dL    RDW 17.3 (H) 10.0 - 15.0 %    Platelet Count 126 (L) 150 - 450 10e3/uL   Basic metabolic panel   Result Value Ref Range    Sodium 136 135 - 145 mmol/L    Potassium 3.9 3.4 - 5.3 mmol/L    Chloride 104 98 - 107 mmol/L    Carbon Dioxide (CO2) 20 (L) 22 - 29 mmol/L    Anion Gap 12 7 - 15 mmol/L    Urea Nitrogen 14.4 8.0 - 23.0 mg/dL    Creatinine 0.86 0.67 - 1.17 mg/dL    GFR Estimate >90 >60 mL/min/1.73m2    Calcium 8.1 (L) 8.8 - 10.4 mg/dL    Glucose 100 (H) 70 - 99 mg/dL   Lactic acid whole blood with 1x repeat in 2 hr when >2   Result Value Ref Range    Lactic Acid, Initial 0.6 (L) 0.7 - 2.0 mmol/L   Troponin T, High Sensitivity   Result Value Ref Range    Troponin T, High Sensitivity 14 <=22 ng/L   EKG 12 lead   Result Value Ref Range    Systolic Blood Pressure  mmHg    Diastolic Blood Pressure  mmHg    Ventricular Rate 69 BPM    Atrial Rate 69 BPM    IN Interval 146 ms    QRS Duration 84 ms     ms    QTc 379 ms    P Axis 45 degrees    R AXIS -12 degrees    T Axis 262 degrees    Interpretation ECG       Sinus rhythm  ST & T wave abnormality, consider inferior ischemia  ST & T wave abnormality, consider anterolateral ischemia  Abnormal ECG  When compared with ECG of 03-Jun-2024 14:52, (unconfirmed)  No significant change was found     Chest XR,  PA & LAT    Narrative    CHEST TWO VIEWS January 3, 2025 11:01 AM     HISTORY: Cough.    COMPARISON: 7/8/2024.      Impression    IMPRESSION: No significant interval change. Heart size upper limits of  normal. Pulmonary vascularity is within normal limits. Lungs clear. No  pleural effusions. Mild thoracic vertebral body compressions are  unchanged. Old right rib fracture.    CHANTELLE GARCIA MD         SYSTEM ID:  [id number]    POC US ECHO LIMITED    Impression    Limited Bedside Cardiac Ultrasound, performed and interpreted by me.   Indication: Hypotension/shock.  Parasternal long axis, parasternal short axis, apical 4 chamber, and subcostal views were acquired.   Image quality was satisfactory.    Findings:    Global left ventricular function appears intact.  Chambers do not appear dilated.  There is no evidence of free fluid within the pericardium.    IMPRESSION: Grossly normal left ventricular function and chamber size.  No pericardial effusion..       Troponin T, High Sensitivity   Result Value Ref Range    Troponin T, High Sensitivity 14 <=22 ng/L       For the majority of the shift this patient's behavior was Green     Cardiac Rhythm: Normal Sinus  Pt needs tele? No  Skin/wound Issues: None    Code Status: Full Code    Pain control: good    Nausea control: pt had none    Abnormal labs/tests/findings requiring intervention: n/a    Patient tested for COVID 19 prior to admission: YES     OBS brochure/video discussed/provided to patient/family: N/A     Family present during ED course? No     Family Comments/Social Situation comments: n/a    Tasks needing completion: None    Justice Lugo, RN

## 2025-01-04 NOTE — PLAN OF CARE
"Goal Outcome Evaluation:      Plan of Care Reviewed With: patient    Overall Patient Progress: improvingOverall Patient Progress: improving    Outcome Evaluation: Pt A&Ox4. Able to make needs known. Reports pain associated with coughing. Febrile treated per MAR with some improvement. Pt states NC doesn't work given his congestion but refuses mask and NC w/ humidification. Instruction on deep breathing to maintain O2 sats while on RA given and O2 sats remain largely WDL while patient is awake.  O2 sats dip to 88-90 while pt is asleep.    Pt BP 88/40 & 98/38 with pulse 43-49 (monitor) and 50 (60 second central). MD aware. Watch as long as pt remains asymptomatic.      Visit Vitals  /68 (BP Location: Right arm, Patient Position: Supine, Cuff Size: Adult Regular)   Pulse 65   Temp 100  F (37.8  C) (Oral)   Resp 24       Problem: Adult Inpatient Plan of Care  Goal: Plan of Care Review  Description: The Plan of Care Review/Shift note should be completed every shift.  The Outcome Evaluation is a brief statement about your assessment that the patient is improving, declining, or no change.  This information will be displayed automatically on your shift  note.  Outcome: Progressing  Flowsheets (Taken 1/4/2025 0334)  Outcome Evaluation: Pt A&Ox4. Able to make needs known. Reports pain associated with coughing. Febrile treated per MAR with some improvement. Pt states NC doesn't work given his congestion but refuses mask and NC w/ humidification. Instruction on deep breathing to maintain O2 sats while on RA given and O2 sats remain largely WDL while patient is awake.  Plan of Care Reviewed With: patient  Overall Patient Progress: improving  Goal: Patient-Specific Goal (Individualized)  Description: You can add care plan individualizations to a care plan. Examples of Individualization might be:  \"Parent requests to be called daily at 9am for status\", \"I have a hard time hearing out of my right ear\", or \"Do not touch me to " "wake me up as it startles  me\".  Outcome: Progressing  Goal: Absence of Hospital-Acquired Illness or Injury  Outcome: Progressing  Intervention: Identify and Manage Fall Risk  Recent Flowsheet Documentation  Taken 1/3/2025 2354 by Phylicia Sol, RN  Safety Promotion/Fall Prevention:   activity supervised   assistive device/personal items within reach   clutter free environment maintained   mobility aid in reach   nonskid shoes/slippers when out of bed   safety round/check completed  Goal: Optimal Comfort and Wellbeing  Outcome: Progressing  Intervention: Monitor Pain and Promote Comfort  Recent Flowsheet Documentation  Taken 1/3/2025 2354 by Phylicia Sol, RN  Pain Management Interventions:   medication (see MAR)   quiet environment facilitated   rest  Goal: Readiness for Transition of Care  Outcome: Progressing     "

## 2025-01-05 LAB
ANION GAP SERPL CALCULATED.3IONS-SCNC: 11 MMOL/L (ref 7–15)
BUN SERPL-MCNC: 20.3 MG/DL (ref 8–23)
CALCIUM SERPL-MCNC: 8.7 MG/DL (ref 8.8–10.4)
CHLORIDE SERPL-SCNC: 104 MMOL/L (ref 98–107)
CREAT SERPL-MCNC: 0.68 MG/DL (ref 0.67–1.17)
EGFRCR SERPLBLD CKD-EPI 2021: >90 ML/MIN/1.73M2
ERYTHROCYTE [DISTWIDTH] IN BLOOD BY AUTOMATED COUNT: 17.3 % (ref 10–15)
GLUCOSE SERPL-MCNC: 157 MG/DL (ref 70–99)
HCO3 SERPL-SCNC: 22 MMOL/L (ref 22–29)
HCT VFR BLD AUTO: 32.5 % (ref 40–53)
HGB BLD-MCNC: 10.8 G/DL (ref 13.3–17.7)
MCH RBC QN AUTO: 30.3 PG (ref 26.5–33)
MCHC RBC AUTO-ENTMCNC: 33.2 G/DL (ref 31.5–36.5)
MCV RBC AUTO: 91 FL (ref 78–100)
PLATELET # BLD AUTO: 137 10E3/UL (ref 150–450)
POTASSIUM SERPL-SCNC: 4.6 MMOL/L (ref 3.4–5.3)
RBC # BLD AUTO: 3.56 10E6/UL (ref 4.4–5.9)
SODIUM SERPL-SCNC: 137 MMOL/L (ref 135–145)
WBC # BLD AUTO: 4.6 10E3/UL (ref 4–11)

## 2025-01-05 PROCEDURE — 85018 HEMOGLOBIN: CPT | Performed by: STUDENT IN AN ORGANIZED HEALTH CARE EDUCATION/TRAINING PROGRAM

## 2025-01-05 PROCEDURE — 250N000012 HC RX MED GY IP 250 OP 636 PS 637: Performed by: STUDENT IN AN ORGANIZED HEALTH CARE EDUCATION/TRAINING PROGRAM

## 2025-01-05 PROCEDURE — 250N000011 HC RX IP 250 OP 636

## 2025-01-05 PROCEDURE — 250N000013 HC RX MED GY IP 250 OP 250 PS 637: Performed by: STUDENT IN AN ORGANIZED HEALTH CARE EDUCATION/TRAINING PROGRAM

## 2025-01-05 PROCEDURE — 99232 SBSQ HOSP IP/OBS MODERATE 35: CPT | Performed by: STUDENT IN AN ORGANIZED HEALTH CARE EDUCATION/TRAINING PROGRAM

## 2025-01-05 PROCEDURE — 250N000013 HC RX MED GY IP 250 OP 250 PS 637

## 2025-01-05 PROCEDURE — 36415 COLL VENOUS BLD VENIPUNCTURE: CPT | Performed by: STUDENT IN AN ORGANIZED HEALTH CARE EDUCATION/TRAINING PROGRAM

## 2025-01-05 PROCEDURE — 82435 ASSAY OF BLOOD CHLORIDE: CPT | Performed by: STUDENT IN AN ORGANIZED HEALTH CARE EDUCATION/TRAINING PROGRAM

## 2025-01-05 PROCEDURE — 120N000001 HC R&B MED SURG/OB

## 2025-01-05 PROCEDURE — 80048 BASIC METABOLIC PNL TOTAL CA: CPT | Performed by: STUDENT IN AN ORGANIZED HEALTH CARE EDUCATION/TRAINING PROGRAM

## 2025-01-05 PROCEDURE — 85049 AUTOMATED PLATELET COUNT: CPT | Performed by: STUDENT IN AN ORGANIZED HEALTH CARE EDUCATION/TRAINING PROGRAM

## 2025-01-05 RX ORDER — POLYETHYLENE GLYCOL 3350 17 G/17G
17 POWDER, FOR SOLUTION ORAL DAILY
Status: DISCONTINUED | OUTPATIENT
Start: 2025-01-05 | End: 2025-01-06 | Stop reason: HOSPADM

## 2025-01-05 RX ADMIN — TAMSULOSIN HYDROCHLORIDE 0.4 MG: 0.4 CAPSULE ORAL at 08:30

## 2025-01-05 RX ADMIN — PREDNISONE 40 MG: 20 TABLET ORAL at 08:30

## 2025-01-05 RX ADMIN — ROSUVASTATIN CALCIUM 40 MG: 20 TABLET, FILM COATED ORAL at 08:30

## 2025-01-05 RX ADMIN — OSELTAMAVIR PHOSPHATE 75 MG: 75 CAPSULE ORAL at 08:30

## 2025-01-05 RX ADMIN — ASPIRIN 81 MG 81 MG: 81 TABLET ORAL at 08:30

## 2025-01-05 RX ADMIN — OSELTAMAVIR PHOSPHATE 75 MG: 75 CAPSULE ORAL at 19:53

## 2025-01-05 RX ADMIN — FLUTICASONE FUROATE AND VILANTEROL TRIFENATATE 1 PUFF: 100; 25 POWDER RESPIRATORY (INHALATION) at 08:31

## 2025-01-05 RX ADMIN — CEFTRIAXONE 2 G: 2 INJECTION, POWDER, FOR SOLUTION INTRAMUSCULAR; INTRAVENOUS at 01:02

## 2025-01-05 RX ADMIN — AZITHROMYCIN DIHYDRATE 500 MG: 250 TABLET, FILM COATED ORAL at 08:30

## 2025-01-05 RX ADMIN — POLYETHYLENE GLYCOL 3350 17 G: 17 POWDER, FOR SOLUTION ORAL at 17:17

## 2025-01-05 ASSESSMENT — ACTIVITIES OF DAILY LIVING (ADL)
ADLS_ACUITY_SCORE: 41

## 2025-01-05 NOTE — PLAN OF CARE
Problem: Adult Inpatient Plan of Care  Goal: Plan of Care Review  Description: The Plan of Care Review/Shift note should be completed every shift.  The Outcome Evaluation is a brief statement about your assessment that the patient is improving, declining, or no change.  This information will be displayed automatically on your shift  note.  Flowsheets (Taken 1/5/2025 0555)  Outcome Evaluation: Pt alert and Oriented x4. Pt able to make needs known and given IV antibiotics. Pt slept throughout the shift with no complaints of pain.  Plan of Care Reviewed With: patient  Overall Patient Progress: improving  Goal: Absence of Hospital-Acquired Illness or Injury  Intervention: Prevent Skin Injury  Recent Flowsheet Documentation  Taken 1/5/2025 0102 by Michelle Wagner, RN  Body Position: position changed independently     Problem: Gas Exchange Impaired  Goal: Optimal Gas Exchange  Intervention: Optimize Oxygenation and Ventilation  Recent Flowsheet Documentation  Taken 1/5/2025 0102 by Michelle Wagner, RN  Head of Bed (HOB) Positioning: HOB at 30-45 degrees         Goal Outcome Evaluation:      Plan of Care Reviewed With: patient    Overall Patient Progress: improvingOverall Patient Progress: improving    Outcome Evaluation: Pt alert and Oriented x4. Pt able to make needs known and given IV antibiotics. Pt slept throughout the shift with no complaints of pain.

## 2025-01-05 NOTE — PLAN OF CARE
Goal Outcome Evaluation:    Patient alert and oriented x4. Using urinal in bed, has not been out of bed on evening shift. Oxygen sats above 90% on room air. Ate 75% of dinner, patient reporting that appetite is improving. MD notified of low BP and pulse at 1712 (see separate note), medications held per provider order. Recheck at 1946 111/62, pulse 50. Will continue to monitor.     Leia Mak RN on 1/4/2025 at 11:08 PM

## 2025-01-05 NOTE — PROGRESS NOTES
[facility]    Medicine Progress Note - Hospitalist Service    Date of Admission:  1/3/2025    Assessment & Plan   Delvin Echevarria is a 59 year old male with a past medical history of non-ischemic cardiomyopathy, HFrEF, HTN, CAD, AAA, and alcohol use disorder in remission that presented with fever on 01/03/2025 with fever and cough admitted with acute hypoxic respiratory failure likely 2/2 influenza leading to COPD exacerbation.     # Sepsis  # Influenza A  # Possible bacterial pneumonia   # Acute respiratory failure, improved   He presented to the ED with shortness of breath fevers, and myalgias. He was found to have influenza A. Hypoxia rapidly improved with antibiotics so continued despite clear CXR to cover for potential superimposed bacterial pneumonia. Low suspicion for contribution from CHF and patient appear dry to euvolemic on exam.  - Continued oseltamivir  - Weaning off oxygen   - Continued azithromycin with switched to PO to complete three doses of 500 mg   - Continued ceftriaxone     # COPD with acute exacerbation   Suspect secondary to influenza.  - Continued PTA PTA inhalers with formulary substitution   - Continued prednisone burst     # Generalized weakness   Suspect secondary to influenza, gradually improving.   - Pt consulted     # Chronic systolic heart failure with recovered ejection fraction   # NICM of unclear etiology with recovered ejection fraction   # Non obstructive CAD. Per previous angiogram   # Benign essential hypertension   Follows with cardiology and last seen in clinic on 01/02/2025. TTE from that day with ejection fraction recovered at 60-65%. Ef had previously been in the 30's. He is not overloaded currently.   - Holding PTA sacubitril-valsartan 24-26  bid with recurrent hypotension   - Holding PTA carvedolol 12.5 bid with recurrent hypotension and bradycardia    - Holding PTA furosemide 20 mg every day with decreased intake   - I/O, daily weights    #  Asymptomatic infrarenal abdominal aortic aneurysm   # Stable SMA dissection   Follows with vascular medicine. Last seen in clinic 12/17/2024, plan at that time for annual CT.   - Continued follow up with vascular outpatient   - Continued PTA ASA 81  - Continued PTA rosuvastatin 40 mg every day      # Hx of right distal femur fracture s/p ORIF 7/9/2024   Patient had fracture after being pinned between a post and a 600 lb pig. This was surgical repaired at [facility]. He was started on apixaban for DVT prophylaxis at that time. It appears that apixaban the plan had been to do apixaban for 12 weeks however this was never stopped. It is present on the medication list for his vascular serge and cardiology clinic visits but is not addressed. In a later Orthopedics note it erroneously documented that he was on asa bid for prophylaxis.   - Stopping PTA apixaban     # Chronic normocytic anemia   Hgb appears to be stable at 10.8 and improving from baseline. No signs of bleeding.     # Thrombocytopenia  Mild in 120's and stable to improving.   - CBC with am labs      # BPH  - Continued PTA tamsulosin     # Constipation   - Started miralax every day     # Former smoker   Quit 7/2023         Diet: 2 Gram Sodium Diet    DVT Prophylaxis: Pneumatic Compression Devices  Rivera Catheter: Not present  Lines: None     Cardiac Monitoring: None  Code Status: Full Code      Clinically Significant Risk Factors           # Hypocalcemia: Lowest Ca = 8.3 mg/dL in last 2 days, will monitor and replace as appropriate     # Hypoalbuminemia: Lowest albumin = 3.4 g/dL at 1/4/2025  5:18 AM, will monitor as appropriate   # Thrombocytopenia: Lowest platelets = 123 in last 2 days, will monitor for bleeding   # Hypertension: Noted on problem list  # Heart failure with preserved ejection fraction: EF >50% and home medication list includes sacubitril/valsartan (Entresto)               # Financial/Environmental Concerns: none         Social Drivers of Health     Housing Stability: High Risk (1/4/2025)    Housing Stability     Do you have housing? : Yes     Are you worried about losing your housing?: Yes   Tobacco Use: Medium Risk (1/4/2025)    Patient History     Smoking Tobacco Use: Former     Smokeless Tobacco Use: Never          Disposition Plan     Medically Ready for Discharge: Anticipated Tomorrow     Sd Ugalde MD  Hospitalist Service  [facility]  Securely message with [employer] (more info)  Text page via [employer] Paging/Directory   ______________________________________________________________________    Interval History   Some hypotension yesterday evening into the night. Patient states that he is tired this morning, he denies having any specific pain, fevers, or chills. He request that he be allowed to continue resting.    Re-approached in the afternoon. States that he feels like he is improving but still significantly weaker than baseline. He states that fevers and chills have resolved. He has not stooled in a few days and requests medication for this. Denies abdominal pain or nausea.       Physical Exam   Vital Signs: Temp: 97.5  F (36.4  C) Temp src: Oral BP: 124/66 Pulse: 50   Resp: 18 SpO2: 94 % O2 Device: None (Room air)    Weight: 140 lbs 6.93 oz    General Appearance:  Laying in bed in no acute distress, appears tired.   Respiratory: Breathing easily on room air, lungs clear to auscultation bilaterally   Cardiovascular: Regular rate and rhythm, extremities warm and well perfused, no edema in bilateral lower extremities    GI: Abdomen soft, non-tender, and non-distended   Skin: No rashes or lesions   Other:  Appropriate mood and affect      Medical Decision Making       45 MINUTES SPENT BY ME on the date of service doing chart review, history, exam, documentation & further activities per the note.      Data     I have personally reviewed the following data over the past 24 hrs:    4.6  \   10.8 (L)   / 137 (L)     137 104  20.3 /  157 (H)   4.6 22 0.68 \       Imaging results reviewed over the past 24 hrs:   No results found for this or any previous visit (from the past 24 hours).

## 2025-01-05 NOTE — PLAN OF CARE
Problem: Adult Inpatient Plan of Care  Goal: Readiness for Transition of Care  Outcome: Not Progressing  Patient is refusing to get out of bed, educated on importance of sitting up, walking and he was adamant that he was not getting up. Turning and repositioning in bed.  Patient had soiled brief on, writer assisted with perineal hygiene and brief change, using urinal. Due to void, patient said he doesn't have to.     Problem: Gas Exchange Impaired  Goal: Optimal Gas Exchange  Outcome: Progressing    02 sats 94% on RA, LS diminished bases, loose cough. Offered tylenol for reports of pleuritic pain with cough, patient refused.  Intervention: Optimize Oxygenation and Ventilation  Recent Flowsheet Documentation  Taken 1/5/2025 4576 by Charlotte Menchaca, RN  Head of Bed (HOB) Positioning: (able to self adjust HOB) HOB at 30-45 degrees

## 2025-01-06 VITALS
DIASTOLIC BLOOD PRESSURE: 67 MMHG | HEIGHT: 71 IN | SYSTOLIC BLOOD PRESSURE: 124 MMHG | WEIGHT: 138.89 LBS | HEART RATE: 47 BPM | BODY MASS INDEX: 19.44 KG/M2 | TEMPERATURE: 97.9 F | OXYGEN SATURATION: 95 % | RESPIRATION RATE: 18 BRPM

## 2025-01-06 LAB
ANION GAP SERPL CALCULATED.3IONS-SCNC: 13 MMOL/L (ref 7–15)
BUN SERPL-MCNC: 17.7 MG/DL (ref 8–23)
CALCIUM SERPL-MCNC: 8.7 MG/DL (ref 8.8–10.4)
CHLORIDE SERPL-SCNC: 103 MMOL/L (ref 98–107)
CREAT SERPL-MCNC: 0.7 MG/DL (ref 0.67–1.17)
EGFRCR SERPLBLD CKD-EPI 2021: >90 ML/MIN/1.73M2
ERYTHROCYTE [DISTWIDTH] IN BLOOD BY AUTOMATED COUNT: 17.3 % (ref 10–15)
GLUCOSE SERPL-MCNC: 92 MG/DL (ref 70–99)
HCO3 SERPL-SCNC: 23 MMOL/L (ref 22–29)
HCT VFR BLD AUTO: 31.9 % (ref 40–53)
HGB BLD-MCNC: 10.6 G/DL (ref 13.3–17.7)
MCH RBC QN AUTO: 30.5 PG (ref 26.5–33)
MCHC RBC AUTO-ENTMCNC: 33.2 G/DL (ref 31.5–36.5)
MCV RBC AUTO: 92 FL (ref 78–100)
PLATELET # BLD AUTO: 172 10E3/UL (ref 150–450)
POTASSIUM SERPL-SCNC: 4.3 MMOL/L (ref 3.4–5.3)
RBC # BLD AUTO: 3.47 10E6/UL (ref 4.4–5.9)
SODIUM SERPL-SCNC: 139 MMOL/L (ref 135–145)
WBC # BLD AUTO: 6.7 10E3/UL (ref 4–11)

## 2025-01-06 PROCEDURE — 250N000013 HC RX MED GY IP 250 OP 250 PS 637: Performed by: STUDENT IN AN ORGANIZED HEALTH CARE EDUCATION/TRAINING PROGRAM

## 2025-01-06 PROCEDURE — 99239 HOSP IP/OBS DSCHRG MGMT >30: CPT | Performed by: INTERNAL MEDICINE

## 2025-01-06 PROCEDURE — 36415 COLL VENOUS BLD VENIPUNCTURE: CPT | Performed by: STUDENT IN AN ORGANIZED HEALTH CARE EDUCATION/TRAINING PROGRAM

## 2025-01-06 PROCEDURE — 250N000012 HC RX MED GY IP 250 OP 636 PS 637: Performed by: STUDENT IN AN ORGANIZED HEALTH CARE EDUCATION/TRAINING PROGRAM

## 2025-01-06 PROCEDURE — 999N000147 HC STATISTIC PT IP EVAL DEFER

## 2025-01-06 PROCEDURE — 80048 BASIC METABOLIC PNL TOTAL CA: CPT | Performed by: STUDENT IN AN ORGANIZED HEALTH CARE EDUCATION/TRAINING PROGRAM

## 2025-01-06 PROCEDURE — 250N000011 HC RX IP 250 OP 636

## 2025-01-06 PROCEDURE — 85027 COMPLETE CBC AUTOMATED: CPT | Performed by: STUDENT IN AN ORGANIZED HEALTH CARE EDUCATION/TRAINING PROGRAM

## 2025-01-06 PROCEDURE — 250N000013 HC RX MED GY IP 250 OP 250 PS 637

## 2025-01-06 RX ORDER — PREDNISONE 20 MG/1
40 TABLET ORAL DAILY
Qty: 4 TABLET | Refills: 0 | Status: SHIPPED | OUTPATIENT
Start: 2025-01-07 | End: 2025-01-09

## 2025-01-06 RX ORDER — CEFDINIR 300 MG/1
300 CAPSULE ORAL 2 TIMES DAILY
Qty: 4 CAPSULE | Refills: 0 | Status: SHIPPED | OUTPATIENT
Start: 2025-01-06 | End: 2025-01-08

## 2025-01-06 RX ORDER — OSELTAMIVIR PHOSPHATE 75 MG/1
75 CAPSULE ORAL 2 TIMES DAILY
Qty: 4 CAPSULE | Refills: 0 | Status: SHIPPED | OUTPATIENT
Start: 2025-01-06 | End: 2025-01-08

## 2025-01-06 RX ADMIN — POLYETHYLENE GLYCOL 3350 17 G: 17 POWDER, FOR SOLUTION ORAL at 07:52

## 2025-01-06 RX ADMIN — OSELTAMAVIR PHOSPHATE 75 MG: 75 CAPSULE ORAL at 07:51

## 2025-01-06 RX ADMIN — ASPIRIN 81 MG 81 MG: 81 TABLET ORAL at 07:54

## 2025-01-06 RX ADMIN — ROSUVASTATIN CALCIUM 40 MG: 20 TABLET, FILM COATED ORAL at 07:53

## 2025-01-06 RX ADMIN — CEFTRIAXONE 2 G: 2 INJECTION, POWDER, FOR SOLUTION INTRAMUSCULAR; INTRAVENOUS at 01:08

## 2025-01-06 RX ADMIN — FLUTICASONE FUROATE AND VILANTEROL TRIFENATATE 1 PUFF: 100; 25 POWDER RESPIRATORY (INHALATION) at 07:51

## 2025-01-06 RX ADMIN — AZITHROMYCIN DIHYDRATE 500 MG: 250 TABLET, FILM COATED ORAL at 07:50

## 2025-01-06 RX ADMIN — PREDNISONE 40 MG: 20 TABLET ORAL at 07:52

## 2025-01-06 RX ADMIN — TAMSULOSIN HYDROCHLORIDE 0.4 MG: 0.4 CAPSULE ORAL at 07:54

## 2025-01-06 ASSESSMENT — ACTIVITIES OF DAILY LIVING (ADL)
ADLS_ACUITY_SCORE: 41

## 2025-01-06 NOTE — PLAN OF CARE
Physical Therapy: PT order received and acknowledged. Per chart review and discussion in rounds, pt admitted from TCU with bedhold in place. Pt potentially ready for discharge back to TCU today. IP PT eval not needed for return to TCU so will defer to next level of care. Will discontinue IP PT order.

## 2025-01-06 NOTE — DISCHARGE SUMMARY
[facility]  Discharge Summary  Hospital Medicine       Date of Admission:  1/3/2025  Date of Discharge:  1/6/2025  Discharging Provider: Mukesh Tinoco MD      Identification and Chief Compaint: Delvin Echevarria is a 59 year old male with a past medical history of non-ischemic cardiomyopathy, HFrEF, HTN, CAD, AAA, and alcohol use disorder in remission that presented on 01/03/2025 with fever and cough.    Discharge Diagnoses   Sepsis  Influenza A  Possible bacterial pneumonia   Acute respiratory failure  COPD with acute exacerbation   Generalized weakness   Chronic systolic heart failure with recovered ejection fraction   NICM of unclear etiology with recovered ejection fraction   Non obstructive CAD. Per previous angiogram   Benign essential hypertension   Asymptomatic infrarenal abdominal aortic aneurysm   Stable SMA dissection   Hx of right distal femur fracture s/p ORIF 7/9/2024   Chronic normocytic anemia   Thrombocytopenia  BPH  Constipation   Former smoker      Follow-ups Needed After Discharge   Follow-up Appointments       Follow-up and recommended labs and tests       Follow up with primary care provider, Delvin Henning, within 7 days for hospital follow- up. Should consider restarting carvedilol if/when HR and BP normalize. No follow up labs or test are needed.                Unresulted Labs Ordered in the Past 30 Days of this Admission       Date and Time Order Name Status Description    1/3/2025  9:55 AM Blood Culture Arm, Left Preliminary         These results will be followed up by Dr. Tinoco if pertinent    Hospital Course   Delvin Echevarria is a 59 year old male with a past medical history of non-ischemic cardiomyopathy, HFrEF, HTN, CAD, AAA, and alcohol use disorder in remission that presented on 01/03/2025 with fever and cough admitted with acute hypoxic respiratory failure likely 2/2 influenza leading to COPD exacerbation.     Sepsis  Influenza A  Possible  bacterial pneumonia   Acute respiratory failure    He presented to the ED with shortness of breath fevers, and myalgias. He was found to have influenza A. Hypoxia rapidly improved with antibiotics so continued despite clear CXR to cover for potential superimposed bacterial pneumonia. Low suspicion for contribution from CHF and patient appeared dry to euvolemic on exam.    Weaned off oxygen and dyspnea improved.     Continued oseltamivir on discharge. Completed 3 doses of azithromycin 500 mg. Will complete betalactam course with cefdinir.     COPD with acute exacerbation     Suspect secondary to influenza.    Continued PTA PTA inhalers     Will complete 5-day prednisone burst     Generalized weakness     Suspect secondary to influenza, gradually improving.     Chronic systolic heart failure with recovered ejection fraction   NICM of unclear etiology with recovered ejection fraction   Non obstructive CAD. Per previous angiogram   Benign essential hypertension   Follows with cardiology and last seen in clinic on 01/02/2025. TTE from that day with ejection fraction recovered at 60-65%. Ef had previously been in the 30's. He is not overloaded currently.     Held PTA sacubitril-valsartan 24-26  bid with recurrent hypotension but BP has normalized and can resume on discharge.     Held PTA carvedolol 12.5 bid with recurrent hypotension and bradycardia. BP improved but HR still in the 40's. Will stop carvedilol for now and can be reassessed when follows up with cardiology.     Resume PTA furosemide 20 mg every day on discharge     Asymptomatic infrarenal abdominal aortic aneurysm   Stable SMA dissection   Follows with vascular medicine. Last seen in clinic 12/17/2024, plan at that time for annual CT.     Continued follow up with vascular outpatient     Continued PTA ASA 81    Continued PTA rosuvastatin 40 mg every day      Hx of right distal femur fracture s/p ORIF 7/9/2024     Patient had fracture after being pinned between  a post and a 600 lb pig. This was surgical repaired at [facility]. He was started on apixaban for DVT prophylaxis at that time. It appears that apixaban the plan had been to do apixaban for 12 weeks however this was never stopped. It is present on the medication list for his vascular serge and cardiology clinic visits but is not addressed. In a later Orthopedics note it erroneously documented that he was on asa bid for prophylaxis.     Stopping PTA apixaban.     Chronic normocytic anemia     Hgb appears to be stable at 10.8 and improving from baseline. No signs of bleeding.     Thrombocytopenia    Mild in 120's and stable to improving.      BPH    Continued PTA tamsulosin     Constipation     Started miralax every day     Former smoker   Quit 7/2023         Consultations This Hospital Stay   CARE MANAGEMENT / SOCIAL WORK IP CONSULT  PHYSICAL THERAPY ADULT IP CONSULT    Code Status   Full Code    The discharge plan was discussed with the patient, and he expressed understanding.     Time Spent on this Encounter   Total time on this discharge was > 30 minutes.       Mukesh Tinoco MD  [facility]  ______________________________________________________________________    Physical Exam   Vital Signs: Temp: 97.9  F (36.6  C) Temp src: Oral BP: 124/67 Pulse: (!) 47   Resp: 18 SpO2: 95 % O2 Device: None (Room air)    Weight: 138 lbs 14.24 oz  Constitutional: alert and oriented, a little fatigued appearing but otherwise nontoxic and NAD  CV: Regular, no JVD, no edema  Respiratory: crackles in left base that partially clear with deep cough otherwise CTA bilaterally  GI: Soft, non-tender   Skin: Warm and mild diaphoretic       Primary Care Physician   Delvin Henning  [address] [facility] [address]     Discharge Disposition   Discharged to assisted living  Condition at discharge: Stable    Significant Results and Procedures   Results for orders placed or performed during the hospital encounter  of 01/03/25   Chest XR,  PA & LAT    Narrative    CHEST TWO VIEWS January 3, 2025 11:01 AM     HISTORY: Cough.    COMPARISON: 7/8/2024.      Impression    IMPRESSION: No significant interval change. Heart size upper limits of  normal. Pulmonary vascularity is within normal limits. Lungs clear. No  pleural effusions. Mild thoracic vertebral body compressions are  unchanged. Old right rib fracture.    CHANTELLE GARCIA MD         SYSTEM ID:  [id number]   POC US ECHO LIMITED    Impression    Limited Bedside Cardiac Ultrasound, performed and interpreted by me.   Indication: Hypotension/shock.  Parasternal long axis, parasternal short axis, apical 4 chamber, and subcostal views were acquired.   Image quality was satisfactory.    Findings:    Global left ventricular function appears intact.  Chambers do not appear dilated.  There is no evidence of free fluid within the pericardium.    IMPRESSION: Grossly normal left ventricular function and chamber size.  No pericardial effusion..         Procedures  None    Discharge Orders      Primary Care - Care Coordination Referral      Reason for your hospital stay    Influenza A with possible secondary pneumonia     Follow-up and recommended labs and tests     Follow up with primary care provider, Delvin Henning, within 7 days for hospital follow- up. Should consider restarting carvedilol if/when HR and BP normalize. No follow up labs or test are needed.     Activity    Your activity upon discharge: activity as tolerated     Diet    Follow this diet upon discharge: Low sodium/low salt diet     Discharge Medications   Current Discharge Medication List        START taking these medications    Details   cefdinir (OMNICEF) 300 MG capsule Take 1 capsule (300 mg) by mouth 2 times daily for 2 days.  Qty: 4 capsule, Refills: 0    Associated Diagnoses: Pneumonia due to infectious organism, unspecified laterality, unspecified part of lung      oseltamivir (TAMIFLU) 75 MG capsule Take 1 capsule  (75 mg) by mouth 2 times daily for 2 days.  Qty: 4 capsule, Refills: 0    Associated Diagnoses: Influenza A      predniSONE (DELTASONE) 20 MG tablet Take 2 tablets (40 mg) by mouth daily for 2 days.  Qty: 4 tablet, Refills: 0    Associated Diagnoses: Influenza A           CONTINUE these medications which have NOT CHANGED    Details   acetaminophen (TYLENOL) 500 MG tablet Take 1,000 mg by mouth 4 times daily as needed for pain.      albuterol (PROAIR HFA/PROVENTIL HFA/VENTOLIN HFA) 108 (90 Base) MCG/ACT inhaler Inhale 2 puffs into the lungs every 6 hours as needed for shortness of breath, wheezing or cough  Qty: 18 g, Refills: 11    Comments: Pharmacy may dispense brand covered by insurance (Proair, or proventil or ventolin or generic albuterol inhaler)  Associated Diagnoses: Uncomplicated asthma, unspecified asthma severity, unspecified whether persistent      aspirin (ASA) 81 MG chewable tablet Take 1 tablet (81 mg) by mouth daily  Qty: 90 tablet, Refills: 3    Associated Diagnoses: Coronary artery disease involving native coronary artery of native heart, unspecified whether angina present      fluticasone-salmeterol (ADVAIR) 250-50 MCG/ACT inhaler Inhale 1 puff into the lungs every 12 hours  Qty: 60 each, Refills: 3    Associated Diagnoses: Stage 2 moderate COPD by GOLD classification (H)      furosemide (LASIX) 20 MG tablet Take 1 tablet (20 mg) by mouth daily  Qty: 30 tablet, Refills: 1    Associated Diagnoses: Nonischemic cardiomyopathy (H); Chronic systolic heart failure (H)      hydrOXYzine HCl (ATARAX) 25 MG tablet Take 1-2 tablets (25-50 mg) by mouth every 6 hours as needed for itching  Qty: 60 tablet, Refills: 11    Associated Diagnoses: Eczema, unspecified type      magnesium hydroxide (MOM) 2400 MG/10ML SUSP Take 5 mLs by mouth daily as needed for constipation.      ondansetron (ZOFRAN) 4 MG tablet Take 4 mg by mouth every 6 hours as needed for nausea.      rosuvastatin (CRESTOR) 40 MG tablet Take 1  tablet (40 mg) by mouth daily  Qty: 90 tablet, Refills: 0    Associated Diagnoses: Nonobstructive atherosclerosis of coronary artery; Dyslipidemia      sacubitril-valsartan (ENTRESTO) 24-26 MG per tablet Take 1 tablet by mouth 2 times daily.  Qty: 180 tablet, Refills: 3    Associated Diagnoses: Nonischemic cardiomyopathy (H)      spacer (OPTICHAMBER SAIDA) holding chamber Use with albuterol  Qty: 1 each, Refills: 2    Associated Diagnoses: Moderate COPD (chronic obstructive pulmonary disease) (H)      tamsulosin (FLOMAX) 0.4 MG capsule Take 0.4 mg by mouth daily.           STOP taking these medications       carvedilol (COREG) 12.5 MG tablet Comments:   Reason for Stopping:         ELIQUIS ANTICOAGULANT 2.5 MG tablet Comments:   Reason for Stopping:             Allergies   Allergies   Allergen Reactions    Seasonal Allergies Other (See Comments)     Congestion sinuses    Codeine Nausea

## 2025-01-06 NOTE — PLAN OF CARE
Problem: Adult Inpatient Plan of Care  Goal: Plan of Care Review  Description: The Plan of Care Review/Shift note should be completed every shift.  The Outcome Evaluation is a brief statement about your assessment that the patient is improving, declining, or no change.  This information will be displayed automatically on your shift  note.  Flowsheets (Taken 1/6/2025 0602)  Outcome Evaluation: Pt alert and oriented x4. Pt able to make needs known and given IV antibiotics. Pt slept throughout shift and due to discharge back to facility today.  Plan of Care Reviewed With: patient  Overall Patient Progress: improving       Goal Outcome Evaluation:      Plan of Care Reviewed With: patient    Overall Patient Progress: improvingOverall Patient Progress: improving    Outcome Evaluation: Pt alert and oriented x4. Pt able to make needs known and given IV antibiotics. Pt slept throughout shift and due to discharge back to facility today.

## 2025-01-06 NOTE — PROGRESS NOTES
Care Management Discharge Note    Discharge Date: 01/06/2025     Discharge Disposition: Transitional Care    Discharge Services: Transportation Services    Discharge DME: None    Discharge Transportation: agency    Private pay costs discussed: transportation costs    Does the patient's insurance plan have a 3 day qualifying hospital stay waiver?  Yes     Which insurance plan 3 day waiver is available? Alternative insurance waiver    Will the waiver be used for post-acute placement? No    Patient/family educated on Medicare website which has current facility and service quality ratings: no    Education Provided on the Discharge Plan:    Persons Notified of Discharge Plans: patient, Roxann w/TCU  Patient/Family in Agreement with the Plan: yes    Handoff Referral Completed: Yes, [facility] PCP: Internal handoff referral completed    Additional Information:    Patient is medically ready for discharge today. Confirmed discharge plan with Roxann at [facility] (Phone: [phone] Fax: [phone]). Arranged transport with [employer] between [id number].    OWEN CHANG RN

## 2025-01-06 NOTE — PLAN OF CARE
Goal Outcome Evaluation:    Patient alert and oriented x4. Up with assist of 1. Denied pain this shift. Using IS independently and demonstrated 2500. Patient reported stool on evening shift (not documented by staff). Using urinal in bed.     Leia Mak RN on 1/5/2025 at 10:32 PM

## 2025-01-06 NOTE — PLAN OF CARE
"  Problem: Adult Inpatient Plan of Care  Goal: Plan of Care Review  Description: The Plan of Care Review/Shift note should be completed every shift.  The Outcome Evaluation is a brief statement about your assessment that the patient is improving, declining, or no change.  This information will be displayed automatically on your shift  note.  Outcome: Progressing  Flowsheets (Taken 1/6/2025 1328)  Plan of Care Reviewed With: patient     Problem: Adult Inpatient Plan of Care  Goal: Absence of Hospital-Acquired Illness or Injury  Outcome: Progressing  Intervention: Identify and Manage Fall Risk  Recent Flowsheet Documentation  Taken 1/6/2025 0800 by Juan Francisco Santizo, RN  Safety Promotion/Fall Prevention: activity supervised     Problem: Fall Injury Risk  Goal: Absence of Fall and Fall-Related Injury  Outcome: Progressing  Intervention: Promote Injury-Free Environment  Recent Flowsheet Documentation  Taken 1/6/2025 0800 by Juan Francisco Santizo, RN  Safety Promotion/Fall Prevention: activity supervised   Goal Outcome Evaluation:      Plan of Care Reviewed With: patient      Patient remains on room air 95% heart rate in the upper 40\"s to low 50\"s will update [place]ED[place]           "

## 2025-01-07 ENCOUNTER — PATIENT OUTREACH (OUTPATIENT)
Dept: CARE COORDINATION | Facility: CLINIC | Age: 60
End: 2025-01-07
Payer: COMMERCIAL

## 2025-01-07 NOTE — LETTER
To:             Please give to facility    From:   Sagrario Reyez RN, Care Coordinator   [facility]   Sagrario Reyez RN, Care Coordinator   [facility]'s   E-mail [email or web address]   [phone]   Patient Name:  Delvin Echevarria  YOB: 1965   Admit date: 1/6/2025      *Information Needed:  Please contact me when the patient will discharge (or if they will move to long term care)- include the discharge date, disposition, and main diagnosis   If the patient is discharged with home care services, please provide the name of the agency    Also- Please inform me if a care conference is being held.   [facility]   Sagrario Reyez RN, Care Coordinator   [facility]'s   E-mail [email or web address]   [phone]                             Thank you         Electronically signed

## 2025-01-07 NOTE — PLAN OF CARE
[facility] DISCHARGE NOTE    Patient discharged to nursing home at 6:29 PM via cart. Accompanied by significant other and staff. Discharge instructions reviewed with patient, opportunity offered to ask questions. Prescriptions sent with patient to fill . All belongings sent with patient.    Juan Francisco Santizo RN

## 2025-01-07 NOTE — PROGRESS NOTES
Clinic Care Coordination Contact  Care Coordination Transition Communication    Clinical Data:   1/3/2025 - 1/6/2025 (3 days)  [facility]    Sepsis secondary to Influenza A  Acute respiratory failure  COPD .     Assessment: Patient has transitioned to TCU/ARU for short term rehabilitation:    Facility Name: [facility]  Transition Communication:  Notified facility of Primary Care- Care Coordination support via Epic fax.    Plan: Care Coordinator will await notification from facility staff informing of patient's discharge plans/needs. Care Coordinator will review chart and outreach to facility staff every 4 weeks and as needed.     [facility]   Sagrario Reyez RN, Care Coordinator   [facility]'s   E-mail [email or web address]   [phone]

## 2025-01-08 LAB — BACTERIA BLD CULT: NO GROWTH

## 2025-01-22 ENCOUNTER — LAB REQUISITION (OUTPATIENT)
Dept: LAB | Facility: CLINIC | Age: 60
End: 2025-01-22
Payer: COMMERCIAL

## 2025-01-22 DIAGNOSIS — I42.9 CARDIOMYOPATHY, UNSPECIFIED (H): ICD-10-CM

## 2025-01-22 DIAGNOSIS — E55.9 VITAMIN D DEFICIENCY, UNSPECIFIED: ICD-10-CM

## 2025-01-23 LAB
ALBUMIN SERPL BCG-MCNC: 3.9 G/DL (ref 3.5–5.2)
ALP SERPL-CCNC: 77 U/L (ref 40–150)
ALT SERPL W P-5'-P-CCNC: 34 U/L (ref 0–70)
ANION GAP SERPL CALCULATED.3IONS-SCNC: 14 MMOL/L (ref 7–15)
AST SERPL W P-5'-P-CCNC: 39 U/L (ref 0–45)
BILIRUB SERPL-MCNC: 0.4 MG/DL
BUN SERPL-MCNC: 22.5 MG/DL (ref 8–23)
CALCIUM SERPL-MCNC: 8.9 MG/DL (ref 8.8–10.4)
CHLORIDE SERPL-SCNC: 104 MMOL/L (ref 98–107)
CHOLEST SERPL-MCNC: 98 MG/DL
CREAT SERPL-MCNC: 0.9 MG/DL (ref 0.67–1.17)
EGFRCR SERPLBLD CKD-EPI 2021: >90 ML/MIN/1.73M2
ERYTHROCYTE [DISTWIDTH] IN BLOOD BY AUTOMATED COUNT: 17.2 % (ref 10–15)
EST. AVERAGE GLUCOSE BLD GHB EST-MCNC: 120 MG/DL
FASTING STATUS PATIENT QL REPORTED: ABNORMAL
GLUCOSE SERPL-MCNC: 77 MG/DL (ref 70–99)
HBA1C MFR BLD: 5.8 %
HCO3 SERPL-SCNC: 22 MMOL/L (ref 22–29)
HCT VFR BLD AUTO: 34.5 % (ref 40–53)
HDLC SERPL-MCNC: 31 MG/DL
HGB BLD-MCNC: 10.9 G/DL (ref 13.3–17.7)
LDLC SERPL CALC-MCNC: 59 MG/DL
MCH RBC QN AUTO: 30.4 PG (ref 26.5–33)
MCHC RBC AUTO-ENTMCNC: 31.6 G/DL (ref 31.5–36.5)
MCV RBC AUTO: 96 FL (ref 78–100)
NONHDLC SERPL-MCNC: 67 MG/DL
PLATELET # BLD AUTO: 205 10E3/UL (ref 150–450)
POTASSIUM SERPL-SCNC: 4.5 MMOL/L (ref 3.4–5.3)
PROT SERPL-MCNC: 6.5 G/DL (ref 6.4–8.3)
RBC # BLD AUTO: 3.59 10E6/UL (ref 4.4–5.9)
SODIUM SERPL-SCNC: 140 MMOL/L (ref 135–145)
TRIGL SERPL-MCNC: 42 MG/DL
WBC # BLD AUTO: 4.8 10E3/UL (ref 4–11)

## 2025-01-23 PROCEDURE — 83036 HEMOGLOBIN GLYCOSYLATED A1C: CPT | Mod: ORL | Performed by: FAMILY MEDICINE

## 2025-01-23 PROCEDURE — 85027 COMPLETE CBC AUTOMATED: CPT | Mod: ORL | Performed by: FAMILY MEDICINE

## 2025-01-23 PROCEDURE — 36415 COLL VENOUS BLD VENIPUNCTURE: CPT | Mod: ORL

## 2025-01-23 PROCEDURE — 82306 VITAMIN D 25 HYDROXY: CPT | Mod: ORL | Performed by: FAMILY MEDICINE

## 2025-01-23 PROCEDURE — 80061 LIPID PANEL: CPT | Mod: ORL | Performed by: FAMILY MEDICINE

## 2025-01-23 PROCEDURE — P9603 ONE-WAY ALLOW PRORATED MILES: HCPCS | Mod: ORL | Performed by: FAMILY MEDICINE

## 2025-01-23 PROCEDURE — 80053 COMPREHEN METABOLIC PANEL: CPT | Mod: ORL | Performed by: FAMILY MEDICINE

## 2025-01-24 LAB — VIT D+METAB SERPL-MCNC: 13 NG/ML (ref 20–50)

## 2025-02-19 ENCOUNTER — LAB REQUISITION (OUTPATIENT)
Dept: LAB | Facility: CLINIC | Age: 60
End: 2025-02-19
Payer: COMMERCIAL

## 2025-02-19 DIAGNOSIS — R73.03 PREDIABETES: ICD-10-CM

## 2025-02-19 DIAGNOSIS — J44.9 CHRONIC OBSTRUCTIVE PULMONARY DISEASE, UNSPECIFIED (H): ICD-10-CM

## 2025-02-19 DIAGNOSIS — E55.9 VITAMIN D DEFICIENCY, UNSPECIFIED: ICD-10-CM

## 2025-02-19 DIAGNOSIS — E78.5 HYPERLIPIDEMIA, UNSPECIFIED: ICD-10-CM

## 2025-02-19 DIAGNOSIS — I25.10 ATHEROSCLEROTIC HEART DISEASE OF NATIVE CORONARY ARTERY WITHOUT ANGINA PECTORIS: ICD-10-CM

## 2025-02-19 DIAGNOSIS — R94.4 ABNORMAL RESULTS OF KIDNEY FUNCTION STUDIES: ICD-10-CM

## 2025-02-19 DIAGNOSIS — I10 ESSENTIAL (PRIMARY) HYPERTENSION: ICD-10-CM

## 2025-02-19 DIAGNOSIS — I42.2 OTHER HYPERTROPHIC CARDIOMYOPATHY (H): ICD-10-CM

## 2025-02-19 DIAGNOSIS — J96.01 ACUTE RESPIRATORY FAILURE WITH HYPOXIA (H): ICD-10-CM

## 2025-02-19 DIAGNOSIS — I71.40 ABDOMINAL AORTIC ANEURYSM, WITHOUT RUPTURE, UNSPECIFIED: ICD-10-CM

## 2025-02-20 ENCOUNTER — OFFICE VISIT (OUTPATIENT)
Dept: PULMONOLOGY | Facility: CLINIC | Age: 60
End: 2025-02-20
Payer: COMMERCIAL

## 2025-02-20 VITALS
OXYGEN SATURATION: 100 % | RESPIRATION RATE: 16 BRPM | BODY MASS INDEX: 19.94 KG/M2 | DIASTOLIC BLOOD PRESSURE: 90 MMHG | HEART RATE: 65 BPM | SYSTOLIC BLOOD PRESSURE: 122 MMHG | TEMPERATURE: 97.5 F | WEIGHT: 143 LBS

## 2025-02-20 DIAGNOSIS — J44.9 STAGE 2 MODERATE COPD BY GOLD CLASSIFICATION (H): ICD-10-CM

## 2025-02-20 DIAGNOSIS — J44.9 MODERATE COPD (CHRONIC OBSTRUCTIVE PULMONARY DISEASE) (H): ICD-10-CM

## 2025-02-20 DIAGNOSIS — R59.0 HILAR LYMPHADENOPATHY: Primary | ICD-10-CM

## 2025-02-20 DIAGNOSIS — J45.909 UNCOMPLICATED ASTHMA, UNSPECIFIED ASTHMA SEVERITY, UNSPECIFIED WHETHER PERSISTENT: ICD-10-CM

## 2025-02-20 LAB
ALBUMIN SERPL BCG-MCNC: 3.9 G/DL (ref 3.5–5.2)
ALP SERPL-CCNC: 76 U/L (ref 40–150)
ALT SERPL W P-5'-P-CCNC: 44 U/L (ref 0–70)
ANION GAP SERPL CALCULATED.3IONS-SCNC: 12 MMOL/L (ref 7–15)
AST SERPL W P-5'-P-CCNC: 48 U/L (ref 0–45)
BASOPHILS # BLD AUTO: 0.1 10E3/UL (ref 0–0.2)
BASOPHILS NFR BLD AUTO: 1 %
BILIRUB SERPL-MCNC: 0.3 MG/DL
BUN SERPL-MCNC: 21.2 MG/DL (ref 8–23)
CALCIUM SERPL-MCNC: 8.8 MG/DL (ref 8.8–10.4)
CHLORIDE SERPL-SCNC: 106 MMOL/L (ref 98–107)
CREAT SERPL-MCNC: 0.8 MG/DL (ref 0.67–1.17)
EGFRCR SERPLBLD CKD-EPI 2021: >90 ML/MIN/1.73M2
EOSINOPHIL # BLD AUTO: 0.2 10E3/UL (ref 0–0.7)
EOSINOPHIL NFR BLD AUTO: 4 %
ERYTHROCYTE [DISTWIDTH] IN BLOOD BY AUTOMATED COUNT: 15.2 % (ref 10–15)
EST. AVERAGE GLUCOSE BLD GHB EST-MCNC: 105 MG/DL
GLUCOSE SERPL-MCNC: 86 MG/DL (ref 70–99)
HBA1C MFR BLD: 5.3 %
HCO3 SERPL-SCNC: 19 MMOL/L (ref 22–29)
HCT VFR BLD AUTO: 34.1 % (ref 40–53)
HGB BLD-MCNC: 11 G/DL (ref 13.3–17.7)
IMM GRANULOCYTES # BLD: 0 10E3/UL
IMM GRANULOCYTES NFR BLD: 0 %
LYMPHOCYTES # BLD AUTO: 2.2 10E3/UL (ref 0.8–5.3)
LYMPHOCYTES NFR BLD AUTO: 40 %
MCH RBC QN AUTO: 32.2 PG (ref 26.5–33)
MCHC RBC AUTO-ENTMCNC: 32.3 G/DL (ref 31.5–36.5)
MCV RBC AUTO: 100 FL (ref 78–100)
MONOCYTES # BLD AUTO: 0.5 10E3/UL (ref 0–1.3)
MONOCYTES NFR BLD AUTO: 10 %
NEUTROPHILS # BLD AUTO: 2.5 10E3/UL (ref 1.6–8.3)
NEUTROPHILS NFR BLD AUTO: 45 %
NRBC # BLD AUTO: 0 10E3/UL
NRBC BLD AUTO-RTO: 0 /100
PLATELET # BLD AUTO: 157 10E3/UL (ref 150–450)
POTASSIUM SERPL-SCNC: 4.1 MMOL/L (ref 3.4–5.3)
PROT SERPL-MCNC: 6.3 G/DL (ref 6.4–8.3)
RBC # BLD AUTO: 3.42 10E6/UL (ref 4.4–5.9)
SODIUM SERPL-SCNC: 137 MMOL/L (ref 135–145)
VIT D+METAB SERPL-MCNC: 12 NG/ML (ref 20–50)
WBC # BLD AUTO: 5.5 10E3/UL (ref 4–11)

## 2025-02-20 PROCEDURE — 82306 VITAMIN D 25 HYDROXY: CPT | Mod: ORL | Performed by: FAMILY MEDICINE

## 2025-02-20 PROCEDURE — 84439 ASSAY OF FREE THYROXINE: CPT | Mod: ORL | Performed by: FAMILY MEDICINE

## 2025-02-20 PROCEDURE — 85025 COMPLETE CBC W/AUTO DIFF WBC: CPT | Mod: ORL | Performed by: FAMILY MEDICINE

## 2025-02-20 PROCEDURE — 84443 ASSAY THYROID STIM HORMONE: CPT | Mod: ORL | Performed by: FAMILY MEDICINE

## 2025-02-20 PROCEDURE — 36415 COLL VENOUS BLD VENIPUNCTURE: CPT | Mod: ORL

## 2025-02-20 PROCEDURE — 80053 COMPREHEN METABOLIC PANEL: CPT | Mod: ORL | Performed by: FAMILY MEDICINE

## 2025-02-20 PROCEDURE — P9603 ONE-WAY ALLOW PRORATED MILES: HCPCS | Mod: ORL | Performed by: FAMILY MEDICINE

## 2025-02-20 PROCEDURE — 83036 HEMOGLOBIN GLYCOSYLATED A1C: CPT | Mod: ORL | Performed by: FAMILY MEDICINE

## 2025-02-20 RX ORDER — ALBUTEROL SULFATE 90 UG/1
2 INHALANT RESPIRATORY (INHALATION) EVERY 6 HOURS PRN
Qty: 18 G | Refills: 11 | Status: SHIPPED | OUTPATIENT
Start: 2025-02-20

## 2025-02-20 RX ORDER — APIXABAN 2.5 MG/1
TABLET, FILM COATED ORAL
COMMUNITY
Start: 2025-02-07

## 2025-02-20 RX ORDER — FLUTICASONE PROPIONATE AND SALMETEROL 250; 50 UG/1; UG/1
1 POWDER RESPIRATORY (INHALATION) EVERY 12 HOURS
Qty: 60 EACH | Refills: 3 | Status: SHIPPED | OUTPATIENT
Start: 2025-02-20

## 2025-02-20 RX ORDER — CARVEDILOL 12.5 MG/1
12.5 TABLET ORAL 2 TIMES DAILY WITH MEALS
COMMUNITY
Start: 2025-02-19

## 2025-02-20 ASSESSMENT — PAIN SCALES - GENERAL: PAINLEVEL_OUTOF10: NO PAIN (0)

## 2025-02-20 NOTE — PROGRESS NOTES
Does Delvin have home oxygen?  No       [address] [address]  Phone: [phone]    Patient:  Delvin Echevarria, Date of birth 1965  Date of Visit:  02/20/2025  Referring Provider Nolan Fields      Assessment & Plan      Moderate COPD  Emphysema  History of smoking  Right hilar lymphadenopathy    His breathing symptoms are stable.  I will continue him on Advair twice a day and albuterol as needed.  Dose of Advair is 250 mcg twice a day and seems to be doing well.  I went over his chest CT scan with him.  I showed him how the right hilar lymph node has been mostly stable to slightly decreased over the last year and a half.  We decided to get 1 more CT scan 6 months from the last 1 to ensure stability of the hilar lymph node for a 2-year period of time.    He does have a mild amount of emphysema, and some airflow obstruction on spirometry.  This would qualify him for moderate COPD.  I talked to him about alpha-1 antitrypsin testing and he agreed to get a fingerstick test today.    He quit smoking 2 months ago.  He is using nicotine gum.  Encouraged him to remain abstinent from cigarettes.  I refilled his inhalers for a year.    Medical Decision Making      I spent a total of 32 minutes on the day of the visit.   Time spent by me today doing chart review, history and exam, documentation and further activities per the note       Nolan Fields MD         Does Delvin have home oxygen?  No            Today's visit note:     Chief Complaint: RECHECK (COPD )      HISTORY OF PRESENT ILLNESS:    Delvin Echevarria is a 59 year old year old male who is being seen for evaluation of his lungs.    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  He end up breaking his femur and injured his other knee.  He has recovered from those operations, but now he  resides in a nursing home.  Hopefully he will be able to leave this nursing home in March and live independently again.  Throughout all of this, his breathing has been pretty good.  He did participate in physical therapy recovering from his injury, and would get a little bit winded with that but it was not anything drastic.  He rarely has needed his albuterol.  He uses Advair twice a day.  1 month ago he had influenza A and he was hospitalized for that.  He required oxygen briefly, but is no longer on that.  He quit smoking 2 months ago.  He is using nicotine gum, and sometimes will need more than 10 doses of gum in a day to keep away his urges.           Past Medical and Surgical History:     Past Medical History:   Diagnosis Date    AAA (abdominal aortic aneurysm)     Abscess 08/11/2017    Acute posthemorrhagic anemia 12/14/2016    Anemia due to blood loss, acute 09/30/2016    CAD (coronary artery disease)     Chronic systolic heart failure (H)     Closed fracture of lateral portion of left tibial plateau 07/10/2024    Closed fracture of right tibia and fibula with routine healing 09/29/2016    DDD (degenerative disc disease), lumbar     Dissection of mesenteric artery     Stable    DVT (deep venous thrombosis) (H)     Hypertension 03/15/2013    Marijuana use     4x daily    Non-ischemic cardiomyopathy (H)     Nonobstructive atherosclerosis of coronary artery 08/10/2023    Staph aureus infection 09/13/2017    Tibial plateau fracture 09/11/2016    Tobacco use     Wound infection 09/29/2016     Past Surgical History:   Procedure Laterality Date    APPLY EXTERNAL FIXATOR LOWER EXTREMITY Right 09/12/2016    Procedure: APPLY EXTERNAL FIXATOR LOWER EXTREMITY;  Surgeon: John Gonzales MD;  Location: [facility]    CV CORONARY ANGIOGRAM N/A 7/24/2023    Procedure: Coronary Angiogram;  Surgeon: Boubacar Camarena MD;  Location:  [facility]    CV LEFT HEART CATH N/A 7/24/2023    Procedure: Left Heart  Catheterization;  Surgeon: Boubacar Camarena MD;  Location:  [facility]    CV LEFT VENTRICULOGRAM N/A 7/24/2023    Procedure: Left Ventriculogram;  Surgeon: Boubacar Camarena MD;  Location:  [facility] CATH [facility]    HERNIORRHAPHY INGUINAL  07/02/2012    Procedure: HERNIORRHAPHY INGUINAL;  Open Repair Right Inguinal Hernia with Mesh;  Surgeon: Avni Maxwell MD;  Location: [facility]    INCISION AND DRAINAGE BONE LOWER EXTREMITY, COMBINED Right 08/11/2017    Procedure: COMBINED INCISION AND DRAINAGE BONE LOWER EXTREMITY;  Irrigation and Debridement Right Tibia,  Removal of Hardware;  Surgeon: Kenroy Dos Santos MD;  Location: [facility]    KNEE SURGERY Left 03/01/2021    patella fracture repair    LARYNGECTOMY  Several Years ago    Partail removal due to fish bone    OPEN REDUCTION INTERNAL FIXATION PATELLA Left 3/1/2021    Procedure: OPEN REDUCTION INTERNAL FIXATION, FRACTURE, PATELLA;  Surgeon: Papa Pena MD;  Location: [facility]    OPEN REDUCTION INTERNAL FIXATION TIBIA Right 09/15/2016    Procedure: OPEN REDUCTION INTERNAL FIXATION TIBIA;  Surgeon: Miguel A Green MD;  Location: [facility]    OPEN REDUCTION INTERNAL FIXATION TIBIA Right 11/07/2017    Procedure: OPEN REDUCTION INTERNAL FIXATION TIBIA;  Right Tibia Fibula Open Reduction Internal Fixation;  Surgeon: Miguel A Green MD;  [facility]    REMOVE HARDWARE LOWER EXTREMITY Right 08/11/2017    Procedure: REMOVE HARDWARE LOWER EXTREMITY;;  Surgeon: Kenroy Dos Santos MD;  Location: [facility]           Family History:     Family History   Problem Relation Age of Onset    Hypertension Mother     Hypertension Brother               Social History:     Social History     Socioeconomic History    Marital status: Single     Spouse name: Not on file    Number of children: Not on file    Years of education: Not on file    Highest education level: Not on file   Occupational History    Not on file   Tobacco Use    Smoking  status: Former     Current packs/day: 0.00     Average packs/day: 0.5 packs/day for 25.0 years (12.5 ttl pk-yrs)     Types: Cigarettes     Start date: 12/10/1999     Quit date: 12/10/2024     Years since quittin.1    Smokeless tobacco: Never    Tobacco comments:     10 Cigarettes a day   Vaping Use    Vaping status: Never Used   Substance and Sexual Activity    Alcohol use: No    Drug use: Yes     Types: Marijuana     Comment: Previously smoked 4x daily    Sexual activity: Yes   Other Topics Concern    Parent/sibling w/ CABG, MI or angioplasty before 65F 55M? Not Asked   Social History Narrative    Not on file     Social Drivers of Health     Financial Resource Strain: Low Risk  (1/3/2025)    Financial Resource Strain     Within the past 12 months, have you or your family members you live with been unable to get utilities (heat, electricity) when it was really needed?: No   Food Insecurity: Low Risk  (1/3/2025)    Food Insecurity     Within the past 12 months, did you worry that your food would run out before you got money to buy more?: No     Within the past 12 months, did the food you bought just not last and you didn t have money to get more?: No   Transportation Needs: Low Risk  (1/3/2025)    Transportation Needs     Within the past 12 months, has lack of transportation kept you from medical appointments, getting your medicines, non-medical meetings or appointments, work, or from getting things that you need?: No   Physical Activity: Not on file   Stress: Not on file   Social Connections: Socially Integrated (2024)    Received from [facility]    Social Connections     Do you often feel lonely or isolated from those around you?: 0   Interpersonal Safety: Low Risk  (1/3/2025)    Interpersonal Safety     Do you feel physically and emotionally safe where you currently live?: Yes     Within the past 12 months, have you been hit, slapped, kicked or otherwise physically hurt by  someone?: No     Within the past 12 months, have you been humiliated or emotionally abused in other ways by your partner or ex-partner?: No   Housing Stability: High Risk (1/4/2025)    Housing Stability     Do you have housing? : Yes     Are you worried about losing your housing?: Yes            Medications:     Current Outpatient Medications   Medication Sig Dispense Refill    carvedilol (COREG) 12.5 MG tablet 12.5 mg 2 times daily (with meals).      ELIQUIS ANTICOAGULANT 2.5 MG tablet       acetaminophen (TYLENOL) 500 MG tablet Take 1,000 mg by mouth 4 times daily as needed for pain.      albuterol (PROAIR HFA/PROVENTIL HFA/VENTOLIN HFA) 108 (90 Base) MCG/ACT inhaler Inhale 2 puffs into the lungs every 6 hours as needed for shortness of breath, wheezing or cough 18 g 11    aspirin (ASA) 81 MG chewable tablet Take 1 tablet (81 mg) by mouth daily 90 tablet 3    fluticasone-salmeterol (ADVAIR) 250-50 MCG/ACT inhaler Inhale 1 puff into the lungs every 12 hours 60 each 3    furosemide (LASIX) 20 MG tablet Take 1 tablet (20 mg) by mouth daily 30 tablet 1    hydrOXYzine HCl (ATARAX) 25 MG tablet Take 1-2 tablets (25-50 mg) by mouth every 6 hours as needed for itching 60 tablet 11    magnesium hydroxide (MOM) 2400 MG/10ML SUSP Take 5 mLs by mouth daily as needed for constipation.      ondansetron (ZOFRAN) 4 MG tablet Take 4 mg by mouth every 6 hours as needed for nausea.      rosuvastatin (CRESTOR) 40 MG tablet Take 1 tablet (40 mg) by mouth daily 90 tablet 0    sacubitril-valsartan (ENTRESTO) 24-26 MG per tablet Take 1 tablet by mouth 2 times daily. 180 tablet 3    spacer (OPTICHAMBER SAIDA) holding chamber Use with albuterol 1 each 2    tamsulosin (FLOMAX) 0.4 MG capsule Take 0.4 mg by mouth daily.       No current facility-administered medications for this visit.            Review of Systems:       A complete review of systems was otherwise negative except as noted in the HPI.      PHYSICAL EXAM:  /90   Pulse  65   Temp 97.5  F (36.4  C) (Temporal)   Resp 16   Wt 64.9 kg (143 lb)   SpO2 100%   BMI 19.94 kg/m       General: Comfortable, No apparent distress  Eyes: Anicteric  Ears: Hearing grossly normal  Mouth: Oral mucosa is moist, without any lesions. No oropharyngeal exudate.  Neck: supple, no thyromegaly  Lymphatics: No cervical or supraclavicular nodes  Respiratory: Good air movement. No crackles. No rhonchi. No wheezes  Cardiac: RRR, normal S1, S2. No murmurs.   Musculoskeletal: Extremities normal. No clubbing. No cyanosis. No edema.  Skin: No rash on limited exam  Neuro: Normal mentation. Normal speech.  Psych:Normal affect           Data:   All laboratory and imaging data reviewed.    PFT:       PFT Interpretation:  Moderate airflow obstruction  High normal residual volume  Impaired diffusion capacity  Valid Maneuver    CXR: I have reviewed the CXR images from May 8, 2024 and agree with the radiologist interpretation below:  IMPRESSION: Stable size of cardiomediastinal silhouette. No focal  airspace consolidation, pleural effusion or pneumothorax. No acute  bony abnormality. Healed right rib fractures.    Chest CT: I have reviewed the chest CT images from November 2024 and agree with the radiologist interpretation below:LUNGS AND PLEURA: Biapical scarring. Mixed centrilobular and  paraseptal emphysema. Slight elevation of the left hemidiaphragm with  left basilar volume loss. Stable small nodules such as 6 mm right  middle lobe (4/168). No new or significantly growing nodule. No  pleural effusion or pneumothorax.     MEDIASTINUM/AXILLAE: Borderline heart size. No pericardial effusion.  Normal caliber thoracic aorta. Right hilar lymph node is minimally  smaller measuring 1.3 cm compared to 1.4 cm. No new or significantly  growing lymph nodes.     CORONARY ARTERY CALCIFICATION: Severe.     UPPER ABDOMEN: Chronic SMA dissection incompletely visualized but not  significantly changed. Renal cysts do not require  imaging follow-up.  Partially visualized anterior abdominal wall fat-containing hernia.     MUSCULOSKELETAL: Old rib fractures. No significant osseous  abnormality.                                                                      IMPRESSION:   1.  Minimally decreased size of 1.3 cm right hilar lymph node. No  new/growing thoracic adenopathy.     2.  Emphysema. Stable small pulmonary nodules.

## 2025-02-21 LAB
T4 FREE SERPL-MCNC: 1.43 NG/DL (ref 0.9–1.7)
TSH SERPL DL<=0.005 MIU/L-ACNC: 2.48 UIU/ML (ref 0.3–4.2)

## 2025-03-04 LAB
A1AD NUMERIC: 153 MG/DL (ref 90–200)
A1AD STRING: NORMAL

## 2025-03-05 ENCOUNTER — TELEPHONE (OUTPATIENT)
Dept: SURGERY | Facility: CLINIC | Age: 60
End: 2025-03-05
Payer: COMMERCIAL

## 2025-03-05 NOTE — TELEPHONE ENCOUNTER
Writer notified patient of provider's message below. No additional questions.   Trudy Guillen RN on 3/5/2025 at 9:47 AM

## 2025-03-05 NOTE — TELEPHONE ENCOUNTER
----- Message from Nolan Fields sent at 3/5/2025  9:04 AM CST -----  Please let Mr Echevarria know that his alpha 1 antitrypsin genetic test was normal    Jose

## 2025-03-13 ENCOUNTER — PATIENT OUTREACH (OUTPATIENT)
Dept: CARE COORDINATION | Facility: CLINIC | Age: 60
End: 2025-03-13
Payer: COMMERCIAL

## 2025-03-13 ASSESSMENT — ACTIVITIES OF DAILY LIVING (ADL): DEPENDENT_IADLS:: TRANSPORTATION

## 2025-03-13 NOTE — PROGRESS NOTES
Clinic Care Coordination Contact    Situation: Patient chart reviewed by care coordinator.    Background:   1/3/2025 - 1/6/2025 (3 days)  [facility]    Sepsis secondary to Influenza A  Acute respiratory failure  COPD .      Assessment: Patient has transitioned to TCU/ARU for short term rehabilitation:     Facility Name: [facility]    Assessment: Patient continues to be in the TCU     Plan/Recommendations: CC RN left a message on Marta DAWSON  to discuss a discharge plan ([phone] )    [facility]   Sagrario Reyez RN, Care Coordinator   [facility]'s   E-mail [email or web address]   [phone]

## 2025-03-13 NOTE — PROGRESS NOTES
Marta DAWSON/ [facility] left message on writers VM that here is not a plan for discharge    Working on Finances and relocation       [facility]   Sagrario Reyez RN, Care Coordinator   [facility]'s   E-mail [email or web address]   [phone]

## 2025-04-23 ENCOUNTER — PATIENT OUTREACH (OUTPATIENT)
Dept: CARE COORDINATION | Facility: CLINIC | Age: 60
End: 2025-04-23
Payer: COMMERCIAL

## 2025-04-23 ASSESSMENT — ACTIVITIES OF DAILY LIVING (ADL): DEPENDENT_IADLS:: TRANSPORTATION

## 2025-04-23 NOTE — PROGRESS NOTES
Clinic Care Coordination Contact  Care Team Conversations    CC RN spoke to Marta DAWSON [facility]. [phone]  No discharge plans  The  continues to work with the patient to discharged to independent living or half-way  Also working on some legal issues and finances  Patient is independent and is out of the building today.  Writer encouraged Marta to call back if any needs in the future     Marta agrees with this plan     [facility]   Sagrario Reyez RN, Care Coordinator   [facility]'s   E-mail [email or web address]   [phone]

## (undated) DEVICE — CAST PADDING 6" STERILE 9046S

## (undated) DEVICE — IMM LIMB ELEVATOR DC40-0203

## (undated) DEVICE — 3.5 DRILL BIT

## (undated) DEVICE — SOL WATER IRRIG 1000ML BOTTLE 07139-09

## (undated) DEVICE — DRAPE EXTREMITY W/ARMBOARD 29405

## (undated) DEVICE — GLOVE PROTEXIS POWDER FREE 8.0 ORTHOPEDIC 2D73ET80

## (undated) DEVICE — LINEN TOWEL PACK X5 5464

## (undated) DEVICE — Device

## (undated) DEVICE — PREP CHLORAPREP 26ML TINTED ORANGE  260815

## (undated) DEVICE — SOL NACL 0.9% IRRIG 1000ML BOTTLE 2F7124

## (undated) DEVICE — ESU GROUND PAD ADULT W/CORD E7507

## (undated) DEVICE — CAST PADDING 4" STERILE 9044S

## (undated) DEVICE — BONE CLEANING TIP INTERPULSE  0210-010-000

## (undated) DEVICE — GOWN XLG DISP 9545

## (undated) DEVICE — GLOVE PROTEXIS POWDER FREE SMT 7.5  2D72PT75X

## (undated) DEVICE — MITT SURGICAL PREP HAIR REMOVER LATEX FREE SPM100

## (undated) DEVICE — SUCTION MANIFOLD DORNOCH ULTRA CART UL-CL500

## (undated) DEVICE — INTRO SHEATH 4FRX10CM PINNACLE RSS402

## (undated) DEVICE — DRSG STERI STRIP 1/2X4" R1547

## (undated) DEVICE — GLOVE PROTEXIS BLUE W/NEU-THERA 6.5  2D73EB65

## (undated) DEVICE — GUIDE WIRE 1.6X150MM THREADED 292.72

## (undated) DEVICE — GOWN IMPERVIOUS SPECIALTY XLG/XLONG 32474

## (undated) DEVICE — DRAPE IOBAN INCISE 23X17" 6650EZ

## (undated) DEVICE — TOURNIQUET CUFF 24" YELLOW LF 5921-024-135

## (undated) DEVICE — GLOVE PROTEXIS BLUE W/NEU-THERA 8.0  2D73EB80

## (undated) DEVICE — PREP POVIDONE IODINE SOLUTION 10% 120ML

## (undated) DEVICE — KIT HAND CONTROL ANGIOTOUCH ACIST 65CM AT-P65

## (undated) DEVICE — BLADE CLIPPER SGL USE 9680

## (undated) DEVICE — SUCTION MANIFOLD NEPTUNE 2 SYS 1 PORT 702-025-000

## (undated) DEVICE — DRSG ADAPTIC 3X8" 6113

## (undated) DEVICE — CATH DIAG 4FR ANG PIG 538453S

## (undated) DEVICE — DRAPE STERI U 1015

## (undated) DEVICE — DRAPE C-ARMOR 5 SIDED 5523

## (undated) DEVICE — TOTE ANGIO CORP PC15AT SAN32CC83O

## (undated) DEVICE — SU PDS II 2-0 CT-2 27"  Z333H

## (undated) DEVICE — PACK LOWER EXTREMITY CUSTOM ASC

## (undated) DEVICE — SOL NACL 0.9% IRRIG 3000ML BAG 2B7477

## (undated) DEVICE — CATH ANGIO INFINITI 3DRC 4FRX100CM 538476

## (undated) DEVICE — SOL NACL 0.9% IRRIG 1000ML BOTTLE 07138-09

## (undated) DEVICE — TOURNIQUET CUFF 30" REPRO BLUE 60-7070-105

## (undated) DEVICE — SYR BULB IRRIG DOVER 60 ML LATEX FREE 67000

## (undated) DEVICE — SPONGE LAP 18X18" X8435

## (undated) DEVICE — EYE PREP BETADINE 5% SOLUTION 30ML 0065-0411-30

## (undated) DEVICE — SU VICRYL 2-0 CT-2 27" UND J269H

## (undated) DEVICE — GOWN REINFORCED XXLG 9071

## (undated) DEVICE — DRAIN HEMOVAC RESERVOIR KIT 10FR 1/8" MED 00-2550-002-10

## (undated) DEVICE — MANIFOLD KIT ANGIO AUTOMATED 014613

## (undated) DEVICE — BLADE KNIFE SURG 10 371110

## (undated) DEVICE — DRAPE U-DRAPE 1015NSD NON-STERILE

## (undated) DEVICE — PREP POVIDONE IODINE SCRUB 7.5% 120ML

## (undated) DEVICE — DRAPE C-ARM W/STRAPS 42X72" 07-CA104

## (undated) DEVICE — SU VICRYL 0 CT-1 36" J946H

## (undated) DEVICE — DRAPE SHEET REV FOLD 3/4 9349

## (undated) DEVICE — DEVICE RETRIEVER HEWSON 71111579

## (undated) DEVICE — LINEN ORTHO PACK 5446

## (undated) DEVICE — IMPLANTABLE DEVICE
Type: IMPLANTABLE DEVICE | Site: KNEE | Status: NON-FUNCTIONAL
Removed: 2021-03-01

## (undated) DEVICE — SOL WATER IRRIG 1000ML BOTTLE 2F7114

## (undated) DEVICE — DEFIB PRO-PADZ LVP LQD GEL ADULT 8900-2105-01

## (undated) DEVICE — DECANTER VIAL 2006S

## (undated) DEVICE — TUBING IRRIG CYSTO/BLADDER SET 81" LF 2C4040

## (undated) DEVICE — PREP SKIN SCRUB TRAY 4461A

## (undated) DEVICE — SU VICRYL 2-0 CT-1 36" UND J945H

## (undated) DEVICE — STPL SKIN 35W ROTATING HEAD PRW35

## (undated) DEVICE — DRAPE C-ARM OEC MINI VIEW 6800   00-901917-01

## (undated) DEVICE — DRAPE STOCKINETTE IMPERVIOUS 12" 1587

## (undated) DEVICE — NDL PERC ENTRY THINWALL 18GA 7.0" G00166

## (undated) DEVICE — LINEN GOWN XLG 5407

## (undated) DEVICE — ESU GROUND PAD UNIVERSAL W/O CORD

## (undated) DEVICE — LINEN DRAPE 54X72" 5467

## (undated) DEVICE — 2.5 MM DRILL BIT

## (undated) DEVICE — SUCTION IRR SYSTEM W/O TIP INTERPULSE HANDPIECE 0210-100-000

## (undated) DEVICE — DRAPE C-ARM 60X42" 1013

## (undated) DEVICE — GLOVE PROTEXIS W/NEU-THERA 8.0  2D73TE80

## (undated) DEVICE — CATH ANGIO INFINITI JL4 4FRX100CM 538420

## (undated) DEVICE — STRAP KNEE/BODY 31143004

## (undated) DEVICE — SU ETHILON 3-0 PS-1 18" 1663H

## (undated) DEVICE — SYR BULB IRRIG 50ML LATEX FREE 0035280

## (undated) DEVICE — SPECIMEN CONTAINER 5OZ STERILE 2600SA

## (undated) DEVICE — GLOVE PROTEXIS W/NEU-THERA 6.5  2D73TE65

## (undated) DEVICE — DRSG GAUZE 4X8"

## (undated) DEVICE — GLOVE PROTEXIS BLUE W/NEU-THERA 8.5  2D73EB85

## (undated) RX ORDER — ACETAMINOPHEN 325 MG/1
TABLET ORAL
Status: DISPENSED
Start: 2017-11-07

## (undated) RX ORDER — FENTANYL CITRATE 50 UG/ML
INJECTION, SOLUTION INTRAMUSCULAR; INTRAVENOUS
Status: DISPENSED
Start: 2017-08-11

## (undated) RX ORDER — CEFAZOLIN SODIUM 1 G/3ML
INJECTION, POWDER, FOR SOLUTION INTRAMUSCULAR; INTRAVENOUS
Status: DISPENSED
Start: 2021-03-01

## (undated) RX ORDER — DIPHENHYDRAMINE HYDROCHLORIDE 50 MG/ML
INJECTION INTRAMUSCULAR; INTRAVENOUS
Status: DISPENSED
Start: 2021-03-01

## (undated) RX ORDER — FENTANYL CITRATE 50 UG/ML
INJECTION, SOLUTION INTRAMUSCULAR; INTRAVENOUS
Status: DISPENSED
Start: 2017-11-07

## (undated) RX ORDER — METHYLPREDNISOLONE ACETATE 40 MG/ML
INJECTION, SUSPENSION INTRA-ARTICULAR; INTRALESIONAL; INTRAMUSCULAR; SOFT TISSUE
Status: DISPENSED
Start: 2019-02-06

## (undated) RX ORDER — LIDOCAINE HCL/EPINEPHRINE/PF 2%-1:200K
VIAL (ML) INJECTION
Status: DISPENSED
Start: 2021-03-01

## (undated) RX ORDER — DEXAMETHASONE SODIUM PHOSPHATE 10 MG/ML
INJECTION, SOLUTION INTRAMUSCULAR; INTRAVENOUS
Status: DISPENSED
Start: 2021-03-01

## (undated) RX ORDER — BUPIVACAINE HYDROCHLORIDE 5 MG/ML
INJECTION, SOLUTION EPIDURAL; INTRACAUDAL
Status: DISPENSED
Start: 2019-03-07

## (undated) RX ORDER — OXYCODONE HYDROCHLORIDE 5 MG/1
TABLET ORAL
Status: DISPENSED
Start: 2017-11-07

## (undated) RX ORDER — PROPOFOL 10 MG/ML
INJECTION, EMULSION INTRAVENOUS
Status: DISPENSED
Start: 2021-03-01

## (undated) RX ORDER — FENTANYL CITRATE 50 UG/ML
INJECTION, SOLUTION INTRAMUSCULAR; INTRAVENOUS
Status: DISPENSED
Start: 2023-07-24

## (undated) RX ORDER — ROPIVACAINE HYDROCHLORIDE 5 MG/ML
INJECTION, SOLUTION EPIDURAL; INFILTRATION; PERINEURAL
Status: DISPENSED
Start: 2021-03-01

## (undated) RX ORDER — GABAPENTIN 300 MG/1
CAPSULE ORAL
Status: DISPENSED
Start: 2017-11-07

## (undated) RX ORDER — METHYLPREDNISOLONE ACETATE 40 MG/ML
INJECTION, SUSPENSION INTRA-ARTICULAR; INTRALESIONAL; INTRAMUSCULAR; SOFT TISSUE
Status: DISPENSED
Start: 2019-03-07

## (undated) RX ORDER — HEPARIN SODIUM 200 [USP'U]/100ML
INJECTION, SOLUTION INTRAVENOUS
Status: DISPENSED
Start: 2023-07-24

## (undated) RX ORDER — DEXAMETHASONE SODIUM PHOSPHATE 10 MG/ML
INJECTION, SOLUTION INTRAMUSCULAR; INTRAVENOUS
Status: DISPENSED
Start: 2019-03-07

## (undated) RX ORDER — ONDANSETRON 2 MG/ML
INJECTION INTRAMUSCULAR; INTRAVENOUS
Status: DISPENSED
Start: 2021-03-01

## (undated) RX ORDER — HYDRALAZINE HYDROCHLORIDE 20 MG/ML
INJECTION INTRAMUSCULAR; INTRAVENOUS
Status: DISPENSED
Start: 2017-11-07

## (undated) RX ORDER — REGADENOSON 0.08 MG/ML
INJECTION, SOLUTION INTRAVENOUS
Status: DISPENSED
Start: 2024-06-03

## (undated) RX ORDER — BUPIVACAINE HYDROCHLORIDE 5 MG/ML
INJECTION, SOLUTION EPIDURAL; INTRACAUDAL
Status: DISPENSED
Start: 2019-02-06

## (undated) RX ORDER — HEPARIN SODIUM 1000 [USP'U]/ML
INJECTION, SOLUTION INTRAVENOUS; SUBCUTANEOUS
Status: DISPENSED
Start: 2023-07-24

## (undated) RX ORDER — LIDOCAINE HYDROCHLORIDE 10 MG/ML
INJECTION, SOLUTION EPIDURAL; INFILTRATION; INTRACAUDAL; PERINEURAL
Status: DISPENSED
Start: 2023-07-24

## (undated) RX ORDER — DEXAMETHASONE SODIUM PHOSPHATE 10 MG/ML
INJECTION, SOLUTION INTRAMUSCULAR; INTRAVENOUS
Status: DISPENSED
Start: 2019-02-06

## (undated) RX ORDER — FENTANYL CITRATE 50 UG/ML
INJECTION, SOLUTION INTRAMUSCULAR; INTRAVENOUS
Status: DISPENSED
Start: 2021-03-01

## (undated) RX ORDER — BUPIVACAINE HYDROCHLORIDE 5 MG/ML
INJECTION, SOLUTION EPIDURAL; INTRACAUDAL
Status: DISPENSED
Start: 2017-11-07

## (undated) RX ORDER — LIDOCAINE HYDROCHLORIDE 10 MG/ML
INJECTION, SOLUTION EPIDURAL; INFILTRATION; INTRACAUDAL; PERINEURAL
Status: DISPENSED
Start: 2021-03-01